# Patient Record
Sex: FEMALE | Race: WHITE | HISPANIC OR LATINO | Employment: OTHER | URBAN - METROPOLITAN AREA
[De-identification: names, ages, dates, MRNs, and addresses within clinical notes are randomized per-mention and may not be internally consistent; named-entity substitution may affect disease eponyms.]

---

## 2019-02-01 ENCOUNTER — OFFICE VISIT (OUTPATIENT)
Dept: URGENT CARE | Facility: CLINIC | Age: 49
End: 2019-02-01
Payer: COMMERCIAL

## 2019-02-01 VITALS
SYSTOLIC BLOOD PRESSURE: 135 MMHG | DIASTOLIC BLOOD PRESSURE: 62 MMHG | BODY MASS INDEX: 35.36 KG/M2 | OXYGEN SATURATION: 98 % | WEIGHT: 220 LBS | RESPIRATION RATE: 18 BRPM | HEART RATE: 77 BPM | TEMPERATURE: 99.6 F | HEIGHT: 66 IN

## 2019-02-01 DIAGNOSIS — N63.10 LUMP OF BREAST, RIGHT: Primary | ICD-10-CM

## 2019-02-01 PROCEDURE — G0381 LEV 2 HOSP TYPE B ED VISIT: HCPCS | Performed by: NURSE PRACTITIONER

## 2019-02-01 PROCEDURE — 99282 EMERGENCY DEPT VISIT SF MDM: CPT | Performed by: NURSE PRACTITIONER

## 2019-02-01 NOTE — PATIENT INSTRUCTIONS
Breast Mass   AMBULATORY CARE:   A breast mass  is a lump or growth in your breast or underarm  A cyst (fluid-filled pocket), an injury, or changes in your breast tissue are the most common causes  A breast mass can also be caused by cancer  You must follow up as directed to find the cause of your breast mass  Contact your healthcare provider if:   · Your breast mass returns or grows larger  · You have pain in your breast or underarm  · You have nipple discharge  · You notice other changes to your breasts or nipples, such as dimpling or a rash  · You had an aspiration and you have redness, pain, swelling, or warmth near the aspiration site  · You have a fever  · You have questions or concerns about your condition or care  Continue to do breast self-exams as directed:  Check your breast for changes in size, shape, or feel of the breast tissue  Check under your arms and all around your breasts  If you have monthly periods, examine your breasts after your period is over  Contact your healthcare provider if you notice any changes in your breasts  If you have questions, ask for more information on how to do a breast self-exam         Follow up with your healthcare provider as directed: You may need to return for more tests  Write down your questions so you remember to ask them during your visits  © 2017 [employer] [employer] Information is for End User's use only and may not be sold, redistributed or otherwise used for commercial purposes  All illustrations and images included in CareNotes® are the copyrighted property of A D A M , Inc  or [employer]  The above information is an  only  It is not intended as medical advice for individual conditions or treatments  Talk to your doctor, nurse or pharmacist before following any medical regimen to see if it is safe and effective for you

## 2019-02-01 NOTE — PROGRESS NOTES
Assessment/Plan    Lump of breast, right [N63 10]  1  Lump of breast, right       - patient advised to follow up with PCP will order ultrasound and/or mammogram  - advised to do so ASAP        Subjective:  Lump on the breast     Patient ID: Narayan Smith is a 51 y o  female  Reason For Visit / Chief Complaint  Chief Complaint   Patient presents with    Breast Problem     pt noted a lump to her right breast 3 days ago  pain noted  I assessed pts breast, no redness noted, lump felt/noted  HPI  She reports she noticed a lump on her breast about 3 days ago  Right breast   No pain  No previous occurrence of such lump  reports level of discomfort in the affected area is 1/10  Past Medical History:   Diagnosis Date    Hypotension        Past Surgical History:   Procedure Laterality Date    MYOMECTOMY      H/O FIBROIDS, NO SURGERY NEEDED       History reviewed  No pertinent family history  Review of Systems   Cardiovascular: Negative for chest pain (No trauma to the chest or breast   No discharge from the nipple  No bruising on the breast )  Objective:    /62 (BP Location: Left arm, Patient Position: Sitting)   Pulse 77   Temp 99 6 °F (37 6 °C) (Tympanic)   Resp 18   Ht 5' 6" (1 676 m)   Wt 99 8 kg (220 lb)   SpO2 98%   BMI 35 51 kg/m²     Physical Exam   Pulmonary/Chest:   Patient advised she will need an ultrasound for a more detailed examination of the lump  That week cannot order an ultrasound at this location  Advised that she will have to follow up with PCP will decide whether she needs either an ultrasound or mammogram of both  Patient got very angry and left because she felt we were supposed to give an outpatient order for ultrasound  Breast exam not performed

## 2019-05-02 ENCOUNTER — TRANSCRIBE ORDERS (OUTPATIENT)
Dept: MAMMOGRAPHY | Facility: CLINIC | Age: 49
End: 2019-05-02

## 2019-05-02 ENCOUNTER — OFFICE VISIT (OUTPATIENT)
Dept: OBGYN CLINIC | Facility: CLINIC | Age: 49
End: 2019-05-02
Payer: COMMERCIAL

## 2019-05-02 VITALS — DIASTOLIC BLOOD PRESSURE: 70 MMHG | BODY MASS INDEX: 34.86 KG/M2 | WEIGHT: 216 LBS | SYSTOLIC BLOOD PRESSURE: 110 MMHG

## 2019-05-02 DIAGNOSIS — N93.9 ABNORMAL UTERINE BLEEDING (AUB): ICD-10-CM

## 2019-05-02 DIAGNOSIS — N63.10 BREAST MASS, RIGHT: Primary | ICD-10-CM

## 2019-05-02 PROCEDURE — 99204 OFFICE O/P NEW MOD 45 MIN: CPT | Performed by: NURSE PRACTITIONER

## 2019-05-03 ENCOUNTER — HOSPITAL ENCOUNTER (OUTPATIENT)
Dept: ULTRASOUND IMAGING | Facility: CLINIC | Age: 49
Discharge: HOME | End: 2019-05-03
Admitting: RADIOLOGY
Payer: COMMERCIAL

## 2019-05-03 ENCOUNTER — HOSPITAL ENCOUNTER (OUTPATIENT)
Dept: MAMMOGRAPHY | Facility: CLINIC | Age: 49
Discharge: HOME | End: 2019-05-03
Payer: COMMERCIAL

## 2019-05-03 ENCOUNTER — HOSPITAL ENCOUNTER (OUTPATIENT)
Dept: ULTRASOUND IMAGING | Facility: CLINIC | Age: 49
Discharge: HOME | End: 2019-05-03
Payer: COMMERCIAL

## 2019-05-03 VITALS
SYSTOLIC BLOOD PRESSURE: 120 MMHG | BODY MASS INDEX: 34.72 KG/M2 | DIASTOLIC BLOOD PRESSURE: 80 MMHG | HEIGHT: 66 IN | WEIGHT: 216 LBS | HEART RATE: 68 BPM

## 2019-05-03 DIAGNOSIS — R92.8 ABNORMAL MAMMOGRAM: ICD-10-CM

## 2019-05-03 DIAGNOSIS — R92.8 ABNORMAL ULTRASOUND OF BREAST: ICD-10-CM

## 2019-05-03 DIAGNOSIS — N63.10 BREAST MASS, RIGHT: ICD-10-CM

## 2019-05-03 PROCEDURE — 88342 IMHCHEM/IMCYTCHM 1ST ANTB: CPT | Performed by: PATHOLOGY

## 2019-05-03 PROCEDURE — 77066 DX MAMMO INCL CAD BI: CPT

## 2019-05-03 PROCEDURE — 88361 TUMOR IMMUNOHISTOCHEM/COMPUT: CPT | Performed by: PATHOLOGY

## 2019-05-03 PROCEDURE — G0279 TOMOSYNTHESIS, MAMMO: HCPCS

## 2019-05-03 PROCEDURE — 88341 IMHCHEM/IMCYTCHM EA ADD ANTB: CPT | Performed by: PATHOLOGY

## 2019-05-03 PROCEDURE — 76642 ULTRASOUND BREAST LIMITED: CPT

## 2019-05-03 PROCEDURE — 76942 ECHO GUIDE FOR BIOPSY: CPT

## 2019-05-03 PROCEDURE — 88305 TISSUE EXAM BY PATHOLOGIST: CPT | Performed by: PATHOLOGY

## 2019-05-03 PROCEDURE — 19083 BX BREAST 1ST LESION US IMAG: CPT

## 2019-05-03 RX ORDER — LIDOCAINE HYDROCHLORIDE 10 MG/ML
4 INJECTION, SOLUTION INFILTRATION; PERINEURAL ONCE
Status: COMPLETED | OUTPATIENT
Start: 2019-05-03 | End: 2019-05-03

## 2019-05-03 RX ADMIN — LIDOCAINE HYDROCHLORIDE 4 ML: 10 INJECTION, SOLUTION INFILTRATION; PERINEURAL at 14:08

## 2019-05-03 RX ADMIN — LIDOCAINE HYDROCHLORIDE 4 ML: 10 INJECTION, SOLUTION INFILTRATION; PERINEURAL at 13:57

## 2019-05-05 LAB
BASOPHILS # BLD AUTO: 7 CELLS/UL (ref 0–200)
BASOPHILS NFR BLD AUTO: 0.1 %
EOSINOPHIL # BLD AUTO: 127 CELLS/UL (ref 15–500)
EOSINOPHIL NFR BLD AUTO: 1.9 %
ERYTHROCYTE [DISTWIDTH] IN BLOOD BY AUTOMATED COUNT: 12.4 % (ref 11–15)
HCT VFR BLD AUTO: 39.2 % (ref 35–45)
HGB BLD-MCNC: 13 G/DL (ref 11.7–15.5)
LYMPHOCYTES # BLD AUTO: 1956 CELLS/UL (ref 850–3900)
LYMPHOCYTES NFR BLD AUTO: 29.2 %
MCH RBC QN AUTO: 30.1 PG (ref 27–33)
MCHC RBC AUTO-ENTMCNC: 33.2 G/DL (ref 32–36)
MCV RBC AUTO: 90.7 FL (ref 80–100)
MONOCYTES # BLD AUTO: 509 CELLS/UL (ref 200–950)
MONOCYTES NFR BLD AUTO: 7.6 %
NEUTROPHILS # BLD AUTO: 4100 CELLS/UL (ref 1500–7800)
NEUTROPHILS NFR BLD AUTO: 61.2 %
PLATELET # BLD AUTO: 185 THOUSAND/UL (ref 140–400)
PMV BLD REES-ECKER: 13.1 FL (ref 7.5–12.5)
RBC # BLD AUTO: 4.32 MILLION/UL (ref 3.8–5.1)
TSH SERPL-ACNC: 1.74 MIU/L
WBC # BLD AUTO: 6.7 THOUSAND/UL (ref 3.8–10.8)

## 2019-05-06 ENCOUNTER — TELEPHONE (OUTPATIENT)
Dept: OBGYN CLINIC | Facility: CLINIC | Age: 49
End: 2019-05-06

## 2019-05-08 ENCOUNTER — TELEPHONE (OUTPATIENT)
Dept: MAMMOGRAPHY | Facility: CLINIC | Age: 49
End: 2019-05-08

## 2019-05-16 ENCOUNTER — OFFICE VISIT (OUTPATIENT)
Dept: SURGICAL ONCOLOGY | Facility: HOSPITAL | Age: 49
End: 2019-05-16
Payer: COMMERCIAL

## 2019-05-16 VITALS
RESPIRATION RATE: 14 BRPM | SYSTOLIC BLOOD PRESSURE: 100 MMHG | WEIGHT: 217 LBS | HEART RATE: 76 BPM | TEMPERATURE: 97.1 F | DIASTOLIC BLOOD PRESSURE: 70 MMHG | HEIGHT: 66 IN | BODY MASS INDEX: 34.87 KG/M2

## 2019-05-16 DIAGNOSIS — C50.111 MALIGNANT NEOPLASM OF CENTRAL PORTION OF RIGHT BREAST IN FEMALE, ESTROGEN RECEPTOR POSITIVE (HCC): Primary | ICD-10-CM

## 2019-05-16 DIAGNOSIS — Z80.0 FAMILY HISTORY OF COLON CANCER: ICD-10-CM

## 2019-05-16 DIAGNOSIS — Z17.0 MALIGNANT NEOPLASM OF CENTRAL PORTION OF RIGHT BREAST IN FEMALE, ESTROGEN RECEPTOR POSITIVE (HCC): Primary | ICD-10-CM

## 2019-05-16 DIAGNOSIS — Z80.3 FAMILY HISTORY OF BREAST CANCER IN FEMALE: ICD-10-CM

## 2019-05-16 DIAGNOSIS — R92.2 DENSE BREAST TISSUE: ICD-10-CM

## 2019-05-16 DIAGNOSIS — C50.911 CARCINOMA OF RIGHT BREAST METASTATIC TO AXILLARY LYMPH NODE (HCC): ICD-10-CM

## 2019-05-16 DIAGNOSIS — C77.3 CARCINOMA OF RIGHT BREAST METASTATIC TO AXILLARY LYMPH NODE (HCC): ICD-10-CM

## 2019-05-16 PROBLEM — C50.419 MALIGNANT NEOPLASM OF UPPER-OUTER QUADRANT OF BREAST IN FEMALE, ESTROGEN RECEPTOR POSITIVE (HCC): Status: ACTIVE | Noted: 2019-05-02

## 2019-05-16 PROCEDURE — 99245 OFF/OP CONSLTJ NEW/EST HI 55: CPT | Performed by: SURGERY

## 2019-05-16 RX ORDER — BENZONATATE 100 MG/1
100 CAPSULE ORAL 3 TIMES DAILY PRN
COMMUNITY
End: 2019-06-07 | Stop reason: ALTCHOICE

## 2019-05-17 ENCOUNTER — TELEPHONE (OUTPATIENT)
Dept: SURGICAL ONCOLOGY | Facility: CLINIC | Age: 49
End: 2019-05-17

## 2019-05-22 ENCOUNTER — TELEPHONE (OUTPATIENT)
Dept: SURGICAL ONCOLOGY | Facility: CLINIC | Age: 49
End: 2019-05-22

## 2019-05-22 DIAGNOSIS — N61.0 MASTITIS: Primary | ICD-10-CM

## 2019-05-22 RX ORDER — CEPHALEXIN 500 MG/1
500 CAPSULE ORAL EVERY 8 HOURS SCHEDULED
Qty: 15 CAPSULE | Refills: 0 | Status: SHIPPED | OUTPATIENT
Start: 2019-05-22 | End: 2019-05-27

## 2019-05-23 ENCOUNTER — HOSPITAL ENCOUNTER (OUTPATIENT)
Dept: NUCLEAR MEDICINE | Facility: HOSPITAL | Age: 49
Discharge: HOME | End: 2019-05-23
Attending: SURGERY
Payer: COMMERCIAL

## 2019-05-23 DIAGNOSIS — C50.911 CARCINOMA OF RIGHT BREAST METASTATIC TO AXILLARY LYMPH NODE (HCC): ICD-10-CM

## 2019-05-23 DIAGNOSIS — Z15.01 BRCA2 POSITIVE: Primary | ICD-10-CM

## 2019-05-23 DIAGNOSIS — C77.3 CARCINOMA OF RIGHT BREAST METASTATIC TO AXILLARY LYMPH NODE (HCC): ICD-10-CM

## 2019-05-23 DIAGNOSIS — Z15.09 BRCA2 POSITIVE: Primary | ICD-10-CM

## 2019-05-23 PROCEDURE — A9503 TC99M MEDRONATE: HCPCS

## 2019-05-23 PROCEDURE — 78306 BONE IMAGING WHOLE BODY: CPT

## 2019-05-24 ENCOUNTER — HOSPITAL ENCOUNTER (OUTPATIENT)
Dept: CT IMAGING | Facility: CLINIC | Age: 49
Discharge: HOME | End: 2019-05-24
Attending: SURGERY
Payer: COMMERCIAL

## 2019-05-24 DIAGNOSIS — C77.3 CARCINOMA OF RIGHT BREAST METASTATIC TO AXILLARY LYMPH NODE (HCC): ICD-10-CM

## 2019-05-24 DIAGNOSIS — C50.911 CARCINOMA OF RIGHT BREAST METASTATIC TO AXILLARY LYMPH NODE (HCC): ICD-10-CM

## 2019-05-24 PROCEDURE — 74176 CT ABD & PELVIS W/O CONTRAST: CPT

## 2019-05-24 PROCEDURE — 71250 CT THORAX DX C-: CPT

## 2019-05-28 ENCOUNTER — TELEPHONE (OUTPATIENT)
Dept: OBGYN CLINIC | Facility: CLINIC | Age: 49
End: 2019-05-28

## 2019-06-03 DIAGNOSIS — R93.89 ABNORMAL CT SCAN: ICD-10-CM

## 2019-06-03 DIAGNOSIS — Z15.09 BRCA2 POSITIVE: ICD-10-CM

## 2019-06-03 DIAGNOSIS — C77.3 CARCINOMA OF RIGHT BREAST METASTATIC TO AXILLARY LYMPH NODE (HCC): Primary | ICD-10-CM

## 2019-06-03 DIAGNOSIS — Z15.01 BRCA2 POSITIVE: ICD-10-CM

## 2019-06-03 DIAGNOSIS — C50.911 CARCINOMA OF RIGHT BREAST METASTATIC TO AXILLARY LYMPH NODE (HCC): Primary | ICD-10-CM

## 2019-06-04 ENCOUNTER — TELEPHONE (OUTPATIENT)
Dept: SURGICAL ONCOLOGY | Facility: CLINIC | Age: 49
End: 2019-06-04

## 2019-06-07 ENCOUNTER — CONSULT (OUTPATIENT)
Dept: HEMATOLOGY ONCOLOGY | Facility: CLINIC | Age: 49
End: 2019-06-07
Payer: COMMERCIAL

## 2019-06-07 VITALS
DIASTOLIC BLOOD PRESSURE: 80 MMHG | HEART RATE: 70 BPM | OXYGEN SATURATION: 97 % | RESPIRATION RATE: 18 BRPM | HEIGHT: 66 IN | SYSTOLIC BLOOD PRESSURE: 124 MMHG | BODY MASS INDEX: 34.72 KG/M2 | TEMPERATURE: 95.4 F | WEIGHT: 216 LBS

## 2019-06-07 DIAGNOSIS — Z15.09 BRCA2 POSITIVE: Primary | ICD-10-CM

## 2019-06-07 DIAGNOSIS — C50.911 CARCINOMA OF RIGHT BREAST METASTATIC TO AXILLARY LYMPH NODE (HCC): ICD-10-CM

## 2019-06-07 DIAGNOSIS — C77.3 CARCINOMA OF RIGHT BREAST METASTATIC TO AXILLARY LYMPH NODE (HCC): ICD-10-CM

## 2019-06-07 DIAGNOSIS — Z15.01 BRCA2 POSITIVE: Primary | ICD-10-CM

## 2019-06-07 PROCEDURE — 99245 OFF/OP CONSLTJ NEW/EST HI 55: CPT | Performed by: INTERNAL MEDICINE

## 2019-06-10 ENCOUNTER — DOCUMENTATION (OUTPATIENT)
Dept: HEMATOLOGY ONCOLOGY | Facility: CLINIC | Age: 49
End: 2019-06-10

## 2019-06-11 ENCOUNTER — HOSPITAL ENCOUNTER (OUTPATIENT)
Dept: RADIOLOGY | Facility: HOSPITAL | Age: 49
Discharge: HOME | End: 2019-06-11
Attending: SURGERY | Admitting: RADIOLOGY
Payer: COMMERCIAL

## 2019-06-11 VITALS
BODY MASS INDEX: 34.72 KG/M2 | RESPIRATION RATE: 16 BRPM | TEMPERATURE: 98.7 F | WEIGHT: 216 LBS | SYSTOLIC BLOOD PRESSURE: 120 MMHG | HEIGHT: 66 IN | OXYGEN SATURATION: 95 % | DIASTOLIC BLOOD PRESSURE: 57 MMHG | HEART RATE: 70 BPM

## 2019-06-11 DIAGNOSIS — R93.89 ABNORMAL CT SCAN: ICD-10-CM

## 2019-06-11 DIAGNOSIS — C50.911 CARCINOMA OF RIGHT BREAST METASTATIC TO AXILLARY LYMPH NODE (HCC): ICD-10-CM

## 2019-06-11 DIAGNOSIS — C77.3 CARCINOMA OF RIGHT BREAST METASTATIC TO AXILLARY LYMPH NODE (HCC): ICD-10-CM

## 2019-06-11 LAB
ERYTHROCYTE [DISTWIDTH] IN BLOOD BY AUTOMATED COUNT: 12.9 % (ref 11.6–15.1)
HCG SERPL QL: NEGATIVE
HCT VFR BLD AUTO: 40.8 % (ref 34.8–46.1)
HGB BLD-MCNC: 13.4 G/DL (ref 11.5–15.4)
INR PPP: 0.96 (ref 0.86–1.17)
MCH RBC QN AUTO: 29.6 PG (ref 26.8–34.3)
MCHC RBC AUTO-ENTMCNC: 32.8 G/DL (ref 31.4–37.4)
MCV RBC AUTO: 90 FL (ref 82–98)
PLATELET # BLD AUTO: 200 THOUSANDS/UL (ref 149–390)
PMV BLD AUTO: 12.5 FL (ref 8.9–12.7)
PROTHROMBIN TIME: 12.9 SECONDS (ref 11.8–14.2)
RBC # BLD AUTO: 4.52 MILLION/UL (ref 3.81–5.12)
WBC # BLD AUTO: 8.04 THOUSAND/UL (ref 4.31–10.16)

## 2019-06-11 PROCEDURE — 99152 MOD SED SAME PHYS/QHP 5/>YRS: CPT | Performed by: RADIOLOGY

## 2019-06-11 PROCEDURE — 88334 PATH CONSLTJ SURG CYTO XM EA: CPT | Performed by: PATHOLOGY

## 2019-06-11 PROCEDURE — 84703 CHORIONIC GONADOTROPIN ASSAY: CPT | Performed by: RADIOLOGY

## 2019-06-11 PROCEDURE — 76942 ECHO GUIDE FOR BIOPSY: CPT

## 2019-06-11 PROCEDURE — 88342 IMHCHEM/IMCYTCHM 1ST ANTB: CPT | Performed by: PATHOLOGY

## 2019-06-11 PROCEDURE — 47000 NEEDLE BIOPSY OF LIVER PERQ: CPT

## 2019-06-11 PROCEDURE — 88341 IMHCHEM/IMCYTCHM EA ADD ANTB: CPT | Performed by: PATHOLOGY

## 2019-06-11 PROCEDURE — 99153 MOD SED SAME PHYS/QHP EA: CPT

## 2019-06-11 PROCEDURE — 88333 PATH CONSLTJ SURG CYTO XM 1: CPT | Performed by: PATHOLOGY

## 2019-06-11 PROCEDURE — 88307 TISSUE EXAM BY PATHOLOGIST: CPT | Performed by: PATHOLOGY

## 2019-06-11 PROCEDURE — 76942 ECHO GUIDE FOR BIOPSY: CPT | Performed by: RADIOLOGY

## 2019-06-11 PROCEDURE — 47000 NEEDLE BIOPSY OF LIVER PERQ: CPT | Performed by: RADIOLOGY

## 2019-06-11 PROCEDURE — 85027 COMPLETE CBC AUTOMATED: CPT | Performed by: RADIOLOGY

## 2019-06-11 PROCEDURE — 85610 PROTHROMBIN TIME: CPT | Performed by: RADIOLOGY

## 2019-06-11 PROCEDURE — 99152 MOD SED SAME PHYS/QHP 5/>YRS: CPT

## 2019-06-11 RX ORDER — MIDAZOLAM HYDROCHLORIDE 1 MG/ML
INJECTION INTRAMUSCULAR; INTRAVENOUS CODE/TRAUMA/SEDATION MEDICATION
Status: COMPLETED | OUTPATIENT
Start: 2019-06-11 | End: 2019-06-11

## 2019-06-11 RX ORDER — FENTANYL CITRATE 50 UG/ML
INJECTION, SOLUTION INTRAMUSCULAR; INTRAVENOUS CODE/TRAUMA/SEDATION MEDICATION
Status: COMPLETED | OUTPATIENT
Start: 2019-06-11 | End: 2019-06-11

## 2019-06-11 RX ORDER — DIPHENHYDRAMINE HYDROCHLORIDE 50 MG/ML
INJECTION INTRAMUSCULAR; INTRAVENOUS CODE/TRAUMA/SEDATION MEDICATION
Status: COMPLETED | OUTPATIENT
Start: 2019-06-11 | End: 2019-06-11

## 2019-06-11 RX ORDER — SODIUM CHLORIDE 9 MG/ML
75 INJECTION, SOLUTION INTRAVENOUS CONTINUOUS
Status: DISCONTINUED | OUTPATIENT
Start: 2019-06-11 | End: 2019-06-11 | Stop reason: HOSPADM

## 2019-06-11 RX ADMIN — DIPHENHYDRAMINE HYDROCHLORIDE 50 MG: 50 INJECTION, SOLUTION INTRAMUSCULAR; INTRAVENOUS at 11:47

## 2019-06-11 RX ADMIN — MIDAZOLAM 1 MG: 1 INJECTION INTRAMUSCULAR; INTRAVENOUS at 12:00

## 2019-06-11 RX ADMIN — MIDAZOLAM 1 MG: 1 INJECTION INTRAMUSCULAR; INTRAVENOUS at 11:44

## 2019-06-11 RX ADMIN — FENTANYL CITRATE 50 MCG: 50 INJECTION, SOLUTION INTRAMUSCULAR; INTRAVENOUS at 11:44

## 2019-06-11 RX ADMIN — MIDAZOLAM 1 MG: 1 INJECTION INTRAMUSCULAR; INTRAVENOUS at 11:37

## 2019-06-11 RX ADMIN — FENTANYL CITRATE 50 MCG: 50 INJECTION, SOLUTION INTRAMUSCULAR; INTRAVENOUS at 11:37

## 2019-06-11 RX ADMIN — FENTANYL CITRATE 50 MCG: 50 INJECTION, SOLUTION INTRAMUSCULAR; INTRAVENOUS at 12:00

## 2019-06-20 ENCOUNTER — OFFICE VISIT (OUTPATIENT)
Dept: HEMATOLOGY ONCOLOGY | Facility: CLINIC | Age: 49
End: 2019-06-20
Payer: COMMERCIAL

## 2019-06-20 VITALS
HEIGHT: 66 IN | DIASTOLIC BLOOD PRESSURE: 72 MMHG | HEART RATE: 77 BPM | SYSTOLIC BLOOD PRESSURE: 144 MMHG | OXYGEN SATURATION: 96 % | RESPIRATION RATE: 18 BRPM | BODY MASS INDEX: 34.72 KG/M2 | WEIGHT: 216 LBS | TEMPERATURE: 98.5 F

## 2019-06-20 DIAGNOSIS — Z15.09 BRCA2 POSITIVE: ICD-10-CM

## 2019-06-20 DIAGNOSIS — Z17.0 MALIGNANT NEOPLASM OF CENTRAL PORTION OF RIGHT BREAST IN FEMALE, ESTROGEN RECEPTOR POSITIVE (HCC): Primary | ICD-10-CM

## 2019-06-20 DIAGNOSIS — C78.7 LIVER METASTASES (HCC): ICD-10-CM

## 2019-06-20 DIAGNOSIS — C50.111 MALIGNANT NEOPLASM OF CENTRAL PORTION OF RIGHT BREAST IN FEMALE, ESTROGEN RECEPTOR POSITIVE (HCC): Primary | ICD-10-CM

## 2019-06-20 DIAGNOSIS — Z15.01 BRCA2 POSITIVE: ICD-10-CM

## 2019-06-20 PROCEDURE — 99215 OFFICE O/P EST HI 40 MIN: CPT | Performed by: INTERNAL MEDICINE

## 2019-06-20 RX ORDER — LETROZOLE 2.5 MG/1
2.5 TABLET, FILM COATED ORAL DAILY
Qty: 90 TABLET | Refills: 1 | Status: SHIPPED | OUTPATIENT
Start: 2019-06-20 | End: 2019-12-02 | Stop reason: SDUPTHER

## 2019-06-21 ENCOUNTER — CONSULT (OUTPATIENT)
Dept: GYNECOLOGIC ONCOLOGY | Facility: CLINIC | Age: 49
End: 2019-06-21
Payer: COMMERCIAL

## 2019-06-21 ENCOUNTER — DOCUMENTATION (OUTPATIENT)
Dept: HEMATOLOGY ONCOLOGY | Facility: CLINIC | Age: 49
End: 2019-06-21

## 2019-06-21 VITALS
SYSTOLIC BLOOD PRESSURE: 124 MMHG | DIASTOLIC BLOOD PRESSURE: 84 MMHG | BODY MASS INDEX: 35.03 KG/M2 | TEMPERATURE: 98 F | HEIGHT: 66 IN | WEIGHT: 218 LBS | HEART RATE: 70 BPM

## 2019-06-21 DIAGNOSIS — N93.9 ABNORMAL UTERINE BLEEDING (AUB): ICD-10-CM

## 2019-06-21 DIAGNOSIS — C78.7 LIVER METASTASES (HCC): ICD-10-CM

## 2019-06-21 DIAGNOSIS — Z15.01 BRCA2 POSITIVE: ICD-10-CM

## 2019-06-21 DIAGNOSIS — C50.111 MALIGNANT NEOPLASM OF CENTRAL PORTION OF RIGHT BREAST IN FEMALE, ESTROGEN RECEPTOR POSITIVE (HCC): Primary | ICD-10-CM

## 2019-06-21 DIAGNOSIS — Z17.0 MALIGNANT NEOPLASM OF CENTRAL PORTION OF RIGHT BREAST IN FEMALE, ESTROGEN RECEPTOR POSITIVE (HCC): Primary | ICD-10-CM

## 2019-06-21 DIAGNOSIS — C50.911 CARCINOMA OF RIGHT BREAST METASTATIC TO AXILLARY LYMPH NODE (HCC): ICD-10-CM

## 2019-06-21 DIAGNOSIS — Z15.09 BRCA2 POSITIVE: ICD-10-CM

## 2019-06-21 DIAGNOSIS — C77.3 CARCINOMA OF RIGHT BREAST METASTATIC TO AXILLARY LYMPH NODE (HCC): ICD-10-CM

## 2019-06-21 PROCEDURE — 58100 BIOPSY OF UTERUS LINING: CPT | Performed by: OBSTETRICS & GYNECOLOGY

## 2019-06-21 PROCEDURE — 99245 OFF/OP CONSLTJ NEW/EST HI 55: CPT | Performed by: OBSTETRICS & GYNECOLOGY

## 2019-06-21 PROCEDURE — 88305 TISSUE EXAM BY PATHOLOGIST: CPT | Performed by: PATHOLOGY

## 2019-06-21 RX ORDER — ACETAMINOPHEN 325 MG/1
975 TABLET ORAL ONCE
Status: CANCELLED | OUTPATIENT
Start: 2019-06-21 | End: 2019-06-21

## 2019-06-21 RX ORDER — GABAPENTIN 100 MG/1
100 CAPSULE ORAL ONCE
Status: CANCELLED | OUTPATIENT
Start: 2019-06-21 | End: 2019-06-21

## 2019-06-21 RX ORDER — CELECOXIB 200 MG/1
200 CAPSULE ORAL ONCE
Status: CANCELLED | OUTPATIENT
Start: 2019-06-21 | End: 2019-06-21

## 2019-06-21 RX ORDER — HEPARIN SODIUM 5000 [USP'U]/ML
5000 INJECTION, SOLUTION INTRAVENOUS; SUBCUTANEOUS
Status: CANCELLED | OUTPATIENT
Start: 2019-06-21 | End: 2019-06-22

## 2019-06-24 ENCOUNTER — DOCUMENTATION (OUTPATIENT)
Dept: HEMATOLOGY ONCOLOGY | Facility: CLINIC | Age: 49
End: 2019-06-24

## 2019-06-24 DIAGNOSIS — C50.111 MALIGNANT NEOPLASM OF CENTRAL PORTION OF RIGHT BREAST IN FEMALE, ESTROGEN RECEPTOR POSITIVE (HCC): Primary | ICD-10-CM

## 2019-06-24 DIAGNOSIS — Z17.0 MALIGNANT NEOPLASM OF CENTRAL PORTION OF RIGHT BREAST IN FEMALE, ESTROGEN RECEPTOR POSITIVE (HCC): Primary | ICD-10-CM

## 2019-06-26 ENCOUNTER — DOCUMENTATION (OUTPATIENT)
Dept: HEMATOLOGY ONCOLOGY | Facility: CLINIC | Age: 49
End: 2019-06-26

## 2019-06-28 ENCOUNTER — DOCUMENTATION (OUTPATIENT)
Dept: HEMATOLOGY ONCOLOGY | Facility: CLINIC | Age: 49
End: 2019-06-28

## 2019-07-01 ENCOUNTER — TELEPHONE (OUTPATIENT)
Dept: OBGYN CLINIC | Facility: CLINIC | Age: 49
End: 2019-07-01

## 2019-07-01 NOTE — TELEPHONE ENCOUNTER
----- Message from Bessy Abel sent at 7/1/2019  6:24 PM EDT -----  Regarding: Non-Urgent Medical Question  Contact: [phone]  Should I get the hpv vaccine? To be blunt, I would like to get laid before I am to ill to enjoy it  Hpv seems to be running rampant in my age range  Is it a good idea?

## 2019-07-02 ENCOUNTER — DOCUMENTATION (OUTPATIENT)
Dept: HEMATOLOGY ONCOLOGY | Facility: CLINIC | Age: 49
End: 2019-07-02

## 2019-07-02 NOTE — TELEPHONE ENCOUNTER
Spoke to [place], reviewed with her that [employer] V[employer] is approved for people ages 10-36  Being that she is 51years old it would not be covered by insurance  I also explained that it is a 3 dose vaccine 0-2-6month schedulle and she will be receiving Chemo before her 2nd dose  I did discuss with her safe sex practices with condom use  She offered no other questions at this time

## 2019-07-02 NOTE — PROGRESS NOTES
7-2-19    Received call from Cain at [address] [facility] program stating she has been trying to reach [facility]  For weeks now  She stated Dea Garcia submitted an application for this patient and it does not indicate when the patient will loose her coverage  Cain stated unless she is given a definite date of termination she will have to deny the application and the patient can reapply when she doesn't have coverage  I informed her according to the notes in [facility] [place] reached out to her a number of times and left message  I also informed her that I would reach out to the patient to see if there was a definite [facility]  Call placed to patient per [facility], there is no definite date as the divorce proceedings are still pending  She stated she could ask her  however she was not positive she would get one  Patient stated she will try and call me back with information  [facility] to provide [place] with information

## 2019-07-24 ENCOUNTER — ANESTHESIA EVENT (OUTPATIENT)
Dept: PERIOP | Facility: HOSPITAL | Age: 49
End: 2019-07-24
Payer: COMMERCIAL

## 2019-07-24 LAB
ABO GROUP BLD: NORMAL
BASOPHILS # BLD AUTO: 18 CELLS/UL (ref 0–200)
BASOPHILS NFR BLD AUTO: 0.3 %
BLD GP AB SCN SERPL QL: NORMAL
EOSINOPHIL # BLD AUTO: 128 CELLS/UL (ref 15–500)
EOSINOPHIL NFR BLD AUTO: 2.1 %
ERYTHROCYTE [DISTWIDTH] IN BLOOD BY AUTOMATED COUNT: 12.5 % (ref 11–15)
EST. AVERAGE GLUCOSE BLD GHB EST-MCNC: 117 (CALC)
EST. AVERAGE GLUCOSE BLD GHB EST-SCNC: 6.5 (CALC)
HBA1C MFR BLD: 5.7 % OF TOTAL HGB
HCT VFR BLD AUTO: 39.1 % (ref 35–45)
HGB BLD-MCNC: 13 G/DL (ref 11.7–15.5)
LYMPHOCYTES # BLD AUTO: 1891 CELLS/UL (ref 850–3900)
LYMPHOCYTES NFR BLD AUTO: 31 %
MCH RBC QN AUTO: 29.8 PG (ref 27–33)
MCHC RBC AUTO-ENTMCNC: 33.2 G/DL (ref 32–36)
MCV RBC AUTO: 89.7 FL (ref 80–100)
MONOCYTES # BLD AUTO: 506 CELLS/UL (ref 200–950)
MONOCYTES NFR BLD AUTO: 8.3 %
NEUTROPHILS # BLD AUTO: 3556 CELLS/UL (ref 1500–7800)
NEUTROPHILS NFR BLD AUTO: 58.3 %
PLATELET # BLD AUTO: 193 THOUSAND/UL (ref 140–400)
PMV BLD REES-ECKER: 12.9 FL (ref 7.5–12.5)
RBC # BLD AUTO: 4.36 MILLION/UL (ref 3.8–5.1)
RH BLD: NORMAL
WBC # BLD AUTO: 6.1 THOUSAND/UL (ref 3.8–10.8)

## 2019-07-25 LAB
ALBUMIN SERPL-MCNC: 3.9 G/DL (ref 3.6–5.1)
ALBUMIN/GLOB SERPL: 1.3 (CALC) (ref 1–2.5)
ALP SERPL-CCNC: 64 U/L (ref 33–130)
ALT SERPL-CCNC: 24 U/L (ref 6–29)
AST SERPL-CCNC: 23 U/L (ref 10–35)
BASOPHILS # BLD AUTO: 30 CELLS/UL (ref 0–200)
BASOPHILS NFR BLD AUTO: 0.4 %
BILIRUB SERPL-MCNC: 0.3 MG/DL (ref 0.2–1.2)
BUN SERPL-MCNC: 20 MG/DL (ref 7–25)
BUN/CREAT SERPL: NORMAL (CALC) (ref 6–22)
CALCIUM SERPL-MCNC: 9.2 MG/DL (ref 8.6–10.4)
CHLORIDE SERPL-SCNC: 107 MMOL/L (ref 98–110)
CO2 SERPL-SCNC: 27 MMOL/L (ref 20–32)
CREAT SERPL-MCNC: 0.84 MG/DL (ref 0.5–1.05)
EOSINOPHIL # BLD AUTO: 163 CELLS/UL (ref 15–500)
EOSINOPHIL NFR BLD AUTO: 2.2 %
ERYTHROCYTE [DISTWIDTH] IN BLOOD BY AUTOMATED COUNT: 12.5 % (ref 11–15)
GLOBULIN SER CALC-MCNC: 2.9 G/DL (CALC) (ref 1.9–3.7)
GLUCOSE SERPL-MCNC: 88 MG/DL (ref 65–99)
HCT VFR BLD AUTO: 40.4 % (ref 35–45)
HGB BLD-MCNC: 13.2 G/DL (ref 11.7–15.5)
LYMPHOCYTES # BLD AUTO: 1998 CELLS/UL (ref 850–3900)
LYMPHOCYTES NFR BLD AUTO: 27 %
MCH RBC QN AUTO: 29.6 PG (ref 27–33)
MCHC RBC AUTO-ENTMCNC: 32.7 G/DL (ref 32–36)
MCV RBC AUTO: 90.6 FL (ref 80–100)
MONOCYTES # BLD AUTO: 570 CELLS/UL (ref 200–950)
MONOCYTES NFR BLD AUTO: 7.7 %
NEUTROPHILS # BLD AUTO: 4640 CELLS/UL (ref 1500–7800)
NEUTROPHILS NFR BLD AUTO: 62.7 %
PLATELET # BLD AUTO: 193 THOUSAND/UL (ref 140–400)
PMV BLD REES-ECKER: 12.9 FL (ref 7.5–12.5)
POTASSIUM SERPL-SCNC: 4.3 MMOL/L (ref 3.5–5.3)
PROT SERPL-MCNC: 6.8 G/DL (ref 6.1–8.1)
RBC # BLD AUTO: 4.46 MILLION/UL (ref 3.8–5.1)
SL AMB EGFR AFRICAN AMERICAN: 94 ML/MIN/1.73M2
SL AMB EGFR NON AFRICAN AMERICAN: 81 ML/MIN/1.73M2
SODIUM SERPL-SCNC: 141 MMOL/L (ref 135–146)
WBC # BLD AUTO: 7.4 THOUSAND/UL (ref 3.8–10.8)

## 2019-07-29 ENCOUNTER — HOSPITAL ENCOUNTER (OUTPATIENT)
Facility: HOSPITAL | Age: 49
Setting detail: OUTPATIENT SURGERY
Discharge: HOME | End: 2019-07-29
Attending: OBSTETRICS & GYNECOLOGY | Admitting: OBSTETRICS & GYNECOLOGY
Payer: COMMERCIAL

## 2019-07-29 ENCOUNTER — ANESTHESIA (OUTPATIENT)
Dept: PERIOP | Facility: HOSPITAL | Age: 49
End: 2019-07-29
Payer: COMMERCIAL

## 2019-07-29 VITALS
HEIGHT: 66 IN | HEART RATE: 55 BPM | SYSTOLIC BLOOD PRESSURE: 117 MMHG | TEMPERATURE: 95.6 F | DIASTOLIC BLOOD PRESSURE: 66 MMHG | WEIGHT: 215 LBS | OXYGEN SATURATION: 96 % | BODY MASS INDEX: 34.55 KG/M2 | RESPIRATION RATE: 20 BRPM

## 2019-07-29 DIAGNOSIS — N93.9 ABNORMAL UTERINE BLEEDING (AUB): Primary | ICD-10-CM

## 2019-07-29 DIAGNOSIS — C78.7 LIVER METASTASES (HCC): ICD-10-CM

## 2019-07-29 DIAGNOSIS — Z15.09 BRCA2 POSITIVE: ICD-10-CM

## 2019-07-29 DIAGNOSIS — Z17.0 MALIGNANT NEOPLASM OF CENTRAL PORTION OF RIGHT BREAST IN FEMALE, ESTROGEN RECEPTOR POSITIVE (HCC): ICD-10-CM

## 2019-07-29 DIAGNOSIS — C50.111 MALIGNANT NEOPLASM OF CENTRAL PORTION OF RIGHT BREAST IN FEMALE, ESTROGEN RECEPTOR POSITIVE (HCC): ICD-10-CM

## 2019-07-29 DIAGNOSIS — C50.911 CARCINOMA OF RIGHT BREAST METASTATIC TO AXILLARY LYMPH NODE (HCC): ICD-10-CM

## 2019-07-29 DIAGNOSIS — C77.3 CARCINOMA OF RIGHT BREAST METASTATIC TO AXILLARY LYMPH NODE (HCC): ICD-10-CM

## 2019-07-29 DIAGNOSIS — Z15.01 BRCA2 POSITIVE: ICD-10-CM

## 2019-07-29 PROBLEM — Z90.722 S/P BSO (BILATERAL SALPINGO-OOPHORECTOMY): Status: ACTIVE | Noted: 2019-07-29

## 2019-07-29 LAB
ABO GROUP BLD: NORMAL
ALBUMIN SERPL BCP-MCNC: 3.6 G/DL (ref 3.5–5)
ALP SERPL-CCNC: 77 U/L (ref 46–116)
ALT SERPL W P-5'-P-CCNC: 34 U/L (ref 12–78)
ANION GAP SERPL CALCULATED.3IONS-SCNC: 6 MMOL/L (ref 4–13)
AST SERPL W P-5'-P-CCNC: 32 U/L (ref 5–45)
BILIRUB SERPL-MCNC: 0.19 MG/DL (ref 0.2–1)
BLD GP AB SCN SERPL QL: NEGATIVE
BUN SERPL-MCNC: 17 MG/DL (ref 5–25)
CALCIUM SERPL-MCNC: 8.7 MG/DL (ref 8.3–10.1)
CHLORIDE SERPL-SCNC: 109 MMOL/L (ref 100–108)
CO2 SERPL-SCNC: 26 MMOL/L (ref 21–32)
CREAT SERPL-MCNC: 0.82 MG/DL (ref 0.6–1.3)
EXT PREGNANCY TEST URINE: NEGATIVE
EXT. CONTROL: NORMAL
GFR SERPL CREATININE-BSD FRML MDRD: 84 ML/MIN/1.73SQ M
GLUCOSE P FAST SERPL-MCNC: 129 MG/DL (ref 65–99)
GLUCOSE SERPL-MCNC: 129 MG/DL (ref 65–140)
POTASSIUM SERPL-SCNC: 3.6 MMOL/L (ref 3.5–5.3)
PROT SERPL-MCNC: 7.3 G/DL (ref 6.4–8.2)
RH BLD: POSITIVE
SODIUM SERPL-SCNC: 141 MMOL/L (ref 136–145)
SPECIMEN EXPIRATION DATE: NORMAL

## 2019-07-29 PROCEDURE — 88112 CYTOPATH CELL ENHANCE TECH: CPT | Performed by: PATHOLOGY

## 2019-07-29 PROCEDURE — 88305 TISSUE EXAM BY PATHOLOGIST: CPT | Performed by: PATHOLOGY

## 2019-07-29 PROCEDURE — 86900 BLOOD TYPING SEROLOGIC ABO: CPT | Performed by: STUDENT IN AN ORGANIZED HEALTH CARE EDUCATION/TRAINING PROGRAM

## 2019-07-29 PROCEDURE — 86901 BLOOD TYPING SEROLOGIC RH(D): CPT | Performed by: STUDENT IN AN ORGANIZED HEALTH CARE EDUCATION/TRAINING PROGRAM

## 2019-07-29 PROCEDURE — NC001 PR NO CHARGE: Performed by: OBSTETRICS & GYNECOLOGY

## 2019-07-29 PROCEDURE — 80053 COMPREHEN METABOLIC PANEL: CPT | Performed by: STUDENT IN AN ORGANIZED HEALTH CARE EDUCATION/TRAINING PROGRAM

## 2019-07-29 PROCEDURE — 58661 LAPAROSCOPY REMOVE ADNEXA: CPT | Performed by: OBSTETRICS & GYNECOLOGY

## 2019-07-29 PROCEDURE — 81025 URINE PREGNANCY TEST: CPT | Performed by: OBSTETRICS & GYNECOLOGY

## 2019-07-29 PROCEDURE — 86850 RBC ANTIBODY SCREEN: CPT | Performed by: STUDENT IN AN ORGANIZED HEALTH CARE EDUCATION/TRAINING PROGRAM

## 2019-07-29 RX ORDER — GABAPENTIN 100 MG/1
100 CAPSULE ORAL ONCE
Status: COMPLETED | OUTPATIENT
Start: 2019-07-29 | End: 2019-07-29

## 2019-07-29 RX ORDER — METOCLOPRAMIDE HYDROCHLORIDE 5 MG/ML
10 INJECTION INTRAMUSCULAR; INTRAVENOUS ONCE AS NEEDED
Status: DISCONTINUED | OUTPATIENT
Start: 2019-07-29 | End: 2019-07-29 | Stop reason: HOSPADM

## 2019-07-29 RX ORDER — GLYCOPYRROLATE 0.2 MG/ML
INJECTION INTRAMUSCULAR; INTRAVENOUS AS NEEDED
Status: DISCONTINUED | OUTPATIENT
Start: 2019-07-29 | End: 2019-07-29 | Stop reason: SURG

## 2019-07-29 RX ORDER — PROPOFOL 10 MG/ML
INJECTION, EMULSION INTRAVENOUS AS NEEDED
Status: DISCONTINUED | OUTPATIENT
Start: 2019-07-29 | End: 2019-07-29 | Stop reason: SURG

## 2019-07-29 RX ORDER — ONDANSETRON 2 MG/ML
4 INJECTION INTRAMUSCULAR; INTRAVENOUS EVERY 6 HOURS PRN
Status: DISCONTINUED | OUTPATIENT
Start: 2019-07-29 | End: 2019-07-29 | Stop reason: HOSPADM

## 2019-07-29 RX ORDER — FENTANYL CITRATE 50 UG/ML
INJECTION, SOLUTION INTRAMUSCULAR; INTRAVENOUS AS NEEDED
Status: DISCONTINUED | OUTPATIENT
Start: 2019-07-29 | End: 2019-07-29 | Stop reason: SURG

## 2019-07-29 RX ORDER — ACETAMINOPHEN 500 MG
1000 TABLET ORAL EVERY 6 HOURS PRN
COMMUNITY
End: 2019-09-05 | Stop reason: ALTCHOICE

## 2019-07-29 RX ORDER — ROCURONIUM BROMIDE 10 MG/ML
INJECTION, SOLUTION INTRAVENOUS AS NEEDED
Status: DISCONTINUED | OUTPATIENT
Start: 2019-07-29 | End: 2019-07-29 | Stop reason: SURG

## 2019-07-29 RX ORDER — ACETAMINOPHEN 325 MG/1
650 TABLET ORAL EVERY 6 HOURS PRN
Status: DISCONTINUED | OUTPATIENT
Start: 2019-07-29 | End: 2019-07-29 | Stop reason: HOSPADM

## 2019-07-29 RX ORDER — CEFAZOLIN SODIUM 2 G/50ML
SOLUTION INTRAVENOUS AS NEEDED
Status: DISCONTINUED | OUTPATIENT
Start: 2019-07-29 | End: 2019-07-29 | Stop reason: SURG

## 2019-07-29 RX ORDER — ACETAMINOPHEN 325 MG/1
975 TABLET ORAL ONCE
Status: COMPLETED | OUTPATIENT
Start: 2019-07-29 | End: 2019-07-29

## 2019-07-29 RX ORDER — FENTANYL CITRATE/PF 50 MCG/ML
25 SYRINGE (ML) INJECTION
Status: DISCONTINUED | OUTPATIENT
Start: 2019-07-29 | End: 2019-07-29 | Stop reason: HOSPADM

## 2019-07-29 RX ORDER — HYDROMORPHONE HYDROCHLORIDE 2 MG/ML
INJECTION, SOLUTION INTRAMUSCULAR; INTRAVENOUS; SUBCUTANEOUS AS NEEDED
Status: DISCONTINUED | OUTPATIENT
Start: 2019-07-29 | End: 2019-07-29 | Stop reason: SURG

## 2019-07-29 RX ORDER — SODIUM CHLORIDE 9 MG/ML
INJECTION, SOLUTION INTRAVENOUS CONTINUOUS PRN
Status: DISCONTINUED | OUTPATIENT
Start: 2019-07-29 | End: 2019-07-29 | Stop reason: SURG

## 2019-07-29 RX ORDER — SODIUM CHLORIDE, SODIUM LACTATE, POTASSIUM CHLORIDE, CALCIUM CHLORIDE 600; 310; 30; 20 MG/100ML; MG/100ML; MG/100ML; MG/100ML
50 INJECTION, SOLUTION INTRAVENOUS CONTINUOUS
Status: DISCONTINUED | OUTPATIENT
Start: 2019-07-29 | End: 2019-07-29

## 2019-07-29 RX ORDER — HEPARIN SODIUM 5000 [USP'U]/ML
5000 INJECTION, SOLUTION INTRAVENOUS; SUBCUTANEOUS
Status: COMPLETED | OUTPATIENT
Start: 2019-07-29 | End: 2019-07-29

## 2019-07-29 RX ORDER — ONDANSETRON 2 MG/ML
INJECTION INTRAMUSCULAR; INTRAVENOUS AS NEEDED
Status: DISCONTINUED | OUTPATIENT
Start: 2019-07-29 | End: 2019-07-29 | Stop reason: SURG

## 2019-07-29 RX ORDER — LIDOCAINE HYDROCHLORIDE 10 MG/ML
INJECTION, SOLUTION INFILTRATION; PERINEURAL AS NEEDED
Status: DISCONTINUED | OUTPATIENT
Start: 2019-07-29 | End: 2019-07-29 | Stop reason: SURG

## 2019-07-29 RX ORDER — OXYCODONE HYDROCHLORIDE AND ACETAMINOPHEN 5; 325 MG/1; MG/1
1 TABLET ORAL EVERY 4 HOURS PRN
Qty: 15 TABLET | Refills: 0 | Status: SHIPPED | OUTPATIENT
Start: 2019-07-29 | End: 2019-07-29 | Stop reason: SDUPTHER

## 2019-07-29 RX ORDER — MIDAZOLAM HYDROCHLORIDE 1 MG/ML
INJECTION INTRAMUSCULAR; INTRAVENOUS AS NEEDED
Status: DISCONTINUED | OUTPATIENT
Start: 2019-07-29 | End: 2019-07-29 | Stop reason: SURG

## 2019-07-29 RX ORDER — DEXAMETHASONE SODIUM PHOSPHATE 10 MG/ML
INJECTION, SOLUTION INTRAMUSCULAR; INTRAVENOUS AS NEEDED
Status: DISCONTINUED | OUTPATIENT
Start: 2019-07-29 | End: 2019-07-29 | Stop reason: SURG

## 2019-07-29 RX ORDER — BUPIVACAINE HYDROCHLORIDE 5 MG/ML
INJECTION, SOLUTION EPIDURAL; INTRACAUDAL AS NEEDED
Status: DISCONTINUED | OUTPATIENT
Start: 2019-07-29 | End: 2019-07-29 | Stop reason: HOSPADM

## 2019-07-29 RX ORDER — CELECOXIB 200 MG/1
200 CAPSULE ORAL ONCE
Status: COMPLETED | OUTPATIENT
Start: 2019-07-29 | End: 2019-07-29

## 2019-07-29 RX ORDER — ONDANSETRON 2 MG/ML
4 INJECTION INTRAMUSCULAR; INTRAVENOUS ONCE AS NEEDED
Status: COMPLETED | OUTPATIENT
Start: 2019-07-29 | End: 2019-07-29

## 2019-07-29 RX ORDER — IBUPROFEN 400 MG/1
600 TABLET ORAL EVERY 6 HOURS PRN
Status: DISCONTINUED | OUTPATIENT
Start: 2019-07-29 | End: 2019-07-29 | Stop reason: HOSPADM

## 2019-07-29 RX ORDER — OXYCODONE HYDROCHLORIDE 5 MG/1
5 TABLET ORAL EVERY 4 HOURS PRN
Status: DISCONTINUED | OUTPATIENT
Start: 2019-07-29 | End: 2019-07-29 | Stop reason: HOSPADM

## 2019-07-29 RX ORDER — SODIUM CHLORIDE, SODIUM LACTATE, POTASSIUM CHLORIDE, CALCIUM CHLORIDE 600; 310; 30; 20 MG/100ML; MG/100ML; MG/100ML; MG/100ML
INJECTION, SOLUTION INTRAVENOUS CONTINUOUS PRN
Status: DISCONTINUED | OUTPATIENT
Start: 2019-07-29 | End: 2019-07-29 | Stop reason: SURG

## 2019-07-29 RX ORDER — HYDROMORPHONE HCL/PF 1 MG/ML
0.5 SYRINGE (ML) INJECTION
Status: DISCONTINUED | OUTPATIENT
Start: 2019-07-29 | End: 2019-07-29 | Stop reason: HOSPADM

## 2019-07-29 RX ORDER — LIDOCAINE HYDROCHLORIDE 10 MG/ML
0.5 INJECTION, SOLUTION EPIDURAL; INFILTRATION; INTRACAUDAL; PERINEURAL ONCE AS NEEDED
Status: DISCONTINUED | OUTPATIENT
Start: 2019-07-29 | End: 2019-07-29

## 2019-07-29 RX ORDER — OXYCODONE HYDROCHLORIDE AND ACETAMINOPHEN 5; 325 MG/1; MG/1
1 TABLET ORAL EVERY 4 HOURS PRN
Qty: 10 TABLET | Refills: 0 | Status: SHIPPED | OUTPATIENT
Start: 2019-07-29 | End: 2019-08-08

## 2019-07-29 RX ADMIN — MIDAZOLAM 2 MG: 1 INJECTION INTRAMUSCULAR; INTRAVENOUS at 07:27

## 2019-07-29 RX ADMIN — HYDROMORPHONE HYDROCHLORIDE 0.5 MG: 2 INJECTION, SOLUTION INTRAMUSCULAR; INTRAVENOUS; SUBCUTANEOUS at 08:29

## 2019-07-29 RX ADMIN — FENTANYL CITRATE 100 MCG: 50 INJECTION, SOLUTION INTRAMUSCULAR; INTRAVENOUS at 07:38

## 2019-07-29 RX ADMIN — SUGAMMADEX 400 MG: 100 INJECTION, SOLUTION INTRAVENOUS at 09:08

## 2019-07-29 RX ADMIN — ACETAMINOPHEN 975 MG: 325 TABLET ORAL at 06:39

## 2019-07-29 RX ADMIN — DEXAMETHASONE SODIUM PHOSPHATE 10 MG: 10 INJECTION, SOLUTION INTRAMUSCULAR; INTRAVENOUS at 07:44

## 2019-07-29 RX ADMIN — GABAPENTIN 100 MG: 100 CAPSULE ORAL at 06:39

## 2019-07-29 RX ADMIN — CELECOXIB 200 MG: 200 CAPSULE ORAL at 06:38

## 2019-07-29 RX ADMIN — ONDANSETRON 4 MG: 2 INJECTION INTRAMUSCULAR; INTRAVENOUS at 09:02

## 2019-07-29 RX ADMIN — SODIUM CHLORIDE, SODIUM LACTATE, POTASSIUM CHLORIDE, AND CALCIUM CHLORIDE: .6; .31; .03; .02 INJECTION, SOLUTION INTRAVENOUS at 09:10

## 2019-07-29 RX ADMIN — CEFAZOLIN SODIUM 2000 MG: 2 SOLUTION INTRAVENOUS at 08:00

## 2019-07-29 RX ADMIN — PROPOFOL 50 MG: 10 INJECTION, EMULSION INTRAVENOUS at 07:44

## 2019-07-29 RX ADMIN — ROCURONIUM BROMIDE 50 MG: 10 INJECTION INTRAVENOUS at 07:38

## 2019-07-29 RX ADMIN — SODIUM CHLORIDE, SODIUM LACTATE, POTASSIUM CHLORIDE, AND CALCIUM CHLORIDE: .6; .31; .03; .02 INJECTION, SOLUTION INTRAVENOUS at 07:16

## 2019-07-29 RX ADMIN — ONDANSETRON 4 MG: 2 INJECTION INTRAMUSCULAR; INTRAVENOUS at 09:30

## 2019-07-29 RX ADMIN — GLYCOPYRROLATE 0.2 MG: 0.2 INJECTION, SOLUTION INTRAMUSCULAR; INTRAVENOUS at 07:27

## 2019-07-29 RX ADMIN — PHENYLEPHRINE HYDROCHLORIDE 50 MCG/MIN: 10 INJECTION INTRAVENOUS at 07:45

## 2019-07-29 RX ADMIN — ROCURONIUM BROMIDE 30 MG: 10 INJECTION INTRAVENOUS at 08:28

## 2019-07-29 RX ADMIN — PHENYLEPHRINE HYDROCHLORIDE 200 MCG: 10 INJECTION INTRAVENOUS at 07:38

## 2019-07-29 RX ADMIN — LIDOCAINE HYDROCHLORIDE 100 MG: 10 INJECTION, SOLUTION INFILTRATION; PERINEURAL at 07:38

## 2019-07-29 RX ADMIN — SODIUM CHLORIDE: 0.9 INJECTION, SOLUTION INTRAVENOUS at 07:43

## 2019-07-29 RX ADMIN — PROPOFOL 200 MG: 10 INJECTION, EMULSION INTRAVENOUS at 07:38

## 2019-07-29 RX ADMIN — FENTANYL CITRATE 25 MCG: 50 INJECTION, SOLUTION INTRAMUSCULAR; INTRAVENOUS at 09:58

## 2019-07-29 RX ADMIN — HEPARIN SODIUM 5000 UNITS: 5000 INJECTION INTRAVENOUS; SUBCUTANEOUS at 07:13

## 2019-07-29 NOTE — H&P
H&P Exam - Gynecology   Gabby Thorpe 47 y o  female MRN: [id number]  Unit/Bed#: OR [place] Encounter: [id number]      History of Present Illness     HPI:  Gabby Thorpe is a 47 y o  female who presents for BSO risk reduction surgery in the setting of stage IV breast cancer BRCA2 positive  She denies any changes in her current medical management or medical history since her original consult  Review of Systems   Constitutional: Negative for chills and fever  Respiratory: Negative for shortness of breath  Cardiovascular: Negative for chest pain  Gastrointestinal: Negative for abdominal distention, abdominal pain, constipation, diarrhea, nausea and vomiting  Neurological: Negative for headaches         Historical Information   Past Medical History:   Diagnosis Date    Hypotension      Past Surgical History:   Procedure Laterality Date    BREAST BIOPSY      IR IMAGE GUIDED BIOPSY/ASPIRATION LIVER  2019    MYOMECTOMY      H/O FIBROIDS, NO SURGERY NEEDED    US GUIDED BREAST BIOPSY RIGHT COMPLETE Right 5/3/2019    US GUIDED BREAST LYMPH NODE BIOPSY RIGHT Right 5/3/2019     OB History    Para Term  AB Living   2 2 2         SAB TAB Ectopic Multiple Live Births                  # Outcome Date GA Lbr Keyur/2nd Weight Sex Delivery Anes PTL Lv   2 Term            1 Term               Obstetric Comments   Age at menarche 14   Age first live birth 33       Family History   Problem Relation Age of Onset    Hypotension Mother     Colon cancer Father 36    Hypertension Father     Prostate cancer Father 79    Throat cancer Maternal Grandmother 61    Cancer Maternal Grandmother     Breast cancer Paternal Aunt         age unknown     Social History   Social History     Substance and Sexual Activity   Alcohol Use Yes    Frequency: Monthly or less    Drinks per session: 1 or 2    Comment: occassional     Social History     Substance and Sexual Activity   Drug Use No     Social History Tobacco Use   Smoking Status Former Smoker    Packs/day: 1 00    Years: 30 00    Pack years: 30 00    Last attempt to quit: 2017    Years since quittin 5   Smokeless Tobacco Never Used       Meds/Allergies   Medications Prior to Admission   Medication    acetaminophen (TYLENOL) 500 mg tablet    APPLE CIDER VINEGAR PO    Biotin w/ Vitamins C & E (HAIR/SKIN/NAILS PO)    letrozole (FEMARA) 2 5 mg tablet    Palbociclib (IBRANCE) 125 MG capsule     No Known Allergies    Objective   /59   Pulse 77   Temp (!) 96 9 °F (36 1 °C) (Tympanic)   Resp 18   Ht 5' 6" (1 676 m)   Wt 97 5 kg (215 lb)   SpO2 98%   BMI 34 70 kg/m²     No intake or output data in the 24 hours ending 19 0708    Physical Exam   Constitutional: She is oriented to person, place, and time  She appears well-developed and well-nourished  No distress  Cardiovascular: Normal rate, regular rhythm and normal heart sounds  Exam reveals no gallop and no friction rub  No murmur heard  Pulmonary/Chest: Effort normal and breath sounds normal  No stridor  No respiratory distress  She has no wheezes  She has no rales  Abdominal: Soft  She exhibits no distension and no mass  There is no tenderness  There is no rebound and no guarding  Neurological: She is alert and oriented to person, place, and time  Skin: Skin is warm and dry  She is not diaphoretic  Psychiatric: She has a normal mood and affect   Her behavior is normal        Lab Results:   Admission on 2019   Component Date Value    Sodium 2019 141     Potassium 2019 3 6     Chloride 2019 109*    CO2 2019 26     ANION GAP 2019 6     BUN 2019 17     Creatinine 2019 0 82     Glucose 2019 129     Glucose, Fasting 2019 129*    Calcium 2019 8 7     AST 2019 32     ALT 2019 34     Alkaline Phosphatase 2019 77     Total Protein 2019 7 3     Albumin 2019 3 6     Total Bilirubin 2019 0 19*    eGFR 2019 84     EXT Preg Test, Ur 2019 Negative     Control 2019 Valid         Assessment/Plan     25-year-old  for BSO risk reduction surgery  Plan for OR as scheduled       Jb Franco MD  2019  7:08 AM

## 2019-07-29 NOTE — INTERVAL H&P NOTE
H&P reviewed  After examining the patient I find no changes in the patients condition since the H&P had been written  Discussed with patient her recent biopsy of the endometrium which was negative for malignancy or hyperplasia  We discussed whether to proceed with hysterectomy BSO or simply be BSO based on the patient's BRCA mutation and recent diagnosis of breast cancer  We discussed risks and benefits and decided to move ahead with laparoscopic bilateral salpingo-oophorectomy  I have  discussed the risks and benefits and we signed the consent

## 2019-07-29 NOTE — OP NOTE
OPERATIVE REPORT  PATIENT NAME: Regine Izaguirre    :  1970  MRN: [id number]  Pt Location: [facility] OR ROOM 14    SURGERY DATE: 2019    Surgeon(s) and Role:     * Carlie Gonzales MD - Primary     * Aline Parikh PA-C - Assisting     * Marco Cannon MD - [address]  Adrianna Orellana MD - Jillian Peterson MD - Assisting    Preop Diagnosis:  Carcinoma of right breast metastatic to axillary lymph node ([address] ) [C50 911, C77 3]  BRCA2 positive [Z15 01, Z15 09]  Liver metastases ([address] ) [C78 7]  Malignant neoplasm of central portion of right breast in female, estrogen receptor positive ([address] ) [C50 111, Z17 0]  Abnormal uterine bleeding (AUB) [N93 9]    Post-Op Diagnosis Codes:     * Carcinoma of right breast metastatic to axillary lymph node ([address] ) [C50 911, C77 3]     * BRCA2 positive [Z15 01, Z15 09]     * Liver metastases ([address] ) [C78 7]     * Malignant neoplasm of central portion of right breast in female, estrogen receptor positive ([address] ) [C50 111, Z17 0]     * Abnormal uterine bleeding (AUB) [N93 9]    Procedure(s) (LRB):  LAPAROSCOPIC BILATERAL SALPINGO-OOPHORECTOMY (N/A)    Specimen(s):  ID Type Source Tests Collected by Time Destination   1 :  Washing Pelvic Washing NON-GYNECOLOGIC CYTOLOGY Carlie Gonzales MD 2019 4130    2 : with bilateral ovaries Tissue Fallopian Tubes, Bilateral TISSUE EXAM Carlie Gonzales MD 2019 0825        Estimated Blood Loss:   Minimal    Drains:  [REMOVED] Urethral Catheter Non-latex 16 Fr  (Removed)   Number of days: 0       Anesthesia Type:   General ET    Operative Indications:  Carcinoma of right breast metastatic to axillary lymph node (HCC) [C50 911, C77 3]  BRCA2 positive [Z15 01, Z15 09]  Liver metastases ([address] ) [C78 7]  Malignant neoplasm of central portion of right breast in female, estrogen receptor positive ([address] ) [C50 111, Z17 0]  Abnormal uterine bleeding (AUB) [N93 9]    Operative Findings:  1    External genitalia grossly normal in appearance  No ulcerations, no lacerations, no lesions  Bimanual exam revealed retroverted uterus with normal contours and freely mobile  No adnexal masses palpated bilaterally  2   Laparoscopic evaluation revealed no injury to bowel or bladder vasculature upon entrance  Uterus was small, retroverted  Adhesions were not present  Bilateral ovaries were grossly normal in appearance  Bilateral fallopian tubes were grossly normal in appearance  3   Upper abdominal organs were visualized and grossly normal in appearance  These include liver, stomach, omentum and diaphragm  Complications:   None    Procedure and Technique:  Brief History  Patient is a 53yo with diagnosed stage IV breast cancer, BRCA positive with negative endometrial biopsy, scheduled for risk reduction surgery of bilateral salpingo-oopherectomy  All risks, benefits, and alternatives to the procedure were discussed with the patient and she had the opportunity to ask questions  Informed consent was obtained  Description of Procedure    Patient was taken to the operating room  General endotracheal anesthesia (GET) was administered and the patient was positioned on the OR table in the dorsal lithotomy position  All pressure points were padded and a nancy hugger was placed to maintain control of core body temperature  A bimanual exam was performed and the uterus was noted to be retroverted, normal in size and consistency with no palpable adnexal masses or fullness  The patient was prepped and draped in the usual sterile fashion with chloroprep on the abdomen and chlorhexidine prep on the vagina and perineum  Operative Technique    A time out was performed to confirm correct patient and correct procedure  A morales catheter was introduced into the bladder  Sterile gloves were then exchanged and attention was turned to the abdomen  A 10mm incision was made at the inferior edge of the umbilicus for introduction of a 10mm trocar   Trocar was introduced under direct visualization  Pneumoperitoneum was then established to a maximum of 15mmHg  The entire abdomen and pelvis was inspected and there was no evidence of injury to bowel, bladder, vasculature, or other structures  Attention was then turned to the pelvis  Patient was placed in Trendelenburg and the uterus was elevated to visualize the pelvic organs  There was noted to be grossly normal tubes and ovaries bilaterally  Two additional port sites were selected in the left and right lower abdomen  A 5mm incision was made for introduction of a 5mm trocar under direct visualization at each site  A blunt grasper was inserted through the right port and used to visualize fallopian tubes and ovaries  Washings were taken from the pelvis  The ureters were identified transperitoneally on both sides: the right could be followed completely up to the level of the pelvic brim, however the left ureter could not be transperitoneally followed at the level of the pelvic brim so the decision was made to gently open the left pelvic peritoneum  The infundibulopelvic ligament was isolated  The right fallopian tube was grasped at its fimbriated end with a blunt grasper and elevated to visualize the mesosalpinx  The right infundibulopelvic ligament was identified and divided with the Enseal device  Enseal device was used to ligate along the mesosalpinx, working proximally  The left utero-ovarian vessel was divided using the Enseal device  The right adnexa was transected just adjacent to level of the cornua  Bleeding was noted to be stable  Attention was then turned to the contralateral adnexal structures  The left infundibulopelvic ligament was divided with the Enseal device  The remainder of the left adnexa was amputated in similar fashion to that of the right  Hemostasis was noted to be adequate  The bilateral adnexal structures were removed using the Endocatch bag   The specimens were removed through the midline port incision  Fascia of this site was closed with interrupted 0 Vicryl suture transperitoneally  Surgical sites were again noted to be hemostatic  Pneumoperitoneum was allowed to escape  Trocars were removed from the abdomen  The incisions were closed using 4-0 Monocryl and Histoacryl  The morales catheter was removed  At the conclusion of the procedure, all needle, sponge, and instrument counts were noted to be correct x2  Patient tolerated the procedure well and was transferred to PACU in stable condition prior to discharge with follow up in 1-2 weeks  Dr Stephanie Rivera was present and participated in all key portions of the case      Patient Disposition:  PACU     SIGNATURE: Oumar Segura MD  DATE: July 29, 2019  TIME: 9:23 AM

## 2019-07-29 NOTE — ANESTHESIA PREPROCEDURE EVALUATION
Review of Systems/Medical History  Patient summary reviewed        Cardiovascular  Negative cardio ROS    Pulmonary  Negative pulmonary ROS        GI/Hepatic    Liver disease ,        Negative  ROS        Endo/Other  Negative endo/other ROS      GYN  Negative gynecology ROS          Hematology  Negative hematology ROS      Musculoskeletal  Negative musculoskeletal ROS        Neurology  Negative neurology ROS      Psychology   Negative psychology ROS              Physical Exam    Airway    Mallampati score: II  TM Distance: >3 FB  Neck ROM: full     Dental       Cardiovascular  Comment: Negative ROS, Cardiovascular exam normal    Pulmonary  Pulmonary exam normal     Other Findings        Anesthesia Plan  ASA Score- 2     Anesthesia Type- general with ASA Monitors  Additional Monitors:   Airway Plan: ETT  Plan Factors-    Induction- intravenous  Postoperative Plan-     Informed Consent- Anesthetic plan and risks discussed with patient  I personally reviewed this patient with the CRNA  Discussed and agreed on the Anesthesia Plan with the CRNA  Georgette Ormond

## 2019-07-29 NOTE — ANESTHESIA POSTPROCEDURE EVALUATION
Post-Op Assessment Note    CV Status:  Stable  Pain Score: 0    Pain management: satisfactory to patient     Mental Status:  Sleepy   Hydration Status:  Stable   PONV Controlled:  None   Airway Patency:  Patent   Post Op Vitals Reviewed: Yes      Staff: Anesthesiologist, with CRNAs           BP   127/63   Temp  92 2   Pulse  63   Resp   14   SpO2   98

## 2019-07-29 NOTE — DISCHARGE INSTRUCTIONS
Salpingo-oophorectomy   WHAT YOU NEED TO KNOW:   A salpingo-oophorectomy is surgery to remove one or both fallopian tubes and ovaries  The ovaries store and release eggs  The fallopian tubes carry the eggs from the ovaries to the uterus  DISCHARGE INSTRUCTIONS:   Call 911 for any of the following:   · You feel lightheaded, short of breath, and have chest pain  · You cough up blood  Seek care immediately if:   · Your arm or leg feels warm, tender, and painful  It may look swollen and red  · Blood soaks through your bandage  · Your stitches come apart  Contact your healthcare provider if:   · You have a fever or chills  · Your wound is red, swollen, or draining pus  · You have pus or a foul-smelling odor coming from your vagina  · You have nausea or are vomiting  · Your skin is itchy, swollen, or you have a rash  · You have trouble urinating or having a bowel movement  Medicines: You may need any of the following:  · Prescription pain medicine  may be given  Ask your healthcare provider how to take this medicine safely  · NSAIDs , such as ibuprofen, help decrease swelling, pain, and fever  NSAIDs can cause stomach bleeding or kidney problems in certain people  If you take blood thinner medicine, always ask your healthcare provider if NSAIDs are safe for you  Always read the medicine label and follow directions  · Take your medicine as directed  Contact your healthcare provider if you think your medicine is not helping or if you have side effects  Tell him or her if you are allergic to any medicine  Keep a list of the medicines, vitamins, and herbs you take  Include the amounts, and when and why you take them  Bring the list or the pill bottles to follow-up visits  Carry your medicine list with you in case of an emergency  Care for your wound as directed:  Ask your healthcare provider when your incision can get wet  Carefully wash around the wound with soap and water   Let soap and water run over your incision  Do not  scrub your incision  Activity:  Ask your healthcare provider when you can return to your normal activities  Do not have sex, douche, or use tampons until your healthcare provider says it is okay  These actions may cause an infection  Do not exercise or lift anything heavy until your healthcare provider says it is okay  This may put too much stress on your incision  Follow up with your healthcare provider as directed

## 2019-08-02 ENCOUNTER — OFFICE VISIT (OUTPATIENT)
Dept: HEMATOLOGY ONCOLOGY | Facility: CLINIC | Age: 49
End: 2019-08-02
Payer: COMMERCIAL

## 2019-08-02 VITALS
WEIGHT: 208 LBS | HEIGHT: 66 IN | RESPIRATION RATE: 18 BRPM | DIASTOLIC BLOOD PRESSURE: 70 MMHG | BODY MASS INDEX: 33.43 KG/M2 | HEART RATE: 77 BPM | TEMPERATURE: 96.3 F | SYSTOLIC BLOOD PRESSURE: 118 MMHG | OXYGEN SATURATION: 98 %

## 2019-08-02 DIAGNOSIS — C50.911 CARCINOMA OF RIGHT BREAST METASTATIC TO AXILLARY LYMPH NODE (HCC): ICD-10-CM

## 2019-08-02 DIAGNOSIS — C77.3 CARCINOMA OF RIGHT BREAST METASTATIC TO AXILLARY LYMPH NODE (HCC): ICD-10-CM

## 2019-08-02 DIAGNOSIS — C78.7 LIVER METASTASES (HCC): Primary | ICD-10-CM

## 2019-08-02 PROCEDURE — 99214 OFFICE O/P EST MOD 30 MIN: CPT | Performed by: INTERNAL MEDICINE

## 2019-08-02 NOTE — PROGRESS NOTES
Hematology / Oncology Outpatient Follow Up Note    Serge Stone 47 y o  female :1970 NFN:[id number]         Date:  2019    Assessment / Plan:  A 50-year-old surgically postmenopausal woman with metastatic right breast cancer, grade 3, ER 90% positive, NV 90% positive, HER2 negative disease   She has BRCA -2 mutation  She has histologically confirmed liver metastasis  Since she was premenopausal, she underwent as bilateral surgical ovarian ablation in late 2019 followed by starting palbociclib and letrozole  So far, she has not noticed any side effects  I recommended her to continue with palbociclib 125 mg 3 weeks on followed by 1 week off as well as letrozole 2 5 mg daily  We will monitor CBC every 2 weeks at least initially  For some reason, baseline tumor marker was not done  When she is due for next blood work, I would add tumor markers  I will see her again in mid 2019 for follow-up    She is in agreement with my recommendation                                                                                     Subjective:      HPI:  A 21-year-old premenopausal woman who noticed a lump in her right breast in 2019 which she brought to medical attention   Radiographically, she has large right breast mass and right axillary adenopathy both of which were biopsy in May 3, 2019   Biopsy showed invasive ductal carcinoma, grade 3   This was ER 90% positive, NV 90% positive, HER2 negative disease   Biopsy from right axilla lymph node was positive for metastatic disease   She was seen by Dr Josue Mares of her young age, she underwent genetic testing which showed BRCA-2 mutation   Because of the locally advanced nature, she underwent CT scan of chest abdomen pelvis which showed multiple liver lesion, suspicious for metastatic disease   Bone scan was negative for osseous metastasis  Luis Daley is going to have liver biopsy in 2019   She presents today to discuss treatment option   She has no symptomatology from breast standpoint   She denied any pain  Her weight has been stable   She has no respiratory symptoms   She has no significant past medical history   Her paternal aunt had breast cancer in her 35s   Interestingly enough, her father had colon cancer in his 45s as well as prostate cancer in his 62s   She has no family history of ovarian cancer   She was a smoker until 2 years ago  Darline Land does not drink alcohol  Darline Land has a son who is 23years old  Darline Land also has younger son who was biologically female  [facility] is a transgender  [facility] is considering bilateral mastectomy  Buddy Callahan, he has not been tested for BRCA gene mutation            Interval History:   A 42-year-old surgically postmenopausal woman with metastatic right breast cancer, grade 3, ER 90% positive, OR 90% positive, HER2 negative disease   She has BRCA -2 mutation   Based on the CT scan, she appeared to have multiple liver metastasis  She subsequently underwent liver biopsy which showed metastatic carcinoma, consistent with breast primary  She was premenopausal at the time of diagnosis  Therefore, she underwent bilateral oophorectomy in late July 2019  Ovary and fallopian tube did not show any malignancy  She started palbociclib and letrozole 3 days ago  She presents today for follow-up  She has not noticed any side effect, yet  She feels well  She has no complaint of pain  Her weight is stable  She has no respiratory symptoms  Her performance status is normal        Objective:      Primary Diagnosis:     1    Metastatic breast cancer, grade 3, ER 90 % positive, OR 90 % positive, HER2 negative disease    2    BRCA-2 mutation      Cancer Staging:  Cancer Staging  Malignant neoplasm of central portion of right breast in female, estrogen receptor positive ([facility] [address] )  Staging form: Breast, AJCC 8th Edition  - Clinical: Stage IIIB (cT4, cN1(f), cM0, G3, ER+, OR+, HER2-) - Signed by Dee Lambert MD on 5/16/2019  Laterality: Right  Method of lymph node assessment: Core biopsy  Histologic grading system: 3 grade system           Previous Hematologic/ Oncologic Treatment:      Surgical ovarian ablation in late July 2019      Current Hematologic/ Oncologic Treatment:       Palbociclib and letrozole since the end of July 2018       Disease Status:      Not evaluated at this time      Test Results:     Pathology:     Right breast biopsy in axillary lymph node biopsy showed invasive ductal carcinoma, grade 3   ER 90 % positive, [place] 90% positive, HER2 negative disease      Liver biopsy in June 2019 showed metastatic carcinoma, consistent with breast primary  Bilateral ovary and fallopian tube showed no evidence of malignancy      Radiology:     Bone scan showed no evidence of osseous metastasis      CT scan of chest abdomen pelvis showed multiple liver lesion, suspicious for metastatic disease in May 2019      Laboratory:     See below      Physical Exam:        General Appearance:    Alert, oriented          Eyes:    PERRL   Ears:    Normal external ear canals, both ears   Nose:   Nares normal, septum midline   Throat:   Mucosa moist  Pharynx without injection  Neck:   Supple         Lungs:     Clear to auscultation bilaterally   Chest Wall:    No tenderness or deformity    Heart:    Regular rate and rhythm         Abdomen:     Soft, non-tender, bowel sounds +, no organomegaly               Extremities:   Extremities no cyanosis or edema         Skin:   no rash or icterus  Lymph nodes:   Cervical, supraclavicular, and axillary nodes normal   Neurologic:   CNII-XII intact, normal strength, sensation and reflexes     Throughout             Breast exam:    12 x 12 cm of mass at upper aspect of her right breast   2 cm of palpable right axillary adenopathy  Left breast exam is negative  ROS: Review of Systems   All other systems reviewed and are negative  Imaging: No results found        Labs:   Lab Results   Component Value Date    WBC 7 4 07/24/2019    HGB 13 2 07/24/2019    HCT 40 4 07/24/2019    MCV 90 6 07/24/2019     07/24/2019     Lab Results   Component Value Date    K 3 6 07/29/2019     (H) 07/29/2019    CO2 26 07/29/2019    BUN 17 07/29/2019    CREATININE 0 82 07/29/2019    GLUF 129 (H) 07/29/2019    CALCIUM 8 7 07/29/2019    AST 32 07/29/2019    ALT 34 07/29/2019    ALKPHOS 77 07/29/2019    EGFR 84 07/29/2019         Current Medications: Reviewed  Allergies: Reviewed  PMH/FH/SH:  Reviewed      Vital Sign:    Body surface area is 2 03 meters squared      Wt Readings from Last 3 Encounters:   08/02/19 94 3 kg (208 lb)   07/29/19 97 5 kg (215 lb)   06/21/19 98 9 kg (218 lb)        Temp Readings from Last 3 Encounters:   08/02/19 (!) 96 3 °F (35 7 °C) (Tympanic Core)   07/29/19 (!) 95 6 °F (35 3 °C) (Tympanic)   06/21/19 98 °F (36 7 °C) (Oral)        BP Readings from Last 3 Encounters:   08/02/19 118/70   07/29/19 117/66   06/21/19 124/84         Pulse Readings from Last 3 Encounters:   08/02/19 77   07/29/19 55   06/21/19 70     @LASTSAO2(3)@

## 2019-08-05 ENCOUNTER — TELEPHONE (OUTPATIENT)
Dept: GYNECOLOGIC ONCOLOGY | Facility: CLINIC | Age: 49
End: 2019-08-05

## 2019-08-05 NOTE — TELEPHONE ENCOUNTER
Left message for patient to give our office a call if she had any questions with incisions or recovery

## 2019-08-15 LAB
BASOPHILS # BLD AUTO: 10 CELLS/UL (ref 0–200)
BASOPHILS NFR BLD AUTO: 0.3 %
CANCER AG15-3 SERPL-ACNC: 81 U/ML
CANCER AG27-29 SERPL-ACNC: 88 U/ML
EOSINOPHIL # BLD AUTO: 51 CELLS/UL (ref 15–500)
EOSINOPHIL NFR BLD AUTO: 1.5 %
ERYTHROCYTE [DISTWIDTH] IN BLOOD BY AUTOMATED COUNT: 13 % (ref 11–15)
HCT VFR BLD AUTO: 37.2 % (ref 35–45)
HGB BLD-MCNC: 12.5 G/DL (ref 11.7–15.5)
LYMPHOCYTES # BLD AUTO: 1244 CELLS/UL (ref 850–3900)
LYMPHOCYTES NFR BLD AUTO: 36.6 %
MCH RBC QN AUTO: 29.9 PG (ref 27–33)
MCHC RBC AUTO-ENTMCNC: 33.6 G/DL (ref 32–36)
MCV RBC AUTO: 89 FL (ref 80–100)
MONOCYTES # BLD AUTO: 207 CELLS/UL (ref 200–950)
MONOCYTES NFR BLD AUTO: 6.1 %
NEUTROPHILS # BLD AUTO: 1887 CELLS/UL (ref 1500–7800)
NEUTROPHILS NFR BLD AUTO: 55.5 %
PLATELET # BLD AUTO: 190 THOUSAND/UL (ref 140–400)
PMV BLD REES-ECKER: 12 FL (ref 7.5–12.5)
RBC # BLD AUTO: 4.18 MILLION/UL (ref 3.8–5.1)
WBC # BLD AUTO: 3.4 THOUSAND/UL (ref 3.8–10.8)

## 2019-08-28 ENCOUNTER — TELEPHONE (OUTPATIENT)
Dept: OBGYN CLINIC | Facility: CLINIC | Age: 49
End: 2019-08-28

## 2019-08-28 ENCOUNTER — OFFICE VISIT (OUTPATIENT)
Dept: GYNECOLOGIC ONCOLOGY | Facility: CLINIC | Age: 49
End: 2019-08-28

## 2019-08-28 VITALS
DIASTOLIC BLOOD PRESSURE: 78 MMHG | WEIGHT: 203.9 LBS | HEIGHT: 66 IN | TEMPERATURE: 98.1 F | BODY MASS INDEX: 32.77 KG/M2 | SYSTOLIC BLOOD PRESSURE: 140 MMHG | HEART RATE: 80 BPM

## 2019-08-28 DIAGNOSIS — Z90.722 S/P BSO (BILATERAL SALPINGO-OOPHORECTOMY): ICD-10-CM

## 2019-08-28 DIAGNOSIS — C50.111 MALIGNANT NEOPLASM OF CENTRAL PORTION OF RIGHT BREAST IN FEMALE, ESTROGEN RECEPTOR POSITIVE (HCC): ICD-10-CM

## 2019-08-28 DIAGNOSIS — Z09 POSTOP CHECK: Primary | ICD-10-CM

## 2019-08-28 DIAGNOSIS — C78.7 LIVER METASTASES (HCC): ICD-10-CM

## 2019-08-28 DIAGNOSIS — C50.911 CARCINOMA OF RIGHT BREAST METASTATIC TO AXILLARY LYMPH NODE (HCC): ICD-10-CM

## 2019-08-28 DIAGNOSIS — C77.3 CARCINOMA OF RIGHT BREAST METASTATIC TO AXILLARY LYMPH NODE (HCC): ICD-10-CM

## 2019-08-28 DIAGNOSIS — Z17.0 MALIGNANT NEOPLASM OF CENTRAL PORTION OF RIGHT BREAST IN FEMALE, ESTROGEN RECEPTOR POSITIVE (HCC): ICD-10-CM

## 2019-08-28 LAB
ALBUMIN SERPL-MCNC: 4.1 G/DL (ref 3.6–5.1)
ALBUMIN/GLOB SERPL: 1.4 (CALC) (ref 1–2.5)
ALP SERPL-CCNC: 53 U/L (ref 33–130)
ALT SERPL-CCNC: 21 U/L (ref 6–29)
AST SERPL-CCNC: 21 U/L (ref 10–35)
BASOPHILS # BLD AUTO: 9 CELLS/UL (ref 0–200)
BASOPHILS NFR BLD AUTO: 0.2 %
BILIRUB SERPL-MCNC: 0.3 MG/DL (ref 0.2–1.2)
BUN SERPL-MCNC: 18 MG/DL (ref 7–25)
BUN/CREAT SERPL: NORMAL (CALC) (ref 6–22)
CALCIUM SERPL-MCNC: 9.5 MG/DL (ref 8.6–10.4)
CHLORIDE SERPL-SCNC: 105 MMOL/L (ref 98–110)
CO2 SERPL-SCNC: 28 MMOL/L (ref 20–32)
CREAT SERPL-MCNC: 0.94 MG/DL (ref 0.5–1.05)
EOSINOPHIL # BLD AUTO: 41 CELLS/UL (ref 15–500)
EOSINOPHIL NFR BLD AUTO: 0.9 %
ERYTHROCYTE [DISTWIDTH] IN BLOOD BY AUTOMATED COUNT: 15.1 % (ref 11–15)
GLOBULIN SER CALC-MCNC: 3 G/DL (CALC) (ref 1.9–3.7)
GLUCOSE SERPL-MCNC: 96 MG/DL (ref 65–99)
HCT VFR BLD AUTO: 37.6 % (ref 35–45)
HGB BLD-MCNC: 12.5 G/DL (ref 11.7–15.5)
LYMPHOCYTES # BLD AUTO: 1927 CELLS/UL (ref 850–3900)
LYMPHOCYTES NFR BLD AUTO: 41.9 %
MCH RBC QN AUTO: 30.2 PG (ref 27–33)
MCHC RBC AUTO-ENTMCNC: 33.2 G/DL (ref 32–36)
MCV RBC AUTO: 90.8 FL (ref 80–100)
MONOCYTES # BLD AUTO: 547 CELLS/UL (ref 200–950)
MONOCYTES NFR BLD AUTO: 11.9 %
NEUTROPHILS # BLD AUTO: 2075 CELLS/UL (ref 1500–7800)
NEUTROPHILS NFR BLD AUTO: 45.1 %
PLATELET # BLD AUTO: 186 THOUSAND/UL (ref 140–400)
PMV BLD REES-ECKER: 10.9 FL (ref 7.5–12.5)
POTASSIUM SERPL-SCNC: 3.8 MMOL/L (ref 3.5–5.3)
PROT SERPL-MCNC: 7.1 G/DL (ref 6.1–8.1)
RBC # BLD AUTO: 4.14 MILLION/UL (ref 3.8–5.1)
SL AMB EGFR AFRICAN AMERICAN: 82 ML/MIN/1.73M2
SL AMB EGFR NON AFRICAN AMERICAN: 71 ML/MIN/1.73M2
SODIUM SERPL-SCNC: 140 MMOL/L (ref 135–146)
WBC # BLD AUTO: 4.6 THOUSAND/UL (ref 3.8–10.8)

## 2019-08-28 PROCEDURE — 99024 POSTOP FOLLOW-UP VISIT: CPT | Performed by: NURSE PRACTITIONER

## 2019-08-28 NOTE — PATIENT INSTRUCTIONS
1  Return to primary gynecologist for routine gynecologic care    2  Call the office if you develop any new or concerning symptoms related to your recent surgery (new pain, bleeding, discharge, fevers, etc )

## 2019-08-28 NOTE — TELEPHONE ENCOUNTER
Pt very distraught and upset  I listened to her and tried to help  She is seeing oncologist today and will express concerns with them   Needs # of  for Cancer care

## 2019-08-28 NOTE — TELEPHONE ENCOUNTER
----- Message from Yury Valenzuela sent at 8/28/2019 10:36 AM EDT -----  Regarding: Non-Urgent Medical Question  Contact: [phone]  I sent a message on the 15th to Dr Nancy Bergman office but no one responded  I was diagnosed with stage 4 and then told I was not getting breast surgery  Honestly, my brain was not working well at the time so I don't remember why  Can you please find out for me because right now I'm thinking that the reason is that as I'm dying anyway, why waste the effort  I realize that I may be venturing into crazy territory with this mindset so I am trying to find out the real reason      Thank you

## 2019-08-28 NOTE — ASSESSMENT & PLAN NOTE
34-year-old with stage IV breast cancer metastatic to the liver  As she was pre-menopausal, she underwent laparoscopic BSO on July 29, 2019  Pathology was without evidence of malignancy  She has recovered well  Her performance status is zero  Patient will resume well-woman care annually with her primary gynecologist  She will call the office with any postoperative concerns  She inquired about contact information for our cancer care ; outreach made to Rosario SHARP to contact the patient

## 2019-08-28 NOTE — PROGRESS NOTES
Assessment/Plan:    Problem List Items Addressed This Visit        Digestive    Liver metastases ([address] )       Immune and Lymphatic    Carcinoma of right breast metastatic to axillary lymph node (HCC)       Other    Malignant neoplasm of central portion of right breast in female, estrogen receptor positive ([address] )    S/P BSO (bilateral salpingo-oophorectomy)    Postop check - Primary     47year-old with stage IV breast cancer metastatic to the liver  As she was pre-menopausal, she underwent laparoscopic BSO on July 29, 2019  Pathology was without evidence of malignancy  She has recovered well  Her performance status is zero  Patient will resume well-woman care annually with her primary gynecologist  She will call the office with any postoperative concerns  She inquired about contact information for our cancer care ; outreach made to Chris SHARP to contact the patient  CHIEF COMPLAINT: Postoperative evaluation       Problem:  Cancer Staging  Malignant neoplasm of central portion of right breast in female, estrogen receptor positive ([facility] [address] )  Staging form: Breast, AJCC 8th Edition  - Clinical: Stage IIIB (cT4, cN1(f), cM0, G3, ER+, WA+, HER2-) - Signed by Annie Harper MD on 5/16/2019        Previous therapy:     Malignant neoplasm of central portion of right breast in female, estrogen receptor positive ([facility] [address] )    5/3/2019 Biopsy     Invasive ductal carcinoma  Right breast, 10:00 oclock, 5 cmfn  Grade 3  ER 90  WA 90  Her 2 1+    Axillary lymph node biopsy  Metastatic carcinoma              5/16/2019 -  Cancer Staged     Staging form: Breast, AJCC 8th Edition  - Clinical: Stage IIIB (cT4, cN1(f), cM0, G3, ER+, WA+, HER2-) - Signed by Annie Harper MD on 5/16/2019  Laterality: Right  Method of lymph node assessment: Core biopsy  Histologic grading system: 3 grade system        7/29/2019 Surgery     Laparoscopic BSO  Benign pathology          Liver metastases ([facility] [address] )    6/20/2019 Initial Diagnosis     Liver metastases ([facility] [address] )           Patient ID: Feroz Campos is a 47 y o  female     This is a 47 y o  female who presents to the office for post-operative evaluation  She is recovering uneventfully from surgery and is without acute complaints  She has been afebrile  She denies nausea or vomiting  Her appetite is appropriate  She reports normal bowel and bladder function  She denies vaginal bleeding or discharge  She is without abdominal or pelvic pain  She is ambulatory  The following portions of the patient's history were reviewed and updated as appropriate: allergies, current medications, past family history, past medical history, past social history, past surgical history and problem list     Review of Systems   Constitutional: Negative for chills, fatigue, fever and unexpected weight change  HENT: Negative for nosebleeds  Eyes: Negative  Respiratory: Negative for cough, chest tightness, shortness of breath and wheezing  Cardiovascular: Negative for chest pain, palpitations and leg swelling  Gastrointestinal: Negative for abdominal distention, abdominal pain, anal bleeding, blood in stool, constipation, diarrhea, nausea, rectal pain and vomiting  Endocrine: Negative  Genitourinary: Negative for difficulty urinating, dysuria, frequency, hematuria, pelvic pain, urgency, vaginal bleeding, vaginal discharge and vaginal pain  Musculoskeletal: Negative for arthralgias and joint swelling  Skin: Negative for color change, pallor and rash  Neurological: Negative for dizziness, weakness, light-headedness, numbness and headaches  Hematological: Negative  Psychiatric/Behavioral: Negative            Current Outpatient Medications:     acetaminophen (TYLENOL) 500 mg tablet, Take 1,000 mg by mouth every 6 (six) hours as needed for mild pain, Disp: , Rfl:     APPLE CIDER VINEGAR PO, Take by mouth, Disp: , Rfl:     Biotin w/ Vitamins C & E (HAIR/SKIN/NAILS PO), Take by mouth, Disp: , Rfl:     letrozole (FEMARA) 2 5 mg tablet, Take 1 tablet (2 5 mg total) by mouth daily, Disp: 90 tablet, Rfl: 1    Palbociclib (IBRANCE) 125 MG capsule, Take 1 capsule (125 mg total) by mouth daily with breakfast Take 3 weeks on, followed by one week off  Take with food  , Disp: 21 capsule, Rfl: 3    Objective:    Blood pressure 140/78, pulse 80, temperature 98 1 °F (36 7 °C), height 5' 6" (1 676 m), weight 92 5 kg (203 lb 14 4 oz)  Body mass index is 32 91 kg/m²  Body surface area is 2 02 meters squared  Physical Exam   Constitutional: She is oriented to person, place, and time  She appears well-developed and well-nourished  No distress  HENT:   Head: Normocephalic and atraumatic  Pulmonary/Chest: Effort normal  No respiratory distress  Abdominal: Soft  She exhibits no distension  There is no tenderness  Incisions well-healed, no evidence of infection  Musculoskeletal: Normal range of motion  Neurological: She is alert and oriented to person, place, and time  Skin: Skin is warm and dry  No rash noted  She is not diaphoretic  No erythema  No pallor  Psychiatric: She has a normal mood and affect  Her behavior is normal  Judgment and thought content normal        Pathology:  Case Report   Surgical Pathology Report                         Case: [id number]                                    Authorizing Provider: Nasir Fung MD       Collected:           07/29/2019 0825               Ordering Location:     [address]      Received:            07/29/2019 [address] [place] [address] Operating Room                                                       Pathologist:           Juanis Reveles MD                                                           Specimen:    Fallopian Tubes, Bilateral, with bilateral ovaries                                         Final Diagnosis   A   Right and left ovaries and fallopian tubes, bilateral salpingo-oophorectomy: - Benign right and left ovaries  - Benign right and left fallopian tubes               - Paratubal cyst and endosalpingosis of paratubal soft tissue              - No dysplasia or malignancy is identified      Interpretation performed at , [address] [place] [address]    Electronically signed by Demond Arango MD on 7/31/2019 at  2:01 PM

## 2019-09-05 ENCOUNTER — OFFICE VISIT (OUTPATIENT)
Dept: HEMATOLOGY ONCOLOGY | Facility: CLINIC | Age: 49
End: 2019-09-05
Payer: COMMERCIAL

## 2019-09-05 VITALS
RESPIRATION RATE: 18 BRPM | DIASTOLIC BLOOD PRESSURE: 72 MMHG | BODY MASS INDEX: 32.78 KG/M2 | OXYGEN SATURATION: 98 % | TEMPERATURE: 97.1 F | HEART RATE: 79 BPM | WEIGHT: 204 LBS | HEIGHT: 66 IN | SYSTOLIC BLOOD PRESSURE: 114 MMHG

## 2019-09-05 DIAGNOSIS — C50.911 CARCINOMA OF RIGHT BREAST METASTATIC TO AXILLARY LYMPH NODE (HCC): ICD-10-CM

## 2019-09-05 DIAGNOSIS — C78.7 LIVER METASTASES (HCC): Primary | ICD-10-CM

## 2019-09-05 DIAGNOSIS — C77.3 CARCINOMA OF RIGHT BREAST METASTATIC TO AXILLARY LYMPH NODE (HCC): ICD-10-CM

## 2019-09-05 PROCEDURE — 99214 OFFICE O/P EST MOD 30 MIN: CPT | Performed by: INTERNAL MEDICINE

## 2019-09-05 NOTE — PROGRESS NOTES
Hematology / Oncology Outpatient Follow Up Note    Regine Izaguirre 47 y o  female :1970 [id number]         Date:  2019    Assessment / Plan:  A 50-year-old surgically postmenopausal woman with metastatic right breast cancer, grade 3, ER 90% positive, [place] 90% positive, HER2 negative disease   She has BRCA -2 mutation    She has histologically confirmed liver metastasis  Since she was premenopausal, she underwent as bilateral surgical ovarian ablation in late 2019 followed by starting palbociclib and letrozole  She has no significant toxicity with current regimen  Based on physical examination, she has partial response  I recommended her to continue with palbociclib 125 mg 3 weeks on followed by 1 week off as well as letrozole 2 5 mg daily  We will continue to monitor CBC and CMP every 4 weeks  I would obtain tumor marker in late 2019 followed by CT scan of chest abdomen pelvis  I will see her again in 2 months for routine follow-up  She is in agreement with my recommendation                                                                                      Subjective:      HPI:  A 30-year-old premenopausal woman who noticed a lump in her right breast in 2019 which she brought to medical attention   Radiographically, she has large right breast mass and right axillary adenopathy both of which were biopsy in May 3, 2019   Biopsy showed invasive ductal carcinoma, grade 3   This was ER 90% positive, VA 90% positive, HER2 negative disease   Biopsy from right axilla lymph node was positive for metastatic disease   She was seen by Dr Phuc Reza of her young age, she underwent genetic testing which showed BRCA-2 mutation   Because of the locally advanced nature, she underwent CT scan of chest abdomen pelvis which showed multiple liver lesion, suspicious for metastatic disease   Bone scan was negative for osseous metastasis  Cara Guevara is going to have liver biopsy in 2019   She presents today to discuss treatment option   She has no symptomatology from breast standpoint   She denied any pain  Her weight has been stable   She has no respiratory symptoms   She has no significant past medical history   Her paternal aunt had breast cancer in her 35s   Interestingly enough, her father had colon cancer in his 45s as well as prostate cancer in his 62s   She has no family history of ovarian cancer   She was a smoker until 2 years ago  Coty Ball does not drink alcohol  Coty Ball has a son who is 23years old  Coty Ball also has younger son who was biologically female  Ishmael Limon is a transgender  Ishmael Limon is considering bilateral mastectomy  Linard Kidney, he has not been tested for BRCA gene mutation            Interval History:   A 69-year-old surgically postmenopausal woman with metastatic right breast cancer, grade 3, ER 90% positive, TN 90% positive, HER2 negative disease   She has BRCA -2 mutation   Based on the CT scan, she appeared to have multiple liver metastasis     She subsequently underwent liver biopsy which showed metastatic carcinoma, consistent with breast primary  She was premenopausal at the time of diagnosis  Therefore, she underwent bilateral oophorectomy in late July 2019  Ovary and fallopian tube did not show any malignancy  She started palbociclib and letrozole in late July 2018  So far, she has not noticed any major side effects  However, she has a complaint of insomnia  She has usual hot flashes  She denied any bone pain  Her weight is stable  She has no respiratory symptoms  Her performance status is normal         Objective:      Primary Diagnosis:     1    Metastatic breast cancer, grade 3, ER 90 % positive, TN 90 % positive, HER2 negative disease    2    BRCA-2 mutation      Cancer Staging:  Cancer Staging  Malignant neoplasm of central portion of right breast in female, estrogen receptor positive ([facility] [address] )  Staging form: Breast, AJCC 8th Edition  - Clinical: Stage IIIB (cT4, cN1(f), cM0, G3, ER+, NM+, HER2-) - Signed by Roshni Sher MD on 5/16/2019  Laterality: Right  Method of lymph node assessment: Core biopsy  Histologic grading system: 3 grade system           Previous Hematologic/ Oncologic Treatment:      Surgical ovarian ablation in late July 2019      Current Hematologic/ Oncologic Treatment:       Palbociclib and letrozole since the end of July 2018       Disease Status:      Not evaluated at this time      Test Results:     Pathology:     Right breast biopsy in axillary lymph node biopsy showed invasive ductal carcinoma, grade 3   ER 90 % positive, NM 90% positive, HER2 negative disease      Liver biopsy in June 2019 showed metastatic carcinoma, consistent with breast primary      Bilateral ovary and fallopian tube showed no evidence of malignancy      Radiology:     Bone scan showed no evidence of osseous metastasis      CT scan of chest abdomen pelvis showed multiple liver lesion, suspicious for metastatic disease in May 2019      Laboratory:     See below      Physical Exam:        General Appearance:    Alert, oriented          Eyes:    PERRL   Ears:    Normal external ear canals, both ears   Nose:   Nares normal, septum midline   Throat:   Mucosa moist  Pharynx without injection  Neck:   Supple         Lungs:     Clear to auscultation bilaterally   Chest Wall:    No tenderness or deformity    Heart:    Regular rate and rhythm         Abdomen:     Soft, non-tender, bowel sounds +, no organomegaly               Extremities:   Extremities no cyanosis or edema         Skin:   no rash or icterus  Lymph nodes:   Cervical, supraclavicular, and axillary nodes normal   Neurologic:   CNII-XII intact, normal strength, sensation and reflexes     Throughout             Breast exam:   10 x 8 cm of mass at upper aspect of her right breast   1 cm of palpable right axillary adenopathy  Left breast exam is negative             ROS: Review of Systems   Constitutional:        Hot flashes   All other systems reviewed and are negative  Imaging: No results found  Labs:   Lab Results   Component Value Date    WBC 4 6 08/27/2019    HGB 12 5 08/27/2019    HCT 37 6 08/27/2019    MCV 90 8 08/27/2019     08/27/2019     Lab Results   Component Value Date    K 3 8 08/27/2019     08/27/2019    CO2 28 08/27/2019    BUN 18 08/27/2019    CREATININE 0 94 08/27/2019    GLUF 129 (H) 07/29/2019    CALCIUM 9 5 08/27/2019    AST 21 08/27/2019    ALT 21 08/27/2019    ALKPHOS 53 08/27/2019    EGFR 84 07/29/2019         Current Medications: Reviewed  Allergies: Reviewed  PMH/FH/SH:  Reviewed      Vital Sign:    Body surface area is 2 02 meters squared      Wt Readings from Last 3 Encounters:   09/05/19 92 5 kg (204 lb)   08/28/19 92 5 kg (203 lb 14 4 oz)   08/02/19 94 3 kg (208 lb)        Temp Readings from Last 3 Encounters:   09/05/19 (!) 97 1 °F (36 2 °C) (Tympanic Core)   08/28/19 98 1 °F (36 7 °C)   08/02/19 (!) 96 3 °F (35 7 °C) (Tympanic Core)        BP Readings from Last 3 Encounters:   09/05/19 114/72   08/28/19 140/78   08/02/19 118/70         Pulse Readings from Last 3 Encounters:   09/05/19 79   08/28/19 80   08/02/19 77     @LASTSAO2(3)@

## 2019-09-06 ENCOUNTER — TELEPHONE (OUTPATIENT)
Dept: INFUSION CENTER | Facility: HOSPITAL | Age: 49
End: 2019-09-06

## 2019-09-06 NOTE — TELEPHONE ENCOUNTER
MSW received referral from [place], requesting outreach to the pt  MSW called the pt and received her voicemail  MSW explained the role of the cancer counselor, provided contact number and encouraged a return call

## 2019-10-04 LAB
ALBUMIN SERPL-MCNC: 4.3 G/DL (ref 3.6–5.1)
ALBUMIN/GLOB SERPL: 1.5 (CALC) (ref 1–2.5)
ALP SERPL-CCNC: 57 U/L (ref 33–130)
ALT SERPL-CCNC: 20 U/L (ref 6–29)
AST SERPL-CCNC: 21 U/L (ref 10–35)
BASOPHILS # BLD AUTO: 31 CELLS/UL (ref 0–200)
BASOPHILS NFR BLD AUTO: 0.7 %
BILIRUB SERPL-MCNC: 0.5 MG/DL (ref 0.2–1.2)
BUN SERPL-MCNC: 17 MG/DL (ref 7–25)
BUN/CREAT SERPL: NORMAL (CALC) (ref 6–22)
CALCIUM SERPL-MCNC: 9.8 MG/DL (ref 8.6–10.4)
CANCER AG15-3 SERPL-ACNC: 112 U/ML
CANCER AG27-29 SERPL-ACNC: 157 U/ML
CHLORIDE SERPL-SCNC: 103 MMOL/L (ref 98–110)
CO2 SERPL-SCNC: 27 MMOL/L (ref 20–32)
CREAT SERPL-MCNC: 1.01 MG/DL (ref 0.5–1.05)
EOSINOPHIL # BLD AUTO: 101 CELLS/UL (ref 15–500)
EOSINOPHIL NFR BLD AUTO: 2.3 %
ERYTHROCYTE [DISTWIDTH] IN BLOOD BY AUTOMATED COUNT: 17.5 % (ref 11–15)
GLOBULIN SER CALC-MCNC: 2.9 G/DL (CALC) (ref 1.9–3.7)
GLUCOSE SERPL-MCNC: 82 MG/DL (ref 65–99)
HCT VFR BLD AUTO: 37.5 % (ref 35–45)
HGB BLD-MCNC: 12.5 G/DL (ref 11.7–15.5)
LYMPHOCYTES # BLD AUTO: 1360 CELLS/UL (ref 850–3900)
LYMPHOCYTES NFR BLD AUTO: 30.9 %
MCH RBC QN AUTO: 31.4 PG (ref 27–33)
MCHC RBC AUTO-ENTMCNC: 33.3 G/DL (ref 32–36)
MCV RBC AUTO: 94.2 FL (ref 80–100)
MONOCYTES # BLD AUTO: 414 CELLS/UL (ref 200–950)
MONOCYTES NFR BLD AUTO: 9.4 %
NEUTROPHILS # BLD AUTO: 2495 CELLS/UL (ref 1500–7800)
NEUTROPHILS NFR BLD AUTO: 56.7 %
PLATELET # BLD AUTO: 189 THOUSAND/UL (ref 140–400)
PMV BLD REES-ECKER: 11.6 FL (ref 7.5–12.5)
POTASSIUM SERPL-SCNC: 3.8 MMOL/L (ref 3.5–5.3)
PROT SERPL-MCNC: 7.2 G/DL (ref 6.1–8.1)
RBC # BLD AUTO: 3.98 MILLION/UL (ref 3.8–5.1)
SL AMB EGFR AFRICAN AMERICAN: 75 ML/MIN/1.73M2
SL AMB EGFR NON AFRICAN AMERICAN: 65 ML/MIN/1.73M2
SODIUM SERPL-SCNC: 140 MMOL/L (ref 135–146)
WBC # BLD AUTO: 4.4 THOUSAND/UL (ref 3.8–10.8)

## 2019-10-22 ENCOUNTER — TELEPHONE (OUTPATIENT)
Dept: HEMATOLOGY ONCOLOGY | Facility: CLINIC | Age: 49
End: 2019-10-22

## 2019-10-22 NOTE — TELEPHONE ENCOUNTER
Spoke with patient regarding her burn  "I burned my hand yesterday while cooking   I've used Neosporin and cotton gloves to protect it   Should I do anything else?  It did not blister but it is still red and a bit inflamed  I'm just worried about infection  " I instructed patient to take ibuprofen for pain/swelling as needed, and to soak in cold water  She can continue with the neosporin and cotton gloves as well  I asked that she contact her PCP if any other issues  She stated she did not have one  I asked that ARON Pierce help her in finding one  She verbalized understanding

## 2019-10-24 ENCOUNTER — TELEPHONE (OUTPATIENT)
Dept: HEMATOLOGY ONCOLOGY | Facility: CLINIC | Age: 49
End: 2019-10-24

## 2019-10-24 NOTE — TELEPHONE ENCOUNTER
Called and spoke with patient  She will be picking up Barium at [facility] tomorrow for her CT on Monday

## 2019-10-28 ENCOUNTER — HOSPITAL ENCOUNTER (OUTPATIENT)
Dept: RADIOLOGY | Facility: HOSPITAL | Age: 49
Discharge: HOME | End: 2019-10-28
Attending: INTERNAL MEDICINE
Payer: COMMERCIAL

## 2019-10-28 ENCOUNTER — TRANSCRIBE ORDERS (OUTPATIENT)
Dept: RADIOLOGY | Facility: HOSPITAL | Age: 49
End: 2019-10-28

## 2019-10-28 DIAGNOSIS — C50.911 CARCINOMA OF RIGHT BREAST METASTATIC TO AXILLARY LYMPH NODE (HCC): ICD-10-CM

## 2019-10-28 DIAGNOSIS — C78.7 LIVER METASTASES (HCC): ICD-10-CM

## 2019-10-28 DIAGNOSIS — C77.3 CARCINOMA OF RIGHT BREAST METASTATIC TO AXILLARY LYMPH NODE (HCC): ICD-10-CM

## 2019-10-28 PROCEDURE — 71260 CT THORAX DX C+: CPT

## 2019-10-28 PROCEDURE — 74177 CT ABD & PELVIS W/CONTRAST: CPT

## 2019-10-28 RX ADMIN — IOHEXOL 100 ML: 350 INJECTION, SOLUTION INTRAVENOUS at 07:27

## 2019-10-30 ENCOUNTER — TELEPHONE (OUTPATIENT)
Dept: HEMATOLOGY ONCOLOGY | Facility: CLINIC | Age: 49
End: 2019-10-30

## 2019-10-30 NOTE — TELEPHONE ENCOUNTER
Patient called in wanting to know the results of her Cat Scan  A good call back number is [phone]

## 2019-10-30 NOTE — TELEPHONE ENCOUNTER
Patient was returning a call in regards to her catscan results  I let her know the results are not in yet  Please call and advise  [phone]

## 2019-10-30 NOTE — TELEPHONE ENCOUNTER
Left VM for patient to let her know that the results are not read at this time  I made myself a note to contact patient once the results are available  Left call back number if she has any questions

## 2019-10-31 ENCOUNTER — TELEPHONE (OUTPATIENT)
Dept: HEMATOLOGY ONCOLOGY | Facility: CLINIC | Age: 49
End: 2019-10-31

## 2019-10-31 NOTE — TELEPHONE ENCOUNTER
Left  for patient to discuss her CT results  Called reading room to get results read  Everything is improving, breast mass is shrinking  Will go over these results with patient once she calls me back  Direct line provided in VM

## 2019-10-31 NOTE — TELEPHONE ENCOUNTER
Patient called in stated that she was following up on phone call from yesterday, I reviewed epic and advise that the results were not in  Patient did express her concern for the results  I did advise we will contact her as soon as they are available

## 2019-11-07 ENCOUNTER — OFFICE VISIT (OUTPATIENT)
Dept: HEMATOLOGY ONCOLOGY | Facility: CLINIC | Age: 49
End: 2019-11-07
Payer: COMMERCIAL

## 2019-11-07 VITALS
HEART RATE: 79 BPM | HEIGHT: 66 IN | OXYGEN SATURATION: 100 % | TEMPERATURE: 97.1 F | WEIGHT: 196 LBS | SYSTOLIC BLOOD PRESSURE: 122 MMHG | RESPIRATION RATE: 16 BRPM | DIASTOLIC BLOOD PRESSURE: 72 MMHG | BODY MASS INDEX: 31.5 KG/M2

## 2019-11-07 DIAGNOSIS — Z17.0 MALIGNANT NEOPLASM OF CENTRAL PORTION OF RIGHT BREAST IN FEMALE, ESTROGEN RECEPTOR POSITIVE (HCC): ICD-10-CM

## 2019-11-07 DIAGNOSIS — C78.7 LIVER METASTASES (HCC): Primary | ICD-10-CM

## 2019-11-07 DIAGNOSIS — C50.111 MALIGNANT NEOPLASM OF CENTRAL PORTION OF RIGHT BREAST IN FEMALE, ESTROGEN RECEPTOR POSITIVE (HCC): ICD-10-CM

## 2019-11-07 LAB
ALBUMIN SERPL-MCNC: 4.3 G/DL (ref 3.6–5.1)
ALBUMIN/GLOB SERPL: 1.5 (CALC) (ref 1–2.5)
ALP SERPL-CCNC: 53 U/L (ref 33–130)
ALT SERPL-CCNC: 15 U/L (ref 6–29)
AST SERPL-CCNC: 18 U/L (ref 10–35)
BASOPHILS # BLD AUTO: 9 CELLS/UL (ref 0–200)
BASOPHILS NFR BLD AUTO: 0.2 %
BILIRUB SERPL-MCNC: 0.4 MG/DL (ref 0.2–1.2)
BUN SERPL-MCNC: 22 MG/DL (ref 7–25)
BUN/CREAT SERPL: NORMAL (CALC) (ref 6–22)
CALCIUM SERPL-MCNC: 9.8 MG/DL (ref 8.6–10.4)
CHLORIDE SERPL-SCNC: 105 MMOL/L (ref 98–110)
CO2 SERPL-SCNC: 30 MMOL/L (ref 20–32)
CREAT SERPL-MCNC: 0.98 MG/DL (ref 0.5–1.05)
EOSINOPHIL # BLD AUTO: 69 CELLS/UL (ref 15–500)
EOSINOPHIL NFR BLD AUTO: 1.5 %
ERYTHROCYTE [DISTWIDTH] IN BLOOD BY AUTOMATED COUNT: 16.6 % (ref 11–15)
GLOBULIN SER CALC-MCNC: 2.8 G/DL (CALC) (ref 1.9–3.7)
GLUCOSE SERPL-MCNC: 87 MG/DL (ref 65–139)
HCT VFR BLD AUTO: 38.7 % (ref 35–45)
HGB BLD-MCNC: 12.9 G/DL (ref 11.7–15.5)
LYMPHOCYTES # BLD AUTO: 1201 CELLS/UL (ref 850–3900)
LYMPHOCYTES NFR BLD AUTO: 26.1 %
MCH RBC QN AUTO: 33.2 PG (ref 27–33)
MCHC RBC AUTO-ENTMCNC: 33.3 G/DL (ref 32–36)
MCV RBC AUTO: 99.7 FL (ref 80–100)
MONOCYTES # BLD AUTO: 340 CELLS/UL (ref 200–950)
MONOCYTES NFR BLD AUTO: 7.4 %
NEUTROPHILS # BLD AUTO: 2981 CELLS/UL (ref 1500–7800)
NEUTROPHILS NFR BLD AUTO: 64.8 %
PLATELET # BLD AUTO: 260 THOUSAND/UL (ref 140–400)
PMV BLD REES-ECKER: 11.7 FL (ref 7.5–12.5)
POTASSIUM SERPL-SCNC: 4.6 MMOL/L (ref 3.5–5.3)
PROT SERPL-MCNC: 7.1 G/DL (ref 6.1–8.1)
RBC # BLD AUTO: 3.88 MILLION/UL (ref 3.8–5.1)
SL AMB EGFR AFRICAN AMERICAN: 78 ML/MIN/1.73M2
SL AMB EGFR NON AFRICAN AMERICAN: 67 ML/MIN/1.73M2
SODIUM SERPL-SCNC: 142 MMOL/L (ref 135–146)
WBC # BLD AUTO: 4.6 THOUSAND/UL (ref 3.8–10.8)

## 2019-11-07 PROCEDURE — 99214 OFFICE O/P EST MOD 30 MIN: CPT | Performed by: INTERNAL MEDICINE

## 2019-11-07 NOTE — PROGRESS NOTES
Hematology / Oncology Outpatient Follow Up Note    Ranulfo Mean 47 y o  female :1970 AIR:[id number]         Date:  2019    Assessment / Plan:  A 47year-old surgically postmenopausal woman with metastatic right breast cancer, grade 3, ER 90% positive, NV 90% positive, HER2 negative disease   She has BRCA -2 mutation    She has histologically confirmed liver metastasis   Since she was premenopausal, she underwent as bilateral surgical ovarian ablation in late 2019 followed by starting palbociclib and letrozole  She has no significant toxicity with current regimen  Based on physical examination and imaging study, she has partial response  She has no toxicity  Therefore, I recommended her to continue with palbociclib 125 mg 3 weeks on followed by 1 week off as well as letrozole 2 5 mg daily  Since she has no neutropenia, I would have CBC monitoring every 3 months  I will see her again in 3 months with CT scan of chest abdomen pelvis for disease monitoring as well as CBC, CMP and tumor markers    She is in agreement with my recommendations                                                                              Subjective:      HPI:  A 79-year-old premenopausal woman who noticed a lump in her right breast in 2019 which she brought to medical attention   Radiographically, she has large right breast mass and right axillary adenopathy both of which were biopsy in May 3, 2019   Biopsy showed invasive ductal carcinoma, grade 3   This was ER 90% positive, NV 90% positive, HER2 negative disease   Biopsy from right axilla lymph node was positive for metastatic disease   She was seen by Dr Ezequiel Patino of her young age, she underwent genetic testing which showed BRCA-2 mutation   Because of the locally advanced nature, she underwent CT scan of chest abdomen pelvis which showed multiple liver lesion, suspicious for metastatic disease   Bone scan was negative for osseous metastasis  Aga Solares is going to have liver biopsy in June 11, 2019   She presents today to discuss treatment option   She has no symptomatology from breast standpoint   She denied any pain  Her weight has been stable   She has no respiratory symptoms   She has no significant past medical history   Her paternal aunt had breast cancer in her 35s   Interestingly enough, her father had colon cancer in his 45s as well as prostate cancer in his 62s   She has no family history of ovarian cancer   She was a smoker until 2 years ago  Dixie Blakely does not drink alcohol  Dixie Blakely has a son who is 23years old  Dixie Blakely also has younger son who was biologically female  Lucho Kennedy is a transgender  Lucho Kennedy is considering bilateral mastectomy  Marivel Sias, he has not been tested for BRCA gene mutation            Interval History:   A 47year-old surgically postmenopausal woman with metastatic right breast cancer, grade 3, ER 90% positive, ND 90% positive, HER2 negative disease   She has BRCA -2 mutation   Based on the CT scan, she appeared to have multiple liver metastasis     She subsequently underwent liver biopsy which showed metastatic carcinoma, consistent with breast primary   She was premenopausal at the time of diagnosis   Therefore, she underwent bilateral oophorectomy in late July 2019  Ovary and fallopian tube did not show any malignancy   She started palbociclib and letrozole in late July 2019  She came in today for routine follow-up  She is tolerating current regimen very well  She has no nausea, vomiting or diarrhea  She has mild fatigue  However, she has no complaint of sandoval  She has a several lb of intentional weight loss  She denied any respiratory symptoms  Recent CT scan showed reduction of right breast mass and axillary adenopathy    Her performance status is 0/4 on the ECOG scale       Objective:      Primary Diagnosis:     1    Metastatic breast cancer, grade 3, ER 90 % positive, ND 90 % positive, HER2 negative disease, diagnosed in June 2019    2    BRCA-2 mutation      Cancer Staging:  Cancer Staging  Malignant neoplasm of central portion of right breast in female, estrogen receptor positive ([facility] [address] )  Staging form: Breast, AJCC 8th Edition  - Clinical: Stage IIIB (cT4, cN1(f), cM0, G3, ER+, DC+, HER2-) - Signed by Blayne Harley MD on 5/16/2019  Laterality: Right  Method of lymph node assessment: Core biopsy  Histologic grading system: 3 grade system           Previous Hematologic/ Oncologic Treatment:      Surgical ovarian ablation in late July 2019      Current Hematologic/ Oncologic Treatment:       Palbociclib and letrozole since the end of July 2018       Disease Status:      Partial response      Test Results:     Pathology:     Right breast biopsy in axillary lymph node biopsy showed invasive ductal carcinoma, grade 3   ER 90 % positive, DC 90% positive, HER2 negative disease      Liver biopsy in June 2019 showed metastatic carcinoma, consistent with breast primary      Bilateral ovary and fallopian tube showed no evidence of malignancy      Radiology:     Bone scan showed no evidence of osseous metastasis      CT scan of chest abdomen pelvis in November 2019 showed decreased right breast mass and right axillary adenopathy  Stable liver metastasis      Laboratory:     See below      Physical Exam:        General Appearance:    Alert, oriented          Eyes:    PERRL   Ears:    Normal external ear canals, both ears   Nose:   Nares normal, septum midline   Throat:   Mucosa moist  Pharynx without injection  Neck:   Supple         Lungs:     Clear to auscultation bilaterally   Chest Wall:    No tenderness or deformity    Heart:    Regular rate and rhythm         Abdomen:     Soft, non-tender, bowel sounds +, no organomegaly               Extremities:   Extremities no cyanosis or edema         Skin:   no rash or icterus      Lymph nodes:   Cervical, supraclavicular, and axillary nodes normal   Neurologic:   CNII-XII intact, normal strength, sensation and reflexes     Throughout             Breast exam:   8 x 8 cm of mass at upper aspect of her right breast   No palpable right axillary adenopathy  Left breast exam is negative  ROS: Review of Systems   All other systems reviewed and are negative  Imaging: Ct Chest Abdomen Pelvis W Contrast    Result Date: 10/31/2019  Narrative: CT CHEST, ABDOMEN AND PELVIS WITH IV CONTRAST INDICATION:   C78 7: Secondary malignant neoplasm of liver and intrahepatic bile duct C50 911: Malignant neoplasm of unspecified site of right female breast C77 3: Secondary and unspecified malignant neoplasm of axilla and upper limb lymph nodes  COMPARISON:  5/29/2019  TECHNIQUE: CT examination of the chest, abdomen and pelvis was performed  Axial, sagittal, and coronal 2D reformatted images were created from the source data and submitted for interpretation  Radiation dose length product (DLP) for this visit:  1799 58 mGy-cm   This examination, like all CT scans performed in the [facility], was performed utilizing techniques to minimize radiation dose exposure, including the use of iterative reconstruction and automated exposure control  IV Contrast:  100 mL of iohexol (OMNIPAQUE) Enteric Contrast: Enteric contrast was administered  FINDINGS: CHEST LUNGS:  Lungs are clear  There is no tracheal or endobronchial lesion  PLEURA:  Unremarkable  HEART/GREAT VESSELS:  Unremarkable for patient's age  MEDIASTINUM AND HAIM:  8 mm pretracheal lymph node appears stable  CHEST WALL AND LOWER NECK:   3 8 x 2 7 cm right breast mass is identified likely decreased in size from the prior exam   Interval improvement in right axillary lymphadenopathy is noted with the largest node currently measuring 2 3 x 1 5 cm series 2 image  119  ABDOMEN LIVER/BILIARY TREE:  Hepatic metastatic disease is again seen  Largest lesion is a 2 3 x 1 7 cm superior right hepatic lobe mass series 2 image 156    These appear somewhat larger on the current exam although this may be related to the absence of intravenous contrast on the prior exam  GALLBLADDER:  No calcified gallstones  No pericholecystic inflammatory change  SPLEEN:  Unremarkable  PANCREAS:  Unremarkable  ADRENAL GLANDS:  Unremarkable  KIDNEYS/URETERS:  Unremarkable  No hydronephrosis  STOMACH AND BOWEL:  Unremarkable  APPENDIX:  No findings to suggest appendicitis  ABDOMINOPELVIC CAVITY:  No ascites or free intraperitoneal air  No lymphadenopathy  VESSELS:  Unremarkable for patient's age  PELVIS REPRODUCTIVE ORGANS:  Unremarkable for patient's age  Previously noted right adnexal cyst has resolved  URINARY BLADDER:  Unremarkable  ABDOMINAL WALL/INGUINAL REGIONS:  Unremarkable  OSSEOUS STRUCTURES:  No acute fracture or destructive osseous lesion  Impression: Interval decrease in size of right breast mass and interval improvement in right axillary lymphadenopathy  Stable borderline pretracheal lymph node is present  Hepatic metastatic disease appears somewhat more prominent currently however this may be secondary to the fact that intravenous contrast was not present on the prior exam   Attention at follow-up is necessary  Workstation performed: [id number]         Labs:   Lab Results   Component Value Date    WBC 4 6 11/06/2019    HGB 12 9 11/06/2019    HCT 38 7 11/06/2019    MCV 99 7 11/06/2019     11/06/2019     Lab Results   Component Value Date    K 4 6 11/06/2019     11/06/2019    CO2 30 11/06/2019    BUN 22 11/06/2019    CREATININE 0 98 11/06/2019    GLUF 129 (H) 07/29/2019    CALCIUM 9 8 11/06/2019    AST 18 11/06/2019    ALT 15 11/06/2019    ALKPHOS 53 11/06/2019    EGFR 84 07/29/2019         Current Medications: Reviewed  Allergies: Reviewed  PMH/FH/SH:  Reviewed      Vital Sign:    Body surface area is 1 98 meters squared      Wt Readings from Last 3 Encounters:   11/07/19 88 9 kg (196 lb)   09/05/19 92 5 kg (204 lb)   08/28/19 92 5 kg (203 lb 14 4 oz)        Temp Readings from Last 3 Encounters:   11/07/19 (!) 97 1 °F (36 2 °C) (Tympanic)   09/05/19 (!) 97 1 °F (36 2 °C) (Tympanic Core)   08/28/19 98 1 °F (36 7 °C)        BP Readings from Last 3 Encounters:   11/07/19 122/72   09/05/19 114/72   08/28/19 140/78         Pulse Readings from Last 3 Encounters:   11/07/19 79   09/05/19 79   08/28/19 80     @LASTSAO2(3)@

## 2019-11-18 DIAGNOSIS — C50.111 MALIGNANT NEOPLASM OF CENTRAL PORTION OF RIGHT BREAST IN FEMALE, ESTROGEN RECEPTOR POSITIVE (HCC): ICD-10-CM

## 2019-11-18 DIAGNOSIS — Z17.0 MALIGNANT NEOPLASM OF CENTRAL PORTION OF RIGHT BREAST IN FEMALE, ESTROGEN RECEPTOR POSITIVE (HCC): ICD-10-CM

## 2019-12-02 ENCOUNTER — TELEPHONE (OUTPATIENT)
Dept: HEMATOLOGY ONCOLOGY | Facility: CLINIC | Age: 49
End: 2019-12-02

## 2019-12-02 DIAGNOSIS — C78.7 LIVER METASTASES (HCC): ICD-10-CM

## 2019-12-02 DIAGNOSIS — Z17.0 MALIGNANT NEOPLASM OF CENTRAL PORTION OF RIGHT BREAST IN FEMALE, ESTROGEN RECEPTOR POSITIVE (HCC): ICD-10-CM

## 2019-12-02 DIAGNOSIS — C50.111 MALIGNANT NEOPLASM OF CENTRAL PORTION OF RIGHT BREAST IN FEMALE, ESTROGEN RECEPTOR POSITIVE (HCC): ICD-10-CM

## 2019-12-02 RX ORDER — LETROZOLE 2.5 MG/1
2.5 TABLET, FILM COATED ORAL DAILY
Qty: 90 TABLET | Refills: 1 | Status: SHIPPED | OUTPATIENT
Start: 2019-12-02 | End: 2020-01-02 | Stop reason: ALTCHOICE

## 2019-12-02 NOTE — TELEPHONE ENCOUNTER
PT called to ask Francis Guardado to send in a new prescription they wont pay for refills for this drug letrozole (FEMARA) 2 5 mg tablet

## 2019-12-13 ENCOUNTER — TELEPHONE (OUTPATIENT)
Dept: HEMATOLOGY ONCOLOGY | Facility: CLINIC | Age: 49
End: 2019-12-13

## 2019-12-13 ENCOUNTER — TELEPHONE (OUTPATIENT)
Dept: URGENT CARE | Age: 49
End: 2019-12-13

## 2019-12-13 ENCOUNTER — OFFICE VISIT (OUTPATIENT)
Dept: URGENT CARE | Age: 49
End: 2019-12-13
Payer: COMMERCIAL

## 2019-12-13 ENCOUNTER — APPOINTMENT (OUTPATIENT)
Dept: RADIOLOGY | Age: 49
End: 2019-12-13
Attending: FAMILY MEDICINE
Payer: COMMERCIAL

## 2019-12-13 VITALS
WEIGHT: 193 LBS | BODY MASS INDEX: 31.02 KG/M2 | RESPIRATION RATE: 18 BRPM | HEART RATE: 70 BPM | TEMPERATURE: 98.2 F | SYSTOLIC BLOOD PRESSURE: 128 MMHG | HEIGHT: 66 IN | DIASTOLIC BLOOD PRESSURE: 59 MMHG | OXYGEN SATURATION: 99 %

## 2019-12-13 DIAGNOSIS — M54.6 ACUTE RIGHT-SIDED THORACIC BACK PAIN: ICD-10-CM

## 2019-12-13 DIAGNOSIS — M54.6 ACUTE RIGHT-SIDED THORACIC BACK PAIN: Primary | ICD-10-CM

## 2019-12-13 PROCEDURE — 99283 EMERGENCY DEPT VISIT LOW MDM: CPT | Performed by: FAMILY MEDICINE

## 2019-12-13 PROCEDURE — 71101 X-RAY EXAM UNILAT RIBS/CHEST: CPT

## 2019-12-13 PROCEDURE — G0382 LEV 3 HOSP TYPE B ED VISIT: HCPCS | Performed by: FAMILY MEDICINE

## 2019-12-13 RX ORDER — NAPROXEN 500 MG/1
500 TABLET ORAL 2 TIMES DAILY WITH MEALS
Qty: 30 TABLET | Refills: 0 | Status: SHIPPED | OUTPATIENT
Start: 2019-12-13 | End: 2019-12-25 | Stop reason: SDUPTHER

## 2019-12-13 RX ORDER — CYCLOBENZAPRINE HCL 10 MG
10 TABLET ORAL
Qty: 20 TABLET | Refills: 0 | Status: SHIPPED | OUTPATIENT
Start: 2019-12-13 | End: 2020-01-07 | Stop reason: ALTCHOICE

## 2019-12-13 NOTE — PATIENT INSTRUCTIONS
Rest, limit activity  Ice to injured area 2 to 3 times a day for 15-20 minutes  Stop ibuprofen (Advil/Motrin)  Start Naprosyn twice a day for pain and inflammation (please take with food)  Flexeril (cyclobenzaprine) twice a day or just at bedtime as needed for muscle tightness (may cause sedation)  Recheck/follow-up with family physician as discussed for further care  Katina Artis   [phone]    Please go to the hospital emergency department if needed

## 2019-12-13 NOTE — TELEPHONE ENCOUNTER
12/13/2019 - 09:18AM:  Spoke with patient via phone; reviewed right ribs with PA chest x-ray report (lucent lesion in the right 11th rib concerning for metastatic disease); patient instructed to follow up with her doctors as soon as possible; patient states she is scheduled for a CT scan in January 2020

## 2019-12-13 NOTE — TELEPHONE ENCOUNTER
Moved up patient's scan to 12/20 at 1230 pm  Also moved her appointment with Dr Zohra Orantes to 1/2 at 3pm  She verbalized understanding and agreed to these appointments

## 2019-12-13 NOTE — PROGRESS NOTES
[facility]        NAME: Ranulfo Hodge is a 47 y o  female  : 1970    MRN: [id number]  DATE: 2019  TIME: 8:38 AM    Assessment and Plan   Acute right-sided thoracic back pain [M54 6]  1  Acute right-sided thoracic back pain  XR ribs right w pa chest min 3 views    naproxen (NAPROSYN) 500 mg tablet    cyclobenzaprine (FLEXERIL) 10 mg tablet         Patient Instructions     Patient Instructions   Rest, limit activity  Ice to injured area 2 to 3 times a day for 15-20 minutes  Stop ibuprofen (Advil/Motrin)  Start Naprosyn twice a day for pain and inflammation (please take with food)  Flexeril (cyclobenzaprine) twice a day or just at bedtime as needed for muscle tightness (may cause sedation)  Recheck/follow-up with family physician as discussed for further care  Nicolina Ganser   [phone]    Please go to the hospital emergency department if needed  Follow up with PCP in 3-5 days  Proceed to  ER if symptoms worsen  Chief Complaint     Chief Complaint   Patient presents with    Rib Pain     Was moving over the weekend and now having lower back (rib pain)  Been taking Advil  History of Present Illness       Patient states she was moving this past weekend and now has pain over her posterior right lower ribs; patient states she was taking ibuprofen without improvement      Review of Systems   Review of Systems   Musculoskeletal:        Midback pain over the posterior right lower ribs   All other systems reviewed and are negative  Current Medications       Current Outpatient Medications:     letrozole (FEMARA) 2 5 mg tablet, Take 1 tablet (2 5 mg total) by mouth daily, Disp: 90 tablet, Rfl: 1    Palbociclib (IBRANCE) 125 MG capsule, TAKE 1 CAPSULE DAILY WITH BREAKFAST  TAKE 3 WEEKS ON, FOLLOWED BY 1 WEEK OFF   TAKE WITH FOOD, Disp: 21 capsule, Rfl: 5    cyclobenzaprine (FLEXERIL) 10 mg tablet, Take 1 tablet (10 mg total) by mouth daily at bedtime for 20 doses, Disp: 20 tablet, Rfl: 0    naproxen (NAPROSYN) 500 mg tablet, Take 1 tablet (500 mg total) by mouth 2 (two) times a day with meals for 30 doses, Disp: 30 tablet, Rfl: 0    Current Allergies     Allergies as of 12/13/2019    (No Known Allergies)            The following portions of the patient's history were reviewed and updated as appropriate: allergies, current medications, past family history, past medical history, past social history, past surgical history and problem list      Past Medical History:   Diagnosis Date    Hypotension        Past Surgical History:   Procedure Laterality Date    BREAST BIOPSY      IR IMAGE GUIDED BIOPSY/ASPIRATION LIVER  6/11/2019    MYOMECTOMY      H/O FIBROIDS, NO SURGERY NEEDED    W[place] LAP,RMV  ADNEXAL STRUCTURE N/A 7/29/2019    Procedure: LAPAROSCOPIC BILATERAL SALPINGO-OOPHORECTOMY;  Surgeon: Jenna Mercedes MD;  Location: [facility];  Service: Gynecology Oncology    US GUIDED BREAST BIOPSY RIGHT COMPLETE Right 5/3/2019    US GUIDED BREAST LYMPH NODE BIOPSY RIGHT Right 5/3/2019       Family History   Problem Relation Age of Onset    Hypotension Mother     Colon cancer Father 36    Hypertension Father     Prostate cancer Father 79    Throat cancer Maternal Grandmother 61    Cancer Maternal Grandmother     Breast cancer Paternal Aunt         age unknown         Medications have been verified  Objective   /59 (BP Location: Left arm, Patient Position: Sitting)   Pulse 70   Temp 98 2 °F (36 8 °C) (Temporal)   Resp 18   Ht 5' 6" (1 676 m)   Wt 87 5 kg (193 lb)   SpO2 99%   BMI 31 15 kg/m²        Physical Exam     Physical Exam   Constitutional: She is oriented to person, place, and time  She appears well-developed and well-nourished  HENT:   Mouth/Throat: Oropharynx is clear and moist    Neck: Normal range of motion  Neck supple  Cardiovascular: Normal rate, regular rhythm and normal heart sounds     Pulmonary/Chest: Effort normal and breath sounds normal    Musculoskeletal:   Tenderness over the posterior aspect of the right lower ribs   Neurological: She is alert and oriented to person, place, and time  Skin:   No bruising noted over the right midback area   Psychiatric: She has a normal mood and affect  Her behavior is normal    Nursing note and vitals reviewed          Right ribs with PA chest x-rays - no displaced rib fractures noted

## 2019-12-13 NOTE — TELEPHONE ENCOUNTER
Pt was in to do her chest Xray radiologist found a lesion on her ribs and they are concerned  PT would like call from [address] [place] [address] Ave and or nurse jayantp

## 2019-12-20 ENCOUNTER — TRANSCRIBE ORDERS (OUTPATIENT)
Dept: RADIOLOGY | Facility: HOSPITAL | Age: 49
End: 2019-12-20

## 2019-12-20 ENCOUNTER — HOSPITAL ENCOUNTER (OUTPATIENT)
Dept: RADIOLOGY | Facility: HOSPITAL | Age: 49
Discharge: HOME | End: 2019-12-20
Attending: INTERNAL MEDICINE
Payer: COMMERCIAL

## 2019-12-20 DIAGNOSIS — C78.7 LIVER METASTASES (HCC): ICD-10-CM

## 2019-12-20 DIAGNOSIS — C50.111 MALIGNANT NEOPLASM OF CENTRAL PORTION OF RIGHT BREAST IN FEMALE, ESTROGEN RECEPTOR POSITIVE (HCC): ICD-10-CM

## 2019-12-20 DIAGNOSIS — Z17.0 MALIGNANT NEOPLASM OF CENTRAL PORTION OF RIGHT BREAST IN FEMALE, ESTROGEN RECEPTOR POSITIVE (HCC): ICD-10-CM

## 2019-12-20 PROCEDURE — 74177 CT ABD & PELVIS W/CONTRAST: CPT

## 2019-12-20 PROCEDURE — 71260 CT THORAX DX C+: CPT

## 2019-12-20 RX ADMIN — IOHEXOL 100 ML: 350 INJECTION, SOLUTION INTRAVENOUS at 13:42

## 2019-12-25 DIAGNOSIS — M54.6 ACUTE RIGHT-SIDED THORACIC BACK PAIN: ICD-10-CM

## 2019-12-26 RX ORDER — NAPROXEN 500 MG/1
TABLET ORAL
Qty: 30 TABLET | Refills: 0 | Status: SHIPPED | OUTPATIENT
Start: 2019-12-26 | End: 2020-01-07 | Stop reason: ALTCHOICE

## 2019-12-27 ENCOUNTER — TELEPHONE (OUTPATIENT)
Dept: HEMATOLOGY ONCOLOGY | Facility: CLINIC | Age: 49
End: 2019-12-27

## 2019-12-27 NOTE — TELEPHONE ENCOUNTER
Call transferred from Ray Guerrero, [address] [place] [address] from Radiology to report significant findings on Ct of Chest/Abdomen/Pelvis  Results in Epic  Andi Banegas RN contacted via IM and tasked

## 2019-12-30 ENCOUNTER — TELEPHONE (OUTPATIENT)
Dept: HEMATOLOGY ONCOLOGY | Facility: CLINIC | Age: 49
End: 2019-12-30

## 2019-12-30 NOTE — TELEPHONE ENCOUNTER
Patient states that her breast is swollen and she is icing it  She has an appt  On Thursday, but is looking for CT results    Please call her at [phone]

## 2019-12-30 NOTE — TELEPHONE ENCOUNTER
I spoke with patient regarding recent CT scan which showed mixed response in the liver metastatic lesion  However, she has clear progression of disease in the breast as well as right axillary lymph node  Therefore, systemic therapy as to be changed  I am considering PARP inhibitor since she has BRCA 2 mutation  She has appointment later this week at which time we will have further discussion  She is in agreement

## 2020-01-02 ENCOUNTER — OFFICE VISIT (OUTPATIENT)
Dept: HEMATOLOGY ONCOLOGY | Facility: CLINIC | Age: 50
End: 2020-01-02
Payer: COMMERCIAL

## 2020-01-02 VITALS
SYSTOLIC BLOOD PRESSURE: 128 MMHG | RESPIRATION RATE: 18 BRPM | OXYGEN SATURATION: 99 % | HEART RATE: 85 BPM | DIASTOLIC BLOOD PRESSURE: 72 MMHG | TEMPERATURE: 95.7 F | BODY MASS INDEX: 32.95 KG/M2 | WEIGHT: 205 LBS | HEIGHT: 66 IN

## 2020-01-02 DIAGNOSIS — Z15.01 BRCA2 GENE MUTATION POSITIVE: ICD-10-CM

## 2020-01-02 DIAGNOSIS — C78.7 LIVER METASTASES (HCC): Primary | ICD-10-CM

## 2020-01-02 DIAGNOSIS — Z15.09 BRCA2 GENE MUTATION POSITIVE: ICD-10-CM

## 2020-01-02 DIAGNOSIS — C50.911 CARCINOMA OF RIGHT BREAST METASTATIC TO AXILLARY LYMPH NODE (HCC): ICD-10-CM

## 2020-01-02 DIAGNOSIS — C77.3 CARCINOMA OF RIGHT BREAST METASTATIC TO AXILLARY LYMPH NODE (HCC): ICD-10-CM

## 2020-01-02 PROCEDURE — 99215 OFFICE O/P EST HI 40 MIN: CPT | Performed by: INTERNAL MEDICINE

## 2020-01-02 RX ORDER — OXYCODONE HYDROCHLORIDE 10 MG/1
10 TABLET ORAL EVERY 6 HOURS PRN
Qty: 30 TABLET | Refills: 0 | Status: SHIPPED | OUTPATIENT
Start: 2020-01-02 | End: 2020-02-21 | Stop reason: SDUPTHER

## 2020-01-02 RX ORDER — SODIUM CHLORIDE 9 MG/ML
20 INJECTION, SOLUTION INTRAVENOUS ONCE
Status: CANCELLED | OUTPATIENT
Start: 2020-01-07

## 2020-01-02 RX ORDER — SODIUM CHLORIDE 9 MG/ML
20 INJECTION, SOLUTION INTRAVENOUS ONCE
Status: CANCELLED | OUTPATIENT
Start: 2020-01-31

## 2020-01-02 NOTE — LETTER
2020     Harriett Hidalgo MD  [address] [facility]  [address] [facility] [address]     Patient: Tiara Foss   YOB: 1970   Date of Visit: 2020       Dear Dr Dhaval Gonzalez: Thank you for referring Tiara Foss to me for evaluation  Below are my notes for this consultation  If you have questions, please do not hesitate to call me  I look forward to following your patient along with you  Sincerely,        Cinthia Mcbride MD        CC: No Recipients  Cinthia Mcbride MD  2020  3:45 PM  Sign at close encounter  Hematology / Oncology Outpatient Follow Up Note    Tiara Foss 47 y o  female :1970 HAK:[id number]         Date:  2020    Assessment / Plan:  A 47year-old surgically postmenopausal woman with metastatic right breast cancer, grade 3, ER 90% positive, ME 90% positive, HER2 negative disease   She has BRCA -2 mutation    She has histologically confirmed liver metastasis   Since she was premenopausal, she underwent as bilateral surgical ovarian ablation in late 2019 followed by starting palbociclib and letrozole  She had initial minor response  However, since last month, she has rapid progression of right breast mass, axillary adenopathy as well as new development of skin metastasis  I recommended her to discontinue palbociclib and letrozole  We discussed following 2 options  1  Olaparib  2  Induction chemotherapy with Taxotere followed by possibly olaparib  Because of her lap progression of disease as well as grade 3 tumor, I would favor induction therapy with Taxotere  Side effects of Taxotere was thoroughly discussed including but not limited to alopecia, nausea, vomiting, neutropenia, risk of infection, allergic reaction, peripheral edema as well as fatigue  She understood and wished to proceed  She was instructed to give us a call immediately if she has fever    She has skin involvement clinically which raised the concern of future fungating mass if she has further progression  I will let her see Dr Fernando Washington for possible palliative mastectomy  Ideally, if she has some certain response on Taxotere, she may undergo palliative mastectomy  She understood  She is going to have 1st cycle of Taxotere on January 7, 2020  I will see her again prior to the 2nd cycle of chemotherapy  She is in agreement with my recommendations                           Subjective:      HPI:  A 79-year-old premenopausal woman who noticed a lump in her right breast in January 2019 which she brought to medical attention   Radiographically, she has large right breast mass and right axillary adenopathy both of which were biopsy in May 3, 2019   Biopsy showed invasive ductal carcinoma, grade 3   This was ER 90% positive, GA 90% positive, HER2 negative disease   Biopsy from right axilla lymph node was positive for metastatic disease   She was seen by Dr Miky Landa of her young age, she underwent genetic testing which showed BRCA-2 mutation   Because of the locally advanced nature, she underwent CT scan of chest abdomen pelvis which showed multiple liver lesion, suspicious for metastatic disease   Bone scan was negative for osseous metastasis  Frances Combs is going to have liver biopsy in June 11, 2019   She presents today to discuss treatment option   She has no symptomatology from breast standpoint   She denied any pain   Her weight has been stable   She has no respiratory symptoms   She has no significant past medical history   Her paternal aunt had breast cancer in her 35s   Interestingly enough, her father had colon cancer in his 45s as well as prostate cancer in his 62s   She has no family history of ovarian cancer   She was a smoker until 2 years ago  Frances Combs does not drink alcohol  Frances Combs has a son who is 23years old  Frances Combs also has younger son who was biologically female  [employer] is a transgender  [employer] is considering bilateral mastectomy  Lilia Champagne, he has not been tested for BRCA gene mutation            Interval History:   A 47year-old surgically postmenopausal woman with metastatic right breast cancer, grade 3, ER 90% positive, WA 90% positive, HER2 negative disease   She has BRCA -2 mutation   Based on the CT scan, she appeared to have multiple liver metastasis     She subsequently underwent liver biopsy which showed metastatic carcinoma, consistent with breast primary   She was premenopausal at the time of diagnosis   Therefore, she underwent bilateral oophorectomy in late July 2019  Ovary and fallopian tube did not show any malignancy   She started palbociclib and letrozole in late July 2019  She initially had response with decreased right breast mass as well as right axillary adenopathy  However, she has recently noticed enlarging right breast mass as well as breast pain  She recently underwent CT scan of chest abdomen pelvis which showed enlarging right breast mass as well as skin change and I had right axillary adenopathy  She had mixed response with liver metastasis  Her weight is stable  She has no respiratory symptoms  Her performance status is normal         Objective:      Primary Diagnosis:     1    Metastatic breast cancer, grade 3, ER 90 % positive, WA 90 % positive, HER2 negative disease, diagnosed in June 2019   2    BRCA-2 mutation      Cancer Staging:  Cancer Staging  Malignant neoplasm of central portion of right breast in female, estrogen receptor positive ([facility] [address] )  Staging form: Breast, AJCC 8th Edition  - Clinical: Stage IIIB (cT4, cN1(f), cM0, G3, ER+, WA+, HER2-) - Signed by Mamie Hernandez MD on 5/16/2019  Laterality: Right  Method of lymph node assessment: Core biopsy  Histologic grading system: 3 grade system           Previous Hematologic/ Oncologic Treatment:      1   Surgical ovarian ablation in late July 2019   2  Palbociclib and letrozole from July 2018 through January 2020       Current Hematologic/ Oncologic Treatment:       Taxotere every 3 weeks to be started in January 7, 2020       Disease Status:      Progressive disease on palbociclib and letrozole      Test Results:     Pathology:     Right breast biopsy in axillary lymph node biopsy showed invasive ductal carcinoma, grade 3   ER 90 % positive, OR 90% positive, HER2 negative disease      Liver biopsy in June 2019 showed metastatic carcinoma, consistent with breast primary      Bilateral ovary and fallopian tube showed no evidence of malignancy      Radiology:     Bone scan showed no evidence of osseous metastasis      CT scan of chest abdomen pelvis in  December 2019 showed progression of right breast mass and right axillary adenopathy with new appearance of skin change  Mixed response with liver metastasis       Laboratory:     See below      Physical Exam:        General Appearance:    Alert, oriented          Eyes:    PERRL   Ears:    Normal external ear canals, both ears   Nose:   Nares normal, septum midline   Throat:   Mucosa moist  Pharynx without injection  Neck:   Supple         Lungs:     Clear to auscultation bilaterally   Chest Wall:    No tenderness or deformity    Heart:    Regular rate and rhythm         Abdomen:     Soft, non-tender, bowel sounds +, no organomegaly               Extremities:   Extremities no cyanosis or edema         Skin:   no rash or icterus  Lymph nodes:   Cervical, supraclavicular, and axillary nodes normal   Neurologic:   CNII-XII intact, normal strength, sensation and reflexes     Throughout             Breast exam:  13 x 12 cm of mass at upper aspect of her right breast   Skin thickening as well as a tomatoes small skin lesion, consistent with skin metastasis   No palpable right axillary adenopathy  Left breast exam is negative  ROS: Review of Systems   All other systems reviewed and are negative            Imaging: Xr Ribs Right W Pa Chest Min 3 Views    Result Date: 12/13/2019  Narrative: RIGHT RIBS AND CHEST - DUAL ENERGY INDICATION:   M54 6: Pain in thoracic spine  COMPARISON:  CT 10/20/2019  EXAM PERFORMED/VIEWS:  XR RIBS RIGHT W PA CHEST MIN 3 VIEWS FINDINGS: Cardiomediastinal silhouette appears unremarkable  Lungs are clear  No pleural effusions  There is no pneumothorax  No rib fractures are identified  There is a 1 6 cm ovoid lucency in the right 11th rib laterally, concerning for metastatic disease  Impression: Lucent lesion in the right 11th rib concerning for metastatic disease  No active cardiopulmonary disease  The study was marked in [facility] [place] for immediate notification  Workstation performed: [id number]     Ct Chest Abdomen Pelvis W Contrast    Result Date: 12/27/2019  Narrative: CT CHEST, ABDOMEN AND PELVIS WITH IV CONTRAST INDICATION:   C78 7: Secondary malignant neoplasm of liver and intrahepatic bile duct C50 111: Malignant neoplasm of central portion of right female breast Z17 0: Estrogen receptor positive status (ER+)  COMPARISON:  10/28/2019 TECHNIQUE: CT examination of the chest, abdomen and pelvis was performed  Scanning through the abdomen was performed in arterial, venous and delayed phases according a protocol spefically designed to evaluate upper abdominal viscera  Axial, sagittal, and coronal 2D reformatted images were created from the source data and submitted for interpretation  Radiation dose length product (DLP) for this visit:  1865 85 mGy-cm   This examination, like all CT scans performed in the [facility], was performed utilizing techniques to minimize radiation dose exposure, including the use of iterative reconstruction and automated exposure control  IV Contrast:  100 mL of iohexol (OMNIPAQUE) Enteric Contrast: Enteric contrast was administered  FINDINGS: CHEST LUNGS:  Lungs are clear  There is centrilobular emphysema  There is no tracheal or endobronchial lesion  PLEURA:  Unremarkable  HEART/GREAT VESSELS:  Unremarkable for patient's age  MEDIASTINUM AND HAIM:  Borderline pretracheal lymph node is stable  CHEST WALL AND LOWER NECK:   There is a large mass within the right breast again seen  While the primary component of the mass does not appear significantly changed in size, there is increased nodularity throughout the surrounding breast tissue and increased overlying skin thickening suspicious for tumor spread  Again seen is right axillary lymphadenopathy  A right axillary lymph node measures 2 4 x 2 9 cm, previously 1 5 x 2 4 cm  A necrotic axillary lymph node measures 1 8 x 2 0 cm, previously 8 x 11 mm  ABDOMEN LIVER/BILIARY TREE:  Multiple liver metastasis are again seen  A lesion in the posterior right lobe at the dome measures 1 8 x 1 9 cm, previously 1 8 x 1 9 cm  A lesion in the medial left lobe measures 1 2 x 1 4 cm, previously 2 2 x 2 7 cm  A subcapsular lesion in the anterior right lobe measures 1 8 x 2 5 cm, previously 1 9 x 1 9 cm  A lesion in the posterior segment of the right hepatic lobe measures 2 1 x 2 4 cm, previously 1 8 x 2 3 cm  GALLBLADDER:  No calcified gallstones  No pericholecystic inflammatory change  SPLEEN:  Unremarkable  PANCREAS:  Unremarkable  ADRENAL GLANDS:  Unremarkable  KIDNEYS/URETERS:  Unremarkable  No hydronephrosis  STOMACH AND BOWEL:  Unremarkable  APPENDIX:  No findings to suggest appendicitis  ABDOMINOPELVIC CAVITY:  No ascites or free intraperitoneal air  No lymphadenopathy  VESSELS:  Unremarkable for patient's age  PELVIS REPRODUCTIVE ORGANS:  Again seen are uterine fibroids  URINARY BLADDER:  Unremarkable  ABDOMINAL WALL/INGUINAL REGIONS:  Unremarkable  OSSEOUS STRUCTURES:  No acute fracture or destructive osseous lesion  Impression: 1  Worsening diffuse right breast nodularity and skin thickening with similar size of the dominant mass  This likely represents local tumor spread  2   Worsening right axillary lymphadenopathy    3   Mixed response of liver metastasis with decrease in some lesions, stability of a lesion at the dome, and increase in size of a subcapsular lesion in the anterior right lobe  The study was marked in [employer] for significant notification  Workstation performed: [id number]         Labs:   Lab Results   Component Value Date    WBC 4 6 11/06/2019    HGB 12 9 11/06/2019    HCT 38 7 11/06/2019    MCV 99 7 11/06/2019     11/06/2019     Lab Results   Component Value Date    K 4 6 11/06/2019     11/06/2019    CO2 30 11/06/2019    BUN 22 11/06/2019    CREATININE 0 98 11/06/2019    GLUF 129 (H) 07/29/2019    CALCIUM 9 8 11/06/2019    AST 18 11/06/2019    ALT 15 11/06/2019    ALKPHOS 53 11/06/2019    EGFR 84 07/29/2019         Current Medications: Reviewed  Allergies: Reviewed  PMH/FH/SH:  Reviewed      Vital Sign:    Body surface area is 2 02 meters squared      Wt Readings from Last 3 Encounters:   01/02/20 93 kg (205 lb)   12/13/19 87 5 kg (193 lb)   11/07/19 88 9 kg (196 lb)        Temp Readings from Last 3 Encounters:   01/02/20 (!) 95 7 °F (35 4 °C) (Tympanic Core)   12/13/19 98 2 °F (36 8 °C) (Temporal)   11/07/19 (!) 97 1 °F (36 2 °C) (Tympanic)        BP Readings from Last 3 Encounters:   01/02/20 128/72   12/13/19 128/59   11/07/19 122/72         Pulse Readings from Last 3 Encounters:   01/02/20 85   12/13/19 70   11/07/19 79     @LASTSAO2(3)@

## 2020-01-02 NOTE — PROGRESS NOTES
Hematology / Oncology Outpatient Follow Up Note    Yvonne Perea 47 y o  female :1970 EUL:[id number]         Date:  2020    Assessment / Plan:  A 47year-old surgically postmenopausal woman with metastatic right breast cancer, grade 3, ER 90% positive, TN 90% positive, HER2 negative disease   She has BRCA -2 mutation    She has histologically confirmed liver metastasis   Since she was premenopausal, she underwent as bilateral surgical ovarian ablation in late 2019 followed by starting palbociclib and letrozole  She had initial minor response  However, since last month, she has rapid progression of right breast mass, axillary adenopathy as well as new development of skin metastasis  I recommended her to discontinue palbociclib and letrozole  We discussed following 2 options  1  Olaparib  2  Induction chemotherapy with Taxotere followed by possibly olaparib  Because of her lap progression of disease as well as grade 3 tumor, I would favor induction therapy with Taxotere  Side effects of Taxotere was thoroughly discussed including but not limited to alopecia, nausea, vomiting, neutropenia, risk of infection, allergic reaction, peripheral edema as well as fatigue  She understood and wished to proceed  She was instructed to give us a call immediately if she has fever  She has skin involvement clinically which raised the concern of future fungating mass if she has further progression  I will let her see Dr Betty Sawant for possible palliative mastectomy  Ideally, if she has some certain response on Taxotere, she may undergo palliative mastectomy  She understood  She is going to have 1st cycle of Taxotere on 2020  I will see her again prior to the 2nd cycle of chemotherapy    She is in agreement with my recommendations                           Subjective:      HPI:  A 50-year-old premenopausal woman who noticed a lump in her right breast in 2019 which she brought to medical attention   Radiographically, she has large right breast mass and right axillary adenopathy both of which were biopsy in May 3, 2019   Biopsy showed invasive ductal carcinoma, grade 3   This was ER 90% positive, NY 90% positive, HER2 negative disease   Biopsy from right axilla lymph node was positive for metastatic disease   She was seen by Dr J Luis Jackson of her young age, she underwent genetic testing which showed BRCA-2 mutation   Because of the locally advanced nature, she underwent CT scan of chest abdomen pelvis which showed multiple liver lesion, suspicious for metastatic disease   Bone scan was negative for osseous metastasis  Scott Mehta is going to have liver biopsy in June 11, 2019   She presents today to discuss treatment option   She has no symptomatology from breast standpoint   She denied any pain  Her weight has been stable   She has no respiratory symptoms   She has no significant past medical history   Her paternal aunt had breast cancer in her 35s   Interestingly enough, her father had colon cancer in his 45s as well as prostate cancer in his 62s   She has no family history of ovarian cancer   She was a smoker until 2 years ago  Scott Mehta does not drink alcohol  Scott Mehta has a son who is 23years old  Scott Mehta also has younger son who was biologically female  [facility] is a transgender  [facility] is considering bilateral mastectomy  [facility], he has not been tested for BRCA gene mutation            Interval History:   A 47year-old surgically postmenopausal woman with metastatic right breast cancer, grade 3, ER 90% positive, [place] 90% positive, HER2 negative disease   She has BRCA -2 mutation   Based on the CT scan, she appeared to have multiple liver metastasis     She subsequently underwent liver biopsy which showed metastatic carcinoma, consistent with breast primary   She was premenopausal at the time of diagnosis   Therefore, she underwent bilateral oophorectomy in late July 2019   Ovary and fallopian tube did not show any malignancy   She started palbociclib and letrozole in late July 2019  She initially had response with decreased right breast mass as well as right axillary adenopathy  However, she has recently noticed enlarging right breast mass as well as breast pain  She recently underwent CT scan of chest abdomen pelvis which showed enlarging right breast mass as well as skin change and I had right axillary adenopathy  She had mixed response with liver metastasis  Her weight is stable  She has no respiratory symptoms  Her performance status is normal         Objective:      Primary Diagnosis:     1    Metastatic breast cancer, grade 3, ER 90 % positive, SC 90 % positive, HER2 negative disease, diagnosed in June 2019   2    BRCA-2 mutation      Cancer Staging:  Cancer Staging  Malignant neoplasm of central portion of right breast in female, estrogen receptor positive ([facility] [address] )  Staging form: Breast, AJCC 8th Edition  - Clinical: Stage IIIB (cT4, cN1(f), cM0, G3, ER+, SC+, HER2-) - Signed by Everardo Jimenez MD on 5/16/2019  Laterality: Right  Method of lymph node assessment: Core biopsy  Histologic grading system: 3 grade system           Previous Hematologic/ Oncologic Treatment:      1   Surgical ovarian ablation in late July 2019   2  Palbociclib and letrozole from July 2018 through January 2020       Current Hematologic/ Oncologic Treatment:       Taxotere every 3 weeks to be started in January 7, 2020       Disease Status:      Progressive disease on palbociclib and letrozole      Test Results:     Pathology:     Right breast biopsy in axillary lymph node biopsy showed invasive ductal carcinoma, grade 3   ER 90 % positive, SC 90% positive, HER2 negative disease      Liver biopsy in June 2019 showed metastatic carcinoma, consistent with breast primary      Bilateral ovary and fallopian tube showed no evidence of malignancy      Radiology:     Bone scan showed no evidence of osseous metastasis      CT scan of chest abdomen pelvis in December 2019 showed progression of right breast mass and right axillary adenopathy with new appearance of skin change  Mixed response with liver metastasis       Laboratory:     See below      Physical Exam:        General Appearance:    Alert, oriented          Eyes:    PERRL   Ears:    Normal external ear canals, both ears   Nose:   Nares normal, septum midline   Throat:   Mucosa moist  Pharynx without injection  Neck:   Supple         Lungs:     Clear to auscultation bilaterally   Chest Wall:    No tenderness or deformity    Heart:    Regular rate and rhythm         Abdomen:     Soft, non-tender, bowel sounds +, no organomegaly               Extremities:   Extremities no cyanosis or edema         Skin:   no rash or icterus  Lymph nodes:   Cervical, supraclavicular, and axillary nodes normal   Neurologic:   CNII-XII intact, normal strength, sensation and reflexes     Throughout             Breast exam:  13 x 12 cm of mass at upper aspect of her right breast   Skin thickening as well as a tomatoes small skin lesion, consistent with skin metastasis   No palpable right axillary adenopathy  Left breast exam is negative  ROS: Review of Systems   All other systems reviewed and are negative  Imaging: Xr Ribs Right W Pa Chest Min 3 Views    Result Date: 12/13/2019  Narrative: RIGHT RIBS AND CHEST - DUAL ENERGY INDICATION:   M54 6: Pain in thoracic spine  COMPARISON:  CT 10/20/2019  EXAM PERFORMED/VIEWS:  XR RIBS RIGHT W PA CHEST MIN 3 VIEWS FINDINGS: Cardiomediastinal silhouette appears unremarkable  Lungs are clear  No pleural effusions  There is no pneumothorax  No rib fractures are identified  There is a 1 6 cm ovoid lucency in the right 11th rib laterally, concerning for metastatic disease  Impression: Lucent lesion in the right 11th rib concerning for metastatic disease  No active cardiopulmonary disease  The study was marked in [facility] for immediate notification   Workstation performed: [id number]     Ct Chest Abdomen Pelvis W Contrast    Result Date: 12/27/2019  Narrative: CT CHEST, ABDOMEN AND PELVIS WITH IV CONTRAST INDICATION:   C78 7: Secondary malignant neoplasm of liver and intrahepatic bile duct C50 111: Malignant neoplasm of central portion of right female breast Z17 0: Estrogen receptor positive status (ER+)  COMPARISON:  10/28/2019 TECHNIQUE: CT examination of the chest, abdomen and pelvis was performed  Scanning through the abdomen was performed in arterial, venous and delayed phases according a protocol spefically designed to evaluate upper abdominal viscera  Axial, sagittal, and coronal 2D reformatted images were created from the source data and submitted for interpretation  Radiation dose length product (DLP) for this visit:  1865 85 mGy-cm   This examination, like all CT scans performed in the [facility], was performed utilizing techniques to minimize radiation dose exposure, including the use of iterative reconstruction and automated exposure control  IV Contrast:  100 mL of iohexol (OMNIPAQUE) Enteric Contrast: Enteric contrast was administered  FINDINGS: CHEST LUNGS:  Lungs are clear  There is centrilobular emphysema  There is no tracheal or endobronchial lesion  PLEURA:  Unremarkable  HEART/GREAT VESSELS:  Unremarkable for patient's age  MEDIASTINUM AND HAIM:  Borderline pretracheal lymph node is stable  CHEST WALL AND LOWER NECK:   There is a large mass within the right breast again seen  While the primary component of the mass does not appear significantly changed in size, there is increased nodularity throughout the surrounding breast tissue and increased overlying skin thickening suspicious for tumor spread  Again seen is right axillary lymphadenopathy  A right axillary lymph node measures 2 4 x 2 9 cm, previously 1 5 x 2 4 cm  A necrotic axillary lymph node measures 1 8 x 2 0 cm, previously 8 x 11 mm   ABDOMEN LIVER/BILIARY TREE:  Multiple liver metastasis are again seen  A lesion in the posterior right lobe at the dome measures 1 8 x 1 9 cm, previously 1 8 x 1 9 cm  A lesion in the medial left lobe measures 1 2 x 1 4 cm, previously 2 2 x 2 7 cm  A subcapsular lesion in the anterior right lobe measures 1 8 x 2 5 cm, previously 1 9 x 1 9 cm  A lesion in the posterior segment of the right hepatic lobe measures 2 1 x 2 4 cm, previously 1 8 x 2 3 cm  GALLBLADDER:  No calcified gallstones  No pericholecystic inflammatory change  SPLEEN:  Unremarkable  PANCREAS:  Unremarkable  ADRENAL GLANDS:  Unremarkable  KIDNEYS/URETERS:  Unremarkable  No hydronephrosis  STOMACH AND BOWEL:  Unremarkable  APPENDIX:  No findings to suggest appendicitis  ABDOMINOPELVIC CAVITY:  No ascites or free intraperitoneal air  No lymphadenopathy  VESSELS:  Unremarkable for patient's age  PELVIS REPRODUCTIVE ORGANS:  Again seen are uterine fibroids  URINARY BLADDER:  Unremarkable  ABDOMINAL WALL/INGUINAL REGIONS:  Unremarkable  OSSEOUS STRUCTURES:  No acute fracture or destructive osseous lesion  Impression: 1  Worsening diffuse right breast nodularity and skin thickening with similar size of the dominant mass  This likely represents local tumor spread  2   Worsening right axillary lymphadenopathy  3   Mixed response of liver metastasis with decrease in some lesions, stability of a lesion at the dome, and increase in size of a subcapsular lesion in the anterior right lobe  The study was marked in EP[employer] for significant notification   Workstation performed: [id number]         Labs:   Lab Results   Component Value Date    WBC 4 6 11/06/2019    HGB 12 9 11/06/2019    HCT 38 7 11/06/2019    MCV 99 7 11/06/2019     11/06/2019     Lab Results   Component Value Date    K 4 6 11/06/2019     11/06/2019    CO2 30 11/06/2019    BUN 22 11/06/2019    CREATININE 0 98 11/06/2019    GLUF 129 (H) 07/29/2019    CALCIUM 9 8 11/06/2019    AST 18 11/06/2019    ALT 15 11/06/2019 ALKPHOS 53 11/06/2019    EGFR 84 07/29/2019         Current Medications: Reviewed  Allergies: Reviewed  PMH/FH/SH:  Reviewed      Vital Sign:    Body surface area is 2 02 meters squared      Wt Readings from Last 3 Encounters:   01/02/20 93 kg (205 lb)   12/13/19 87 5 kg (193 lb)   11/07/19 88 9 kg (196 lb)        Temp Readings from Last 3 Encounters:   01/02/20 (!) 95 7 °F (35 4 °C) (Tympanic Core)   12/13/19 98 2 °F (36 8 °C) (Temporal)   11/07/19 (!) 97 1 °F (36 2 °C) (Tympanic)        BP Readings from Last 3 Encounters:   01/02/20 128/72   12/13/19 128/59   11/07/19 122/72         Pulse Readings from Last 3 Encounters:   01/02/20 85   12/13/19 70   11/07/19 79     @LASTSAO2(3)@

## 2020-01-03 ENCOUNTER — TELEPHONE (OUTPATIENT)
Dept: SURGICAL ONCOLOGY | Facility: CLINIC | Age: 50
End: 2020-01-03

## 2020-01-03 NOTE — TELEPHONE ENCOUNTER
----- Message from Brook Ruvalcaba MD sent at 1/3/2020  4:26 PM EST -----  Give her 3pm on Tuesday    ----- Message -----  From: Blaise Edouard RN  Sent: 1/3/2020   2:42 PM EST  To: Brook Ruvalcaba MD    Pt needs appt with you per Dr Shannon Rutledge  Rapid progression of disease in breast with skin mets  Please see his note   Thanks

## 2020-01-04 ENCOUNTER — APPOINTMENT (OUTPATIENT)
Dept: LAB | Facility: MEDICAL CENTER | Age: 50
End: 2020-01-04
Payer: COMMERCIAL

## 2020-01-04 DIAGNOSIS — Z15.01 BRCA2 GENE MUTATION POSITIVE: ICD-10-CM

## 2020-01-04 DIAGNOSIS — C78.7 LIVER METASTASES (HCC): ICD-10-CM

## 2020-01-04 DIAGNOSIS — Z15.09 BRCA2 GENE MUTATION POSITIVE: ICD-10-CM

## 2020-01-04 DIAGNOSIS — C77.3 CARCINOMA OF RIGHT BREAST METASTATIC TO AXILLARY LYMPH NODE (HCC): ICD-10-CM

## 2020-01-04 DIAGNOSIS — C50.911 CARCINOMA OF RIGHT BREAST METASTATIC TO AXILLARY LYMPH NODE (HCC): ICD-10-CM

## 2020-01-04 LAB
ALBUMIN SERPL BCP-MCNC: 3.7 G/DL (ref 3.5–5)
ALP SERPL-CCNC: 63 U/L (ref 46–116)
ALT SERPL W P-5'-P-CCNC: 32 U/L (ref 12–78)
ANION GAP SERPL CALCULATED.3IONS-SCNC: 4 MMOL/L (ref 4–13)
AST SERPL W P-5'-P-CCNC: 18 U/L (ref 5–45)
BASOPHILS # BLD AUTO: 0.01 THOUSANDS/ΜL (ref 0–0.1)
BASOPHILS NFR BLD AUTO: 0 % (ref 0–1)
BILIRUB SERPL-MCNC: 0.32 MG/DL (ref 0.2–1)
BUN SERPL-MCNC: 20 MG/DL (ref 5–25)
CALCIUM SERPL-MCNC: 10.1 MG/DL (ref 8.3–10.1)
CANCER AG27-29 SERPL-ACNC: 403.2 U/ML (ref 0–42.3)
CHLORIDE SERPL-SCNC: 107 MMOL/L (ref 100–108)
CO2 SERPL-SCNC: 29 MMOL/L (ref 21–32)
CREAT SERPL-MCNC: 0.76 MG/DL (ref 0.6–1.3)
EOSINOPHIL # BLD AUTO: 0.08 THOUSAND/ΜL (ref 0–0.61)
EOSINOPHIL NFR BLD AUTO: 2 % (ref 0–6)
ERYTHROCYTE [DISTWIDTH] IN BLOOD BY AUTOMATED COUNT: 12.9 % (ref 11.6–15.1)
GFR SERPL CREATININE-BSD FRML MDRD: 92 ML/MIN/1.73SQ M
GLUCOSE P FAST SERPL-MCNC: 91 MG/DL (ref 65–99)
HCT VFR BLD AUTO: 38.9 % (ref 34.8–46.1)
HGB BLD-MCNC: 12.8 G/DL (ref 11.5–15.4)
IMM GRANULOCYTES # BLD AUTO: 0.01 THOUSAND/UL (ref 0–0.2)
IMM GRANULOCYTES NFR BLD AUTO: 0 % (ref 0–2)
LYMPHOCYTES # BLD AUTO: 1.39 THOUSANDS/ΜL (ref 0.6–4.47)
LYMPHOCYTES NFR BLD AUTO: 36 % (ref 14–44)
MCH RBC QN AUTO: 34.8 PG (ref 26.8–34.3)
MCHC RBC AUTO-ENTMCNC: 32.9 G/DL (ref 31.4–37.4)
MCV RBC AUTO: 106 FL (ref 82–98)
MONOCYTES # BLD AUTO: 0.56 THOUSAND/ΜL (ref 0.17–1.22)
MONOCYTES NFR BLD AUTO: 15 % (ref 4–12)
NEUTROPHILS # BLD AUTO: 1.78 THOUSANDS/ΜL (ref 1.85–7.62)
NEUTS SEG NFR BLD AUTO: 47 % (ref 43–75)
NRBC BLD AUTO-RTO: 0 /100 WBCS
PLATELET # BLD AUTO: 231 THOUSANDS/UL (ref 149–390)
PMV BLD AUTO: 11 FL (ref 8.9–12.7)
POTASSIUM SERPL-SCNC: 4.1 MMOL/L (ref 3.5–5.3)
PROT SERPL-MCNC: 7.6 G/DL (ref 6.4–8.2)
RBC # BLD AUTO: 3.68 MILLION/UL (ref 3.81–5.12)
SODIUM SERPL-SCNC: 140 MMOL/L (ref 136–145)
WBC # BLD AUTO: 3.83 THOUSAND/UL (ref 4.31–10.16)

## 2020-01-04 PROCEDURE — 36415 COLL VENOUS BLD VENIPUNCTURE: CPT | Performed by: INTERNAL MEDICINE

## 2020-01-04 PROCEDURE — 86300 IMMUNOASSAY TUMOR CA 15-3: CPT

## 2020-01-04 PROCEDURE — 85025 COMPLETE CBC W/AUTO DIFF WBC: CPT | Performed by: INTERNAL MEDICINE

## 2020-01-04 PROCEDURE — 80053 COMPREHEN METABOLIC PANEL: CPT | Performed by: INTERNAL MEDICINE

## 2020-01-05 LAB — CANCER AG15-3 SERPL-ACNC: 382.7 U/ML (ref 0–25)

## 2020-01-07 ENCOUNTER — OFFICE VISIT (OUTPATIENT)
Dept: SURGICAL ONCOLOGY | Facility: CLINIC | Age: 50
End: 2020-01-07
Payer: COMMERCIAL

## 2020-01-07 ENCOUNTER — DOCUMENTATION (OUTPATIENT)
Dept: SURGICAL ONCOLOGY | Facility: CLINIC | Age: 50
End: 2020-01-07

## 2020-01-07 ENCOUNTER — HOSPITAL ENCOUNTER (OUTPATIENT)
Dept: INFUSION CENTER | Facility: CLINIC | Age: 50
Discharge: HOME | End: 2020-01-07
Payer: COMMERCIAL

## 2020-01-07 VITALS
DIASTOLIC BLOOD PRESSURE: 70 MMHG | WEIGHT: 203 LBS | BODY MASS INDEX: 32.62 KG/M2 | SYSTOLIC BLOOD PRESSURE: 120 MMHG | TEMPERATURE: 97.1 F | HEART RATE: 66 BPM | RESPIRATION RATE: 18 BRPM | HEIGHT: 66 IN

## 2020-01-07 VITALS
BODY MASS INDEX: 32.7 KG/M2 | SYSTOLIC BLOOD PRESSURE: 130 MMHG | DIASTOLIC BLOOD PRESSURE: 79 MMHG | HEART RATE: 70 BPM | RESPIRATION RATE: 18 BRPM | WEIGHT: 203.48 LBS | TEMPERATURE: 98.5 F | HEIGHT: 66 IN

## 2020-01-07 DIAGNOSIS — C78.7 LIVER METASTASES (HCC): Primary | ICD-10-CM

## 2020-01-07 DIAGNOSIS — C50.911 CARCINOMA OF RIGHT BREAST METASTATIC TO AXILLARY LYMPH NODE (HCC): Primary | ICD-10-CM

## 2020-01-07 DIAGNOSIS — Z79.899 ON ANTINEOPLASTIC CHEMOTHERAPY: ICD-10-CM

## 2020-01-07 DIAGNOSIS — C50.911 CARCINOMA OF RIGHT BREAST METASTATIC TO AXILLARY LYMPH NODE (HCC): ICD-10-CM

## 2020-01-07 DIAGNOSIS — Z15.09 BRCA2 GENE MUTATION POSITIVE: ICD-10-CM

## 2020-01-07 DIAGNOSIS — C78.7 LIVER METASTASES (HCC): ICD-10-CM

## 2020-01-07 DIAGNOSIS — Z15.01 BRCA2 GENE MUTATION POSITIVE: ICD-10-CM

## 2020-01-07 DIAGNOSIS — C50.111 MALIGNANT NEOPLASM OF CENTRAL PORTION OF RIGHT BREAST IN FEMALE, ESTROGEN RECEPTOR POSITIVE (HCC): ICD-10-CM

## 2020-01-07 DIAGNOSIS — Z17.0 MALIGNANT NEOPLASM OF CENTRAL PORTION OF RIGHT BREAST IN FEMALE, ESTROGEN RECEPTOR POSITIVE (HCC): ICD-10-CM

## 2020-01-07 DIAGNOSIS — C77.3 CARCINOMA OF RIGHT BREAST METASTATIC TO AXILLARY LYMPH NODE (HCC): ICD-10-CM

## 2020-01-07 DIAGNOSIS — C77.3 CARCINOMA OF RIGHT BREAST METASTATIC TO AXILLARY LYMPH NODE (HCC): Primary | ICD-10-CM

## 2020-01-07 PROCEDURE — 96367 TX/PROPH/DG ADDL SEQ IV INF: CPT

## 2020-01-07 PROCEDURE — 96413 CHEMO IV INFUSION 1 HR: CPT

## 2020-01-07 PROCEDURE — 99215 OFFICE O/P EST HI 40 MIN: CPT | Performed by: SURGERY

## 2020-01-07 RX ORDER — SODIUM CHLORIDE 9 MG/ML
20 INJECTION, SOLUTION INTRAVENOUS ONCE
Status: COMPLETED | OUTPATIENT
Start: 2020-01-07 | End: 2020-01-07

## 2020-01-07 RX ADMIN — SODIUM CHLORIDE 20 ML/HR: 0.9 INJECTION, SOLUTION INTRAVENOUS at 09:32

## 2020-01-07 RX ADMIN — DEXAMETHASONE SODIUM PHOSPHATE 10 MG: 10 INJECTION, SOLUTION INTRAMUSCULAR; INTRAVENOUS at 09:32

## 2020-01-07 RX ADMIN — DOCETAXEL 151.6 MG: 20 INJECTION, SOLUTION, CONCENTRATE INTRAVENOUS at 10:07

## 2020-01-07 NOTE — PROGRESS NOTES
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 helps her get through all of this, it keeps her active and distracted but is aware she may need some down time but would prefer to work as long as she can  Discussed additional cancer support services available, patient would like to speak to Imani Moreno again regarding insurance and disability  Otherwise denied any additional questions or needs at this time  Informed that I will make request to Imani Moreno to contact her as requested  Provided patient with my contact information and availability  Encouraged to call as needed with any questions or needs  I will continue to follow up with patient as needed  Patient advised that it is easier to communicate with her via e-mail as she is in and out with running her business  E-mail provided for communication [email or web address]  I will continue to follow up with patient as needed  E-mail sent to Imani Moreno requesting follow up call to patient as noted above

## 2020-01-07 NOTE — PROGRESS NOTES
Surgical Oncology Follow Up       [facility] SURGICAL ONCOLOGY [facility] [address] [address]    [place]  1970  [id number]  [facility] SURGICAL ONCOLOGY [place]  [address] [address]    Chief Complaint   Patient presents with    Follow-up       Assessment/Plan   Diagnoses and all orders for this visit:    Carcinoma of right breast metastatic to axillary lymph node ([facility] [address] )  -     Ambulatory referral to Palliative Care; Future    Liver metastases ([facility] [address] )  -     Ambulatory referral to Palliative Care; Future    BRCA2 gene mutation positive    Malignant neoplasm of central portion of right breast in female, estrogen receptor positive ([facility] [address] )    On antineoplastic chemotherapy        Advance Care Planning/Advance Directives:  Discussed disease status, cancer treatment plans and/or cancer treatment goals with the patient  Oncology History:       Malignant neoplasm of central portion of right breast in female, estrogen receptor positive ([facility] [address] )    5/3/2019 Biopsy     Invasive ductal carcinoma  Right breast, 10:00 oclock, 5 cmfn  Grade 3  ER 90  NJ 90  Her 2 1+    Axillary lymph node biopsy  Metastatic carcinoma              5/16/2019 -  Cancer Staged     Staging form: Breast, AJCC 8th Edition  - Clinical: Stage IV (cT4, cN1(f), cM1, G3, ER+, NJ+, HER2-) - Signed by Aditya Waddell MD on 1/7/2020  Laterality: Right  Method of lymph node assessment: Core biopsy  Sites of metastasis: Liver  Histologic grading system: 3 grade system        7/29/2019 Surgery     Laparoscopic BSO  Benign pathology          Carcinoma of right breast metastatic to axillary lymph node ([facility] [address] )    5/16/2019 Initial Diagnosis     Carcinoma of right breast metastatic to axillary lymph node ([address] )      1/7/2020 -  Chemotherapy     DOCEtaxel (TAXOTERE) 151 6 mg in sodium chloride 0 9 % 250 mL chemo infusion, 75 mg/m2 = 151 6 mg (125 % of original dose 60 mg/m2), Intravenous, Once, 1 of 6 cycles  Dose modification: 75 mg/m2 (original dose 60 mg/m2, Cycle 1, Reason: Dose Not Tolerated)  Administration: 151 6 mg (1/7/2020)        Liver metastases ([facility] [address] )    6/20/2019 Initial Diagnosis     Liver metastases ([facility] [address] )      1/7/2020 -  Chemotherapy     DOCEtaxel (TAXOTERE) 151 6 mg in sodium chloride 0 9 % 250 mL chemo infusion, 75 mg/m2 = 151 6 mg (125 % of original dose 60 mg/m2), Intravenous, Once, 1 of 6 cycles  Dose modification: 75 mg/m2 (original dose 60 mg/m2, Cycle 1, Reason: Dose Not Tolerated)  Administration: 151 6 mg (1/7/2020)         History of Present Illness:  Stage IV right breast cancer  -Interval History:  Was being treated on Ibrance and letrozole but had progression of disease, she started Taxotere today, status post bilateral salpingo-oophorectomy in July of 2019    Review of Systems:  Review of Systems   Constitutional: Negative for appetite change and fever  Hot flashes   Eyes: Negative  Respiratory: Negative for shortness of breath  Cardiovascular: Negative  Gastrointestinal: Negative  Endocrine: Negative  Genitourinary: Negative  Musculoskeletal: Positive for arthralgias and myalgias  Skin: Negative  Allergic/Immunologic: Negative  Neurological: Negative  Hematological: Negative  Negative for adenopathy  Does not bruise/bleed easily  Psychiatric/Behavioral: Negative          Patient Active Problem List   Diagnosis    Malignant neoplasm of central portion of right breast in female, estrogen receptor positive ([facility] [address] )    Abnormal uterine bleeding (AUB)    Carcinoma of right breast metastatic to axillary lymph node ([facility] [address] )    Family history of breast cancer in female    Family history of colon cancer    Dense breast tissue    Mastitis    BRCA2 gene mutation positive    Abnormal CT scan    Liver metastases ([address] )    S/P BSO (bilateral salpingo-oophorectomy)    Postop check    On antineoplastic chemotherapy Past Medical History:   Diagnosis Date    Hypotension      Past Surgical History:   Procedure Laterality Date    BILATERAL SALPINGOOPHORECTOMY      BREAST BIOPSY      IR IMAGE GUIDED BIOPSY/ASPIRATION LIVER  6/11/2019    MYOMECTOMY      H/O FIBROIDS, NO SURGERY NEEDED    HI LAP,RMV  ADNEXAL STRUCTURE N/A 7/29/2019    Procedure: LAPAROSCOPIC BILATERAL SALPINGO-OOPHORECTOMY;  Surgeon: Jenna Mercedes MD;  Location: [facility];  Service: Gynecology Oncology    US GUIDED BREAST BIOPSY RIGHT COMPLETE Right 5/3/2019    US GUIDED BREAST LYMPH NODE BIOPSY RIGHT Right 5/3/2019     Family History   Problem Relation Age of Onset    Hypotension Mother     Colon cancer Father 36    Hypertension Father     Prostate cancer Father 79    Throat cancer Maternal Grandmother 61    Cancer Maternal Grandmother     Breast cancer Paternal Aunt         age unknown     Social History     Socioeconomic History    Marital status: Single     Spouse name: Not on file    Number of children: Not on file    Years of education: Not on file    Highest education level: Not on file   Occupational History    Not on file   Social Needs    Financial resource strain: Not on file    Food insecurity:     Worry: Not on file     Inability: Not on file    Transportation needs:     Medical: Not on file     Non-medical: Not on file   Tobacco Use    Smoking status: Former Smoker     Packs/day: 1 00     Years: 30 00     Pack years: 30 00     Last attempt to quit: 2017     Years since quitting: 3 0    Smokeless tobacco: Never Used   Substance and Sexual Activity    Alcohol use: Yes     Frequency: Monthly or less     Drinks per session: 1 or 2     Comment: occassional    Drug use: No    Sexual activity: Yes     Partners: Male     Birth control/protection: None   Lifestyle    Physical activity:     Days per week: Not on file     Minutes per session: Not on file    Stress: Not on file   Relationships    Social connections:     Talks on phone: Not on file     Gets together: Not on file     Attends Bahai service: Not on file     Active member of club or organization: Not on file     Attends meetings of clubs or organizations: Not on file     Relationship status: Not on file    Intimate partner violence:     Fear of current or ex partner: Not on file     Emotionally abused: Not on file     Physically abused: Not on file     Forced sexual activity: Not on file   Other Topics Concern    Not on file   Social History Narrative    Not on file       Current Outpatient Medications:     CRANIAL PROSTHESIS, RX,, One wig as needed, Disp: 1 Piece, Rfl: 0    oxyCODONE (ROXICODONE) 10 MG TABS, Take 1 tablet (10 mg total) by mouth every 6 (six) hours as needed for moderate painMax Daily Amount: 40 mg, Disp: 30 tablet, Rfl: 0  No current facility-administered medications for this visit  No Known Allergies    The following portions of the patient's history were reviewed and updated as appropriate: allergies, current medications, past family history, past medical history, past social history, past surgical history and problem list         Vitals:    01/07/20 1455   BP: 120/70   Pulse: 66   Resp: 18   Temp: (!) 97 1 °F (36 2 °C)       Physical Exam   Constitutional: She is oriented to person, place, and time  She appears well-developed and well-nourished  HENT:   Head: Normocephalic and atraumatic  Cardiovascular: Normal heart sounds  Pulmonary/Chest: Breath sounds normal  Right breast exhibits mass and skin change (edema and purple discoloration)  Right breast exhibits no inverted nipple, no nipple discharge and no tenderness  Left breast exhibits no inverted nipple, no mass, no nipple discharge, no skin change and no tenderness  Abdominal: Soft  Lymphadenopathy:     She has axillary adenopathy  Right axillary: Pectoral and lateral adenopathy present  Left axillary: No pectoral and no lateral adenopathy present         Right: No supraclavicular adenopathy present  Left: No supraclavicular adenopathy present  Neurological: She is alert and oriented to person, place, and time  Psychiatric: She has a normal mood and affect  Results:  Labs:  BRCA two positive    Imaging  Most recent CT scan from 12/20/2019 of the chest abdomen pelvis shows increasing tumor burden in the breast and axilla with skin thickening, there are mixed responses in the liver with lesions both increasing and decreasing  12/13/2019 right rib x-ray shows a lesion in the 11th six rib that was concerning for metastasis    I reviewed the above laboratory and imaging data  Discussion/Summary:  54-year-old female here today for a follow-up visit secondary to her stage IV breast cancer  She presented with at least a locally advanced right breast carcinoma with inflammatory features  She did have a positive axillary node  On her imaging, there were multiple lesions in the liver as well  These were biopsied revealing metastatic disease  She did have genetic testing and is BRCA two positive  She started treatment with Ibrance and letrozole  She was doing well until recently when she noted in increase in the mass in the breast   She also developed pain on her right side, which she thought was a pulled muscle  She did have a rib x-ray that showed a concerning lesion in the 11th rib  Following that she had an appointment with her oncologist who noted the progression of disease in the breast as well  She had a follow-up CT scan of the chest abdomen and pelvis as noted  On examination today, the skin changes and mass extend from nearly the inframammary fold superior to the top of the breast mound  She does have palpable adenopathy as well  She is having significant pain  She has been using oxycodone which gives her some relief; however her ability to function on this is poor  I am referring her to palliative medicine    I will present her at our breast tumor board  I do have questions about the lesion in the rib  This was of concern on her plain films but not on her CT scan  She does have pain in this location  I am not sure if there is any indication for palliative radiation  Assuming she responds well to the Taxotere, I will also plan to do a palliative mastectomy for her  All of her questions were answered  I will see her again in three months or sooner should the need arise

## 2020-01-07 NOTE — PROGRESS NOTES
Patient arrived on unit for first Taxotere  Patient does have pain in R breast, which is not new  Taking Oxycodone as ordered by Dr Juanita Cavazos  Cleared for treatment today  Patient tolerated treatment without incident today  Cleared by Angelita García Rn that it is ok to change patient's appointments to Friday  Changes made to schedule  Aware of next appointment   AVS given to patient

## 2020-01-09 ENCOUNTER — TELEPHONE (OUTPATIENT)
Dept: PALLIATIVE MEDICINE | Facility: CLINIC | Age: 50
End: 2020-01-09

## 2020-01-09 NOTE — PROGRESS NOTES
Follow up call completed  Patient reported fatigue that is relieved by sleep  She is interested in learning about nutrition classes but had to go   She is aware of when to call the MD

## 2020-01-10 DIAGNOSIS — C50.911 CARCINOMA OF RIGHT BREAST METASTATIC TO AXILLARY LYMPH NODE (HCC): Primary | ICD-10-CM

## 2020-01-10 DIAGNOSIS — C77.3 CARCINOMA OF RIGHT BREAST METASTATIC TO AXILLARY LYMPH NODE (HCC): Primary | ICD-10-CM

## 2020-01-10 RX ORDER — ONDANSETRON HYDROCHLORIDE 8 MG/1
8 TABLET, FILM COATED ORAL EVERY 8 HOURS PRN
Qty: 30 TABLET | Refills: 1 | Status: SHIPPED | OUTPATIENT
Start: 2020-01-10 | End: 2020-02-13 | Stop reason: SDUPTHER

## 2020-01-13 ENCOUNTER — TELEPHONE (OUTPATIENT)
Dept: SURGICAL ONCOLOGY | Facility: CLINIC | Age: 50
End: 2020-01-13

## 2020-01-13 NOTE — TELEPHONE ENCOUNTER
Breast Cancer Nurse Navigator      Sang Weston 1/13/2020 9:04 AM  Kylie [email or web address], good to hear  Definitely maintain follow up with Palliative medicine they don't only treat pain, they help overall with all cancer related symptoms as well as any other needs that you might have  Have they called yet to schedule an initial appointment with you ? [email or web address] [email or web address]    Mon 1/13/2020 8:59 AM  Ila [place]    Thanks for the update, I figured as much  I'm doing well with the chemo  My boob actually moves again and does not hurt like before  I'll talk with Dr Marc Cantu at our appt and see what he recommends  I don't need pain management at the current time but I will keep in contact with that palliative folks for when I do  Thank you    [facility] 1/13/2020 8:53 AM  [email or web address]    Good Morning Aga,    Our team discussed your case during out tumor board meeting this morning and in regards to the suspicious spot on that right rib, the radiologist reviewed the films and feels it is a metastatic bone lesion  Dr Marc Cantu will order what he needs to for treatment and will discuss with you  Dr Loly Adrian suggested that if what palliative care is doing to treat the pain is not working or stops working to please let us know right away and they can schedule you for palliative radiation treatments to help with the pain  They can't do it at the same time while your getting Taxotere but if needed they can possible do it in between treatments  Please call me or Dr Enrike Dee office if you have questions or want to discuss in more detail  Please confirm receipt as I know your very busy and prefer e-mail communication just want to make sure you received this information  Hope you are doing well and please let me know if there is anything I can help you with  Have an awesome day!

## 2020-01-15 ENCOUNTER — OFFICE VISIT (OUTPATIENT)
Dept: PALLIATIVE MEDICINE | Facility: CLINIC | Age: 50
End: 2020-01-15
Payer: COMMERCIAL

## 2020-01-15 VITALS
BODY MASS INDEX: 32.58 KG/M2 | HEART RATE: 84 BPM | WEIGHT: 200.8 LBS | DIASTOLIC BLOOD PRESSURE: 80 MMHG | OXYGEN SATURATION: 97 % | TEMPERATURE: 98.1 F | SYSTOLIC BLOOD PRESSURE: 120 MMHG | RESPIRATION RATE: 14 BRPM

## 2020-01-15 DIAGNOSIS — Z79.899 ON ANTINEOPLASTIC CHEMOTHERAPY: ICD-10-CM

## 2020-01-15 DIAGNOSIS — C78.7 LIVER METASTASES (HCC): ICD-10-CM

## 2020-01-15 DIAGNOSIS — C50.111 MALIGNANT NEOPLASM OF CENTRAL PORTION OF RIGHT BREAST IN FEMALE, ESTROGEN RECEPTOR POSITIVE (HCC): Primary | ICD-10-CM

## 2020-01-15 DIAGNOSIS — G89.3 CANCER RELATED PAIN: ICD-10-CM

## 2020-01-15 DIAGNOSIS — Z71.89 ADVANCED CARE PLANNING/COUNSELING DISCUSSION: ICD-10-CM

## 2020-01-15 DIAGNOSIS — Z90.722 S/P BSO (BILATERAL SALPINGO-OOPHORECTOMY): ICD-10-CM

## 2020-01-15 DIAGNOSIS — R21 RASH: ICD-10-CM

## 2020-01-15 DIAGNOSIS — Z17.0 MALIGNANT NEOPLASM OF CENTRAL PORTION OF RIGHT BREAST IN FEMALE, ESTROGEN RECEPTOR POSITIVE (HCC): Primary | ICD-10-CM

## 2020-01-15 PROCEDURE — 99244 OFF/OP CNSLTJ NEW/EST MOD 40: CPT | Performed by: INTERNAL MEDICINE

## 2020-01-15 NOTE — PROGRESS NOTES
Palliative and [address] 47 y o  female [phone]    Assessment/Plan:  1  Malignant neoplasm of central portion of right breast in female, estrogen receptor positive ([facility] [address] )    2  Liver metastases ([facility] [address] )    3  S/P BSO (bilateral salpingo-oophorectomy)    4  On antineoplastic chemotherapy    5  Cancer related pain    6  Advanced care planning/counseling discussion    7  Rash      - have asked her to cut her oxycodone to 1/2 (5mg) q6H prn   - she will call office for refills if needed  - does not want to take acetaminophen given liver mets  - already has living will and medical as well financial POA  - she names her friend/ as her medical POA and her brother in [place] as her financial POA  - she is treatment-focused for life prolongation  - have asked her to inform Dr Ramiro Brown office about the rash    Requested Prescriptions      No prescriptions requested or ordered in this encounter     There are no discontinued medications  Representatives have queried the patient's controlled substance dispensing history in the Prescription Drug Monitoring Program in compliance with regulations before I have prescribed any controlled substances  The prescription history is consistent with prescribed therapy and our practice policies  45 minutes were spent face to face with Doug Perez with greater than 50% of the time spent in counseling or coordination of care including discussions of etiology of diagnosis, pathogenesis of diagnosis, prognosis of diagnosis, risks and benefits of treatment, instructions for disease self management, treatment instructions, follow up requirements, risk factors and risk reduction of disease, patient and family counseling/involvement in care and compliance with treatment regimen   All of the patient's questions were answered during this discussion  No follow-ups on file      Subjective:   Chief Complaint  New consultation for:  symptom management, goal of care assessment and decisional support  HPI     Naa Rodriguez is a 47 y o  female with metastatic breast cancer to the liver, LNs  She is s/p TAHBSO and is currently on chemotherapy  She sees Dr Corie Jarquin (Onc) and Dr Kathleen Miner (Surg-Onc)  She was diagnosed with stage 4 breast cancer with liver mets on 5/2019  She underwent TAHBSO on 7/2019  She started Harper but showed disease progression  Currently she is on Docetaxel that she started 1/2020 and she is to continue this until 4/2020  At that time, she will have another surgical follow up to see if her breast mass is amenable to surgery at that time  She experiences some stabbing pain in her R breast occasionally  This is relieved by oxycodone 10mg prn  She admits, however, that since starting chemotherapy that her pain is not as bad anymore  She takes only 1 tablet in a day if needed and she doesn't take oxycodone every day  She still has pills left from her prior prescription  She also complains of itchy rash on her arms and neck  She admits to having stress rashes and thinks that it could be from just being stressed  However, this rash happened 4 days after chemotherapy  I advised her to notify her oncologist to let them know in case this is a reaction related to her chemo  She is currently undergoing a divorce  They have been  for 4-5 years, before being diagnosed with breast cancer  She currently lives at a hotel with her dogs and cats and her daughters  She is hoping to close on a house in [address] soon  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  Her daughters are 18yrs and 21 yrs old  She said she has both financial and medical living benites  She names her friend as her medical POA and her brother in [place] as her financial POA  The following portions of the medical history were reviewed: past medical history, problem list, medication list, and social history      Current Outpatient Medications:     ondansetron (ZOFRAN) 8 mg tablet, Take 1 tablet (8 mg total) by mouth every 8 (eight) hours as needed for nausea or vomiting, Disp: 30 tablet, Rfl: 1    oxyCODONE (ROXICODONE) 10 MG TABS, Take 1 tablet (10 mg total) by mouth every 6 (six) hours as needed for moderate painMax Daily Amount: 40 mg, Disp: 30 tablet, Rfl: 0    CRANIAL PROSTHESIS, RX,, One wig as needed, Disp: 1 Piece, Rfl: 0  Review of Systems   Constitutional: Positive for fatigue  Negative for activity change, appetite change and unexpected weight change  Respiratory: Negative for chest tightness and shortness of breath  Cardiovascular: Negative for chest pain  Gastrointestinal: Negative for abdominal pain, constipation, diarrhea and nausea  Genitourinary:        (+) R breast pain   Skin: Positive for rash  Psychiatric/Behavioral: Negative  All other systems negative    Objective:  Vital Signs  /80 (BP Location: Right arm, Cuff Size: Standard)   Pulse 84   Temp 98 1 °F (36 7 °C) (Oral)   Resp 14   Wt 91 1 kg (200 lb 12 8 oz)   SpO2 97%   BMI 32 58 kg/m²    Physical Exam    Constitutional: Appears well-developed and well-nourished  In no acute physical or emotional distress  Head: Normocephalic and atraumatic  Eyes: EOM are normal  No ocular discharge  No scleral icterus  Neck: No visible adenopathy or masses  Respiratory: Effort normal  No stridor  No respiratory distress  Gastrointestinal: No abdominal distension  Musculoskeletal: No edema  Neurological: Alert, oriented and appropriately conversant  Skin: (+) raised, red papules on the right side of her neck as well on both forearms  There is a blanchable, flat, red rash on the ventral aspect of her L forearm where she said she gets the IV in for her chemo  Psychiatric: Displays a normal mood and affect   Behavior, judgement and thought content appear normal      Vinicius Ga MD  Palliative Medicine & Supportive Care  Internal Medicine  Available via [facility] Text  Office: [phone]  Fax: [phone]

## 2020-01-27 ENCOUNTER — OFFICE VISIT (OUTPATIENT)
Dept: HEMATOLOGY ONCOLOGY | Facility: CLINIC | Age: 50
End: 2020-01-27
Payer: COMMERCIAL

## 2020-01-27 VITALS
WEIGHT: 203.48 LBS | TEMPERATURE: 97.7 F | HEART RATE: 85 BPM | SYSTOLIC BLOOD PRESSURE: 122 MMHG | BODY MASS INDEX: 33.9 KG/M2 | HEIGHT: 65 IN | OXYGEN SATURATION: 96 % | DIASTOLIC BLOOD PRESSURE: 76 MMHG | RESPIRATION RATE: 18 BRPM

## 2020-01-27 DIAGNOSIS — Z15.09 BRCA2 GENE MUTATION POSITIVE: ICD-10-CM

## 2020-01-27 DIAGNOSIS — C50.911 CARCINOMA OF RIGHT BREAST METASTATIC TO AXILLARY LYMPH NODE (HCC): ICD-10-CM

## 2020-01-27 DIAGNOSIS — Z15.01 BRCA2 GENE MUTATION POSITIVE: ICD-10-CM

## 2020-01-27 DIAGNOSIS — C77.3 CARCINOMA OF RIGHT BREAST METASTATIC TO AXILLARY LYMPH NODE (HCC): ICD-10-CM

## 2020-01-27 DIAGNOSIS — C78.7 LIVER METASTASES (HCC): Primary | ICD-10-CM

## 2020-01-27 PROCEDURE — 99214 OFFICE O/P EST MOD 30 MIN: CPT | Performed by: INTERNAL MEDICINE

## 2020-01-27 RX ORDER — SODIUM CHLORIDE 9 MG/ML
20 INJECTION, SOLUTION INTRAVENOUS ONCE
Status: CANCELLED | OUTPATIENT
Start: 2020-02-21

## 2020-01-27 RX ORDER — SODIUM CHLORIDE 9 MG/ML
20 INJECTION, SOLUTION INTRAVENOUS ONCE
Status: CANCELLED | OUTPATIENT
Start: 2020-03-13

## 2020-01-27 NOTE — PROGRESS NOTES
Hematology / Oncology Outpatient Follow Up Note    Parker Salgado 47 y o  female :1970 [facility]:[id number]         Date:  2020    Assessment / Plan:  A 47year-old surgically postmenopausal woman with metastatic right breast cancer, grade 3, ER 90% positive, AZ 90% positive, HER2 negative disease   She has BRCA -2 mutation    She has histologically confirmed liver metastasis   Since she was premenopausal, she underwent as bilateral surgical ovarian ablation in late 2019 followed by starting palbociclib and letrozole  She had initial minor response  However, she had rapid progression in 2019 for which she is currently on Taxotere with excellent tolerance  Based on physical examination, she has partial response  I recommended her to continue with Taxotere every 3 weeks  I will see her again when she is due for 3rd cycle of chemotherapy  She is in agreement with my recommendation           Subjective:      HPI:  A 69-year-old premenopausal woman who noticed a lump in her right breast in 2019 which she brought to medical attention   Radiographically, she has large right breast mass and right axillary adenopathy both of which were biopsy in May 3, 2019   Biopsy showed invasive ductal carcinoma, grade 3   This was ER 90% positive, AZ 90% positive, HER2 negative disease   Biopsy from right axilla lymph node was positive for metastatic disease   She was seen by Dr Yojana Lima of her young age, she underwent genetic testing which showed BRCA-2 mutation   Because of the locally advanced nature, she underwent CT scan of chest abdomen pelvis which showed multiple liver lesion, suspicious for metastatic disease   Bone scan was negative for osseous metastasis  June Hagen is going to have liver biopsy in 2019   She presents today to discuss treatment option   She has no symptomatology from breast standpoint   She denied any pain   Her weight has been stable   She has no respiratory symptoms   She has no significant past medical history   Her paternal aunt had breast cancer in her 35s   Interestingly enough, her father had colon cancer in his 45s as well as prostate cancer in his 62s   She has no family history of ovarian cancer   She was a smoker until 2 years ago  Jhonathan Power does not drink alcohol  Jhonathan Power has a son who is 23years old  Jhonathan Power also has younger son who was biologically female  Ever Paula is a transgender  Ever Paula is considering bilateral mastectomy  Janene Loera, he has not been tested for BRCA gene mutation            Interval History:   A 47year-old surgically postmenopausal woman with metastatic right breast cancer, grade 3, ER 90% positive, [place] 90% positive, HER2 negative disease   She has BRCA -2 mutation   Based on the CT scan, she appeared to have multiple liver metastasis     She subsequently underwent liver biopsy which showed metastatic carcinoma, consistent with breast primary   She was premenopausal at the time of diagnosis   Therefore, she underwent bilateral oophorectomy in late July 2019  Ovary and fallopian tube did not show any malignancy   She started palbociclib and letrozole in late July 2019  She initially had response with decreased right breast mass as well as right axillary adenopathy  However, in late December 2019, she had rapid progression in her right breast mass, skin involvement as well as right axillary adenopathy  Therefore, she started palliative chemotherapy with Taxotere which she tolerated 1st cycle very well  She had minimal nausea  She has no history of neutropenic fever  Her breast mass decreased in size  She no longer has right breast pain  She is going to have 2nd cycle treatment later this week  She has no respiratory symptoms    Her performance status is normal         Objective:      Primary Diagnosis:     1    Metastatic breast cancer, grade 3, ER 90 % positive, [place] 90 % positive, HER2 negative disease, diagnosed in June 2019    2    BRCA-2 mutation      Cancer Staging:  Cancer Staging  Malignant neoplasm of central portion of right breast in female, estrogen receptor positive ([facility] [place] [address] )  Staging form: Breast, AJCC 8th Edition  - Clinical: Stage IIIB (cT4, cN1(f), cM0, G3, ER+, IL+, HER2-) - Signed by Shankar Potter MD on 5/16/2019  Laterality: Right  Method of lymph node assessment: Core biopsy  Histologic grading system: 3 grade system           Previous Hematologic/ Oncologic Treatment:      1  Surgical ovarian ablation in late July 2019   2  Palbociclib and letrozole from July 2018 through January 2020       Current Hematologic/ Oncologic Treatment:       Taxotere every 3 weeks  2nd cycle to be given later this week      Disease Status:      Clinical partial response on Taxotere      Test Results:     Pathology:     Right breast biopsy in axillary lymph node biopsy showed invasive ductal carcinoma, grade 3   ER 90 % positive, IL 90% positive, HER2 negative disease      Liver biopsy in June 2019 showed metastatic carcinoma, consistent with breast primary      Bilateral ovary and fallopian tube showed no evidence of malignancy      Radiology:     Bone scan showed no evidence of osseous metastasis      CT scan of chest abdomen pelvis in December 2019 showed progression of right breast mass and right axillary adenopathy with new appearance of skin change  Mixed response with liver metastasis       Laboratory:     See below      Physical Exam:        General Appearance:    Alert, oriented          Eyes:    PERRL   Ears:    Normal external ear canals, both ears   Nose:   Nares normal, septum midline   Throat:   Mucosa moist  Pharynx without injection      Neck:   Supple         Lungs:     Clear to auscultation bilaterally   Chest Wall:    No tenderness or deformity    Heart:    Regular rate and rhythm         Abdomen:     Soft, non-tender, bowel sounds +, no organomegaly               Extremities:   Extremities no cyanosis or edema         Skin:   no rash or icterus  Lymph nodes:   Cervical, supraclavicular, and axillary nodes normal   Neurologic:   CNII-XII intact, normal strength, sensation and reflexes     Throughout             Breast exam: 9 x 8 cm mass at upper aspect of her right breast with less obvious erythematous skin lesion  Right axillary adenopathy is hardly palpable  Left breast exam is negative  ROS: Review of Systems   All other systems reviewed and are negative  Imaging: No results found  Labs:   Lab Results   Component Value Date    WBC 3 83 (L) 01/04/2020    HGB 12 8 01/04/2020    HCT 38 9 01/04/2020     (H) 01/04/2020     01/04/2020     Lab Results   Component Value Date    K 4 1 01/04/2020     01/04/2020    CO2 29 01/04/2020    BUN 20 01/04/2020    CREATININE 0 76 01/04/2020    GLUF 91 01/04/2020    CALCIUM 10 1 01/04/2020    AST 18 01/04/2020    ALT 32 01/04/2020    ALKPHOS 63 01/04/2020    EGFR 92 01/04/2020         Current Medications: Reviewed  Allergies: Reviewed  PMH/FH/SH:  Reviewed      Vital Sign:    Body surface area is 1 99 meters squared      Wt Readings from Last 3 Encounters:   01/27/20 92 3 kg (203 lb 7 8 oz)   01/15/20 91 1 kg (200 lb 12 8 oz)   01/07/20 92 3 kg (203 lb 7 8 oz)        Temp Readings from Last 3 Encounters:   01/27/20 97 7 °F (36 5 °C) (Tympanic Core)   01/15/20 98 1 °F (36 7 °C) (Oral)   01/07/20 98 5 °F (36 9 °C) (Temporal)        BP Readings from Last 3 Encounters:   01/27/20 122/76   01/15/20 120/80   01/07/20 130/79         Pulse Readings from Last 3 Encounters:   01/27/20 85   01/15/20 84   01/07/20 70     @LASTSAO2(3)@

## 2020-01-28 ENCOUNTER — APPOINTMENT (OUTPATIENT)
Dept: RADIOLOGY | Facility: HOSPITAL | Age: 50
End: 2020-01-28
Attending: INTERNAL MEDICINE
Payer: COMMERCIAL

## 2020-01-29 ENCOUNTER — TELEPHONE (OUTPATIENT)
Dept: HEMATOLOGY ONCOLOGY | Facility: CLINIC | Age: 50
End: 2020-01-29

## 2020-01-29 ENCOUNTER — TELEPHONE (OUTPATIENT)
Dept: INFUSION CENTER | Facility: HOSPITAL | Age: 50
End: 2020-01-29

## 2020-01-29 DIAGNOSIS — C50.911 CARCINOMA OF RIGHT BREAST METASTATIC TO AXILLARY LYMPH NODE (HCC): Primary | ICD-10-CM

## 2020-01-29 DIAGNOSIS — C77.3 CARCINOMA OF RIGHT BREAST METASTATIC TO AXILLARY LYMPH NODE (HCC): Primary | ICD-10-CM

## 2020-01-31 ENCOUNTER — HOSPITAL ENCOUNTER (OUTPATIENT)
Dept: INFUSION CENTER | Facility: CLINIC | Age: 50
Discharge: HOME | End: 2020-01-31
Payer: COMMERCIAL

## 2020-01-31 VITALS
HEIGHT: 65 IN | RESPIRATION RATE: 18 BRPM | HEART RATE: 88 BPM | TEMPERATURE: 99.3 F | SYSTOLIC BLOOD PRESSURE: 137 MMHG | BODY MASS INDEX: 33.79 KG/M2 | DIASTOLIC BLOOD PRESSURE: 82 MMHG | WEIGHT: 202.82 LBS

## 2020-01-31 DIAGNOSIS — Z15.09 BRCA2 GENE MUTATION POSITIVE: ICD-10-CM

## 2020-01-31 DIAGNOSIS — C77.3 CARCINOMA OF RIGHT BREAST METASTATIC TO AXILLARY LYMPH NODE (HCC): ICD-10-CM

## 2020-01-31 DIAGNOSIS — C50.911 CARCINOMA OF RIGHT BREAST METASTATIC TO AXILLARY LYMPH NODE (HCC): ICD-10-CM

## 2020-01-31 DIAGNOSIS — Z15.01 BRCA2 GENE MUTATION POSITIVE: ICD-10-CM

## 2020-01-31 DIAGNOSIS — C78.7 LIVER METASTASES (HCC): Primary | ICD-10-CM

## 2020-01-31 PROCEDURE — 96413 CHEMO IV INFUSION 1 HR: CPT

## 2020-01-31 PROCEDURE — 96367 TX/PROPH/DG ADDL SEQ IV INF: CPT

## 2020-01-31 RX ORDER — SODIUM CHLORIDE 9 MG/ML
20 INJECTION, SOLUTION INTRAVENOUS ONCE
Status: COMPLETED | OUTPATIENT
Start: 2020-01-31 | End: 2020-01-31

## 2020-01-31 RX ADMIN — DEXAMETHASONE SODIUM PHOSPHATE 10 MG: 10 INJECTION, SOLUTION INTRAMUSCULAR; INTRAVENOUS at 13:00

## 2020-01-31 RX ADMIN — SODIUM CHLORIDE 20 ML/HR: 0.9 INJECTION, SOLUTION INTRAVENOUS at 13:00

## 2020-01-31 RX ADMIN — DOCETAXEL 151.6 MG: 20 INJECTION, SOLUTION, CONCENTRATE INTRAVENOUS at 13:29

## 2020-01-31 NOTE — PLAN OF CARE
Problem: Potential for Falls  Goal: Patient will remain free of falls  Description  INTERVENTIONS:  - Assess patient frequently for physical needs  -  Identify cognitive and physical deficits and behaviors that affect risk of falls    -  [place] fall precautions as indicated by assessment   - Educate patient/family on patient safety including physical limitations  - Instruct patient to call for assistance with activity based on assessment  - Modify environment to reduce risk of injury  - Consider OT/PT consult to assist with strengthening/mobility  Outcome: Progressing

## 2020-02-02 LAB
ALBUMIN SERPL-MCNC: 4 G/DL (ref 3.6–5.1)
ALBUMIN/GLOB SERPL: 1.4 (CALC) (ref 1–2.5)
ALP SERPL-CCNC: 59 U/L (ref 33–130)
ALT SERPL-CCNC: 47 U/L (ref 6–29)
AST SERPL-CCNC: 24 U/L (ref 10–35)
BASOPHILS # BLD AUTO: 11 CELLS/UL (ref 0–200)
BASOPHILS NFR BLD AUTO: 0.2 %
BILIRUB SERPL-MCNC: 0.3 MG/DL (ref 0.2–1.2)
BUN SERPL-MCNC: 15 MG/DL (ref 7–25)
BUN/CREAT SERPL: ABNORMAL (CALC) (ref 6–22)
CALCIUM SERPL-MCNC: 9.7 MG/DL (ref 8.6–10.4)
CANCER AG15-3 SERPL-ACNC: 256 U/ML
CANCER AG27-29 SERPL-ACNC: 317 U/ML
CHLORIDE SERPL-SCNC: 104 MMOL/L (ref 98–110)
CO2 SERPL-SCNC: 30 MMOL/L (ref 20–32)
CREAT SERPL-MCNC: 0.72 MG/DL (ref 0.5–1.05)
EOSINOPHIL # BLD AUTO: 92 CELLS/UL (ref 15–500)
EOSINOPHIL NFR BLD AUTO: 1.7 %
ERYTHROCYTE [DISTWIDTH] IN BLOOD BY AUTOMATED COUNT: 12.9 % (ref 11–15)
GLOBULIN SER CALC-MCNC: 2.8 G/DL (CALC) (ref 1.9–3.7)
GLUCOSE SERPL-MCNC: 95 MG/DL (ref 65–139)
HCT VFR BLD AUTO: 39.4 % (ref 35–45)
HGB BLD-MCNC: 13 G/DL (ref 11.7–15.5)
LYMPHOCYTES # BLD AUTO: 1318 CELLS/UL (ref 850–3900)
LYMPHOCYTES NFR BLD AUTO: 24.4 %
MCH RBC QN AUTO: 33.2 PG (ref 27–33)
MCHC RBC AUTO-ENTMCNC: 33 G/DL (ref 32–36)
MCV RBC AUTO: 100.5 FL (ref 80–100)
MONOCYTES # BLD AUTO: 788 CELLS/UL (ref 200–950)
MONOCYTES NFR BLD AUTO: 14.6 %
NEUTROPHILS # BLD AUTO: 3191 CELLS/UL (ref 1500–7800)
NEUTROPHILS NFR BLD AUTO: 59.1 %
PLATELET # BLD AUTO: 193 THOUSAND/UL (ref 140–400)
PMV BLD REES-ECKER: 12.2 FL (ref 7.5–12.5)
POTASSIUM SERPL-SCNC: 4 MMOL/L (ref 3.5–5.3)
PROT SERPL-MCNC: 6.8 G/DL (ref 6.1–8.1)
RBC # BLD AUTO: 3.92 MILLION/UL (ref 3.8–5.1)
SL AMB EGFR AFRICAN AMERICAN: 113 ML/MIN/1.73M2
SL AMB EGFR NON AFRICAN AMERICAN: 98 ML/MIN/1.73M2
SODIUM SERPL-SCNC: 141 MMOL/L (ref 135–146)
WBC # BLD AUTO: 5.4 THOUSAND/UL (ref 3.8–10.8)

## 2020-02-13 DIAGNOSIS — C78.7 LIVER METASTASES (HCC): ICD-10-CM

## 2020-02-13 DIAGNOSIS — Z15.01 BRCA2 GENE MUTATION POSITIVE: ICD-10-CM

## 2020-02-13 DIAGNOSIS — C50.911 CARCINOMA OF RIGHT BREAST METASTATIC TO AXILLARY LYMPH NODE (HCC): ICD-10-CM

## 2020-02-13 DIAGNOSIS — R11.0 CHEMOTHERAPY-INDUCED NAUSEA: Primary | ICD-10-CM

## 2020-02-13 DIAGNOSIS — T45.1X5A CHEMOTHERAPY-INDUCED NAUSEA: Primary | ICD-10-CM

## 2020-02-13 DIAGNOSIS — C77.3 CARCINOMA OF RIGHT BREAST METASTATIC TO AXILLARY LYMPH NODE (HCC): ICD-10-CM

## 2020-02-13 DIAGNOSIS — Z15.09 BRCA2 GENE MUTATION POSITIVE: ICD-10-CM

## 2020-02-13 RX ORDER — ONDANSETRON HYDROCHLORIDE 8 MG/1
8 TABLET, FILM COATED ORAL EVERY 8 HOURS PRN
Qty: 30 TABLET | Refills: 1 | Status: SHIPPED | OUTPATIENT
Start: 2020-02-13 | End: 2021-07-15

## 2020-02-13 RX ORDER — METOCLOPRAMIDE 10 MG/1
10 TABLET ORAL 4 TIMES DAILY
Qty: 30 TABLET | Refills: 1 | Status: SHIPPED | OUTPATIENT
Start: 2020-02-13 | End: 2020-04-02

## 2020-02-17 ENCOUNTER — DOCUMENTATION (OUTPATIENT)
Dept: HEMATOLOGY ONCOLOGY | Facility: CLINIC | Age: 50
End: 2020-02-17

## 2020-02-17 DIAGNOSIS — C78.7 LIVER METASTASES (HCC): ICD-10-CM

## 2020-02-17 DIAGNOSIS — C50.911 CARCINOMA OF RIGHT BREAST METASTATIC TO AXILLARY LYMPH NODE (HCC): Primary | ICD-10-CM

## 2020-02-17 DIAGNOSIS — C77.3 CARCINOMA OF RIGHT BREAST METASTATIC TO AXILLARY LYMPH NODE (HCC): Primary | ICD-10-CM

## 2020-02-20 LAB
ALBUMIN SERPL-MCNC: 3.7 G/DL (ref 3.6–5.1)
ALBUMIN/GLOB SERPL: 1.3 (CALC) (ref 1–2.5)
ALP SERPL-CCNC: 62 U/L (ref 37–153)
ALT SERPL-CCNC: 55 U/L (ref 6–29)
AST SERPL-CCNC: 33 U/L (ref 10–35)
BASOPHILS # BLD AUTO: 26 CELLS/UL (ref 0–200)
BASOPHILS NFR BLD AUTO: 0.3 %
BILIRUB SERPL-MCNC: 0.3 MG/DL (ref 0.2–1.2)
BUN SERPL-MCNC: 18 MG/DL (ref 7–25)
BUN/CREAT SERPL: ABNORMAL (CALC) (ref 6–22)
CALCIUM SERPL-MCNC: 9.3 MG/DL (ref 8.6–10.4)
CHLORIDE SERPL-SCNC: 104 MMOL/L (ref 98–110)
CO2 SERPL-SCNC: 30 MMOL/L (ref 20–32)
CREAT SERPL-MCNC: 0.79 MG/DL (ref 0.5–1.05)
EOSINOPHIL # BLD AUTO: 9 CELLS/UL (ref 15–500)
EOSINOPHIL NFR BLD AUTO: 0.1 %
ERYTHROCYTE [DISTWIDTH] IN BLOOD BY AUTOMATED COUNT: 13.3 % (ref 11–15)
GLOBULIN SER CALC-MCNC: 2.9 G/DL (CALC) (ref 1.9–3.7)
GLUCOSE SERPL-MCNC: 97 MG/DL (ref 65–139)
HCT VFR BLD AUTO: 36.5 % (ref 35–45)
HGB BLD-MCNC: 12.1 G/DL (ref 11.7–15.5)
LYMPHOCYTES # BLD AUTO: 1978 CELLS/UL (ref 850–3900)
LYMPHOCYTES NFR BLD AUTO: 23 %
MCH RBC QN AUTO: 32.1 PG (ref 27–33)
MCHC RBC AUTO-ENTMCNC: 33.2 G/DL (ref 32–36)
MCV RBC AUTO: 96.8 FL (ref 80–100)
MONOCYTES # BLD AUTO: 989 CELLS/UL (ref 200–950)
MONOCYTES NFR BLD AUTO: 11.5 %
NEUTROPHILS # BLD AUTO: 5599 CELLS/UL (ref 1500–7800)
NEUTROPHILS NFR BLD AUTO: 65.1 %
PLATELET # BLD AUTO: 193 THOUSAND/UL (ref 140–400)
PMV BLD REES-ECKER: 11.4 FL (ref 7.5–12.5)
POTASSIUM SERPL-SCNC: 4.4 MMOL/L (ref 3.5–5.3)
PROT SERPL-MCNC: 6.6 G/DL (ref 6.1–8.1)
RBC # BLD AUTO: 3.77 MILLION/UL (ref 3.8–5.1)
SL AMB EGFR AFRICAN AMERICAN: 101 ML/MIN/1.73M2
SL AMB EGFR NON AFRICAN AMERICAN: 87 ML/MIN/1.73M2
SODIUM SERPL-SCNC: 140 MMOL/L (ref 135–146)
WBC # BLD AUTO: 8.6 THOUSAND/UL (ref 3.8–10.8)

## 2020-02-21 ENCOUNTER — OFFICE VISIT (OUTPATIENT)
Dept: HEMATOLOGY ONCOLOGY | Facility: CLINIC | Age: 50
End: 2020-02-21
Payer: COMMERCIAL

## 2020-02-21 ENCOUNTER — HOSPITAL ENCOUNTER (OUTPATIENT)
Dept: INFUSION CENTER | Facility: CLINIC | Age: 50
Discharge: HOME | End: 2020-02-21
Payer: COMMERCIAL

## 2020-02-21 VITALS
BODY MASS INDEX: 34.32 KG/M2 | DIASTOLIC BLOOD PRESSURE: 80 MMHG | TEMPERATURE: 97.1 F | WEIGHT: 206 LBS | RESPIRATION RATE: 18 BRPM | SYSTOLIC BLOOD PRESSURE: 122 MMHG | HEART RATE: 96 BPM | OXYGEN SATURATION: 97 % | HEIGHT: 65 IN

## 2020-02-21 VITALS
RESPIRATION RATE: 18 BRPM | WEIGHT: 205.91 LBS | SYSTOLIC BLOOD PRESSURE: 122 MMHG | TEMPERATURE: 97.1 F | HEIGHT: 65 IN | BODY MASS INDEX: 34.31 KG/M2 | HEART RATE: 96 BPM | DIASTOLIC BLOOD PRESSURE: 80 MMHG

## 2020-02-21 DIAGNOSIS — C78.7 LIVER METASTASES (HCC): Primary | ICD-10-CM

## 2020-02-21 DIAGNOSIS — Z15.01 BRCA2 GENE MUTATION POSITIVE: ICD-10-CM

## 2020-02-21 DIAGNOSIS — C50.911 CARCINOMA OF RIGHT BREAST METASTATIC TO AXILLARY LYMPH NODE (HCC): ICD-10-CM

## 2020-02-21 DIAGNOSIS — C78.7 LIVER METASTASES (HCC): ICD-10-CM

## 2020-02-21 DIAGNOSIS — Z15.09 BRCA2 GENE MUTATION POSITIVE: ICD-10-CM

## 2020-02-21 DIAGNOSIS — C77.3 CARCINOMA OF RIGHT BREAST METASTATIC TO AXILLARY LYMPH NODE (HCC): ICD-10-CM

## 2020-02-21 PROCEDURE — 99214 OFFICE O/P EST MOD 30 MIN: CPT | Performed by: INTERNAL MEDICINE

## 2020-02-21 PROCEDURE — 96413 CHEMO IV INFUSION 1 HR: CPT

## 2020-02-21 PROCEDURE — 96367 TX/PROPH/DG ADDL SEQ IV INF: CPT

## 2020-02-21 RX ORDER — SODIUM CHLORIDE 9 MG/ML
20 INJECTION, SOLUTION INTRAVENOUS ONCE
Status: CANCELLED | OUTPATIENT
Start: 2020-04-03

## 2020-02-21 RX ORDER — SODIUM CHLORIDE 9 MG/ML
20 INJECTION, SOLUTION INTRAVENOUS ONCE
Status: COMPLETED | OUTPATIENT
Start: 2020-02-21 | End: 2020-02-21

## 2020-02-21 RX ORDER — OXYCODONE HYDROCHLORIDE 10 MG/1
10 TABLET ORAL EVERY 6 HOURS PRN
Qty: 30 TABLET | Refills: 0 | Status: SHIPPED | OUTPATIENT
Start: 2020-02-21 | End: 2021-01-14 | Stop reason: SDUPTHER

## 2020-02-21 RX ADMIN — SODIUM CHLORIDE 20 ML/HR: 0.9 INJECTION, SOLUTION INTRAVENOUS at 13:00

## 2020-02-21 RX ADMIN — DEXAMETHASONE SODIUM PHOSPHATE 10 MG: 10 INJECTION, SOLUTION INTRAMUSCULAR; INTRAVENOUS at 12:59

## 2020-02-21 RX ADMIN — DOCETAXEL 151.6 MG: 20 INJECTION, SOLUTION, CONCENTRATE INTRAVENOUS at 13:42

## 2020-02-21 NOTE — PROGRESS NOTES
Pt tolerated infusion without adverse reaction  Pt declined AVS  Ambulated off unit with steady gait

## 2020-02-21 NOTE — PROGRESS NOTES
Hematology / Oncology Outpatient Follow Up Note    Keira Francois 47 y o  female :1970 [facility]:[id number]         Date:  2020    Assessment / Plan:  A 47year-old surgically postmenopausal woman with metastatic right breast cancer, grade 3, ER 90% positive, ID 90% positive, HER2 negative disease   She has BRCA -2 mutation    She has histologically confirmed liver metastasis   Since she was premenopausal, she underwent as bilateral surgical ovarian ablation in late 2019 followed by starting palbociclib and letrozole   She had initial minor response   However, she had rapid progression in 2019 for which she is currently on Taxotere with excellent tolerance  Clinically, she has partial response based on today's examination  She has adequate ANC to have 3rd cycle of Taxotere, today  I recommended her to continue with current regimen every 3 weeks  I will see her again when she is due for 5th cycle of Taxotere    She is in agreement with my recommendations          Subjective:      HPI:  A 48-year-old premenopausal woman who noticed a lump in her right breast in 2019 which she brought to medical attention   Radiographically, she has large right breast mass and right axillary adenopathy both of which were biopsy in May 3, 2019   Biopsy showed invasive ductal carcinoma, grade 3   This was ER 90% positive, ID 90% positive, HER2 negative disease   Biopsy from right axilla lymph node was positive for metastatic disease   She was seen by Dr Fritz Bocanegra of her young age, she underwent genetic testing which showed BRCA-2 mutation   Because of the locally advanced nature, she underwent CT scan of chest abdomen pelvis which showed multiple liver lesion, suspicious for metastatic disease   Bone scan was negative for osseous metastasis  Jayjay Hutchison is going to have liver biopsy in 2019   She presents today to discuss treatment option   She has no symptomatology from breast standpoint  Jayjay Hutchison denied any pain  Her weight has been stable   She has no respiratory symptoms   She has no significant past medical history   Her paternal aunt had breast cancer in her 35s   Interestingly enough, her father had colon cancer in his 45s as well as prostate cancer in his 62s   She has no family history of ovarian cancer   She was a smoker until 2 years ago  Valery Pederson does not drink alcohol  Valery Pederson has a son who is 23years old  Valery Pederson also has younger son who was biologically female  Blayne Youssef is a transgender  Blayne Youssef is considering bilateral mastectomy  Aric Fernandez, he has not been tested for BRCA gene mutation            Interval History:   A 47year-old surgically postmenopausal woman with metastatic right breast cancer, grade 3, ER 90% positive, MT 90% positive, HER2 negative disease   She has BRCA -2 mutation   Based on the CT scan, she appeared to have multiple liver metastasis     She subsequently underwent liver biopsy which showed metastatic carcinoma, consistent with breast primary   She was premenopausal at the time of diagnosis   Therefore, she underwent bilateral oophorectomy in late July 2019  Ovary and fallopian tube did not show any malignancy   She started palbociclib and letrozole in late July 2019  She initially had response with decreased right breast mass as well as right axillary adenopathy  However, in late December 2019, she had rapid progression in her right breast mass, skin involvement as well as right axillary adenopathy  Therefore, she started palliative chemotherapy with Taxotere  After 1 cycle of Taxotere, she had partial response based on physical examination  She presents today for 3rd cycle of Taxotere  She has no history of neutropenic fever  She denied any pain  Her weight is stable  She has no skin rash  She denied neuropathy    Her performance status is normal         Objective:      Primary Diagnosis:     1    Metastatic breast cancer, grade 3, ER 90 % positive, MT 90 % positive, HER2 negative disease, diagnosed in June 2019    2    BRCA-2 mutation      Cancer Staging:  Cancer Staging  Malignant neoplasm of central portion of right breast in female, estrogen receptor positive ([facility] [address] )  Staging form: Breast, AJCC 8th Edition  - Clinical: Stage IIIB (cT4, cN1(f), cM0, G3, ER+, DC+, HER2-) - Signed by Juan Ramon Sanderson MD on 5/16/2019  Laterality: Right  Method of lymph node assessment: Core biopsy  Histologic grading system: 3 grade system           Previous Hematologic/ Oncologic Treatment:      1  Surgical ovarian ablation in late July 2019   2  Palbociclib and letrozole from July 2018 through January 2020       Current Hematologic/ Oncologic Treatment:       Taxotere every 3 weeks  3rd cycle to be given today      Disease Status:      Clinical partial response on Taxotere      Test Results:     Pathology:     Right breast biopsy in axillary lymph node biopsy showed invasive ductal carcinoma, grade 3   ER 90 % positive, DC 90% positive, HER2 negative disease      Liver biopsy in June 2019 showed metastatic carcinoma, consistent with breast primary      Bilateral ovary and fallopian tube showed no evidence of malignancy      Radiology:     Bone scan showed no evidence of osseous metastasis      CT scan of chest abdomen pelvis in December 2019 showed progression of right breast mass and right axillary adenopathy with new appearance of skin change   Mixed response with liver metastasis       Laboratory:     See below      Physical Exam:        General Appearance:    Alert, oriented          Eyes:    PERRL   Ears:    Normal external ear canals, both ears   Nose:   Nares normal, septum midline   Throat:   Mucosa moist  Pharynx without injection      Neck:   Supple         Lungs:     Clear to auscultation bilaterally   Chest Wall:    No tenderness or deformity    Heart:    Regular rate and rhythm         Abdomen:     Soft, non-tender, bowel sounds +, no organomegaly               Extremities:   Extremities no cyanosis or edema         Skin:   no rash or icterus  Lymph nodes:   Cervical, supraclavicular, and axillary nodes normal   Neurologic:   CNII-XII intact, normal strength, sensation and reflexes     Throughout             Breast exam: 8 x 7 cm mass at upper aspect of her right breast with minimal erythematous skin lesion  Right axillary adenopathy is hardly palpable  Left breast exam is negative             ROS: Review of Systems   All other systems reviewed and are negative  Imaging: No results found  Labs:   Lab Results   Component Value Date    WBC 8 6 02/19/2020    HGB 12 1 02/19/2020    HCT 36 5 02/19/2020    MCV 96 8 02/19/2020     02/19/2020     Lab Results   Component Value Date    K 4 4 02/19/2020     02/19/2020    CO2 30 02/19/2020    BUN 18 02/19/2020    CREATININE 0 79 02/19/2020    GLUF 91 01/04/2020    CALCIUM 9 3 02/19/2020    AST 33 02/19/2020    ALT 55 (H) 02/19/2020    ALKPHOS 62 02/19/2020    EGFR 92 01/04/2020         Current Medications: Reviewed  Allergies: Reviewed  PMH/FH/SH:  Reviewed      Vital Sign:    Body surface area is 2 meters squared      Wt Readings from Last 3 Encounters:   02/21/20 93 4 kg (206 lb)   01/31/20 92 kg (202 lb 13 2 oz)   01/27/20 92 3 kg (203 lb 7 8 oz)        Temp Readings from Last 3 Encounters:   02/21/20 (!) 97 1 °F (36 2 °C) (Tympanic Core)   01/31/20 99 3 °F (37 4 °C) (Temporal)   01/27/20 97 7 °F (36 5 °C) (Tympanic Core)        BP Readings from Last 3 Encounters:   02/21/20 122/80   01/31/20 137/82   01/27/20 122/76         Pulse Readings from Last 3 Encounters:   02/21/20 96   01/31/20 88   01/27/20 85     @LASTSAO2(3)@

## 2020-03-11 ENCOUNTER — DOCUMENTATION (OUTPATIENT)
Dept: INFUSION CENTER | Facility: CLINIC | Age: 50
End: 2020-03-11

## 2020-03-11 NOTE — SOCIAL WORK
MSW received patient's distress thermometer, scoring low (3)  No practical, family or spiritual concerns noted at this time  Pt notes depression, fears, sadness and worry  Pt notes physical concerns with memory/concentration and pain, writing in "giant tumor - boob hurts"  MSW will follow up with pt to assess any needs at this time

## 2020-03-13 ENCOUNTER — HOSPITAL ENCOUNTER (OUTPATIENT)
Dept: INFUSION CENTER | Facility: CLINIC | Age: 50
Discharge: HOME | End: 2020-03-13
Payer: COMMERCIAL

## 2020-03-13 ENCOUNTER — APPOINTMENT (OUTPATIENT)
Dept: LAB | Facility: HOSPITAL | Age: 50
End: 2020-03-13
Attending: INTERNAL MEDICINE
Payer: COMMERCIAL

## 2020-03-13 VITALS
HEART RATE: 85 BPM | SYSTOLIC BLOOD PRESSURE: 120 MMHG | HEIGHT: 65 IN | DIASTOLIC BLOOD PRESSURE: 67 MMHG | BODY MASS INDEX: 34.31 KG/M2 | RESPIRATION RATE: 18 BRPM | TEMPERATURE: 98.7 F | WEIGHT: 205.91 LBS

## 2020-03-13 DIAGNOSIS — Z15.09 BRCA2 GENE MUTATION POSITIVE: ICD-10-CM

## 2020-03-13 DIAGNOSIS — C50.911 CARCINOMA OF RIGHT BREAST METASTATIC TO AXILLARY LYMPH NODE (HCC): ICD-10-CM

## 2020-03-13 DIAGNOSIS — C78.7 LIVER METASTASES (HCC): Primary | ICD-10-CM

## 2020-03-13 DIAGNOSIS — C78.7 LIVER METASTASES (HCC): ICD-10-CM

## 2020-03-13 DIAGNOSIS — Z15.01 BRCA2 GENE MUTATION POSITIVE: ICD-10-CM

## 2020-03-13 DIAGNOSIS — C77.3 CARCINOMA OF RIGHT BREAST METASTATIC TO AXILLARY LYMPH NODE (HCC): ICD-10-CM

## 2020-03-13 LAB — CANCER AG27-29 SERPL-ACNC: 93.6 U/ML (ref 0–42.3)

## 2020-03-13 PROCEDURE — 96367 TX/PROPH/DG ADDL SEQ IV INF: CPT

## 2020-03-13 PROCEDURE — 96413 CHEMO IV INFUSION 1 HR: CPT

## 2020-03-13 PROCEDURE — 86300 IMMUNOASSAY TUMOR CA 15-3: CPT

## 2020-03-13 RX ORDER — SODIUM CHLORIDE 9 MG/ML
20 INJECTION, SOLUTION INTRAVENOUS ONCE
Status: COMPLETED | OUTPATIENT
Start: 2020-03-13 | End: 2020-03-13

## 2020-03-13 RX ADMIN — DOCETAXEL 151.6 MG: 20 INJECTION, SOLUTION, CONCENTRATE INTRAVENOUS at 13:01

## 2020-03-13 RX ADMIN — DEXAMETHASONE SODIUM PHOSPHATE 10 MG: 10 INJECTION, SOLUTION INTRAMUSCULAR; INTRAVENOUS at 12:37

## 2020-03-13 RX ADMIN — SODIUM CHLORIDE 20 ML/HR: 0.9 INJECTION, SOLUTION INTRAVENOUS at 12:35

## 2020-03-17 ENCOUNTER — TELEPHONE (OUTPATIENT)
Dept: HEMATOLOGY ONCOLOGY | Facility: CLINIC | Age: 50
End: 2020-03-17

## 2020-03-22 ENCOUNTER — PATIENT MESSAGE (OUTPATIENT)
Dept: HEMATOLOGY ONCOLOGY | Facility: CLINIC | Age: 50
End: 2020-03-22

## 2020-04-02 ENCOUNTER — OFFICE VISIT (OUTPATIENT)
Dept: HEMATOLOGY ONCOLOGY | Facility: CLINIC | Age: 50
End: 2020-04-02
Payer: COMMERCIAL

## 2020-04-02 VITALS
RESPIRATION RATE: 18 BRPM | HEIGHT: 65 IN | SYSTOLIC BLOOD PRESSURE: 124 MMHG | OXYGEN SATURATION: 97 % | WEIGHT: 210 LBS | BODY MASS INDEX: 34.99 KG/M2 | TEMPERATURE: 97.2 F | DIASTOLIC BLOOD PRESSURE: 78 MMHG | HEART RATE: 96 BPM

## 2020-04-02 DIAGNOSIS — C50.111 MALIGNANT NEOPLASM OF CENTRAL PORTION OF RIGHT BREAST IN FEMALE, ESTROGEN RECEPTOR POSITIVE (HCC): ICD-10-CM

## 2020-04-02 DIAGNOSIS — C78.7 LIVER METASTASES (HCC): Primary | ICD-10-CM

## 2020-04-02 DIAGNOSIS — C50.911 CARCINOMA OF RIGHT BREAST METASTATIC TO AXILLARY LYMPH NODE (HCC): ICD-10-CM

## 2020-04-02 DIAGNOSIS — Z17.0 MALIGNANT NEOPLASM OF CENTRAL PORTION OF RIGHT BREAST IN FEMALE, ESTROGEN RECEPTOR POSITIVE (HCC): ICD-10-CM

## 2020-04-02 DIAGNOSIS — C77.3 CARCINOMA OF RIGHT BREAST METASTATIC TO AXILLARY LYMPH NODE (HCC): ICD-10-CM

## 2020-04-02 PROCEDURE — 99214 OFFICE O/P EST MOD 30 MIN: CPT | Performed by: INTERNAL MEDICINE

## 2020-04-02 RX ORDER — SODIUM CHLORIDE 9 MG/ML
20 INJECTION, SOLUTION INTRAVENOUS ONCE
Status: CANCELLED | OUTPATIENT
Start: 2020-04-24

## 2020-04-03 ENCOUNTER — TELEPHONE (OUTPATIENT)
Dept: HEMATOLOGY ONCOLOGY | Facility: CLINIC | Age: 50
End: 2020-04-03

## 2020-04-03 ENCOUNTER — HOSPITAL ENCOUNTER (OUTPATIENT)
Dept: INFUSION CENTER | Facility: CLINIC | Age: 50
Discharge: HOME | End: 2020-04-03

## 2020-04-03 DIAGNOSIS — C78.7 LIVER METASTASES (HCC): Primary | ICD-10-CM

## 2020-04-03 LAB
ALBUMIN SERPL-MCNC: 3.8 G/DL (ref 3.6–5.1)
ALBUMIN/GLOB SERPL: 1.4 (CALC) (ref 1–2.5)
ALP SERPL-CCNC: 56 U/L (ref 37–153)
ALT SERPL-CCNC: 269 U/L (ref 6–29)
AST SERPL-CCNC: 113 U/L (ref 10–35)
BASOPHILS # BLD AUTO: 19 CELLS/UL (ref 0–200)
BASOPHILS NFR BLD AUTO: 0.2 %
BILIRUB SERPL-MCNC: 0.3 MG/DL (ref 0.2–1.2)
BUN SERPL-MCNC: 18 MG/DL (ref 7–25)
BUN/CREAT SERPL: ABNORMAL (CALC) (ref 6–22)
CALCIUM SERPL-MCNC: 9.7 MG/DL (ref 8.6–10.4)
CHLORIDE SERPL-SCNC: 105 MMOL/L (ref 98–110)
CO2 SERPL-SCNC: 30 MMOL/L (ref 20–32)
CREAT SERPL-MCNC: 0.76 MG/DL (ref 0.5–1.05)
EOSINOPHIL # BLD AUTO: 10 CELLS/UL (ref 15–500)
EOSINOPHIL NFR BLD AUTO: 0.1 %
ERYTHROCYTE [DISTWIDTH] IN BLOOD BY AUTOMATED COUNT: 14.4 % (ref 11–15)
GLOBULIN SER CALC-MCNC: 2.7 G/DL (CALC) (ref 1.9–3.7)
GLUCOSE SERPL-MCNC: 95 MG/DL (ref 65–139)
HCT VFR BLD AUTO: 35.6 % (ref 35–45)
HGB BLD-MCNC: 11.7 G/DL (ref 11.7–15.5)
LYMPHOCYTES # BLD AUTO: 1978 CELLS/UL (ref 850–3900)
LYMPHOCYTES NFR BLD AUTO: 20.6 %
MCH RBC QN AUTO: 30.2 PG (ref 27–33)
MCHC RBC AUTO-ENTMCNC: 32.9 G/DL (ref 32–36)
MCV RBC AUTO: 92 FL (ref 80–100)
MONOCYTES # BLD AUTO: 1027 CELLS/UL (ref 200–950)
MONOCYTES NFR BLD AUTO: 10.7 %
NEUTROPHILS # BLD AUTO: 6566 CELLS/UL (ref 1500–7800)
NEUTROPHILS NFR BLD AUTO: 68.4 %
PLATELET # BLD AUTO: 212 THOUSAND/UL (ref 140–400)
PMV BLD REES-ECKER: 11.8 FL (ref 7.5–12.5)
POTASSIUM SERPL-SCNC: 4.1 MMOL/L (ref 3.5–5.3)
PROT SERPL-MCNC: 6.5 G/DL (ref 6.1–8.1)
RBC # BLD AUTO: 3.87 MILLION/UL (ref 3.8–5.1)
SL AMB EGFR AFRICAN AMERICAN: 106 ML/MIN/1.73M2
SL AMB EGFR NON AFRICAN AMERICAN: 91 ML/MIN/1.73M2
SODIUM SERPL-SCNC: 140 MMOL/L (ref 135–146)
WBC # BLD AUTO: 9.6 THOUSAND/UL (ref 3.8–10.8)

## 2020-04-20 ENCOUNTER — OFFICE VISIT (OUTPATIENT)
Dept: SURGICAL ONCOLOGY | Facility: CLINIC | Age: 50
End: 2020-04-20
Payer: COMMERCIAL

## 2020-04-20 VITALS
SYSTOLIC BLOOD PRESSURE: 120 MMHG | RESPIRATION RATE: 18 BRPM | WEIGHT: 215 LBS | HEIGHT: 65 IN | BODY MASS INDEX: 35.82 KG/M2 | TEMPERATURE: 98 F | HEART RATE: 85 BPM | DIASTOLIC BLOOD PRESSURE: 70 MMHG

## 2020-04-20 DIAGNOSIS — Z79.899 ON ANTINEOPLASTIC CHEMOTHERAPY: ICD-10-CM

## 2020-04-20 DIAGNOSIS — Z17.0 MALIGNANT NEOPLASM OF CENTRAL PORTION OF RIGHT BREAST IN FEMALE, ESTROGEN RECEPTOR POSITIVE (HCC): Primary | ICD-10-CM

## 2020-04-20 DIAGNOSIS — C78.7 LIVER METASTASES (HCC): ICD-10-CM

## 2020-04-20 DIAGNOSIS — C50.111 MALIGNANT NEOPLASM OF CENTRAL PORTION OF RIGHT BREAST IN FEMALE, ESTROGEN RECEPTOR POSITIVE (HCC): Primary | ICD-10-CM

## 2020-04-20 PROCEDURE — 99215 OFFICE O/P EST HI 40 MIN: CPT | Performed by: SURGERY

## 2020-04-21 ENCOUNTER — HOSPITAL ENCOUNTER (OUTPATIENT)
Dept: INFUSION CENTER | Facility: CLINIC | Age: 50
End: 2020-04-21

## 2020-04-21 DIAGNOSIS — C78.7 LIVER METASTASES (HCC): ICD-10-CM

## 2020-04-21 DIAGNOSIS — C50.911 CARCINOMA OF RIGHT BREAST METASTATIC TO AXILLARY LYMPH NODE (HCC): ICD-10-CM

## 2020-04-21 DIAGNOSIS — C77.3 CARCINOMA OF RIGHT BREAST METASTATIC TO AXILLARY LYMPH NODE (HCC): ICD-10-CM

## 2020-04-21 DIAGNOSIS — Z15.09 BRCA2 GENE MUTATION POSITIVE: ICD-10-CM

## 2020-04-21 DIAGNOSIS — Z15.01 BRCA2 GENE MUTATION POSITIVE: ICD-10-CM

## 2020-04-21 RX ORDER — SODIUM CHLORIDE 9 MG/ML
20 INJECTION, SOLUTION INTRAVENOUS ONCE
Status: CANCELLED | OUTPATIENT
Start: 2020-04-24

## 2020-04-24 ENCOUNTER — TELEPHONE (OUTPATIENT)
Dept: HEMATOLOGY ONCOLOGY | Facility: CLINIC | Age: 50
End: 2020-04-24

## 2020-04-24 ENCOUNTER — HOSPITAL ENCOUNTER (OUTPATIENT)
Dept: INFUSION CENTER | Facility: CLINIC | Age: 50
Discharge: HOME | End: 2020-04-24
Payer: COMMERCIAL

## 2020-04-24 VITALS
BODY MASS INDEX: 35.4 KG/M2 | HEIGHT: 66 IN | HEART RATE: 96 BPM | RESPIRATION RATE: 18 BRPM | WEIGHT: 220.24 LBS | DIASTOLIC BLOOD PRESSURE: 70 MMHG | TEMPERATURE: 98.6 F | SYSTOLIC BLOOD PRESSURE: 126 MMHG

## 2020-04-24 DIAGNOSIS — Z15.01 BRCA2 GENE MUTATION POSITIVE: ICD-10-CM

## 2020-04-24 DIAGNOSIS — C78.7 LIVER METASTASES (HCC): Primary | ICD-10-CM

## 2020-04-24 DIAGNOSIS — C50.911 CARCINOMA OF RIGHT BREAST METASTATIC TO AXILLARY LYMPH NODE (HCC): ICD-10-CM

## 2020-04-24 DIAGNOSIS — C77.3 CARCINOMA OF RIGHT BREAST METASTATIC TO AXILLARY LYMPH NODE (HCC): ICD-10-CM

## 2020-04-24 DIAGNOSIS — Z15.09 BRCA2 GENE MUTATION POSITIVE: ICD-10-CM

## 2020-04-24 PROCEDURE — 96413 CHEMO IV INFUSION 1 HR: CPT

## 2020-04-24 PROCEDURE — 96367 TX/PROPH/DG ADDL SEQ IV INF: CPT

## 2020-04-24 RX ORDER — SODIUM CHLORIDE 9 MG/ML
20 INJECTION, SOLUTION INTRAVENOUS ONCE
Status: COMPLETED | OUTPATIENT
Start: 2020-04-24 | End: 2020-04-24

## 2020-04-24 RX ADMIN — DOCETAXEL 151.6 MG: 20 INJECTION, SOLUTION, CONCENTRATE INTRAVENOUS at 13:48

## 2020-04-24 RX ADMIN — SODIUM CHLORIDE 20 ML/HR: 0.9 INJECTION, SOLUTION INTRAVENOUS at 13:17

## 2020-04-24 RX ADMIN — DEXAMETHASONE SODIUM PHOSPHATE 10 MG: 10 INJECTION, SOLUTION INTRAMUSCULAR; INTRAVENOUS at 13:20

## 2020-04-26 LAB
ALBUMIN SERPL-MCNC: 3.8 G/DL (ref 3.6–5.1)
ALBUMIN/GLOB SERPL: 1.3 (CALC) (ref 1–2.5)
ALP SERPL-CCNC: 71 U/L (ref 37–153)
ALT SERPL-CCNC: 84 U/L (ref 6–29)
AST SERPL-CCNC: 44 U/L (ref 10–35)
BASOPHILS # BLD AUTO: 18 CELLS/UL (ref 0–200)
BASOPHILS NFR BLD AUTO: 0.3 %
BILIRUB SERPL-MCNC: 0.3 MG/DL (ref 0.2–1.2)
BUN SERPL-MCNC: 15 MG/DL (ref 7–25)
BUN/CREAT SERPL: ABNORMAL (CALC) (ref 6–22)
CALCIUM SERPL-MCNC: 9.5 MG/DL (ref 8.6–10.4)
CANCER AG27-29 SERPL-ACNC: 89 U/ML
CHLORIDE SERPL-SCNC: 104 MMOL/L (ref 98–110)
CO2 SERPL-SCNC: 29 MMOL/L (ref 20–32)
CREAT SERPL-MCNC: 0.73 MG/DL (ref 0.5–1.05)
EOSINOPHIL # BLD AUTO: 183 CELLS/UL (ref 15–500)
EOSINOPHIL NFR BLD AUTO: 3 %
ERYTHROCYTE [DISTWIDTH] IN BLOOD BY AUTOMATED COUNT: 14.6 % (ref 11–15)
GLOBULIN SER CALC-MCNC: 3 G/DL (CALC) (ref 1.9–3.7)
GLUCOSE SERPL-MCNC: 82 MG/DL (ref 65–139)
HCT VFR BLD AUTO: 37.7 % (ref 35–45)
HGB BLD-MCNC: 12.5 G/DL (ref 11.7–15.5)
LYMPHOCYTES # BLD AUTO: 2001 CELLS/UL (ref 850–3900)
LYMPHOCYTES NFR BLD AUTO: 32.8 %
MCH RBC QN AUTO: 30.3 PG (ref 27–33)
MCHC RBC AUTO-ENTMCNC: 33.2 G/DL (ref 32–36)
MCV RBC AUTO: 91.3 FL (ref 80–100)
MONOCYTES # BLD AUTO: 677 CELLS/UL (ref 200–950)
MONOCYTES NFR BLD AUTO: 11.1 %
NEUTROPHILS # BLD AUTO: 3221 CELLS/UL (ref 1500–7800)
NEUTROPHILS NFR BLD AUTO: 52.8 %
PLATELET # BLD AUTO: 227 THOUSAND/UL (ref 140–400)
PMV BLD REES-ECKER: 12.8 FL (ref 7.5–12.5)
POTASSIUM SERPL-SCNC: 3.9 MMOL/L (ref 3.5–5.3)
PROT SERPL-MCNC: 6.8 G/DL (ref 6.1–8.1)
RBC # BLD AUTO: 4.13 MILLION/UL (ref 3.8–5.1)
SL AMB EGFR AFRICAN AMERICAN: 111 ML/MIN/1.73M2
SL AMB EGFR NON AFRICAN AMERICAN: 96 ML/MIN/1.73M2
SODIUM SERPL-SCNC: 140 MMOL/L (ref 135–146)
WBC # BLD AUTO: 6.1 THOUSAND/UL (ref 3.8–10.8)

## 2020-05-11 ENCOUNTER — OFFICE VISIT (OUTPATIENT)
Dept: HEMATOLOGY ONCOLOGY | Facility: CLINIC | Age: 50
End: 2020-05-11
Payer: COMMERCIAL

## 2020-05-11 VITALS
RESPIRATION RATE: 18 BRPM | DIASTOLIC BLOOD PRESSURE: 78 MMHG | BODY MASS INDEX: 35.2 KG/M2 | TEMPERATURE: 98.2 F | OXYGEN SATURATION: 98 % | HEART RATE: 90 BPM | HEIGHT: 66 IN | WEIGHT: 219 LBS | SYSTOLIC BLOOD PRESSURE: 132 MMHG

## 2020-05-11 DIAGNOSIS — C78.7 LIVER METASTASES (HCC): Primary | ICD-10-CM

## 2020-05-11 DIAGNOSIS — Z15.09 BRCA2 GENE MUTATION POSITIVE: ICD-10-CM

## 2020-05-11 DIAGNOSIS — Z15.01 BRCA2 GENE MUTATION POSITIVE: ICD-10-CM

## 2020-05-11 DIAGNOSIS — C77.3 CARCINOMA OF RIGHT BREAST METASTATIC TO AXILLARY LYMPH NODE (HCC): ICD-10-CM

## 2020-05-11 DIAGNOSIS — C50.911 CARCINOMA OF RIGHT BREAST METASTATIC TO AXILLARY LYMPH NODE (HCC): ICD-10-CM

## 2020-05-11 PROCEDURE — 99215 OFFICE O/P EST HI 40 MIN: CPT | Performed by: INTERNAL MEDICINE

## 2020-05-11 RX ORDER — SODIUM CHLORIDE 9 MG/ML
20 INJECTION, SOLUTION INTRAVENOUS ONCE
Status: CANCELLED | OUTPATIENT
Start: 2020-05-15

## 2020-05-11 RX ORDER — LORATADINE 10 MG/1
10 TABLET ORAL DAILY
COMMUNITY
End: 2021-01-21 | Stop reason: ALTCHOICE

## 2020-05-12 ENCOUNTER — DOCUMENTATION (OUTPATIENT)
Dept: HEMATOLOGY ONCOLOGY | Facility: CLINIC | Age: 50
End: 2020-05-12

## 2020-05-13 DIAGNOSIS — C77.3 CARCINOMA OF RIGHT BREAST METASTATIC TO AXILLARY LYMPH NODE (HCC): ICD-10-CM

## 2020-05-13 DIAGNOSIS — C78.7 LIVER METASTASES (HCC): Primary | ICD-10-CM

## 2020-05-13 DIAGNOSIS — C50.911 CARCINOMA OF RIGHT BREAST METASTATIC TO AXILLARY LYMPH NODE (HCC): ICD-10-CM

## 2020-05-13 LAB
ALBUMIN SERPL-MCNC: 3.9 G/DL (ref 3.6–5.1)
ALBUMIN/GLOB SERPL: 1.3 (CALC) (ref 1–2.5)
ALP SERPL-CCNC: 68 U/L (ref 37–153)
ALT SERPL-CCNC: 71 U/L (ref 6–29)
AST SERPL-CCNC: 47 U/L (ref 10–35)
BASOPHILS # BLD AUTO: 21 CELLS/UL (ref 0–200)
BASOPHILS NFR BLD AUTO: 0.2 %
BILIRUB SERPL-MCNC: 0.2 MG/DL (ref 0.2–1.2)
BUN SERPL-MCNC: 15 MG/DL (ref 7–25)
BUN/CREAT SERPL: ABNORMAL (CALC) (ref 6–22)
CALCIUM SERPL-MCNC: 9.4 MG/DL (ref 8.6–10.4)
CHLORIDE SERPL-SCNC: 105 MMOL/L (ref 98–110)
CO2 SERPL-SCNC: 28 MMOL/L (ref 20–32)
CREAT SERPL-MCNC: 0.81 MG/DL (ref 0.5–1.05)
EOSINOPHIL # BLD AUTO: 10 CELLS/UL (ref 15–500)
EOSINOPHIL NFR BLD AUTO: 0.1 %
ERYTHROCYTE [DISTWIDTH] IN BLOOD BY AUTOMATED COUNT: 14.9 % (ref 11–15)
GLOBULIN SER CALC-MCNC: 2.9 G/DL (CALC) (ref 1.9–3.7)
GLUCOSE SERPL-MCNC: 115 MG/DL (ref 65–99)
HCT VFR BLD AUTO: 36.9 % (ref 35–45)
HGB BLD-MCNC: 12.1 G/DL (ref 11.7–15.5)
LYMPHOCYTES # BLD AUTO: 2226 CELLS/UL (ref 850–3900)
LYMPHOCYTES NFR BLD AUTO: 21.4 %
MCH RBC QN AUTO: 29.2 PG (ref 27–33)
MCHC RBC AUTO-ENTMCNC: 32.8 G/DL (ref 32–36)
MCV RBC AUTO: 89.1 FL (ref 80–100)
MONOCYTES # BLD AUTO: 790 CELLS/UL (ref 200–950)
MONOCYTES NFR BLD AUTO: 7.6 %
NEUTROPHILS # BLD AUTO: 7353 CELLS/UL (ref 1500–7800)
NEUTROPHILS NFR BLD AUTO: 70.7 %
PLATELET # BLD AUTO: 221 THOUSAND/UL (ref 140–400)
PMV BLD REES-ECKER: 12 FL (ref 7.5–12.5)
POTASSIUM SERPL-SCNC: 3.6 MMOL/L (ref 3.5–5.3)
PROT SERPL-MCNC: 6.8 G/DL (ref 6.1–8.1)
RBC # BLD AUTO: 4.14 MILLION/UL (ref 3.8–5.1)
SL AMB EGFR AFRICAN AMERICAN: 98 ML/MIN/1.73M2
SL AMB EGFR NON AFRICAN AMERICAN: 85 ML/MIN/1.73M2
SODIUM SERPL-SCNC: 141 MMOL/L (ref 135–146)
WBC # BLD AUTO: 10.4 THOUSAND/UL (ref 3.8–10.8)

## 2020-05-15 ENCOUNTER — DOCUMENTATION (OUTPATIENT)
Dept: HEMATOLOGY ONCOLOGY | Facility: CLINIC | Age: 50
End: 2020-05-15

## 2020-05-15 ENCOUNTER — HOSPITAL ENCOUNTER (OUTPATIENT)
Dept: INFUSION CENTER | Facility: CLINIC | Age: 50
Discharge: HOME | End: 2020-05-15
Payer: COMMERCIAL

## 2020-05-15 VITALS
BODY MASS INDEX: 35.11 KG/M2 | HEART RATE: 84 BPM | WEIGHT: 218.48 LBS | SYSTOLIC BLOOD PRESSURE: 105 MMHG | TEMPERATURE: 98.2 F | DIASTOLIC BLOOD PRESSURE: 72 MMHG | RESPIRATION RATE: 16 BRPM | HEIGHT: 66 IN

## 2020-05-15 DIAGNOSIS — Z15.01 BRCA2 GENE MUTATION POSITIVE: ICD-10-CM

## 2020-05-15 DIAGNOSIS — C78.7 LIVER METASTASES (HCC): Primary | ICD-10-CM

## 2020-05-15 DIAGNOSIS — Z15.09 BRCA2 GENE MUTATION POSITIVE: ICD-10-CM

## 2020-05-15 DIAGNOSIS — C50.911 CARCINOMA OF RIGHT BREAST METASTATIC TO AXILLARY LYMPH NODE (HCC): ICD-10-CM

## 2020-05-15 DIAGNOSIS — C77.3 CARCINOMA OF RIGHT BREAST METASTATIC TO AXILLARY LYMPH NODE (HCC): ICD-10-CM

## 2020-05-15 DIAGNOSIS — H66.90 EAR INFECTION: Primary | ICD-10-CM

## 2020-05-15 PROCEDURE — 96367 TX/PROPH/DG ADDL SEQ IV INF: CPT

## 2020-05-15 PROCEDURE — 96413 CHEMO IV INFUSION 1 HR: CPT

## 2020-05-15 RX ORDER — SODIUM CHLORIDE 9 MG/ML
20 INJECTION, SOLUTION INTRAVENOUS ONCE
Status: COMPLETED | OUTPATIENT
Start: 2020-05-15 | End: 2020-05-15

## 2020-05-15 RX ORDER — AMOXICILLIN 500 MG/1
500 CAPSULE ORAL EVERY 8 HOURS SCHEDULED
Qty: 21 CAPSULE | Refills: 0 | Status: SHIPPED | OUTPATIENT
Start: 2020-05-15 | End: 2020-05-22

## 2020-05-15 RX ADMIN — DEXAMETHASONE SODIUM PHOSPHATE 10 MG: 10 INJECTION, SOLUTION INTRAMUSCULAR; INTRAVENOUS at 13:02

## 2020-05-15 RX ADMIN — SODIUM CHLORIDE 20 ML/HR: 9 INJECTION, SOLUTION INTRAVENOUS at 12:50

## 2020-05-15 RX ADMIN — DOCETAXEL 151.6 MG: 20 INJECTION, SOLUTION, CONCENTRATE INTRAVENOUS at 13:38

## 2020-05-19 ENCOUNTER — TELEPHONE (OUTPATIENT)
Dept: SURGICAL ONCOLOGY | Facility: CLINIC | Age: 50
End: 2020-05-19

## 2020-05-21 ENCOUNTER — TELEPHONE (OUTPATIENT)
Dept: SURGICAL ONCOLOGY | Facility: CLINIC | Age: 50
End: 2020-05-21

## 2020-05-27 ENCOUNTER — OFFICE VISIT (OUTPATIENT)
Dept: SURGICAL ONCOLOGY | Facility: CLINIC | Age: 50
End: 2020-05-27
Payer: COMMERCIAL

## 2020-05-27 VITALS
HEART RATE: 91 BPM | BODY MASS INDEX: 36 KG/M2 | DIASTOLIC BLOOD PRESSURE: 68 MMHG | RESPIRATION RATE: 18 BRPM | HEIGHT: 66 IN | TEMPERATURE: 98.6 F | SYSTOLIC BLOOD PRESSURE: 118 MMHG | WEIGHT: 224 LBS

## 2020-05-27 DIAGNOSIS — Z17.0 MALIGNANT NEOPLASM OF CENTRAL PORTION OF RIGHT BREAST IN FEMALE, ESTROGEN RECEPTOR POSITIVE (HCC): Primary | ICD-10-CM

## 2020-05-27 DIAGNOSIS — C50.111 MALIGNANT NEOPLASM OF CENTRAL PORTION OF RIGHT BREAST IN FEMALE, ESTROGEN RECEPTOR POSITIVE (HCC): Primary | ICD-10-CM

## 2020-05-27 PROCEDURE — 99215 OFFICE O/P EST HI 40 MIN: CPT | Performed by: SURGERY

## 2020-05-27 RX ORDER — CEFAZOLIN SODIUM 2 G/50ML
2000 SOLUTION INTRAVENOUS ONCE
Status: CANCELLED | OUTPATIENT
Start: 2020-06-05 | End: 2020-05-27

## 2020-05-29 ENCOUNTER — LAB (OUTPATIENT)
Dept: LAB | Facility: HOSPITAL | Age: 50
End: 2020-05-29
Attending: SURGERY
Payer: COMMERCIAL

## 2020-05-29 ENCOUNTER — HOSPITAL ENCOUNTER (OUTPATIENT)
Dept: NON INVASIVE DIAGNOSTICS | Facility: HOSPITAL | Age: 50
Discharge: HOME | End: 2020-05-29
Attending: SURGERY
Payer: COMMERCIAL

## 2020-05-29 DIAGNOSIS — C50.111 MALIGNANT NEOPLASM OF CENTRAL PORTION OF RIGHT BREAST IN FEMALE, ESTROGEN RECEPTOR POSITIVE (HCC): ICD-10-CM

## 2020-05-29 DIAGNOSIS — Z17.0 MALIGNANT NEOPLASM OF CENTRAL PORTION OF RIGHT BREAST IN FEMALE, ESTROGEN RECEPTOR POSITIVE (HCC): ICD-10-CM

## 2020-05-29 LAB
ALBUMIN SERPL BCP-MCNC: 3.5 G/DL (ref 3.5–5)
ALP SERPL-CCNC: 69 U/L (ref 46–116)
ALT SERPL W P-5'-P-CCNC: 58 U/L (ref 12–78)
ANION GAP SERPL CALCULATED.3IONS-SCNC: 7 MMOL/L (ref 4–13)
APTT PPP: 27 SECONDS (ref 23–37)
AST SERPL W P-5'-P-CCNC: 37 U/L (ref 5–45)
ATRIAL RATE: 82 BPM
BACTERIA UR QL AUTO: ABNORMAL /HPF
BASOPHILS # BLD AUTO: 0.01 THOUSANDS/ΜL (ref 0–0.1)
BASOPHILS NFR BLD AUTO: 0 % (ref 0–1)
BILIRUB SERPL-MCNC: 0.15 MG/DL (ref 0.2–1)
BILIRUB UR QL STRIP: NEGATIVE
BUN SERPL-MCNC: 14 MG/DL (ref 5–25)
CALCIUM SERPL-MCNC: 9.1 MG/DL (ref 8.3–10.1)
CAOX CRY URNS QL MICRO: ABNORMAL /HPF
CHLORIDE SERPL-SCNC: 105 MMOL/L (ref 100–108)
CLARITY UR: CLEAR
CO2 SERPL-SCNC: 28 MMOL/L (ref 21–32)
COLOR UR: YELLOW
CREAT SERPL-MCNC: 0.81 MG/DL (ref 0.6–1.3)
EOSINOPHIL # BLD AUTO: 0.01 THOUSAND/ΜL (ref 0–0.61)
EOSINOPHIL NFR BLD AUTO: 0 % (ref 0–6)
ERYTHROCYTE [DISTWIDTH] IN BLOOD BY AUTOMATED COUNT: 15.2 % (ref 11.6–15.1)
GFR SERPL CREATININE-BSD FRML MDRD: 85 ML/MIN/1.73SQ M
GLUCOSE SERPL-MCNC: 83 MG/DL (ref 65–140)
GLUCOSE UR STRIP-MCNC: NEGATIVE MG/DL
HCT VFR BLD AUTO: 38.4 % (ref 34.8–46.1)
HGB BLD-MCNC: 12.1 G/DL (ref 11.5–15.4)
HGB UR QL STRIP.AUTO: NEGATIVE
IMM GRANULOCYTES # BLD AUTO: 0.05 THOUSAND/UL (ref 0–0.2)
IMM GRANULOCYTES NFR BLD AUTO: 1 % (ref 0–2)
INR PPP: 0.96 (ref 0.84–1.19)
KETONES UR STRIP-MCNC: NEGATIVE MG/DL
LEUKOCYTE ESTERASE UR QL STRIP: ABNORMAL
LYMPHOCYTES # BLD AUTO: 2.1 THOUSANDS/ΜL (ref 0.6–4.47)
LYMPHOCYTES NFR BLD AUTO: 41 % (ref 14–44)
MCH RBC QN AUTO: 29 PG (ref 26.8–34.3)
MCHC RBC AUTO-ENTMCNC: 31.5 G/DL (ref 31.4–37.4)
MCV RBC AUTO: 92 FL (ref 82–98)
MONOCYTES # BLD AUTO: 1.18 THOUSAND/ΜL (ref 0.17–1.22)
MONOCYTES NFR BLD AUTO: 23 % (ref 4–12)
MUCOUS THREADS UR QL AUTO: ABNORMAL
NEUTROPHILS # BLD AUTO: 1.81 THOUSANDS/ΜL (ref 1.85–7.62)
NEUTS SEG NFR BLD AUTO: 35 % (ref 43–75)
NITRITE UR QL STRIP: NEGATIVE
NON-SQ EPI CELLS URNS QL MICRO: ABNORMAL /HPF
NRBC BLD AUTO-RTO: 0 /100 WBCS
P AXIS: 50 DEGREES
PH UR STRIP.AUTO: 5.5 [PH]
PLATELET # BLD AUTO: 225 THOUSANDS/UL (ref 149–390)
PMV BLD AUTO: 11.1 FL (ref 8.9–12.7)
POTASSIUM SERPL-SCNC: 3.8 MMOL/L (ref 3.5–5.3)
PR INTERVAL: 136 MS
PROT SERPL-MCNC: 7.5 G/DL (ref 6.4–8.2)
PROT UR STRIP-MCNC: NEGATIVE MG/DL
PROTHROMBIN TIME: 12.9 SECONDS (ref 11.6–14.5)
QRS AXIS: 67 DEGREES
QRSD INTERVAL: 80 MS
QT INTERVAL: 384 MS
QTC INTERVAL: 448 MS
RBC # BLD AUTO: 4.17 MILLION/UL (ref 3.81–5.12)
RBC #/AREA URNS AUTO: ABNORMAL /HPF
SODIUM SERPL-SCNC: 140 MMOL/L (ref 136–145)
SP GR UR STRIP.AUTO: 1.02 (ref 1–1.03)
T WAVE AXIS: 39 DEGREES
UROBILINOGEN UR QL STRIP.AUTO: 0.2 E.U./DL
VENTRICULAR RATE: 82 BPM
WBC # BLD AUTO: 5.16 THOUSAND/UL (ref 4.31–10.16)
WBC #/AREA URNS AUTO: ABNORMAL /HPF

## 2020-05-29 PROCEDURE — 81001 URINALYSIS AUTO W/SCOPE: CPT | Performed by: SURGERY

## 2020-05-29 PROCEDURE — U0003 INFECTIOUS AGENT DETECTION BY NUCLEIC ACID (DNA OR RNA); SEVERE ACUTE RESPIRATORY SYNDROME CORONAVIRUS 2 (SARS-COV-2) (CORONAVIRUS DISEASE [COVID-19]), AMPLIFIED PROBE TECHNIQUE, MAKING USE OF HIGH THROUGHPUT TECHNOLOGIES AS DESCRIBED BY CMS-2020-01-R: HCPCS

## 2020-05-29 PROCEDURE — 85610 PROTHROMBIN TIME: CPT

## 2020-05-29 PROCEDURE — 85025 COMPLETE CBC W/AUTO DIFF WBC: CPT

## 2020-05-29 PROCEDURE — 85730 THROMBOPLASTIN TIME PARTIAL: CPT

## 2020-05-29 PROCEDURE — 93010 ELECTROCARDIOGRAM REPORT: CPT | Performed by: INTERNAL MEDICINE

## 2020-05-29 PROCEDURE — 80053 COMPREHEN METABOLIC PANEL: CPT

## 2020-05-29 PROCEDURE — 93005 ELECTROCARDIOGRAM TRACING: CPT

## 2020-05-29 PROCEDURE — 36415 COLL VENOUS BLD VENIPUNCTURE: CPT

## 2020-06-01 LAB — SARS-COV-2 RNA SPEC QL NAA+PROBE: NOT DETECTED

## 2020-06-02 ENCOUNTER — ANESTHESIA EVENT (OUTPATIENT)
Dept: PERIOP | Facility: HOSPITAL | Age: 50
DRG: 580 | End: 2020-06-02
Payer: COMMERCIAL

## 2020-06-05 ENCOUNTER — HOSPITAL ENCOUNTER (INPATIENT)
Facility: HOSPITAL | Age: 50
LOS: 1 days | Discharge: HOME HEALTH | DRG: 580 | End: 2020-06-06
Attending: SURGERY | Admitting: SURGERY
Payer: COMMERCIAL

## 2020-06-05 ENCOUNTER — ANESTHESIA (OUTPATIENT)
Dept: PERIOP | Facility: HOSPITAL | Age: 50
DRG: 580 | End: 2020-06-05
Payer: COMMERCIAL

## 2020-06-05 DIAGNOSIS — Z17.0 MALIGNANT NEOPLASM OF CENTRAL PORTION OF RIGHT BREAST IN FEMALE, ESTROGEN RECEPTOR POSITIVE (HCC): Primary | ICD-10-CM

## 2020-06-05 DIAGNOSIS — C50.111 MALIGNANT NEOPLASM OF CENTRAL PORTION OF RIGHT BREAST IN FEMALE, ESTROGEN RECEPTOR POSITIVE (HCC): Primary | ICD-10-CM

## 2020-06-05 PROCEDURE — 88342 IMHCHEM/IMCYTCHM 1ST ANTB: CPT | Performed by: PATHOLOGY

## 2020-06-05 PROCEDURE — 07B50ZX EXCISION OF RIGHT AXILLARY LYMPHATIC, OPEN APPROACH, DIAGNOSTIC: ICD-10-PCS | Performed by: SURGERY

## 2020-06-05 PROCEDURE — 88341 IMHCHEM/IMCYTCHM EA ADD ANTB: CPT | Performed by: PATHOLOGY

## 2020-06-05 PROCEDURE — 19307 MAST MOD RAD: CPT | Performed by: SURGERY

## 2020-06-05 PROCEDURE — 0HTT0ZZ RESECTION OF RIGHT BREAST, OPEN APPROACH: ICD-10-PCS | Performed by: SURGERY

## 2020-06-05 PROCEDURE — 19307 MAST MOD RAD: CPT | Performed by: PHYSICIAN ASSISTANT

## 2020-06-05 PROCEDURE — 88307 TISSUE EXAM BY PATHOLOGIST: CPT | Performed by: PATHOLOGY

## 2020-06-05 PROCEDURE — NC001 PR NO CHARGE: Performed by: PHYSICIAN ASSISTANT

## 2020-06-05 PROCEDURE — 88309 TISSUE EXAM BY PATHOLOGIST: CPT | Performed by: PATHOLOGY

## 2020-06-05 RX ORDER — FENTANYL CITRATE/PF 50 MCG/ML
25 SYRINGE (ML) INJECTION
Status: DISCONTINUED | OUTPATIENT
Start: 2020-06-05 | End: 2020-06-05 | Stop reason: HOSPADM

## 2020-06-05 RX ORDER — HYDROMORPHONE HCL/PF 1 MG/ML
0.5 SYRINGE (ML) INJECTION
Status: DISCONTINUED | OUTPATIENT
Start: 2020-06-05 | End: 2020-06-05 | Stop reason: HOSPADM

## 2020-06-05 RX ORDER — ONDANSETRON 2 MG/ML
4 INJECTION INTRAMUSCULAR; INTRAVENOUS ONCE AS NEEDED
Status: DISCONTINUED | OUTPATIENT
Start: 2020-06-05 | End: 2020-06-05 | Stop reason: HOSPADM

## 2020-06-05 RX ORDER — MIDAZOLAM HYDROCHLORIDE 2 MG/2ML
INJECTION, SOLUTION INTRAMUSCULAR; INTRAVENOUS AS NEEDED
Status: DISCONTINUED | OUTPATIENT
Start: 2020-06-05 | End: 2020-06-05 | Stop reason: SURG

## 2020-06-05 RX ORDER — IBUPROFEN 600 MG/1
600 TABLET ORAL EVERY 8 HOURS PRN
Status: DISCONTINUED | OUTPATIENT
Start: 2020-06-05 | End: 2020-06-06 | Stop reason: HOSPADM

## 2020-06-05 RX ORDER — HYDROMORPHONE HCL/PF 1 MG/ML
SYRINGE (ML) INJECTION AS NEEDED
Status: DISCONTINUED | OUTPATIENT
Start: 2020-06-05 | End: 2020-06-05 | Stop reason: SURG

## 2020-06-05 RX ORDER — LORATADINE 10 MG/1
10 TABLET ORAL DAILY
Status: DISCONTINUED | OUTPATIENT
Start: 2020-06-05 | End: 2020-06-06 | Stop reason: HOSPADM

## 2020-06-05 RX ORDER — DEXAMETHASONE SODIUM PHOSPHATE 10 MG/ML
INJECTION, SOLUTION INTRAMUSCULAR; INTRAVENOUS AS NEEDED
Status: DISCONTINUED | OUTPATIENT
Start: 2020-06-05 | End: 2020-06-05 | Stop reason: SURG

## 2020-06-05 RX ORDER — DEXMEDETOMIDINE HYDROCHLORIDE 100 UG/ML
INJECTION, SOLUTION INTRAVENOUS AS NEEDED
Status: DISCONTINUED | OUTPATIENT
Start: 2020-06-05 | End: 2020-06-05 | Stop reason: SURG

## 2020-06-05 RX ORDER — GLYCOPYRROLATE 0.2 MG/ML
INJECTION INTRAMUSCULAR; INTRAVENOUS AS NEEDED
Status: DISCONTINUED | OUTPATIENT
Start: 2020-06-05 | End: 2020-06-05 | Stop reason: SURG

## 2020-06-05 RX ORDER — SODIUM CHLORIDE 9 MG/ML
125 INJECTION, SOLUTION INTRAVENOUS CONTINUOUS
Status: DISCONTINUED | OUTPATIENT
Start: 2020-06-05 | End: 2020-06-05

## 2020-06-05 RX ORDER — ONDANSETRON 2 MG/ML
INJECTION INTRAMUSCULAR; INTRAVENOUS AS NEEDED
Status: DISCONTINUED | OUTPATIENT
Start: 2020-06-05 | End: 2020-06-05 | Stop reason: SURG

## 2020-06-05 RX ORDER — SODIUM CHLORIDE, SODIUM LACTATE, POTASSIUM CHLORIDE, CALCIUM CHLORIDE 600; 310; 30; 20 MG/100ML; MG/100ML; MG/100ML; MG/100ML
100 INJECTION, SOLUTION INTRAVENOUS CONTINUOUS
Status: DISCONTINUED | OUTPATIENT
Start: 2020-06-05 | End: 2020-06-06

## 2020-06-05 RX ORDER — SODIUM CHLORIDE 9 MG/ML
INJECTION, SOLUTION INTRAVENOUS CONTINUOUS PRN
Status: DISCONTINUED | OUTPATIENT
Start: 2020-06-05 | End: 2020-06-05 | Stop reason: SURG

## 2020-06-05 RX ORDER — PROPOFOL 10 MG/ML
INJECTION, EMULSION INTRAVENOUS AS NEEDED
Status: DISCONTINUED | OUTPATIENT
Start: 2020-06-05 | End: 2020-06-05 | Stop reason: SURG

## 2020-06-05 RX ORDER — ONDANSETRON 8 MG/1
8 TABLET, ORALLY DISINTEGRATING ORAL EVERY 8 HOURS PRN
Status: DISCONTINUED | OUTPATIENT
Start: 2020-06-05 | End: 2020-06-06 | Stop reason: HOSPADM

## 2020-06-05 RX ORDER — FENTANYL CITRATE 50 UG/ML
INJECTION, SOLUTION INTRAMUSCULAR; INTRAVENOUS AS NEEDED
Status: DISCONTINUED | OUTPATIENT
Start: 2020-06-05 | End: 2020-06-05 | Stop reason: SURG

## 2020-06-05 RX ORDER — CEFAZOLIN SODIUM 2 G/50ML
2000 SOLUTION INTRAVENOUS ONCE
Status: DISCONTINUED | OUTPATIENT
Start: 2020-06-05 | End: 2020-06-05

## 2020-06-05 RX ORDER — MAGNESIUM HYDROXIDE 1200 MG/15ML
LIQUID ORAL AS NEEDED
Status: DISCONTINUED | OUTPATIENT
Start: 2020-06-05 | End: 2020-06-05 | Stop reason: HOSPADM

## 2020-06-05 RX ORDER — OXYCODONE HYDROCHLORIDE 10 MG/1
10 TABLET ORAL EVERY 6 HOURS PRN
Status: DISCONTINUED | OUTPATIENT
Start: 2020-06-05 | End: 2020-06-06 | Stop reason: HOSPADM

## 2020-06-05 RX ORDER — LIDOCAINE HYDROCHLORIDE 10 MG/ML
INJECTION, SOLUTION EPIDURAL; INFILTRATION; INTRACAUDAL; PERINEURAL AS NEEDED
Status: DISCONTINUED | OUTPATIENT
Start: 2020-06-05 | End: 2020-06-05 | Stop reason: SURG

## 2020-06-05 RX ORDER — CEFAZOLIN SODIUM 2 G/50ML
SOLUTION INTRAVENOUS AS NEEDED
Status: DISCONTINUED | OUTPATIENT
Start: 2020-06-05 | End: 2020-06-05 | Stop reason: SURG

## 2020-06-05 RX ORDER — KETAMINE HYDROCHLORIDE 50 MG/ML
INJECTION, SOLUTION, CONCENTRATE INTRAMUSCULAR; INTRAVENOUS AS NEEDED
Status: DISCONTINUED | OUTPATIENT
Start: 2020-06-05 | End: 2020-06-05 | Stop reason: SURG

## 2020-06-05 RX ADMIN — HYDROMORPHONE HYDROCHLORIDE 0.5 MG: 1 INJECTION, SOLUTION INTRAMUSCULAR; INTRAVENOUS; SUBCUTANEOUS at 15:44

## 2020-06-05 RX ADMIN — DEXMEDETOMIDINE HYDROCHLORIDE 8 MCG: 100 INJECTION, SOLUTION INTRAVENOUS at 15:56

## 2020-06-05 RX ADMIN — MIDAZOLAM 2 MG: 1 INJECTION INTRAMUSCULAR; INTRAVENOUS at 14:41

## 2020-06-05 RX ADMIN — FENTANYL CITRATE 25 MCG: 50 INJECTION, SOLUTION INTRAMUSCULAR; INTRAVENOUS at 17:35

## 2020-06-05 RX ADMIN — FENTANYL CITRATE 50 MCG: 50 INJECTION, SOLUTION INTRAMUSCULAR; INTRAVENOUS at 15:06

## 2020-06-05 RX ADMIN — CEFAZOLIN SODIUM 2000 MG: 2 SOLUTION INTRAVENOUS at 14:40

## 2020-06-05 RX ADMIN — DEXMEDETOMIDINE HYDROCHLORIDE 8 MCG: 100 INJECTION, SOLUTION INTRAVENOUS at 15:03

## 2020-06-05 RX ADMIN — SODIUM CHLORIDE: 0.9 INJECTION, SOLUTION INTRAVENOUS at 15:28

## 2020-06-05 RX ADMIN — DEXMEDETOMIDINE HYDROCHLORIDE 8 MCG: 100 INJECTION, SOLUTION INTRAVENOUS at 15:42

## 2020-06-05 RX ADMIN — FENTANYL CITRATE 25 MCG: 50 INJECTION, SOLUTION INTRAMUSCULAR; INTRAVENOUS at 17:30

## 2020-06-05 RX ADMIN — OXYCODONE HYDROCHLORIDE 10 MG: 10 TABLET ORAL at 21:45

## 2020-06-05 RX ADMIN — ONDANSETRON 4 MG: 2 INJECTION INTRAMUSCULAR; INTRAVENOUS at 16:35

## 2020-06-05 RX ADMIN — FENTANYL CITRATE 50 MCG: 50 INJECTION, SOLUTION INTRAMUSCULAR; INTRAVENOUS at 14:49

## 2020-06-05 RX ADMIN — HYDROMORPHONE HYDROCHLORIDE 0.5 MG: 1 INJECTION, SOLUTION INTRAMUSCULAR; INTRAVENOUS; SUBCUTANEOUS at 17:42

## 2020-06-05 RX ADMIN — ONDANSETRON 4 MG: 2 INJECTION INTRAMUSCULAR; INTRAVENOUS at 14:53

## 2020-06-05 RX ADMIN — FENTANYL CITRATE 50 MCG: 50 INJECTION, SOLUTION INTRAMUSCULAR; INTRAVENOUS at 14:58

## 2020-06-05 RX ADMIN — DEXAMETHASONE SODIUM PHOSPHATE 4 MG: 10 INJECTION, SOLUTION INTRAMUSCULAR; INTRAVENOUS at 14:49

## 2020-06-05 RX ADMIN — DEXMEDETOMIDINE HYDROCHLORIDE 8 MCG: 100 INJECTION, SOLUTION INTRAVENOUS at 15:30

## 2020-06-05 RX ADMIN — SODIUM CHLORIDE: 0.9 INJECTION, SOLUTION INTRAVENOUS at 14:30

## 2020-06-05 RX ADMIN — FENTANYL CITRATE 50 MCG: 50 INJECTION, SOLUTION INTRAMUSCULAR; INTRAVENOUS at 15:15

## 2020-06-05 RX ADMIN — ENOXAPARIN SODIUM 40 MG: 40 INJECTION SUBCUTANEOUS at 13:58

## 2020-06-05 RX ADMIN — PROPOFOL 200 MG: 10 INJECTION, EMULSION INTRAVENOUS at 14:47

## 2020-06-05 RX ADMIN — SODIUM CHLORIDE 125 ML/HR: 0.9 INJECTION, SOLUTION INTRAVENOUS at 12:50

## 2020-06-05 RX ADMIN — SODIUM CHLORIDE, SODIUM LACTATE, POTASSIUM CHLORIDE, AND CALCIUM CHLORIDE 100 ML/HR: .6; .31; .03; .02 INJECTION, SOLUTION INTRAVENOUS at 18:14

## 2020-06-05 RX ADMIN — KETAMINE HYDROCHLORIDE 20 MG: 50 INJECTION INTRAMUSCULAR; INTRAVENOUS at 15:49

## 2020-06-05 RX ADMIN — LIDOCAINE HYDROCHLORIDE 80 MG: 10 INJECTION, SOLUTION EPIDURAL; INFILTRATION; INTRACAUDAL; PERINEURAL at 14:47

## 2020-06-05 RX ADMIN — HYDROMORPHONE HYDROCHLORIDE 0.5 MG: 1 INJECTION, SOLUTION INTRAMUSCULAR; INTRAVENOUS; SUBCUTANEOUS at 16:14

## 2020-06-05 RX ADMIN — GLYCOPYRROLATE 0.2 MG: 0.2 INJECTION, SOLUTION INTRAMUSCULAR; INTRAVENOUS at 15:49

## 2020-06-05 RX ADMIN — DEXMEDETOMIDINE HYDROCHLORIDE 8 MCG: 100 INJECTION, SOLUTION INTRAVENOUS at 15:05

## 2020-06-05 RX ADMIN — SODIUM CHLORIDE, SODIUM LACTATE, POTASSIUM CHLORIDE, AND CALCIUM CHLORIDE 100 ML/HR: .6; .31; .03; .02 INJECTION, SOLUTION INTRAVENOUS at 19:47

## 2020-06-06 VITALS
BODY MASS INDEX: 35.26 KG/M2 | HEIGHT: 66 IN | TEMPERATURE: 96.7 F | HEART RATE: 64 BPM | OXYGEN SATURATION: 94 % | SYSTOLIC BLOOD PRESSURE: 130 MMHG | WEIGHT: 219.4 LBS | DIASTOLIC BLOOD PRESSURE: 58 MMHG | RESPIRATION RATE: 18 BRPM

## 2020-06-06 PROCEDURE — 99024 POSTOP FOLLOW-UP VISIT: CPT | Performed by: SURGERY

## 2020-06-09 DIAGNOSIS — C77.3 CARCINOMA OF RIGHT BREAST METASTATIC TO AXILLARY LYMPH NODE (HCC): Primary | ICD-10-CM

## 2020-06-09 DIAGNOSIS — C78.7 LIVER METASTASES (HCC): ICD-10-CM

## 2020-06-09 DIAGNOSIS — C50.911 CARCINOMA OF RIGHT BREAST METASTATIC TO AXILLARY LYMPH NODE (HCC): Primary | ICD-10-CM

## 2020-06-11 ENCOUNTER — TELEPHONE (OUTPATIENT)
Dept: SURGICAL ONCOLOGY | Facility: CLINIC | Age: 50
End: 2020-06-11

## 2020-06-20 ENCOUNTER — TELEPHONE (OUTPATIENT)
Dept: OTHER | Facility: OTHER | Age: 50
End: 2020-06-20

## 2020-06-23 ENCOUNTER — TELEPHONE (OUTPATIENT)
Dept: SURGICAL ONCOLOGY | Facility: CLINIC | Age: 50
End: 2020-06-23

## 2020-06-24 ENCOUNTER — OFFICE VISIT (OUTPATIENT)
Dept: SURGICAL ONCOLOGY | Facility: CLINIC | Age: 50
End: 2020-06-24

## 2020-06-24 ENCOUNTER — PATIENT OUTREACH (OUTPATIENT)
Dept: SURGICAL ONCOLOGY | Facility: CLINIC | Age: 50
End: 2020-06-24

## 2020-06-24 VITALS
BODY MASS INDEX: 35.52 KG/M2 | SYSTOLIC BLOOD PRESSURE: 112 MMHG | DIASTOLIC BLOOD PRESSURE: 70 MMHG | TEMPERATURE: 98.9 F | HEIGHT: 66 IN | WEIGHT: 221 LBS | HEART RATE: 85 BPM | RESPIRATION RATE: 18 BRPM

## 2020-06-24 DIAGNOSIS — C50.111 MALIGNANT NEOPLASM OF CENTRAL PORTION OF RIGHT BREAST IN FEMALE, ESTROGEN RECEPTOR POSITIVE (HCC): Primary | ICD-10-CM

## 2020-06-24 DIAGNOSIS — Z17.0 MALIGNANT NEOPLASM OF CENTRAL PORTION OF RIGHT BREAST IN FEMALE, ESTROGEN RECEPTOR POSITIVE (HCC): Primary | ICD-10-CM

## 2020-06-24 PROCEDURE — 99024 POSTOP FOLLOW-UP VISIT: CPT | Performed by: SURGERY

## 2020-06-24 RX ORDER — CEPHALEXIN 500 MG/1
CAPSULE ORAL
COMMUNITY
Start: 2020-06-20 | End: 2020-08-15 | Stop reason: HOSPADM

## 2020-06-24 RX ORDER — IBUPROFEN 200 MG
TABLET ORAL EVERY 6 HOURS PRN
COMMUNITY
End: 2020-07-31

## 2020-06-29 ENCOUNTER — OFFICE VISIT (OUTPATIENT)
Dept: SURGICAL ONCOLOGY | Facility: CLINIC | Age: 50
End: 2020-06-29

## 2020-06-29 VITALS
SYSTOLIC BLOOD PRESSURE: 130 MMHG | WEIGHT: 218.4 LBS | TEMPERATURE: 97.5 F | DIASTOLIC BLOOD PRESSURE: 80 MMHG | HEART RATE: 100 BPM | HEIGHT: 66 IN | RESPIRATION RATE: 16 BRPM | BODY MASS INDEX: 35.1 KG/M2

## 2020-06-29 DIAGNOSIS — N64.89 SEROMA OF BREAST: Primary | ICD-10-CM

## 2020-06-29 PROCEDURE — 99024 POSTOP FOLLOW-UP VISIT: CPT | Performed by: SURGERY

## 2020-07-01 ENCOUNTER — TELEPHONE (OUTPATIENT)
Dept: SURGICAL ONCOLOGY | Facility: CLINIC | Age: 50
End: 2020-07-01

## 2020-07-01 ENCOUNTER — EVALUATION (OUTPATIENT)
Dept: PHYSICAL THERAPY | Facility: REHABILITATION | Age: 50
End: 2020-07-01
Payer: COMMERCIAL

## 2020-07-01 DIAGNOSIS — Z17.0 MALIGNANT NEOPLASM OF CENTRAL PORTION OF RIGHT BREAST IN FEMALE, ESTROGEN RECEPTOR POSITIVE (HCC): ICD-10-CM

## 2020-07-01 DIAGNOSIS — C50.111 MALIGNANT NEOPLASM OF CENTRAL PORTION OF RIGHT BREAST IN FEMALE, ESTROGEN RECEPTOR POSITIVE (HCC): ICD-10-CM

## 2020-07-01 DIAGNOSIS — I89.0 LYMPH EDEMA: Primary | ICD-10-CM

## 2020-07-01 PROCEDURE — 97140 MANUAL THERAPY 1/> REGIONS: CPT | Performed by: PHYSICAL THERAPIST

## 2020-07-01 PROCEDURE — 97161 PT EVAL LOW COMPLEX 20 MIN: CPT | Performed by: PHYSICAL THERAPIST

## 2020-07-01 NOTE — TELEPHONE ENCOUNTER
Patient confirmed appointment and location for tomorrow at 3:45pm at [facility]  Called patient and she does not have any symptoms of fever, cough, or shortness of breath, chills, repeated shaking with chills, muscle pain,headache, sore throat, or new loss of taste/smell  Patient has not been tested for Covid-19  Patient has not been in contact with someone that has been confirmed having Covid-19  Patient was reminded to wear required  face mask when entering the facility  Patient was informed only 1 visitor allowed at time of appt who will also be asked the same screening questions and to wear a mask without a vent or filter

## 2020-07-01 NOTE — PROGRESS NOTES
PT Evaluation     Today's date: 2020  Patient name: Damaris Yañez  : 1970  MRN: [id number]  Referring provider: Lady Gabby MD  Dx:   Encounter Diagnosis     ICD-10-CM    1  Lymph edema I89 0        Start Time: 0155          Assessment  Assessment details: Patient has no edema in her RUE but is pocketing in her axilla and has decreased ROM and strength and would benefit from scar massage, manual drainage, stretching and strengthening  Impairments: abnormal or restricted ROM and impaired physical strength  Other impairment: chest edema    Goals  STG decrease pain 0-1/10  Increase ROM 20-40 degree  Decrease chest edema  LTG I ADL's   Full ROM and strength    Plan  Plan details: Continue manual drainage, stretching and strengthening as tolerated  Patient would benefit from: skilled physical therapy  Planned therapy interventions: manual therapy, joint mobilization and therapeutic exercise  Frequency: 2x week  Duration in weeks: 4  Treatment plan discussed with: patient        Subjective Evaluation    History of Present Illness  Date of surgery: 2020  Mechanism of injury: S/p mastectomy  on the right with drains removed last week and has residual swelling in the right axilla   20 lymphnodes removed  Pain  Current pain rating: 3  At best pain ratin  At worst pain ratin  Location: axilla right   Quality: tight and needle-like  Relieving factors: ice  Aggravating factors: lifting    Patient Goals  Patient goals for therapy: decreased edema, decreased pain and increased motion          Objective     Active Range of Motion     Right Shoulder   Flexion: 145 degrees   Abduction: 95 degrees     Passive Range of Motion     Right Shoulder   Flexion: 149 degrees   Abduction: 141 degrees   External rotation 90°: WFL  Internal rotation 90°: WFL    Swelling   Left shoulder swelling details: Axilla 36 5 cm  12 cm above 37 cm  8 cm above 36 cm  4 cm above 32 5 cm  Olecranon 27 5 cm  16 cm above 27 5 cm  12 cm above 26 cm  8 cm above 22 5 cm  4 cm above 18 5 cm  Ulnar styloid 17 cm   Peguero crease 19 2 cm  mcp 19 cm    Right shoulder swelling details: Axilla 37 1 cm  12 cm above 37 cm  8 cm above 35 cm  4 cm above 31 5 cm  Olecranon 28 cm  16 cm above 28 cm  12 cm above 26 1 cm  8 cm above 22 5 cm  4 cm above 18 5 cm  Ulnar styloid 17 1 cm  Pegeuro crease 21 6 cm  mcp 19 7 cm             Precautions: cancer right breast s/p mastectomy, COPD      Manuals 7/1            Mobilizations distraction/ inferior glide Grade II x 10 ea            Self massage instruction 5 min                                      Neuro Re-Ed                                                                                                        Ther Ex             Wand flexion supine X 20            Standing wand abduction X 20                                                                                          Ther Activity                                       Gait Training                                       Modalities

## 2020-07-02 ENCOUNTER — OFFICE VISIT (OUTPATIENT)
Dept: SURGICAL ONCOLOGY | Facility: CLINIC | Age: 50
End: 2020-07-02

## 2020-07-02 VITALS
SYSTOLIC BLOOD PRESSURE: 120 MMHG | HEIGHT: 66 IN | TEMPERATURE: 98.6 F | DIASTOLIC BLOOD PRESSURE: 70 MMHG | HEART RATE: 86 BPM | BODY MASS INDEX: 35.2 KG/M2 | WEIGHT: 219 LBS | RESPIRATION RATE: 16 BRPM

## 2020-07-02 DIAGNOSIS — N64.89 SEROMA OF BREAST: Primary | ICD-10-CM

## 2020-07-02 PROCEDURE — 99024 POSTOP FOLLOW-UP VISIT: CPT | Performed by: SURGERY

## 2020-07-02 NOTE — PROGRESS NOTES
47 y o  female is here today s/p rule palliative mastectomy  She reports recurrent seroma  Physical Exam   Constitutional: She appears well-developed and well-nourished  Pulmonary/Chest: Right breast exhibits skin change (Well-healing mastectomy scar with seroma slightly more medial compared to last exam)  There is breast swelling ( as noted)  Aspirated 140 cc of clear serous fluid       Neurological: She is alert  Psychiatric: She has a normal mood and affect  Diagnoses and all orders for this visit:    Seroma of breast        Assessment/Plan:  71-year-old female status post right mastectomy  She has a recurrent seroma which was aspirated today in the office  I will see her again as scheduled or sooner should the need arise

## 2020-07-06 ENCOUNTER — OFFICE VISIT (OUTPATIENT)
Dept: PHYSICAL THERAPY | Facility: REHABILITATION | Age: 50
End: 2020-07-06
Payer: COMMERCIAL

## 2020-07-06 DIAGNOSIS — I89.0 LYMPH EDEMA: Primary | ICD-10-CM

## 2020-07-06 PROCEDURE — 97112 NEUROMUSCULAR REEDUCATION: CPT | Performed by: PHYSICAL THERAPIST

## 2020-07-06 PROCEDURE — 97140 MANUAL THERAPY 1/> REGIONS: CPT | Performed by: PHYSICAL THERAPIST

## 2020-07-06 NOTE — PROGRESS NOTES
Daily Note     Today's date: 2020  Patient name: Earl Lockett  : 1970  MRN: [id number]  Referring provider: Gia Watkins MD  Dx:   Encounter Diagnosis     ICD-10-CM    1  Lymph edema I89 0                   Subjective: It's still tight and I'm feeling better but still have pocketing under my arm      Objective: See treatment diary below    Swelling   Left shoulder swelling details: Axilla 36 5 cm  12 cm above 37 cm  8 cm above 36 cm  4 cm above 32 5 cm  Olecranon 27 5 cm  16 cm above 27 5 cm  12 cm above 26 cm  8 cm above 22 5 cm  4 cm above 18 5 cm  Ulnar styloid 17 cm   Peguero crease 19 2 cm  mcp 19 cm     Right shoulder swelling details: Axilla 37 1 cm  12 cm above 37 cm  8 cm above 35 cm  4 cm above 31 5 cm  Olecranon 28 cm  16 cm above 28 cm  12 cm above 26 1 cm  8 cm above 22 5 cm  4 cm above 18 5 cm  Ulnar styloid 17 1 cm  Peguero crease 21 6 cm  mcp 19 7 cm  Assessment: Tolerated treatment well  Patient would benefit from continued PT      Plan: Continue per plan of care        Precautions: cancer right breast s/p mastectomy, COPD      Manuals  7           Mobilizations distraction/ inferior glide Grade II x 10 ea Grade II x 10 ea           Self massage instruction 5 min manual drainage 12 min           PROM right shoulder 10 reps flex/abd 15 flex/abd                        Neuro Re-Ed                                                                                                        Ther Ex             Wand flexion supine X 20 1# x 20           Standing wand abduction X 20 Active x 20           Door way stretch  x3 with 15 sec hold                                                                            Ther Activity                                       Gait Training                                       Modalities

## 2020-07-08 ENCOUNTER — OFFICE VISIT (OUTPATIENT)
Dept: PHYSICAL THERAPY | Facility: REHABILITATION | Age: 50
End: 2020-07-08
Payer: COMMERCIAL

## 2020-07-08 DIAGNOSIS — I89.0 LYMPH EDEMA: Primary | ICD-10-CM

## 2020-07-08 PROCEDURE — 97140 MANUAL THERAPY 1/> REGIONS: CPT | Performed by: PHYSICAL THERAPIST

## 2020-07-08 PROCEDURE — 97110 THERAPEUTIC EXERCISES: CPT | Performed by: PHYSICAL THERAPIST

## 2020-07-08 NOTE — PROGRESS NOTES
Daily Note     Today's date: 2020  Patient name: Héctor Romeo  : 1970  MRN: [id number]  Referring provider: Latisha Ivy MD  Dx:   Encounter Diagnosis     ICD-10-CM    1  Lymph edema I89 0        Start Time: 1332          Subjective: It's still tight and I'm feeling better but still have pocketing under my arm      Objective: See treatment diary below    Swelling   Left shoulder swelling details: Axilla 36 5 cm  12 cm above 37 cm  8 cm above 36 cm  4 cm above 32 5 cm  Olecranon 27 5 cm  16 cm above 27 5 cm  12 cm above 26 cm  8 cm above 22 5 cm  4 cm above 18 5 cm  Ulnar styloid 17 cm   Peguero crease 19 2 cm  mcp 19 cm     Right shoulder swelling details: Axilla 38 cm  12 cm above 38 5 cm  8 cm above 36 2 cm  4 cm above 31 5 cm  Olecranon 28 2 cm  16 cm above 29 cm  12 cm above 26 2 cm  8 cm above 22 cm  4 cm above 18 5 cm  Ulnar styloid 16 8 cm  Peguero crease 20 5 cm  mcp 19 5 cm  Assessment: Tolerated treatment well  Patient would benefit from continued PT  Slight increases right upper arm      Plan: Continue per plan of care        Precautions: cancer right breast s/p mastectomy, COPD      Manuals           Mobilizations distraction/ inferior glide Grade II x 10 ea Grade II x 10 ea grade II 10 ea          Self massage instruction 5 min manual drainage 12 min manual drainage          PROM right shoulder 10 reps flex/abd 15 flex/abd 15 flex/ab          Axilla MFR   2 min          Neuro Re-Ed                                                                                                        Ther Ex             Wand flexion supine X 20 1# x 20 2#x 20           Standing wand abduction X 20 Active x 20 1# x 20          Door way stretch  x3 with 15 sec hold x5 with 15 sec hold          pulleys   2 min          Shoulder flexion   X 20                                                 Ther Activity                                       Gait Training Modalities

## 2020-07-13 ENCOUNTER — OFFICE VISIT (OUTPATIENT)
Dept: PHYSICAL THERAPY | Facility: REHABILITATION | Age: 50
End: 2020-07-13
Payer: COMMERCIAL

## 2020-07-13 DIAGNOSIS — I89.0 LYMPH EDEMA: Primary | ICD-10-CM

## 2020-07-13 PROCEDURE — 97140 MANUAL THERAPY 1/> REGIONS: CPT | Performed by: PHYSICAL THERAPIST

## 2020-07-13 PROCEDURE — 97110 THERAPEUTIC EXERCISES: CPT | Performed by: PHYSICAL THERAPIST

## 2020-07-13 NOTE — PROGRESS NOTES
Daily Note     Today's date: 2020  Patient name: Damaris Yañez  : 1970  MRN: [id number]  Referring provider: Lady Gabby MD  Dx:   Encounter Diagnosis     ICD-10-CM    1  Lymph edema I89 0        Start Time: 1438          Subjective: It's moving better      Objective: See treatment diary below    Swelling   Left shoulder swelling details: Axilla 36 5 cm  12 cm above 37 cm  8 cm above 36 cm  4 cm above 32 5 cm  Olecranon 27 5 cm  16 cm above 27 5 cm  12 cm above 26 cm  8 cm above 22 5 cm  4 cm above 18 5 cm  Ulnar styloid 17 cm   Peguero crease 19 2 cm  mcp 19 cm     Right shoulder swelling details: Axilla 38 cm  12 cm above 38 5 cm  8 cm above 36 2 cm  4 cm above 31 5 cm  Olecranon 28 2 cm  16 cm above 29 cm  12 cm above 26 2 cm  8 cm above 22 cm  4 cm above 18 5 cm  Ulnar styloid 16 8 cm  Peguero crease 20 5 cm  mcp 19 5 cm  Assessment: Tolerated treatment well  Patient would benefit from continued PT  Slight increases right upper arm      Plan: Continue per plan of care        Precautions: cancer right breast s/p mastectomy, COPD      Manuals          Mobilizations distraction/ inferior glide Grade II x 10 ea Grade II x 10 ea grade II 10 ea grade II 10 ea         Self massage instruction 5 min manual drainage 12 min manual drainage manual drainage         PROM right shoulder 10 reps flex/abd 15 flex/abd 15 flex/ab 15 flexion         Axilla MFR   2 min 4 min         Neuro Re-Ed                                                                                                        Ther Ex             Wand flexion supine X 20 1# x 20 2#x 20  3# x 20         Standing wand abduction X 20 Active x 20 1# x 20 2# x 15         Door way stretch  x3 with 15 sec hold x5 with 15 sec hold x5 with 15 sec hold         pulleys   2 min 3 min         Shoulder flexion   X 20 1# x 20         Chest stretch    x3 with 15 sec hold                                   Ther Activity Gait Training                                       Modalities

## 2020-07-15 ENCOUNTER — OFFICE VISIT (OUTPATIENT)
Dept: PHYSICAL THERAPY | Facility: REHABILITATION | Age: 50
End: 2020-07-15
Payer: COMMERCIAL

## 2020-07-15 ENCOUNTER — TELEPHONE (OUTPATIENT)
Dept: HEMATOLOGY ONCOLOGY | Facility: CLINIC | Age: 50
End: 2020-07-15

## 2020-07-15 ENCOUNTER — DOCUMENTATION (OUTPATIENT)
Dept: HEMATOLOGY ONCOLOGY | Facility: CLINIC | Age: 50
End: 2020-07-15

## 2020-07-15 DIAGNOSIS — I89.0 LYMPH EDEMA: Primary | ICD-10-CM

## 2020-07-15 PROCEDURE — 97110 THERAPEUTIC EXERCISES: CPT | Performed by: PHYSICAL THERAPIST

## 2020-07-15 PROCEDURE — 97140 MANUAL THERAPY 1/> REGIONS: CPT | Performed by: PHYSICAL THERAPIST

## 2020-07-15 NOTE — TELEPHONE ENCOUNTER
Pharmacist Kimberly Cortez from [facility] called to inform us that patient's insurance was changed to [address] [facility] [phone] and will need a prior authorization for  olaparib [address]) tablet   Kimberly Cortez stated that she created a cover my meds profile and faxed it to [phone]  I requested that she refax to main [facility] fax number and will email it to Dr Cassandria Lombard team and oral chemo team when I receive it      Email sent to Oral chemo team     Lilian's call back # [phone]

## 2020-07-15 NOTE — PROGRESS NOTES
Daily Note     Today's date: 7/15/2020  Patient name: Ezekiel Velasquez  : 1970  MRN: [id number]  Referring provider: Anthony Zhong MD  Dx:   Encounter Diagnosis     ICD-10-CM    1  Lymph edema I89 0        Start Time: 1528          Subjective: I think the puffiness is down under the arm      Objective: See treatment diary below    Swelling   Left shoulder swelling details: Axilla 36 5 cm  12 cm above 37 cm  8 cm above 36 cm  4 cm above 32 5 cm  Olecranon 27 5 cm  16 cm above 27 5 cm  12 cm above 26 cm  8 cm above 22 5 cm  4 cm above 18 5 cm  Ulnar styloid 17 cm   Peguero crease 19 2 cm  mcp 19 cm     Right shoulder swelling details: Axilla 38 cm  12 cm above 38 cm  8 cm above 36 cm  4 cm above 32 cm  Olecranon 28 5 cm  16 cm above 28 cm  12 cm above 26 cm  8 cm above 22 5 cm  4 cm above 18 8 cm  Ulnar styloid 17 cm  Peguero crease 21 cm  mcp 19 2 cm  Assessment: Tolerated treatment well  Patient would benefit from continued PT  Significant decreases right upper arm      Plan: Continue per plan of care        Precautions: cancer right breast s/p mastectomy, COPD      Manuals  7 7/8 7/13 7/15        Mobilizations distraction/ inferior glide Grade II x 10 ea Grade II x 10 ea grade II 10 ea grade II 10 ea grade II 10 ea        Self massage instruction 5 min manual drainage 12 min manual drainage manual drainage manual drainage 9 min        PROM right shoulder 10 reps flex/abd 15 flex/abd 15 flex/ab 15 flexion 15 flexion        Axilla MFR   2 min 4 min 3 min        Neuro Re-Ed                                                                                                        Ther Ex             Wand flexion supine X 20 1# x 20 2#x 20  3# x 20 4#x 20        Standing wand abduction X 20 Active x 20 1# x 20 2# x 15 2# x 20        Door way stretch  x3 with 15 sec hold x5 with 15 sec hold x5 with 15 sec hold x5        pulleys   2 min 3 min 4 min        Shoulder flexion   X 20 1# x 20 2# x 20        Chest stretch x3 with 15 sec hold x5 with 15 sec hold                                  Ther Activity                                       Gait Training                                       Modalities

## 2020-07-15 NOTE — PROGRESS NOTES
7/15/2020 received notification from [facility] the pt called stating she has new insurance and the [employer]par[employer] needs auth  Pt has [facility]  ID # [id number]  DARLENE [id number]  PCN # [id number]  GRP #[id number]    Submitted for auth through cover my meds    7/16/2020 received approval letter from [facility]  Per the approval letter Martinez Figueroa is valid from 7/15/2020 through 7/15/2021    Notified clinical and [facility]

## 2020-07-17 ENCOUNTER — OFFICE VISIT (OUTPATIENT)
Dept: HEMATOLOGY ONCOLOGY | Facility: CLINIC | Age: 50
End: 2020-07-17
Payer: COMMERCIAL

## 2020-07-17 VITALS
BODY MASS INDEX: 35.36 KG/M2 | TEMPERATURE: 97 F | HEIGHT: 66 IN | DIASTOLIC BLOOD PRESSURE: 68 MMHG | SYSTOLIC BLOOD PRESSURE: 112 MMHG | RESPIRATION RATE: 18 BRPM | HEART RATE: 86 BPM | OXYGEN SATURATION: 98 % | WEIGHT: 220 LBS

## 2020-07-17 DIAGNOSIS — Z17.0 MALIGNANT NEOPLASM OF CENTRAL PORTION OF RIGHT BREAST IN FEMALE, ESTROGEN RECEPTOR POSITIVE (HCC): ICD-10-CM

## 2020-07-17 DIAGNOSIS — C50.911 CARCINOMA OF RIGHT BREAST METASTATIC TO AXILLARY LYMPH NODE (HCC): ICD-10-CM

## 2020-07-17 DIAGNOSIS — C78.7 LIVER METASTASES (HCC): Primary | ICD-10-CM

## 2020-07-17 DIAGNOSIS — C50.111 MALIGNANT NEOPLASM OF CENTRAL PORTION OF RIGHT BREAST IN FEMALE, ESTROGEN RECEPTOR POSITIVE (HCC): ICD-10-CM

## 2020-07-17 DIAGNOSIS — C77.3 CARCINOMA OF RIGHT BREAST METASTATIC TO AXILLARY LYMPH NODE (HCC): ICD-10-CM

## 2020-07-17 PROCEDURE — 99214 OFFICE O/P EST MOD 30 MIN: CPT | Performed by: INTERNAL MEDICINE

## 2020-07-17 NOTE — PROGRESS NOTES
Hematology / Oncology Outpatient Follow Up Note    Isael Beatty 45 y o  female :1970 [id number]         Date:  2020    Assessment / Plan:  A 45year-old surgically postmenopausal woman with metastatic right breast cancer, grade 3, ER 90% positive, VT 90% positive, HER2 negative disease   She has BRCA -2 mutation    She has histologically confirmed liver metastasis   Since she was premenopausal, she underwent as bilateral surgical ovarian ablation in late 2019 followed by starting palbociclib and letrozole   She had initial minor response   However, she had rapid progression in 2019 for which she is currently on Taxotere with excellent tolerance  She has 6 cycle of Taxotere, resulting in partial response  She recently underwent palliative right mastectomy, due to the fact that she had some skin involvement when she had previous progression  She started olaparib 3 days ago with no toxicity  I recommended her to continue with olaparib 300 mg twice a day  I will see her again in a month with CBC and CMP  She is in agreement with my recommendations           Subjective:      HPI:  A 62-year-old premenopausal woman who noticed a lump in her right breast in 2019 which she brought to medical attention   Radiographically, she has large right breast mass and right axillary adenopathy both of which were biopsy in May 3, 2019   Biopsy showed invasive ductal carcinoma, grade 3   This was ER 90% positive, VT 90% positive, HER2 negative disease   Biopsy from right axilla lymph node was positive for metastatic disease   She was seen by Dr Darryl Beebe of her young age, she underwent genetic testing which showed BRCA-2 mutation   Because of the locally advanced nature, she underwent CT scan of chest abdomen pelvis which showed multiple liver lesion, suspicious for metastatic disease   Bone scan was negative for osseous metastasis  Alberto Uribe is going to have liver biopsy in 2019   She presents today to discuss treatment option   She has no symptomatology from breast standpoint   She denied any pain  Her weight has been stable   She has no respiratory symptoms   She has no significant past medical history   Her paternal aunt had breast cancer in her 35s   Interestingly enough, her father had colon cancer in his 45s as well as prostate cancer in his 62s   She has no family history of ovarian cancer   She was a smoker until 2 years ago  Kailash Hagan does not drink alcohol  Kailash Hagan has a son who is 23years old  Kailash Hagan also has younger son who was biologically female  Jose Alejandro Oliveros is a transgender  Jose Alejandro Oliveros is considering bilateral mastectomy  Geovany Grey, he has not been tested for BRCA gene mutation            Interval History:   A 45year-old surgically postmenopausal woman with metastatic right breast cancer, grade 3, ER 90% positive, UT 90% positive, HER2 negative disease   She has BRCA -2 mutation   Based on the CT scan, she appeared to have multiple liver metastasis     She subsequently underwent liver biopsy which showed metastatic carcinoma, consistent with breast primary   She was premenopausal at the time of diagnosis   Therefore, she underwent bilateral oophorectomy in late July 2019  Ovary and fallopian tube did not show any malignancy   She started palbociclib and letrozole in late July 2019  She initially had response with decreased right breast mass as well as right axillary adenopathy   However, in late December 2019, she had rapid progression in her right breast mass, skin involvement as well as right axillary adenopathy   Therefore, she started palliative chemotherapy with Taxotere, resulting in partial response  After 6 cycle treatment with Taxotere, she underwent palliative right mastectomy which showed multiple foci of invasive ductal carcinoma measuring up to 3 5 cm and 13 positive axillary lymph node  She presents today for follow-up  She started olaparib 3 days ago    So far, she has not noticed any side effects  She feels well  She has no complaint of pain  She denied any respiratory symptoms  Her weight is stable  She has normal performance status        Objective:      Primary Diagnosis:     1    Metastatic breast cancer, grade 3, ER 90 % positive, WI 90 % positive, HER2 negative disease, diagnosed in June 2019    2    BRCA-2 mutation      Cancer Staging:  Cancer Staging  Malignant neoplasm of central portion of right breast in female, estrogen receptor positive ([address] )  Staging form: Breast, AJCC 8th Edition  - Clinical: Stage IIIB (cT4, cN1(f), cM0, G3, ER+, WI+, HER2-) - Signed by Marissa Murillo MD on 5/16/2019  Laterality: Right  Method of lymph node assessment: Core biopsy  Histologic grading system: 3 grade system           Previous Hematologic/ Oncologic Treatment:      1  Surgical ovarian ablation in late July 2019   2  Palbociclib and letrozole from July 2018 through January 2020    3  Taxotere x6 cycle, completed in May 2020     4  Palliative right mastectomy      Current Hematologic/ Oncologic Treatment:       Olaparib 300 mg b i d  started in mid July 2020       Disease Status:      Clinical partial response on Taxotere      Test Results:     Pathology:     Right breast biopsy in axillary lymph node biopsy showed invasive ductal carcinoma, grade 3   ER 90 % positive, WI 90% positive, HER2 negative disease      Liver biopsy in June 2019 showed metastatic carcinoma, consistent with breast primary      In June 2020, mastectomy specimen showed multiple foci of invasive ductal carcinoma measuring up to 3 5 cm with 13 positive axillary lymph nodes      Radiology:     Bone scan showed no evidence of osseous metastasis      CT scan of chest abdomen pelvis in December 2019 showed progression of right breast mass and right axillary adenopathy with new appearance of skin change   Mixed response with liver metastasis       Laboratory:     See below   CA 27, 29 was 86 in May 2020       Physical Exam:        General Appearance:    Alert, oriented          Eyes:    PERRL   Ears:    Normal external ear canals, both ears   Nose:   Nares normal, septum midline   Throat:   Mucosa moist  Pharynx without injection  Neck:   Supple         Lungs:     Clear to auscultation bilaterally   Chest Wall:    No tenderness or deformity    Heart:    Regular rate and rhythm         Abdomen:     Soft, non-tender, bowel sounds +, no organomegaly               Extremities:   Extremities no cyanosis or edema         Skin:   no rash or icterus  Lymph nodes:   Cervical, supraclavicular, and axillary nodes normal   Neurologic:   CNII-XII intact, normal strength, sensation and reflexes     Throughout             Breast exam: Right status post mastectomy without reconstruction  No palpable abnormality on her right chest wall   Left breast exam is negative  ROS: Review of Systems   All other systems reviewed and are negative  Imaging: No results found  Labs:   Lab Results   Component Value Date    WBC 5 16 05/29/2020    HGB 12 1 05/29/2020    HCT 38 4 05/29/2020    MCV 92 05/29/2020     05/29/2020     Lab Results   Component Value Date    K 3 8 05/29/2020     05/29/2020    CO2 28 05/29/2020    BUN 14 05/29/2020    CREATININE 0 81 05/29/2020    GLUF 91 01/04/2020    CALCIUM 9 1 05/29/2020    AST 37 05/29/2020    ALT 58 05/29/2020    ALKPHOS 69 05/29/2020    EGFR 85 05/29/2020         Current Medications: Reviewed  Allergies: Reviewed  PMH/FH/SH:  Reviewed      Vital Sign:    Body surface area is 2 08 meters squared      Wt Readings from Last 3 Encounters:   07/17/20 99 8 kg (220 lb)   07/02/20 99 3 kg (219 lb)   06/29/20 99 1 kg (218 lb 6 4 oz)        Temp Readings from Last 3 Encounters:   07/17/20 (!) 97 °F (36 1 °C) (Tympanic Core)   07/02/20 98 6 °F (37 °C) (Tympanic)   06/29/20 97 5 °F (36 4 °C) (Tympanic)        BP Readings from Last 3 Encounters:   07/17/20 112/68   07/02/20 120/70 06/29/20 130/80         Pulse Readings from Last 3 Encounters:   07/17/20 86   07/02/20 86   06/29/20 100     @LASTSAO2(3)@

## 2020-07-20 ENCOUNTER — OFFICE VISIT (OUTPATIENT)
Dept: PHYSICAL THERAPY | Facility: REHABILITATION | Age: 50
End: 2020-07-20
Payer: COMMERCIAL

## 2020-07-20 DIAGNOSIS — I89.0 LYMPH EDEMA: Primary | ICD-10-CM

## 2020-07-20 PROCEDURE — 97110 THERAPEUTIC EXERCISES: CPT | Performed by: PHYSICAL THERAPIST

## 2020-07-20 PROCEDURE — 97140 MANUAL THERAPY 1/> REGIONS: CPT | Performed by: PHYSICAL THERAPIST

## 2020-07-20 NOTE — PROGRESS NOTES
Daily Note     Today's date: 2020  Patient name: Melvyn Saint  : 1970  MRN: [id number]  Referring provider: Karolyn Hall MD  Dx:   Encounter Diagnosis     ICD-10-CM    1  Lymph edema I89 0        Start Time: 1429          Subjective: I think its better      Objective: See treatment diary below    Swelling   Left shoulder swelling details: Axilla 36 5 cm  12 cm above 37 cm  8 cm above 36 cm  4 cm above 32 5 cm  Olecranon 27 5 cm  16 cm above 27 5 cm  12 cm above 26 cm  8 cm above 22 5 cm  4 cm above 18 5 cm  Ulnar styloid 17 cm   Peguero crease 19 2 cm  mcp 19 cm     Right shoulder swelling details: Axilla 38 cm  12 cm above 38 cm  8 cm above 36 cm  4 cm above 32 cm  Olecranon 28 5 cm  16 cm above 28 cm  12 cm above 26 cm  8 cm above 22 5 cm  4 cm above 18 8 cm  Ulnar styloid 17 cm  Peguero crease 21 cm  mcp 19 2 cm  Assessment: Tolerated treatment well  Patient would benefit from continued PT  Plan: Continue per plan of care        Precautions: cancer right breast s/p mastectomy, COPD      Manuals 7/1 7/6 7/8 7/13 7/15 7/20       Mobilizations distraction/ inferior glide Grade II x 10 ea Grade II x 10 ea grade II 10 ea grade II 10 ea grade II 10 ea grade II 10 ea       Self massage instruction 5 min manual drainage 12 min manual drainage manual drainage manual drainage 9 min manual drainage 10 min       PROM right shoulder 10 reps flex/abd 15 flex/abd 15 flex/ab 15 flexion 15 flexion 15 flexion       Axilla MFR   2 min 4 min 3 min 2 min       Neuro Re-Ed                                                                                                        Ther Ex             Wand flexion supine X 20 1# x 20 2#x 20  3# x 20 4#x 20 5# x 15       Standing wand abduction X 20 Active x 20 1# x 20 2# x 15 2# x 20 3# x 15       Door way stretch  x3 with 15 sec hold x5 with 15 sec hold x5 with 15 sec hold x5 x5       pulleys   2 min 3 min 4 min 5 min       Shoulder flexion   X 20 1# x 20 2# x 20 3# x 15 Chest stretch    x3 with 15 sec hold x5 with 15 sec hold x5                                 Ther Activity                                       Gait Training                                       Modalities

## 2020-07-22 ENCOUNTER — OFFICE VISIT (OUTPATIENT)
Dept: PHYSICAL THERAPY | Facility: REHABILITATION | Age: 50
End: 2020-07-22
Payer: COMMERCIAL

## 2020-07-22 DIAGNOSIS — I89.0 LYMPH EDEMA: Primary | ICD-10-CM

## 2020-07-22 PROCEDURE — 97140 MANUAL THERAPY 1/> REGIONS: CPT | Performed by: PHYSICAL THERAPIST

## 2020-07-22 PROCEDURE — 97110 THERAPEUTIC EXERCISES: CPT | Performed by: PHYSICAL THERAPIST

## 2020-07-22 NOTE — PROGRESS NOTES
Daily Note     Today's date: 2020  Patient name: Fabio Martinez  : 1970  MRN: [id number]  Referring provider: Mae Palafox MD  Dx:   Encounter Diagnosis     ICD-10-CM    1  Lymph edema I89 0                   Subjective: No new complaints      Objective: See treatment diary below    Swelling   Left shoulder swelling details: Axilla 36 5 cm  12 cm above 37 cm  8 cm above 36 cm  4 cm above 32 5 cm  Olecranon 27 5 cm  16 cm above 27 5 cm  12 cm above 26 cm  8 cm above 22 5 cm  4 cm above 18 5 cm  Ulnar styloid 17 cm   Peguero crease 19 2 cm  mcp 19 cm     Right shoulder swelling details: Axilla 37 1 cm  12 cm above 37 7 cm  8 cm above 35 7 cm  4 cm above 31 6 cm  Olecranon 28 5 cm  16 cm above 28 5 cm  12 cm above 26 4 cm  8 cm above 21 6 cm  4 cm above 18 1 cm  Ulnar styloid 16 5 cm  Peguero crease 19 8 cm  mcp 19 2 cm  Assessment: Tolerated treatment well  Patient would benefit from continued PT  Plan: Continue per plan of care        Precautions: cancer right breast s/p mastectomy, COPD      Manuals 7/1 7/6 7/8 7/13 7/15 7/20 7/22      Mobilizations distraction/ inferior glide Grade II x 10 ea Grade II x 10 ea grade II 10 ea grade II 10 ea grade II 10 ea grade II 10 ea grade II 10ea      Self massage instruction 5 min manual drainage 12 min manual drainage manual drainage manual drainage 9 min manual drainage 10 min manual drainage x 10 min      PROM right shoulder 10 reps flex/abd 15 flex/abd 15 flex/ab 15 flexion 15 flexion 15 flexion 15 flexion      Axilla MFR   2 min 4 min 3 min 2 min 1 min      Neuro Re-Ed             Decongestive ex       x5 ea                                                                                    Ther Ex             Wand flexion supine X 20 1# x 20 2#x 20  3# x 20 4#x 20 5# x 15 5# x 20      Standing wand abduction X 20 Active x 20 1# x 20 2# x 15 2# x 20 3# x 15 3# x 15      Door way stretch  x3 with 15 sec hold x5 with 15 sec hold x5 with 15 sec hold x5 x5 x5 pulleys   2 min 3 min 4 min 5 min 5 min      Shoulder flexion   X 20 1# x 20 2# x 20 3# x 15 3# x 15      Chest stretch    x3 with 15 sec hold x5 with 15 sec hold x5 X 5                                Ther Activity                                       Gait Training                                       Modalities

## 2020-07-26 ENCOUNTER — PATIENT MESSAGE (OUTPATIENT)
Dept: HEMATOLOGY ONCOLOGY | Facility: CLINIC | Age: 50
End: 2020-07-26

## 2020-07-27 ENCOUNTER — TELEPHONE (OUTPATIENT)
Dept: HEMATOLOGY ONCOLOGY | Facility: CLINIC | Age: 50
End: 2020-07-27

## 2020-07-27 ENCOUNTER — OFFICE VISIT (OUTPATIENT)
Dept: PHYSICAL THERAPY | Facility: REHABILITATION | Age: 50
End: 2020-07-27
Payer: COMMERCIAL

## 2020-07-27 DIAGNOSIS — I89.0 LYMPH EDEMA: Primary | ICD-10-CM

## 2020-07-27 PROCEDURE — 97140 MANUAL THERAPY 1/> REGIONS: CPT | Performed by: PHYSICAL THERAPIST

## 2020-07-27 PROCEDURE — 97110 THERAPEUTIC EXERCISES: CPT | Performed by: PHYSICAL THERAPIST

## 2020-07-27 NOTE — PROGRESS NOTES
Daily Note     Today's date: 2020  Patient name: Fabio Martinez  : 1970  MRN: [id number]  Referring provider: Mae Palafox MD  Dx:   Encounter Diagnosis     ICD-10-CM    1  Lymph edema I89 0        Start Time: 1457          Subjective: I have a fluid build up in my right ear and I think I pulled something in my central chest on the right at work      Objective: See treatment diary below    Swelling   Left shoulder swelling details: Axilla 36 5 cm  12 cm above 37 cm  8 cm above 36 cm  4 cm above 32 5 cm  Olecranon 27 5 cm  16 cm above 27 5 cm  12 cm above 26 cm  8 cm above 22 5 cm  4 cm above 18 5 cm  Ulnar styloid 17 cm   Peguero crease 19 2 cm  mcp 19 cm     Right shoulder swelling details: Axilla 37 1 cm  12 cm above 37 7 cm  8 cm above 35 7 cm  4 cm above 31 6 cm  Olecranon 28 5 cm  16 cm above 28 5 cm  12 cm above 26 4 cm  8 cm above 21 6 cm  4 cm above 18 1 cm  Ulnar styloid 16 5 cm  Peguero crease 19 8 cm  mcp 19 2 cm  Assessment: Tolerated treatment well  Patient would benefit from continued PT  Axilla felt really soft today      Plan: Continue per plan of care        Precautions: cancer right breast s/p mastectomy, COPD      Manuals 7/1 7/6 7/8 7/13 7/15 7/20 7/22 7/27     Mobilizations distraction/ inferior glide Grade II x 10 ea Grade II x 10 ea grade II 10 ea grade II 10 ea grade II 10 ea grade II 10 ea grade II 10ea grade II 10 ea     Self massage instruction 5 min manual drainage 12 min manual drainage manual drainage manual drainage 9 min manual drainage 10 min manual drainage x 10 min manual drainage 10 min     PROM right shoulder 10 reps flex/abd 15 flex/abd 15 flex/ab 15 flexion 15 flexion 15 flexion 15 flexion 15 flexion     Axilla MFR   2 min 4 min 3 min 2 min 1 min 1 min     Neuro Re-Ed             Decongestive ex       x5 ea                                                                                    Ther Ex             Wand flexion supine X 20 1# x 20 2#x 20  3# x 20 4#x 20 5# x 15 5# x 20 5# x20      Standing wand abduction X 20 Active x 20 1# x 20 2# x 15 2# x 20 3# x 15 3# x 15 3# x 15     Door way stretch  x3 with 15 sec hold x5 with 15 sec hold x5 with 15 sec hold x5 x5 x5 x5     pulleys   2 min 3 min 4 min 5 min 5 min 5min     Shoulder flexion   X 20 1# x 20 2# x 20 3# x 15 3# x 15 3# x 15     Chest stretch    x3 with 15 sec hold x5 with 15 sec hold x5 X 5 x5                               Ther Activity                                       Gait Training                                       Modalities

## 2020-07-27 NOTE — TELEPHONE ENCOUNTER
Patient palpated "lump" on right side of neck, oval 2 inches in length, denies pruritis or rash or redness  Patient is afebrile  Patient states right ear has been "clogged" for 3 months  Patient has no PCP  Encouraged patient to choose one  Patient will go to Urgent Care today to have area assessed  Message with picture was sent to Dr Maria E Lala yesterday  Patient is maintained on lynparza    Will send message to Dr Moishe Cowden RN to inform

## 2020-07-29 ENCOUNTER — EVALUATION (OUTPATIENT)
Dept: PHYSICAL THERAPY | Facility: REHABILITATION | Age: 50
End: 2020-07-29
Payer: COMMERCIAL

## 2020-07-29 DIAGNOSIS — I89.0 LYMPH EDEMA: Primary | ICD-10-CM

## 2020-07-29 PROCEDURE — 97110 THERAPEUTIC EXERCISES: CPT | Performed by: PHYSICAL THERAPIST

## 2020-07-29 PROCEDURE — 97140 MANUAL THERAPY 1/> REGIONS: CPT | Performed by: PHYSICAL THERAPIST

## 2020-07-29 NOTE — PROGRESS NOTES
Daily Note     Today's date: 2020  Patient name: Je Sutton  : 1970  MRN: [id number]  Referring provider: Prabhjot Ybarra MD  Dx:   Encounter Diagnosis     ICD-10-CM    1  Lymph edema I89 0                   Subjective: I have some numbness but overall am doing well       Objective: See treatment diary below    Swelling   Left shoulder swelling details: Axilla 36 5 cm  12 cm above 37 cm  8 cm above 36 cm  4 cm above 32 5 cm  Olecranon 27 5 cm  16 cm above 27 5 cm  12 cm above 26 cm  8 cm above 22 5 cm  4 cm above 18 5 cm  Ulnar styloid 17 cm   Peguero crease 19 2 cm  mcp 19 cm     Right shoulder swelling details: Axilla 37 cm  12 cm above 37 cm  8 cm above 35 cm  4 cm above 31 4 cm  Olecranon 28 cm  16 cm above 28 2 cm  12 cm above 26 3 cm  8 cm above 22 5 cm  4 cm above 19 cm  Ulnar styloid 17 cm  Peguero crease 19 4 cm  mcp 19 cm  Assessment: Tolerated treatment well  Patient appears ready for DC to home program with all goals met         Plan: DC to home exercise program     Precautions: cancer right breast s/p mastectomy, COPD      Manuals 7/1 7/6 7/8 7/13 7/15 7/20 7/22 7/27 7/29    Mobilizations distraction/ inferior glide Grade II x 10 ea Grade II x 10 ea grade II 10 ea grade II 10 ea grade II 10 ea grade II 10 ea grade II 10ea grade II 10 ea grade II x 10    Self massage instruction 5 min manual drainage 12 min manual drainage manual drainage manual drainage 9 min manual drainage 10 min manual drainage x 10 min manual drainage 10 min not needed    PROM right shoulder 10 reps flex/abd 15 flex/abd 15 flex/ab 15 flexion 15 flexion 15 flexion 15 flexion 15 flexion 15 flexion    Axilla MFR   2 min 4 min 3 min 2 min 1 min 1 min 1 min    Neuro Re-Ed             Decongestive ex       x5 ea                                                                                    Ther Ex             Wand flexion supine X 20 1# x 20 2#x 20  3# x 20 4#x 20 5# x 15 5# x 20 5# x20  5#x 20    Standing wand abduction X 20 Active x 20 1# x 20 2# x 15 2# x 20 3# x 15 3# x 15 3# x 15 3# x 20    Door way stretch  x3 with 15 sec hold x5 with 15 sec hold x5 with 15 sec hold x5 x5 x5 x5 x 5    pulleys   2 min 3 min 4 min 5 min 5 min 5min 5 min    Shoulder flexion   X 20 1# x 20 2# x 20 3# x 15 3# x 15 3# x 15 3#x 16    Chest stretch    x3 with 15 sec hold x5 with 15 sec hold x5 X 5 x5 x5                              Ther Activity                                       Gait Training                                       Modalities

## 2020-07-31 ENCOUNTER — ANNUAL EXAM (OUTPATIENT)
Dept: OBGYN CLINIC | Facility: CLINIC | Age: 50
End: 2020-07-31
Payer: COMMERCIAL

## 2020-07-31 VITALS — WEIGHT: 219.2 LBS | SYSTOLIC BLOOD PRESSURE: 112 MMHG | DIASTOLIC BLOOD PRESSURE: 80 MMHG | BODY MASS INDEX: 35.56 KG/M2

## 2020-07-31 DIAGNOSIS — Z01.419 ENCOUNTER FOR GYNECOLOGICAL EXAMINATION: Primary | ICD-10-CM

## 2020-07-31 DIAGNOSIS — N90.7 SEBACEOUS CYST OF LABIA: ICD-10-CM

## 2020-07-31 PROCEDURE — G0145 SCR C/V CYTO,THINLAYER,RESCR: HCPCS | Performed by: NURSE PRACTITIONER

## 2020-07-31 PROCEDURE — S0612 ANNUAL GYNECOLOGICAL EXAMINA: HCPCS | Performed by: NURSE PRACTITIONER

## 2020-07-31 PROCEDURE — 87624 HPV HI-RISK TYP POOLED RSLT: CPT | Performed by: NURSE PRACTITIONER

## 2020-07-31 NOTE — PROGRESS NOTES
Manan Meidna   1970    CC:  Yearly exam    S:  45 y o  female here for yearly exam  She is postmenopausal and has had no vaginal bleeding  S/p BSO 7/29/19  Hx of stage IV breast cancer in right breast-estrogen receptor positive with mets to lymph nodes and liver  She denies breast concerns, abdominal/pelvic pain, abnormal vaginal discharge, bladder/bowel dysfunction  She denies vaginal, itching, odor or dryness  She is not sexually active  C/o a lump on her right labia  Has been there for a few years  Not painful, no drainage  Tried sticking it with a needle  Would like it removed  Last Pap-unsure   Last Mammo-follows with Dr Rojas  S/p palliative right breast mastectomy on 6/5/20  Just completed PT for lymph edema this week  Has f/u with Dr Rojas scheduled  Last Colonoscopy-none    Recently moved into a ranch home  1 son lives in [place]  Younger son is going to college in [place] in the fall        Current Outpatient Medications:     cephalexin (KEFLEX) 500 mg capsule, TK 1 C PO TID, Disp: , Rfl:     CRANIAL PROSTHESIS, RX,, One wig as needed, Disp: 1 Piece, Rfl: 0    loratadine (CLARITIN) 10 mg tablet, Take 10 mg by mouth daily, Disp: , Rfl:     olaparib (LYNPARZA) tablet, Take 2 tablets (300 mg total) by mouth 2 (two) times a day, Disp: 120 tablet, Rfl: 2    ondansetron (ZOFRAN) 8 mg tablet, Take 1 tablet (8 mg total) by mouth every 8 (eight) hours as needed for nausea or vomiting, Disp: 30 tablet, Rfl: 1    oxyCODONE (ROXICODONE) 10 MG TABS, Take 1 tablet (10 mg total) by mouth every 6 (six) hours as needed for moderate painMax Daily Amount: 40 mg, Disp: 30 tablet, Rfl: 0  Social History     Socioeconomic History    Marital status: Single     Spouse name: Not on file    Number of children: Not on file    Years of education: Not on file    Highest education level: Not on file   Occupational History    Not on file   Social Needs    Financial resource strain: Not on file    Food insecurity: Worry: Not on file     Inability: Not on file    Transportation needs:     Medical: Not on file     Non-medical: Not on file   Tobacco Use    Smoking status: Former Smoker     Packs/day: 1 00     Years: 30 00     Pack years: 30 00     Last attempt to quit: 2017     Years since quitting: 3 5    Smokeless tobacco: Never Used   Substance and Sexual Activity    Alcohol use: Yes     Frequency: Monthly or less     Drinks per session: 1 or 2     Comment: occassional    Drug use: No    Sexual activity: Not Currently     Partners: Male     Birth control/protection: None   Lifestyle    Physical activity:     Days per week: Not on file     Minutes per session: Not on file    Stress: Not on file   Relationships    Social connections:     Talks on phone: Not on file     Gets together: Not on file     Attends Faith service: Not on file     Active member of club or organization: Not on file     Attends meetings of clubs or organizations: Not on file     Relationship status: Not on file    Intimate partner violence:     Fear of current or ex partner: Not on file     Emotionally abused: Not on file     Physically abused: Not on file     Forced sexual activity: Not on file   Other Topics Concern    Not on file   Social History Narrative    Not on file     Family History   Problem Relation Age of Onset    Hypotension Mother     Colon cancer Father 36    Hypertension Father     Prostate cancer Father 79    Throat cancer Maternal Grandmother 61    Cancer Maternal Grandmother     Breast cancer Paternal Aunt         age unknown     Past Medical History:   Diagnosis Date    Bloating     Bruises easily     Cancer ([address] )     right breast//to lymph nodes/liver/ and bone    Depression     History of cancer chemotherapy 05/2020    History of pneumonia     Hypotension     occas    Low iron     "when pregnant, 20 yrs ago"    Neuropathy     hands//fingers    Shortness of breath     occas    Tinnitus     right  Wears glasses         O:  Blood pressure 112/80, weight 99 4 kg (219 lb 3 2 oz)  Patient appears well and is not in distress  Neck is supple without masses  Breasts-right breast exhibits healing mastectomy scar  Left breast is without mass, tenderness, nipple discharge, skin changes or adenopathy  Abdomen is soft and nontender without masses  External genitals-4mm cyst on the right labia majora without drainage or erythema  Vagina is normal without discharge or bleeding  Cervix is normal without discharge or lesion  Uterus is normal, mobile, nontender without palpable mass  Adnexa are surgically absent  A:  Yearly exam  Sebaceous cyst of the right labia  P:  Pap collected today  ASCCP guidelines reviewed with patient  Will continue f/u with [facility] for breast management and mammography  Colonoscopy recommended  Reviewed postmenopausal considerations to report  Appears to be sebaceous cyst on the right labia majora  Will RTO next week with MD to discuss removal   RTO one year for yearly exam or sooner as needed

## 2020-08-03 ENCOUNTER — OFFICE VISIT (OUTPATIENT)
Dept: OBGYN CLINIC | Facility: CLINIC | Age: 50
End: 2020-08-03

## 2020-08-03 DIAGNOSIS — N90.7 SEBACEOUS CYST OF LABIA: Primary | ICD-10-CM

## 2020-08-03 PROCEDURE — 56420 I&D BARTHOLINS GLAND ABSCESS: CPT | Performed by: NURSE PRACTITIONER

## 2020-08-03 NOTE — PROGRESS NOTES
Incision and drain    Date/Time: 8/3/2020 3:30 PM  Performed by: EMILIANO Ramesh  Authorized by: EMILIANO Ramesh     Patient location:  Clinic  Other Assisting Provider: Yes (comment)    Consent:     Consent obtained:  Verbal    Consent given by:  Patient    Risks discussed:  Bleeding, incomplete drainage, pain and infection    Alternatives discussed:  No treatment  Universal protocol:     Procedure explained and questions answered to patient or proxy's satisfaction: yes      Immediately prior to procedure a time out was called: yes      Patient identity confirmed:  Verbally with patient  Location:     Indications for incision and drainage: sebaceous cyst     Size:  4mm    Location: right labia majora  Pre-procedure details:     Skin preparation:  Betadine  Anesthesia (see MAR for exact dosages): Anesthesia method:  Local infiltration    Local anesthetic:  Lidocaine 2% WITH epi  Procedure details:     Complexity:  Simple    Incision types:  Stab incision    Scalpel blade:  11    Approach:  Puncture    Drainage:  Serosanguinous    Drainage amount:  Scant    Wound treatment:  Wound left open    Packing materials:  None  Post-procedure details:     Patient tolerance of procedure:   Tolerated well, no immediate complications

## 2020-08-03 NOTE — PROGRESS NOTES
Assessment/Plan:    No problem-specific Assessment & Plan notes found for this encounter  Diagnoses and all orders for this visit:    Sebaceous cyst of labia          Subjective:      Patient ID: Manan Medina is a 45 y o  female  HPI    The following portions of the patient's history were reviewed and updated as appropriate: allergies, current medications, past family history, past medical history, past social history, past surgical history and problem list     Review of Systems      Objective: There were no vitals taken for this visit           Physical Exam

## 2020-08-04 ENCOUNTER — PATIENT MESSAGE (OUTPATIENT)
Dept: SURGICAL ONCOLOGY | Facility: CLINIC | Age: 50
End: 2020-08-04

## 2020-08-04 DIAGNOSIS — Z90.11 STATUS POST RIGHT MASTECTOMY: ICD-10-CM

## 2020-08-04 DIAGNOSIS — Z17.0 MALIGNANT NEOPLASM OF CENTRAL PORTION OF RIGHT BREAST IN FEMALE, ESTROGEN RECEPTOR POSITIVE (HCC): Primary | ICD-10-CM

## 2020-08-04 DIAGNOSIS — C50.111 MALIGNANT NEOPLASM OF CENTRAL PORTION OF RIGHT BREAST IN FEMALE, ESTROGEN RECEPTOR POSITIVE (HCC): Primary | ICD-10-CM

## 2020-08-04 LAB
HPV HR 12 DNA CVX QL NAA+PROBE: NEGATIVE
HPV16 DNA CVX QL NAA+PROBE: NEGATIVE
HPV18 DNA CVX QL NAA+PROBE: NEGATIVE

## 2020-08-06 LAB
LAB AP GYN PRIMARY INTERPRETATION: NORMAL
Lab: NORMAL

## 2020-08-06 NOTE — TELEPHONE ENCOUNTER
Called patient to discuss this  Per Dr Marie Marte, it was up to the patient if she wanted to continue having left breast mammograms or not  After discussing this with the patient, she opted for no imaging of her left breast  Patient reports that she does not "feel anything" and doesn't see the point of getting imaging on that side  Instructed patient to continue her self breast exams and to contact our office if she noticed any suspicious findings  Patient verbalized understanding

## 2020-08-10 ENCOUNTER — TELEPHONE (OUTPATIENT)
Dept: OBGYN CLINIC | Facility: MEDICAL CENTER | Age: 50
End: 2020-08-10

## 2020-08-10 ENCOUNTER — OFFICE VISIT (OUTPATIENT)
Dept: FAMILY MEDICINE CLINIC | Facility: CLINIC | Age: 50
End: 2020-08-10
Payer: COMMERCIAL

## 2020-08-10 ENCOUNTER — TELEPHONE (OUTPATIENT)
Dept: FAMILY MEDICINE CLINIC | Facility: CLINIC | Age: 50
End: 2020-08-10

## 2020-08-10 VITALS
RESPIRATION RATE: 18 BRPM | HEIGHT: 66 IN | TEMPERATURE: 98.7 F | DIASTOLIC BLOOD PRESSURE: 70 MMHG | WEIGHT: 222 LBS | SYSTOLIC BLOOD PRESSURE: 110 MMHG | BODY MASS INDEX: 35.68 KG/M2 | HEART RATE: 88 BPM

## 2020-08-10 DIAGNOSIS — C50.111 MALIGNANT NEOPLASM OF CENTRAL PORTION OF RIGHT BREAST IN FEMALE, ESTROGEN RECEPTOR POSITIVE (HCC): ICD-10-CM

## 2020-08-10 DIAGNOSIS — Z23 NEED FOR 23-POLYVALENT PNEUMOCOCCAL POLYSACCHARIDE VACCINE: ICD-10-CM

## 2020-08-10 DIAGNOSIS — Z12.11 SCREEN FOR COLON CANCER: ICD-10-CM

## 2020-08-10 DIAGNOSIS — J43.2 CENTRILOBULAR EMPHYSEMA (HCC): ICD-10-CM

## 2020-08-10 DIAGNOSIS — C78.7 LIVER METASTASES (HCC): ICD-10-CM

## 2020-08-10 DIAGNOSIS — C77.3 CARCINOMA OF RIGHT BREAST METASTATIC TO AXILLARY LYMPH NODE (HCC): ICD-10-CM

## 2020-08-10 DIAGNOSIS — Z78.0 POSTMENOPAUSAL: Primary | ICD-10-CM

## 2020-08-10 DIAGNOSIS — E66.01 CLASS 2 SEVERE OBESITY DUE TO EXCESS CALORIES WITH SERIOUS COMORBIDITY AND BODY MASS INDEX (BMI) OF 36.0 TO 36.9 IN ADULT (HCC): ICD-10-CM

## 2020-08-10 DIAGNOSIS — Z80.0 FAMILY HISTORY OF COLON CANCER: ICD-10-CM

## 2020-08-10 DIAGNOSIS — C50.911 CARCINOMA OF RIGHT BREAST METASTATIC TO AXILLARY LYMPH NODE (HCC): ICD-10-CM

## 2020-08-10 DIAGNOSIS — Z17.0 MALIGNANT NEOPLASM OF CENTRAL PORTION OF RIGHT BREAST IN FEMALE, ESTROGEN RECEPTOR POSITIVE (HCC): ICD-10-CM

## 2020-08-10 PROCEDURE — 3725F SCREEN DEPRESSION PERFORMED: CPT | Performed by: NURSE PRACTITIONER

## 2020-08-10 PROCEDURE — 90471 IMMUNIZATION ADMIN: CPT | Performed by: NURSE PRACTITIONER

## 2020-08-10 PROCEDURE — 99204 OFFICE O/P NEW MOD 45 MIN: CPT | Performed by: NURSE PRACTITIONER

## 2020-08-10 PROCEDURE — 90732 PPSV23 VACC 2 YRS+ SUBQ/IM: CPT | Performed by: NURSE PRACTITIONER

## 2020-08-10 PROCEDURE — 1036F TOBACCO NON-USER: CPT | Performed by: NURSE PRACTITIONER

## 2020-08-10 RX ORDER — BUDESONIDE AND FORMOTEROL FUMARATE DIHYDRATE 160; 4.5 UG/1; UG/1
2 AEROSOL RESPIRATORY (INHALATION) 2 TIMES DAILY
Qty: 1 INHALER | Refills: 3 | Status: SHIPPED | OUTPATIENT
Start: 2020-08-10 | End: 2020-08-11 | Stop reason: CLARIF

## 2020-08-10 NOTE — TELEPHONE ENCOUNTER
----- Message from [address] [place] [address] sent at 8/7/2020 10:05 AM EDT -----  Please notify pap and HPV are negative  Thank you!

## 2020-08-10 NOTE — PROGRESS NOTES
Assessment and Plan:    Problem List Items Addressed This Visit        Digestive    Liver metastases [facility])     Patient is under care of her oncologist             Respiratory    Centrilobular emphysema ([facility] [address] )     Recently evaluated on CT scan 12/20/2019  Patient reports increasing shortness of breath  Will trial controller inhaler 1st if no response would consider PFT in pulmonary evaluation  Immune and Lymphatic    Carcinoma of right breast metastatic to axillary lymph node [facility])     Patient continues under the care of oncologist and breast surgeon            Other    Malignant neoplasm of central portion of right breast in female, estrogen receptor positive ([facility] )     Patient is under the care of Oncology         Family history of colon cancer     Recommend colon cancer screening given breast cancer history and family history         Relevant Orders    Ambulatory referral to Gastroenterology    Postmenopausal - Primary    Relevant Orders    Vitamin D 25 hydroxy    DXA bone density spine hip and pelvis      Other Visit Diagnoses     Screen for colon cancer        Relevant Orders    Ambulatory referral to Gastroenterology    Class 2 severe obesity due to excess calories with serious comorbidity and body mass index (BMI) of 36 0 to 36 9 in adult [facility])        Relevant Orders    Lipid panel    TSH, 3rd generation with Free T4 reflex    Need for 23-polyvalent pneumococcal polysaccharide vaccine        Relevant Orders    PNEUMOCOCCAL POLYSACCHARIDE VACCINE 23-VALENT =>3YO SQ IM (Completed)                 Diagnoses and all orders for this visit:    Postmenopausal  -     Vitamin D 25 hydroxy; Future  -     DXA bone density spine hip and pelvis;  Future    Carcinoma of right breast metastatic to axillary lymph node (HCC)    Liver metastases ([facility] )    Malignant neoplasm of central portion of right breast in female, estrogen receptor positive ([facility] )    Family history of colon cancer  -     Ambulatory referral to Gastroenterology; Future    Screen for colon cancer  -     Ambulatory referral to Gastroenterology; Future    Class 2 severe obesity due to excess calories with serious comorbidity and body mass index (BMI) of 36 0 to 36 9 in adult (HCC)  -     Lipid panel  -     TSH, 3rd generation with Free T4 reflex; Future    Centrilobular emphysema (HCC)  -     Discontinue: budesonide-formoterol (SYMBICORT) 160-4 5 mcg/act inhaler; Inhale 2 puffs 2 (two) times a day Rinse mouth after use  Need for 23-polyvalent pneumococcal polysaccharide vaccine  -     PNEUMOCOCCAL POLYSACCHARIDE VACCINE 23-VALENT =>3YO SQ IM              Subjective:      Patient ID: Cecilia Plascencia is a 45 y o  female  CC:    Chief Complaint   Patient presents with   [facility]     Patient is here to stablish care  HPI:    Patient presents today as a new patient  She states that she has not been seen or under primary care provider for 21 years  Patient is following with oncology she has metastatic right breast cancer  She has liver lesion and newly found right sided rib lesion  Patient is currently on oral treatments  Last chemo was in May of 2019  She is still undergoing and following up with them  Patient states she recent was discharged from physical therapy after seen them post chemo treatment  Patient states that she is concerned today because she is having difficulty with her breathing  She does have a history of smoking for 30 years and quit about 3 years ago  Patient states that she was found to have emphysema on one of her most recent scan  Patient states she is instructed that she does not need to have mammograms did completed  Patient also notes that there is family history of colon cancer she has not yet had a colonoscopy done  She is up-to-date on her dental care and her eye care        The following portions of the patient's history were reviewed and updated as appropriate: allergies, current medications, past family history, past medical history, past social history, past surgical history and problem list       Review of Systems   Constitutional: Negative for chills, diaphoresis, fatigue and fever  Respiratory: Positive for shortness of breath  Negative for cough, chest tightness and wheezing  Cardiovascular: Negative for chest pain, palpitations and leg swelling  Gastrointestinal: Positive for constipation (occasional uses food )  Neurological: Negative for dizziness, light-headedness and headaches  Data to review:       Objective:    Vitals:    08/10/20 1239   BP: 110/70   BP Location: Left arm   Patient Position: Sitting   Cuff Size: Standard   Pulse: 88   Resp: 18   Temp: 98 7 °F (37 1 °C)   Weight: 101 kg (222 lb)   Height: 5' 5 83" (1 672 m)        Physical Exam  Vitals signs and nursing note reviewed  Constitutional:       Appearance: Normal appearance  She is well-developed  She is obese  She is not ill-appearing or diaphoretic  HENT:      Head: Normocephalic and atraumatic  Right Ear: Tympanic membrane normal       Left Ear: Tympanic membrane normal       Mouth/Throat:      Pharynx: Uvula midline  Eyes:      Pupils: Pupils are equal, round, and reactive to light  Neck:      Thyroid: No thyromegaly  Vascular: No carotid bruit or JVD  Cardiovascular:      Rate and Rhythm: Normal rate and regular rhythm  Heart sounds: Normal heart sounds, S1 normal and S2 normal  No murmur  Pulmonary:      Effort: Pulmonary effort is normal  No respiratory distress  Breath sounds: Normal breath sounds  No wheezing or rhonchi  Abdominal:      General: Bowel sounds are normal       Palpations: Abdomen is soft  Tenderness: There is no abdominal tenderness  Musculoskeletal:      Right lower leg: No edema  Left lower leg: No edema  Lymphadenopathy:      Cervical: No cervical adenopathy  Skin:     General: Skin is warm and dry  Findings: No rash     Neurological: Mental Status: She is alert and oriented to person, place, and time  Psychiatric:         Behavior: Behavior normal          Thought Content: Thought content normal            BMI Counseling: Body mass index is 36 02 kg/m²  The BMI is above normal  Nutrition recommendations include decreasing portion sizes, moderation in carbohydrate intake, reducing intake of saturated and trans fat and reducing intake of cholesterol  Exercise recommendations include moderate physical activity 150 minutes/week and strength training exercises

## 2020-08-10 NOTE — TELEPHONE ENCOUNTER
Call from Max stating insurance does not cover Symbicort, [facility]

## 2020-08-11 DIAGNOSIS — C78.7 LIVER METASTASES (HCC): Primary | ICD-10-CM

## 2020-08-11 DIAGNOSIS — J43.2 CENTRILOBULAR EMPHYSEMA (HCC): ICD-10-CM

## 2020-08-11 PROBLEM — Z78.0 POSTMENOPAUSAL: Status: ACTIVE | Noted: 2020-08-11

## 2020-08-11 NOTE — TELEPHONE ENCOUNTER
Pt's Budesonide/Form 160/4 5mcg is not covered by pt's insurance   They suggest and alternative    Advair HFA  Breo Ellipta  Dulera  Symbicort

## 2020-08-11 NOTE — TELEPHONE ENCOUNTER
Please have patient contact insurance to find alternative to symbicort   Some other names are juice/ Rey Chand

## 2020-08-11 NOTE — ASSESSMENT & PLAN NOTE
Recently evaluated on CT scan 12/20/2019  Patient reports increasing shortness of breath  Will trial controller inhaler 1st if no response would consider PFT in pulmonary evaluation

## 2020-08-12 ENCOUNTER — APPOINTMENT (EMERGENCY)
Dept: RADIOLOGY | Facility: HOSPITAL | Age: 50
DRG: 815 | End: 2020-08-12
Payer: COMMERCIAL

## 2020-08-12 ENCOUNTER — HOSPITAL ENCOUNTER (INPATIENT)
Facility: HOSPITAL | Age: 50
LOS: 3 days | Discharge: HOME | DRG: 815 | End: 2020-08-15
Attending: EMERGENCY MEDICINE | Admitting: INTERNAL MEDICINE
Payer: COMMERCIAL

## 2020-08-12 DIAGNOSIS — J43.2 CENTRILOBULAR EMPHYSEMA (HCC): ICD-10-CM

## 2020-08-12 DIAGNOSIS — C50.919 BREAST CANCER (HCC): ICD-10-CM

## 2020-08-12 DIAGNOSIS — R50.9 FEVER: Primary | ICD-10-CM

## 2020-08-12 DIAGNOSIS — R65.10 SIRS (SYSTEMIC INFLAMMATORY RESPONSE SYNDROME) (HCC): ICD-10-CM

## 2020-08-12 LAB
ALBUMIN SERPL BCP-MCNC: 3.2 G/DL (ref 3.5–5)
ALP SERPL-CCNC: 88 U/L (ref 46–116)
ALT SERPL W P-5'-P-CCNC: 30 U/L (ref 12–78)
ANION GAP SERPL CALCULATED.3IONS-SCNC: 8 MMOL/L (ref 4–13)
APTT PPP: 27 SECONDS (ref 23–37)
AST SERPL W P-5'-P-CCNC: 16 U/L (ref 5–45)
BACTERIA UR QL AUTO: ABNORMAL /HPF
BASOPHILS # BLD AUTO: 0.01 THOUSANDS/ΜL (ref 0–0.1)
BASOPHILS NFR BLD AUTO: 0 % (ref 0–1)
BILIRUB SERPL-MCNC: 0.77 MG/DL (ref 0.2–1)
BILIRUB UR QL STRIP: NEGATIVE
BUN SERPL-MCNC: 15 MG/DL (ref 5–25)
CALCIUM SERPL-MCNC: 8.8 MG/DL (ref 8.3–10.1)
CHLORIDE SERPL-SCNC: 104 MMOL/L (ref 100–108)
CLARITY UR: CLEAR
CO2 SERPL-SCNC: 25 MMOL/L (ref 21–32)
COLOR UR: YELLOW
CREAT SERPL-MCNC: 1.21 MG/DL (ref 0.6–1.3)
EOSINOPHIL # BLD AUTO: 0 THOUSAND/ΜL (ref 0–0.61)
EOSINOPHIL NFR BLD AUTO: 0 % (ref 0–6)
ERYTHROCYTE [DISTWIDTH] IN BLOOD BY AUTOMATED COUNT: 17.2 % (ref 11.6–15.1)
GFR SERPL CREATININE-BSD FRML MDRD: 52 ML/MIN/1.73SQ M
GLUCOSE SERPL-MCNC: 134 MG/DL (ref 65–140)
GLUCOSE UR STRIP-MCNC: NEGATIVE MG/DL
HCT VFR BLD AUTO: 36.4 % (ref 34.8–46.1)
HGB BLD-MCNC: 12.1 G/DL (ref 11.5–15.4)
HGB UR QL STRIP.AUTO: ABNORMAL
HYALINE CASTS #/AREA URNS LPF: ABNORMAL /LPF
IMM GRANULOCYTES # BLD AUTO: 0.08 THOUSAND/UL (ref 0–0.2)
IMM GRANULOCYTES NFR BLD AUTO: 1 % (ref 0–2)
INR PPP: 1.11 (ref 0.84–1.19)
KETONES UR STRIP-MCNC: NEGATIVE MG/DL
LACTATE SERPL-SCNC: 1.7 MMOL/L (ref 0.5–2)
LACTATE SERPL-SCNC: 2.7 MMOL/L (ref 0.5–2)
LACTATE SERPL-SCNC: 3.5 MMOL/L (ref 0.5–2)
LEUKOCYTE ESTERASE UR QL STRIP: NEGATIVE
LYMPHOCYTES # BLD AUTO: 1.43 THOUSANDS/ΜL (ref 0.6–4.47)
LYMPHOCYTES NFR BLD AUTO: 10 % (ref 14–44)
MCH RBC QN AUTO: 30.3 PG (ref 26.8–34.3)
MCHC RBC AUTO-ENTMCNC: 33.2 G/DL (ref 31.4–37.4)
MCV RBC AUTO: 91 FL (ref 82–98)
MONOCYTES # BLD AUTO: 0.73 THOUSAND/ΜL (ref 0.17–1.22)
MONOCYTES NFR BLD AUTO: 5 % (ref 4–12)
NEUTROPHILS # BLD AUTO: 12.81 THOUSANDS/ΜL (ref 1.85–7.62)
NEUTS SEG NFR BLD AUTO: 84 % (ref 43–75)
NITRITE UR QL STRIP: NEGATIVE
NON-SQ EPI CELLS URNS QL MICRO: ABNORMAL /HPF
NRBC BLD AUTO-RTO: 0 /100 WBCS
PH UR STRIP.AUTO: 5.5 [PH]
PLATELET # BLD AUTO: 174 THOUSANDS/UL (ref 149–390)
PMV BLD AUTO: 11.1 FL (ref 8.9–12.7)
POTASSIUM SERPL-SCNC: 3.6 MMOL/L (ref 3.5–5.3)
PROCALCITONIN SERPL-MCNC: 0.21 NG/ML
PROT SERPL-MCNC: 7.8 G/DL (ref 6.4–8.2)
PROT UR STRIP-MCNC: NEGATIVE MG/DL
PROTHROMBIN TIME: 14.3 SECONDS (ref 11.6–14.5)
RBC # BLD AUTO: 3.99 MILLION/UL (ref 3.81–5.12)
RBC #/AREA URNS AUTO: ABNORMAL /HPF
SARS-COV-2 RNA RESP QL NAA+PROBE: NEGATIVE
SODIUM SERPL-SCNC: 137 MMOL/L (ref 136–145)
SP GR UR STRIP.AUTO: 1.02 (ref 1–1.03)
UROBILINOGEN UR QL STRIP.AUTO: 0.2 E.U./DL
WBC # BLD AUTO: 15.06 THOUSAND/UL (ref 4.31–10.16)
WBC #/AREA URNS AUTO: ABNORMAL /HPF

## 2020-08-12 PROCEDURE — U0003 INFECTIOUS AGENT DETECTION BY NUCLEIC ACID (DNA OR RNA); SEVERE ACUTE RESPIRATORY SYNDROME CORONAVIRUS 2 (SARS-COV-2) (CORONAVIRUS DISEASE [COVID-19]), AMPLIFIED PROBE TECHNIQUE, MAKING USE OF HIGH THROUGHPUT TECHNOLOGIES AS DESCRIBED BY CMS-2020-01-R: HCPCS | Performed by: EMERGENCY MEDICINE

## 2020-08-12 PROCEDURE — 36415 COLL VENOUS BLD VENIPUNCTURE: CPT | Performed by: EMERGENCY MEDICINE

## 2020-08-12 PROCEDURE — 99223 1ST HOSP IP/OBS HIGH 75: CPT | Performed by: INTERNAL MEDICINE

## 2020-08-12 PROCEDURE — 85610 PROTHROMBIN TIME: CPT | Performed by: EMERGENCY MEDICINE

## 2020-08-12 PROCEDURE — 83605 ASSAY OF LACTIC ACID: CPT | Performed by: EMERGENCY MEDICINE

## 2020-08-12 PROCEDURE — 81001 URINALYSIS AUTO W/SCOPE: CPT | Performed by: INTERNAL MEDICINE

## 2020-08-12 PROCEDURE — 99285 EMERGENCY DEPT VISIT HI MDM: CPT | Performed by: EMERGENCY MEDICINE

## 2020-08-12 PROCEDURE — 80053 COMPREHEN METABOLIC PANEL: CPT | Performed by: EMERGENCY MEDICINE

## 2020-08-12 PROCEDURE — 83605 ASSAY OF LACTIC ACID: CPT | Performed by: INTERNAL MEDICINE

## 2020-08-12 PROCEDURE — 96365 THER/PROPH/DIAG IV INF INIT: CPT

## 2020-08-12 PROCEDURE — 84145 PROCALCITONIN (PCT): CPT | Performed by: EMERGENCY MEDICINE

## 2020-08-12 PROCEDURE — 99285 EMERGENCY DEPT VISIT HI MDM: CPT

## 2020-08-12 PROCEDURE — 85025 COMPLETE CBC W/AUTO DIFF WBC: CPT | Performed by: EMERGENCY MEDICINE

## 2020-08-12 PROCEDURE — 85730 THROMBOPLASTIN TIME PARTIAL: CPT | Performed by: EMERGENCY MEDICINE

## 2020-08-12 PROCEDURE — 87040 BLOOD CULTURE FOR BACTERIA: CPT | Performed by: EMERGENCY MEDICINE

## 2020-08-12 PROCEDURE — 71045 X-RAY EXAM CHEST 1 VIEW: CPT

## 2020-08-12 RX ORDER — ALBUTEROL SULFATE 2.5 MG/3ML
2.5 SOLUTION RESPIRATORY (INHALATION) EVERY 6 HOURS PRN
Status: DISCONTINUED | OUTPATIENT
Start: 2020-08-12 | End: 2020-08-13

## 2020-08-12 RX ORDER — IBUPROFEN 400 MG/1
400 TABLET ORAL EVERY 6 HOURS PRN
Status: DISCONTINUED | OUTPATIENT
Start: 2020-08-12 | End: 2020-08-15 | Stop reason: HOSPADM

## 2020-08-12 RX ORDER — SODIUM CHLORIDE, SODIUM GLUCONATE, SODIUM ACETATE, POTASSIUM CHLORIDE, MAGNESIUM CHLORIDE, SODIUM PHOSPHATE, DIBASIC, AND POTASSIUM PHOSPHATE .53; .5; .37; .037; .03; .012; .00082 G/100ML; G/100ML; G/100ML; G/100ML; G/100ML; G/100ML; G/100ML
1000 INJECTION, SOLUTION INTRAVENOUS ONCE
Status: DISCONTINUED | OUTPATIENT
Start: 2020-08-12 | End: 2020-08-12

## 2020-08-12 RX ORDER — IBUPROFEN 600 MG/1
600 TABLET ORAL ONCE
Status: COMPLETED | OUTPATIENT
Start: 2020-08-12 | End: 2020-08-12

## 2020-08-12 RX ORDER — SODIUM CHLORIDE, SODIUM GLUCONATE, SODIUM ACETATE, POTASSIUM CHLORIDE, MAGNESIUM CHLORIDE, SODIUM PHOSPHATE, DIBASIC, AND POTASSIUM PHOSPHATE .53; .5; .37; .037; .03; .012; .00082 G/100ML; G/100ML; G/100ML; G/100ML; G/100ML; G/100ML; G/100ML
125 INJECTION, SOLUTION INTRAVENOUS CONTINUOUS
Status: DISCONTINUED | OUTPATIENT
Start: 2020-08-12 | End: 2020-08-15

## 2020-08-12 RX ORDER — SODIUM CHLORIDE 9 MG/ML
3 INJECTION INTRAVENOUS
Status: DISCONTINUED | OUTPATIENT
Start: 2020-08-12 | End: 2020-08-15 | Stop reason: HOSPADM

## 2020-08-12 RX ORDER — ACETAMINOPHEN 325 MG/1
650 TABLET ORAL EVERY 6 HOURS PRN
Status: DISCONTINUED | OUTPATIENT
Start: 2020-08-12 | End: 2020-08-12

## 2020-08-12 RX ORDER — ONDANSETRON 2 MG/ML
4 INJECTION INTRAMUSCULAR; INTRAVENOUS EVERY 6 HOURS PRN
Status: DISCONTINUED | OUTPATIENT
Start: 2020-08-12 | End: 2020-08-15 | Stop reason: HOSPADM

## 2020-08-12 RX ADMIN — VANCOMYCIN HYDROCHLORIDE 1500 MG: 10 INJECTION, POWDER, LYOPHILIZED, FOR SOLUTION INTRAVENOUS at 12:37

## 2020-08-12 RX ADMIN — METRONIDAZOLE 500 MG: 500 INJECTION, SOLUTION INTRAVENOUS at 16:03

## 2020-08-12 RX ADMIN — SODIUM CHLORIDE, SODIUM GLUCONATE, SODIUM ACETATE, POTASSIUM CHLORIDE AND MAGNESIUM CHLORIDE 125 ML/HR: 526; 502; 368; 37; 30 INJECTION, SOLUTION INTRAVENOUS at 17:00

## 2020-08-12 RX ADMIN — IBUPROFEN 600 MG: 600 TABLET, FILM COATED ORAL at 10:59

## 2020-08-12 RX ADMIN — METRONIDAZOLE 500 MG: 500 INJECTION, SOLUTION INTRAVENOUS at 23:56

## 2020-08-12 RX ADMIN — CEFEPIME HYDROCHLORIDE 2000 MG: 2 INJECTION, POWDER, FOR SOLUTION INTRAVENOUS at 10:59

## 2020-08-12 RX ADMIN — CEFEPIME HYDROCHLORIDE 2000 MG: 2 INJECTION, POWDER, FOR SOLUTION INTRAVENOUS at 23:50

## 2020-08-12 RX ADMIN — SODIUM CHLORIDE 1000 ML: 0.9 INJECTION, SOLUTION INTRAVENOUS at 12:37

## 2020-08-12 RX ADMIN — ENOXAPARIN SODIUM 40 MG: 40 INJECTION SUBCUTANEOUS at 16:07

## 2020-08-12 RX ADMIN — IBUPROFEN 400 MG: 400 TABLET ORAL at 16:59

## 2020-08-12 RX ADMIN — OLAPARIB 300 MG: 150 TABLET, FILM COATED ORAL at 19:49

## 2020-08-12 NOTE — PROGRESS NOTES
Vancomycin IV Pharmacy-to-Dose Consultation    Cecilia Plascencia is a 45 y o  female who is currently receiving Vancomycin IV with management by the Pharmacy Consult service  Relevant clinical data and objective / subjective history reviewed  Vancomycin Assessment:  Indication: sepsis  Status: acute febrile illness  Micro: blood cultures pending 8/12  Procalcitonin: 0 21  Renal Function: JANNA: Scr elevated from baseline, Scr 1 21, baseline 0 81, CrCl 60, BUN 15  Potential Nephrotoxicity Factors (select ALL that apply):  Medications: n/a  Patient-Factors: renal dysfunction  Days of Therapy: 1  Ordered Dose: 1500 q12h  Goal Trough: 15-20 (appropriate for most indications)   Goal AUC(24h): 400-600      Vancomycin Plan:  New Dosing: give 1500mg loading dose followed by 1250mg for level less than 20  Next Level: random level AM draw 8/13  Renal Function Monitoring: Continue to monitor for changes in Scr/urine output      Pharmacy will continue to follow closely for s/sx of nephrotoxicity, infusion reactions and appropriateness of therapy  BMP and CBC will be ordered per protocol  We will continue to follow the patients culture results and clinical progress daily  Alize Dill III, Pharm  D

## 2020-08-12 NOTE — SEPSIS NOTE
Sepsis Note   Je Bench 45 y o  female MRN: [id number]  Unit/Bed#: ED 16 Encounter: [id number]      qSOFA     Row Name 08/12/20 1138 08/12/20 1020             Altered mental status GCS < 15           Respiratory Rate > / =22    0       Systolic BP < / =653  0  0       Q Sofa Score  0  0           Initial Sepsis Screening     Row Name 08/12/20 1219                Is the patient's history suggestive of a new or worsening infection? (!) Yes (Proceed)  -DP        Suspected source of infection  suspect infection, source unknown  -DP        Are two or more of the following signs & symptoms of infection both present and new to the patient? (!) Yes (Proceed)  -DP        Indicate SIRS criteria  Hyperthemia > 38 3C (100 9F); Tachypnea > 20 resp per min; Tachycardia > 90 bpm  -DP        If the answer is yes to both questions, suspicion of sepsis is present          If severe sepsis is present AND tissue hypoperfusion perists in the hour after fluid resuscitation or lactate > 4, the patient meets criteria for SEPTIC SHOCK          Are any of the following organ dysfunction criteria present within 6 hours of suspected infection and SIRS criteria that are NOT considered to be chronic conditions?         Organ dysfunction          Date of presentation of severe sepsis          Time of presentation of severe sepsis          Tissue hypoperfusion persists in the hour after crystalloid fluid administration, evidenced, by either:  Decrease in SBP by > 40 points mm/Hg  -DP        Was hypotension present within one hour of the conclusion of crystalloid fluid administration?   No  -DP        Date of presentation of septic shock          Time of presentation of septic shock            User Key  (r) = Recorded By, (t) = Taken By, (c) = Cosigned By    [address] Name Provider Type    DP Shalini Max MD Physician               [facility] (last 720 hours)      Sepsis Reassess     Row Name 08/12/20 5664 Repeat Volume Status and Tissue Perfusion Assessment Performed    Repeat Volume Status and Tissue Perfusion Assessment Performed  Yes  -DP           Volume Status and Tissue Perfusion Post Fluid Resuscitation * Must Document All *    Vital Signs Reviewed (HR, RR, BP, T)  Yes  -DP        Shock Index Reviewed          Arterial Oxygen Saturation Reviewed (POx, SaO2 or SpO2)  Yes (comment %)  -DP        Cardio  Normal S1/S2; Regular rate and rhythm  -DP        Pulmonary  Normal effort  -DP        Capillary Refill  Brisk  -DP        Peripheral Pulses  Radial  -DP        Peripheral Pulse  +2  -DP        Skin  Warm  -DP        Urine output assessed  Adequate  -DP           *OR*   Intensive Monitoring- Must Document One of the Following Four *:    Vital Signs Reviewed          * Central Venous Pressure (CVP or RAP)          * Central Venous Oxygen (SVO2, ScvO2 or Oxygen saturation via central catheter)          * Bedside Cardiovascular US in IVC diameter and % collapse          * Passive Leg Raise OR Crystalloid Challenge            User Key  (r) = Recorded By, (t) = Taken By, (c) = Cosigned By    Initials Name Provider Type    DP Elba Queen MD Physician

## 2020-08-12 NOTE — SEPSIS NOTE
Sepsis Note   Margarito Pap 45 y o  female MRN: [id number]  Unit/Bed#: ED 16 Encounter: [id number]      qSOFA     Row Name 08/12/20 1138 08/12/20 1020             Altered mental status GCS < 15           Respiratory Rate > / =22    0       Systolic BP < / =580  0  0       Q Sofa Score  0  0

## 2020-08-12 NOTE — H&P
H&P- René Glass 1970, 45 y o  female MRN: [id number]    Unit/Bed#: ED 16 Encounter: [id number]    Primary Care Provider: EMILIANO Renteria   Date and time admitted to hospital: 8/12/2020 10:13 AM        * Febrile illness, acute  Assessment & Plan  Patient presented with SIRS criteria, fever, tachycardia, leukocytosis and elevated lactic acid  Unknown source  Rule out sepsis  Patient with underlying metastatic breast cancer on chemotherapy  Covid test is negative  Normal chest x-ray  Check UA  Start broad-spectrum IV antibiotic empirically  Follow on blood cultures  Continue supportive care    Malignant neoplasm of central portion of right breast in female, estrogen receptor positive ([facility] [address] )  Assessment & Plan  Status post positive right mastectomy on 06/05/2020  Well healed surgical site  Currently on oral chemotherapy  Outpatient follow-up    VTE Prophylaxis: Enoxaparin (Lovenox)  / sequential compression device   Code Status: Full  POLST: POLST form is completed  Discussion with family: none    Anticipated Length of Stay:  Patient will be admitted on an Inpatient basis with an anticipated length of stay of  > 2 midnights  Justification for Hospital Stay: Sepsis, IV Abx, work-up for a possible source of infection     Total Time for Visit, including Counseling / Coordination of Care: 1 hour  Greater than 50% of this total time spent on direct patient counseling and coordination of care  Chief Complaint: Fever  History of Present Illness:    René Glass is a 45 y o  female with a history of metastatic breast cancer to liver s/p right mastectomy on 06/05/2020 who presents with fever of unknown source since yesterday evening  States that she felt warm and checked her temperature which showed temps of 101 - 102 F  yesterday and this morning  She adds that she has chills and nausea also  Denies any head ache, vomiting, diarrhea, cough, chest pain, fatigue, weakness, wheezing, or dysuria   She is currently on PO chemotherapy  No catheters or lines  Her last hospital admission was on 6/5/2020 for mastectomy of right breast  States she quit smoking tobacco three years ago  Patient was evaluated in the emergency room, found to be febrile with leukocytosis, no neutropenia  Also found to have elevated lactic acid and tachycardia    Review of Systems:    Review of Systems   Constitutional: Positive for chills and fever  Negative for appetite change  HENT: Negative for sore throat  Respiratory: Negative for chest tightness and shortness of breath  Cardiovascular: Negative for chest pain, palpitations and leg swelling  Gastrointestinal: Positive for nausea  Negative for abdominal pain, blood in stool, diarrhea and vomiting  Endocrine: Negative for polyuria  Genitourinary: Negative for difficulty urinating and dysuria  Neurological: Negative for dizziness, speech difficulty and headaches  All other systems reviewed and are negative        Past Medical and Surgical History:     Past Medical History:   Diagnosis Date    Bloating     Bruises easily     Cancer ([address] )     right breast//to lymph nodes/liver/ and bone    Depression     History of cancer chemotherapy 05/2020    History of pneumonia     Hypotension     occas    Low iron     "when pregnant, 20 yrs ago"    Neuropathy     hands//fingers    Shortness of breath     occas    Tinnitus     right    Wears glasses        Past Surgical History:   Procedure Laterality Date    BILATERAL SALPINGOOPHORECTOMY      BREAST BIOPSY      IR IMAGE GUIDED BIOPSY/ASPIRATION LIVER  6/11/2019    MYOMECTOMY      H/O FIBROIDS, NO SURGERY NEEDED    PA LAP,RMV  ADNEXAL STRUCTURE N/A 7/29/2019    Procedure: LAPAROSCOPIC BILATERAL SALPINGO-OOPHORECTOMY;  Surgeon: Shun Spencer MD;  [facility];  Service: Gynecology Oncology    PA MASTECTOMY, SIMPLE, COMPLETE Right 6/5/2020    Procedure: MASTECTOMY SIMPLE, axillary node dissection;  Surgeon: Marissa Murillo MD;  Location[facility];  Service: Surgical Oncology    US GUIDED BREAST BIOPSY RIGHT COMPLETE Right 5/3/2019    US GUIDED BREAST LYMPH NODE BIOPSY RIGHT Right 5/3/2019       Meds/Allergies:    Prior to Admission medications    Medication Sig Start Date End Date Taking? Authorizing Provider   cephalexin (KEFLEX) 500 mg capsule TK 1 C PO TID 6/20/20   Historical Provider, MD   CRANIAL PROSTHESIS, RX, One wig as needed  Patient not taking: Reported on 8/10/2020 1/2/20   Tho Daley MD   fluticasone-salmeterol (ADVAIR, Obadiah Prier) 250-50 mcg/dose inhaler Inhale 1 puff 2 (two) times a day Rinse mouth after use  8/11/20   EMILIANO Gilmore   loratadine (CLARITIN) 10 mg tablet Take 10 mg by mouth daily    Historical Provider, MD donohue [facility] [address]) tablet Take 2 tablets (300 mg total) by mouth 2 (two) times a day 6/9/20   Tho Daley MD   ondansetron (ZOFRAN) 8 mg tablet Take 1 tablet (8 mg total) by mouth every 8 (eight) hours as needed for nausea or vomiting  Patient not taking: Reported on 8/10/2020 2/13/20   Tho Daley MD   oxyCODONE (ROXICODONE) 10 MG TABS Take 1 tablet (10 mg total) by mouth every 6 (six) hours as needed for moderate painMax Daily Amount: 40 mg  Patient not taking: Reported on 8/10/2020 2/21/20   Tho Daley MD     I have reviewed home medications with patient personally  Allergies:    Allergies   Allergen Reactions    Tylenol [Acetaminophen]      Told to avoid due to cancer        Social History:     Marital Status: Single     Substance Use History:   Social History     Substance and Sexual Activity   Alcohol Use Yes    Frequency: Monthly or less    Drinks per session: 1 or 2    Comment: occassional     Social History     Tobacco Use   Smoking Status Former Smoker    Packs/day: 1 00    Years: 30 00    Pack years: 30 00    Last attempt to quit: 2017    Years since quitting: 3 6   Smokeless Tobacco Never Used     Social History     Substance and Sexual Activity   Drug Use No       Family History:    Family History   Problem Relation Age of Onset    Hypotension Mother     Colon cancer Father 36    Hypertension Father     Prostate cancer Father 79    Throat cancer Maternal Grandmother 61    Cancer Maternal Grandmother     Breast cancer Paternal Aunt         age unknown     Physical Exam:     Vitals:   Blood Pressure: 109/53 (08/12/20 1241)  Pulse: 99 (08/12/20 1241)  Temperature: (!) 102 5 °F (39 2 °C) (08/12/20 1020)  Temp Source: Oral (08/12/20 1020)  Respirations: 18 (08/12/20 1241)  Weight - Scale: 99 8 kg (220 lb) (08/12/20 1020)  SpO2: 95 % (08/12/20 1241)    Physical Exam  Vitals signs reviewed  Constitutional:       General: She is not in acute distress  Appearance: Normal appearance  She is not ill-appearing  HENT:      Head: Normocephalic and atraumatic  Mouth/Throat:      Mouth: Mucous membranes are moist       Pharynx: No oropharyngeal exudate  Eyes:      Extraocular Movements: Extraocular movements intact  Conjunctiva/sclera: Conjunctivae normal       Pupils: Pupils are equal, round, and reactive to light  Neck:      Musculoskeletal: Normal range of motion and neck supple  Cardiovascular:      Rate and Rhythm: Regular rhythm  Pulses: Normal pulses  Heart sounds: Normal heart sounds  No murmur  Pulmonary:      Effort: Pulmonary effort is normal       Breath sounds: Normal breath sounds  No wheezing  Abdominal:      General: Bowel sounds are normal  There is no distension  Palpations: Abdomen is soft  Tenderness: There is no abdominal tenderness  Musculoskeletal: Normal range of motion  Skin:     General: Skin is warm and dry  Findings: No rash  Comments: Status post Mastectomy site on right anterior chest wall without erythema or tenderness  Neurological:      General: No focal deficit present  Mental Status: She is alert and oriented to person, place, and time  Cranial Nerves: No cranial nerve deficit  Psychiatric:         Mood and Affect: Mood normal          Additional Data:     Lab Results: I have personally reviewed pertinent reports  Results from last 7 days   Lab Units 08/12/20  1104   WBC Thousand/uL 15 06*   HEMOGLOBIN g/dL 12 1   HEMATOCRIT % 36 4   PLATELETS Thousands/uL 174   NEUTROS PCT % 84*   LYMPHS PCT % 10*   MONOS PCT % 5   EOS PCT % 0     Results from last 7 days   Lab Units 08/12/20  1104   SODIUM mmol/L 137   POTASSIUM mmol/L 3 6   CHLORIDE mmol/L 104   CO2 mmol/L 25   BUN mg/dL 15   CREATININE mg/dL 1 21   ANION GAP mmol/L 8   CALCIUM mg/dL 8 8   ALBUMIN g/dL 3 2*   TOTAL BILIRUBIN mg/dL 0 77   ALK PHOS U/L 88   ALT U/L 30   AST U/L 16   GLUCOSE RANDOM mg/dL 134     Results from last 7 days   Lab Units 08/12/20  1104   INR  1 11             Results from last 7 days   Lab Units 08/12/20  1104   LACTIC ACID mmol/L 3 5*   PROCALCITONIN ng/ml 0 21       Imaging: I have personally reviewed pertinent reports  XR chest 1 view portable   Final Result by Greer Calderón MD (08/12 1300)      No acute cardiopulmonary disease  Workstation performed: [id number]             EKG, Pathology, and Other Studies Reviewed on Admission:   · YES    Allsc[facility] [place] [facility]\"" / Epic Records Reviewed: Yes     ** Please Note: This note has been constructed using a voice recognition system   **

## 2020-08-12 NOTE — ASSESSMENT & PLAN NOTE
Patient presented with SIRS criteria, fever, tachycardia, leukocytosis and elevated lactic acid  Unknown source  Rule out sepsis  Patient with underlying metastatic breast cancer on chemotherapy  Covid test is negative  Normal chest x-ray  Check UA  Start broad-spectrum IV antibiotic empirically  Follow on blood cultures  Continue supportive care

## 2020-08-12 NOTE — ED PROVIDER NOTES
Emergency Department Note- Cristina Man 45 y o  female MRN: [id number]    Unit/Bed#: [facility] 923-01 Encounter: [id number]        History of Present Illness   HPI:  Cristina Man is a 45 y o  female who presents with fever and chills started yesterday approximately 3:30 p m  When she was driving home from an appointment    The patient has no other symptoms other than mild myalgias she has no complaints of cough no headache no neck pain no vomiting no diarrhea no dysuria or hematuria  Patient has had a mastectomy of her right breast in June of this year she had a lymph node dissection she has metastatic disease to her liver and lymph nodes  She has had no cough no known COVID exposure  The patient took Advil for her fever she called her oncologist who told her to come to the emergency room  The surgical site has not caused her any pain and there has been no drainage  Review of Systems  REVIEW OF SYSTEMS  Constitutional:  positive fever, positive chills, no weight loss   Eyes:  Denies visual changes, denies eye pain    HENT:  Denies nasal congestion or sore throat   Respiratory:  Denies cough or shortness of breath, denies hemoptysis    Cardiovascular:  Denies chest pain, palpitations, or leg edema    GI:  Denies abdominal pain, nausea, vomiting, bloody stools, melena, or diarrhea   :  Denies dysuria, hematuria, polyuria   Musculoskeletal:  Denies back pain or joint pain   Integument:  Denies rash, denies color change    Neurologic:  Denies headache, focal weakness or sensory changes   Endocrine:  Denies polyuria or polydipsia   Lymphatic:  Denies swollen glands   Psychiatric:  Denies depression or anxiety     All system reviewed and negative except as noted above or in HPI    Historical Information   Past Medical History:   Diagnosis Date    Bloating     Bruises easily     Cancer ([facility] [address] )     right breast//to lymph nodes/liver/ and bone    Depression     History of cancer chemotherapy 05/2020    History of pneumonia     Hypotension     occas    Low iron     "when pregnant, 20 yrs ago"    Neuropathy     hands//fingers    Shortness of breath     occas    Tinnitus     right    Wears glasses      Past Surgical History:   Procedure Laterality Date    BILATERAL SALPINGOOPHORECTOMY      BREAST BIOPSY      IR IMAGE GUIDED BIOPSY/ASPIRATION LIVER  6/11/2019    MYOMECTOMY      H/O FIBROIDS, NO SURGERY NEEDED    DC LAP,RMV  ADNEXAL STRUCTURE N/A 7/29/2019    Procedure: LAPAROSCOPIC BILATERAL SALPINGO-OOPHORECTOMY;  Surgeon: Braulio Riley MD;  Location[facility];  Service: Gynecology Oncology    DC MASTECTOMY, SIMPLE, COMPLETE Right 6/5/2020    Procedure: MASTECTOMY SIMPLE, axillary node dissection;  Surgeon: Aldair Brooks MD;  Location[facility];  Service: Surgical Oncology    US GUIDED BREAST BIOPSY RIGHT COMPLETE Right 5/3/2019    US GUIDED BREAST LYMPH NODE BIOPSY RIGHT Right 5/3/2019     Social History   Social History     Substance and Sexual Activity   Alcohol Use Yes    Frequency: Monthly or less    Drinks per session: 1 or 2    Comment: occassional     Social History     Substance and Sexual Activity   Drug Use No     Social History     Tobacco Use   Smoking Status Former Smoker    Packs/day: 1 00    Years: 30 00    Pack years: 30 00    Last attempt to quit: 2017    Years since quitting: 3 6   Smokeless Tobacco Never Used     Family History:   Family History   Problem Relation Age of Onset    Hypotension Mother     Colon cancer Father 36    Hypertension Father     Prostate cancer Father 79    Throat cancer Maternal Grandmother 61    Cancer Maternal Grandmother     Breast cancer Paternal Aunt         age unknown       Meds/Allergies   No medications prior to admission  Allergies   Allergen Reactions    Tylenol [Acetaminophen]      Told to avoid due to cancer        Objective   Vitals: Blood pressure 117/63, pulse 78, temperature 98 5 °F (36 9 °C), resp   rate 18, height 5' 6" (1 676 m), weight 100 kg (220 lb 10 9 oz), SpO2 99 %  PHYSICAL EXAM  Constitutional:  Well developed, well nourished, no acute distress, non toxic appearance   Eyes:   PERRL, EOMI, conjunctiva normal, sclera anicteric, no proptosis   HENT:  Atraumatic, external ears normal, nose normal, oropharynx moist, no pharyngeal exudates  Neck  no JVD, no bruits,  normal range of motion, no tenderness, supple, thyroid normal    Respiratory:  No respiratory distress, normal breath sounds B/L, no rales, no wheezing     Cardiovascular:  NSR, no M/G/R  GI:  Soft,  Normal BS,  ND,  NT,  No mass or bruits   :  No costovertebral angle tenderness   Extremities: No edema, no tenderness, no deformities  Normal pulses   Musculoskeletal:   Back - no midline tenderness,  FROM  Upper and lower extremities   SKIN:   no rash, warm and dry    Neurologic:  Alert & oriented x 3, CN 2-12 intact, normal motor function, normal sensory function, no focal deficits noted, gait normal   Psychiatric:  Speech and behavior appropriate normal judgement and insight      Lab Results: Lab Results: I have personally reviewed pertinent lab results    Labs Reviewed   CBC AND DIFFERENTIAL - Abnormal       Result Value Ref Range Status    WBC 15 06 (*) 4 31 - 10 16 Thousand/uL Final    RBC 3 99  3 81 - 5 12 Million/uL Final    Hemoglobin 12 1  11 5 - 15 4 g/dL Final    Hematocrit 36 4  34 8 - 46 1 % Final    MCV 91  82 - 98 fL Final    MCH 30 3  26 8 - 34 3 pg Final    MCHC 33 2  31 4 - 37 4 g/dL Final    RDW 17 2 (*) 11 6 - 15 1 % Final    MPV 11 1  8 9 - 12 7 fL Final    Platelets 980  724 - 390 Thousands/uL Final    nRBC 0  /100 WBCs Final    Neutrophils Relative 84 (*) 43 - 75 % Final    Immat GRANS % 1  0 - 2 % Final    Lymphocytes Relative 10 (*) 14 - 44 % Final    Monocytes Relative 5  4 - 12 % Final    Eosinophils Relative 0  0 - 6 % Final    Basophils Relative 0  0 - 1 % Final    Neutrophils Absolute 12 81 (*) 1 85 - 7 62 Thousands/µL Final    Immature Grans Absolute 0 08  0 00 - 0 20 Thousand/uL Final    Lymphocytes Absolute 1 43  0 60 - 4 47 Thousands/µL Final    Monocytes Absolute 0 73  0 17 - 1 22 Thousand/µL Final    Eosinophils Absolute 0 00  0 00 - 0 61 Thousand/µL Final    Basophils Absolute 0 01  0 00 - 0 10 Thousands/µL Final   COMPREHENSIVE METABOLIC PANEL - Abnormal    Sodium 137  136 - 145 mmol/L Final    Potassium 3 6  3 5 - 5 3 mmol/L Final    Chloride 104  100 - 108 mmol/L Final    CO2 25  21 - 32 mmol/L Final    ANION GAP 8  4 - 13 mmol/L Final    BUN 15  5 - 25 mg/dL Final    Creatinine 1 21  0 60 - 1 30 mg/dL Final    Comment: Standardized to IDMS reference method    Glucose 134  65 - 140 mg/dL Final    Comment: If the patient is fasting, the ADA then defines impaired fasting glucose as > 100 mg/dL and diabetes as > or equal to 123 mg/dL  Specimen collection should occur prior to Sulfasalazine administration due to the potential for falsely depressed results  Specimen collection should occur prior to Sulfapyridine administration due to the potential for falsely elevated results  Calcium 8 8  8 3 - 10 1 mg/dL Final    AST 16  5 - 45 U/L Final    Comment: Specimen collection should occur prior to Sulfasalazine administration due to the potential for falsely depressed results  ALT 30  12 - 78 U/L Final    Comment: Specimen collection should occur prior to Sulfasalazine and/or Sulfapyridine administration due to the potential for falsely depressed results  Alkaline Phosphatase 88  46 - 116 U/L Final    Total Protein 7 8  6 4 - 8 2 g/dL Final    Albumin 3 2 (*) 3 5 - 5 0 g/dL Final    Total Bilirubin 0 77  0 20 - 1 00 mg/dL Final    Comment: Use of this assay is not recommended for patients undergoing treatment with eltrombopag due to the potential for falsely elevated results      eGFR 52  ml/min/1 73sq m Final    Narrative:     [place] guidelines for Chronic Kidney Disease (CKD):     Stage 1 with normal or high GFR (GFR > 90 mL/min/1 73 square meters)    Stage 2 Mild CKD (GFR = 60-89 mL/min/1 73 square meters)    Stage 3A Moderate CKD (GFR = 45-59 mL/min/1 73 square meters)    Stage 3B Moderate CKD (GFR = 30-44 mL/min/1 73 square meters)    Stage 4 Severe CKD (GFR = 15-29 mL/min/1 73 square meters)    Stage 5 End Stage CKD (GFR <15 mL/min/1 73 square meters)  Note: GFR calculation is accurate only with a steady state creatinine   LACTIC ACID, PLASMA - Abnormal    LACTIC ACID 3 5 (*) 0 5 - 2 0 mmol/L Final    Narrative:     Result may be elevated if tourniquet was used during collection  NOVEL CORONAVIRUS (COVID-19), PCR [facility] - Normal    SARS-CoV-2 Negative  Negative Final    Narrative: The specimen collection materials, transport medium, and/or testing methodology utilized in the production of these test results have been proven to be reliable in a limited validation with an abbreviated program under the Emergency Utilization Authorization provided by the FDA  Testing reported as "Presumptive positive" will be confirmed with secondary testing with a reference laboratory to ensure result accuracy  Clinical caution and judgement should be used with the interpretation of these results with consideration of the clinical impression and other laboratory testing  Testing reported as "Positive" or "Negative" has been proven to be accurate according to standard laboratory validation requirements  All testing is performed with control materials showing appropriate reactivity at standard intervals       PROTIME-INR - Normal    Protime 14 3  11 6 - 14 5 seconds Final    INR 1 11  0 84 - 1 19 Final   APTT - Normal    PTT 27  23 - 37 seconds Final    Comment: Therapeutic Heparin Range =  60-90 seconds   PROCALCITONIN TEST - Normal    Procalcitonin 0 21  <=0 25 ng/ml Final    Comment: Suspected Lower Respiratory Tract Infection (LRTI):  - LESS than or EQUAL to 0 25 ng/mL:   low likelihood for bacterial LRTI; antibiotics DISCOURAGED   - GREATER than 0 25 ng/mL:   increased likelihood for bacterial LRTI; antibiotics ENCOURAGED  Suspected Sepsis:  - Strongly consider initiating antibiotics in ALL UNSTABLE patients  - LESS than or EQUAL to 0 5 ng/mL:   low likelihood for bacterial sepsis; antibiotics DISCOURAGED   - GREATER than 0 5 ng/mL:   increased likelihood for bacterial sepsis; antibiotics ENCOURAGED   - GREATER than 2 ng/mL:   high risk for severe sepsis / septic shock; antibiotics strongly ENCOURAGED  Decisions on antibiotic use should not be based solely on Procalcitonin (PCT) levels  If PCT is low but uncertainty exists with stopping antibiotics, repeat PCT in 6-24 hours to confirm the low level  If antibiotics are administered (regardless if initial PCT was high or low), repeat PCT every 1-2 days to consider early antibiotic cessation (when GREATER than 80% decrease from the peak OR when PCT drops below designated cutoffs, whichever comes first), so long as the infection is NOT one that typically requires prolonged treatment durations (e g , bone/joint infections, endocarditis, Staph  aureus bacteremia)      Situations of FALSE-POSITIVE Procalcitonin values:  1) Newborns < 67 hours old  2) Massive stress from severe trauma / burns, major surgery, acute pancreatitis, cardiogenic / hemorrhagic shock, sickle cell crisis, or other organ perfusion abnormalities  3) Malaria and some Candidal infections  4) Treatment with agents that stimulate cytokines (e g , OKT3, anti-lymphocyte globulins, alemtuzumab, IL-2, granulocyte transfusion [NOT GCSFs])  5) Chronic renal disease causes elevated baseline levels (consider GREATER than 0 75 ng/mL as an abnormal cut-off); initiating HD/CRRT may cause transient decreases  6) Paraneoplastic syndromes from medullary thyroid or SCLC, some forms of vasculitis, and acute [id number] disease    Situations of FALSE-NEGATIVE Procalcitonin values:  1) Too early in clinical course for PCT to have reached its peak (may repeat in 6-24 hours to confirm low level)  2) Localized infection WITHOUT systemic (SIRS / sepsis) response (e g , an abscess, osteomyelitis, cystitis)  3) Mycobacteria (e g , Tuberculosis, MAC)  4) Cystic fibrosis exacerbations       Imaging: I have personally reviewed pertinent reports  XR chest 1 view portable   Final Result      No acute cardiopulmonary disease              Workstation performed: [id number]           EKG, Pathology, and Other Studies: I have personally reviewed pertinent films in PACS       Assessment/Plan     ED Medical Decision Making:  Concern for bacteremia   Planned septic workup  IV fluid  Antipyretics  Empiric antibiotics         Anjel Faye MD  08/16/20 2481

## 2020-08-12 NOTE — SOCIAL WORK
LOS: 0  Not a bundle  Readmission risk: Green  Met with pt and discussed role of CM  Pt lives with her son (25 y o ) in a 1-story home with 3 steps at entrance  Pt is independent in ADLs  No DME  Pt has hx [facility]  Preference for pharmacy is [facility] on [address]  No MH/D&A tx hx  PCP- [facility], [address] ([phone])  Pt reports her friend Shannon Wakefield as [place] ([phone])  Main contact: Brother- Adair Mcelroy ([phone])  Anticipate family/friend transport  CM reviewed d/c planning process including the following: identifying help at home, patient preference for d/c planning needs, Discharge Lounge, [facility] Meds to Bed program, availability of treatment team to discuss questions or concerns patient and/or family may have regarding understanding medications and recognizing signs and symptoms once discharged  CM also encouraged patient to follow up with all recommended appointments after discharge  Patient advised of importance for patient and family to participate in managing patients medical well being  Patient/caregiver received discharge checklist  Content reviewed  Patient/caregiver encouraged to participate in discharge plan of care prior to discharge home

## 2020-08-12 NOTE — ASSESSMENT & PLAN NOTE
Status post positive right mastectomy on 06/05/2020  Well healed surgical site  Currently on oral chemotherapy  Outpatient follow-up

## 2020-08-12 NOTE — PLAN OF CARE
Problem: Potential for Falls  Goal: Patient will remain free of falls  Description: INTERVENTIONS:  - Assess patient frequently for physical needs  -  Identify cognitive and physical deficits and behaviors that affect risk of falls    -  [place] fall precautions as indicated by assessment   - Educate patient/family on patient safety including physical limitations  - Instruct patient to call for assistance with activity based on assessment  - Modify environment to reduce risk of injury  - Consider OT/PT consult to assist with strengthening/mobility  Outcome: Progressing     Problem: INFECTION - ADULT  Goal: Absence or prevention of progression during hospitalization  Description: INTERVENTIONS:  - Assess and monitor for signs and symptoms of infection  - Monitor lab/diagnostic results  - Monitor all insertion sites, i e  indwelling lines, tubes, and drains  - Monitor endotracheal if appropriate and nasal secretions for changes in amount and color  - [place] appropriate cooling/warming therapies per order  - Administer medications as ordered  - Instruct and encourage patient and family to use good hand hygiene technique  - Identify and instruct in appropriate isolation precautions for identified infection/condition  Outcome: Progressing

## 2020-08-12 NOTE — NURSING NOTE
Notified Dr Shyanne Pollard that pts temp is 102 9 approximately 1 hr after motrin given (was 102 8)  Pt packed with ice now  Dr Shyanne Pollard offered tylenol but pt refusing as her oncologist has told her not to take tylenol because of the mass on her liver  Will recheck temp again at 1900

## 2020-08-13 LAB
ANION GAP SERPL CALCULATED.3IONS-SCNC: 8 MMOL/L (ref 4–13)
BASOPHILS # BLD AUTO: 0.01 THOUSANDS/ΜL (ref 0–0.1)
BASOPHILS NFR BLD AUTO: 0 % (ref 0–1)
BUN SERPL-MCNC: 11 MG/DL (ref 5–25)
CALCIUM SERPL-MCNC: 7.7 MG/DL (ref 8.3–10.1)
CHLORIDE SERPL-SCNC: 107 MMOL/L (ref 100–108)
CO2 SERPL-SCNC: 24 MMOL/L (ref 21–32)
CREAT SERPL-MCNC: 0.86 MG/DL (ref 0.6–1.3)
EOSINOPHIL # BLD AUTO: 0 THOUSAND/ΜL (ref 0–0.61)
EOSINOPHIL NFR BLD AUTO: 0 % (ref 0–6)
ERYTHROCYTE [DISTWIDTH] IN BLOOD BY AUTOMATED COUNT: 17.5 % (ref 11.6–15.1)
GFR SERPL CREATININE-BSD FRML MDRD: 78 ML/MIN/1.73SQ M
GLUCOSE SERPL-MCNC: 115 MG/DL (ref 65–140)
HCT VFR BLD AUTO: 32.1 % (ref 34.8–46.1)
HGB BLD-MCNC: 10.1 G/DL (ref 11.5–15.4)
IMM GRANULOCYTES # BLD AUTO: 0.13 THOUSAND/UL (ref 0–0.2)
IMM GRANULOCYTES NFR BLD AUTO: 1 % (ref 0–2)
LYMPHOCYTES # BLD AUTO: 0.91 THOUSANDS/ΜL (ref 0.6–4.47)
LYMPHOCYTES NFR BLD AUTO: 6 % (ref 14–44)
MAGNESIUM SERPL-MCNC: 2.3 MG/DL (ref 1.6–2.6)
MCH RBC QN AUTO: 29.3 PG (ref 26.8–34.3)
MCHC RBC AUTO-ENTMCNC: 31.5 G/DL (ref 31.4–37.4)
MCV RBC AUTO: 93 FL (ref 82–98)
MONOCYTES # BLD AUTO: 1.03 THOUSAND/ΜL (ref 0.17–1.22)
MONOCYTES NFR BLD AUTO: 7 % (ref 4–12)
NEUTROPHILS # BLD AUTO: 12.14 THOUSANDS/ΜL (ref 1.85–7.62)
NEUTS SEG NFR BLD AUTO: 86 % (ref 43–75)
NRBC BLD AUTO-RTO: 0 /100 WBCS
PHOSPHATE SERPL-MCNC: 1.6 MG/DL (ref 2.7–4.5)
PLATELET # BLD AUTO: 133 THOUSANDS/UL (ref 149–390)
PMV BLD AUTO: 11.7 FL (ref 8.9–12.7)
POTASSIUM SERPL-SCNC: 3.3 MMOL/L (ref 3.5–5.3)
PROCALCITONIN SERPL-MCNC: 0.16 NG/ML
RBC # BLD AUTO: 3.45 MILLION/UL (ref 3.81–5.12)
SARS-COV-2 RNA RESP QL NAA+PROBE: NEGATIVE
SODIUM SERPL-SCNC: 139 MMOL/L (ref 136–145)
VANCOMYCIN SERPL-MCNC: 2.9 UG/ML
WBC # BLD AUTO: 14.22 THOUSAND/UL (ref 4.31–10.16)

## 2020-08-13 PROCEDURE — 84145 PROCALCITONIN (PCT): CPT | Performed by: INTERNAL MEDICINE

## 2020-08-13 PROCEDURE — 80048 BASIC METABOLIC PNL TOTAL CA: CPT | Performed by: INTERNAL MEDICINE

## 2020-08-13 PROCEDURE — U0003 INFECTIOUS AGENT DETECTION BY NUCLEIC ACID (DNA OR RNA); SEVERE ACUTE RESPIRATORY SYNDROME CORONAVIRUS 2 (SARS-COV-2) (CORONAVIRUS DISEASE [COVID-19]), AMPLIFIED PROBE TECHNIQUE, MAKING USE OF HIGH THROUGHPUT TECHNOLOGIES AS DESCRIBED BY CMS-2020-01-R: HCPCS | Performed by: INTERNAL MEDICINE

## 2020-08-13 PROCEDURE — 99254 IP/OBS CNSLTJ NEW/EST MOD 60: CPT | Performed by: INTERNAL MEDICINE

## 2020-08-13 PROCEDURE — 99232 SBSQ HOSP IP/OBS MODERATE 35: CPT | Performed by: INTERNAL MEDICINE

## 2020-08-13 PROCEDURE — 80202 ASSAY OF VANCOMYCIN: CPT | Performed by: INTERNAL MEDICINE

## 2020-08-13 PROCEDURE — 84100 ASSAY OF PHOSPHORUS: CPT | Performed by: INTERNAL MEDICINE

## 2020-08-13 PROCEDURE — 83735 ASSAY OF MAGNESIUM: CPT | Performed by: INTERNAL MEDICINE

## 2020-08-13 PROCEDURE — 85025 COMPLETE CBC W/AUTO DIFF WBC: CPT | Performed by: INTERNAL MEDICINE

## 2020-08-13 RX ORDER — ALBUTEROL SULFATE 90 UG/1
2 AEROSOL, METERED RESPIRATORY (INHALATION) EVERY 4 HOURS PRN
Status: DISCONTINUED | OUTPATIENT
Start: 2020-08-13 | End: 2020-08-15 | Stop reason: HOSPADM

## 2020-08-13 RX ADMIN — OLAPARIB 300 MG: 150 TABLET, FILM COATED ORAL at 18:01

## 2020-08-13 RX ADMIN — CEFEPIME HYDROCHLORIDE 2000 MG: 2 INJECTION, POWDER, FOR SOLUTION INTRAVENOUS at 23:53

## 2020-08-13 RX ADMIN — METRONIDAZOLE 500 MG: 500 INJECTION, SOLUTION INTRAVENOUS at 08:16

## 2020-08-13 RX ADMIN — VANCOMYCIN HYDROCHLORIDE 1500 MG: 10 INJECTION, POWDER, LYOPHILIZED, FOR SOLUTION INTRAVENOUS at 09:02

## 2020-08-13 RX ADMIN — IBUPROFEN 400 MG: 400 TABLET ORAL at 07:15

## 2020-08-13 RX ADMIN — CEFEPIME HYDROCHLORIDE 2000 MG: 2 INJECTION, POWDER, FOR SOLUTION INTRAVENOUS at 10:42

## 2020-08-13 RX ADMIN — SODIUM CHLORIDE, SODIUM GLUCONATE, SODIUM ACETATE, POTASSIUM CHLORIDE AND MAGNESIUM CHLORIDE 125 ML/HR: 526; 502; 368; 37; 30 INJECTION, SOLUTION INTRAVENOUS at 14:47

## 2020-08-13 RX ADMIN — METRONIDAZOLE 500 MG: 500 INJECTION, SOLUTION INTRAVENOUS at 16:48

## 2020-08-13 RX ADMIN — OLAPARIB 300 MG: 150 TABLET, FILM COATED ORAL at 05:38

## 2020-08-13 RX ADMIN — ENOXAPARIN SODIUM 40 MG: 40 INJECTION SUBCUTANEOUS at 09:04

## 2020-08-13 RX ADMIN — VANCOMYCIN HYDROCHLORIDE 1500 MG: 10 INJECTION, POWDER, LYOPHILIZED, FOR SOLUTION INTRAVENOUS at 21:51

## 2020-08-13 RX ADMIN — SODIUM CHLORIDE, SODIUM GLUCONATE, SODIUM ACETATE, POTASSIUM CHLORIDE AND MAGNESIUM CHLORIDE 125 ML/HR: 526; 502; 368; 37; 30 INJECTION, SOLUTION INTRAVENOUS at 04:10

## 2020-08-13 NOTE — PLAN OF CARE
Problem: Potential for Falls  Goal: Patient will remain free of falls  Description: INTERVENTIONS:  - Assess patient frequently for physical needs  -  Identify cognitive and physical deficits and behaviors that affect risk of falls    -  [place] fall precautions as indicated by assessment   - Educate patient/family on patient safety including physical limitations  - Instruct patient to call for assistance with activity based on assessment  - Modify environment to reduce risk of injury  - Consider OT/PT consult to assist with strengthening/mobility  Outcome: Progressing     Problem: INFECTION - ADULT  Goal: Absence or prevention of progression during hospitalization  Description: INTERVENTIONS:  - Assess and monitor for signs and symptoms of infection  - Monitor lab/diagnostic results  - Monitor all insertion sites, i e  indwelling lines, tubes, and drains  - Monitor endotracheal if appropriate and nasal secretions for changes in amount and color  - [place] appropriate cooling/warming therapies per order  - Administer medications as ordered  - Instruct and encourage patient and family to use good hand hygiene technique  - Identify and instruct in appropriate isolation precautions for identified infection/condition  Outcome: Progressing

## 2020-08-13 NOTE — UTILIZATION REVIEW
Initial Clinical Review    Admission: Date/Time/Statement:   Admission Orders (From admission, onward)     Ordered        08/12/20 1216  Inpatient Admission  Once                   Orders Placed This Encounter   Procedures    Inpatient Admission     Standing Status:   Standing     Number of Occurrences:   1     Order Specific Question:   Admitting Physician     Answer:   Bertha Guerra [[id number]]     Order Specific Question:   Level of Care     Answer:   Med Surg [[id number]]     Order Specific Question:   Estimated length of stay     Answer:   More than 2 Midnights     Order Specific Question:   Certification     Answer:   I certify that inpatient services are medically necessary for this patient for a duration of greater than two midnights  See H&P and MD Progress Notes for additional information about the patient's course of treatment  ED Arrival Information     Expected Arrival Acuity Means of Arrival Escorted By Service Admission Type    - 8/12/2020 09:58 Emergent Walk-In Self Hospitalist Emergency    Arrival Complaint    Fever        Chief Complaint   Patient presents with    Fever Immunocompromised     pt reports fevers, on chemo for breast ca  recent pnuemonia vaccine given     Assessment/Plan:   Ms Nasrin Acuña is a 44 yo female who presents to the ED from home with c/o feeling warm and found to have fever 101-102 0 with chills and nausea  She is s/p metastatic breast cancer to liver with right mastectomy on 06/05/2020  She is on oral chemo  She had no other complaints  In the ED she was found to have leukocytosis, tachycardia and elevated lactic acid  She is admitted to INPATIENT status with Acute Febrile Illness - r/o sepsis - follow UA, IV antibiotics, blood cultures  R mastectomy site is well healed  Pt was found to be unresponsive to Motrin given for temp of 102 8 - was told not to take Tylenol r/t liver mass  Temperature did come down but she has remained febrile since admission        8/13 - still with leukocytosis and fever since admission  Remains on IV fluids  Lactic acid is down to WNL  Covid negative  Will repeat COVID 19 test and get ID Consult  Repeat covid is negative  8/13 ID Consult - Fever secondary to Pneumovax hyper reaction left upper extremity - continue IV antibiotics, d/c whole body CT scan, check final cultures, if cultures negative and L arm swelling decreases with low suspicion for major bacterial infection could consider discontinuing antibiotics and observing        ED Triage Vitals   Temperature Pulse Respirations Blood Pressure SpO2   08/12/20 1020 08/12/20 1020 08/12/20 1020 08/12/20 1020 08/12/20 1020   (!) 102 5 °F (39 2 °C) 102 18 (!) 208/118 96 %      Temp Source Heart Rate Source Patient Position - Orthostatic VS BP Location FiO2 (%)   08/12/20 1020 08/12/20 1138 -- 08/12/20 1138 --   Oral Monitor  Left arm       Pain Score       08/12/20 1242       No Pain          Wt Readings from Last 1 Encounters:   08/12/20 100 kg (220 lb 10 9 oz)     Additional Vital Signs:   08/13/20 08:28:31   100 7 °F (38 2 °C)Abnormal        16   111/61   78          08/13/20 0627   100 3 °F (37 9 °C)                      08/12/20 22:48:36   99 9 °F (37 7 °C)   86   18   105/59   74   97 %       08/12/20 19:01:40   102 9 °F (39 4 °C)Abnormal                        08/12/20 1647   102 8 °F (39 3 °C)Abnormal                        08/12/20 1451   99 5 °F (37 5 °C)   78   18   105/52      97 %   None (Room air)    08/12/20 1241      100   18   109/53   76   95 %       08/12/20 1138      102      102/58      98 %   None (Room air)      Pertinent Labs/Diagnostic Test Results:     8/12 - CXR - no acute disease     Results from last 7 days   Lab Units 08/12/20  1101   SARS-COV-2  Negative     Results from last 7 days   Lab Units 08/13/20  0451 08/12/20  1104   WBC Thousand/uL 14 22* 15 06*   HEMOGLOBIN g/dL 10 1* 12 1   HEMATOCRIT % 32 1* 36 4   PLATELETS Thousands/uL 133* 174 NEUTROS ABS Thousands/µL 12 14* 12 81*     Results from last 7 days   Lab Units 08/13/20  0451 08/12/20  1104   SODIUM mmol/L 139 137   POTASSIUM mmol/L 3 3* 3 6   CHLORIDE mmol/L 107 104   CO2 mmol/L 24 25   ANION GAP mmol/L 8 8   BUN mg/dL 11 15   CREATININE mg/dL 0 86 1 21   EGFR ml/min/1 73sq m 78 52   CALCIUM mg/dL 7 7* 8 8   MAGNESIUM mg/dL 2 3  --    PHOSPHORUS mg/dL 1 6*  --      Results from last 7 days   Lab Units 08/12/20  1104   AST U/L 16   ALT U/L 30   ALK PHOS U/L 88   TOTAL PROTEIN g/dL 7 8   ALBUMIN g/dL 3 2*   TOTAL BILIRUBIN mg/dL 0 77         Results from last 7 days   Lab Units 08/13/20  0451 08/12/20  1104   GLUCOSE RANDOM mg/dL 115 134     Results from last 7 days   Lab Units 08/12/20  1104   PROTIME seconds 14 3   INR  1 11   PTT seconds 27         Results from last 7 days   Lab Units 08/13/20  0451 08/12/20  1104   PROCALCITONIN ng/ml 0 16 0 21     Results from last 7 days   Lab Units 08/12/20  1802 08/12/20  1603 08/12/20  1104   LACTIC ACID mmol/L 1 7 2 7* 3 5*     Results from last 7 days   Lab Units 08/12/20  1707   CLARITY UA  Clear   COLOR UA  Yellow   SPEC GRAV UA  1 017   PH UA  5 5   GLUCOSE UA mg/dl Negative   KETONES UA mg/dl Negative   BLOOD UA  Trace*   PROTEIN UA mg/dl Negative   NITRITE UA  Negative   BILIRUBIN UA  Negative   UROBILINOGEN UA E U /dl 0 2   LEUKOCYTES UA  Negative   WBC UA /hpf None Seen   RBC UA /hpf 2-4*   BACTERIA UA /hpf None Seen   EPITHELIAL CELLS WET PREP /hpf None Seen     Results from last 7 days   Lab Units 08/12/20  1119 08/12/20  1104   BLOOD CULTURE  Received in Microbiology Lab  Culture in Progress  Received in Microbiology Lab  Culture in Progress       ED Treatment:   Medication Administration from 08/12/2020 0958 to 08/12/2020 1441    Date/Time Order Dose Route Action   08/12/2020 1059 cefepime (MAXIPIME) 2 g/50 mL dextrose IVPB 2,000 mg Intravenous New Bag   08/12/2020 1059 ibuprofen (MOTRIN) tablet 600 mg 600 mg Oral Given   08/12/2020 6797 vancomycin (VANCOCIN) 1,500 mg in sodium chloride 0 9 % 250 mL IVPB 1,500 mg Intravenous New Bag   08/12/2020 1237 sodium chloride 0 9 % bolus 1,000 mL 1,000 mL Intravenous New Bag        Past Medical History:   Diagnosis Date    Bloating     Bruises easily     [facility]     right breast//to lymph nodes/liver/ and bone    Depression     History of cancer chemotherapy 05/2020    History of pneumonia     Hypotension     occas    Low iron     "when pregnant, 20 yrs ago"    Neuropathy     hands//fingers    Shortness of breath     occas    Tinnitus     right    Wears glasses      Admitting Diagnosis: Breast cancer (HCC) [C50 919]  Fever [R50 9]  SIRS (systemic inflammatory response syndrome) ([facility] ) [R65 10]     Age/Sex: 45 y o  female     Admission Orders:  Scheduled Medications:  cefepime, 2,000 mg, Intravenous, Q12H  enoxaparin, 40 mg, Subcutaneous, Daily  metroNIDAZOLE, 500 mg, Intravenous, Q8H  olaparib, 300 mg, Oral, BID  vancomycin, 20 mg/kg (Adjusted), Intravenous, Q12H    Continuous IV Infusions:  multi-electrolyte, 125 mL/hr, Intravenous, Continuous    PRN Meds:  albuterol, 2 5 mg, Nebulization, Q6H PRN  ibuprofen, 400 mg, Oral, Q6H PRN - x 1 8/12, 8/13  ondansetron, 4 mg, Intravenous, Q6H PRN  sodium chloride (PF), 3 mL, Intravenous, Q1H PRN    SCDs  OOB as bartolo   CT chest, abd, pelvis   Regular diet   IP CONSULT TO PHARMACY    Network Utilization Review Department  [email or web address]  ATTENTION: Please call with any questions or concerns to [phone] and carefully listen to the prompts so that you are directed to the right person  All voicemails are confidential   Mary Dias all requests for admission clinical reviews, approved or denied determinations and any other requests to dedicated fax number below belonging to the campus where the patient is receiving treatment   List of dedicated fax numbers for the Facilities:  FACILITY NAME UR FAX NUMBER   ADMISSION DENIALS (Administrative/Medical Necessity) [address] (Maternity/NICU/Pediatrics) [place] [phone]   Kezia Sainzcole [phone]   Nancy Awad [phone]   [address] [phone]   [facility]  [phone]   [address] [place] [address], [facility]  [address] [place] [address] [phone]

## 2020-08-13 NOTE — RESPIRATORY THERAPY NOTE
RT Protocol Note  Earl Lockett 45 y o  female MRN: [id number]  Unit/Bed#: [facility] 923-01 Encounter: [id number]    Assessment    Principal Problem:    Febrile illness, acute  Active Problems:    Malignant neoplasm of central portion of right breast in female, estrogen receptor positive ([facility] )      Home Pulmonary Medications:  none       Past Medical History:   Diagnosis Date    Bloating     Bruises easily     [facility]     right breast//to lymph nodes/liver/ and bone    Depression     History of cancer chemotherapy 05/2020    History of pneumonia     Hypotension     occas    Low iron     "when pregnant, 20 yrs ago"    Neuropathy     hands//fingers    Shortness of breath     occas    Tinnitus     right    Wears glasses      Social History     Socioeconomic History    Marital status: Single     Spouse name: None    Number of children: None    Years of education: None    Highest education level: None   Occupational History    None   Social Needs    Financial resource strain: None    Food insecurity     Worry: None     Inability: None    Transportation needs     Medical: None     Non-medical: None   Tobacco Use    Smoking status: Former Smoker     Packs/day: 1 00     Years: 30 00     Pack years: 30 00     Last attempt to quit: 2017     Years since quitting: 3 6    Smokeless tobacco: Never Used   Substance and Sexual Activity    Alcohol use: Yes     Frequency: Monthly or less     Drinks per session: 1 or 2     Comment: occassional    Drug use: No    Sexual activity: Not Currently     Partners: Male     Birth control/protection: None   Lifestyle    Physical activity     Days per week: None     Minutes per session: None    Stress: None   Relationships    Social connections     Talks on phone: None     Gets together: None     Attends Mandaen service: None     Active member of club or organization: None     Attends meetings of clubs or organizations: None     Relationship status: None    Intimate partner violence     Fear of current or ex partner: None     Emotionally abused: None     Physically abused: None     Forced sexual activity: None   Other Topics Concern    None   Social History Narrative    None       Subjective         Objective    Physical Exam:   Assessment Type: (P) Assess only(per RN)    Vitals:  Blood pressure 111/61, pulse 86, temperature (!) 100 7 °F (38 2 °C), resp  rate 16, height 5' 6" (1 676 m), weight 100 kg (220 lb 10 9 oz), SpO2 97 %  Imaging and other studies: I have personally reviewed pertinent reports  Plan    Respiratory Plan: (P) Discontinue Protocol, No distress/Pulmonary history        Resp Comments: (P) pt is Rule out Covid at this time will D/c albuterol UDn and change to albuterol MDi q4 prn for sob /wheeezing   pt was assed per chart and Speaking with RN   per Rn pt would be able to perform an MDI if needed pt does not take any respiratroy meds @ home no other respiratroy intervention is needed at this time will d/c from protocol

## 2020-08-13 NOTE — CONSULTS
Consultation - Infectious Disease   Damarsi Yañez 45 y o  female MRN: [id number]  Unit/Bed#: [facility] 923-01 Encounter: [id number]      Inpatient consult to Infectious Diseases  Consult performed by: Mariza Plascencia MD  Consult ordered by: Duy Sal DO          IMPRESSION & RECOMMENDATIONS:   Impression:  1  Fever secondary to Pneumovax hyper reaction left upper extremity     Recommendations:    Discussed with the primary service  1  Check final culture results  2  If cultures remain negative tomorrow and left arm inflammation decreases with low suspicion for major bacterial infection could consider discontinuing antibiotics and observing   3  For now continue cefepime, vancomycin and metronidazole  4  Would discontinue whole-body CT scan  HISTORY OF PRESENT ILLNESS:    Reason for Consult:  Fever  HPI: Damaris Yañez is a 45y o  year old female who has a history of metastatic breast carcinoma to the liver S/P right mastectomy 6/5/20 who was admitted from the emergency room yesterday with 2 days of fever spikes between 101 and 102°  Patient was doing well and saw her family physician on 08/10 with some shortness of breath  At that time she received a Pneumovax 23 vaccination  Within hours the patient felt that her left arm had pain like of never experienced with a vaccine before    The following day she began to have high spiking temperatures and felt that her left arm was warm and tender  The warmth and tenderness remain currently  Patient  is on chemotherapy  In the emergency room the patient met SIRS criteria  Patient had 2 negative SARS-CoV-2 PCR tests done on consecutive days yesterday and today  Blood cultures x2 are negative at 24 hours  Her initial WBC count was 15,060 with 84% neutrophils, CMP was WNL with the exception of a modestly decreased albumin, lactic acid was elevated at 3 5 and procalcitonin was normal at 0 21 yesterday and 0 16 today  UA was unremarkable  Nellie Nissen   She was begun on cefepime 2 g q 12 hours IV, metronidazole 500 mg q 8 hours IV and vancomycin 1 5 g q 12 hours IV yesterday  She has defervesced over the prior 24 hours  Chest x-ray was unremarkable      Review of Systems   Constitutional: Positive for activity change, appetite change, chills, diaphoresis, fatigue and fever  Respiratory: Positive for shortness of breath and wheezing (Mild)  Musculoskeletal: Positive for myalgias ( left upper arm)  Skin: Positive for color change ( left upper arm)  A [id number] point system-based review of systems is otherwise negative      PAST MEDICAL HISTORY:  Past Medical History:   Diagnosis Date    Bloating     Bruises easily     Cancer ([address] )     right breast//to lymph nodes/liver/ and bone    Depression     History of cancer chemotherapy 05/2020    History of pneumonia     Hypotension     occas    Low iron     "when pregnant, 20 yrs ago"    Neuropathy     hands//fingers    Shortness of breath     occas    Tinnitus     right    Wears glasses      Past Surgical History:   Procedure Laterality Date    BILATERAL SALPINGOOPHORECTOMY      BREAST BIOPSY      IR IMAGE GUIDED BIOPSY/ASPIRATION LIVER  6/11/2019    MYOMECTOMY      H/O FIBROIDS, NO SURGERY NEEDED    MA LAP,RMV  ADNEXAL STRUCTURE N/A 7/29/2019    Procedure: LAPAROSCOPIC BILATERAL SALPINGO-OOPHORECTOMY;  Surgeon: Zeyad Robertson MD;  Location: [facility];  Service: Gynecology Oncology    MA MASTECTOMY, SIMPLE, COMPLETE Right 6/5/2020    Procedure: MASTECTOMY SIMPLE, axillary node dissection;  Surgeon: Danny Wells MD;  Location: [facility];  Service: Surgical Oncology    US GUIDED BREAST BIOPSY RIGHT COMPLETE Right 5/3/2019    US GUIDED BREAST LYMPH NODE BIOPSY RIGHT Right 5/3/2019       FAMILY HISTORY:  Non-contributory    SOCIAL HISTORY:  Social History   Single  Social History     Substance and Sexual Activity   Alcohol Use Yes    Frequency: Monthly or less    Drinks per session: 1 or 2    Comment: occassional     Social History     Substance and Sexual Activity   Drug Use No     Social History     Tobacco Use   Smoking Status Former Smoker    Packs/day: 1 00    Years: 30 00    Pack years: 30 00    Last attempt to quit: 2017    Years since quitting: 3 6   Smokeless Tobacco Never Used       ALLERGIES:  Allergies   Allergen Reactions    Tylenol [Acetaminophen]      Told to avoid due to cancer        MEDICATIONS:  All current active medications have been reviewed        PHYSICAL EXAM:  Temp:  [98 4 °F (36 9 °C)-102 9 °F (39 4 °C)] 98 4 °F (36 9 °C)  HR:  [72-86] 72  Resp:  [16-20] 20  BP: (102-111)/(59-61) 102/61  SpO2:  [95 %-97 %] 95 %  Temp (24hrs), Av 4 °F (38 °C), Min:98 4 °F (36 9 °C), Max:102 9 °F (39 4 °C)  Current: Temperature: 98 4 °F (36 9 °C)    Intake/Output Summary (Last 24 hours) at 2020 1725  Last data filed at 2020 1648  Gross per 24 hour   Intake 4035 08 ml   Output 3250 ml   Net 785 08 ml       General Appearance:  Appearing well, nontoxic, and in no distress, appears stated age   Head:  Normocephalic, without obvious abnormality, atraumatic   Eyes:  PERRL, conjunctiva pale and sclera anicteric, both eyes   Nose: Nares normal, mucosa normal, no drainage   Throat: Oropharynx moist without lesions; lips, mucosa, and tongue normal; teeth and gums normal   Neck: Supple, symmetrical, trachea midline, no adenopathy, no tenderness/mass/nodules   Back:   Symmetric, no curvature, ROM normal, no CVA tenderness   Lungs:   Clear to auscultation bilaterally, no audible wheezes, rhonchi and rales, respirations unlabored   Chest Wall:  No tenderness or deformity   Heart:  Regular rate and rhythm, S1, S2 normal, no murmur, rub or gallop   Abdomen:   Soft, non-tender, non-distended, positive bowel sounds, no masses, no organomegaly    No CVA tenderness   Extremities: Left upper extremity biceps area is warm, tender, with some edema   Skin: Right mastectomy scar without any inflammation, as above    Neurologic: Alert and oriented times 3, extremity strength 5/5 and symmetric           Invasive Devices:   Peripheral IV 08/12/20 Left Antecubital (Active)   Site Assessment Clean;Dry; Intact 08/13/20 0700   Dressing Type Transparent 08/13/20 0700   Line Status Infusing 08/13/20 0700   Dressing Status Clean;Dry; Intact 08/13/20 0700   Dressing Change Due 08/16/20 08/13/20 0700       LABS, IMAGING, & OTHER STUDIES:  Lab Results:      I have personally reviewed pertinent labs  Results from last 7 days   Lab Units 08/13/20  0451 08/12/20  1104   WBC Thousand/uL 14 22* 15 06*   HEMOGLOBIN g/dL 10 1* 12 1   PLATELETS Thousands/uL 133* 174     Results from last 7 days   Lab Units 08/13/20  0451 08/12/20  1104   SODIUM mmol/L 139 137   POTASSIUM mmol/L 3 3* 3 6   CHLORIDE mmol/L 107 104   CO2 mmol/L 24 25   BUN mg/dL 11 15   CREATININE mg/dL 0 86 1 21   EGFR ml/min/1 73sq m 78 52   CALCIUM mg/dL 7 7* 8 8   AST U/L  --  16   ALT U/L  --  30   ALK PHOS U/L  --  88     Results from last 7 days   Lab Units 08/12/20  1119 08/12/20  1104   BLOOD CULTURE  No Growth at 24 hrs  No Growth at 24 hrs  Imaging Studies:   I have personally reviewed pertinent imaging study reports and images in PACS  EKG, Pathology, and Other Studies:   I have personally reviewed pertinent reports

## 2020-08-13 NOTE — PROGRESS NOTES
Vancomycin IV Pharmacy-to-Dose Consultation    Cecilia Plascencia is a 45 y o  female who is currently receiving Vancomycin IV with management by the Pharmacy Consult service  Relevant clinical data and objective / subjective history reviewed  Vancomycin Assessment:  Indication: other sepsis  Status: acute febrile illness  Micro: blood cultures pending 8/13  Procalcitonin: 0 16  Renal Function: stabilizing- 0 86  Potential Nephrotoxicity Factors:  Medications: na   Patient-Factors: renal function improving  Days of Therapy: 2  Current Dose: 1500 mg q12h   Goal Trough: 15-20 (appropriate for most indications)   Goal AUC(24h): 400-600  Last Level: random on 8/13 :  2 9      Vancomycin Plan:  New Dosing: change to 1500mg q12h (next dose: )  Predicted Trough / AUC: 806  Next Level: on 8/14 at 2030  Renal Function Monitoring: will continue to monitor      Pharmacy will continue to follow closely for s/sx of nephrotoxicity, infusion reactions and appropriateness of therapy  BMP and CBC will be ordered per protocol  We will continue to follow the patients culture results and clinical progress daily  Nael DAWKINS  Ph

## 2020-08-13 NOTE — PLAN OF CARE
Problem: Potential for Falls  Goal: Patient will remain free of falls  Description: INTERVENTIONS:  - Assess patient frequently for physical needs  -  Identify cognitive and physical deficits and behaviors that affect risk of falls    -  [place] fall precautions as indicated by assessment   - Educate patient/family on patient safety including physical limitations  - Instruct patient to call for assistance with activity based on assessment  - Modify environment to reduce risk of injury  - Consider OT/PT consult to assist with strengthening/mobility  Outcome: Progressing     Problem: INFECTION - ADULT  Goal: Absence or prevention of progression during hospitalization  Description: INTERVENTIONS:  - Assess and monitor for signs and symptoms of infection  - Monitor lab/diagnostic results  - Monitor all insertion sites, i e  indwelling lines, tubes, and drains  - Monitor endotracheal if appropriate and nasal secretions for changes in amount and color  - [place] appropriate cooling/warming therapies per order  - Administer medications as ordered  - Instruct and encourage patient and family to use good hand hygiene technique  - Identify and instruct in appropriate isolation precautions for identified infection/condition  Outcome: Progressing

## 2020-08-13 NOTE — PROGRESS NOTES
Progress Note - Darcus Manual 1970, 45 y o  female MRN: [id number]    Unit/Bed#: [facility] 923-01 Encounter: [id number]    Primary Care Provider: EMILIANO Cota   Date and time admitted to hospital: 8/12/2020 10:13 AM        * Febrile illness, acute  Assessment & Plan  Patient presented with SIRS criteria, fever, tachycardia, leukocytosis and elevated lactic acid  Unknown source  Rule out sepsis  Rule out occult infection  No obvious skin lesions  No IVs   Patient with underlying metastatic breast cancer on chemotherapy  Covid test is negative  Will repeat  Normal chest x-ray  Follow-up with CT chest abdomen pelvis  Patient with persistent fevers  She does report episodes of shortness of breath and dyspnea with exertion earlier in the week  She notes that her PCP also noted that she was having increased wheezing earlier in the week  Check UA  Already on broad-spectrum antibiotics  Follow on blood cultures  Continue with antibiotics at least through final results of cultures  Consult infectious disease    Malignant neoplasm of central portion of right breast in female, estrogen receptor positive ([facility] [address] )  Assessment & Plan  Status post positive right mastectomy on 06/05/2020  Well healed surgical site  Currently on oral chemotherapy  Outpatient follow-up      VTE Pharmacologic Prophylaxis:   Pharmacologic: Enoxaparin (Lovenox)  Mechanical VTE Prophylaxis in Place: No    Patient Centered Rounds: I have performed bedside rounds with nursing staff today  Time Spent for Care: 15 minutes  More than 50% of total time spent on counseling and coordination of care as described above  Current Length of Stay: 1 day(s)    Current Patient Status: Inpatient   Certification Statement: The patient will continue to require additional inpatient hospital stay due to Need to monitor symptoms        Code Status: Level 1 - Full Code      Subjective:   Patient still with low-grade temps      Objective: Vitals:   Temp (24hrs), Av 3 °F (38 5 °C), Min:99 9 °F (37 7 °C), Max:102 9 °F (39 4 °C)    Temp:  [99 9 °F (37 7 °C)-102 9 °F (39 4 °C)] 100 7 °F (38 2 °C)  HR:  [86] 86  Resp:  [16-18] 16  BP: (105-111)/(59-61) 111/61  SpO2:  [97 %] 97 %  Body mass index is 35 62 kg/m²  Input and Output Summary (last 24 hours): Intake/Output Summary (Last 24 hours) at 2020 1510  Last data filed at 2020 1446  Gross per 24 hour   Intake 3543 ml   Output 2850 ml   Net 693 ml       Physical Exam:     Physical Exam    Additional Data:     Labs:    Results from last 7 days   Lab Units 20  0451   WBC Thousand/uL 14 22*   HEMOGLOBIN g/dL 10 1*   HEMATOCRIT % 32 1*   PLATELETS Thousands/uL 133*   NEUTROS PCT % 86*   LYMPHS PCT % 6*   MONOS PCT % 7   EOS PCT % 0     Results from last 7 days   Lab Units 20  0451 20  1104   POTASSIUM mmol/L 3 3* 3 6   CHLORIDE mmol/L 107 104   CO2 mmol/L 24 25   BUN mg/dL 11 15   CREATININE mg/dL 0 86 1 21   CALCIUM mg/dL 7 7* 8 8   ALK PHOS U/L  --  88   ALT U/L  --  30   AST U/L  --  16     Results from last 7 days   Lab Units 20  1104   INR  1 11       * I Have Reviewed All Lab Data Listed Above  * Additional Pertinent Lab Tests Reviewed: All Labs Within Last 24 Hours Reviewed          Recent Cultures (last 7 days):     Results from last 7 days   Lab Units 20  1119 20  1104   BLOOD CULTURE  Received in Microbiology Lab  Culture in Progress  Received in Microbiology Lab  Culture in Progress         Last 24 Hours Medication List:   Current Facility-Administered Medications   Medication Dose Route Frequency Provider Last Rate    albuterol  2 5 mg Nebulization Q6H PRN Genita Dearth, DO      cefepime  2,000 mg Intravenous Q12H Genita Dearth, DO Stopped (20 1143)    enoxaparin  40 mg Subcutaneous Daily Genita Dearth, DO      ibuprofen  400 mg Oral Q6H PRN Genita Dearth, DO      metroNIDAZOLE  500 mg Intravenous Q8H Genita Dearth, DO Stopped (08/13/20 0902)    multi-electrolyte  125 mL/hr Intravenous Continuous Eulice Erichsen,  mL/hr (08/13/20 1447)    olaparib  300 mg Oral BID Eulice Erichsen, DO      ondansetron  4 mg Intravenous Q6H PRN Eulice Erichsen, DO      sodium chloride (PF)  3 mL Intravenous Q1H PRN Eulice Erichsen, DO      vancomycin  20 mg/kg (Adjusted) Intravenous Q12H Tim Centeno DO Stopped (08/13/20 1042)        Today, Patient Was Seen By: Jerry Inman DO    ** Please Note: Dictation voice to text software may have been used in the creation of this document   **

## 2020-08-13 NOTE — ASSESSMENT & PLAN NOTE
Patient presented with SIRS criteria, fever, tachycardia, leukocytosis and elevated lactic acid  Unknown source  Rule out sepsis  Rule out occult infection  No obvious skin lesions  No IVs   Patient with underlying metastatic breast cancer on chemotherapy  Covid test is negative  Will repeat  Normal chest x-ray  Follow-up with CT chest abdomen pelvis  Patient with persistent fevers  She does report episodes of shortness of breath and dyspnea with exertion earlier in the week  She notes that her PCP also noted that she was having increased wheezing earlier in the week  Check UA  Already on broad-spectrum antibiotics  Follow on blood cultures  Continue with antibiotics at least through final results of cultures    Consult infectious disease

## 2020-08-14 LAB
ANION GAP SERPL CALCULATED.3IONS-SCNC: 5 MMOL/L (ref 4–13)
BUN SERPL-MCNC: 10 MG/DL (ref 5–25)
CALCIUM SERPL-MCNC: 7.7 MG/DL (ref 8.3–10.1)
CHLORIDE SERPL-SCNC: 111 MMOL/L (ref 100–108)
CO2 SERPL-SCNC: 27 MMOL/L (ref 21–32)
CREAT SERPL-MCNC: 0.82 MG/DL (ref 0.6–1.3)
GFR SERPL CREATININE-BSD FRML MDRD: 83 ML/MIN/1.73SQ M
GLUCOSE SERPL-MCNC: 94 MG/DL (ref 65–140)
POTASSIUM SERPL-SCNC: 3.4 MMOL/L (ref 3.5–5.3)
SODIUM SERPL-SCNC: 143 MMOL/L (ref 136–145)
VANCOMYCIN TROUGH SERPL-MCNC: 13.9 UG/ML (ref 10–20)

## 2020-08-14 PROCEDURE — 80202 ASSAY OF VANCOMYCIN: CPT | Performed by: INTERNAL MEDICINE

## 2020-08-14 PROCEDURE — 80048 BASIC METABOLIC PNL TOTAL CA: CPT | Performed by: INTERNAL MEDICINE

## 2020-08-14 PROCEDURE — 99232 SBSQ HOSP IP/OBS MODERATE 35: CPT | Performed by: INTERNAL MEDICINE

## 2020-08-14 RX ORDER — DOCUSATE SODIUM 100 MG/1
100 CAPSULE, LIQUID FILLED ORAL 2 TIMES DAILY PRN
Status: DISCONTINUED | OUTPATIENT
Start: 2020-08-14 | End: 2020-08-15 | Stop reason: HOSPADM

## 2020-08-14 RX ADMIN — OLAPARIB 300 MG: 150 TABLET, FILM COATED ORAL at 05:19

## 2020-08-14 RX ADMIN — CEFEPIME HYDROCHLORIDE 2000 MG: 2 INJECTION, POWDER, FOR SOLUTION INTRAVENOUS at 11:52

## 2020-08-14 RX ADMIN — METRONIDAZOLE 500 MG: 500 INJECTION, SOLUTION INTRAVENOUS at 08:55

## 2020-08-14 RX ADMIN — METRONIDAZOLE 500 MG: 500 INJECTION, SOLUTION INTRAVENOUS at 00:46

## 2020-08-14 RX ADMIN — SODIUM CHLORIDE, SODIUM GLUCONATE, SODIUM ACETATE, POTASSIUM CHLORIDE AND MAGNESIUM CHLORIDE 125 ML/HR: 526; 502; 368; 37; 30 INJECTION, SOLUTION INTRAVENOUS at 03:24

## 2020-08-14 RX ADMIN — IBUPROFEN 400 MG: 400 TABLET ORAL at 06:56

## 2020-08-14 RX ADMIN — OLAPARIB 300 MG: 150 TABLET, FILM COATED ORAL at 19:30

## 2020-08-14 RX ADMIN — METRONIDAZOLE 500 MG: 500 INJECTION, SOLUTION INTRAVENOUS at 17:00

## 2020-08-14 RX ADMIN — CEFEPIME HYDROCHLORIDE 2000 MG: 2 INJECTION, POWDER, FOR SOLUTION INTRAVENOUS at 23:14

## 2020-08-14 RX ADMIN — ENOXAPARIN SODIUM 40 MG: 40 INJECTION SUBCUTANEOUS at 08:55

## 2020-08-14 RX ADMIN — VANCOMYCIN HYDROCHLORIDE 1500 MG: 10 INJECTION, POWDER, LYOPHILIZED, FOR SOLUTION INTRAVENOUS at 21:05

## 2020-08-14 RX ADMIN — VANCOMYCIN HYDROCHLORIDE 1500 MG: 10 INJECTION, POWDER, LYOPHILIZED, FOR SOLUTION INTRAVENOUS at 10:15

## 2020-08-14 NOTE — PLAN OF CARE
Problem: Potential for Falls  Goal: Patient will remain free of falls  Description: INTERVENTIONS:  - Assess patient frequently for physical needs  -  Identify cognitive and physical deficits and behaviors that affect risk of falls    -  [place] fall precautions as indicated by assessment   - Educate patient/family on patient safety including physical limitations  - Instruct patient to call for assistance with activity based on assessment  - Modify environment to reduce risk of injury  - Consider OT/PT consult to assist with strengthening/mobility  Outcome: Progressing

## 2020-08-14 NOTE — ASSESSMENT & PLAN NOTE
Patient presented with SIRS criteria, fever, tachycardia, leukocytosis and elevated lactic acid  Unknown source  Repeat COVID test was negative  Will hold off on CT imaging  Note Infectious Disease input  Discussed with Infectious Disease last night  Etiology of fever likely secondary to vaccination based on temporal relationship of injection and symptoms

## 2020-08-14 NOTE — PROGRESS NOTES
Progress Note - Infectious Disease   Janette Griggs 45 y o  female MRN: [id number]  Unit/Bed#: [facility] 923-01 Encounter: [id number]      Impression:  1  Fever secondary to Pneumovax-23 hyper reaction left upper extremity    Recommendations:  Patient is afebrile and WBC is gradually declining  Patient still has some left upper arm pain and tenderness but it is gradually lessening  1  Check final culture results  2  If cultures are negative tomorrow and improvement continues could discharge off antibiotics     Antibiotics:  1  Cefepime IV, day 3 Rx  2  Vancomycin IV, day 3 Rx  3  Metronidazole IV, day 3 Rx     Subjective: The patient has left upper arm painful tenderness which is less than yesterday  Denies fevers, chills, or sweats  Denies nausea, vomiting, or diarrhea  Objective:  Vitals:  Temp:  [98 °F (36 7 °C)-99 °F (37 2 °C)] 98 °F (36 7 °C)  HR:  [76-84] 76  Resp:  [14-20] 14  BP: ()/(59-73) 104/68  SpO2:  [98 %] 98 %  Temp (24hrs), Av 5 °F (36 9 °C), Min:98 °F (36 7 °C), Max:99 °F (37 2 °C)  Current: Temperature: 98 °F (36 7 °C)    Physical Exam:     General Appearance:  Alert, nontoxic, no acute distress  Throat: Oropharynx moist without lesions  Lips, mucosa, and tongue normal   Neck: Supple, symmetrical, trachea midline, no adenopathy,  no tenderness/mass/nodules   Lungs:   Clear to auscultation bilaterally, no audible wheezes, rhonchi or rales; respirations unlabored   Heart:  Regular rate and rhythm, S1, S2 normal, no murmur, rub or gallop   Abdomen:   Soft, non-tender, non-distended, positive bowel sounds  No masses, no organomegaly    No CVA tenderness   Extremities: LUE biceps area is 1+ warm, edematous and tender with faint erythema   Skin: As above, right mastectomy scar         Invasive Devices     Peripheral Intravenous Line            Peripheral IV 20 Dorsal (posterior); Left Hand less than 1 day                Labs, Imaging, & Other studies:   All pertinent labs were personally reviewed  Results from last 7 days   Lab Units 08/13/20  0451 08/12/20  1104   WBC Thousand/uL 14 22* 15 06*   HEMOGLOBIN g/dL 10 1* 12 1   PLATELETS Thousands/uL 133* 174     Results from last 7 days   Lab Units 08/14/20  0530 08/13/20  0451 08/12/20  1104   SODIUM mmol/L 143 139 137   POTASSIUM mmol/L 3 4* 3 3* 3 6   CHLORIDE mmol/L 111* 107 104   CO2 mmol/L 27 24 25   BUN mg/dL 10 11 15   CREATININE mg/dL 0 82 0 86 1 21   EGFR ml/min/1 73sq m 83 78 52   CALCIUM mg/dL 7 7* 7 7* 8 8   AST U/L  --   --  16   ALT U/L  --   --  30   ALK PHOS U/L  --   --  88     Results from last 7 days   Lab Units 08/12/20  1119 08/12/20  1104   BLOOD CULTURE  No Growth at 48 hrs  No Growth at 48 hrs

## 2020-08-14 NOTE — PROGRESS NOTES
Progress Note - Darcus Manual 1970, 45 y o  female MRN: [id number]    Unit/Bed#: [facility] 923-01 Encounter: [id number]    Primary Care Provider: EMILIANO Cota   Date and time admitted to hospital: 2020 10:13 AM        * Febrile illness, acute  Assessment & Plan  Patient presented with SIRS criteria, fever, tachycardia, leukocytosis and elevated lactic acid  Unknown source  Repeat COVID test was negative  Will hold off on CT imaging  Note Infectious Disease input  Discussed with Infectious Disease last night  Etiology of fever likely secondary to vaccination based on temporal relationship of injection and symptoms  Malignant neoplasm of central portion of right breast in female, estrogen receptor positive [facility])  Assessment & Plan  Status post positive right mastectomy on 2020  Well healed surgical site  Currently on oral chemotherapy  Outpatient follow-up      VTE Pharmacologic Prophylaxis:   Pharmacologic: Enoxaparin (Lovenox)  Mechanical VTE Prophylaxis in Place: No    Patient Centered Rounds: I have performed bedside rounds with nursing staff today  Time Spent for Care: 15 minutes  More than 50% of total time spent on counseling and coordination of care as described above  Current Length of Stay: 2 day(s)    Current Patient Status: Inpatient       Discharge Plan:  Tentative discharge in 24 hours if no further fever spikes  Will likely discontinue antibiotics at discharge    Code Status: Level 1 - Full Code      Subjective:   Patient comfortable    Objective:     Vitals:   Temp (24hrs), Av 6 °F (37 °C), Min:98 2 °F (36 8 °C), Max:99 °F (37 2 °C)    Temp:  [98 2 °F (36 8 °C)-99 °F (37 2 °C)] 98 8 °F (37 1 °C)  HR:  [72-84] 84  Resp:  [20] 20  BP: ()/(59-73) 99/59  SpO2:  [95 %-98 %] 98 %  Body mass index is 35 62 kg/m²  Input and Output Summary (last 24 hours):        Intake/Output Summary (Last 24 hours) at 2020 1251  Last data filed at 2020 1058  Gross per 24 hour   Intake 3423 75 ml   Output 4150 ml   Net -726 25 ml       Physical Exam:     Physical Exam  HENT:      Head: Normocephalic  Neck:      Musculoskeletal: Normal range of motion and neck supple  No neck rigidity  Cardiovascular:      Rate and Rhythm: Normal rate and regular rhythm  Heart sounds: No murmur  No friction rub  Pulmonary:      Effort: Pulmonary effort is normal       Breath sounds: Normal breath sounds  Abdominal:      General: Abdomen is flat  Palpations: Abdomen is soft  Skin:     General: Skin is warm and dry  Neurological:      General: No focal deficit present  Mental Status: She is oriented to person, place, and time  Additional Data:     Labs:    Results from last 7 days   Lab Units 08/13/20  0451   WBC Thousand/uL 14 22*   HEMOGLOBIN g/dL 10 1*   HEMATOCRIT % 32 1*   PLATELETS Thousands/uL 133*   NEUTROS PCT % 86*   LYMPHS PCT % 6*   MONOS PCT % 7   EOS PCT % 0     Results from last 7 days   Lab Units 08/14/20  0530  08/12/20  1104   POTASSIUM mmol/L 3 4*   < > 3 6   CHLORIDE mmol/L 111*   < > 104   CO2 mmol/L 27   < > 25   BUN mg/dL 10   < > 15   CREATININE mg/dL 0 82   < > 1 21   CALCIUM mg/dL 7 7*   < > 8 8   ALK PHOS U/L  --   --  88   ALT U/L  --   --  30   AST U/L  --   --  16    < > = values in this interval not displayed  Results from last 7 days   Lab Units 08/12/20  1104   INR  1 11       * I Have Reviewed All Lab Data Listed Above  * Additional Pertinent Lab Tests Reviewed: All Labs Within Last 24 Hours Reviewed        Recent Cultures (last 7 days):     Results from last 7 days   Lab Units 08/12/20  1119 08/12/20  1104   BLOOD CULTURE  No Growth at 24 hrs  No Growth at 24 hrs         Last 24 Hours Medication List:   Current Facility-Administered Medications   Medication Dose Route Frequency Provider Last Rate    albuterol  2 puff Inhalation Q4H PRN Tim Centeno DO      cefepime  2,000 mg Intravenous Q12H Bettina Bravo DO Stopped (08/14/20 0050)    docusate sodium  100 mg Oral BID PRN Hetul Centeno, DO      enoxaparin  40 mg Subcutaneous Daily Ferry Fontanelle, DO      ibuprofen  400 mg Oral Q6H PRN Ferry Fontanelle, DO      metroNIDAZOLE  500 mg Intravenous Q8H Ferry Fontanelle,  mg (08/14/20 0855)    multi-electrolyte  125 mL/hr Intravenous Continuous Ferry Fontanelle, DO Stopped (08/14/20 0855)    olaparib  300 mg Oral BID Ferry Fontanelle, DO      ondansetron  4 mg Intravenous Q6H PRN Ferry Fontanelle, DO      sodium chloride (PF)  3 mL Intravenous Q1H PRN Ferry Fontanelle, DO      vancomycin  20 mg/kg (Adjusted) Intravenous Q12H Hetul Centeno, DO 1,500 mg (08/14/20 1015)        Today, Patient Was Seen By: Huy Mendez DO    ** Please Note: Dictation voice to text software may have been used in the creation of this document   **

## 2020-08-15 VITALS
HEART RATE: 78 BPM | RESPIRATION RATE: 18 BRPM | WEIGHT: 220.68 LBS | TEMPERATURE: 98.5 F | SYSTOLIC BLOOD PRESSURE: 117 MMHG | HEIGHT: 66 IN | DIASTOLIC BLOOD PRESSURE: 63 MMHG | OXYGEN SATURATION: 99 % | BODY MASS INDEX: 35.47 KG/M2

## 2020-08-15 PROCEDURE — 99232 SBSQ HOSP IP/OBS MODERATE 35: CPT | Performed by: INTERNAL MEDICINE

## 2020-08-15 PROCEDURE — 99238 HOSP IP/OBS DSCHRG MGMT 30/<: CPT | Performed by: INTERNAL MEDICINE

## 2020-08-15 RX ORDER — ALBUTEROL SULFATE 90 UG/1
2 AEROSOL, METERED RESPIRATORY (INHALATION) EVERY 6 HOURS PRN
Qty: 8.5 G | Refills: 0 | Status: SHIPPED | OUTPATIENT
Start: 2020-08-15 | End: 2021-07-15

## 2020-08-15 RX ADMIN — OLAPARIB 300 MG: 150 TABLET, FILM COATED ORAL at 06:13

## 2020-08-15 RX ADMIN — METRONIDAZOLE 500 MG: 500 INJECTION, SOLUTION INTRAVENOUS at 00:03

## 2020-08-15 RX ADMIN — METRONIDAZOLE 500 MG: 500 INJECTION, SOLUTION INTRAVENOUS at 09:00

## 2020-08-15 NOTE — PROGRESS NOTES
Progress Note - Margarito Pap 1970, 45 y o  female MRN: [id number]    Unit/Bed#: [facility] 923-01 Encounter: [id number]    Primary Care Provider: Venson Boeck, CRNP   Date and time admitted to hospital: 2020 10:13 AM        * Febrile illness, acute  Assessment & Plan  Patient presented with SIRS criteria, fever, tachycardia, leukocytosis and elevated lactic acid  Unknown source  Repeat COVID test was negative  Will hold off on CT imaging  Note Infectious Disease input  Discussed with Infectious Disease last night  Etiology of fever likely secondary to vaccination based on temporal relationship of injection and symptoms  Centrilobular emphysema ([address] )  Assessment & Plan  Discharge on albuterol inhaler  Malignant neoplasm of central portion of right breast in female, estrogen receptor positive [facility])  Assessment & Plan  Status post positive right mastectomy on 2020  Well healed surgical site  Currently on oral chemotherapy  Outpatient follow-up      VTE Pharmacologic Prophylaxis:   Pharmacologic: Enoxaparin (Lovenox)  Mechanical VTE Prophylaxis in Place: No    Patient Centered Rounds: I have performed bedside rounds with nursing staff today  Time Spent for Care: 15 minutes  More than 50% of total time spent on counseling and coordination of care as described above  Current Length of Stay: 3 day(s)    Current Patient Status: Inpatient   Certification Statement: The patient will continue to require additional inpatient hospital stay due to Need to monitor symptoms      Code Status: Level 1 - Full Code      Subjective:   No acute distress    Objective:     Vitals:   Temp (24hrs), Av 5 °F (36 9 °C), Min:98 °F (36 7 °C), Max:98 8 °F (37 1 °C)    Temp:  [98 °F (36 7 °C)-98 8 °F (37 1 °C)] 98 5 °F (36 9 °C)  HR:  [76] 76  Resp:  [14-18] 18  BP: ()/(60-68) 117/63  Body mass index is 35 62 kg/m²  Input and Output Summary (last 24 hours):        Intake/Output Summary (Last 24 hours) at 8/15/2020 0924  Last data filed at 8/15/2020 0700  Gross per 24 hour   Intake 550 ml   Output 1950 ml   Net -1400 ml       Physical Exam:     Physical Exam  Constitutional:       Appearance: Normal appearance  HENT:      Head: Normocephalic and atraumatic  Cardiovascular:      Rate and Rhythm: Normal rate and regular rhythm  Heart sounds: No murmur  Pulmonary:      Effort: Pulmonary effort is normal       Breath sounds: Normal breath sounds  Abdominal:      General: Abdomen is flat  Palpations: Abdomen is soft  Musculoskeletal:         General: No swelling  Skin:     Coloration: Skin is not jaundiced or pale  Neurological:      General: No focal deficit present  Mental Status: She is alert and oriented to person, place, and time  Additional Data:     Labs:    Results from last 7 days   Lab Units 08/13/20  0451   WBC Thousand/uL 14 22*   HEMOGLOBIN g/dL 10 1*   HEMATOCRIT % 32 1*   PLATELETS Thousands/uL 133*   NEUTROS PCT % 86*   LYMPHS PCT % 6*   MONOS PCT % 7   EOS PCT % 0     Results from last 7 days   Lab Units 08/14/20  0530  08/12/20  1104   POTASSIUM mmol/L 3 4*   < > 3 6   CHLORIDE mmol/L 111*   < > 104   CO2 mmol/L 27   < > 25   BUN mg/dL 10   < > 15   CREATININE mg/dL 0 82   < > 1 21   CALCIUM mg/dL 7 7*   < > 8 8   ALK PHOS U/L  --   --  88   ALT U/L  --   --  30   AST U/L  --   --  16    < > = values in this interval not displayed  Results from last 7 days   Lab Units 08/12/20  1104   INR  1 11       * I Have Reviewed All Lab Data Listed Above  * Additional Pertinent Lab Tests Reviewed: All Labs Within Last 24 Hours Reviewed      Recent Cultures (last 7 days):     Results from last 7 days   Lab Units 08/12/20  1119 08/12/20  1104   BLOOD CULTURE  No Growth at 48 hrs  No Growth at 48 hrs         Last 24 Hours Medication List:   Current Facility-Administered Medications   Medication Dose Route Frequency Provider Last Rate    albuterol  2 puff Inhalation Q4H PRN Hetul Centeno, DO      cefepime  2,000 mg Intravenous Q12H Eulice Erichsen, DO Stopped (08/14/20 2345)    docusate sodium  100 mg Oral BID PRN Hetul Centeno, DO      enoxaparin  40 mg Subcutaneous Daily Eulice Erichsen, DO      ibuprofen  400 mg Oral Q6H PRN Eulice Erichsen, DO      metroNIDAZOLE  500 mg Intravenous Q8H Eulice Erichsen,  mg (08/15/20 0900)    olaparib  300 mg Oral BID Eulice Erichsen, DO      ondansetron  4 mg Intravenous Q6H PRN Eulice Erichsen, DO      sodium chloride (PF)  3 mL Intravenous Q1H PRN Eulice Erichsen, DO      vancomycin  22 mg/kg (Adjusted) Intravenous Q12H Hetul Centeno, DO          Today, Patient Was Seen By: Jerry Inman DO    ** Please Note: Dictation voice to text software may have been used in the creation of this document   **

## 2020-08-15 NOTE — PROGRESS NOTES
Vancomycin IV Pharmacy-to-Dose Consultation    Fabio Martinez is a 45 y o  female who is currently receiving Vancomycin IV with management by the Pharmacy Consult service  Assessment/Plan:  The patient was reviewed  Renal function is stable and no signs or symptoms of nephrotoxicity and/or infusion reactions were documented in the chart  Based on todays assessment, continue current vancomycin (day # 4) dosing of 1750 mg q12h  If therapy is to continue, plan for trough to be drawn at 2030 on 8/16  We will continue to follow the patients culture results and clinical progress daily      Jyoti Macedo, TeriD, BCIDP

## 2020-08-15 NOTE — PLAN OF CARE
Problem: Potential for Falls  Goal: Patient will remain free of falls  Description: INTERVENTIONS:  - Assess patient frequently for physical needs  -  Identify cognitive and physical deficits and behaviors that affect risk of falls    -  [place] fall precautions as indicated by assessment   - Educate patient/family on patient safety including physical limitations  - Instruct patient to call for assistance with activity based on assessment  - Modify environment to reduce risk of injury  - Consider OT/PT consult to assist with strengthening/mobility  Outcome: Adequate for Discharge     Problem: INFECTION - ADULT  Goal: Absence or prevention of progression during hospitalization  Description: INTERVENTIONS:  - Assess and monitor for signs and symptoms of infection  - Monitor lab/diagnostic results  - Monitor all insertion sites, i e  indwelling lines, tubes, and drains  - Monitor endotracheal if appropriate and nasal secretions for changes in amount and color  - [place] appropriate cooling/warming therapies per order  - Administer medications as ordered  - Instruct and encourage patient and family to use good hand hygiene technique  - Identify and instruct in appropriate isolation precautions for identified infection/condition  Outcome: Adequate for Discharge

## 2020-08-15 NOTE — PROGRESS NOTES
Vancomycin Assessment    Georges Rocky is a 45 y o  female who is currently receiving vancomycin 1500 mg iv q 12hours for other sepsis   Relevant clinical data and objective history reviewed:  Creatinine   Date Value Ref Range Status   08/14/2020 0 82 0 60 - 1 30 mg/dL Final     Comment:     Standardized to IDMS reference method   08/13/2020 0 86 0 60 - 1 30 mg/dL Final     Comment:     Standardized to IDMS reference method   08/12/2020 1 21 0 60 - 1 30 mg/dL Final     Comment:     Standardized to IDMS reference method     Vancomycin Rm   Date Value Ref Range Status   08/13/2020 2 9 ug/mL Final     /68 (BP Location: Left arm)   Pulse 76   Temp 98 °F (36 7 °C) (Oral)   Resp 14   Ht 5' 6" (1 676 m)   Wt 100 kg (220 lb 10 9 oz)   SpO2 98%   BMI 35 62 kg/m²   I/O last 3 completed shifts: In: 46 6 [P O :1660; I V :2144 6; IV Piggyback:1253]  Out: 5650 [Urine:5650]  Lab Results   Component Value Date/Time    BUN 10 08/14/2020 05:30 AM    BUN 15 05/13/2020 01:06 PM    WBC 14 22 (H) 08/13/2020 04:51 AM    HGB 10 1 (L) 08/13/2020 04:51 AM    HCT 32 1 (L) 08/13/2020 04:51 AM    MCV 93 08/13/2020 04:51 AM     (L) 08/13/2020 04:51 AM     Temp Readings from Last 3 Encounters:   08/14/20 98 °F (36 7 °C) (Oral)   08/10/20 98 7 °F (37 1 °C)   08/03/20 97 5 °F (36 4 °C) (Tympanic)     Vancomycin Days of Therapy: 3    Assessment/Plan  The patient is currently on vancomycin utilizing scheduled dosing  Baseline risks associated with therapy include: concomitant nephrotoxic medications and dehydration  The patient is receiving 1500 mg iv q 12hours with the most recent vancomycin level being at steady-state and sub-therapeutic (13 9) based on a goal of 15-20 (appropriate for most indications) ; therefore, after clinical evaluation will be changed to 1750 mg iv q 12 hours   Pharmacy will continue to follow closely for s/sx of nephrotoxicity, infusion reactions, and appropriateness of therapy    BMP and CBC will be ordered per protocol  Plan for trough as patient approaches steady state, prior to the 4th  dose at approximately 20:30 on 8/16/2020  Pharmacy will continue to follow the patients culture results and clinical progress daily      Colonel Matter, Pharmacist

## 2020-08-15 NOTE — SOCIAL WORK
Received message from charge nurse, Yunior Paige that pt would like to s/w CM regarding Billing  Met with patient  She was wanting to pay her $75 00 copay for ER  This CM instructed her we do not take care of billing and informed her most insurance companies waive the fee if patient is admitted as an inpatient  She will f/u with her insurance  Pt will drive herself home   Care in ER lot

## 2020-08-15 NOTE — DISCHARGE SUMMARY
Discharge- Lore Waller 1970, 45 y o  female MRN: [id number]    Unit/Bed#: [facility] 923-01 Encounter: [id number]    Primary Care Provider: EMILIANO Judd   Date and time admitted to hospital: 8/12/2020 10:13 AM        * Febrile illness, acute  Assessment & Plan  Patient presented with SIRS criteria, fever, tachycardia, leukocytosis and elevated lactic acid  Unknown source  Repeat COVID test was negative  Will hold off on CT imaging  Note Infectious Disease input  Discussed with Infectious Disease last night  Etiology of fever likely secondary to vaccination based on temporal relationship of injection and symptoms  Centrilobular emphysema ([facility] [address] )  Assessment & Plan  Discharge on albuterol inhaler  Malignant neoplasm of central portion of right breast in female, estrogen receptor positive ([facility] [address] )  Assessment & Plan  Status post positive right mastectomy on 06/05/2020  Well healed surgical site  Currently on oral chemotherapy  Outpatient follow-up                Resolved Problems  Date Reviewed: 8/12/2020    None          Admission Date:   Admission Orders (From admission, onward)     Ordered        08/12/20 1216  Inpatient Admission  Once                     Admitting Diagnosis: Breast cancer ([facility] [address] ) [C50 919]  Fever [R50 9]  SIRS (systemic inflammatory response syndrome) ([facility] [address] ) [R65 10]    HPI:  This very pleasant 79-year-old female presents hospital known history metastatic breast carcinoma status post right-sided mastectomy on June 5th, 2020 was admitted emergency room for evaluation of 2 day history of fever spikes going up to 101 to 102  Patient was doing well and saw her family physician on August 10th, 2020 with some symptoms of shortness of breath  At that time she received a Pneumovax  Within hours of the patient felt that her left arm had become swollen with increasing pain  She subsequent developed fever spikes  She presents the hospital further workup evaluation    She had empiric antibiotics started with blood cultures drawn  Fortunately her blood cultures have been negative  She did have a look moist reaction likely secondary to the injection  Patient at this time appears clinically stable with improved symptoms  Etiology of the fever is attributed to a vaccine reaction  · Febrile illness secondary to Pneumovax hyper reaction  Patient was admitted to rule out occult sepsis  Fortunately cultures have been negative  Leukocytosis likely leukemoid reaction  Patient feels clinically better  Swelling in arm has improved  Observe off antibiotics  Will discharge home  Cultures negative today  No fevers overnight  COVID testing negative x2  · Metastatic breast cancer  Outpatient follow-up  Patient status post recent mastectomy  Patient with prior chemotherapy  · COPD history  Will discharge on bronchodilator therapy     Procedures Performed: No orders of the defined types were placed in this encounter  Condition at Discharge: fair         Discharge instructions/Information to patient and family:   See after visit summary for information provided to patient and family  Provisions for Follow-Up Care:  See after visit summary for information related to follow-up care and any pertinent home health orders  PCP: EMILIANO Monk    Disposition: Home    Planned Readmission: No    Discharge Statement   I spent 35 minutes discharging the patient  This time was spent on the day of discharge  I had direct contact with the patient on the day of discharge  Additional documentation is required if more than 30 minutes were spent on discharge  Discharge Medications:  See after visit summary for reconciled discharge medications provided to patient and family

## 2020-08-15 NOTE — PROGRESS NOTES
Progress Note - Infectious Disease   Narayanalea Smith 45 y o  female MRN: [id number]  Unit/Bed#: [facility] 923-01 Encounter: [id number]      Impression:  1  Fever secondary to Pneumovax-23 hyper reaction left upper extremity    Recommendations:  Patient is afebrile and WBC is gradually declining when last taken  Patient still has some left upper arm pain and tenderness but it continues to decrease  1  Check final culture results which remain negative  2  Cultures remain negative  Discussed with primary service  Patient will be discharged off antibiotics  IV antibiotics discontinued    Antibiotics:  1  None     Subjective:  Less tenderness and swelling of LUE  Denies fevers, chills, or sweats  Denies nausea, vomiting, or diarrhea  Objective:  Vitals:  Temp:  [98 °F (36 7 °C)-98 7 °F (37 1 °C)] 98 5 °F (36 9 °C)  HR:  [76-78] 78  Resp:  [14-18] 18  BP: ()/(60-68) 117/63  SpO2:  [99 %] 99 %  Temp (24hrs), Av 4 °F (36 9 °C), Min:98 °F (36 7 °C), Max:98 7 °F (37 1 °C)  Current: Temperature: 98 5 °F (36 9 °C)    Physical Exam:     General Appearance:  Alert, nontoxic, no acute distress  Throat: Oropharynx moist without lesions  Lips, mucosa, and tongue normal   Neck: Supple, symmetrical, trachea midline, no adenopathy,  no tenderness/mass/nodules   Lungs:   Clear to auscultation bilaterally, no audible wheezes, rhonchi or rales; respirations unlabored   Heart:  Regular rate and rhythm, S1, S2 normal, no murmur, rub or gallop   Abdomen:   Soft, non-tender, non-distended, positive bowel sounds    No masses, no organomegaly    No CVA tenderness   Extremities: LUE biceps area is 1+ warm, edematous and tender with trace erythema   Skin: As above, right mastectomy scar         Invasive Devices     None                 Labs, Imaging, & Other studies:   All pertinent labs were personally reviewed  Results from last 7 days   Lab Units 20  0451 20  1104   WBC Thousand/uL 14 22* 15 06*   HEMOGLOBIN g/dL 10 1* 12 1   PLATELETS Thousands/uL 133* 174     Results from last 7 days   Lab Units 08/14/20  0530 08/13/20  0451 08/12/20  1104   SODIUM mmol/L 143 139 137   POTASSIUM mmol/L 3 4* 3 3* 3 6   CHLORIDE mmol/L 111* 107 104   CO2 mmol/L 27 24 25   BUN mg/dL 10 11 15   CREATININE mg/dL 0 82 0 86 1 21   EGFR ml/min/1 73sq m 83 78 52   CALCIUM mg/dL 7 7* 7 7* 8 8   AST U/L  --   --  16   ALT U/L  --   --  30   ALK PHOS U/L  --   --  88     Results from last 7 days   Lab Units 08/12/20  1119 08/12/20  1104   BLOOD CULTURE  No Growth at 48 hrs  No Growth at 48 hrs

## 2020-08-17 ENCOUNTER — TRANSITIONAL CARE MANAGEMENT (OUTPATIENT)
Dept: FAMILY MEDICINE CLINIC | Facility: CLINIC | Age: 50
End: 2020-08-17

## 2020-08-17 LAB
BACTERIA BLD CULT: NORMAL
BACTERIA BLD CULT: NORMAL

## 2020-08-27 ENCOUNTER — OFFICE VISIT (OUTPATIENT)
Dept: FAMILY MEDICINE CLINIC | Facility: CLINIC | Age: 50
End: 2020-08-27
Payer: COMMERCIAL

## 2020-08-27 VITALS
DIASTOLIC BLOOD PRESSURE: 64 MMHG | SYSTOLIC BLOOD PRESSURE: 116 MMHG | TEMPERATURE: 98.4 F | WEIGHT: 220 LBS | HEART RATE: 86 BPM | BODY MASS INDEX: 35.36 KG/M2 | HEIGHT: 66 IN

## 2020-08-27 DIAGNOSIS — R50.9 FEBRILE ILLNESS, ACUTE: Primary | ICD-10-CM

## 2020-08-27 DIAGNOSIS — N75.0 BARTHOLIN CYST: ICD-10-CM

## 2020-08-27 DIAGNOSIS — J43.2 CENTRILOBULAR EMPHYSEMA (HCC): ICD-10-CM

## 2020-08-27 PROCEDURE — 99495 TRANSJ CARE MGMT MOD F2F 14D: CPT | Performed by: NURSE PRACTITIONER

## 2020-08-27 PROCEDURE — 3008F BODY MASS INDEX DOCD: CPT | Performed by: NURSE PRACTITIONER

## 2020-08-27 NOTE — ASSESSMENT & PLAN NOTE
Ultimately unknown source  Patient was given antibiotics IV  Was discharged after not having fever for >24 hours  It was ultimately discussed it was more likely related to reaction from pneumovax

## 2020-08-27 NOTE — PROGRESS NOTES
Assessment/Plan:     Centrilobular emphysema ([address] )  Patient on advair and ventolin as needed  Febrile illness, acute  Ultimately unknown source  Patient was given antibiotics IV  Was discharged after not having fever for >24 hours  It was ultimately discussed it was more likely related to reaction from pneumovax  Bartholin cyst  Patient had right vulva cyst  Tiny cyst nontender  Monitor if bigger gyn eval for drainage  Diagnoses and all orders for this visit:    Febrile illness, acute    Centrilobular emphysema ([address] )    Bartholin cyst         Subjective:     Patient ID: Maryam Triplett is a 45 y o  female  8/12-8/15: patient presented herself to the ER after having left arm swelling and fever with Tmax: 103  8  patient is immunocompromised  She was admitted to rule out occult infection due to SIRS criteria  Blood cultures were negative  There were no occult findings to suggest infection  The final diagnosis was febrile illness secondary to hyper response to pneumovax  Patient reports the arm swelling is significantly improved  She has not had any additional fevers  Patient reports since using inhaler she feels better as well  She states she has not needed to use ventolin  She is performing mouth care routinely after use  Patient reports she noticed a small lump on her vaginal area  She states she had them before  Would like it to be looked at  Review of Systems   Constitutional: Negative for chills, diaphoresis, fatigue and fever  Respiratory: Negative for cough, chest tightness and shortness of breath  Cardiovascular: Negative for chest pain and palpitations  Skin: Negative for rash  As noted in the HPI          Objective:     Physical Exam  Vitals signs and nursing note reviewed  Constitutional:       Appearance: She is well-developed  HENT:      Head: Normocephalic and atraumatic  Cardiovascular:      Rate and Rhythm: Normal rate and regular rhythm        Heart sounds: Normal heart sounds, S1 normal and S2 normal    Pulmonary:      Effort: Pulmonary effort is normal       Breath sounds: Normal breath sounds  Chest:      Breasts: Breasts are asymmetrical (right mastectomy )  Genitourinary:      Neurological:      Mental Status: She is alert and oriented to person, place, and time  Vitals:    08/27/20 1315   BP: 116/64   BP Location: Left arm   Patient Position: Sitting   Cuff Size: Adult   Pulse: 86   Temp: 98 4 °F (36 9 °C)   TempSrc: Temporal   Weight: 99 8 kg (220 lb)   Height: 5' 6" (1 676 m)       Transitional Care Management Review:  Keira Jessica is a 45 y o  female here for TCM follow up  During the TCM phone call patient stated:    TCM Call (since 7/27/2020)     Date and time call was made  8/17/2020  1:27 [address] [facility] reviewed  Records reviewed    Patient was hospitial[facility] at  [facility]    Date of Admission  08/12/20    Date of discharge  08/15/20    Diagnosis  acute febrile illness, reaction to pneumovax    Disposition  Home    Were the patients medications reviewed and updated  Yes    Current Symptoms  None      TCM Call (since 7/27/2020)     Post hospital issues  None    Should patient be enrolled in anticoag monitoring? No    Scheduled for follow up?   Yes    Did you obtain your prescribed medications  Yes    Do you need help managing your prescriptions or medications  No    Is transportation to your appointment needed  No    I have advised the patient to call PCP with any new or worsening symptoms  EMILIANO Glass

## 2020-09-02 ENCOUNTER — TELEPHONE (OUTPATIENT)
Dept: HEMATOLOGY ONCOLOGY | Facility: CLINIC | Age: 50
End: 2020-09-02

## 2020-09-02 NOTE — TELEPHONE ENCOUNTER
Patient is calling in requesting for her lab orders to be faxed over to [facility]      I have faxed this over to [phone]

## 2020-09-03 LAB
ALBUMIN SERPL-MCNC: 4 G/DL (ref 3.6–5.1)
ALBUMIN/GLOB SERPL: 1.4 (CALC) (ref 1–2.5)
ALP SERPL-CCNC: 79 U/L (ref 37–153)
ALT SERPL-CCNC: 29 U/L (ref 6–29)
AST SERPL-CCNC: 21 U/L (ref 10–35)
BASOPHILS # BLD AUTO: 0 CELLS/UL (ref 0–200)
BASOPHILS NFR BLD AUTO: 0 %
BILIRUB SERPL-MCNC: 0.3 MG/DL (ref 0.2–1.2)
BUN SERPL-MCNC: 16 MG/DL (ref 7–25)
BUN/CREAT SERPL: NORMAL (CALC) (ref 6–22)
CALCIUM SERPL-MCNC: 9.5 MG/DL (ref 8.6–10.4)
CHLORIDE SERPL-SCNC: 103 MMOL/L (ref 98–110)
CO2 SERPL-SCNC: 30 MMOL/L (ref 20–32)
CREAT SERPL-MCNC: 0.86 MG/DL (ref 0.5–1.05)
EOSINOPHIL # BLD AUTO: 78 CELLS/UL (ref 15–500)
EOSINOPHIL NFR BLD AUTO: 1.3 %
ERYTHROCYTE [DISTWIDTH] IN BLOOD BY AUTOMATED COUNT: 19.3 % (ref 11–15)
GLOBULIN SER CALC-MCNC: 2.8 G/DL (CALC) (ref 1.9–3.7)
GLUCOSE SERPL-MCNC: 85 MG/DL (ref 65–139)
HCT VFR BLD AUTO: 35 % (ref 35–45)
HGB BLD-MCNC: 11.9 G/DL (ref 11.7–15.5)
LYMPHOCYTES # BLD AUTO: 1320 CELLS/UL (ref 850–3900)
LYMPHOCYTES NFR BLD AUTO: 22 %
MCH RBC QN AUTO: 31.3 PG (ref 27–33)
MCHC RBC AUTO-ENTMCNC: 34 G/DL (ref 32–36)
MCV RBC AUTO: 92.1 FL (ref 80–100)
MONOCYTES # BLD AUTO: 576 CELLS/UL (ref 200–950)
MONOCYTES NFR BLD AUTO: 9.6 %
NEUTROPHILS # BLD AUTO: 4026 CELLS/UL (ref 1500–7800)
NEUTROPHILS NFR BLD AUTO: 67.1 %
PLATELET # BLD AUTO: 214 THOUSAND/UL (ref 140–400)
PMV BLD REES-ECKER: 11.7 FL (ref 7.5–12.5)
POTASSIUM SERPL-SCNC: 4.3 MMOL/L (ref 3.5–5.3)
PROT SERPL-MCNC: 6.8 G/DL (ref 6.1–8.1)
RBC # BLD AUTO: 3.8 MILLION/UL (ref 3.8–5.1)
SL AMB EGFR AFRICAN AMERICAN: 91 ML/MIN/1.73M2
SL AMB EGFR NON AFRICAN AMERICAN: 78 ML/MIN/1.73M2
SODIUM SERPL-SCNC: 139 MMOL/L (ref 135–146)
WBC # BLD AUTO: 6 THOUSAND/UL (ref 3.8–10.8)

## 2020-09-04 ENCOUNTER — OFFICE VISIT (OUTPATIENT)
Dept: HEMATOLOGY ONCOLOGY | Facility: CLINIC | Age: 50
End: 2020-09-04
Payer: COMMERCIAL

## 2020-09-04 VITALS
BODY MASS INDEX: 35.84 KG/M2 | HEIGHT: 66 IN | OXYGEN SATURATION: 96 % | WEIGHT: 223 LBS | HEART RATE: 89 BPM | RESPIRATION RATE: 18 BRPM | SYSTOLIC BLOOD PRESSURE: 122 MMHG | DIASTOLIC BLOOD PRESSURE: 70 MMHG | TEMPERATURE: 98.1 F

## 2020-09-04 DIAGNOSIS — C77.3 CARCINOMA OF RIGHT BREAST METASTATIC TO AXILLARY LYMPH NODE (HCC): ICD-10-CM

## 2020-09-04 DIAGNOSIS — C78.7 LIVER METASTASES (HCC): Primary | ICD-10-CM

## 2020-09-04 DIAGNOSIS — Z17.0 MALIGNANT NEOPLASM OF CENTRAL PORTION OF RIGHT BREAST IN FEMALE, ESTROGEN RECEPTOR POSITIVE (HCC): ICD-10-CM

## 2020-09-04 DIAGNOSIS — C50.911 CARCINOMA OF RIGHT BREAST METASTATIC TO AXILLARY LYMPH NODE (HCC): ICD-10-CM

## 2020-09-04 DIAGNOSIS — C50.111 MALIGNANT NEOPLASM OF CENTRAL PORTION OF RIGHT BREAST IN FEMALE, ESTROGEN RECEPTOR POSITIVE (HCC): ICD-10-CM

## 2020-09-04 PROCEDURE — 99214 OFFICE O/P EST MOD 30 MIN: CPT | Performed by: INTERNAL MEDICINE

## 2020-09-04 NOTE — PROGRESS NOTES
Hematology / Oncology Outpatient Follow Up Note    Manan Medina 45 y o  female :1970 QAD:[id number]         Date:  2020    Assessment / Plan:  A 45year-old surgically postmenopausal woman with metastatic right breast cancer, grade 3, ER 90% positive, IA 90% positive, HER2 negative disease   She has BRCA -2 mutation    She has histologically confirmed liver metastasis   Since she was premenopausal, she underwent as bilateral surgical ovarian ablation in late 2019 followed by starting palbociclib and letrozole   She had initial minor response   However, she had rapid progression in 2019 for which she was treated with Taxotere x6 cycle resulting in partial response  Subsequently, she had palliative right mastectomy followed by olaparib since mid 2020  She is tolerating olaparib very well with no toxicity  I recommended her to continue with olaparib 300 mg b i d  I am going to obtain tumor antigen, CBC and CMP as well as CT scan of chest abdomen pelvis in mid 2020 for tumor evaluation    She is in agreement with my recommendations           Subjective:      HPI:  A 50-year-old premenopausal woman who noticed a lump in her right breast in 2019 which she brought to medical attention   Radiographically, she has large right breast mass and right axillary adenopathy both of which were biopsy in May 3, 2019   Biopsy showed invasive ductal carcinoma, grade 3   This was ER 90% positive, IA 90% positive, HER2 negative disease   Biopsy from right axilla lymph node was positive for metastatic disease   She was seen by Dr Belle Carey of her young age, she underwent genetic testing which showed BRCA-2 mutation   Because of the locally advanced nature, she underwent CT scan of chest abdomen pelvis which showed multiple liver lesion, suspicious for metastatic disease   Bone scan was negative for osseous metastasis  Taco Garcia is going to have liver biopsy in 2019  Taco Garcia presents today to discuss treatment option   She has no symptomatology from breast standpoint   She denied any pain  Her weight has been stable   She has no respiratory symptoms   She has no significant past medical history   Her paternal aunt had breast cancer in her 35s   Interestingly enough, her father had colon cancer in his 45s as well as prostate cancer in his 62s   She has no family history of ovarian cancer   She was a smoker until 2 years ago  Viri House does not drink alcohol  Viri House has a son who is 23years old  Viri House also has younger son who was biologically female  Emil Craig is a transgender  Emil Craig is considering bilateral mastectomy  Crispin Cardona, he has not been tested for BRCA gene mutation            Interval History:   A 45year-old surgically postmenopausal woman with metastatic right breast cancer, grade 3, ER 90% positive, MN 90% positive, HER2 negative disease   She has BRCA -2 mutation   Based on the CT scan, she appeared to have multiple liver metastasis     She subsequently underwent liver biopsy which showed metastatic carcinoma, consistent with breast primary   She was premenopausal at the time of diagnosis   Therefore, she underwent bilateral oophorectomy in late July 2019  Ovary and fallopian tube did not show any malignancy   She started palbociclib and letrozole in late July 2019  She initially had response with decreased right breast mass as well as right axillary adenopathy   However, in late December 2019, she had rapid progression in her right breast mass, skin involvement as well as right axillary adenopathy   Therefore, she started palliative chemotherapy with Taxotere, resulting in partial response  After 6 cycle treatment with Taxotere, she underwent palliative right mastectomy which showed multiple foci of invasive ductal carcinoma measuring up to 3 5 cm and 13 positive axillary lymph node  Since mid June 2020, she has been on olaparib    She is tolerating olaparib very well with no nausea, vomiting or GI symptomatology  Her weight is stable  She has no complaint of anorexia  She denied any pain  She has no respiratory symptoms  She was hospitalized with fever which occurred within 24 hours after her 1st shot of pneumonia vaccine  She recovered completely from this event  She has normal performance status         Objective:      Primary Diagnosis:     1    Metastatic breast cancer, grade 3, ER 90 % positive, MN 90 % positive, HER2 negative disease, diagnosed in June 2019    2    BRCA-2 mutation      Cancer Staging:  Cancer Staging  Malignant neoplasm of central portion of right breast in female, estrogen receptor positive ([facility] [address] )  Staging form: Breast, AJCC 8th Edition  - Clinical: Stage IIIB (cT4, cN1(f), cM0, G3, ER+, MN+, HER2-) - Signed by Everardo Garner MD on 5/16/2019  Laterality: Right  Method of lymph node assessment: Core biopsy  Histologic grading system: 3 grade system           Previous Hematologic/ Oncologic Treatment:      1  Surgical ovarian ablation in late July 2019   2  Palbociclib and letrozole from July 2018 through January 2020    3  Taxotere x6 cycle, completed in May 2020  4  Palliative right mastectomy      Current Hematologic/ Oncologic Treatment:       Olaparib 300 mg b i d   Since mid July 2020       Disease Status:      Not evaluated on current regimen      Test Results:     Pathology:     Right breast biopsy in axillary lymph node biopsy showed invasive ductal carcinoma, grade 3   ER 90 % positive, MN 90% positive, HER2 negative disease      Liver biopsy in June 2019 showed metastatic carcinoma, consistent with breast primary      In June 2020, mastectomy specimen showed multiple foci of invasive ductal carcinoma measuring up to 3 5 cm with 13 positive axillary lymph nodes      Radiology:     Bone scan showed no evidence of osseous metastasis      CT scan of chest abdomen pelvis in December 2019 showed progression of right breast mass and right axillary adenopathy with new appearance of skin change   Mixed response with liver metastasis       Laboratory:     See below   CA 27, 29 was 86 in May 2020       Physical Exam:        General Appearance:    Alert, oriented          Eyes:    PERRL   Ears:    Normal external ear canals, both ears   Nose:   Nares normal, septum midline   Throat:   Mucosa moist  Pharynx without injection  Neck:   Supple         Lungs:     Clear to auscultation bilaterally   Chest Wall:    No tenderness or deformity    Heart:    Regular rate and rhythm         Abdomen:     Soft, non-tender, bowel sounds +, no organomegaly               Extremities:   Extremities no cyanosis or edema         Skin:   no rash or icterus  Lymph nodes:   Cervical, supraclavicular, and axillary nodes normal   Neurologic:   CNII-XII intact, normal strength, sensation and reflexes     Throughout             Breast exam: Right status post mastectomy without reconstruction  No palpable abnormality on her right chest wall   Left breast exam is negative  ROS: Review of Systems   All other systems reviewed and are negative  Imaging: Xr Chest 1 View Portable    Result Date: 8/12/2020  Narrative: CHEST INDICATION:   fever  COMPARISON:  12/13/2019 EXAM PERFORMED/VIEWS:  XR CHEST PORTABLE FINDINGS: Cardiomediastinal silhouette appears unremarkable  The lungs are clear  No pneumothorax or pleural effusion  Osseous structures appear within normal limits for patient age  The patient is status post right mastectomy  Surgical clips are present within the axilla  Impression: No acute cardiopulmonary disease   Workstation performed: [id number]         Labs:   Lab Results   Component Value Date    WBC 6 0 09/02/2020    HGB 11 9 09/02/2020    HCT 35 0 09/02/2020    MCV 92 1 09/02/2020     09/02/2020     Lab Results   Component Value Date    K 4 3 09/02/2020     09/02/2020    CO2 30 09/02/2020    BUN 16 09/02/2020    CREATININE 0 86 09/02/2020    GLUF 91 01/04/2020    CALCIUM 9 5 09/02/2020    AST 21 09/02/2020    ALT 29 09/02/2020    ALKPHOS 79 09/02/2020    EGFR 83 08/14/2020         Current Medications: Reviewed  Allergies: Reviewed  PMH/FH/SH:  Reviewed      Vital Sign:    Body surface area is 2 09 meters squared      Wt Readings from Last 3 Encounters:   09/04/20 101 kg (223 lb)   08/27/20 99 8 kg (220 lb)   08/12/20 100 kg (220 lb 10 9 oz)        Temp Readings from Last 3 Encounters:   09/04/20 98 1 °F (36 7 °C) (Tympanic Core)   08/27/20 98 4 °F (36 9 °C) (Temporal)   08/15/20 98 5 °F (36 9 °C)        BP Readings from Last 3 Encounters:   09/04/20 122/70   08/27/20 116/64   08/15/20 117/63         Pulse Readings from Last 3 Encounters:   09/04/20 89   08/27/20 86   08/15/20 78     @LASTSAO2(3)@

## 2020-09-23 ENCOUNTER — PATIENT MESSAGE (OUTPATIENT)
Dept: FAMILY MEDICINE CLINIC | Facility: CLINIC | Age: 50
End: 2020-09-23

## 2020-09-23 DIAGNOSIS — J43.2 CENTRILOBULAR EMPHYSEMA (HCC): ICD-10-CM

## 2020-09-24 NOTE — TELEPHONE ENCOUNTER
From: Georges Hidalgo  To: EMILIANO Hubbard  Sent: 9/23/2020 1:32 PM EDT  Subject: Prescription Question    Could you please have my prescription sent to the [facility] that I have on file  I originally picked up my prescription at the hospital after my stay   I don't want to have to refill at the hospital  Thank you

## 2020-09-29 DIAGNOSIS — C50.911 CARCINOMA OF RIGHT BREAST METASTATIC TO AXILLARY LYMPH NODE (HCC): Primary | ICD-10-CM

## 2020-09-29 DIAGNOSIS — C78.7 LIVER METASTASES (HCC): ICD-10-CM

## 2020-09-29 DIAGNOSIS — C77.3 CARCINOMA OF RIGHT BREAST METASTATIC TO AXILLARY LYMPH NODE (HCC): Primary | ICD-10-CM

## 2020-10-12 ENCOUNTER — HOSPITAL ENCOUNTER (OUTPATIENT)
Dept: CT IMAGING | Facility: HOSPITAL | Age: 50
Discharge: HOME | End: 2020-10-12
Attending: INTERNAL MEDICINE
Payer: COMMERCIAL

## 2020-10-12 DIAGNOSIS — C50.911 CARCINOMA OF RIGHT BREAST METASTATIC TO AXILLARY LYMPH NODE (HCC): ICD-10-CM

## 2020-10-12 DIAGNOSIS — C77.3 CARCINOMA OF RIGHT BREAST METASTATIC TO AXILLARY LYMPH NODE (HCC): ICD-10-CM

## 2020-10-12 DIAGNOSIS — C78.7 LIVER METASTASES (HCC): ICD-10-CM

## 2020-10-12 DIAGNOSIS — C50.111 MALIGNANT NEOPLASM OF CENTRAL PORTION OF RIGHT BREAST IN FEMALE, ESTROGEN RECEPTOR POSITIVE (HCC): ICD-10-CM

## 2020-10-12 DIAGNOSIS — Z17.0 MALIGNANT NEOPLASM OF CENTRAL PORTION OF RIGHT BREAST IN FEMALE, ESTROGEN RECEPTOR POSITIVE (HCC): ICD-10-CM

## 2020-10-12 PROCEDURE — G1004 CDSM NDSC: HCPCS

## 2020-10-12 PROCEDURE — 71260 CT THORAX DX C+: CPT

## 2020-10-12 PROCEDURE — 74177 CT ABD & PELVIS W/CONTRAST: CPT

## 2020-10-12 RX ADMIN — IOHEXOL 100 ML: 350 INJECTION, SOLUTION INTRAVENOUS at 16:11

## 2020-10-18 ENCOUNTER — APPOINTMENT (OUTPATIENT)
Dept: LAB | Facility: HOSPITAL | Age: 50
End: 2020-10-18
Attending: INTERNAL MEDICINE
Payer: COMMERCIAL

## 2020-10-18 DIAGNOSIS — C78.7 LIVER METASTASES (HCC): ICD-10-CM

## 2020-10-18 DIAGNOSIS — Z17.0 MALIGNANT NEOPLASM OF CENTRAL PORTION OF RIGHT BREAST IN FEMALE, ESTROGEN RECEPTOR POSITIVE (HCC): ICD-10-CM

## 2020-10-18 DIAGNOSIS — C50.111 MALIGNANT NEOPLASM OF CENTRAL PORTION OF RIGHT BREAST IN FEMALE, ESTROGEN RECEPTOR POSITIVE (HCC): ICD-10-CM

## 2020-10-18 DIAGNOSIS — C77.3 CARCINOMA OF RIGHT BREAST METASTATIC TO AXILLARY LYMPH NODE (HCC): ICD-10-CM

## 2020-10-18 DIAGNOSIS — C50.911 CARCINOMA OF RIGHT BREAST METASTATIC TO AXILLARY LYMPH NODE (HCC): ICD-10-CM

## 2020-10-18 LAB
ALBUMIN SERPL BCP-MCNC: 3.8 G/DL (ref 3.5–5)
ALP SERPL-CCNC: 75 U/L (ref 46–116)
ALT SERPL W P-5'-P-CCNC: 47 U/L (ref 12–78)
ANION GAP SERPL CALCULATED.3IONS-SCNC: 4 MMOL/L (ref 4–13)
AST SERPL W P-5'-P-CCNC: 23 U/L (ref 5–45)
BASOPHILS # BLD AUTO: 0.01 THOUSANDS/ΜL (ref 0–0.1)
BASOPHILS NFR BLD AUTO: 0 % (ref 0–1)
BILIRUB SERPL-MCNC: 0.27 MG/DL (ref 0.2–1)
BUN SERPL-MCNC: 15 MG/DL (ref 5–25)
CALCIUM SERPL-MCNC: 9.5 MG/DL (ref 8.3–10.1)
CANCER AG27-29 SERPL-ACNC: 25 U/ML (ref 0–42.3)
CHLORIDE SERPL-SCNC: 110 MMOL/L (ref 100–108)
CHOLEST SERPL-MCNC: 201 MG/DL (ref 50–200)
CO2 SERPL-SCNC: 29 MMOL/L (ref 21–32)
CREAT SERPL-MCNC: 0.9 MG/DL (ref 0.6–1.3)
EOSINOPHIL # BLD AUTO: 0.11 THOUSAND/ΜL (ref 0–0.61)
EOSINOPHIL NFR BLD AUTO: 2 % (ref 0–6)
ERYTHROCYTE [DISTWIDTH] IN BLOOD BY AUTOMATED COUNT: 17.1 % (ref 11.6–15.1)
GFR SERPL CREATININE-BSD FRML MDRD: 74 ML/MIN/1.73SQ M
GLUCOSE P FAST SERPL-MCNC: 89 MG/DL (ref 65–99)
HCT VFR BLD AUTO: 36.2 % (ref 34.8–46.1)
HDLC SERPL-MCNC: 55 MG/DL
HGB BLD-MCNC: 12.4 G/DL (ref 11.5–15.4)
IMM GRANULOCYTES # BLD AUTO: 0.02 THOUSAND/UL (ref 0–0.2)
IMM GRANULOCYTES NFR BLD AUTO: 0 % (ref 0–2)
LDLC SERPL CALC-MCNC: 132 MG/DL (ref 0–100)
LYMPHOCYTES # BLD AUTO: 1.78 THOUSANDS/ΜL (ref 0.6–4.47)
LYMPHOCYTES NFR BLD AUTO: 28 % (ref 14–44)
MCH RBC QN AUTO: 33.4 PG (ref 26.8–34.3)
MCHC RBC AUTO-ENTMCNC: 34.3 G/DL (ref 31.4–37.4)
MCV RBC AUTO: 98 FL (ref 82–98)
MONOCYTES # BLD AUTO: 0.63 THOUSAND/ΜL (ref 0.17–1.22)
MONOCYTES NFR BLD AUTO: 10 % (ref 4–12)
NEUTROPHILS # BLD AUTO: 3.71 THOUSANDS/ΜL (ref 1.85–7.62)
NEUTS SEG NFR BLD AUTO: 60 % (ref 43–75)
NONHDLC SERPL-MCNC: 146 MG/DL
NRBC BLD AUTO-RTO: 0 /100 WBCS
PLATELET # BLD AUTO: 200 THOUSANDS/UL (ref 149–390)
PMV BLD AUTO: 11.7 FL (ref 8.9–12.7)
POTASSIUM SERPL-SCNC: 4.1 MMOL/L (ref 3.5–5.3)
PROT SERPL-MCNC: 7.7 G/DL (ref 6.4–8.2)
RBC # BLD AUTO: 3.71 MILLION/UL (ref 3.81–5.12)
SODIUM SERPL-SCNC: 143 MMOL/L (ref 136–145)
TRIGL SERPL-MCNC: 71 MG/DL
WBC # BLD AUTO: 6.26 THOUSAND/UL (ref 4.31–10.16)

## 2020-10-18 PROCEDURE — 80061 LIPID PANEL: CPT | Performed by: NURSE PRACTITIONER

## 2020-10-18 PROCEDURE — 86300 IMMUNOASSAY TUMOR CA 15-3: CPT

## 2020-10-18 PROCEDURE — 80053 COMPREHEN METABOLIC PANEL: CPT

## 2020-10-18 PROCEDURE — 36415 COLL VENOUS BLD VENIPUNCTURE: CPT

## 2020-10-18 PROCEDURE — 85025 COMPLETE CBC W/AUTO DIFF WBC: CPT

## 2020-10-19 ENCOUNTER — OFFICE VISIT (OUTPATIENT)
Dept: HEMATOLOGY ONCOLOGY | Facility: CLINIC | Age: 50
End: 2020-10-19
Payer: COMMERCIAL

## 2020-10-19 VITALS
HEART RATE: 85 BPM | DIASTOLIC BLOOD PRESSURE: 80 MMHG | SYSTOLIC BLOOD PRESSURE: 124 MMHG | HEIGHT: 66 IN | WEIGHT: 228 LBS | OXYGEN SATURATION: 99 % | BODY MASS INDEX: 36.64 KG/M2 | TEMPERATURE: 97.4 F | RESPIRATION RATE: 18 BRPM

## 2020-10-19 DIAGNOSIS — C78.7 LIVER METASTASES (HCC): Primary | ICD-10-CM

## 2020-10-19 DIAGNOSIS — C77.3 CARCINOMA OF RIGHT BREAST METASTATIC TO AXILLARY LYMPH NODE (HCC): ICD-10-CM

## 2020-10-19 DIAGNOSIS — C50.911 CARCINOMA OF RIGHT BREAST METASTATIC TO AXILLARY LYMPH NODE (HCC): ICD-10-CM

## 2020-10-19 PROCEDURE — 99214 OFFICE O/P EST MOD 30 MIN: CPT | Performed by: INTERNAL MEDICINE

## 2020-10-21 ENCOUNTER — OFFICE VISIT (OUTPATIENT)
Dept: FAMILY MEDICINE CLINIC | Facility: CLINIC | Age: 50
End: 2020-10-21
Payer: COMMERCIAL

## 2020-10-21 VITALS
HEIGHT: 66 IN | DIASTOLIC BLOOD PRESSURE: 70 MMHG | BODY MASS INDEX: 36.74 KG/M2 | TEMPERATURE: 98 F | SYSTOLIC BLOOD PRESSURE: 128 MMHG | WEIGHT: 228.6 LBS

## 2020-10-21 DIAGNOSIS — B07.9 VIRAL WART ON FINGER: ICD-10-CM

## 2020-10-21 DIAGNOSIS — J43.2 CENTRILOBULAR EMPHYSEMA (HCC): Primary | ICD-10-CM

## 2020-10-21 DIAGNOSIS — Z23 ENCOUNTER FOR IMMUNIZATION: ICD-10-CM

## 2020-10-21 PROBLEM — R50.9 FEBRILE ILLNESS, ACUTE: Status: RESOLVED | Noted: 2020-08-12 | Resolved: 2020-10-21

## 2020-10-21 PROBLEM — Z01.419 ENCOUNTER FOR GYNECOLOGICAL EXAMINATION: Status: RESOLVED | Noted: 2020-07-31 | Resolved: 2020-10-21

## 2020-10-21 PROBLEM — N61.0 MASTITIS: Status: RESOLVED | Noted: 2019-05-22 | Resolved: 2020-10-21

## 2020-10-21 PROCEDURE — 17110 DESTRUCTION B9 LES UP TO 14: CPT | Performed by: NURSE PRACTITIONER

## 2020-10-21 PROCEDURE — 1036F TOBACCO NON-USER: CPT | Performed by: NURSE PRACTITIONER

## 2020-10-21 PROCEDURE — 90682 RIV4 VACC RECOMBINANT DNA IM: CPT | Performed by: NURSE PRACTITIONER

## 2020-10-21 PROCEDURE — 90471 IMMUNIZATION ADMIN: CPT | Performed by: NURSE PRACTITIONER

## 2020-10-21 PROCEDURE — 99213 OFFICE O/P EST LOW 20 MIN: CPT | Performed by: NURSE PRACTITIONER

## 2020-11-05 ENCOUNTER — TELEPHONE (OUTPATIENT)
Dept: DERMATOLOGY | Facility: CLINIC | Age: 50
End: 2020-11-05

## 2020-11-06 ENCOUNTER — PROCEDURE VISIT (OUTPATIENT)
Dept: FAMILY MEDICINE CLINIC | Facility: CLINIC | Age: 50
End: 2020-11-06
Payer: COMMERCIAL

## 2020-11-06 VITALS
HEART RATE: 88 BPM | HEIGHT: 66 IN | BODY MASS INDEX: 37.09 KG/M2 | SYSTOLIC BLOOD PRESSURE: 120 MMHG | DIASTOLIC BLOOD PRESSURE: 72 MMHG

## 2020-11-06 DIAGNOSIS — B07.9 VIRAL WART ON FINGER: Primary | ICD-10-CM

## 2020-11-06 PROCEDURE — 99212 OFFICE O/P EST SF 10 MIN: CPT | Performed by: NURSE PRACTITIONER

## 2020-11-19 ENCOUNTER — TELEPHONE (OUTPATIENT)
Dept: PALLIATIVE MEDICINE | Facility: CLINIC | Age: 50
End: 2020-11-19

## 2020-11-19 ENCOUNTER — CONSULT (OUTPATIENT)
Dept: PALLIATIVE MEDICINE | Facility: CLINIC | Age: 50
End: 2020-11-19
Payer: COMMERCIAL

## 2020-11-19 VITALS
OXYGEN SATURATION: 99 % | TEMPERATURE: 96.5 F | WEIGHT: 229.5 LBS | RESPIRATION RATE: 18 BRPM | DIASTOLIC BLOOD PRESSURE: 80 MMHG | SYSTOLIC BLOOD PRESSURE: 128 MMHG | HEART RATE: 76 BPM | HEIGHT: 66 IN | BODY MASS INDEX: 36.88 KG/M2

## 2020-11-19 DIAGNOSIS — R63.5 WEIGHT GAIN: ICD-10-CM

## 2020-11-19 DIAGNOSIS — J43.2 CENTRILOBULAR EMPHYSEMA (HCC): ICD-10-CM

## 2020-11-19 DIAGNOSIS — C50.911 CARCINOMA OF RIGHT BREAST METASTATIC TO AXILLARY LYMPH NODE (HCC): ICD-10-CM

## 2020-11-19 DIAGNOSIS — C78.7 LIVER METASTASES (HCC): ICD-10-CM

## 2020-11-19 DIAGNOSIS — F41.8 ANXIETY ABOUT HEALTH: ICD-10-CM

## 2020-11-19 DIAGNOSIS — C50.111 MALIGNANT NEOPLASM OF CENTRAL PORTION OF RIGHT BREAST IN FEMALE, ESTROGEN RECEPTOR POSITIVE (HCC): Primary | ICD-10-CM

## 2020-11-19 DIAGNOSIS — C77.3 CARCINOMA OF RIGHT BREAST METASTATIC TO AXILLARY LYMPH NODE (HCC): ICD-10-CM

## 2020-11-19 DIAGNOSIS — F51.02 ADJUSTMENT INSOMNIA: ICD-10-CM

## 2020-11-19 DIAGNOSIS — Z17.0 MALIGNANT NEOPLASM OF CENTRAL PORTION OF RIGHT BREAST IN FEMALE, ESTROGEN RECEPTOR POSITIVE (HCC): Primary | ICD-10-CM

## 2020-11-19 PROCEDURE — 99214 OFFICE O/P EST MOD 30 MIN: CPT | Performed by: FAMILY MEDICINE

## 2020-11-19 PROCEDURE — 1036F TOBACCO NON-USER: CPT | Performed by: FAMILY MEDICINE

## 2020-11-19 RX ORDER — ALPRAZOLAM 0.25 MG/1
0.25 TABLET ORAL DAILY PRN
Qty: 20 TABLET | Refills: 0 | Status: SHIPPED | OUTPATIENT
Start: 2020-11-19 | End: 2021-01-14 | Stop reason: SDUPTHER

## 2020-11-19 RX ORDER — HYDROXYZINE HYDROCHLORIDE 25 MG/1
25 TABLET, FILM COATED ORAL
Qty: 20 TABLET | Refills: 0 | Status: SHIPPED | OUTPATIENT
Start: 2020-11-19 | End: 2021-07-15

## 2020-11-30 ENCOUNTER — OFFICE VISIT (OUTPATIENT)
Dept: DERMATOLOGY | Facility: CLINIC | Age: 50
End: 2020-11-30
Payer: COMMERCIAL

## 2020-11-30 VITALS — WEIGHT: 230 LBS | BODY MASS INDEX: 36.96 KG/M2 | HEIGHT: 66 IN | TEMPERATURE: 98.1 F

## 2020-11-30 DIAGNOSIS — L71.9 ROSACEA: Primary | ICD-10-CM

## 2020-11-30 DIAGNOSIS — D22.9 NEVUS: ICD-10-CM

## 2020-11-30 DIAGNOSIS — L84 CALLUS: ICD-10-CM

## 2020-11-30 PROCEDURE — 99204 OFFICE O/P NEW MOD 45 MIN: CPT | Performed by: DERMATOLOGY

## 2020-11-30 PROCEDURE — 3008F BODY MASS INDEX DOCD: CPT | Performed by: FAMILY MEDICINE

## 2020-11-30 RX ORDER — AZELAIC ACID 0.15 G/G
AEROSOL, FOAM TOPICAL
Qty: 50 G | Refills: 6 | Status: SHIPPED | OUTPATIENT
Start: 2020-11-30 | End: 2021-07-15

## 2020-12-23 ENCOUNTER — TELEPHONE (OUTPATIENT)
Dept: SURGICAL ONCOLOGY | Facility: CLINIC | Age: 50
End: 2020-12-23

## 2020-12-28 ENCOUNTER — OFFICE VISIT (OUTPATIENT)
Dept: SURGICAL ONCOLOGY | Facility: CLINIC | Age: 50
End: 2020-12-28
Payer: COMMERCIAL

## 2020-12-28 VITALS
WEIGHT: 236 LBS | SYSTOLIC BLOOD PRESSURE: 122 MMHG | HEIGHT: 66 IN | BODY MASS INDEX: 37.93 KG/M2 | TEMPERATURE: 96.8 F | DIASTOLIC BLOOD PRESSURE: 78 MMHG | HEART RATE: 77 BPM

## 2020-12-28 DIAGNOSIS — Z17.0 MALIGNANT NEOPLASM OF CENTRAL PORTION OF RIGHT BREAST IN FEMALE, ESTROGEN RECEPTOR POSITIVE (HCC): Primary | ICD-10-CM

## 2020-12-28 DIAGNOSIS — C50.111 MALIGNANT NEOPLASM OF CENTRAL PORTION OF RIGHT BREAST IN FEMALE, ESTROGEN RECEPTOR POSITIVE (HCC): Primary | ICD-10-CM

## 2020-12-28 DIAGNOSIS — I89.0 LYMPHEDEMA: ICD-10-CM

## 2020-12-28 DIAGNOSIS — C78.7 LIVER METASTASES (HCC): ICD-10-CM

## 2020-12-28 DIAGNOSIS — C50.911 CARCINOMA OF RIGHT BREAST METASTATIC TO AXILLARY LYMPH NODE (HCC): ICD-10-CM

## 2020-12-28 DIAGNOSIS — Z15.09 BRCA2 GENE MUTATION POSITIVE: ICD-10-CM

## 2020-12-28 DIAGNOSIS — C77.3 CARCINOMA OF RIGHT BREAST METASTATIC TO AXILLARY LYMPH NODE (HCC): ICD-10-CM

## 2020-12-28 DIAGNOSIS — Z15.01 BRCA2 GENE MUTATION POSITIVE: ICD-10-CM

## 2020-12-28 PROCEDURE — 1036F TOBACCO NON-USER: CPT | Performed by: SURGERY

## 2020-12-28 PROCEDURE — 99214 OFFICE O/P EST MOD 30 MIN: CPT | Performed by: SURGERY

## 2020-12-28 PROCEDURE — 3008F BODY MASS INDEX DOCD: CPT | Performed by: SURGERY

## 2020-12-29 DIAGNOSIS — C77.3 CARCINOMA OF RIGHT BREAST METASTATIC TO AXILLARY LYMPH NODE (HCC): ICD-10-CM

## 2020-12-29 DIAGNOSIS — C78.7 LIVER METASTASES (HCC): ICD-10-CM

## 2020-12-29 DIAGNOSIS — C50.911 CARCINOMA OF RIGHT BREAST METASTATIC TO AXILLARY LYMPH NODE (HCC): ICD-10-CM

## 2021-01-14 ENCOUNTER — OFFICE VISIT (OUTPATIENT)
Dept: PALLIATIVE MEDICINE | Facility: CLINIC | Age: 51
End: 2021-01-14
Payer: COMMERCIAL

## 2021-01-14 VITALS
WEIGHT: 233.69 LBS | RESPIRATION RATE: 18 BRPM | OXYGEN SATURATION: 98 % | SYSTOLIC BLOOD PRESSURE: 118 MMHG | HEART RATE: 75 BPM | DIASTOLIC BLOOD PRESSURE: 76 MMHG | TEMPERATURE: 97.4 F | BODY MASS INDEX: 37.56 KG/M2 | HEIGHT: 66 IN

## 2021-01-14 DIAGNOSIS — F41.8 ANXIETY ABOUT HEALTH: ICD-10-CM

## 2021-01-14 DIAGNOSIS — C78.7 LIVER METASTASES (HCC): ICD-10-CM

## 2021-01-14 DIAGNOSIS — Z15.01 BRCA2 GENE MUTATION POSITIVE: ICD-10-CM

## 2021-01-14 DIAGNOSIS — Z15.09 BRCA2 GENE MUTATION POSITIVE: ICD-10-CM

## 2021-01-14 DIAGNOSIS — C50.911 CARCINOMA OF RIGHT BREAST METASTATIC TO AXILLARY LYMPH NODE (HCC): ICD-10-CM

## 2021-01-14 DIAGNOSIS — C77.3 CARCINOMA OF RIGHT BREAST METASTATIC TO AXILLARY LYMPH NODE (HCC): ICD-10-CM

## 2021-01-14 PROCEDURE — 99214 OFFICE O/P EST MOD 30 MIN: CPT | Performed by: FAMILY MEDICINE

## 2021-01-14 RX ORDER — ALPRAZOLAM 0.25 MG/1
0.25 TABLET ORAL DAILY PRN
Qty: 20 TABLET | Refills: 0 | Status: SHIPPED | OUTPATIENT
Start: 2021-01-14 | End: 2021-03-09 | Stop reason: SDUPTHER

## 2021-01-14 RX ORDER — OXYCODONE HYDROCHLORIDE 10 MG/1
10 TABLET ORAL EVERY 6 HOURS PRN
Qty: 30 TABLET | Refills: 0 | Status: SHIPPED | OUTPATIENT
Start: 2021-01-14 | End: 2021-03-09 | Stop reason: SDUPTHER

## 2021-01-14 NOTE — PROGRESS NOTES
Outpatient Follow-Up - Palliative and Supportive Care   Ki Penn 45 y o  female [id number]    Assessment & Plan  1  Anxiety about health    2  Liver metastases ([address] )    3  Carcinoma of right breast metastatic to axillary lymph node ([address] )    4  BRCA2 gene mutation positive        Medications adjusted this encounter:  Requested Prescriptions     Signed Prescriptions Disp Refills    ALPRAZolam (XANAX) 0 25 mg tablet 20 tablet 0     Sig: Take 1 tablet (0 25 mg total) by mouth daily as needed (during day for anxiety as needed)    oxyCODONE (ROXICODONE) 10 MG TABS 30 tablet 0     Sig: Take 1 tablet (10 mg total) by mouth every 6 (six) hours as needed for moderate painMax Daily Amount: 40 mg     No orders of the defined types were placed in this encounter  Medications Discontinued During This Encounter   Medication Reason    ALPRAZolam (XANAX) 0 25 mg tablet Reorder    oxyCODONE (ROXICODONE) 10 MG TABS Reorder   - No changes, refills sent  - Advised to consider non-pharm therapy at [facility]  Ms Shiela Melo was seen today for symptoms and planning cares related to above illnesses  I have reviewed the patient's controlled substance dispensing history in the Prescription Drug Monitoring Program in compliance with the [facility] regulations before prescribing any controlled substances  She is invited to continue to follow with us  If there are questions or concerns, please contact us through our clinic/answering service 24 hours a day, seven days a week  Melida Giang MD  [facility]  [phone]      Visit Information    Accompanied By: No one    Source of History: Self    History Limitations: None    Contacts: brother Stepan Oas  - [phone]    Follow up visit for:  symptom management    History of Present Illness      This 45 y o  lady presents in f/up of her breast cancer, having previously seen Dr Baldo Sharp of our team   Since last visit, feeling better overall  She is carrying more tension/stress being carried in shoulders  She finds herself less anhedonic, and with improved function and focus  Her family life is stabilizing, and she is happy to have completed divorce  Children are safe, she declines any specific help for them today  Previously, she had a period of poor motivation after diagnosis, and that has only slightly improved  However, she states that she is motivated enough to work, to care for several pets, and she has recently regained the motivation to enjoy reading and videogames again  She feels frustrated by her slow metabolism  OxyIR has been helpful on occasion for sleep, but she would like to have a medication as need for panic/anxiety/stress      She is not a prayerful person; she feels distrustful of establishments, and even of therapy  From Dr Flavio Barnard' note: "Nathalia David is a 47 y o  female with metastatic breast cancer to the liver, LNs  She is s/p TAHBSO and is currently on chemotherapy  She sees Dr Anjali Mcclelland (Onc) and Dr Gaspar Nyhan (Surg-Onc)      She was diagnosed with stage 4 breast cancer with liver mets on 5/2019  She underwent TAHBSO on 7/2019  She started Coamo but showed disease progression  Currently she is on Docetaxel that she started 1/2020 and she is to continue this until 4/2020  At that time, she will have another surgical follow up to see if her breast mass is amenable to surgery at that time      She experiences some stabbing pain in her R breast occasionally  This is relieved by oxycodone 10mg prn  She admits, however, that since starting chemotherapy that her pain is not as bad anymore  She takes only 1 tablet in a day if needed and she doesn't take oxycodone every day  She still has pills left from her prior prescription      She also complains of itchy rash on her arms and neck  She admits to having stress rashes and thinks that it could be from just being stressed   However, this rash happened 4 days after chemotherapy  I advised her to notify her oncologist to let them know in case this is a reaction related to her chemo       She is currently undergoing a divorce  They have been  for 4-5 years, before being diagnosed with breast cancer  She currently lives at a hotel with her dogs and cats and her daughters  She is hoping to close on a house in [address] soon  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  Her daughters are 18yrs and 21 yrs old  She said she has both financial and medical living benites  She names her friend as her medical POA and her brother in [place] as her financial POA  "        Past medical, surgical, social, and family histories are reviewed and pertinent updates are made  Review of Systems   Constitution: Negative for decreased appetite, weight gain and weight loss  HENT: Negative for hoarse voice and nosebleeds  Eyes: Negative for vision loss in left eye and vision loss in right eye  Cardiovascular: Negative for chest pain and dyspnea on exertion  Respiratory: Negative for cough and shortness of breath  Endocrine: Negative for polydipsia, polyphagia and polyuria  Skin: Negative for flushing and itching  Musculoskeletal: Negative for falls  Gastrointestinal: Negative for anorexia, jaundice, nausea and vomiting  Genitourinary: Negative for frequency  Neurological: Negative for dizziness  Psychiatric/Behavioral: Negative for depression and memory loss  The patient is not nervous/anxious  Vital Signs    /76 (BP Location: Left arm, Patient Position: Sitting, Cuff Size: Standard)   Pulse 75   Temp (!) 97 4 °F (36 3 °C) (Tympanic)   Resp 18   Ht 5' 6" (1 676 m)   Wt 106 kg (233 lb 11 oz)   SpO2 98%   BMI 37 72 kg/m²     Physical Exam and Objective Data  Physical Exam  Constitutional:       General: She is not in acute distress  Appearance: She is well-developed   She is not ill-appearing, toxic-appearing or diaphoretic  HENT:      Head: Normocephalic and atraumatic  Right Ear: External ear normal       Left Ear: External ear normal    Eyes:      General:         Right eye: No discharge  Left eye: No discharge  Conjunctiva/sclera: Conjunctivae normal       Pupils: Pupils are equal, round, and reactive to light  Neck:      Musculoskeletal: Normal range of motion  Trachea: No tracheal deviation  Cardiovascular:      Rate and Rhythm: Normal rate and regular rhythm  Pulmonary:      Effort: Pulmonary effort is normal  No respiratory distress  Breath sounds: No stridor  Abdominal:      General: There is no distension  Palpations: Abdomen is soft  Musculoskeletal:         General: No deformity  Skin:     General: Skin is warm and dry  Findings: No erythema or rash  Neurological:      General: No focal deficit present  Mental Status: She is alert and oriented to person, place, and time  Mental status is at baseline  Cranial Nerves: No cranial nerve deficit  Psychiatric:         Mood and Affect: Mood normal          Behavior: Behavior normal          Thought Content: Thought content normal          Judgment: Judgment normal            Radiology and Laboratory:  I personally reviewed and interpreted the following results: none new    25+ minutes was spent face to face with Sandra Millard with greater than 50% of the time spent in counseling or coordination of care including discussions of etiology of diagnosis, risks and benefits of treatment, treatment instructions and compliance with treatment regimen   All of the patient's questions were answered during this discussion

## 2021-01-14 NOTE — PATIENT INSTRUCTIONS
Please protect yourself from the COVID-19! Even though we do not good antiviral drugs for this infection, the following strategies can help you stay healthy:    = Wash your hands! Soap and water, or hand  with at least 60% alcohol, are both effective at killing the virus  = Wear a mask! This will help protect others from any virus particles you might spread  Your mouth and nose BOTH need to be covered  = Keep the distance! Keep 6 feet of distance from others people, even if they seem healthy  Keeping distance protects you from the other person's virus spread     = Get the vaccine! The [employer] and [employer] are approved for emergency use in the [place]  These vaccines have been shown to be 90+% effective at preventing severe infections when combined with masking, hand-washing, and distancing  As of 1/4/2021, only nursing home residents and front-line health workers have access to the first round of vaccines, but more are coming  We are recommending that all our Palliative and Supportive Care patients get two shots of either vaccine, as early as it is available to them  Numbers of Coronavirus cases are spiking in many [place] States  This is not a more dangerous virus, but a sign that more people in a community are spreading the virus  Please check the local disease reports near you if you consider travelling  As of 1/4/21, we do NOT advise travel outside the [facility] ([place] or [place])  Check out [employer] for [place] data that are updated daily:    http://www [employer]/     Global Epidemics  Org, from [facility] (OUTPATIENT C[facility]), will give you Xabmfs-sn-Uzxshm information on virus cases:    Https://globalepidemics  org/

## 2021-01-18 ENCOUNTER — EVALUATION (OUTPATIENT)
Dept: PHYSICAL THERAPY | Facility: REHABILITATION | Age: 51
End: 2021-01-18
Payer: COMMERCIAL

## 2021-01-18 DIAGNOSIS — I89.0 LYMPHEDEMA: ICD-10-CM

## 2021-01-18 PROCEDURE — 97140 MANUAL THERAPY 1/> REGIONS: CPT | Performed by: PHYSICAL THERAPIST

## 2021-01-18 PROCEDURE — 97162 PT EVAL MOD COMPLEX 30 MIN: CPT | Performed by: PHYSICAL THERAPIST

## 2021-01-18 NOTE — PROGRESS NOTES
Initial Evaluation    Today's Date: 2021  Patient name: Reynaldo Garcia  : 1970  MRN: [id number]  Referring provider: Terri Villafana MD  Dx:  Encounter Diagnosis     ICD-10-CM    1  Lymphedema  I89 0 Ambulatory referral to PT/OT lymphedema therapy       Start Time: 1700  Stop Time: 1800  Total time in clinic (min): 60 minutes       Assessment  Assessment details: Ms Caesar Grant returns to physical therapy for worsening edema of the bilateral lower extremities and hands  Lower extremities edema is consistent with mild venous edema that does not resolve with activity or elevation  Hand edema is transient and not demonstrated on evaluation today, but may be related to lymphatic overload of the upper quadrants related to prior Axillary Lymph Node dissection or current, active and advanced cancer disease state  Upper extremity limb volume difference of 4%, without symptoms of RUE lymphedema, is within normal limits given that she is right hand dominant and engaged in a physically active occupation  She would benefit from manual lymphatic drainage to decongest lower extremities, training on self-MLD for self-management and maintenance, and fitting with compression stockings for daily use for prevention of progression of venous-based or lymphatic-based edema  Other impairment: edema    Symptom irritability: moderateUnderstanding of Dx/Px/POC: excellent  Goals  1  Patient will demonstrate safe and appropriate self-MLD techniquesin 4 weeks    2  Lower extremity limb volume will decrease by at least 5% bilaterally    Plan  Planned therapy interventions: manual therapy, muscle pump exercises, compression, home exercise program, patient education and self care  Frequency: 1x week  Duration in weeks: 8  Plan of Care beginning date: 2021  Plan of Care expiration date: 3/15/2021  Treatment plan discussed with: patient        Subjective/History:  - Chief Complaint: general edema: both hands- in fingers, and both ankles but R>L (history of R ankle fracture); popliteal fossa  Denies RUE lymphedema   - Location of symptoms: general  - Duration of symptoms: unsure, better when receiving MLD   Melissajacob Alicia of onset: insidious; reports 30 lb weight gain in the last year with chemo, etc   Melissajacob Vargas of Progression: slow; unchanged    Cancer history and treatments  o Type of CA: right breast, IDC, ER+, VT+, HER2-; BRCA2 gene mutation positive  o Date of diagnosis: 05/2019  o Stage/Grade: IV  o Metastasis: yes- to Liver, Lymph nodes  o Surgical excision: yes   R mastectomy 6/5/2020   BSO on 07/2019  o Lymph node dissection? Yes- 20 on R  o Reconstruction: none  o Axillary Web Syndrome? no  o Radiation: none  o Chemotherapy: [place]; [address] [phone]; docetaxel (started 01/2020-06/2020) - neoadjuvant  o Side effects of chemo: no lasting side effects  o Care team: Dr Shannon Rutledge (Onc) and Dr Bibi Good (Surg-Onc)  Lymphedema special questions  o Feeling of heaviness, fullness, pressure? no  o Change in fit of shoes/clothes/jewelry?no  o Resolved with elevation/sleeping: no  o Prior treatments  - Multilayer short stretch bandaging: no  - MLD: yes   Compression garments: no; purchased OTC knee high stockings but has not tried them  - Pump: no  - Exercises: shoulder remedial exercises: flexibility and strengthening  - Wound Care: none  o History of Cellulitis?  no  o History of S/DVT? no  o History of Leg wounds? no  o Presence of genital edema? no  o Latex Allergy? no  Systems Review:  o Cardiac screen : denies CHF, AAA, Arrhythmia, Pacemaker present, Peripheral arterial disease, CVA, HTN  o Thyroid dysfunction - no  o Diabetes- no  o Peripheral Neuropathy- no  o Kidney disease - no  o Liver disease - no  o Abdominal surgeries?  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  - Patient goals: decreased swelling in legs and hands    Objective  Orthopedic: no deficits in ROM or strength of upper extremities or lower extremities bilaterally  No deficits in gait or balance or sensation      Vascular exam  - Capillary refill: 1 sec  o Location: great toe nailbed  - Pedal Pulse: 2+    Integumentary exam  - Wounds: no  - Skin changes: Lower extremities: Dry; Upper extremities: WNL  - Scars: laparoscopic, abdominal; well healed; R mastectomy  - Nails: toes: thick; fingers: thin    Lymphedema Exam  Lower extremities:  Leg:  left right  MTP  23 23   -4 cm  23 23 1  -8 cm  24 9 26 6     0 cm  26 4 26 7    4  cm  27 3 27 5    8  cm  30 3 31  12  cm  33 3 34 5  16  cm  37 4 38 3  20  cm  41 5 42 9  24  cm  44 8 45 6  28  cm  46 5 46 8  32  cm  46 45 8  36  cm  44 8 44 3  40  cm  48 9 48 3  44  cm  53 52  48  cm  57 5 59 3  52  cm  63 5 64 8  56  cm  67 5 66 9  60  cm  71 5 71  64  cm  76 4 76  Volume (mL): 76116 52558    Upper extremities:    Left Right  MCP  18 8 19 7   Web  20 6 20 2     0 cm  17 8 17 6    4  cm  19 5 20 1    8  cm  22 23  12  cm  25 7 26 6  16  cm  27 7 29 5  20  cm  28 4 30  24  cm  29 30 5  28  cm  32 32 5  32  cm  35 2 35 6  36  cm  37 4 37 6  40  cm  38 9 38 8    Volume (ml): 3250 3403  Difference b/w limb (mL): 153mL  Difference (%): 4%     Precautions stage IV breast CA (liver METs)     Manuals 1/18       volumetrics 15'       Manual Lymph Drainage 30': SCF, lateral neck, SCF, superficial abd, BLU axillary; BLU inguinal; BLU anterior/lateral/medial LE techniques                       Ther Ex        Self-MLD nv **      Treadmill walking nv **                              Ther Activity

## 2021-01-19 ENCOUNTER — TELEPHONE (OUTPATIENT)
Dept: HEMATOLOGY ONCOLOGY | Facility: CLINIC | Age: 51
End: 2021-01-19

## 2021-01-21 ENCOUNTER — OFFICE VISIT (OUTPATIENT)
Dept: HEMATOLOGY ONCOLOGY | Facility: CLINIC | Age: 51
End: 2021-01-21
Payer: COMMERCIAL

## 2021-01-21 VITALS
HEIGHT: 65 IN | TEMPERATURE: 96 F | OXYGEN SATURATION: 98 % | WEIGHT: 234 LBS | HEART RATE: 81 BPM | RESPIRATION RATE: 18 BRPM | BODY MASS INDEX: 38.99 KG/M2 | DIASTOLIC BLOOD PRESSURE: 72 MMHG | SYSTOLIC BLOOD PRESSURE: 124 MMHG

## 2021-01-21 DIAGNOSIS — Z17.0 MALIGNANT NEOPLASM OF CENTRAL PORTION OF RIGHT BREAST IN FEMALE, ESTROGEN RECEPTOR POSITIVE (HCC): ICD-10-CM

## 2021-01-21 DIAGNOSIS — C77.3 CARCINOMA OF RIGHT BREAST METASTATIC TO AXILLARY LYMPH NODE (HCC): ICD-10-CM

## 2021-01-21 DIAGNOSIS — C50.911 CARCINOMA OF RIGHT BREAST METASTATIC TO AXILLARY LYMPH NODE (HCC): ICD-10-CM

## 2021-01-21 DIAGNOSIS — C50.111 MALIGNANT NEOPLASM OF CENTRAL PORTION OF RIGHT BREAST IN FEMALE, ESTROGEN RECEPTOR POSITIVE (HCC): ICD-10-CM

## 2021-01-21 DIAGNOSIS — C78.7 LIVER METASTASES (HCC): Primary | ICD-10-CM

## 2021-01-21 PROCEDURE — 1036F TOBACCO NON-USER: CPT | Performed by: INTERNAL MEDICINE

## 2021-01-21 PROCEDURE — 3008F BODY MASS INDEX DOCD: CPT | Performed by: INTERNAL MEDICINE

## 2021-01-21 PROCEDURE — 99214 OFFICE O/P EST MOD 30 MIN: CPT | Performed by: INTERNAL MEDICINE

## 2021-01-21 NOTE — PROGRESS NOTES
Hematology / Oncology Outpatient Follow Up Note    Darby Velez 45 y o  female :1970 IVV:[id number]         Date:  2021    Assessment / Plan:  A 45year-old surgically postmenopausal woman with metastatic right breast cancer, grade 3, ER 90% positive, DC 90% positive, HER2 negative disease   She has BRCA -2 mutation    She has histologically confirmed liver metastasis   Since she was premenopausal, she underwent as bilateral surgical ovarian ablation in late 2019 followed by starting palbociclib and letrozole   She had initial minor response  [place] had rapid progression in 2019 for which she was treated with Taxotere x6 cycle resulting in partial response   Subsequently, she had palliative right mastectomy followed by olaparib since mid 2020 with no significant toxicity  She achieved major response on olaparib  Clinically, she has no evidence of progressive disease  I recommended her to continue with olaparib 300 mg b i d  Abhishek Byers I will see her again in 3 months with CBC, CMP and CA 27, 29 as well as CT scan of chest abdomen pelvis for disease evaluation  She is in agreement with my recommendations      Subjective:      HPI:  A 69-year-old premenopausal woman who noticed a lump in her right breast in 2019 which she brought to medical attention   Radiographically, she has large right breast mass and right axillary adenopathy both of which were biopsy in May 3, 2019   Biopsy showed invasive ductal carcinoma, grade 3   This was ER 90% positive, DC 90% positive, HER2 negative disease   Biopsy from right axilla lymph node was positive for metastatic disease   She was seen by Dr Gonzalez Mount of her young age, she underwent genetic testing which showed BRCA-2 mutation   Because of the locally advanced nature, she underwent CT scan of chest abdomen pelvis which showed multiple liver lesion, suspicious for metastatic disease   Bone scan was negative for osseous metastasis  Royal Smith is going to have liver biopsy in June 11, 2019   She presents today to discuss treatment option   She has no symptomatology from breast standpoint   She denied any pain  Her weight has been stable   She has no respiratory symptoms   She has no significant past medical history   Her paternal aunt had breast cancer in her 35s   Interestingly enough, her father had colon cancer in his 45s as well as prostate cancer in his 62s   She has no family history of ovarian cancer   She was a smoker until 2 years ago  Royal Smith does not drink alcohol  Royal Smith has a son who is 23years old  Will Sarah also has younger son who was biologically female  Pippa Pyle is a transgender  Pippa Pyle is considering bilateral mastectomy  Sumi Staley, he has not been tested for BRCA gene mutation            Interval History:   A 45year-old surgically postmenopausal woman with metastatic right breast cancer, grade 3, ER 90% positive, AL 90% positive, HER2 negative disease   She has BRCA -2 mutation   Based on the CT scan, she appeared to have multiple liver metastasis     She subsequently underwent liver biopsy which showed metastatic carcinoma, consistent with breast primary   She was premenopausal at the time of diagnosis   Therefore, she underwent bilateral oophorectomy in late July 2019  Ovary and fallopian tube did not show any malignancy   She started palbociclib and letrozole in late July 2019  She initially had response with decreased right breast mass as well as right axillary adenopathy   However, in late December 2019, she had rapid progression in her right breast mass, skin involvement as well as right axillary adenopathy   Therefore, she started palliative chemotherapy with Taxotere, resulting in partial response   After 6 cycle treatment with Taxotere, she underwent palliative right mastectomy which showed multiple foci of invasive ductal carcinoma measuring up to 3 5 cm and 13 positive axillary lymph node   Since mid June 2020, she has been on olaparib   She is tolerating olaparib very well with no nausea, vomiting or GI symptomatology  She achieved major radiographical and biochemical response on olaparib  She came in today for routine follow-up  She feels well  She has no complaint of pain  Her weight is stable  She has no respiratory symptoms    Her tumor antigen is pending from 2 days ago     Haydee Head  Objective:      Primary Diagnosis:     1    Metastatic breast cancer, grade 3, ER 90 % positive, MD 90 % positive, HER2 negative disease, diagnosed in June 2019    2    BRCA-2 mutation      Cancer Staging:  Cancer Staging  Malignant neoplasm of central portion of right breast in female, estrogen receptor positive ([facility] [address] )  Staging form: Breast, AJCC 8th Edition  - Clinical: Stage IIIB (cT4, cN1(f), cM0, G3, ER+, MD+, HER2-) - Signed by Harriett Hidalgo MD on 5/16/2019  Laterality: Right  Method of lymph node assessment: Core biopsy  Histologic grading system: 3 grade system           Previous Hematologic/ Oncologic Treatment:      1  Surgical ovarian ablation in late July 2019   2  Palbociclib and letrozole from July 2018 through January 2020    3  Taxotere x6 cycle, completed in May 2020    4  Palliative right mastectomy      Current Hematologic/ Oncologic Treatment:       Olaparib 300 mg b i d  Since mid July 2020       Disease Status:      Good partial response on olaparib      Test Results:     Pathology:     Right breast biopsy in axillary lymph node biopsy showed invasive ductal carcinoma, grade 3   ER 90 % positive, MD 90% positive, HER2 negative disease      Liver biopsy in June 2019 showed metastatic carcinoma, consistent with breast primary      In June 2020, mastectomy specimen showed multiple foci of invasive ductal carcinoma measuring up to 3 5 cm with 13 positive axillary lymph nodes      Radiology:     Bone scan showed no evidence of osseous metastasis      CT scan of chest abdomen pelvis in October 2020 showed interval improvement of extensive liver metastasis       Laboratory:     [facility] 18, 29 was 25 in October 2020       Physical Exam:        General Appearance:    Alert, oriented          Eyes:    PERRL   Ears:    Normal external ear canals, both ears   Nose:   Nares normal, septum midline   Throat:   Mucosa moist  Pharynx without injection  Neck:   Supple         Lungs:     Clear to auscultation bilaterally   Chest Wall:    No tenderness or deformity    Heart:    Regular rate and rhythm         Abdomen:     Soft, non-tender, bowel sounds +, no organomegaly               Extremities:   Extremities no cyanosis or edema         Skin:   no rash or icterus  Lymph nodes:   Cervical, supraclavicular, and axillary nodes normal   Neurologic:   CNII-XII intact, normal strength, sensation and reflexes     Throughout             Breast exam: Right status post mastectomy without reconstruction   No palpable abnormality on her right chest wall   Left breast exam is negative  ROS: Review of Systems   All other systems reviewed and are negative  Imaging: No results found  Labs:   Lab Results   Component Value Date    WBC 6 8 01/19/2021    HGB 12 8 01/19/2021    HCT 38 2 01/19/2021    MCV 98 7 01/19/2021     01/19/2021     Lab Results   Component Value Date    K 4 1 01/19/2021     01/19/2021    CO2 28 01/19/2021    BUN 14 01/19/2021    CREATININE 0 86 01/19/2021    GLUF 89 10/18/2020    CALCIUM 9 6 01/19/2021    AST 22 01/19/2021    ALT 30 (H) 01/19/2021    ALKPHOS 71 01/19/2021    EGFR 74 10/18/2020         Current Medications: Reviewed  Allergies: Reviewed  PMH/FH/SH:  Reviewed      Vital Sign:    Body surface area is 2 12 meters squared      Wt Readings from Last 3 Encounters:   01/21/21 106 kg (234 lb)   01/14/21 106 kg (233 lb 11 oz)   12/28/20 107 kg (236 lb)        Temp Readings from Last 3 Encounters:   01/21/21 (!) 96 °F (35 6 °C) (Tympanic Core)   01/14/21 (!) 97 4 °F (36 3 °C) (Tympanic)   12/28/20 (!) 96 8 °F (36 °C) (Tympanic)        BP Readings from Last 3 Encounters:   01/21/21 124/72   01/14/21 118/76   12/28/20 122/78         Pulse Readings from Last 3 Encounters:   01/21/21 81   01/14/21 75   12/28/20 77     @LASTSAO2(3)@

## 2021-01-22 LAB
ALBUMIN SERPL-MCNC: 4.1 G/DL (ref 3.6–5.1)
ALBUMIN/GLOB SERPL: 1.5 (CALC) (ref 1–2.5)
ALP SERPL-CCNC: 71 U/L (ref 37–153)
ALT SERPL-CCNC: 30 U/L (ref 6–29)
AST SERPL-CCNC: 22 U/L (ref 10–35)
BASOPHILS # BLD AUTO: 7 CELLS/UL (ref 0–200)
BASOPHILS NFR BLD AUTO: 0.1 %
BILIRUB SERPL-MCNC: 0.4 MG/DL (ref 0.2–1.2)
BUN SERPL-MCNC: 14 MG/DL (ref 7–25)
BUN/CREAT SERPL: ABNORMAL (CALC) (ref 6–22)
CALCIUM SERPL-MCNC: 9.6 MG/DL (ref 8.6–10.4)
CANCER AG27-29 SERPL-ACNC: 22 U/ML
CHLORIDE SERPL-SCNC: 104 MMOL/L (ref 98–110)
CO2 SERPL-SCNC: 28 MMOL/L (ref 20–32)
CREAT SERPL-MCNC: 0.86 MG/DL (ref 0.5–1.05)
EOSINOPHIL # BLD AUTO: 143 CELLS/UL (ref 15–500)
EOSINOPHIL NFR BLD AUTO: 2.1 %
ERYTHROCYTE [DISTWIDTH] IN BLOOD BY AUTOMATED COUNT: 14.1 % (ref 11–15)
GLOBULIN SER CALC-MCNC: 2.7 G/DL (CALC) (ref 1.9–3.7)
GLUCOSE SERPL-MCNC: 84 MG/DL (ref 65–139)
HCT VFR BLD AUTO: 38.2 % (ref 35–45)
HGB BLD-MCNC: 12.8 G/DL (ref 11.7–15.5)
LYMPHOCYTES # BLD AUTO: 1816 CELLS/UL (ref 850–3900)
LYMPHOCYTES NFR BLD AUTO: 26.7 %
MCH RBC QN AUTO: 33.1 PG (ref 27–33)
MCHC RBC AUTO-ENTMCNC: 33.5 G/DL (ref 32–36)
MCV RBC AUTO: 98.7 FL (ref 80–100)
MONOCYTES # BLD AUTO: 510 CELLS/UL (ref 200–950)
MONOCYTES NFR BLD AUTO: 7.5 %
NEUTROPHILS # BLD AUTO: 4325 CELLS/UL (ref 1500–7800)
NEUTROPHILS NFR BLD AUTO: 63.6 %
PLATELET # BLD AUTO: 205 THOUSAND/UL (ref 140–400)
PMV BLD REES-ECKER: 12 FL (ref 7.5–12.5)
POTASSIUM SERPL-SCNC: 4.1 MMOL/L (ref 3.5–5.3)
PROT SERPL-MCNC: 6.8 G/DL (ref 6.1–8.1)
RBC # BLD AUTO: 3.87 MILLION/UL (ref 3.8–5.1)
SL AMB EGFR AFRICAN AMERICAN: 91 ML/MIN/1.73M2
SL AMB EGFR NON AFRICAN AMERICAN: 78 ML/MIN/1.73M2
SODIUM SERPL-SCNC: 141 MMOL/L (ref 135–146)
WBC # BLD AUTO: 6.8 THOUSAND/UL (ref 3.8–10.8)

## 2021-01-26 ENCOUNTER — APPOINTMENT (OUTPATIENT)
Dept: PHYSICAL THERAPY | Facility: REHABILITATION | Age: 51
End: 2021-01-26
Payer: COMMERCIAL

## 2021-02-02 ENCOUNTER — APPOINTMENT (OUTPATIENT)
Dept: PHYSICAL THERAPY | Facility: REHABILITATION | Age: 51
End: 2021-02-02
Payer: COMMERCIAL

## 2021-02-09 ENCOUNTER — OFFICE VISIT (OUTPATIENT)
Dept: PHYSICAL THERAPY | Facility: REHABILITATION | Age: 51
End: 2021-02-09
Payer: COMMERCIAL

## 2021-02-09 DIAGNOSIS — I89.0 LYMPHEDEMA: Primary | ICD-10-CM

## 2021-02-09 PROCEDURE — 97140 MANUAL THERAPY 1/> REGIONS: CPT | Performed by: PHYSICAL THERAPIST

## 2021-02-09 NOTE — PROGRESS NOTES
Daily Note     Today's date: 2021  Patient name: Dru Hinds  : 1970  MRN: [id number]  Referring provider: Julio Cesar Edwards MD  Dx:   Encounter Diagnosis     ICD-10-CM    1  Lymphedema  I89 0        Start Time: 184  Stop Time:   Total time in clinic (min): 55 minutes    Subjective: No new complaints  Started a weight loss program (Noon) which entails drinking more water (currentl 16 oz/day) and choosing smaller quantities of high-calorie/low-nutrition foods  Objective: See treatment diary below      Assessment: Tolerated treatment well  Continued MLD with focus on Right UE and BLE  Tolerated well  Continue next visit  Plan: Continue per plan of care  Train in self-MLD       Precautions: cancer right breast s/p mastectomy, COPD    Manuals       volumetrics 15'       Manual Lymph Drainage 30': SCF, lateral neck, SCF, superficial abd, BLU axillary; BLU inguinal; BLU anterior/lateral/medial LE techniques 55': SCF, lateral neck, SCF, superficial abd, BLU axillary; BLU inguinal; BLU anterior/lateral/medial LE techniques; RUE techniques                      Ther Ex        Self-MLD  nv      Treadmill walking  nv                              Ther Activity

## 2021-02-16 ENCOUNTER — APPOINTMENT (OUTPATIENT)
Dept: PHYSICAL THERAPY | Facility: REHABILITATION | Age: 51
End: 2021-02-16
Payer: COMMERCIAL

## 2021-03-02 ENCOUNTER — OFFICE VISIT (OUTPATIENT)
Dept: PHYSICAL THERAPY | Facility: REHABILITATION | Age: 51
End: 2021-03-02
Payer: COMMERCIAL

## 2021-03-02 DIAGNOSIS — I89.0 LYMPHEDEMA: Primary | ICD-10-CM

## 2021-03-02 PROCEDURE — 97140 MANUAL THERAPY 1/> REGIONS: CPT | Performed by: PHYSICAL THERAPIST

## 2021-03-02 NOTE — PROGRESS NOTES
Daily Note/Discharge     Today's date: 3/2/2021  Patient name: Gary Bridges  : 1970  MRN: [id number]  Referring provider: Devi Miles MD  Dx:   Encounter Diagnosis     ICD-10-CM    1  Lymphedema  I89 0                   Subjective: Notes decreased swelling in ankles; hand edema persists bilaterally in the mornings  Reports 15 lb weight loss in the last 4 weeks  Objective: See treatment diary below    Assessment: Tolerated treatment well  Patient desires discharge today due to symptomatic improvement      Plan: discharge     Precautions: cancer right breast s/p mastectomy, COPD    Manuals 1/18 2/9 3/2     volumetrics 15'       Manual Lymph Drainage 30': SCF, lateral neck, SCF, superficial abd, BLU axillary; BLU inguinal; BLU anterior/lateral/medial LE techniques 55': SCF, lateral neck, SCF, superficial abd, BLU axillary; BLU inguinal; BLU anterior/lateral/medial LE techniques; RUE techniques 55': SCF, lateral neck, SCF, superficial abd, BLU axillary; BLU inguinal; BLU LE techniques ant/post                     Ther Ex        Self-MLD  nv      Treadmill walking  nv                              Ther Activity

## 2021-03-09 ENCOUNTER — OFFICE VISIT (OUTPATIENT)
Dept: PALLIATIVE MEDICINE | Facility: CLINIC | Age: 51
End: 2021-03-09
Payer: COMMERCIAL

## 2021-03-09 VITALS
SYSTOLIC BLOOD PRESSURE: 110 MMHG | OXYGEN SATURATION: 97 % | BODY MASS INDEX: 37.27 KG/M2 | TEMPERATURE: 97.4 F | DIASTOLIC BLOOD PRESSURE: 60 MMHG | HEART RATE: 80 BPM | WEIGHT: 225.53 LBS | RESPIRATION RATE: 16 BRPM

## 2021-03-09 DIAGNOSIS — Z15.01 BRCA2 GENE MUTATION POSITIVE: ICD-10-CM

## 2021-03-09 DIAGNOSIS — Z15.09 BRCA2 GENE MUTATION POSITIVE: ICD-10-CM

## 2021-03-09 DIAGNOSIS — G89.3 CANCER RELATED PAIN: ICD-10-CM

## 2021-03-09 DIAGNOSIS — C50.911 CARCINOMA OF RIGHT BREAST METASTATIC TO AXILLARY LYMPH NODE (HCC): ICD-10-CM

## 2021-03-09 DIAGNOSIS — F41.8 ANXIETY ABOUT HEALTH: ICD-10-CM

## 2021-03-09 DIAGNOSIS — C77.3 CARCINOMA OF RIGHT BREAST METASTATIC TO AXILLARY LYMPH NODE (HCC): ICD-10-CM

## 2021-03-09 DIAGNOSIS — C78.7 LIVER METASTASES (HCC): Primary | ICD-10-CM

## 2021-03-09 PROCEDURE — 99214 OFFICE O/P EST MOD 30 MIN: CPT | Performed by: NURSE PRACTITIONER

## 2021-03-09 RX ORDER — OXYCODONE HYDROCHLORIDE 10 MG/1
10 TABLET ORAL EVERY 6 HOURS PRN
Qty: 30 TABLET | Refills: 0 | Status: SHIPPED | OUTPATIENT
Start: 2021-03-09 | End: 2021-03-10 | Stop reason: SDUPTHER

## 2021-03-09 RX ORDER — ALPRAZOLAM 0.25 MG/1
0.25 TABLET ORAL DAILY PRN
Qty: 20 TABLET | Refills: 0 | Status: SHIPPED | OUTPATIENT
Start: 2021-03-09 | End: 2021-03-10 | Stop reason: SDUPTHER

## 2021-03-09 NOTE — PROGRESS NOTES
Outpatient Follow-Up - Palliative and Supportive Care   Vishnu Renner 45 y o  female [id number]    Assessment & Plan  1  Liver metastases ([address] )    2  Carcinoma of right breast metastatic to axillary lymph node (HCC)    3  Cancer related pain    4  Anxiety about health      - Counseling on health screening and disease prevention, COVID-19 specific (CPT V65 49)    - Continue Oxycodone IR 10mg by mouth every six hours as needed for pain    - Continue Xanax 0 25mg by mouth every day as needed for anxiety     - Follow up with our office in 3 months    - Call our office with any new or uncontrolled symptoms or side-effects    Medications adjusted this encounter:  Requested Prescriptions      No prescriptions requested or ordered in this encounter     No orders of the defined types were placed in this encounter  There are no discontinued medications  Vishnu Renner was seen today for symptoms and planning cares related to above illnesses  I have reviewed the patient's controlled substance dispensing history in the Prescription Drug Monitoring Program in compliance with the [facility] regulations before prescribing any controlled substances  They are invited to continue to follow with us  If there are questions or concerns, please contact us through our clinic/answering service 24 hours a day, seven days a week  EMILIANO Gallardo  [facility]  [phone]      Visit Information    Accompanied By: No one    Source of History: Self    History Limitations: None    Contacts:Contact Information:  Kate Martin - [phone]    History of Present Illness      Vishnu Renner is a 45 y o  female who presents for follow up of symptoms related to stage 4 breast cancer with metastasis to liver    Pertinent issues include: symptom management, depression or anxiety, assessment of goals of care, disease process education and discussion of prognosis    She sees Dr Rita Ortiz for Oncology treatment  She is currently on olaparib and will have repeat imaging in April after her treatment course is complete  She is tolerating her chemotherapy well  She is surprised that she has not experienced any rashes with this medication, as she has in the past with other treatments  [place] reports only taking at most 1 Oxycodone tablet per day and does not use them every day  She also does not need her Xanax every day, but takes it on occasion  She does not like to take medication if not needed and does not take medication while she is working because she needs to be functional   She is satisfied with her current regimen and feels that the Xanax and Oxycodone work when she needs them too  She has been having some difficulty sleeping at night due to anxiety  She has been able to use the Xanax with good results but asks about MMJ  She will need to change her name on her 's license before she can register for [address] [place] [address] [employer] [place]  She has lost about 15 lbs recently, however this has been purposeful  She reports a healthy diet with lots of vegetables and fiber  She is feeling well and is excited about continuing her self-improvement  Past medical, surgical, social, and family histories are reviewed and pertinent updates are made  Review of Systems   Constitution: Positive for weight loss  Negative for decreased appetite, fever and malaise/fatigue  HENT: Negative for sore throat  Eyes: Negative for visual disturbance  Cardiovascular: Negative for chest pain and dyspnea on exertion  Respiratory: Negative for cough and shortness of breath  Skin: Negative for itching and rash  Musculoskeletal: Negative for falls  Gastrointestinal: Negative for abdominal pain, dysphagia, hematemesis, nausea and vomiting  Genitourinary: Negative for dysuria  Neurological: Negative for disturbances in coordination and weakness     Psychiatric/Behavioral: Negative for hallucinations and memory loss          Vital Signs    There were no vitals taken for this visit  Physical Exam and Objective Data  Physical Exam  Constitutional:       Appearance: She is obese  She is not ill-appearing  HENT:      Head: Normocephalic  Nose: No congestion  Eyes:      Pupils: Pupils are equal, round, and reactive to light  Neck:      Musculoskeletal: Neck supple  Cardiovascular:      Rate and Rhythm: Normal rate and regular rhythm  Pulmonary:      Effort: Pulmonary effort is normal  No respiratory distress  Breath sounds: Normal breath sounds  Abdominal:      General: There is no distension  Palpations: Abdomen is soft  Tenderness: There is no abdominal tenderness  Musculoskeletal: Normal range of motion  Skin:     General: Skin is warm and dry  Neurological:      General: No focal deficit present  Mental Status: She is alert and oriented to person, place, and time  Psychiatric:         Mood and Affect: Mood normal          Behavior: Behavior normal          Thought Content: Thought content normal          Judgment: Judgment normal          20 minutes was spent face to face with Reynaldo Garcia with greater than 50% of the time spent in counseling or coordination of care including discussions of etiology of diagnosis, risks and benefits of treatment, instructions for disease self management, treatment instructions, follow up requirements, risk factors and risk reduction of disease, patient and family counseling/involvement in care and compliance with treatment regimen   All of the patient's or agent's questions were answered during this discussion

## 2021-03-10 ENCOUNTER — TELEPHONE (OUTPATIENT)
Dept: PALLIATIVE MEDICINE | Facility: CLINIC | Age: 51
End: 2021-03-10

## 2021-03-10 DIAGNOSIS — C50.911 CARCINOMA OF RIGHT BREAST METASTATIC TO AXILLARY LYMPH NODE (HCC): ICD-10-CM

## 2021-03-10 DIAGNOSIS — C77.3 CARCINOMA OF RIGHT BREAST METASTATIC TO AXILLARY LYMPH NODE (HCC): ICD-10-CM

## 2021-03-10 DIAGNOSIS — C78.7 LIVER METASTASES (HCC): ICD-10-CM

## 2021-03-10 DIAGNOSIS — Z15.09 BRCA2 GENE MUTATION POSITIVE: ICD-10-CM

## 2021-03-10 DIAGNOSIS — F41.8 ANXIETY ABOUT HEALTH: ICD-10-CM

## 2021-03-10 DIAGNOSIS — Z23 ENCOUNTER FOR IMMUNIZATION: ICD-10-CM

## 2021-03-10 DIAGNOSIS — Z15.01 BRCA2 GENE MUTATION POSITIVE: ICD-10-CM

## 2021-03-10 NOTE — TELEPHONE ENCOUNTER
Per telephone call with [address] [place] [address] where the alprazolam and oxycodone scripts were sent yesterday, they do not handle these types of medications  I then called the patient who verbalized that the pharmacy should be the [facility] on [facility]  Please send the new scripts to that location  Thank you   Germaine Serrano

## 2021-03-11 RX ORDER — OXYCODONE HYDROCHLORIDE 10 MG/1
10 TABLET ORAL EVERY 6 HOURS PRN
Qty: 30 TABLET | Refills: 0 | Status: SHIPPED | OUTPATIENT
Start: 2021-03-11 | End: 2021-10-05 | Stop reason: SDUPTHER

## 2021-03-11 RX ORDER — ALPRAZOLAM 0.25 MG/1
0.25 TABLET ORAL DAILY PRN
Qty: 20 TABLET | Refills: 0 | Status: SHIPPED | OUTPATIENT
Start: 2021-03-11 | End: 2021-07-25 | Stop reason: SDUPTHER

## 2021-03-25 ENCOUNTER — IMMUNIZATIONS (OUTPATIENT)
Dept: FAMILY MEDICINE CLINIC | Facility: HOSPITAL | Age: 51
End: 2021-03-25

## 2021-03-25 DIAGNOSIS — Z23 ENCOUNTER FOR IMMUNIZATION: Primary | ICD-10-CM

## 2021-03-25 PROCEDURE — 91300 SARS-COV-2 / COVID-19 MRNA VACCINE (PFIZER-BIONTECH) 30 MCG: CPT

## 2021-03-25 PROCEDURE — 0001A SARS-COV-2 / COVID-19 MRNA VACCINE (PFIZER-BIONTECH) 30 MCG: CPT

## 2021-04-08 ENCOUNTER — TELEPHONE (OUTPATIENT)
Dept: SURGICAL ONCOLOGY | Facility: CLINIC | Age: 51
End: 2021-04-08

## 2021-04-08 NOTE — TELEPHONE ENCOUNTER
Patient called in regards to lab work being done before her follow up appt with Dr Tony Alvarado on 4/22/21, also stated she has a CT on 4/16/21 and the covid vaccine on 4/19/21  I verified with Michelle Jessica nurse that the covid vaccine will not affect her lab work as done routinely

## 2021-04-16 ENCOUNTER — HOSPITAL ENCOUNTER (OUTPATIENT)
Dept: CT IMAGING | Facility: HOSPITAL | Age: 51
Discharge: HOME | End: 2021-04-16
Attending: INTERNAL MEDICINE
Payer: COMMERCIAL

## 2021-04-16 DIAGNOSIS — C50.111 MALIGNANT NEOPLASM OF CENTRAL PORTION OF RIGHT BREAST IN FEMALE, ESTROGEN RECEPTOR POSITIVE (HCC): ICD-10-CM

## 2021-04-16 DIAGNOSIS — C50.911 CARCINOMA OF RIGHT BREAST METASTATIC TO AXILLARY LYMPH NODE (HCC): ICD-10-CM

## 2021-04-16 DIAGNOSIS — C78.7 LIVER METASTASES (HCC): ICD-10-CM

## 2021-04-16 DIAGNOSIS — C77.3 CARCINOMA OF RIGHT BREAST METASTATIC TO AXILLARY LYMPH NODE (HCC): ICD-10-CM

## 2021-04-16 DIAGNOSIS — Z17.0 MALIGNANT NEOPLASM OF CENTRAL PORTION OF RIGHT BREAST IN FEMALE, ESTROGEN RECEPTOR POSITIVE (HCC): ICD-10-CM

## 2021-04-16 PROCEDURE — G1004 CDSM NDSC: HCPCS

## 2021-04-16 PROCEDURE — 71260 CT THORAX DX C+: CPT

## 2021-04-16 PROCEDURE — 74177 CT ABD & PELVIS W/CONTRAST: CPT

## 2021-04-16 RX ADMIN — IOHEXOL 100 ML: 350 INJECTION, SOLUTION INTRAVENOUS at 13:15

## 2021-04-19 ENCOUNTER — IMMUNIZATIONS (OUTPATIENT)
Dept: FAMILY MEDICINE CLINIC | Facility: HOSPITAL | Age: 51
End: 2021-04-19

## 2021-04-19 DIAGNOSIS — Z23 ENCOUNTER FOR IMMUNIZATION: Primary | ICD-10-CM

## 2021-04-19 PROCEDURE — 91300 SARS-COV-2 / COVID-19 MRNA VACCINE (PFIZER-BIONTECH) 30 MCG: CPT

## 2021-04-19 PROCEDURE — 0002A SARS-COV-2 / COVID-19 MRNA VACCINE (PFIZER-BIONTECH) 30 MCG: CPT

## 2021-04-21 ENCOUNTER — TELEPHONE (OUTPATIENT)
Dept: SURGICAL ONCOLOGY | Facility: CLINIC | Age: 51
End: 2021-04-21

## 2021-04-21 ENCOUNTER — TELEPHONE (OUTPATIENT)
Dept: HEMATOLOGY ONCOLOGY | Facility: CLINIC | Age: 51
End: 2021-04-21

## 2021-04-21 DIAGNOSIS — C78.7 LIVER METASTASES (HCC): Primary | ICD-10-CM

## 2021-04-21 DIAGNOSIS — C50.911 CARCINOMA OF RIGHT BREAST METASTATIC TO AXILLARY LYMPH NODE (HCC): ICD-10-CM

## 2021-04-21 DIAGNOSIS — C77.3 CARCINOMA OF RIGHT BREAST METASTATIC TO AXILLARY LYMPH NODE (HCC): ICD-10-CM

## 2021-04-21 NOTE — TELEPHONE ENCOUNTER
Spoke to Emily Last and reminded her that she has an appt with Dr Vince Salinas tomorrow at 1:00pm and that there are labs that need to be completed prior to her appt  I expressed to her that I can fax her lab orders to [facility] for her  Halle expressed gratitude

## 2021-04-22 ENCOUNTER — OFFICE VISIT (OUTPATIENT)
Dept: HEMATOLOGY ONCOLOGY | Facility: CLINIC | Age: 51
End: 2021-04-22
Payer: COMMERCIAL

## 2021-04-22 VITALS
HEART RATE: 82 BPM | DIASTOLIC BLOOD PRESSURE: 78 MMHG | SYSTOLIC BLOOD PRESSURE: 136 MMHG | RESPIRATION RATE: 18 BRPM | HEIGHT: 65 IN | OXYGEN SATURATION: 98 % | BODY MASS INDEX: 36.49 KG/M2 | TEMPERATURE: 96.6 F | WEIGHT: 219 LBS

## 2021-04-22 DIAGNOSIS — Z17.0 MALIGNANT NEOPLASM OF CENTRAL PORTION OF RIGHT BREAST IN FEMALE, ESTROGEN RECEPTOR POSITIVE (HCC): Primary | ICD-10-CM

## 2021-04-22 DIAGNOSIS — C77.3 CARCINOMA OF RIGHT BREAST METASTATIC TO AXILLARY LYMPH NODE (HCC): ICD-10-CM

## 2021-04-22 DIAGNOSIS — Z15.09 BRCA2 GENE MUTATION POSITIVE: ICD-10-CM

## 2021-04-22 DIAGNOSIS — Z15.01 BRCA2 GENE MUTATION POSITIVE: ICD-10-CM

## 2021-04-22 DIAGNOSIS — C78.7 LIVER METASTASES (HCC): ICD-10-CM

## 2021-04-22 DIAGNOSIS — C50.111 MALIGNANT NEOPLASM OF CENTRAL PORTION OF RIGHT BREAST IN FEMALE, ESTROGEN RECEPTOR POSITIVE (HCC): Primary | ICD-10-CM

## 2021-04-22 DIAGNOSIS — C50.911 CARCINOMA OF RIGHT BREAST METASTATIC TO AXILLARY LYMPH NODE (HCC): ICD-10-CM

## 2021-04-22 PROCEDURE — 3008F BODY MASS INDEX DOCD: CPT | Performed by: INTERNAL MEDICINE

## 2021-04-22 PROCEDURE — 99214 OFFICE O/P EST MOD 30 MIN: CPT | Performed by: INTERNAL MEDICINE

## 2021-04-22 NOTE — PROGRESS NOTES
Hematology / Oncology Outpatient Follow Up Note    Earl Lockett 45 y o  female :1970 OR         Date:  2021    Assessment / Plan:  A 45year-old surgically postmenopausal woman with metastatic right breast cancer, grade 3, ER 90% positive, DC 90% positive, HER2 negative disease   She has BRCA -2 mutation    She has histologically confirmed liver metastasis   Since she was premenopausal, she underwent as bilateral surgical ovarian ablation in late 2019 followed by starting palbociclib and letrozole   She had initial minor response   However, she had rapid progression in 2019 for which she was treated with Taxotere x6 cycle resulting in partial response   Subsequently, she had palliative right mastectomy followed by olaparib since mid 2020 with no significant toxicity  She achieved major response on olaparib  Clinically, radiographically as well as biochemically, she has no progression  I recommended her to continue with olaparib 300 mg b i d  She is in agreement with my recommendation    I will see her again in 3 months with CBC, CMP and CA 27, 29        Subjective:      HPI:  A 50-year-old premenopausal woman who noticed a lump in her right breast in 2019 which she brought to medical attention   Radiographically, she has large right breast mass and right axillary adenopathy both of which were biopsy in May 3, 2019   Biopsy showed invasive ductal carcinoma, grade 3   This was ER 90% positive, DC 90% positive, HER2 negative disease   Biopsy from right axilla lymph node was positive for metastatic disease   She was seen by Dr Josee Adrian of her young age, she underwent genetic testing which showed BRCA-2 mutation   Because of the locally advanced nature, she underwent CT scan of chest abdomen pelvis which showed multiple liver lesion, suspicious for metastatic disease   Bone scan was negative for osseous metastasis  Oralia Steve is going to have liver biopsy in  11, 2019   She presents today to discuss treatment option   She has no symptomatology from breast standpoint   She denied any pain  Her weight has been stable   She has no respiratory symptoms   She has no significant past medical history   Her paternal aunt had breast cancer in her 35s   Interestingly enough, her father had colon cancer in his 45s as well as prostate cancer in his 62s   She has no family history of ovarian cancer   She was a smoker until 2 years ago  Ivelisse Arevalo does not drink alcohol  Ivelisse Arevalo has a son who is 23years old  Ivelisse Arevalo also has younger son who was biologically female  João Troy is a transgender  João Troy is considering bilateral mastectomy  Clorinda Lis, he has not been tested for BRCA gene mutation            Interval History:   A 45year-old surgically postmenopausal woman with metastatic right breast cancer, grade 3, ER 90% positive, HI 90% positive, HER2 negative disease   She has BRCA -2 mutation   Based on the CT scan, she appeared to have multiple liver metastasis     She subsequently underwent liver biopsy which showed metastatic carcinoma, consistent with breast primary   She was premenopausal at the time of diagnosis   Therefore, she underwent bilateral oophorectomy in late July 2019   Ovary and fallopian tube did not show any malignancy   She started palbociclib and letrozole in late July 2019  She initially had response with decreased right breast mass as well as right axillary adenopathy   However, in late December 2019, she had rapid progression in her right breast mass, skin involvement as well as right axillary adenopathy   Therefore, she started palliative chemotherapy with Taxotere, resulting in partial response   After 6 cycle treatment with Taxotere, she underwent palliative right mastectomy which showed multiple foci of invasive ductal carcinoma measuring up to 3 5 cm and 13 positive axillary lymph node   Since mid June 2020, she has been on olaparib   She is tolerating olaparib very well with no nausea, vomiting or GI symptomatology  She achieved major radiographical and biochemical response on olaparib  She presents today for routine follow-up  She has no new complaint  She denied any pain  Her weight is stable  She has no respiratory symptoms  Her recent tumor antigen is within normal range    Unfortunately, CT scan of chest abdomen pelvis has not been read by the radiologist   Her performance status is normal          Objective:      Primary Diagnosis:     1    Metastatic breast cancer, grade 3, ER 90 % positive, NM 90 % positive, HER2 negative disease, diagnosed in June 2019    2    BRCA-2 mutation      Cancer Staging:  Cancer Staging  Malignant neoplasm of central portion of right breast in female, estrogen receptor positive ([facility] [address] )  Staging form: Breast, AJCC 8th Edition  - Clinical: Stage IIIB (cT4, cN1(f), cM0, G3, ER+, NM+, HER2-) - Signed by Shae Carver MD on 5/16/2019  Laterality: Right  Method of lymph node assessment: Core biopsy  Histologic grading system: 3 grade system           Previous Hematologic/ Oncologic Treatment:      1  Surgical ovarian ablation in late July 2019   2  Palbociclib and letrozole from July 2018 through January 2020    3  Taxotere x6 cycle, completed in May 2020    4  Palliative right mastectomy      Current Hematologic/ Oncologic Treatment:       Olaparib 300 mg b i d  Since mid July 2020       Disease Status:      Good partial response on olaparib      Test Results:     Pathology:     Right breast biopsy in axillary lymph node biopsy showed invasive ductal carcinoma, grade 3   ER 90 % positive, NM 90% positive, HER2 negative disease      Liver biopsy in June 2019 showed metastatic carcinoma, consistent with breast primary      In June 2020, mastectomy specimen showed multiple foci of invasive ductal carcinoma measuring up to 3 5 cm with 13 positive axillary lymph nodes      Radiology:     Bone scan showed no evidence of osseous metastasis      CT scan of chest abdomen pelvis in   April 2021 has not been read by radiologist   Personal review of the film showed no evidence of progression       Laboratory:     See below   CA 27, 29 was  22      Physical Exam:        General Appearance:    Alert, oriented          Eyes:    PERRL   Ears:    Normal external ear canals, both ears   Nose:   Nares normal, septum midline   Throat:   Mucosa moist  Pharynx without injection  Neck:   Supple         Lungs:     Clear to auscultation bilaterally   Chest Wall:    No tenderness or deformity    Heart:    Regular rate and rhythm         Abdomen:     Soft, non-tender, bowel sounds +, no organomegaly               Extremities:   Extremities no cyanosis or edema         Skin:   no rash or icterus  Lymph nodes:   Cervical, supraclavicular, and axillary nodes normal   Neurologic:   CNII-XII intact, normal strength, sensation and reflexes     Throughout             Breast exam: Right status post mastectomy without reconstruction   No palpable abnormality on her right chest wall   Left breast exam is negative  ROS: Review of Systems   All other systems reviewed and are negative  Imaging: No results found  Labs:   Lab Results   Component Value Date    WBC 4 5 04/21/2021    HGB 13 4 04/21/2021    HCT 38 5 04/21/2021    MCV 97 2 04/21/2021     04/21/2021     Lab Results   Component Value Date    K 4 0 04/21/2021     04/21/2021    CO2 29 04/21/2021    BUN 13 04/21/2021    CREATININE 0 92 04/21/2021    GLUF 89 10/18/2020    CALCIUM 9 3 04/21/2021    AST 19 04/21/2021    ALT 21 04/21/2021    ALKPHOS 78 04/21/2021    EGFR 74 10/18/2020         Current Medications: Reviewed  Allergies: Reviewed  PMH/FH/SH:  Reviewed      Vital Sign:    Body surface area is 2 06 meters squared      Wt Readings from Last 3 Encounters:   04/22/21 99 3 kg (219 lb)   03/09/21 102 kg (225 lb 8 5 oz)   01/21/21 106 kg (234 lb)        Temp Readings from Last 3 Encounters:   04/22/21 Colin Marking ) 96 6 °F (35 9 °C) (Tympanic Core)   03/09/21 (!) 97 4 °F (36 3 °C) (Temporal)   01/21/21 (!) 96 °F (35 6 °C) (Tympanic Core)        BP Readings from Last 3 Encounters:   04/22/21 136/78   03/09/21 110/60   01/21/21 124/72         Pulse Readings from Last 3 Encounters:   04/22/21 82   03/09/21 80   01/21/21 81     @LASTSAO2(3)@

## 2021-04-25 LAB
ALBUMIN SERPL-MCNC: 4.2 G/DL (ref 3.6–5.1)
ALBUMIN/GLOB SERPL: 1.5 (CALC) (ref 1–2.5)
ALP SERPL-CCNC: 78 U/L (ref 37–153)
ALT SERPL-CCNC: 21 U/L (ref 6–29)
AST SERPL-CCNC: 19 U/L (ref 10–35)
BASOPHILS # BLD AUTO: 9 CELLS/UL (ref 0–200)
BASOPHILS NFR BLD AUTO: 0.2 %
BILIRUB SERPL-MCNC: 0.4 MG/DL (ref 0.2–1.2)
BUN SERPL-MCNC: 13 MG/DL (ref 7–25)
BUN/CREAT SERPL: NORMAL (CALC) (ref 6–22)
CALCIUM SERPL-MCNC: 9.3 MG/DL (ref 8.6–10.4)
CANCER AG27-29 SERPL-ACNC: 21 U/ML
CHLORIDE SERPL-SCNC: 103 MMOL/L (ref 98–110)
CO2 SERPL-SCNC: 29 MMOL/L (ref 20–32)
CREAT SERPL-MCNC: 0.92 MG/DL (ref 0.5–1.05)
EOSINOPHIL # BLD AUTO: 41 CELLS/UL (ref 15–500)
EOSINOPHIL NFR BLD AUTO: 0.9 %
ERYTHROCYTE [DISTWIDTH] IN BLOOD BY AUTOMATED COUNT: 14.5 % (ref 11–15)
GLOBULIN SER CALC-MCNC: 2.8 G/DL (CALC) (ref 1.9–3.7)
GLUCOSE SERPL-MCNC: 83 MG/DL (ref 65–139)
HCT VFR BLD AUTO: 38.5 % (ref 35–45)
HGB BLD-MCNC: 13.4 G/DL (ref 11.7–15.5)
LYMPHOCYTES # BLD AUTO: 837 CELLS/UL (ref 850–3900)
LYMPHOCYTES NFR BLD AUTO: 18.6 %
MCH RBC QN AUTO: 33.8 PG (ref 27–33)
MCHC RBC AUTO-ENTMCNC: 34.8 G/DL (ref 32–36)
MCV RBC AUTO: 97.2 FL (ref 80–100)
MONOCYTES # BLD AUTO: 455 CELLS/UL (ref 200–950)
MONOCYTES NFR BLD AUTO: 10.1 %
NEUTROPHILS # BLD AUTO: 3159 CELLS/UL (ref 1500–7800)
NEUTROPHILS NFR BLD AUTO: 70.2 %
PLATELET # BLD AUTO: 182 THOUSAND/UL (ref 140–400)
PMV BLD REES-ECKER: 12.5 FL (ref 7.5–12.5)
POTASSIUM SERPL-SCNC: 4 MMOL/L (ref 3.5–5.3)
PROT SERPL-MCNC: 7 G/DL (ref 6.1–8.1)
RBC # BLD AUTO: 3.96 MILLION/UL (ref 3.8–5.1)
SL AMB EGFR AFRICAN AMERICAN: 84 ML/MIN/1.73M2
SL AMB EGFR NON AFRICAN AMERICAN: 72 ML/MIN/1.73M2
SODIUM SERPL-SCNC: 140 MMOL/L (ref 135–146)
WBC # BLD AUTO: 4.5 THOUSAND/UL (ref 3.8–10.8)

## 2021-05-07 DIAGNOSIS — C78.7 LIVER METASTASES (HCC): ICD-10-CM

## 2021-05-07 DIAGNOSIS — C77.3 CARCINOMA OF RIGHT BREAST METASTATIC TO AXILLARY LYMPH NODE (HCC): Primary | ICD-10-CM

## 2021-05-07 DIAGNOSIS — C50.911 CARCINOMA OF RIGHT BREAST METASTATIC TO AXILLARY LYMPH NODE (HCC): Primary | ICD-10-CM

## 2021-06-08 ENCOUNTER — OFFICE VISIT (OUTPATIENT)
Dept: PALLIATIVE MEDICINE | Facility: CLINIC | Age: 51
End: 2021-06-08
Payer: COMMERCIAL

## 2021-06-08 VITALS
BODY MASS INDEX: 37.08 KG/M2 | HEART RATE: 86 BPM | WEIGHT: 224.43 LBS | TEMPERATURE: 97.6 F | SYSTOLIC BLOOD PRESSURE: 130 MMHG | OXYGEN SATURATION: 97 % | DIASTOLIC BLOOD PRESSURE: 70 MMHG

## 2021-06-08 DIAGNOSIS — F41.8 ANXIETY ABOUT HEALTH: Primary | ICD-10-CM

## 2021-06-08 DIAGNOSIS — R63.5 ABNORMAL WEIGHT GAIN: ICD-10-CM

## 2021-06-08 PROCEDURE — 99214 OFFICE O/P EST MOD 30 MIN: CPT | Performed by: FAMILY MEDICINE

## 2021-06-08 RX ORDER — BUPROPION HYDROCHLORIDE 100 MG/1
100 TABLET, EXTENDED RELEASE ORAL 2 TIMES DAILY
Qty: 60 TABLET | Refills: 0 | Status: SHIPPED | OUTPATIENT
Start: 2021-06-08 | End: 2021-07-08

## 2021-06-08 RX ORDER — BUPROPION HYDROCHLORIDE 100 MG/1
100 TABLET, EXTENDED RELEASE ORAL 2 TIMES DAILY
Qty: 60 TABLET | Refills: 0 | Status: SHIPPED | OUTPATIENT
Start: 2021-06-08 | End: 2021-06-08 | Stop reason: SDUPTHER

## 2021-06-08 NOTE — PROGRESS NOTES
Outpatient Follow-Up - Palliative and Supportive Care   Mariela Rendon 45 y o  female [id number]    Assessment & Plan  Problem List Items Addressed This Visit     Anxiety about health - Primary    Relevant Medications    buPROPion (WELLBUTRIN SR) 100 mg 12 hr tablet      Other Visit Diagnoses     Abnormal weight gain        Relevant Orders    CBC and Platelet    Basic metabolic panel    TSH, 3rd generation with Free T4 reflex      - Counseling on health screening and disease prevention, COVID-19 specific (CPT V65 49)    Medications adjusted this encounter:  Requested Prescriptions     Signed Prescriptions Disp Refills    buPROPion (WELLBUTRIN SR) 100 mg 12 hr tablet 60 tablet 0     Sig: Take 1 tablet (100 mg total) by mouth 2 (two) times a day Before breakfast, before lunch     Orders Placed This Encounter   Procedures    CBC and Platelet    Basic metabolic panel    TSH, 3rd generation with Free T4 reflex     Medications Discontinued During This Encounter   Medication Reason    buPROPion (WELLBUTRIN SR) 100 mg 12 hr tablet Reorder   - Trial of bupropion for appetite suppression   - Labs to consider etiology for unintentional wt gain      Mariela Rendon was seen today for symptoms and planning cares related to above illnesses  I have reviewed the patient's controlled substance dispensing history in the Prescription Drug Monitoring Program in compliance with the [facility] regulations before prescribing any controlled substances  They are invited to continue to follow with us  If there are questions or concerns, please contact us through our clinic/answering service 24 hours a day, seven days a week  Penny Cunningham MD  [address] [place] [address] Palliative and Supportive Care  [phone]      Visit Information    Accompanied By: No one    Source of History: Self    History Limitations: None    Contacts: brother Bessie Delvalle  -     History of Present Illness      Mariela Rendon is a 45 y o  female who presents in follow up of symptoms related to breast cancer  Pertinent issues include: symptom management, survivorship  She has been doing overall well since our last visit, and has lost some weight  However, she has just recenlty, over a span of weeks, put that weight back on  She is very frustrated with this development, and requests that we consider any and all modes of cares to help her redcue her weight and maintain that wt loss  She will plan a trip soon to the [facility]; just for the weekend, and she will come back to the [facility] for usual f/up with Dr Toni De La Torre; we agreed that she can gather labs when she gets her other labs for dr Toni De La Torre later on in the month  Past medical, surgical, social, and family histories are reviewed and pertinent updates are made  Review of Systems   Constitution: Positive for weight gain  Negative for decreased appetite and weight loss  HENT: Negative for hoarse voice and nosebleeds  Eyes: Negative for vision loss in left eye and vision loss in right eye  Cardiovascular: Negative for chest pain and dyspnea on exertion  Respiratory: Negative for cough and shortness of breath  Endocrine: Negative for polydipsia, polyphagia and polyuria  Skin: Negative for flushing and itching  Musculoskeletal: Negative for falls  Gastrointestinal: Negative for anorexia, jaundice, nausea and vomiting  Genitourinary: Negative for frequency  Neurological: Negative for dizziness  Psychiatric/Behavioral: Negative for depression and memory loss  The patient is not nervous/anxious  Vital Signs    /70 (BP Location: Left arm, Cuff Size: Standard)   Pulse 86   Temp 97 6 °F (36 4 °C) (Temporal)   Wt 102 kg (224 lb 6 9 oz)   SpO2 97%   BMI 37 08 kg/m²     Physical Exam and Objective Data  Physical Exam  Constitutional:       General: She is not in acute distress  Appearance: She is well-developed  She is obese   She is not ill-appearing, toxic-appearing or diaphoretic  HENT:      Head: Normocephalic and atraumatic  Right Ear: External ear normal       Left Ear: External ear normal    Eyes:      General:         Right eye: No discharge  Left eye: No discharge  Conjunctiva/sclera: Conjunctivae normal       Pupils: Pupils are equal, round, and reactive to light  Neck:      Musculoskeletal: Normal range of motion  Trachea: No tracheal deviation  Cardiovascular:      Rate and Rhythm: Normal rate and regular rhythm  Pulmonary:      Effort: Pulmonary effort is normal  No respiratory distress  Breath sounds: No stridor  Abdominal:      General: There is no distension  Palpations: Abdomen is soft  Musculoskeletal:         General: No deformity  Skin:     General: Skin is warm and dry  Coloration: Skin is not pale  Findings: No erythema or rash  Neurological:      General: No focal deficit present  Mental Status: She is alert and oriented to person, place, and time  Mental status is at baseline  Cranial Nerves: No cranial nerve deficit  Psychiatric:         Mood and Affect: Mood normal          Behavior: Behavior normal          Thought Content: Thought content normal          Judgment: Judgment normal            Radiology and Laboratory:  I personally reviewed and interpreted the following results: none new    30+ minutes was spent face to face with Annie Steele with greater than 50% of the time spent in counseling or coordination of care including discussions of etiology of diagnosis, diagnostic results, impression, and recommendations, risks and benefits of treatment, instructions for disease self management, risk factors and risk reduction of disease and compliance with treatment regimen   Additional time was also spent in discussing coronavirus vaccine indications, availability, and logistical challenges  All of the patient's or agent's questions were answered during this discussion

## 2021-06-08 NOTE — PATIENT INSTRUCTIONS
Please protect yourself from COVID-19! Even though we do not good antiviral drugs for this infection, the following tools can help you stay healthy:    = Wash your hands! Soap and water, or hand  with at least 60% alcohol, are both effective at killing the virus  = Wear a mask! This will help protect others from any virus particles you might spread  Your mouth and nose BOTH need to be covered  = Keep the distance! Keep 6 feet of distance from others people, even if they seem healthy  Keeping distance protects you from the other person's virus spread     = Get a vaccine! Three vaccines are approved for emergency use in the [place]; they are made by [employer], and [employer]  These vaccines have been shown to be 90+% effective at preventing severe infections when combined with masking, hand-washing, and distancing  As of 4/19/2021, ALL adults may get a vaccine!  + Pfizer may be given to all persons age 15 and older   + Phylicia [place] may be given to all adults age 25 and older   + Michael&Michael may be given to all adults age 25 and older  (Serious blood clot side effects have only been reported in reproductive-age women, and these are about 1-in-a-million )  = You may get a shot from many other locations outside [facility]: [facility], [employer], [facility], [facility], and certain [facility] locations  We are recommending that ALL our patients get a complete series of one of the three vaccines, as early as it is available to them  Please keep an eye on the [employer], [facility], or call 8-296-NYXTZWT (Choose Option 7 for faster service!) to see when you will become eligible  If you are eligible by the criteria above, please ask our team to help get you a shot!       Informacion en espanol sobre vacunas, de nos companeros de [facility] --  Tommy bland      Numbers of Coronavirus cases (and COVID deaths) are improving in many [place] States, and we think this is because vaccines are working! However, there are many places around the world where the virus is still present  As of 6/1/21, we do NOT advise travel OUTSIDE the [address], and we encourage you to be very careful when planning travel INSIDE the [address]  Check out Primary Data for [place] data that are updated daily:    [place]     Global Epidemics  Org, from [facility]), will give you Sphpte-qo-[id number] information on virus cases and vaccination rates:    Https://globalepidemics  org/

## 2021-06-15 ENCOUNTER — TELEPHONE (OUTPATIENT)
Dept: SURGICAL ONCOLOGY | Facility: CLINIC | Age: 51
End: 2021-06-15

## 2021-06-15 NOTE — TELEPHONE ENCOUNTER
Called patient to inquire if she wished to reschedule her appointment that she had cancelled for June 21, 2021 with Dr Eric Velazquez  Per the patient she doesn't wish to do so  She doesn't see the point of coming in to be told the same thing she was told last time since nothing has changed and feels like it is a waste of money  She is aware to call the office if she changes her mind or has any issues and that I would make Dr Eric Velazquez aware of above

## 2021-06-30 ENCOUNTER — DOCUMENTATION (OUTPATIENT)
Dept: HEMATOLOGY ONCOLOGY | Facility: CLINIC | Age: 51
End: 2021-06-30

## 2021-06-30 NOTE — PROGRESS NOTES
Received request from pharmacy that they started a prior auth for this patient and her lynparza  I submitted the auth via cover my meds     Key: [id number] with rep [facility] and she stated it already has an approval on file from 07/16/2021-07/16/2022    Call her retail pharmacy and they are aware

## 2021-07-08 DIAGNOSIS — F41.8 ANXIETY ABOUT HEALTH: ICD-10-CM

## 2021-07-08 RX ORDER — BUPROPION HYDROCHLORIDE 100 MG/1
100 TABLET, EXTENDED RELEASE ORAL 2 TIMES DAILY
Qty: 60 TABLET | Refills: 0 | Status: SHIPPED | OUTPATIENT
Start: 2021-07-08 | End: 2021-08-02 | Stop reason: SDUPTHER

## 2021-07-14 PROBLEM — N90.7 SEBACEOUS CYST OF LABIA: Status: RESOLVED | Noted: 2020-07-31 | Resolved: 2021-07-14

## 2021-07-15 ENCOUNTER — OFFICE VISIT (OUTPATIENT)
Dept: FAMILY MEDICINE CLINIC | Facility: CLINIC | Age: 51
End: 2021-07-15
Payer: COMMERCIAL

## 2021-07-15 VITALS
WEIGHT: 230.5 LBS | BODY MASS INDEX: 38.4 KG/M2 | HEIGHT: 65 IN | SYSTOLIC BLOOD PRESSURE: 114 MMHG | DIASTOLIC BLOOD PRESSURE: 80 MMHG | TEMPERATURE: 97.1 F

## 2021-07-15 DIAGNOSIS — H65.21 RIGHT CHRONIC SEROUS OTITIS MEDIA: ICD-10-CM

## 2021-07-15 DIAGNOSIS — H69.80 DYSFUNCTION OF EUSTACHIAN TUBE, UNSPECIFIED LATERALITY: ICD-10-CM

## 2021-07-15 DIAGNOSIS — E66.01 SEVERE OBESITY (BMI 35.0-39.9) WITH COMORBIDITY (HCC): ICD-10-CM

## 2021-07-15 DIAGNOSIS — H66.001 ACUTE SUPPURATIVE OTITIS MEDIA OF RIGHT EAR WITHOUT SPONTANEOUS RUPTURE OF TYMPANIC MEMBRANE, RECURRENCE NOT SPECIFIED: ICD-10-CM

## 2021-07-15 DIAGNOSIS — J30.9 ALLERGIC RHINITIS, UNSPECIFIED SEASONALITY, UNSPECIFIED TRIGGER: ICD-10-CM

## 2021-07-15 DIAGNOSIS — Z11.9 SCREENING EXAMINATION FOR INFECTIOUS DISEASE: ICD-10-CM

## 2021-07-15 DIAGNOSIS — H92.01 EARACHE ON RIGHT: Primary | ICD-10-CM

## 2021-07-15 PROCEDURE — 1036F TOBACCO NON-USER: CPT | Performed by: FAMILY MEDICINE

## 2021-07-15 PROCEDURE — 99214 OFFICE O/P EST MOD 30 MIN: CPT | Performed by: FAMILY MEDICINE

## 2021-07-15 PROCEDURE — 3008F BODY MASS INDEX DOCD: CPT | Performed by: FAMILY MEDICINE

## 2021-07-15 RX ORDER — CEPHALEXIN 500 MG/1
500 CAPSULE ORAL EVERY 8 HOURS SCHEDULED
Qty: 30 CAPSULE | Refills: 0 | Status: SHIPPED | OUTPATIENT
Start: 2021-07-15 | End: 2021-07-25

## 2021-07-15 RX ORDER — AZELASTINE 1 MG/ML
2 SPRAY, METERED NASAL 2 TIMES DAILY
Qty: 30 ML | Refills: 3 | Status: SHIPPED | OUTPATIENT
Start: 2021-07-15 | End: 2021-08-02

## 2021-07-15 RX ORDER — PREDNISONE 20 MG/1
TABLET ORAL
Qty: 20 TABLET | Refills: 0 | Status: SHIPPED | OUTPATIENT
Start: 2021-07-15 | End: 2021-07-25

## 2021-07-15 NOTE — PROGRESS NOTES
Assessment and Plan:   1  Right earache  2  Chronic serous right otitis media  3  Acute right otitis media  4  Allergic rhinitis  5  Eustachian tube dysfunction  Astelin is ordered  Keflex is ordered  Prednisone is ordered  Refer to ENT for further evaluation treatment  6  BMI 38 06 patient gained 6 lb diet exercise weight loss recommended  7  Screening for hepatitis-C and HIV discussed orders placed  8  Return after ENT evaluation if needed      Problem List Items Addressed This Visit        Respiratory    Allergic rhinitis      Astelin ordered         Relevant Medications    azelastine (ASTELIN) 0 1 % nasal spray    predniSONE 20 mg tablet       Other    Severe obesity (BMI 35 0-39  9) with comorbidity ([address] )      Patient gained 6 lb diet exercise weight loss recommended           Other Visit Diagnoses     Earache on right    -  Primary    Relevant Medications    predniSONE 20 mg tablet    Other Relevant Orders    Ambulatory Referral to Otolaryngology    Screening examination for infectious disease        Relevant Orders    Hepatitis C antibody    HIV 1/2 Antigen/Antibody (4th Generation) w Reflex [facility]    Right chronic serous otitis media        Relevant Medications    predniSONE 20 mg tablet    Other Relevant Orders    Ambulatory Referral to Otolaryngology    Acute suppurative otitis media of right ear without spontaneous rupture of tympanic membrane, recurrence not specified        Relevant Medications    cephalexin (KEFLEX) 500 mg capsule    Dysfunction of Eustachian tube, unspecified laterality        Relevant Medications    predniSONE 20 mg tablet    Other Relevant Orders    Ambulatory Referral to Otolaryngology                 Diagnoses and all orders for this visit:    Earache on right  -     predniSONE 20 mg tablet; Take 3 tablets daily for 3 days, 2 tablets daily for 3 days, 1 tablet daily for 4 days  Take with food  -     Ambulatory Referral to Otolaryngology;  Future    Screening examination for infectious disease  -     Hepatitis C antibody; Future  -     HIV 1/2 Antigen/Antibody (4th Generation) w Reflex [facility]; Future    Right chronic serous otitis media  -     predniSONE 20 mg tablet; Take 3 tablets daily for 3 days, 2 tablets daily for 3 days, 1 tablet daily for 4 days  Take with food  -     Ambulatory Referral to Otolaryngology; Future    Acute suppurative otitis media of right ear without spontaneous rupture of tympanic membrane, recurrence not specified  -     cephalexin (KEFLEX) 500 mg capsule; Take 1 capsule (500 mg total) by mouth every 8 (eight) hours for 10 days    Allergic rhinitis, unspecified seasonality, unspecified trigger  -     azelastine (ASTELIN) 0 1 % nasal spray; 2 sprays into each nostril 2 (two) times a day Use in each nostril as directed  -     predniSONE 20 mg tablet; Take 3 tablets daily for 3 days, 2 tablets daily for 3 days, 1 tablet daily for 4 days  Take with food    Dysfunction of Eustachian tube, unspecified laterality  -     predniSONE 20 mg tablet; Take 3 tablets daily for 3 days, 2 tablets daily for 3 days, 1 tablet daily for 4 days  Take with food  -     Ambulatory Referral to Otolaryngology; Future    Severe obesity (BMI 35 0-39  9) with comorbidity ([address] )              Subjective:      Patient ID: Doug Lomas is a 45 y o  female  CC:    Chief Complaint   Patient presents with   Verona Hodgkins     pt feels like ear is full and is painful x months  mgb       HPI:     Patient has right ear pain for over a month  Patient was told she had "fluid"  That just in go away  Patient denies fever chills only mild head congestion  No otorrhea mild otalgia      The following portions of the patient's history were reviewed and updated as appropriate: allergies, current medications, past family history, past medical history, past social history, past surgical history and problem list       Review of Systems   Constitutional:        HPI   HENT:        HPI   Eyes: Negative  Respiratory: Negative  Cardiovascular: Negative  Gastrointestinal: Negative  Endocrine: Negative  Genitourinary: Negative  Musculoskeletal: Negative  Skin: Negative  Allergic/Immunologic: Negative  Neurological: Negative  Hematological: Negative  Psychiatric/Behavioral: Negative  Data to review:       Objective:    Vitals:    07/15/21 1334   BP: 114/80   BP Location: Left arm   Patient Position: Sitting   Cuff Size: Large   Temp: (!) 97 1 °F (36 2 °C)   TempSrc: Temporal   Weight: 105 kg (230 lb 8 oz)   Height: 5' 5 25" (1 657 m)        Physical Exam  Vitals and nursing note reviewed  Constitutional:       Appearance: Normal appearance  HENT:      Head: Normocephalic and atraumatic  Left Ear: Tympanic membrane normal       Ears:      Comments: Right TM dull with fluid and mild injection     Nose:      Comments: Mildly allergic turbinates     Mouth/Throat:      Mouth: Mucous membranes are moist       Pharynx: Oropharynx is clear  No oropharyngeal exudate or posterior oropharyngeal erythema  Eyes:      General: No scleral icterus  Cardiovascular:      Rate and Rhythm: Normal rate and regular rhythm  Pulses: Normal pulses  Heart sounds: Normal heart sounds  Musculoskeletal:      Cervical back: Neck supple  Lymphadenopathy:      Cervical: No cervical adenopathy  Skin:     General: Skin is warm and dry  Neurological:      General: No focal deficit present  Mental Status: She is alert and oriented to person, place, and time  Psychiatric:         Mood and Affect: Mood normal            BMI Counseling: Body mass index is 38 06 kg/m²  The BMI is above normal  Nutrition recommendations include moderation in carbohydrate intake and reducing intake of cholesterol  Exercise recommendations include exercising 3-5 times per week

## 2021-07-15 NOTE — PATIENT INSTRUCTIONS
Finish antibiotic and prednisone  Use Astelin nasal spray 2 sprays both sides twice daily  Follow-up with ENT   Diet exercise weight loss recommended   With next blood work complete screening hepatitis-C and HIV blood work

## 2021-07-25 DIAGNOSIS — F41.8 ANXIETY ABOUT HEALTH: ICD-10-CM

## 2021-07-26 RX ORDER — ALPRAZOLAM 0.25 MG/1
0.25 TABLET ORAL DAILY PRN
Qty: 20 TABLET | Refills: 0 | Status: SHIPPED | OUTPATIENT
Start: 2021-07-26 | End: 2021-08-02 | Stop reason: SDUPTHER

## 2021-07-26 NOTE — TELEPHONE ENCOUNTER
Primary palliative medicine provider: Teagan Leyva    Medication requested: Xanax    If for pain, how has the patient been taking their pain medicine?  Left message to inquire about how med is being used    Last appointment: 6/8/21    Next scheduled appointment: 8/2/21    PDMP review:  Last filled 3/11/21 for a 20 day supply

## 2021-07-27 ENCOUNTER — LAB (OUTPATIENT)
Dept: LAB | Facility: HOSPITAL | Age: 51
End: 2021-07-27
Payer: COMMERCIAL

## 2021-07-27 DIAGNOSIS — C50.911 CARCINOMA OF RIGHT BREAST METASTATIC TO AXILLARY LYMPH NODE (HCC): ICD-10-CM

## 2021-07-27 DIAGNOSIS — Z15.01 BRCA2 GENE MUTATION POSITIVE: ICD-10-CM

## 2021-07-27 DIAGNOSIS — C78.7 LIVER METASTASES (HCC): ICD-10-CM

## 2021-07-27 DIAGNOSIS — R63.5 ABNORMAL WEIGHT GAIN: ICD-10-CM

## 2021-07-27 DIAGNOSIS — C77.3 CARCINOMA OF RIGHT BREAST METASTATIC TO AXILLARY LYMPH NODE (HCC): ICD-10-CM

## 2021-07-27 DIAGNOSIS — Z17.0 MALIGNANT NEOPLASM OF CENTRAL PORTION OF RIGHT BREAST IN FEMALE, ESTROGEN RECEPTOR POSITIVE (HCC): ICD-10-CM

## 2021-07-27 DIAGNOSIS — C50.111 MALIGNANT NEOPLASM OF CENTRAL PORTION OF RIGHT BREAST IN FEMALE, ESTROGEN RECEPTOR POSITIVE (HCC): ICD-10-CM

## 2021-07-27 DIAGNOSIS — Z11.9 SCREENING EXAMINATION FOR INFECTIOUS DISEASE: ICD-10-CM

## 2021-07-27 DIAGNOSIS — Z15.09 BRCA2 GENE MUTATION POSITIVE: ICD-10-CM

## 2021-07-27 DIAGNOSIS — Z78.0 POSTMENOPAUSAL: ICD-10-CM

## 2021-07-27 LAB
25(OH)D3 SERPL-MCNC: 18.4 NG/ML (ref 30–100)
ALBUMIN SERPL BCP-MCNC: 3.3 G/DL (ref 3.5–5)
ALP SERPL-CCNC: 76 U/L (ref 46–116)
ALT SERPL W P-5'-P-CCNC: 30 U/L (ref 12–78)
ANION GAP SERPL CALCULATED.3IONS-SCNC: 6 MMOL/L (ref 4–13)
AST SERPL W P-5'-P-CCNC: 18 U/L (ref 5–45)
BASOPHILS # BLD AUTO: 0.01 THOUSANDS/ΜL (ref 0–0.1)
BASOPHILS NFR BLD AUTO: 0 % (ref 0–1)
BILIRUB SERPL-MCNC: 0.54 MG/DL (ref 0.2–1)
BUN SERPL-MCNC: 11 MG/DL (ref 5–25)
CALCIUM ALBUM COR SERPL-MCNC: 9.7 MG/DL (ref 8.3–10.1)
CALCIUM SERPL-MCNC: 9.1 MG/DL (ref 8.3–10.1)
CANCER AG27-29 SERPL-ACNC: 25.4 U/ML (ref 0–42.3)
CHLORIDE SERPL-SCNC: 105 MMOL/L (ref 100–108)
CO2 SERPL-SCNC: 26 MMOL/L (ref 21–32)
CREAT SERPL-MCNC: 0.92 MG/DL (ref 0.6–1.3)
EOSINOPHIL # BLD AUTO: 0.07 THOUSAND/ΜL (ref 0–0.61)
EOSINOPHIL NFR BLD AUTO: 1 % (ref 0–6)
ERYTHROCYTE [DISTWIDTH] IN BLOOD BY AUTOMATED COUNT: 14.4 % (ref 11.6–15.1)
GFR SERPL CREATININE-BSD FRML MDRD: 72 ML/MIN/1.73SQ M
GLUCOSE P FAST SERPL-MCNC: 75 MG/DL (ref 65–99)
HCT VFR BLD AUTO: 37.6 % (ref 34.8–46.1)
HCV AB SER QL: NORMAL
HGB BLD-MCNC: 12.5 G/DL (ref 11.5–15.4)
IMM GRANULOCYTES # BLD AUTO: 0.05 THOUSAND/UL (ref 0–0.2)
IMM GRANULOCYTES NFR BLD AUTO: 1 % (ref 0–2)
LYMPHOCYTES # BLD AUTO: 1.3 THOUSANDS/ΜL (ref 0.6–4.47)
LYMPHOCYTES NFR BLD AUTO: 15 % (ref 14–44)
MCH RBC QN AUTO: 34.2 PG (ref 26.8–34.3)
MCHC RBC AUTO-ENTMCNC: 33.2 G/DL (ref 31.4–37.4)
MCV RBC AUTO: 103 FL (ref 82–98)
MONOCYTES # BLD AUTO: 0.73 THOUSAND/ΜL (ref 0.17–1.22)
MONOCYTES NFR BLD AUTO: 9 % (ref 4–12)
NEUTROPHILS # BLD AUTO: 6.3 THOUSANDS/ΜL (ref 1.85–7.62)
NEUTS SEG NFR BLD AUTO: 74 % (ref 43–75)
NRBC BLD AUTO-RTO: 0 /100 WBCS
PLATELET # BLD AUTO: 181 THOUSANDS/UL (ref 149–390)
PMV BLD AUTO: 11.7 FL (ref 8.9–12.7)
POTASSIUM SERPL-SCNC: 3.2 MMOL/L (ref 3.5–5.3)
PROT SERPL-MCNC: 7.6 G/DL (ref 6.4–8.2)
RBC # BLD AUTO: 3.65 MILLION/UL (ref 3.81–5.12)
SODIUM SERPL-SCNC: 137 MMOL/L (ref 136–145)
TSH SERPL DL<=0.05 MIU/L-ACNC: 0.71 UIU/ML (ref 0.36–3.74)
WBC # BLD AUTO: 8.46 THOUSAND/UL (ref 4.31–10.16)

## 2021-07-27 PROCEDURE — 36415 COLL VENOUS BLD VENIPUNCTURE: CPT

## 2021-07-27 PROCEDURE — 84443 ASSAY THYROID STIM HORMONE: CPT

## 2021-07-27 PROCEDURE — 87389 HIV-1 AG W/HIV-1&-2 AB AG IA: CPT

## 2021-07-27 PROCEDURE — 86803 HEPATITIS C AB TEST: CPT

## 2021-07-27 PROCEDURE — 86300 IMMUNOASSAY TUMOR CA 15-3: CPT

## 2021-07-27 PROCEDURE — 85025 COMPLETE CBC W/AUTO DIFF WBC: CPT

## 2021-07-27 PROCEDURE — 80053 COMPREHEN METABOLIC PANEL: CPT

## 2021-07-27 PROCEDURE — 82306 VITAMIN D 25 HYDROXY: CPT

## 2021-07-28 LAB — HIV 1+2 AB+HIV1 P24 AG SERPL QL IA: NORMAL

## 2021-07-29 ENCOUNTER — OFFICE VISIT (OUTPATIENT)
Dept: HEMATOLOGY ONCOLOGY | Facility: CLINIC | Age: 51
End: 2021-07-29
Payer: COMMERCIAL

## 2021-07-29 VITALS
DIASTOLIC BLOOD PRESSURE: 68 MMHG | SYSTOLIC BLOOD PRESSURE: 132 MMHG | HEART RATE: 88 BPM | RESPIRATION RATE: 18 BRPM | HEIGHT: 65 IN | TEMPERATURE: 97.8 F | OXYGEN SATURATION: 98 % | WEIGHT: 231 LBS | BODY MASS INDEX: 38.49 KG/M2

## 2021-07-29 DIAGNOSIS — C78.7 LIVER METASTASES (HCC): Primary | ICD-10-CM

## 2021-07-29 DIAGNOSIS — C50.911 CARCINOMA OF RIGHT BREAST METASTATIC TO AXILLARY LYMPH NODE (HCC): ICD-10-CM

## 2021-07-29 DIAGNOSIS — C77.3 CARCINOMA OF RIGHT BREAST METASTATIC TO AXILLARY LYMPH NODE (HCC): ICD-10-CM

## 2021-07-29 DIAGNOSIS — C50.111 MALIGNANT NEOPLASM OF CENTRAL PORTION OF RIGHT BREAST IN FEMALE, ESTROGEN RECEPTOR POSITIVE (HCC): ICD-10-CM

## 2021-07-29 DIAGNOSIS — Z17.0 MALIGNANT NEOPLASM OF CENTRAL PORTION OF RIGHT BREAST IN FEMALE, ESTROGEN RECEPTOR POSITIVE (HCC): ICD-10-CM

## 2021-07-29 PROCEDURE — 3008F BODY MASS INDEX DOCD: CPT | Performed by: FAMILY MEDICINE

## 2021-07-29 PROCEDURE — 99214 OFFICE O/P EST MOD 30 MIN: CPT | Performed by: INTERNAL MEDICINE

## 2021-07-29 NOTE — PROGRESS NOTES
Hematology / Oncology Outpatient Follow Up Note    Sharon Campos 45 y o  female :1970 [facility]:[id number]         Date:  2021    Assessment / Plan:  A 45year-old surgically postmenopausal woman with metastatic right breast cancer, grade 3, ER 90% positive, IL 90% positive, HER2 negative disease   She has BRCA -2 mutation    She has histologically confirmed liver metastasis   Since she was premenopausal, she underwent as bilateral surgical ovarian ablation in late 2019 followed by starting palbociclib and letrozole   She had initial minor response   However, she had rapid progression in 2019 for which she was treated with Taxotere x6 cycle resulting in partial response   Subsequently, she had palliative right mastectomy followed by olaparib since mid 2020 with no significant toxicity   She achieved major response on olaparib  She has no evidence of progressive disease, based on her symptoms and physical examination  I recommended her to continue with olaparib twice a day  I am going to set up CT scan of chest abdomen pelvis, CBC, CMP in 3 months followed by office visit    She is in agreement with my recommendations       Subjective:      HPI:  A 26-year-old premenopausal woman who noticed a lump in her right breast in 2019 which she brought to medical attention   Radiographically, she has large right breast mass and right axillary adenopathy both of which were biopsy in May 3, 2019   Biopsy showed invasive ductal carcinoma, grade 3   This was ER 90% positive, IL 90% positive, HER2 negative disease   Biopsy from right axilla lymph node was positive for metastatic disease   She was seen by Dr Rose Marie Bender of her young age, she underwent genetic testing which showed BRCA-2 mutation   Because of the locally advanced nature, she underwent CT scan of chest abdomen pelvis which showed multiple liver lesion, suspicious for metastatic disease   Bone scan was negative for osseous metastasis  Meghana Starks is going to have liver biopsy in June 11, 2019   She presents today to discuss treatment option   She has no symptomatology from breast standpoint   She denied any pain  Her weight has been stable   She has no respiratory symptoms   She has no significant past medical history   Her paternal aunt had breast cancer in her 35s   Interestingly enough, her father had colon cancer in his 45s as well as prostate cancer in his 62s   She has no family history of ovarian cancer   She was a smoker until 2 years ago  Meghana Starks does not drink alcohol  Meghana Starks has a son who is 23years old  Meghana Starks also has younger son who was biologically female  [facility] is a transgender  [facility] is considering bilateral mastectomy  Elmer Grullon, he has not been tested for BRCA gene mutation            Interval History:   A 45year-old surgically postmenopausal woman with metastatic right breast cancer, grade 3, ER 90% positive, VA 90% positive, HER2 negative disease   She has BRCA -2 mutation   Based on the CT scan, she appeared to have multiple liver metastasis     She subsequently underwent liver biopsy which showed metastatic carcinoma, consistent with breast primary   She was premenopausal at the time of diagnosis   Therefore, she underwent bilateral oophorectomy in late July 2019   Ovary and fallopian tube did not show any malignancy   She started palbociclib and letrozole in late July 2019  She initially had response with decreased right breast mass as well as right axillary adenopathy   However, in late December 2019, she had rapid progression in her right breast mass, skin involvement as well as right axillary adenopathy   Therefore, she started palliative chemotherapy with Taxotere, resulting in partial response   After 6 cycle treatment with Taxotere, she underwent palliative right mastectomy which showed multiple foci of invasive ductal carcinoma measuring up to 3 5 cm and 13 positive axillary lymph node   Since mid June 2020, she has been on olaparib   She is tolerating olaparib very well with no nausea, vomiting or GI symptomatology   She achieved major radiographical and biochemical response on olaparib  She presents today for routine follow-up  She has no new complaint except weight gain  She denied any pain  She has no cough, sputum production or shortness of breath  She is going to have MRI of the brain because she has some hearing difficulty in the right ear in August 2021   Her performance status is normal        Objective:      Primary Diagnosis:     1    Metastatic breast cancer, grade 3, ER 90 % positive, NY 90 % positive, HER2 negative disease, diagnosed in June 2019    2    BRCA-2 mutation      Cancer Staging:  Cancer Staging  Malignant neoplasm of central portion of right breast in female, estrogen receptor positive ([facility] [address] )  Staging form: Breast, AJCC 8th Edition  - Clinical: Stage IIIB (cT4, cN1(f), cM0, G3, ER+, NY+, HER2-) - Signed by Dawson Mcelroy MD on 5/16/2019  Laterality: Right  Method of lymph node assessment: Core biopsy  Histologic grading system: 3 grade system           Previous Hematologic/ Oncologic Treatment:      1  Surgical ovarian ablation in late July 2019   2  Palbociclib and letrozole from July 2018 through January 2020    3  Taxotere x6 cycle, completed in May 2020    4  Palliative right mastectomy      Current Hematologic/ Oncologic Treatment:       Olaparib 300 mg b i d  Since mid July 2020       Disease Status:      Good partial response on olaparib      Test Results:     Pathology:     Right breast biopsy in axillary lymph node biopsy showed invasive ductal carcinoma, grade 3   ER 90 % positive, NY 90% positive, HER2 negative disease      Liver biopsy in June 2019 showed metastatic carcinoma, consistent with breast primary      In June 2020, mastectomy specimen showed multiple foci of invasive ductal carcinoma measuring up to 3 5 cm with 13 positive axillary lymph nodes      Radiology:     Bone scan showed no evidence of osseous metastasis      CT scan of chest abdomen pelvis in   April 2021 has not been read by radiologist   Personal review of the film showed no evidence of progression       Laboratory:     See below   CA 27, 29 was  22      Physical Exam:        General Appearance:    Alert, oriented          Eyes:    PERRL   Ears:    Normal external ear canals, both ears   Nose:   Nares normal, septum midline   Throat:   Mucosa moist  Pharynx without injection  Neck:   Supple         Lungs:     Clear to auscultation bilaterally   Chest Wall:    No tenderness or deformity    Heart:    Regular rate and rhythm         Abdomen:     Soft, non-tender, bowel sounds +, no organomegaly               Extremities:   Extremities no cyanosis or edema         Skin:   no rash or icterus  Lymph nodes:   Cervical, supraclavicular, and axillary nodes normal   Neurologic:   CNII-XII intact, normal strength, sensation and reflexes     Throughout             Breast exam: Right status post mastectomy without reconstruction   No palpable abnormality on her right chest wall   Left breast exam is negative             ROS: Review of Systems   All other systems reviewed and are negative  Imaging: No results found  Labs:   Lab Results   Component Value Date    WBC 8 46 07/27/2021    HGB 12 5 07/27/2021    HCT 37 6 07/27/2021     (H) 07/27/2021     07/27/2021     Lab Results   Component Value Date    K 3 2 (L) 07/27/2021     07/27/2021    CO2 26 07/27/2021    BUN 11 07/27/2021    CREATININE 0 92 07/27/2021    GLUF 75 07/27/2021    CALCIUM 9 1 07/27/2021    CORRECTEDCA 9 7 07/27/2021    AST 18 07/27/2021    ALT 30 07/27/2021    ALKPHOS 76 07/27/2021    EGFR 72 07/27/2021         Current Medications: Reviewed  Allergies: Reviewed  PMH/FH/SH:  Reviewed      Vital Sign:    Body surface area is 2 11 meters squared      Wt Readings from Last 3 Encounters:   07/29/21 105 kg (231 lb)   07/26/21 103 kg (227 lb) 07/15/21 105 kg (230 lb 8 oz)        Temp Readings from Last 3 Encounters:   07/29/21 97 8 °F (36 6 °C) (Tympanic Core)   07/15/21 (!) 97 1 °F (36 2 °C) (Temporal)   06/08/21 97 6 °F (36 4 °C) (Temporal)        BP Readings from Last 3 Encounters:   07/29/21 132/68   07/26/21 118/80   07/15/21 114/80         Pulse Readings from Last 3 Encounters:   07/29/21 88   07/26/21 76   06/08/21 86     @LASTSAO2(3)@

## 2021-07-30 NOTE — RESULT ENCOUNTER NOTE
Abnormal or complex results require in-person consultation    Pt is scheduled to f/up with us in clinic on 8/2/21

## 2021-08-02 ENCOUNTER — OFFICE VISIT (OUTPATIENT)
Dept: PALLIATIVE MEDICINE | Facility: CLINIC | Age: 51
End: 2021-08-02
Payer: COMMERCIAL

## 2021-08-02 VITALS
RESPIRATION RATE: 16 BRPM | SYSTOLIC BLOOD PRESSURE: 120 MMHG | BODY MASS INDEX: 38.47 KG/M2 | HEART RATE: 90 BPM | TEMPERATURE: 98 F | OXYGEN SATURATION: 98 % | WEIGHT: 232.81 LBS | DIASTOLIC BLOOD PRESSURE: 68 MMHG

## 2021-08-02 DIAGNOSIS — E55.9 VITAMIN D DEFICIENCY: Primary | ICD-10-CM

## 2021-08-02 DIAGNOSIS — C50.911 CARCINOMA OF RIGHT BREAST METASTATIC TO AXILLARY LYMPH NODE (HCC): ICD-10-CM

## 2021-08-02 DIAGNOSIS — F41.8 ANXIETY ABOUT HEALTH: ICD-10-CM

## 2021-08-02 DIAGNOSIS — Z15.09 BRCA2 GENE MUTATION POSITIVE: ICD-10-CM

## 2021-08-02 DIAGNOSIS — C78.7 LIVER METASTASES (HCC): ICD-10-CM

## 2021-08-02 DIAGNOSIS — C77.3 CARCINOMA OF RIGHT BREAST METASTATIC TO AXILLARY LYMPH NODE (HCC): ICD-10-CM

## 2021-08-02 DIAGNOSIS — I89.0 LYMPHEDEMA: ICD-10-CM

## 2021-08-02 DIAGNOSIS — Z15.01 BRCA2 GENE MUTATION POSITIVE: ICD-10-CM

## 2021-08-02 DIAGNOSIS — E66.01 SEVERE OBESITY (BMI 35.0-39.9) WITH COMORBIDITY (HCC): ICD-10-CM

## 2021-08-02 PROCEDURE — 1036F TOBACCO NON-USER: CPT | Performed by: FAMILY MEDICINE

## 2021-08-02 PROCEDURE — 99214 OFFICE O/P EST MOD 30 MIN: CPT | Performed by: FAMILY MEDICINE

## 2021-08-02 RX ORDER — TOPIRAMATE 50 MG/1
50 TABLET, FILM COATED ORAL EVERY 12 HOURS SCHEDULED
Qty: 30 TABLET | Refills: 1 | Status: SHIPPED | OUTPATIENT
Start: 2021-08-02 | End: 2021-08-02 | Stop reason: SDUPTHER

## 2021-08-02 RX ORDER — ALPRAZOLAM 0.25 MG/1
0.25 TABLET ORAL DAILY PRN
Qty: 40 TABLET | Refills: 0 | Status: SHIPPED | OUTPATIENT
Start: 2021-08-16 | End: 2021-08-02 | Stop reason: SDUPTHER

## 2021-08-02 RX ORDER — BUPROPION HYDROCHLORIDE 100 MG/1
100 TABLET, EXTENDED RELEASE ORAL DAILY
Qty: 60 TABLET | Refills: 0
Start: 2021-08-09 | End: 2021-09-07

## 2021-08-02 RX ORDER — TOPIRAMATE 50 MG/1
50 TABLET, FILM COATED ORAL EVERY 12 HOURS SCHEDULED
Qty: 30 TABLET | Refills: 1 | Status: SHIPPED | OUTPATIENT
Start: 2021-08-02 | End: 2021-09-13 | Stop reason: SDUPTHER

## 2021-08-02 RX ORDER — ALPRAZOLAM 0.25 MG/1
0.25 TABLET ORAL DAILY PRN
Qty: 40 TABLET | Refills: 0 | Status: SHIPPED | OUTPATIENT
Start: 2021-08-16 | End: 2021-09-07 | Stop reason: SDUPTHER

## 2021-08-04 ENCOUNTER — PATIENT MESSAGE (OUTPATIENT)
Dept: PALLIATIVE MEDICINE | Facility: CLINIC | Age: 51
End: 2021-08-04

## 2021-08-04 DIAGNOSIS — E55.9 VITAMIN D DEFICIENCY: ICD-10-CM

## 2021-08-08 DIAGNOSIS — F41.8 ANXIETY ABOUT HEALTH: ICD-10-CM

## 2021-08-09 RX ORDER — BUPROPION HYDROCHLORIDE 100 MG/1
TABLET, EXTENDED RELEASE ORAL
Qty: 60 TABLET | Refills: 0 | OUTPATIENT
Start: 2021-08-09

## 2021-08-21 ENCOUNTER — HOSPITAL ENCOUNTER (OUTPATIENT)
Dept: RADIOLOGY | Facility: HOSPITAL | Age: 51
Discharge: HOME | End: 2021-08-21
Payer: COMMERCIAL

## 2021-08-21 DIAGNOSIS — H93.291 ABNORMAL AUDITORY PERCEPTION, RIGHT: ICD-10-CM

## 2021-08-21 DIAGNOSIS — H90.3 ASYMMETRICAL SENSORINEURAL HEARING LOSS: ICD-10-CM

## 2021-08-21 PROCEDURE — 70553 MRI BRAIN STEM W/O & W/DYE: CPT

## 2021-08-21 PROCEDURE — A9585 GADOBUTROL INJECTION: HCPCS | Performed by: RADIOLOGY

## 2021-08-21 PROCEDURE — G1004 CDSM NDSC: HCPCS

## 2021-08-21 RX ADMIN — GADOBUTROL 10 ML: 604.72 INJECTION INTRAVENOUS at 13:02

## 2021-08-25 ENCOUNTER — EVALUATION (OUTPATIENT)
Dept: PHYSICAL THERAPY | Facility: REHABILITATION | Age: 51
End: 2021-08-25
Payer: COMMERCIAL

## 2021-08-25 DIAGNOSIS — C50.911 CARCINOMA OF RIGHT BREAST METASTATIC TO AXILLARY LYMPH NODE (HCC): ICD-10-CM

## 2021-08-25 DIAGNOSIS — C77.3 CARCINOMA OF RIGHT BREAST METASTATIC TO AXILLARY LYMPH NODE (HCC): ICD-10-CM

## 2021-08-25 DIAGNOSIS — I89.0 LYMPHEDEMA: ICD-10-CM

## 2021-08-25 PROCEDURE — 97140 MANUAL THERAPY 1/> REGIONS: CPT | Performed by: PHYSICAL THERAPIST

## 2021-08-25 PROCEDURE — 97161 PT EVAL LOW COMPLEX 20 MIN: CPT | Performed by: PHYSICAL THERAPIST

## 2021-08-25 NOTE — PROGRESS NOTES
PT Evaluation     Today's date: 2021  Patient name: Gabby Thorpe  : 1970  MRN: [id number]  Referring provider: April Ortega MD  Dx:   Encounter Diagnosis     ICD-10-CM    1  Carcinoma of right breast metastatic to axillary lymph node ([facility] [address] )  C50 911 Ambulatory referral to PT/OT lymphedema therapy    C77 3    2  Lymphedema  I89 0 Ambulatory referral to PT/OT lymphedema therapy                  Assessment  Assessment details: Patient has stage 0 edema of RUE and RLE and would benefit from manual drainage  Patient at full function  Other impairment: edema    Goals  sTG decrease pain 0-1/10  Decrease edema within 5% of Left side  LTG control of edema    Plan  Plan details: Continue manual drainage with compression as needed    Patient would benefit from: skilled physical therapy  Planned therapy interventions: manual therapy  Frequency: 1x week  Duration in weeks: 8  Treatment plan discussed with: patient        Subjective Evaluation    History of Present Illness  Mechanism of injury: Patient reported that she started feeling swelling in her ankles  before seeing  dr Jered Pelaez  Pain  Current pain ratin  At best pain ratin  At worst pain rating: 3  Quality: tight    Patient Goals  Patient goals for therapy: decreased edema          Objective  UPPER EXTREMITY LEFT CALCULATIONS      Most Recent Value   Volume UE (mL)  3022   Difference from last visit (mL)   -3022   Difference from first visit (mL)   -3022      UPPER EXTREMITY RIGHT CALCULATIONS      Most Recent Value   Volume UE (mL)  3206   Difference from last visit (mL)   -3206   Difference from first visit (mL)   -3206      LOWER EXTREMITY LEFT CALCULATIONS      Most Recent Value   Volume LE (mL)  4566   Difference from last visit (mL)   -4566   Difference from first visit (mL)   -4566      LOWER EXTREMITY RIGHT CALCULATIONS      Most Recent Value   Volume LE (mL)  4744   Difference from last visit (mL)   -4744   Difference from first visit (mL)   -1319               Precautions: allergies, anxiety, arthritis, ca right breast, COPD      Manuals 8/25            Manual drainage RUE and RLE with no anastamosis 16 min                                                   Neuro Re-Ed                                                                                                        Ther Ex                                                                                                                     Ther Activity                                       Gait Training                                       Modalities

## 2021-08-26 ENCOUNTER — TELEPHONE (OUTPATIENT)
Dept: HEMATOLOGY ONCOLOGY | Facility: CLINIC | Age: 51
End: 2021-08-26

## 2021-08-26 DIAGNOSIS — C50.911 CARCINOMA OF RIGHT BREAST METASTATIC TO AXILLARY LYMPH NODE (HCC): ICD-10-CM

## 2021-08-26 DIAGNOSIS — C78.7 LIVER METASTASES (HCC): ICD-10-CM

## 2021-08-26 DIAGNOSIS — C77.3 CARCINOMA OF RIGHT BREAST METASTATIC TO AXILLARY LYMPH NODE (HCC): ICD-10-CM

## 2021-08-26 NOTE — TELEPHONE ENCOUNTER
[facility] is calling for a refill olaparib [address]) tablet   Will forward request to Dr Christy Pressley RN

## 2021-09-05 ENCOUNTER — PATIENT MESSAGE (OUTPATIENT)
Dept: FAMILY MEDICINE CLINIC | Facility: CLINIC | Age: 51
End: 2021-09-05

## 2021-09-06 ENCOUNTER — NURSE TRIAGE (OUTPATIENT)
Dept: OTHER | Facility: OTHER | Age: 51
End: 2021-09-06

## 2021-09-06 DIAGNOSIS — Z20.822 EXPOSURE TO COVID-19 VIRUS: Primary | ICD-10-CM

## 2021-09-06 NOTE — TELEPHONE ENCOUNTER
Reason for Disposition   [1] MODERATE pain (e g , interferes with normal activities) AND [2] pain comes and goes (cramps) AND [3] present > 24 hours  (Exception: pain with Vomiting or Diarrhea - see that Guideline)    Answer Assessment - Initial Assessment Questions  1  LOCATION: "Where does it hurt?"       Pain 2 inches away from belly button   2  RADIATION: "Does the pain shoot anywhere else?" (e g , chest, back)      No   3  ONSET: "When did the pain begin?" (e g , minutes, hours or days ago)       3 days ago   4  SUDDEN: "Gradual or sudden onset?"      Patient stated that she only have pain in her belly button area when she coughs  Patient is Covid 19 positive with home test   5  PATTERN "Does the pain come and go, or is it constant?"     - If constant: "Is it getting better, staying the same, or worsening?"       (Note: Constant means the pain never goes away completely; most serious pain is constant and it progresses)      - If intermittent: "How long does it last?" "Do you have pain now?"      (Note: Intermittent means the pain goes away completely between bouts)      Comes and goes  6  SEVERITY: "How bad is the pain?"  (e g , Scale 1-10; mild, moderate, or severe)    - MILD (1-3): doesn't interfere with normal activities, abdomen soft and not tender to touch     - MODERATE (4-7): interferes with normal activities or awakens from sleep, tender to touch     - SEVERE (8-10): excruciating pain, doubled over, unable to do any normal activities       Moderate pain when patient coughs 5 out of 10   7  RECURRENT SYMPTOM: "Have you ever had this type of stomach pain before?" If Yes, ask: "When was the last time?" and "What happened that time?"       First time   8  CAUSE: "What do you think is causing the stomach pain?"      Possible muscle tear   9  RELIEVING/AGGRAVATING FACTORS: "What makes it better or worse?" (e g , movement, antacids, bowel movement)      Patient has no pain if not coughing   10   OTHER SYMPTOMS: "Has there been any vomiting, diarrhea, constipation, or urine problems?"        No   11   PREGNANCY: "Is there any chance you are pregnant?" "When was your last menstrual period?"        No    Protocols used: ABDOMINAL PAIN - [facility]

## 2021-09-06 NOTE — TELEPHONE ENCOUNTER
Patient stated that she was tested positive for covid 19 with home testing kit a week ago  Patient stated that she only has pain around her belly button at right side when she coughs  Patient has a virtual cisit  appointment with  palliative care physician tomorrow, advised to keep her appointment to discuss this issue  Patient agreeable to go to ER if pain becomes worse   Order for Covid 19 test was placed in Epic per patients request

## 2021-09-06 NOTE — TELEPHONE ENCOUNTER
Regarding: Complaining of sharp right side abdominal pain  ----- Message from Joel Gallo sent at 9/6/2021 12:13 PM EDT -----  "Im staring to experience a sharp pain that is two inches away from my belly button on the right side "

## 2021-09-07 ENCOUNTER — TELEMEDICINE (OUTPATIENT)
Dept: PALLIATIVE MEDICINE | Facility: CLINIC | Age: 51
End: 2021-09-07
Payer: COMMERCIAL

## 2021-09-07 DIAGNOSIS — D33.3 SCHWANNOMA OF CRANIAL NERVE (HCC): Primary | Chronic | ICD-10-CM

## 2021-09-07 DIAGNOSIS — F41.8 ANXIETY ABOUT HEALTH: ICD-10-CM

## 2021-09-07 PROCEDURE — 99214 OFFICE O/P EST MOD 30 MIN: CPT | Performed by: FAMILY MEDICINE

## 2021-09-07 RX ORDER — ALPRAZOLAM 0.25 MG/1
0.25 TABLET ORAL DAILY PRN
Qty: 40 TABLET | Refills: 0 | Status: SHIPPED | OUTPATIENT
Start: 2021-09-07 | End: 2021-10-05 | Stop reason: SDUPTHER

## 2021-09-07 NOTE — PROGRESS NOTES
Outpatient Virtual Regular Visit - Follow-up with Palliative and [address] 45 y o  female [id number]    Intake:    Reason for virtual visit is f/up caner, survivorship  The patient is unable to visit the clinic safely due to the coronavirus pandemic  After connecting through [employer], the patient was identified by name and date of birth  Jaclyn Shin or their agent was informed that this was a telemedicine visit and that the visit is being conducted through [address] [employer] and patient was informed that this is a secure, HIPAA-compliant platform  She agrees to proceed     My office door was closed  No one else was in the room  They acknowledged consent and understanding of privacy and security of the video platform  The patient or agent has agreed to participate and understands they can discontinue the visit at any time  Assessment & Plan  Problem List Items Addressed This Visit     Anxiety about health    Schwannoma of cranial nerve (HCC) - Primary (Chronic)        - Counseling on health screening and disease prevention, COVID-19 specific (CPT V65 49)    Medications adjusted this encounter:  Requested Prescriptions     Pending Prescriptions Disp Refills    ALPRAZolam (XANAX) 0 25 mg tablet 40 tablet 2     Sig: Take 1 tablet (0 25 mg total) by mouth daily as needed (during day for anxiety as needed)     No orders of the defined types were placed in this encounter  Medications Discontinued During This Encounter   Medication Reason    buPROPion [facility] SR) 100 mg 12 hr tablet    - no change to topiramate at this time; pt willing to continue at this dose for now  Jaclyn Shin was seen today for symptoms and planning cares related to above illnesses  I have reviewed the patient's controlled substance dispensing history in the Prescription Drug Monitoring Program in compliance with the [facility] regulations before prescribing any controlled substances      They are invited to continue to follow with us  If there are questions or concerns, please contact us through our clinic/answering service 24 hours a day, seven days a week  Phu Can MD  [facility]          Visit Information    Accompanied By: No one    Source of History: Self    History Limitations: None    Contacts: brother Lillard Koyanagi  - [phone]    History of Present Illness      Aurelio Garcia is a 45 y o  female who presents in follow up of symptoms related to survivorship from breast cancer  Pertinent issues include: symptom management,        Since last visit, she caught COVID  She is vaccinated, and her only real symptom has been tiredness and occ cough and coryza  Symptomatic management alone has been effective for her, and she is quarantining in home for public safety's sake  She has otherwise stopped bupropion, and continues on no formal antianxiety/antidepressant meds  She is continuing on topiramate, which has not suppressed a great deal of appetite  Coincidentally, she has been diagnosed with a R side Schwannoma  Neuro is reviewing her case to determine next steps  She has been overall well from cancer standpoint, but still frustrated with wt gain and social isolation  having panic moreso recently  Chest discomfort and tightness asso last few days; unrelieved by alprazolam and albuterol        Has tapered bupropion down to once a day; only using this much since last week  Having trouble sleeping with this med  Still has not found that this is associated with wt loss  Agreed that we will try other meds to poison her appetite  Swelling in hands and feet will be addressed by lymphedema therapy    Reviewed nutritional labs showing severely depressed vitamin D      Past Medical History:   Diagnosis Date    Bloating     Bruises easily     Cancer ([address] )     right breast//to lymph nodes/liver/ and bone    Depression     History of cancer chemotherapy 05/2020    History of pneumonia     Hypotension     occas    Low iron     "when pregnant, 20 yrs ago"    Neuropathy     hands//fingers    Shortness of breath     occas    Tinnitus     right    Wears glasses      Past Surgical History:   Procedure Laterality Date    BILATERAL SALPINGOOPHORECTOMY      BREAST BIOPSY      IR BIOPSY LIVER MASS  6/11/2019    MYOMECTOMY      H/O FIBROIDS, NO SURGERY NEEDED    SC LAP,RMV  ADNEXAL STRUCTURE N/A 7/29/2019    Procedure: LAPAROSCOPIC BILATERAL SALPINGO-OOPHORECTOMY;  Surgeon: Melvin Kumar MD;  [facility];  Service: Gynecology Oncology    SC MASTECTOMY, SIMPLE, COMPLETE Right 6/5/2020    Procedure: MASTECTOMY SIMPLE, axillary node dissection;  Surgeon: Olena Santana MD;  [facility];  Service: Surgical Oncology    US GUIDED BREAST BIOPSY RIGHT COMPLETE Right 5/3/2019    US GUIDED BREAST LYMPH NODE BIOPSY RIGHT Right 5/3/2019     Current Outpatient Medications   Medication Sig Dispense Refill    ALPRAZolam (XANAX) 0 25 mg tablet Take 1 tablet (0 25 mg total) by mouth daily as needed (during day for anxiety as needed) 40 tablet 0    Cholecalciferol 1 25 MG (48901 UT) capsule Take 1 capsule (50,000 Units total) by mouth once a week 10 capsule 0    olaparib (LYNPARZA) tablet Take 2 tablets (300 mg total) by mouth 2 (two) times a day 120 tablet 2    oxyCODONE (ROXICODONE) 10 MG TABS Take 1 tablet (10 mg total) by mouth every 6 (six) hours as needed for moderate painMax Daily Amount: 40 mg 30 tablet 0    topiramate (TOPAMAX) 50 MG tablet Take 1 tablet (50 mg total) by mouth every 12 (twelve) hours Trial of medicine to suppress appetite  30 tablet 1     No current facility-administered medications for this visit  Allergies   Allergen Reactions    Tylenol [Acetaminophen]      Told to avoid due to cancer        Review of Systems   Constitutional: Positive for activity change  Negative for chills, diaphoresis and fever  HENT: Positive for postnasal drip  Negative for ear pain, rhinorrhea and sinus pain  Eyes: Negative for photophobia and redness  Respiratory: Negative for cough and shortness of breath  Cardiovascular: Negative for chest pain  Gastrointestinal: Negative for abdominal distention, abdominal pain and constipation  Endocrine: Negative for polyphagia and polyuria  Genitourinary: Negative for difficulty urinating, dysuria and flank pain  Musculoskeletal: Positive for back pain  Negative for gait problem and joint swelling  Neurological: Negative for seizures, speech difficulty and light-headedness  Psychiatric/Behavioral: Negative for dysphoric mood and sleep disturbance  The patient is not nervous/anxious  Video Exam  There were no vitals filed for this visit  Physical Exam  Constitutional:       General: She is not in acute distress  Appearance: She is well-developed  She is not ill-appearing, toxic-appearing or diaphoretic  HENT:      Head: Normocephalic and atraumatic  Right Ear: External ear normal       Left Ear: External ear normal    Eyes:      General:         Right eye: No discharge  Left eye: No discharge  Conjunctiva/sclera: Conjunctivae normal       Pupils: Pupils are equal, round, and reactive to light  Neck:      Trachea: No tracheal deviation  Pulmonary:      Effort: Pulmonary effort is normal  No respiratory distress  Breath sounds: No stridor  Abdominal:      General: There is no distension  Musculoskeletal:         General: No deformity  Cervical back: Normal range of motion  Skin:     General: Skin is warm and dry  Coloration: Skin is not pale  Findings: No erythema or rash  Neurological:      General: No focal deficit present  Mental Status: She is alert and oriented to person, place, and time  Mental status is at baseline  Cranial Nerves: No cranial nerve deficit     Psychiatric:         Mood and Affect: Mood normal          Behavior: Behavior normal          Thought Content: Thought content normal          Judgment: Judgment normal          I spent 30+ minutes was spent during this virtual visit by Lilo, face to face with Ever Izquierdo with greater than 50% of the time spent in counseling or coordination of care including discussions of etiology of diagnosis, risks and benefits of treatment, follow up requirements and compliance with treatment regimen   All of the patient's or agent's questions were answered during this discussion  Encounter provider Ivory Chowdhury MD, located at:  [address] [place] [id number] [phone]    Recent Visits  No visits were found meeting these conditions  Showing recent visits within past 7 days and meeting all other requirements  Future Appointments  No visits were found meeting these conditions  Showing future appointments within next 150 days and meeting all other requirements       VIRTUAL VISIT DISCLAIMER    Ever Izquierdo or their agent has consented to an online visit or consultation  They understands that the online visit is based solely on information provided by the patient or agent, and that, in the absence of a face-to-face physical evaluation by the physician, the diagnosis they receive is both limited and provisional in terms of accuracy and completeness  This is not intended to replace a full medical face-to-face evaluation by the physician  Ever Izquierdo or their agent understands and accepts these terms  The patient or their agent was informed this is a billable service

## 2021-09-09 ENCOUNTER — APPOINTMENT (OUTPATIENT)
Dept: PHYSICAL THERAPY | Facility: REHABILITATION | Age: 51
End: 2021-09-09
Payer: COMMERCIAL

## 2021-09-13 DIAGNOSIS — E66.01 SEVERE OBESITY (BMI 35.0-39.9) WITH COMORBIDITY (HCC): ICD-10-CM

## 2021-09-13 RX ORDER — TOPIRAMATE 50 MG/1
50 TABLET, FILM COATED ORAL EVERY 12 HOURS SCHEDULED
Qty: 30 TABLET | Refills: 0 | Status: SHIPPED | OUTPATIENT
Start: 2021-09-13 | End: 2021-10-05 | Stop reason: SDUPTHER

## 2021-09-14 ENCOUNTER — CONSULT (OUTPATIENT)
Dept: NEUROSURGERY | Facility: CLINIC | Age: 51
End: 2021-09-14
Payer: COMMERCIAL

## 2021-09-14 VITALS
HEIGHT: 65 IN | RESPIRATION RATE: 18 BRPM | HEART RATE: 84 BPM | SYSTOLIC BLOOD PRESSURE: 128 MMHG | BODY MASS INDEX: 38.65 KG/M2 | DIASTOLIC BLOOD PRESSURE: 74 MMHG | WEIGHT: 232 LBS | TEMPERATURE: 98.1 F

## 2021-09-14 DIAGNOSIS — D33.3 ACOUSTIC NEUROMA (HCC): ICD-10-CM

## 2021-09-14 DIAGNOSIS — D33.3 VESTIBULAR SCHWANNOMA (HCC): Primary | ICD-10-CM

## 2021-09-14 DIAGNOSIS — H90.3 ASYMMETRICAL SENSORINEURAL HEARING LOSS: ICD-10-CM

## 2021-09-14 PROCEDURE — 3008F BODY MASS INDEX DOCD: CPT | Performed by: NEUROLOGICAL SURGERY

## 2021-09-14 PROCEDURE — 1036F TOBACCO NON-USER: CPT | Performed by: NEUROLOGICAL SURGERY

## 2021-09-14 PROCEDURE — 99244 OFF/OP CNSLTJ NEW/EST MOD 40: CPT | Performed by: NEUROLOGICAL SURGERY

## 2021-09-14 NOTE — PROGRESS NOTES
Neurosurgery Office Note  Dayan Neil 45 y o  female MRN: [id number]      Assessment/Plan        Problem List Items Addressed This Visit     None      Visit Diagnoses     Vestibular schwannoma ([address] )    -  Primary    Relevant Orders        MRI brain IAC wo and w contrast    Asymmetrical sensorineural hearing loss        Acoustic neuroma [facility])                Discussion:  Patient is a 65-year-old female with h/o breast cancer metastatic to LN and liver s/p right mastectomy and LN resection (baseline right arm numbness), obesity (BMI 39) who p/w right-sided hearing loss for the past several months  Also reports intermittent buzzing sound in the right ear (like busy phone sound) - not constant ringing  Denies facial numbness/weakness  Denies taking AC  Recent audiogram showed 30% hearing loss (mostly high frequency) on right side  MRI brain IAC wwo contrast on 8/21/21 showed an enhancing mass within the right IAC measuring 1 2 x 0 6 cm extending from the fundus of the IAC to the porus acusticus with minimal extension into the CP angle cistern, likely a vestibular schwannoma  Given the typical benign nature of VS, we will monitor with repeat MRI brain IAC wwo contrast in 3 months with followup  We can discuss treatment options, such as SRS, if there is evidence of growth on repeat MRI  She was advised to call our office if any new or change in symptoms  All questions and concerns were addressed during this visit  CHIEF COMPLAINT    Chief Complaint   Patient presents with    Consult     1 2CM ACOUSTIC NEUROMA       HISTORY    History of Present Illness     45y o  year old female     HPI    See Discussion    REVIEW OF SYSTEMS    Review of Systems   Constitutional: Negative  HENT: Negative  Eyes: Negative  Respiratory: Negative  Cardiovascular: Negative  Gastrointestinal: Negative  Endocrine: Negative  Genitourinary: Negative           INCONTINENCE - ON TOPAMAX Musculoskeletal: Negative  Skin: Negative  Allergic/Immunologic: Negative  Neurological: Negative  Hematological: Negative  Psychiatric/Behavioral: Negative  All other systems reviewed and are negative  Meds/Allergies     Current Outpatient Medications   Medication Sig Dispense Refill    ALPRAZolam (XANAX) 0 25 mg tablet Take 1 tablet (0 25 mg total) by mouth daily as needed (during day for anxiety as needed) 40 tablet 0    Cholecalciferol 1 25 MG (49788 UT) capsule Take 1 capsule (50,000 Units total) by mouth once a week 10 capsule 0    olaparib (LYNPARZA) tablet Take 2 tablets (300 mg total) by mouth 2 (two) times a day 120 tablet 2    oxyCODONE (ROXICODONE) 10 MG TABS Take 1 tablet (10 mg total) by mouth every 6 (six) hours as needed for moderate painMax Daily Amount: 40 mg 30 tablet 0    topiramate (TOPAMAX) 50 MG tablet Take 1 tablet (50 mg total) by mouth every 12 (twelve) hours Trial of medicine to suppress appetite  30 tablet 0     No current facility-administered medications for this visit         Allergies   Allergen Reactions    Tylenol [Acetaminophen]      Told to avoid due to cancer        PAST HISTORY    Past Medical History:   Diagnosis Date    Bloating     Bruises easily     Cancer [address] [address]     right breast//to lymph nodes/liver/ and bone    Depression     History of cancer chemotherapy 05/2020    History of pneumonia     Hypotension     occas    Low iron     "when pregnant, 20 yrs ago"    Neuropathy     hands//fingers    Shortness of breath     occas    Tinnitus     right    Wears glasses        Past Surgical History:   Procedure Laterality Date    BILATERAL SALPINGOOPHORECTOMY      BREAST BIOPSY      IR BIOPSY LIVER MASS  6/11/2019    MYOMECTOMY      H/O FIBROIDS, NO SURGERY NEEDED    [place] LAP,RMV  ADNEXAL STRUCTURE N/A 7/29/2019    Procedure: LAPAROSCOPIC BILATERAL SALPINGO-OOPHORECTOMY;  Surgeon: Fabiola Acuna MD;  [facility];  Service: Gynecology Oncology    GA MASTECTOMY, SIMPLE, COMPLETE Right 2020    Procedure: MASTECTOMY SIMPLE, axillary node dissection;  Surgeon: Matthew Ochoa MD;  [facility];  Service: Surgical Oncology    US GUIDED BREAST BIOPSY RIGHT COMPLETE Right 5/3/2019    US GUIDED BREAST LYMPH NODE BIOPSY RIGHT Right 5/3/2019       Social History     Tobacco Use    Smoking status: Former Smoker     Packs/day: 1 00     Years: 30 00     Pack years: 30 00     Quit date:      Years since quittin 7    Smokeless tobacco: Never Used   Vaping Use    Vaping Use: Never used   Substance Use Topics    Alcohol use: Yes     Comment: occassional    Drug use: No       Family History   Problem Relation Age of Onset    Hypotension Mother     Colon cancer Father 36    Hypertension Father     Prostate cancer Father 79    Throat cancer Maternal Grandmother 61    Cancer Maternal Grandmother     Breast cancer Paternal Aunt         age unknown         The following portions of the patient's history were reviewed in this encounter and updated as appropriate: Past medical, surgical, family, and social history, as well as medications, allergies, and review of systems  EXAM    Vitals:Blood pressure 128/74, pulse 84, temperature 98 1 °F (36 7 °C), temperature source Temporal, resp  rate 18, height 5' 5" (1 651 m), weight 105 kg (232 lb)  ,There is no height or weight on file to calculate BMI  Physical Exam  Constitutional:       Appearance: Normal appearance  HENT:      Head: Normocephalic and atraumatic  Eyes:      Extraocular Movements: Extraocular movements intact and EOM normal       Pupils: Pupils are equal, round, and reactive to light  Cardiovascular:      Rate and Rhythm: Normal rate and regular rhythm  Pulses: Normal pulses  Heart sounds: Normal heart sounds  Pulmonary:      Effort: Pulmonary effort is normal       Breath sounds: Normal breath sounds     Abdominal:      General: Abdomen is flat       Palpations: Abdomen is soft  Musculoskeletal:         General: Normal range of motion  Cervical back: Normal range of motion  Skin:     General: Skin is warm  Neurological:      General: No focal deficit present  Mental Status: She is alert and oriented to person, place, and time  Mental status is at baseline  Gait: Gait is intact  Deep Tendon Reflexes: Strength normal    Psychiatric:         Mood and Affect: Mood normal          Speech: Speech normal          Behavior: Behavior normal          Neurologic Exam     Mental Status   Oriented to person, place, and time  Attention: normal    Speech: speech is normal   Level of consciousness: alert    Cranial Nerves     CN II   Visual fields full to confrontation  Visual acuity: normal  Right visual field deficit: none  Left visual field deficit: none     CN III, IV, VI   Pupils are equal, round, and reactive to light  Extraocular motions are normal    CN III: no CN III palsy  CN VI: no CN VI palsy  Nystagmus: none   Diplopia: none  Ophthalmoparesis: none  Upgaze: normal  Downgaze: normal  Conjugate gaze: present    CN V   Facial sensation intact  Right facial sensation deficit: none  Left facial sensation deficit: none    CN VII   Facial expression full, symmetric  Right facial weakness: none  Left facial weakness: none    CN VIII   Hearing: impaired (right hearing loss (30% per audiogram), unchanged per patient)    CN IX, X   CN IX normal    CN X normal    Palate: symmetric    CN XI   CN XI normal    Right sternocleidomastoid strength: normal  Left sternocleidomastoid strength: normal  Right trapezius strength: normal  Left trapezius strength: normal    CN XII   CN XII normal    Tongue deviation: none    Motor Exam   Muscle bulk: normal  Overall muscle tone: normal  Right arm pronator drift: absent  Left arm pronator drift: absent    Strength   Strength 5/5 throughout       Sensory Exam   Light touch normal    Right arm light touch: baseline numbness near right armpit s/p breast cancer surgery  Gait, Coordination, and Reflexes     Gait  Gait: normal    Reflexes   Reflexes 2+ except as noted  MEDICAL DECISION MAKING    Imaging Studies:     MRI brain IAC wo and w contrast    Result Date: 8/26/2021  Narrative: MRI BRAIN AND IAC'S -  WITH AND WITHOUT CONTRAST INDICATION: [id number] 291: Other abnormal auditory perceptions, right ear H90 5: Unspecified sensorineural hearing loss  COMPARISON:  None  TECHNIQUE: Brain:   Sagittal T1, axial T2, axial [place], axial T1, axial FLAIR, axial diffusion imaging  Axial T1 postcontrast   Axial BRAVO post contrast  IAC'S:  Coronal FIESTA, coronal T1 postcontrast, axial T1 postcontrast with fat suppression  Targeted images of the IAC'S were performed requiring additional time at acquisition and interpretation of approximately 25% IV Contrast:  10 mL of Gadobutrol injection (SINGLE-DOSE) IMAGE QUALITY:   Diagnostic  FINDINGS: BRAIN PARENCHYMA:  There is no discrete mass, mass effect or midline shift  Brainstem and cerebellum demonstrate normal signal  There is no intracranial hemorrhage  There is no evidence of acute infarction and diffusion imaging is unremarkable  There are no white matter changes in the cerebral hemispheres  Vascular type enhancement and central cameron 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  IAC'S:  Briskly enhancing mass identified within the right IAC measuring 1 2 x 0 6 cm extending from the fundus of the IAC to the porus acusticus with minimal extension into the CP angle cistern  Imaging characteristics are typical for vestibular schwannoma  VENTRICLES:  Normal  SELLA AND PITUITARY GLAND:  Normal  ORBITS:  Normal  PARANASAL SINUSES:  Normal  VASCULATURE:  Evaluation of the major intracranial vasculature demonstrates appropriate flow voids   CALVARIUM AND SKULL BASE:  Normal  EXTRACRANIAL SOFT TISSUES:  Normal      Impression: Right-sided acoustic neuroma measuring 1 2 x 0 6 cm   The mass extends from the fundus of the IAC through the porus acusticus into the CP angle cistern without evidence of brainstem compression  The study was marked in EPIC for significant notification  I have personally reviewed pertinent films in [place]    Neurosurgeon

## 2021-10-02 ENCOUNTER — IMMUNIZATIONS (OUTPATIENT)
Dept: FAMILY MEDICINE CLINIC | Facility: HOSPITAL | Age: 51
End: 2021-10-02

## 2021-10-02 DIAGNOSIS — Z23 ENCOUNTER FOR IMMUNIZATION: Primary | ICD-10-CM

## 2021-10-02 PROCEDURE — 91300 SARS-COV-2 / COVID-19 MRNA VACCINE (PFIZER-BIONTECH) 30 MCG: CPT

## 2021-10-02 PROCEDURE — 0001A SARS-COV-2 / COVID-19 MRNA VACCINE (PFIZER-BIONTECH) 30 MCG: CPT

## 2021-10-03 DIAGNOSIS — C78.7 LIVER METASTASES (HCC): ICD-10-CM

## 2021-10-03 DIAGNOSIS — E55.9 VITAMIN D DEFICIENCY: ICD-10-CM

## 2021-10-03 DIAGNOSIS — C50.911 CARCINOMA OF RIGHT BREAST METASTATIC TO AXILLARY LYMPH NODE (HCC): ICD-10-CM

## 2021-10-03 DIAGNOSIS — C77.3 CARCINOMA OF RIGHT BREAST METASTATIC TO AXILLARY LYMPH NODE (HCC): ICD-10-CM

## 2021-10-03 DIAGNOSIS — Z15.09 BRCA2 GENE MUTATION POSITIVE: ICD-10-CM

## 2021-10-03 DIAGNOSIS — Z15.01 BRCA2 GENE MUTATION POSITIVE: ICD-10-CM

## 2021-10-04 DIAGNOSIS — C78.7 LIVER METASTASES (HCC): ICD-10-CM

## 2021-10-04 DIAGNOSIS — Z15.09 BRCA2 GENE MUTATION POSITIVE: ICD-10-CM

## 2021-10-04 DIAGNOSIS — E66.01 SEVERE OBESITY (BMI 35.0-39.9) WITH COMORBIDITY (HCC): ICD-10-CM

## 2021-10-04 DIAGNOSIS — C50.911 CARCINOMA OF RIGHT BREAST METASTATIC TO AXILLARY LYMPH NODE (HCC): ICD-10-CM

## 2021-10-04 DIAGNOSIS — E55.9 VITAMIN D DEFICIENCY: ICD-10-CM

## 2021-10-04 DIAGNOSIS — F41.8 ANXIETY ABOUT HEALTH: ICD-10-CM

## 2021-10-04 DIAGNOSIS — C77.3 CARCINOMA OF RIGHT BREAST METASTATIC TO AXILLARY LYMPH NODE (HCC): ICD-10-CM

## 2021-10-04 DIAGNOSIS — Z15.01 BRCA2 GENE MUTATION POSITIVE: ICD-10-CM

## 2021-10-04 RX ORDER — OXYCODONE HYDROCHLORIDE 10 MG/1
10 TABLET ORAL EVERY 6 HOURS PRN
Qty: 30 TABLET | Refills: 0 | Status: CANCELLED | OUTPATIENT
Start: 2021-10-04

## 2021-10-05 RX ORDER — OXYCODONE HYDROCHLORIDE 10 MG/1
10 TABLET ORAL EVERY 6 HOURS PRN
Qty: 30 TABLET | Refills: 0 | Status: SHIPPED | OUTPATIENT
Start: 2021-10-05 | End: 2022-02-24 | Stop reason: SDUPTHER

## 2021-10-05 RX ORDER — CHOLECALCIFEROL (VITAMIN D3) 1250 MCG
CAPSULE ORAL
Qty: 4 CAPSULE | Refills: 2 | OUTPATIENT
Start: 2021-10-05

## 2021-10-05 RX ORDER — TOPIRAMATE 50 MG/1
50 TABLET, FILM COATED ORAL EVERY 12 HOURS SCHEDULED
Qty: 30 TABLET | Refills: 0 | Status: SHIPPED | OUTPATIENT
Start: 2021-10-05 | End: 2021-11-09

## 2021-10-05 RX ORDER — ALPRAZOLAM 0.25 MG/1
0.25 TABLET ORAL DAILY PRN
Qty: 40 TABLET | Refills: 0 | Status: SHIPPED | OUTPATIENT
Start: 2021-10-05 | End: 2022-02-24 | Stop reason: SDUPTHER

## 2021-10-05 RX ORDER — MELATONIN
1000 DAILY
Start: 2021-10-05

## 2021-10-06 ENCOUNTER — OFFICE VISIT (OUTPATIENT)
Dept: PHYSICAL THERAPY | Facility: REHABILITATION | Age: 51
End: 2021-10-06
Payer: COMMERCIAL

## 2021-10-06 DIAGNOSIS — I89.0 LYMPHEDEMA: Primary | ICD-10-CM

## 2021-10-06 PROCEDURE — 97140 MANUAL THERAPY 1/> REGIONS: CPT | Performed by: PHYSICAL THERAPIST

## 2021-10-22 ENCOUNTER — HOSPITAL ENCOUNTER (OUTPATIENT)
Dept: RADIOLOGY | Facility: HOSPITAL | Age: 51
Discharge: HOME | End: 2021-10-22
Attending: INTERNAL MEDICINE
Payer: COMMERCIAL

## 2021-10-22 DIAGNOSIS — C50.911 CARCINOMA OF RIGHT BREAST METASTATIC TO AXILLARY LYMPH NODE (HCC): ICD-10-CM

## 2021-10-22 DIAGNOSIS — Z17.0 MALIGNANT NEOPLASM OF CENTRAL PORTION OF RIGHT BREAST IN FEMALE, ESTROGEN RECEPTOR POSITIVE (HCC): ICD-10-CM

## 2021-10-22 DIAGNOSIS — C78.7 LIVER METASTASES (HCC): ICD-10-CM

## 2021-10-22 DIAGNOSIS — C77.3 CARCINOMA OF RIGHT BREAST METASTATIC TO AXILLARY LYMPH NODE (HCC): ICD-10-CM

## 2021-10-22 DIAGNOSIS — C50.111 MALIGNANT NEOPLASM OF CENTRAL PORTION OF RIGHT BREAST IN FEMALE, ESTROGEN RECEPTOR POSITIVE (HCC): ICD-10-CM

## 2021-10-22 PROCEDURE — 71260 CT THORAX DX C+: CPT

## 2021-10-22 PROCEDURE — 74177 CT ABD & PELVIS W/CONTRAST: CPT

## 2021-10-22 RX ADMIN — IOHEXOL 100 ML: 350 INJECTION, SOLUTION INTRAVENOUS at 08:31

## 2021-10-29 ENCOUNTER — TELEPHONE (OUTPATIENT)
Dept: HEMATOLOGY ONCOLOGY | Facility: CLINIC | Age: 51
End: 2021-10-29

## 2021-11-02 ENCOUNTER — APPOINTMENT (OUTPATIENT)
Dept: LAB | Facility: HOSPITAL | Age: 51
End: 2021-11-02
Attending: INTERNAL MEDICINE
Payer: COMMERCIAL

## 2021-11-02 DIAGNOSIS — C50.111 MALIGNANT NEOPLASM OF CENTRAL PORTION OF RIGHT BREAST IN FEMALE, ESTROGEN RECEPTOR POSITIVE (HCC): ICD-10-CM

## 2021-11-02 DIAGNOSIS — E55.9 VITAMIN D DEFICIENCY: ICD-10-CM

## 2021-11-02 DIAGNOSIS — C77.3 CARCINOMA OF RIGHT BREAST METASTATIC TO AXILLARY LYMPH NODE (HCC): ICD-10-CM

## 2021-11-02 DIAGNOSIS — C78.7 LIVER METASTASES (HCC): ICD-10-CM

## 2021-11-02 DIAGNOSIS — C50.911 CARCINOMA OF RIGHT BREAST METASTATIC TO AXILLARY LYMPH NODE (HCC): ICD-10-CM

## 2021-11-02 DIAGNOSIS — Z17.0 MALIGNANT NEOPLASM OF CENTRAL PORTION OF RIGHT BREAST IN FEMALE, ESTROGEN RECEPTOR POSITIVE (HCC): ICD-10-CM

## 2021-11-02 LAB
ALBUMIN SERPL BCP-MCNC: 3.5 G/DL (ref 3.5–5)
ALP SERPL-CCNC: 81 U/L (ref 46–116)
ALT SERPL W P-5'-P-CCNC: 43 U/L (ref 12–78)
ANION GAP SERPL CALCULATED.3IONS-SCNC: 6 MMOL/L (ref 4–13)
AST SERPL W P-5'-P-CCNC: 29 U/L (ref 5–45)
BASOPHILS # BLD AUTO: 0.01 THOUSANDS/ΜL (ref 0–0.1)
BASOPHILS NFR BLD AUTO: 0 % (ref 0–1)
BILIRUB SERPL-MCNC: 0.33 MG/DL (ref 0.2–1)
BUN SERPL-MCNC: 13 MG/DL (ref 5–25)
CALCIUM SERPL-MCNC: 9.3 MG/DL (ref 8.3–10.1)
CANCER AG27-29 SERPL-ACNC: 30 U/ML (ref 0–42.3)
CHLORIDE SERPL-SCNC: 106 MMOL/L (ref 100–108)
CO2 SERPL-SCNC: 26 MMOL/L (ref 21–32)
CREAT SERPL-MCNC: 0.93 MG/DL (ref 0.6–1.3)
EOSINOPHIL # BLD AUTO: 0.15 THOUSAND/ΜL (ref 0–0.61)
EOSINOPHIL NFR BLD AUTO: 2 % (ref 0–6)
ERYTHROCYTE [DISTWIDTH] IN BLOOD BY AUTOMATED COUNT: 13.9 % (ref 11.6–15.1)
GFR SERPL CREATININE-BSD FRML MDRD: 71 ML/MIN/1.73SQ M
GLUCOSE SERPL-MCNC: 86 MG/DL (ref 65–140)
HCT VFR BLD AUTO: 40 % (ref 34.8–46.1)
HGB BLD-MCNC: 13.4 G/DL (ref 11.5–15.4)
IMM GRANULOCYTES # BLD AUTO: 0.01 THOUSAND/UL (ref 0–0.2)
IMM GRANULOCYTES NFR BLD AUTO: 0 % (ref 0–2)
LYMPHOCYTES # BLD AUTO: 1.76 THOUSANDS/ΜL (ref 0.6–4.47)
LYMPHOCYTES NFR BLD AUTO: 28 % (ref 14–44)
MCH RBC QN AUTO: 34 PG (ref 26.8–34.3)
MCHC RBC AUTO-ENTMCNC: 33.5 G/DL (ref 31.4–37.4)
MCV RBC AUTO: 102 FL (ref 82–98)
MONOCYTES # BLD AUTO: 0.6 THOUSAND/ΜL (ref 0.17–1.22)
MONOCYTES NFR BLD AUTO: 9 % (ref 4–12)
NEUTROPHILS # BLD AUTO: 3.85 THOUSANDS/ΜL (ref 1.85–7.62)
NEUTS SEG NFR BLD AUTO: 61 % (ref 43–75)
NRBC BLD AUTO-RTO: 0 /100 WBCS
PLATELET # BLD AUTO: 201 THOUSANDS/UL (ref 149–390)
PMV BLD AUTO: 11.4 FL (ref 8.9–12.7)
POTASSIUM SERPL-SCNC: 3.6 MMOL/L (ref 3.5–5.3)
PROT SERPL-MCNC: 7.8 G/DL (ref 6.4–8.2)
RBC # BLD AUTO: 3.94 MILLION/UL (ref 3.81–5.12)
SODIUM SERPL-SCNC: 138 MMOL/L (ref 136–145)
WBC # BLD AUTO: 6.38 THOUSAND/UL (ref 4.31–10.16)

## 2021-11-02 PROCEDURE — 85025 COMPLETE CBC W/AUTO DIFF WBC: CPT

## 2021-11-02 PROCEDURE — 36415 COLL VENOUS BLD VENIPUNCTURE: CPT

## 2021-11-02 PROCEDURE — 86300 IMMUNOASSAY TUMOR CA 15-3: CPT

## 2021-11-02 PROCEDURE — 82306 VITAMIN D 25 HYDROXY: CPT

## 2021-11-02 PROCEDURE — 80053 COMPREHEN METABOLIC PANEL: CPT

## 2021-11-04 ENCOUNTER — OFFICE VISIT (OUTPATIENT)
Dept: HEMATOLOGY ONCOLOGY | Facility: CLINIC | Age: 51
End: 2021-11-04
Payer: COMMERCIAL

## 2021-11-04 VITALS
SYSTOLIC BLOOD PRESSURE: 122 MMHG | OXYGEN SATURATION: 97 % | RESPIRATION RATE: 18 BRPM | BODY MASS INDEX: 39.49 KG/M2 | HEART RATE: 109 BPM | DIASTOLIC BLOOD PRESSURE: 76 MMHG | TEMPERATURE: 97.6 F | WEIGHT: 237 LBS | HEIGHT: 65 IN

## 2021-11-04 DIAGNOSIS — C78.7 LIVER METASTASES (HCC): Primary | ICD-10-CM

## 2021-11-04 DIAGNOSIS — Z17.0 MALIGNANT NEOPLASM OF CENTRAL PORTION OF RIGHT BREAST IN FEMALE, ESTROGEN RECEPTOR POSITIVE (HCC): ICD-10-CM

## 2021-11-04 DIAGNOSIS — C77.3 CARCINOMA OF RIGHT BREAST METASTATIC TO AXILLARY LYMPH NODE (HCC): ICD-10-CM

## 2021-11-04 DIAGNOSIS — C50.111 MALIGNANT NEOPLASM OF CENTRAL PORTION OF RIGHT BREAST IN FEMALE, ESTROGEN RECEPTOR POSITIVE (HCC): ICD-10-CM

## 2021-11-04 DIAGNOSIS — C50.911 CARCINOMA OF RIGHT BREAST METASTATIC TO AXILLARY LYMPH NODE (HCC): ICD-10-CM

## 2021-11-04 PROCEDURE — 99214 OFFICE O/P EST MOD 30 MIN: CPT | Performed by: INTERNAL MEDICINE

## 2021-11-04 PROCEDURE — 3008F BODY MASS INDEX DOCD: CPT | Performed by: FAMILY MEDICINE

## 2021-11-08 LAB
25(OH)D2 SERPL-MCNC: <1 NG/ML
25(OH)D3 SERPL-MCNC: 37 NG/ML
25(OH)D3+25(OH)D2 SERPL-MCNC: 37 NG/ML

## 2021-11-09 ENCOUNTER — OFFICE VISIT (OUTPATIENT)
Dept: PALLIATIVE MEDICINE | Facility: CLINIC | Age: 51
End: 2021-11-09
Payer: COMMERCIAL

## 2021-11-09 VITALS
WEIGHT: 241.62 LBS | SYSTOLIC BLOOD PRESSURE: 120 MMHG | RESPIRATION RATE: 16 BRPM | OXYGEN SATURATION: 97 % | DIASTOLIC BLOOD PRESSURE: 80 MMHG | TEMPERATURE: 98.1 F | HEART RATE: 91 BPM | BODY MASS INDEX: 39.93 KG/M2

## 2021-11-09 DIAGNOSIS — C77.3 CARCINOMA OF RIGHT BREAST METASTATIC TO AXILLARY LYMPH NODE (HCC): ICD-10-CM

## 2021-11-09 DIAGNOSIS — C50.911 CARCINOMA OF RIGHT BREAST METASTATIC TO AXILLARY LYMPH NODE (HCC): ICD-10-CM

## 2021-11-09 DIAGNOSIS — F41.8 ANXIETY ABOUT HEALTH: ICD-10-CM

## 2021-11-09 DIAGNOSIS — Z15.09 BRCA2 GENE MUTATION POSITIVE: ICD-10-CM

## 2021-11-09 DIAGNOSIS — Z15.01 BRCA2 GENE MUTATION POSITIVE: ICD-10-CM

## 2021-11-09 DIAGNOSIS — C78.7 LIVER METASTASES (HCC): ICD-10-CM

## 2021-11-09 PROCEDURE — 99213 OFFICE O/P EST LOW 20 MIN: CPT | Performed by: FAMILY MEDICINE

## 2021-11-10 DIAGNOSIS — C78.7 LIVER METASTASES (HCC): ICD-10-CM

## 2021-11-10 DIAGNOSIS — C50.911 CARCINOMA OF RIGHT BREAST METASTATIC TO AXILLARY LYMPH NODE (HCC): Primary | ICD-10-CM

## 2021-11-10 DIAGNOSIS — C77.3 CARCINOMA OF RIGHT BREAST METASTATIC TO AXILLARY LYMPH NODE (HCC): Primary | ICD-10-CM

## 2021-12-18 ENCOUNTER — HOSPITAL ENCOUNTER (OUTPATIENT)
Dept: RADIOLOGY | Facility: HOSPITAL | Age: 51
Discharge: HOME | End: 2021-12-18
Attending: NEUROLOGICAL SURGERY
Payer: COMMERCIAL

## 2021-12-18 DIAGNOSIS — D33.3 VESTIBULAR SCHWANNOMA (HCC): ICD-10-CM

## 2021-12-18 PROCEDURE — G1004 CDSM NDSC: HCPCS

## 2021-12-18 PROCEDURE — A9585 GADOBUTROL INJECTION: HCPCS | Performed by: NEUROLOGICAL SURGERY

## 2021-12-18 PROCEDURE — 70553 MRI BRAIN STEM W/O & W/DYE: CPT

## 2021-12-18 RX ADMIN — GADOBUTROL 11 ML: 604.72 INJECTION INTRAVENOUS at 11:41

## 2021-12-23 ENCOUNTER — OFFICE VISIT (OUTPATIENT)
Dept: NEUROSURGERY | Facility: CLINIC | Age: 51
End: 2021-12-23
Payer: COMMERCIAL

## 2021-12-23 VITALS
HEIGHT: 65 IN | RESPIRATION RATE: 18 BRPM | WEIGHT: 244 LBS | BODY MASS INDEX: 40.65 KG/M2 | SYSTOLIC BLOOD PRESSURE: 122 MMHG | HEART RATE: 72 BPM | TEMPERATURE: 97.9 F | DIASTOLIC BLOOD PRESSURE: 60 MMHG

## 2021-12-23 DIAGNOSIS — D33.3 UNILATERAL VESTIBULAR SCHWANNOMA (HCC): Primary | ICD-10-CM

## 2021-12-23 PROCEDURE — 99214 OFFICE O/P EST MOD 30 MIN: CPT | Performed by: NEUROLOGICAL SURGERY

## 2021-12-23 PROCEDURE — 3008F BODY MASS INDEX DOCD: CPT | Performed by: NEUROLOGICAL SURGERY

## 2021-12-23 PROCEDURE — 1036F TOBACCO NON-USER: CPT | Performed by: NEUROLOGICAL SURGERY

## 2021-12-23 RX ORDER — VITAMIN B COMPLEX
1 CAPSULE ORAL DAILY
COMMUNITY

## 2022-01-01 ENCOUNTER — TELEPHONE (OUTPATIENT)
Dept: NEUROLOGY | Facility: CLINIC | Age: 52
End: 2022-01-01

## 2022-01-22 DIAGNOSIS — C77.3 CARCINOMA OF RIGHT BREAST METASTATIC TO AXILLARY LYMPH NODE (HCC): ICD-10-CM

## 2022-01-22 DIAGNOSIS — C50.911 CARCINOMA OF RIGHT BREAST METASTATIC TO AXILLARY LYMPH NODE (HCC): ICD-10-CM

## 2022-01-22 DIAGNOSIS — C78.7 LIVER METASTASES (HCC): ICD-10-CM

## 2022-01-27 ENCOUNTER — HOSPITAL ENCOUNTER (OUTPATIENT)
Dept: RADIOLOGY | Facility: HOSPITAL | Age: 52
Discharge: HOME | End: 2022-01-27
Attending: INTERNAL MEDICINE
Payer: COMMERCIAL

## 2022-01-27 DIAGNOSIS — C78.7 LIVER METASTASES (HCC): ICD-10-CM

## 2022-01-27 DIAGNOSIS — C50.911 CARCINOMA OF RIGHT BREAST METASTATIC TO AXILLARY LYMPH NODE (HCC): ICD-10-CM

## 2022-01-27 DIAGNOSIS — C50.111 MALIGNANT NEOPLASM OF CENTRAL PORTION OF RIGHT BREAST IN FEMALE, ESTROGEN RECEPTOR POSITIVE (HCC): ICD-10-CM

## 2022-01-27 DIAGNOSIS — C77.3 CARCINOMA OF RIGHT BREAST METASTATIC TO AXILLARY LYMPH NODE (HCC): ICD-10-CM

## 2022-01-27 DIAGNOSIS — Z17.0 MALIGNANT NEOPLASM OF CENTRAL PORTION OF RIGHT BREAST IN FEMALE, ESTROGEN RECEPTOR POSITIVE (HCC): ICD-10-CM

## 2022-01-27 PROCEDURE — 71260 CT THORAX DX C+: CPT

## 2022-01-27 PROCEDURE — 74177 CT ABD & PELVIS W/CONTRAST: CPT

## 2022-01-27 RX ADMIN — IOHEXOL 100 ML: 350 INJECTION, SOLUTION INTRAVENOUS at 11:06

## 2022-02-08 ENCOUNTER — APPOINTMENT (OUTPATIENT)
Dept: LAB | Facility: CLINIC | Age: 52
End: 2022-02-08
Payer: COMMERCIAL

## 2022-02-08 DIAGNOSIS — Z17.0 MALIGNANT NEOPLASM OF CENTRAL PORTION OF RIGHT BREAST IN FEMALE, ESTROGEN RECEPTOR POSITIVE (HCC): ICD-10-CM

## 2022-02-08 DIAGNOSIS — C50.111 MALIGNANT NEOPLASM OF CENTRAL PORTION OF RIGHT BREAST IN FEMALE, ESTROGEN RECEPTOR POSITIVE (HCC): ICD-10-CM

## 2022-02-08 DIAGNOSIS — C78.7 LIVER METASTASES (HCC): ICD-10-CM

## 2022-02-08 DIAGNOSIS — C50.911 CARCINOMA OF RIGHT BREAST METASTATIC TO AXILLARY LYMPH NODE (HCC): ICD-10-CM

## 2022-02-08 DIAGNOSIS — C77.3 CARCINOMA OF RIGHT BREAST METASTATIC TO AXILLARY LYMPH NODE (HCC): ICD-10-CM

## 2022-02-08 LAB
ALBUMIN SERPL BCP-MCNC: 3.7 G/DL (ref 3.5–5)
ALP SERPL-CCNC: 74 U/L (ref 46–116)
ALT SERPL W P-5'-P-CCNC: 44 U/L (ref 12–78)
ANION GAP SERPL CALCULATED.3IONS-SCNC: 6 MMOL/L (ref 4–13)
AST SERPL W P-5'-P-CCNC: 27 U/L (ref 5–45)
BASOPHILS # BLD AUTO: 0.02 THOUSANDS/ΜL (ref 0–0.1)
BASOPHILS NFR BLD AUTO: 0 % (ref 0–1)
BILIRUB SERPL-MCNC: 0.51 MG/DL (ref 0.2–1)
BUN SERPL-MCNC: 16 MG/DL (ref 5–25)
CALCIUM SERPL-MCNC: 9.6 MG/DL (ref 8.3–10.1)
CHLORIDE SERPL-SCNC: 110 MMOL/L (ref 100–108)
CO2 SERPL-SCNC: 24 MMOL/L (ref 21–32)
CREAT SERPL-MCNC: 1.08 MG/DL (ref 0.6–1.3)
EOSINOPHIL # BLD AUTO: 0.19 THOUSAND/ΜL (ref 0–0.61)
EOSINOPHIL NFR BLD AUTO: 2 % (ref 0–6)
ERYTHROCYTE [DISTWIDTH] IN BLOOD BY AUTOMATED COUNT: 14.1 % (ref 11.6–15.1)
GFR SERPL CREATININE-BSD FRML MDRD: 59 ML/MIN/1.73SQ M
GLUCOSE SERPL-MCNC: 83 MG/DL (ref 65–140)
HCT VFR BLD AUTO: 38.3 % (ref 34.8–46.1)
HGB BLD-MCNC: 12.5 G/DL (ref 11.5–15.4)
IMM GRANULOCYTES # BLD AUTO: 0.01 THOUSAND/UL (ref 0–0.2)
IMM GRANULOCYTES NFR BLD AUTO: 0 % (ref 0–2)
LYMPHOCYTES # BLD AUTO: 2.09 THOUSANDS/ΜL (ref 0.6–4.47)
LYMPHOCYTES NFR BLD AUTO: 26 % (ref 14–44)
MCH RBC QN AUTO: 33.2 PG (ref 26.8–34.3)
MCHC RBC AUTO-ENTMCNC: 32.6 G/DL (ref 31.4–37.4)
MCV RBC AUTO: 102 FL (ref 82–98)
MONOCYTES # BLD AUTO: 0.79 THOUSAND/ΜL (ref 0.17–1.22)
MONOCYTES NFR BLD AUTO: 10 % (ref 4–12)
NEUTROPHILS # BLD AUTO: 4.86 THOUSANDS/ΜL (ref 1.85–7.62)
NEUTS SEG NFR BLD AUTO: 62 % (ref 43–75)
NRBC BLD AUTO-RTO: 0 /100 WBCS
PLATELET # BLD AUTO: 186 THOUSANDS/UL (ref 149–390)
PMV BLD AUTO: 12 FL (ref 8.9–12.7)
POTASSIUM SERPL-SCNC: 3.6 MMOL/L (ref 3.5–5.3)
PROT SERPL-MCNC: 7.4 G/DL (ref 6.4–8.2)
RBC # BLD AUTO: 3.77 MILLION/UL (ref 3.81–5.12)
SODIUM SERPL-SCNC: 140 MMOL/L (ref 136–145)
WBC # BLD AUTO: 7.96 THOUSAND/UL (ref 4.31–10.16)

## 2022-02-08 PROCEDURE — 36415 COLL VENOUS BLD VENIPUNCTURE: CPT

## 2022-02-08 PROCEDURE — 80053 COMPREHEN METABOLIC PANEL: CPT

## 2022-02-08 PROCEDURE — 86300 IMMUNOASSAY TUMOR CA 15-3: CPT

## 2022-02-08 PROCEDURE — 85025 COMPLETE CBC W/AUTO DIFF WBC: CPT

## 2022-02-09 LAB — CANCER AG27-29 SERPL-ACNC: 20.1 U/ML (ref 0–42.3)

## 2022-02-10 ENCOUNTER — OFFICE VISIT (OUTPATIENT)
Dept: HEMATOLOGY ONCOLOGY | Facility: CLINIC | Age: 52
End: 2022-02-10
Payer: COMMERCIAL

## 2022-02-10 VITALS
BODY MASS INDEX: 41.99 KG/M2 | TEMPERATURE: 97.1 F | RESPIRATION RATE: 18 BRPM | SYSTOLIC BLOOD PRESSURE: 114 MMHG | DIASTOLIC BLOOD PRESSURE: 68 MMHG | WEIGHT: 252 LBS | HEIGHT: 65 IN | OXYGEN SATURATION: 97 % | HEART RATE: 80 BPM

## 2022-02-10 DIAGNOSIS — C50.111 MALIGNANT NEOPLASM OF CENTRAL PORTION OF RIGHT BREAST IN FEMALE, ESTROGEN RECEPTOR POSITIVE (HCC): ICD-10-CM

## 2022-02-10 DIAGNOSIS — C78.7 LIVER METASTASES (HCC): Primary | ICD-10-CM

## 2022-02-10 DIAGNOSIS — Z17.0 MALIGNANT NEOPLASM OF CENTRAL PORTION OF RIGHT BREAST IN FEMALE, ESTROGEN RECEPTOR POSITIVE (HCC): ICD-10-CM

## 2022-02-10 PROCEDURE — 3008F BODY MASS INDEX DOCD: CPT | Performed by: INTERNAL MEDICINE

## 2022-02-10 PROCEDURE — 99214 OFFICE O/P EST MOD 30 MIN: CPT | Performed by: INTERNAL MEDICINE

## 2022-02-10 NOTE — PROGRESS NOTES
Hematology / Oncology Outpatient Follow Up Note    Bell Keyes 45 y o  female :1970 [facility]:[id number]         Date:  2/10/2022    Assessment / Plan:  A 45year-old surgically postmenopausal woman with metastatic right breast cancer, grade 3, ER 90% positive, FL 90% positive, HER2 negative disease   She has BRCA -2 mutation    She has histologically confirmed liver metastasis   Since she was premenopausal, she underwent as bilateral surgical ovarian ablation in late 2019 followed by starting palbociclib and letrozole   She had initial minor response   However, she had rapid progression in 2019 for which she was treated with Taxotere x6 cycle resulting in partial response   Subsequently, she had palliative right mastectomy followed by olaparib since mid 2020 with no significant toxicity   She achieved major response on olaparib  Clinically as well as radiographically, she has no evidence of progressive disease  I recommended her to continue olaparib 300 mg b i d  She is in agreement with my recommendation    I will see her again in 6 months with CBC, CMP, CT scan of chest abdomen pelvis as well as tumor marker        Subjective:      HPI:  A 14-year-old premenopausal woman who noticed a lump in her right breast in 2019 which she brought to medical attention   Radiographically, she has large right breast mass and right axillary adenopathy both of which were biopsy in May 3, 2019   Biopsy showed invasive ductal carcinoma, grade 3   This was ER 90% positive, FL 90% positive, HER2 negative disease   Biopsy from right axilla lymph node was positive for metastatic disease   She was seen by Dr Aamir Samayoa of her young age, she underwent genetic testing which showed BRCA-2 mutation   Because of the locally advanced nature, she underwent CT scan of chest abdomen pelvis which showed multiple liver lesion, suspicious for metastatic disease   Bone scan was negative for osseous metastasis  Soto Izaguirre is going to have liver biopsy in June 11, 2019   She presents today to discuss treatment option   She has no symptomatology from breast standpoint   She denied any pain  Her weight has been stable   She has no respiratory symptoms   She has no significant past medical history   Her paternal aunt had breast cancer in her 35s   Interestingly enough, her father had colon cancer in his 45s as well as prostate cancer in his 62s   She has no family history of ovarian cancer   She was a smoker until 2 years ago  Soto Izaguirre does not drink alcohol  Soto Izaguirre has a son who is 23years old  Soto Izaguirre also has younger son who was biologically female  Adarsh Berg is a transgender  Adarsh Berg is considering bilateral mastectomy  Leroy Fairly, he has not been tested for BRCA gene mutation            Interval History:   A 45year-old surgically postmenopausal woman with metastatic right breast cancer, grade 3, ER 90% positive, DC 90% positive, HER2 negative disease   She has BRCA -2 mutation   Based on the CT scan, she appeared to have multiple liver metastasis     She subsequently underwent liver biopsy which showed metastatic carcinoma, consistent with breast primary   She was premenopausal at the time of diagnosis   Therefore, she underwent bilateral oophorectomy in late July 2019   Ovary and fallopian tube did not show any malignancy   She started palbociclib and letrozole in late July 2019  She initially had response with decreased right breast mass as well as right axillary adenopathy   However, in late December 2019, she had rapid progression in her right breast mass, skin involvement as well as right axillary adenopathy   Therefore, she started palliative chemotherapy with Taxotere, resulting in partial response   After 6 cycle treatment with Taxotere, she underwent palliative right mastectomy which showed multiple foci of invasive ductal carcinoma measuring up to 3 5 cm and 13 positive axillary lymph node   Since mid June 2020, she has been on olaparib  She achieved major radiographical and biochemical response on olaparib  In November 2021, she was found to have left axillary lymphadenopathy based on CT scan  However, we suspected the lymphadenopathy was due to the COVID-19 vaccination  Therefore, we did not change systemic therapy  She continued on olaparib  She presents today for follow-up  Recent CT scan showed decrease of left axillary adenopathy  There is no evidence of progression of liver metastasis  She feels well  She has no complaint of pain  She has occasional minor GI symptoms    Her performance status is normal         Objective:      Primary Diagnosis:     1    Metastatic breast cancer, grade 3, ER 90 % positive, NC 90 % positive, HER2 negative disease, diagnosed in June 2019    2    BRCA-2 mutation      Cancer Staging:  Cancer Staging  Malignant neoplasm of central portion of right breast in female, estrogen receptor positive ([facility] [address] )  Staging form: Breast, AJCC 8th Edition  - Clinical: Stage IIIB (cT4, cN1(f), cM0, G3, ER+, NC+, HER2-) - Signed by Dilan Zamora MD on 5/16/2019  Laterality: Right  Method of lymph node assessment: Core biopsy  Histologic grading system: 3 grade system           Previous Hematologic/ Oncologic Treatment:      1  Surgical ovarian ablation in late July 2019   2  Palbociclib and letrozole from July 2018 through January 2020    3  Taxotere x6 cycle, completed in May 2020    4  Palliative right mastectomy      Current Hematologic/ Oncologic Treatment:       Olaparib 300 mg b i d  Since mid July 2020       Disease Status:      Good partial response on olaparib      Test Results:     Pathology:     Right breast biopsy in axillary lymph node biopsy showed invasive ductal carcinoma, grade 3   ER 90 % positive, NC 90% positive, HER2 negative disease      Liver biopsy in June 2019 showed metastatic carcinoma, consistent with breast primary      In June 2020, mastectomy specimen showed multiple foci of invasive ductal carcinoma measuring up to 3 5 cm with 13 positive axillary lymph nodes      Radiology:     Bone scan showed no evidence of osseous metastasis      CT scan of chest abdomen pelvis in  late January 2022 showed decreased left axillary adenopathy  No new metastatic disease in the liver       Laboratory:     See below   CA 27, 29 was 20 in February 2022     Physical Exam:        General Appearance:    Alert, oriented          Eyes:    PERRL   Ears:    Normal external ear canals, both ears   Nose:   Nares normal, septum midline   Throat:   Mucosa moist  Pharynx without injection  Neck:   Supple         Lungs:     Clear to auscultation bilaterally   Chest Wall:    No tenderness or deformity    Heart:    Regular rate and rhythm         Abdomen:     Soft, non-tender, bowel sounds +, no organomegaly               Extremities:   Extremities no cyanosis or edema         Skin:   no rash or icterus  Lymph nodes:   Cervical, supraclavicular, and axillary nodes normal   Neurologic:   CNII-XII intact, normal strength, sensation and reflexes     Throughout             Breast exam: Right status post mastectomy without reconstruction   No palpable abnormality on her right chest wall   Left breast exam is negative               ROS: Review of Systems   All other systems reviewed and are negative  Imaging: CT chest abdomen pelvis w contrast    Result Date: 1/31/2022  Narrative: CT CHEST, ABDOMEN AND PELVIS WITH IV CONTRAST INDICATION:  C78 7: Secondary malignant neoplasm of liver and intrahepatic bile duct  C50 911: Malignant neoplasm of unspecified site of right female breast  C77 3: Secondary and unspecified malignant neoplasm of axilla and upper limb lymph nodes  C50 111: Malignant neoplasm of central portion of right female breast  Z17 0: Estrogen receptor positive status (ER+)  COMPARISON:  CT chest, abdomen and pelvis 10/22/2021 TECHNIQUE: CT examination of the chest, abdomen and pelvis was performed   Axial, sagittal, and coronal 2D reformatted images were created from the source data and submitted for interpretation  Arterial, portal venous and delayed phase images of the abdomen were obtained  Radiation dose length product (DLP) for this visit:  2501 63 mGy-cm  This examination, like all CT scans performed in the [facility], was performed utilizing techniques to minimize radiation dose exposure, including the use of iterative  reconstruction and automated exposure control  IV Contrast:  100 mL of iohexol (OMNIPAQUE) Enteric Contrast: Enteric contrast was administered  FINDINGS: CHEST LUNGS:  Mild emphysema  No new pulmonary nodules, consolidation or endobronchial lesions  PLEURA:  Unremarkable  HEART/GREAT VESSELS: Heart is unremarkable for patient's age  No thoracic aortic aneurysm  MEDIASTINUM AND HAIM:  Unremarkable  CHEST WALL AND LOWER NECK: Status post right mastectomy and axillary lidia dissection  Subcentimeter short axis left axillary lymph nodes, decreased from previous study  ABDOMEN LIVER/BILIARY TREE:  Liver is decreased in density consistent with fatty change  Mild fatty sparing adjacent to gallbladder fossa noted  Normal hepatic contours  No biliary dilatation  12 x 5 mm hypodensity subcapsular right hepatic lobe segment 5, grossly unchanged accounting for differences in interobserver variability  5 mm left lobe hypodensity series 2/172, unchanged  No new hepatic lesions  GALLBLADDER:  No calcified gallstones  No pericholecystic inflammatory change  SPLEEN:  Unremarkable  PANCREAS:  Mild fatty replacement of the pancreas without acute findings  ADRENAL GLANDS:  Unremarkable  KIDNEYS/URETERS:  Unremarkable  No hydronephrosis  STOMACH AND BOWEL:  Unremarkable  APPENDIX:  No findings to suggest appendicitis  ABDOMINOPELVIC CAVITY:  No ascites  No pneumoperitoneum  Shotty retroperitoneal lymph nodes, similar  VESSELS:  Unremarkable for patient's age   PELVIS REPRODUCTIVE ORGANS: Unremarkable for patient's age  URINARY BLADDER:  Unremarkable  ABDOMINAL WALL/INGUINAL REGIONS:  There is a small fat-containing umbilical hernia  OSSEOUS STRUCTURES:  No acute fracture or destructive osseous lesion  Degenerative changes of the spine  Impression: Decreased size of left axillary lymph nodes  Stable subcentimeter mediastinal lymph nodes  No new evidence of metastatic disease in the chest  Stable hepatic lesions  No evidence of new metastatic disease in the abdomen or pelvis  Fatty liver  Workstation performed: [id number]         Labs:   Lab Results   Component Value Date    WBC 7 96 02/08/2022    HGB 12 5 02/08/2022    HCT 38 3 02/08/2022     (H) 02/08/2022     02/08/2022     Lab Results   Component Value Date    K 3 6 02/08/2022     (H) 02/08/2022    CO2 24 02/08/2022    BUN 16 02/08/2022    CREATININE 1 08 02/08/2022    GLUF 75 07/27/2021    CALCIUM 9 6 02/08/2022    CORRECTEDCA 9 7 07/27/2021    AST 27 02/08/2022    ALT 44 02/08/2022    ALKPHOS 74 02/08/2022    EGFR 59 02/08/2022         Current Medications: Reviewed  Allergies: Reviewed  PMH/FH/SH:  Reviewed      Vital Sign:    Body surface area is 2 19 meters squared      Wt Readings from Last 3 Encounters:   02/10/22 114 kg (252 lb)   12/23/21 111 kg (244 lb)   11/09/21 110 kg (241 lb 10 oz)        Temp Readings from Last 3 Encounters:   02/10/22 (!) 97 1 °F (36 2 °C) (Tympanic Core)   12/23/21 97 9 °F (36 6 °C) (Temporal)   11/09/21 98 1 °F (36 7 °C) (Temporal)        BP Readings from Last 3 Encounters:   02/10/22 114/68   12/23/21 122/60   11/09/21 120/80         Pulse Readings from Last 3 Encounters:   02/10/22 80   12/23/21 72   11/09/21 91     @LASTSAO2(3)@

## 2022-02-24 ENCOUNTER — OFFICE VISIT (OUTPATIENT)
Dept: PALLIATIVE MEDICINE | Facility: CLINIC | Age: 52
End: 2022-02-24
Payer: COMMERCIAL

## 2022-02-24 VITALS
SYSTOLIC BLOOD PRESSURE: 130 MMHG | DIASTOLIC BLOOD PRESSURE: 76 MMHG | WEIGHT: 251.32 LBS | BODY MASS INDEX: 41.53 KG/M2 | HEART RATE: 70 BPM | RESPIRATION RATE: 24 BRPM | TEMPERATURE: 96.2 F | OXYGEN SATURATION: 99 %

## 2022-02-24 DIAGNOSIS — F41.8 ANXIETY ABOUT HEALTH: ICD-10-CM

## 2022-02-24 DIAGNOSIS — C78.7 LIVER METASTASES (HCC): ICD-10-CM

## 2022-02-24 DIAGNOSIS — C77.3 CARCINOMA OF RIGHT BREAST METASTATIC TO AXILLARY LYMPH NODE (HCC): ICD-10-CM

## 2022-02-24 DIAGNOSIS — Z15.01 BRCA2 GENE MUTATION POSITIVE: ICD-10-CM

## 2022-02-24 DIAGNOSIS — Z15.09 BRCA2 GENE MUTATION POSITIVE: ICD-10-CM

## 2022-02-24 DIAGNOSIS — C50.911 CARCINOMA OF RIGHT BREAST METASTATIC TO AXILLARY LYMPH NODE (HCC): ICD-10-CM

## 2022-02-24 PROCEDURE — 99213 OFFICE O/P EST LOW 20 MIN: CPT | Performed by: FAMILY MEDICINE

## 2022-02-24 RX ORDER — ALPRAZOLAM 0.25 MG/1
0.25 TABLET ORAL DAILY PRN
Qty: 40 TABLET | Refills: 0 | Status: SHIPPED | OUTPATIENT
Start: 2022-02-24 | End: 2022-06-08 | Stop reason: SDUPTHER

## 2022-02-24 RX ORDER — OXYCODONE HYDROCHLORIDE 10 MG/1
10 TABLET ORAL EVERY 6 HOURS PRN
Qty: 30 TABLET | Refills: 0 | Status: SHIPPED | OUTPATIENT
Start: 2022-02-24

## 2022-02-24 NOTE — PROGRESS NOTES
Outpatient Follow-Up - Palliative and Supportive Care   Héctor Romeo 45 y o  female [id number]    Assessment & Plan  Problem List Items Addressed This Visit     Anxiety about health     BRCA2 gene mutation positive     Carcinoma of right breast metastatic to axillary lymph node ([facility] [address] )     Liver metastases ([facility] [address] )        Medications adjusted this encounter:  No medication changes made  Refills provided for alprazolam and oxycodone  - Defer further medical therapy for wt loss at this time  Patient is actively engaging with an exercise regimen and has joined a gym with a friend  She is currently very self motivated to address this issue   - Supportive listening provided re: wt gain  Deferred counseling for CBT/cognitive coaching at this time  Héctor Romeo was seen today for symptoms and planning cares related to above illnesses  I have reviewed the patient's controlled substance dispensing history in the Prescription Drug Monitoring Program in compliance with the [facility] regulations before prescribing any controlled substances  She was invited to continue to follow with us and plans for follow up appointment in 6 months  If there are questions or concerns, please contact us through our clinic/answering service 24 hours a day, seven days a week  Adore Evangelista MSN, CRNP  [facility]iative [facility]  [phone]      Visit Information    Accompanied By: No one    Source of History: Self    History Limitations: None    Contacts: brother Concha Aponte - [phone]    History of Present Illness      Héctor Romeo is a 45 y o  female who presents in follow up of symptoms related to survivorship from breast cancer  Pertinent issues include: symptom management for left shoulder pain and weight management  Since last visit, she is feeling well and 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    Her family is doing well and she remains cheerful and open about planning for possibilities should disease progression ever become evident  She has joined a local gym with a friend and has begun workouts with the goal of at least 3 sessions/week  She has also identified portion size as a key factor to address at this time and has begun a meal plan helpful for making this adjustment  She reports alprazolam is effective at hs for helping her quell anxious thought patterns that keep her awake  If ineffective after 2 hours, she occasionally adds a benadryl which has been successful  Past medical, surgical, social, and family histories are reviewed and pertinent updates are made  Review of Systems   Constitutional: Negative for chills, diaphoresis, fever and unexpected weight change  HENT: Negative for ear pain, rhinorrhea and sinus pain  Eyes: Negative for photophobia and redness  Respiratory: Negative for cough and shortness of breath  Cardiovascular: Negative for chest pain  Gastrointestinal: Negative for abdominal distention, abdominal pain and constipation  Endocrine: Negative for polyphagia and polyuria  Genitourinary: Negative for difficulty urinating, dysuria and flank pain  Musculoskeletal: Positive for left shoulder pain  Negative for gait problem and joint swelling  Neurological: Negative for seizures, speech difficulty and light-headedness  Psychiatric/Behavioral: Negative for dysphoric mood and sleep disturbance  The patient is not nervous/anxious  Vital Signs    /76 (BP Location: Left arm, Patient Position: Sitting, Cuff Size: Standard)   Pulse 70   Temp (!) 96 2 °F (35 7 °C) (Temporal)   Resp (!) 24   Wt 114 kg (251 lb 5 2 oz)   SpO2 99%   BMI 41 53 kg/m²       Physical Exam and Objective Data  Physical Exam  Constitutional:       General: She is not in acute distress  Appearance: She is well-developed  She is obese  She is not ill-appearing, toxic-appearing or diaphoretic  HENT:      Head: Normocephalic and atraumatic        Right Ear: External ear normal       Left Ear: External ear normal    Eyes:      General:         Right eye: No discharge  Left eye: No discharge  Conjunctiva/sclera: Conjunctivae normal       Pupils: Pupils are equal, round, and reactive to light  Neck:      Trachea: No tracheal deviation  Cardiovascular:      Rate and Rhythm: Normal rate and regular rhythm  Pulmonary:      Effort: Pulmonary effort is normal  No respiratory distress  Breath sounds: No stridor  Abdominal:      General: There is no distension  Palpations: Abdomen is soft  Musculoskeletal:         General: No deformity  Cervical back: Normal range of motion  Skin:     General: Skin is warm and dry  Coloration: Skin is not pale  Findings: No erythema or rash  Neurological:      General: No focal deficit present  Mental Status: She is alert and oriented to person, place, and time  Mental status is at baseline  Cranial Nerves: No cranial nerve deficit  Psychiatric:         Mood and Affect: Mood normal          Behavior: Behavior normal          Thought Content: Thought content normal          Judgment: Judgment normal           Radiology and Laboratory:  No new labs or studies have been done  30+ minutes was spent face to face with Fabio Martinez with greater than 50% of the time spent in counseling or coordination of care including: chart review; symptom pursuit; supportive listening; anticipatory guidance     Additional time was also spent in discussing goals of care related to potential for disease progression in future  All of the patient's or agent's questions were answered during this discussion

## 2022-03-14 ENCOUNTER — OFFICE VISIT (OUTPATIENT)
Dept: OBGYN CLINIC | Facility: OTHER | Age: 52
End: 2022-03-14
Payer: COMMERCIAL

## 2022-03-14 ENCOUNTER — APPOINTMENT (OUTPATIENT)
Dept: RADIOLOGY | Facility: OTHER | Age: 52
End: 2022-03-14
Payer: COMMERCIAL

## 2022-03-14 VITALS
BODY MASS INDEX: 41.32 KG/M2 | WEIGHT: 248 LBS | SYSTOLIC BLOOD PRESSURE: 127 MMHG | DIASTOLIC BLOOD PRESSURE: 90 MMHG | HEART RATE: 87 BPM | HEIGHT: 65 IN

## 2022-03-14 DIAGNOSIS — M25.512 LEFT SHOULDER PAIN, UNSPECIFIED CHRONICITY: ICD-10-CM

## 2022-03-14 DIAGNOSIS — M75.42 SUBACROMIAL IMPINGEMENT OF LEFT SHOULDER: Primary | ICD-10-CM

## 2022-03-14 PROCEDURE — 99204 OFFICE O/P NEW MOD 45 MIN: CPT | Performed by: ORTHOPAEDIC SURGERY

## 2022-03-14 PROCEDURE — 20610 DRAIN/INJ JOINT/BURSA W/O US: CPT | Performed by: ORTHOPAEDIC SURGERY

## 2022-03-14 PROCEDURE — 73030 X-RAY EXAM OF SHOULDER: CPT

## 2022-03-14 RX ORDER — BUPIVACAINE HYDROCHLORIDE 2.5 MG/ML
2 INJECTION, SOLUTION INFILTRATION; PERINEURAL
Status: COMPLETED | OUTPATIENT
Start: 2022-03-14 | End: 2022-03-14

## 2022-03-14 RX ORDER — BETAMETHASONE SODIUM PHOSPHATE AND BETAMETHASONE ACETATE 3; 3 MG/ML; MG/ML
6 INJECTION, SUSPENSION INTRA-ARTICULAR; INTRALESIONAL; INTRAMUSCULAR; SOFT TISSUE
Status: COMPLETED | OUTPATIENT
Start: 2022-03-14 | End: 2022-03-14

## 2022-03-14 RX ADMIN — BETAMETHASONE SODIUM PHOSPHATE AND BETAMETHASONE ACETATE 6 MG: 3; 3 INJECTION, SUSPENSION INTRA-ARTICULAR; INTRALESIONAL; INTRAMUSCULAR; SOFT TISSUE at 13:50

## 2022-03-14 RX ADMIN — BUPIVACAINE HYDROCHLORIDE 2 ML: 2.5 INJECTION, SOLUTION INFILTRATION; PERINEURAL at 13:50

## 2022-03-14 NOTE — PATIENT INSTRUCTIONS

## 2022-03-14 NOTE — PROGRESS NOTES
Assessment  Diagnoses and all orders for this visit:    Subacromial impingement of left shoulder    Discussion and Plan:    · The patient has an examination consistent with subacromial impingement syndrome of the left shoulder  I have discussed with the patient the pathophysiology of this diagnosis and reviewed how the examination correlates with this diagnosis  Treatment options were discussed at length and after discussing these treatment options, the patient elected for and received a subacromial injection of corticosteroid (as described in the procedure note) with a prescription for referral to physical therapy  · We will reevaluate the patient in 6-8 weeks  If the symptoms fail to improve with this treatment the patient would be indicated for further imaging in the form of an MRI scan of the shoulder  Subjective:   Patient ID: Georges Hidalgo is a 45 y o  female    The patient presents with a chief complaint of left shoulder pain  The pain began 2 month(s) ago and is not associated with an acute injury  The patient describes the pain as aching and dull in intensity,  stable, occurring with increasing frequency in timing, and localizes the pain to the  left subacromial joint, deltoid  The pain is worse with overuse and reaching behind her back and relieved by rest, ice, avoiding the painful activities  The pain is not associated with numbness and tingling  The pain is not associated with constitutional symptoms  The patient is not awoken at night by the pain  The patient has had no prior treatment        The following portions of the patient's history were reviewed and updated as appropriate: allergies, current medications, past family history, past medical history, past social history, past surgical history and problem list     Objective:  /90   Pulse 87   Ht 5' 5" (1 651 m)   Wt 112 kg (248 lb)   BMI 41 27 kg/m²       Left Shoulder Exam     Tenderness   The patient is experiencing no tenderness  Range of Motion   The patient has normal left shoulder ROM  Muscle Strength   Abduction: 5/5   External rotation: 5/5     Tests   Berry test: negative  Drop arm: negative    Other   Erythema: absent  Sensation: normal  Pulse: present             Physical Exam  Constitutional:       Appearance: She is well-developed  Eyes:      Pupils: Pupils are equal, round, and reactive to light  Pulmonary:      Effort: Pulmonary effort is normal       Breath sounds: Normal breath sounds  Skin:     General: Skin is warm and dry  Neurological:      Mental Status: She is alert and oriented to person, place, and time  Psychiatric:         Behavior: Behavior normal          Thought Content: Thought content normal          Judgment: Judgment normal        Large joint arthrocentesis: L subacromial bursa  Universal Protocol:  Consent: Verbal consent obtained  Risks and benefits: risks, benefits and alternatives were discussed  Consent given by: patient  Time out: Immediately prior to procedure a "time out" was called to verify the correct patient, procedure, equipment, support staff and site/side marked as required  Site marked: the operative site was marked  Supporting Documentation  Indications: pain and diagnostic evaluation   Procedure Details  Location: shoulder - L subacromial bursa  Preparation: Patient was prepped and draped in the usual sterile fashion  Needle size: 22 G  Ultrasound guidance: no  Approach: lateral  Medications administered: 2 mL bupivacaine 0 25 %; 6 mg betamethasone acetate-betamethasone sodium phosphate 6 (3-3) mg/mL    Patient tolerance: patient tolerated the procedure well with no immediate complications  Dressing:  Sterile dressing applied        I have personally reviewed pertinent films in PACS and my interpretation is as follows      X Ray Left Shoulder 3/14/2022: No acute osseous abnormalities or degenerative changes    Scribe Attestation    I,:  Tolu Wong am acting as a scribe while in the presence of the attending physician :       I,:  Malina Thompson MD personally performed the services described in this documentation    as scribed in my presence :

## 2022-03-21 ENCOUNTER — EVALUATION (OUTPATIENT)
Dept: PHYSICAL THERAPY | Facility: REHABILITATION | Age: 52
End: 2022-03-21
Payer: COMMERCIAL

## 2022-03-21 ENCOUNTER — PATIENT MESSAGE (OUTPATIENT)
Dept: PALLIATIVE MEDICINE | Facility: CLINIC | Age: 52
End: 2022-03-21

## 2022-03-21 DIAGNOSIS — M75.42 SUBACROMIAL IMPINGEMENT OF LEFT SHOULDER: ICD-10-CM

## 2022-03-21 PROCEDURE — 97110 THERAPEUTIC EXERCISES: CPT | Performed by: PHYSICAL THERAPIST

## 2022-03-21 PROCEDURE — 97161 PT EVAL LOW COMPLEX 20 MIN: CPT | Performed by: PHYSICAL THERAPIST

## 2022-03-21 NOTE — PROGRESS NOTES
PT Evaluation     Today's date: 3/21/2022  Patient name: Annie Steele  : 1970  MRN: [id number]  Referring provider: Pernell Shepard  Dx:   Encounter Diagnosis     ICD-10-CM    1  Subacromial impingement of left shoulder  M75 42 Ambulatory Referral to Physical Therapy        Start Time:   Stop Time: 1615  Total time in clinic (min): 45 minutes    Assessment  Assessment details: Patient is a 45 y o  female presenting to initial examination with chief complaint of L shoulder pain  Signs and symptoms are consistent with primary subacromial impingement syndrome  Primary impairments include posterior capsular hypomobility, ER strength deficits, and middle/lower trapezius strength deficits  As a result of impairments patient experiences limitations with functional/daily activities including reaching across body and behind back, sleeping on L side, and regular fitness activities at the gym  Patient achieved FOTO score of 65  Educated patient regarding plan of care and answered all patient questions to patient satisfaction  Patient would benefit from skilled PT interventions to address above impairments in order to maximize functional capacity   Thank you for the referral     Impairments: abnormal muscle firing, abnormal or restricted ROM, abnormal movement, activity intolerance, impaired physical strength and pain with function     Prognosis: good    Goals  Impairment Goals: 4-6 weeks  - Patient to decrease pain to 0/10  - Patient to improve shoulder AROM to [facility] in all planes  - Patient to increase shoulder strength to 4+/5 throughout    Functional Goals: by discharge  - Patient to discharge to independent [facility]  - Patient to return to prior level of function  - Patient to be able to reach across body without increased pain/compensation/difficulty  - Patient to be able to reach behind back without increased pain/compensation/difficulty   - Patient to tolerate fitness activities at gym without increased pain or difficulty      Plan  Patient would benefit from: skilled physical therapy  Planned modality interventions: cryotherapy, TENS and thermotherapy: hydrocollator packs  Planned therapy interventions: flexibility, home exercise program, joint mobilization, manual therapy, neuromuscular re-education, patient education, strengthening, stretching, therapeutic activities, therapeutic exercise and functional ROM exercises  Frequency: 1x week  Duration in weeks: 6  Treatment plan discussed with: patient        Subjective Evaluation    History of Present Illness  Mechanism of injury: HISTORY OF PRESENT ILLNESS: Patient reports she was reaching behind her with the L shoulder a few months ago  She felt onset of L shoulder pain localized to lateral glenohumeral joint  She has been going to the gym and had pain with chest press motions  Patient denies cervical pain or sensory changes  Patient denies loss of motion    PRIOR TREATMENT: cortisone injection with good relief  AGGRAVATING FACTORS: reaching behind body, sleeping on L side, reaching across body, chest press  EASING FACTORS: change position and avoid aggravating activity  WORK: cleaning business  IMAGING: x-rays negative   FUNCTIONAL LIMITATIONS: reaching behind back/across body, sleeping on L side, restricted activity at gym  SUBJECTIVE FUNCTIONAL LEVEL: 60%   PATIENT GOAL: to get back to normal    Pain  Current pain ratin  At best pain ratin  At worst pain ratin  Location: L shoulder  Quality: sharp          Objective     Palpation     Additional Palpation Details  No TTP elicited shoulder girdle    Neurological Testing     Sensation   Cervical/Thoracic   Left   Intact: light touch    Right   Intact: light touch    Active Range of Motion   Cervical/Thoracic Spine       Cervical    Flexion:  WFL  Extension:  WFL  Left lateral flexion:  WFL  Right lateral flexion:  WFL  Left rotation:  WFL  Right rotation:  [facility]  Left Shoulder   Flexion: WFL  Abduction: [facility]  External rotation BTH: C7   Internal rotation BTB: lumbar     Right Shoulder   Flexion: WFL  Abduction: WFL  External rotation BTH: WFL  Internal rotation BTB: WFL    Additional Active Range of Motion Details  No familiar symptom reproduction with cervical AROM    Passive Range of Motion   Left Shoulder   Flexion: WFL  Abduction: WFL  External rotation 90°: WFL  Internal rotation 90°: [facility]    Joint Play   Left Shoulder  Joints within functional limits are the inferior capsule  Hypomobile in the posterior capsule  Strength/Myotome Testing     Left Shoulder     Planes of Motion   Flexion: 4+   Abduction: 4+   External rotation at 0°: 3+   Internal rotation at 0°: 4+     Isolated Muscles   Infraspinatus: 4   Lower trapezius: 3+   Middle trapezius: 3+   Subscapularis: 4   Supraspinatus: 4   Teres minor: 4+     Right Shoulder     Planes of Motion   Flexion: 4+   Abduction: 4+   External rotation at 0°: 4+   Internal rotation at 0°: 4+     Isolated Muscles   Infraspinatus: 4+   Lower trapezius: 4   Middle trapezius: 4   Subscapularis: 4   Supraspinatus: 4+   Teres minor: 4+     Additional Strength Details  Elbow flexion 4+/5 bilaterally    Tests     Left Shoulder   Positive Hawkin's and Neer's  Negative drop arm, empty can and painful arc  Right Shoulder   Negative drop arm, empty can, Hawkin's, Neer's and painful arc       Additional Tests Details  (+) ER resistance      Flowsheet Rows      Most Recent Value   PT/OT G-Codes    Current Score 65   Projected Score 73             Diagnosis: primary subacromial impingement syndrome   Precautions: active cancer, chemotherapy   Primary impairments: posterior capsular hypomobility, ER strength deficits, and middle/lower trapezius strength deficits   *asterisks by exercise = given for HEP    3/21       Manuals                                                There Ex        UBE        Sleeper stretch                                                                Neuro Re-Ed        Bent over extensions *  1 lb x 15       Bent over H  abd *  1 lb x 15       Bent over scaption        S/L ER *  1 lb x 15       S/L flex        Rhythmic stabilization balance point        Band ER/IR        Band no moneys        Band extensions        Band H  abd        Wall slides with band        3-way scapular band                                Re-evaluation             Ther Act/Gait                                         Modalities                                                   HEP: [id number]

## 2022-03-23 ENCOUNTER — OFFICE VISIT (OUTPATIENT)
Dept: PHYSICAL THERAPY | Facility: REHABILITATION | Age: 52
End: 2022-03-23
Payer: COMMERCIAL

## 2022-03-23 DIAGNOSIS — M75.42 SUBACROMIAL IMPINGEMENT OF LEFT SHOULDER: Primary | ICD-10-CM

## 2022-03-23 PROCEDURE — 97110 THERAPEUTIC EXERCISES: CPT

## 2022-03-23 PROCEDURE — 97112 NEUROMUSCULAR REEDUCATION: CPT

## 2022-03-23 NOTE — PROGRESS NOTES
Daily Note     Today's date: 3/23/2022  Patient name: Maryam Triplett  : 1970  MRN: [id number]  Referring provider: Melba Mahoney  Dx:   Encounter Diagnosis     ICD-10-CM    1  Subacromial impingement of left shoulder  M75 42                   Subjective: Patient stated that her shoulder feels ok  Now new changes since IE  Reports adherence to HEP  Objective: See treatment diary below      Assessment: Tolerated treatment well  Generalized muscle fatigue noted, more so with RTC strengthening  Appropriate parascapular activation noted with posterior chain strengthening  Patient tends not to utilize rest breaks between sets, loosing form with fatigue  Intermittent crepitation noted with PREs, but was not limiting  Continued PT would be beneficial to improve function            Plan: Continue per plan of care         Diagnosis: primary subacromial impingement syndrome   Precautions: active cancer, chemotherapy   Primary impairments: posterior capsular hypomobility, ER strength deficits, and middle/lower trapezius strength deficits   *asterisks by exercise = given for HEP    3/21 3/23      Manuals                                                There Ex        UBE  6'      Sleeper stretch  4x20"                                                              Neuro Re-Ed        Bent over extensions *  1 lb x 15 2x12 1 lb      Bent over H  abd *  1 lb x 15 2x12 1 lb      Bent over scaption  2x12      S/L ER *  1 lb x 15 1 lb x 15      S/L flex  2x12      Rhythmic stabilization balance point  3x30"      Band ER/IR  Red 2x12      Band no moneys  Red 2x12      Band extensions  Red 2x12      Band H  abd  yellow 2x12      Wall slides with band        3-way scapular band  Yellow 12x                              Re-evaluation             Ther Act/Gait                                         Modalities                                                   HEP: [id number]

## 2022-03-24 ENCOUNTER — OFFICE VISIT (OUTPATIENT)
Dept: FAMILY MEDICINE CLINIC | Facility: CLINIC | Age: 52
End: 2022-03-24
Payer: COMMERCIAL

## 2022-03-24 VITALS
DIASTOLIC BLOOD PRESSURE: 70 MMHG | BODY MASS INDEX: 41.82 KG/M2 | SYSTOLIC BLOOD PRESSURE: 106 MMHG | HEART RATE: 72 BPM | WEIGHT: 251 LBS | HEIGHT: 65 IN

## 2022-03-24 DIAGNOSIS — C78.7 LIVER METASTASES (HCC): ICD-10-CM

## 2022-03-24 DIAGNOSIS — C50.111 MALIGNANT NEOPLASM OF CENTRAL PORTION OF RIGHT BREAST IN FEMALE, ESTROGEN RECEPTOR POSITIVE (HCC): ICD-10-CM

## 2022-03-24 DIAGNOSIS — E66.01 CLASS 3 SEVERE OBESITY DUE TO EXCESS CALORIES WITH SERIOUS COMORBIDITY AND BODY MASS INDEX (BMI) OF 40.0 TO 44.9 IN ADULT (HCC): ICD-10-CM

## 2022-03-24 DIAGNOSIS — Z17.0 MALIGNANT NEOPLASM OF CENTRAL PORTION OF RIGHT BREAST IN FEMALE, ESTROGEN RECEPTOR POSITIVE (HCC): ICD-10-CM

## 2022-03-24 DIAGNOSIS — C50.911 CARCINOMA OF RIGHT BREAST METASTATIC TO AXILLARY LYMPH NODE (HCC): ICD-10-CM

## 2022-03-24 DIAGNOSIS — J44.1 CHRONIC OBSTRUCTIVE PULMONARY DISEASE WITH ACUTE EXACERBATION (HCC): ICD-10-CM

## 2022-03-24 DIAGNOSIS — J44.1 CHRONIC OBSTRUCTIVE PULMONARY DISEASE WITH ACUTE EXACERBATION (HCC): Primary | ICD-10-CM

## 2022-03-24 DIAGNOSIS — C77.3 CARCINOMA OF RIGHT BREAST METASTATIC TO AXILLARY LYMPH NODE (HCC): ICD-10-CM

## 2022-03-24 PROBLEM — E66.9 OBESITY: Status: ACTIVE | Noted: 2021-07-15

## 2022-03-24 PROBLEM — J44.9 COPD (CHRONIC OBSTRUCTIVE PULMONARY DISEASE) (HCC): Status: ACTIVE | Noted: 2020-08-10

## 2022-03-24 PROCEDURE — 1036F TOBACCO NON-USER: CPT | Performed by: FAMILY MEDICINE

## 2022-03-24 PROCEDURE — 99214 OFFICE O/P EST MOD 30 MIN: CPT | Performed by: FAMILY MEDICINE

## 2022-03-24 PROCEDURE — 3008F BODY MASS INDEX DOCD: CPT | Performed by: FAMILY MEDICINE

## 2022-03-24 PROCEDURE — 3725F SCREEN DEPRESSION PERFORMED: CPT | Performed by: FAMILY MEDICINE

## 2022-03-24 RX ORDER — BENZONATATE 200 MG/1
200 CAPSULE ORAL 3 TIMES DAILY PRN
Qty: 20 CAPSULE | Refills: 0 | Status: SHIPPED | OUTPATIENT
Start: 2022-03-24 | End: 2022-06-10

## 2022-03-24 RX ORDER — BUDESONIDE AND FORMOTEROL FUMARATE DIHYDRATE 160; 4.5 UG/1; UG/1
2 AEROSOL RESPIRATORY (INHALATION) 2 TIMES DAILY
Qty: 10.2 G | Refills: 5 | Status: SHIPPED | OUTPATIENT
Start: 2022-03-24 | End: 2022-03-26 | Stop reason: CLARIF

## 2022-03-24 RX ORDER — AZITHROMYCIN 250 MG/1
TABLET, FILM COATED ORAL
Qty: 6 TABLET | Refills: 0 | Status: SHIPPED | OUTPATIENT
Start: 2022-03-24 | End: 2022-03-29

## 2022-03-24 RX ORDER — ALBUTEROL SULFATE 90 UG/1
2 AEROSOL, METERED RESPIRATORY (INHALATION) EVERY 6 HOURS PRN
Qty: 6.7 G | Refills: 5 | Status: SHIPPED | OUTPATIENT
Start: 2022-03-24

## 2022-03-24 NOTE — PROGRESS NOTES
Assessment and Plan:    Problem List Items Addressed This Visit     Carcinoma of right breast metastatic to axillary lymph node [facility])     Patient follow-up with Hematology/Oncology, as well as Surgical Oncology  No change         COPD [facility]chronic obstructive pulmonary disease) ([facility] ) - Primary     Patient does appear to have some minor COPD like symptoms, but this also could just be pharyngitis  Would recommend start antibiotics  Z-Romaine           Relevant Medications    albuterol (Proventil HFA) 90 mcg/act inhaler    budesonide-formoterol (Symbicort) 160-4 5 mcg/act inhaler    benzonatate (TESSALON) 200 MG capsule    azithromycin (ZITHROMAX) 250 mg tablet    Liver metastases (HCC)     Elevated February were excellent  No changes  Because of this, I feel we could use Tessalon Perles which are a cough suppressant  Malignant neoplasm of central portion of right breast in female, estrogen receptor positive ([facility] )     Stable  Follow-up with Hematology         Obesity     BMI is elevated, but for now given her cancer diagnosis will hold off on making recommendations  Diagnoses and all orders for this visit:    Chronic obstructive pulmonary disease with acute exacerbation (HCC)  -     albuterol (Proventil HFA) 90 mcg/act inhaler; Inhale 2 puffs every 6 (six) hours as needed for wheezing  -     budesonide-formoterol (Symbicort) 160-4 5 mcg/act inhaler; Inhale 2 puffs 2 (two) times a day Rinse mouth after use  -     benzonatate (TESSALON) 200 MG capsule; Take 1 capsule (200 mg total) by mouth 3 (three) times a day as needed for cough  -     azithromycin (ZITHROMAX) 250 mg tablet;  Take 2 tablets on day 1, then 1 tablet daily days 2 through 5    Malignant neoplasm of central portion of right breast in female, estrogen receptor positive ([facility] )    Liver metastases ([place] [address] )    Class 3 severe obesity due to excess calories with serious comorbidity and body mass index (BMI) of 40 0 to 44 9 in adult [facility])    Carcinoma of right breast metastatic to axillary lymph node (HCC)              Subjective:      Patient ID: Rupal Austin is a 45 y o  female  CC:    Chief Complaint   Patient presents with    Cough     Cough started about 2 days ago, consistent hacking dry cough       HPI:    Patient has significant amount of coughing  Started on the weekend around Saturday  Yesterday, it started getting significantly worse  Significant cough, shortness of breath, some wheezing with that as well  Denies any fevers  Nausea with the significant coughing  Patient has had COPD for a while now  She reports that it was not listed as severe  In the past, she has been on controller medications, but ended up with side effects from that in the throat  She was on Advair previously  Most of the symptoms are in the throat, not in the lungs/chest       The following portions of the patient's history were reviewed and updated as appropriate: allergies, current medications, past family history, past medical history, past social history, past surgical history and problem list       Review of Systems   Constitutional: Positive for fatigue  Negative for chills and fever  HENT:        Per HPI   Respiratory: Positive for cough, shortness of breath and wheezing  Cardiovascular: Negative  Gastrointestinal: Negative  Data to review:       Objective:    Vitals:    03/24/22 1425   BP: 106/70   BP Location: Left arm   Patient Position: Sitting   Cuff Size: Large   Pulse: 72   Weight: 114 kg (251 lb)   Height: 5' 5" (1 651 m)        Physical Exam  Vitals and nursing note reviewed  Constitutional:       Appearance: Normal appearance  HENT:      Head: Normocephalic  Cardiovascular:      Rate and Rhythm: Normal rate and regular rhythm  Pulses: Normal pulses  Carotid pulses are 2+ on the right side and 2+ on the left side  Heart sounds: Normal heart sounds  No murmur heard  No friction rub  No gallop  Pulmonary:      Effort: Pulmonary effort is normal  No respiratory distress  Breath sounds: Normal breath sounds  No stridor  No wheezing, rhonchi or rales  Chest:      Chest wall: No tenderness  Neurological:      Mental Status: She is alert  BMI Counseling: Body mass index is 41 77 kg/m²  The BMI is above normal  Nutrition recommendations include decreasing portion sizes, encouraging healthy choices of fruits and vegetables, decreasing fast food intake, consuming healthier snacks, limiting drinks that contain sugar, moderation in carbohydrate intake, increasing intake of lean protein, reducing intake of saturated and trans fat and reducing intake of cholesterol  Exercise recommendations include exercising 3-5 times per week  No pharmacotherapy was ordered  Rationale for BMI follow-up plan is due to patient being overweight or obese  Depression Screening and Follow-up Plan: Patient was screened for depression during today's encounter  They screened negative with a PHQ-2 score of 1

## 2022-03-24 NOTE — PATIENT INSTRUCTIONS
Problem List Items Addressed This Visit     Carcinoma of right breast metastatic to axillary lymph node [facility])     Patient follow-up with Hematology/Oncology, as well as Surgical Oncology  No change         COPD (chronic obstructive pulmonary disease) ([facility] ) - Primary     Patient does appear to have some minor COPD like symptoms, but this also could just be pharyngitis  Would recommend start antibiotics  Z-Romaine           Relevant Medications    albuterol (Proventil HFA) 90 mcg/act inhaler    budesonide-formoterol (Symbicort) 160-4 5 mcg/act inhaler    benzonatate (TESSALON) 200 MG capsule    azithromycin (ZITHROMAX) 250 mg tablet    Liver metastases (HCC)     Elevated February were excellent  No changes  Because of this, I feel we could use Tessalon Perles which are a cough suppressant  Malignant neoplasm of central portion of right breast in female, estrogen receptor positive ([facility] )     Stable  Follow-up with Hematology         Obesity     BMI is elevated, but for now given her cancer diagnosis will hold off on making recommendations  COVID 19 Instructions    Janette Griggs was advised to limit contact with others to essential tasks such as getting food, medications, and medical care  Proper handwashing reviewed, and Hand sanitzer when washing is not available  If the patient develops symptoms of COVID 19, the patient should call the office as soon as possible  For 7201-0547 Flu season, it is strongly recommended that Flu Vaccinations be obtained  Virtual Visits:  Farida: This works on smart phones (any phone with Internet browsing capability)  You should get a text message when the provider is ready to see you  Click on the link in the text message, and the call should start  You will need to type in your name, and allow camera and microphone access  This is HIPPA compliant, and secure  If you have not already done so, get immunized to COVID 19        We are committed to getting you vaccinated as soon as possible and will be closely following CDC and [facility]  guidelines as they are released and revised  Please refer to our COVID-19 vaccine webpage for the most up to date information on the vaccine and our distribution efforts  This site will also have the most up to date recommendations for COVID booster vaccine  Karla macias    Call [phone]), option 7    OUR NEW LOCATION:    [address], [address] [place], [place] [address] [place], [place], [address]  Fax: [phone]    Lab services and OB/GYN are at this location as well

## 2022-03-24 NOTE — ASSESSMENT & PLAN NOTE
Patient does appear to have some minor COPD like symptoms, but this also could just be pharyngitis  Would recommend start antibiotics    Z-Romaine

## 2022-03-24 NOTE — ASSESSMENT & PLAN NOTE
Elevated February were excellent  No changes  Because of this, I feel we could use Tessalon Perles which are a cough suppressant

## 2022-03-26 DIAGNOSIS — J44.1 CHRONIC OBSTRUCTIVE PULMONARY DISEASE WITH ACUTE EXACERBATION (HCC): ICD-10-CM

## 2022-03-26 RX ORDER — BUDESONIDE AND FORMOTEROL FUMARATE DIHYDRATE 160; 4.5 UG/1; UG/1
AEROSOL RESPIRATORY (INHALATION)
Qty: 10.2 G | Refills: 5 | OUTPATIENT
Start: 2022-03-26

## 2022-03-26 RX ORDER — MOMETASONE FUROATE AND FORMOTEROL FUMARATE DIHYDRATE 200; 5 UG/1; UG/1
2 AEROSOL RESPIRATORY (INHALATION) 2 TIMES DAILY
Qty: 13 G | Refills: 5 | Status: SHIPPED | OUTPATIENT
Start: 2022-03-26 | End: 2022-03-29

## 2022-03-29 RX ORDER — MOMETASONE FUROATE AND FORMOTEROL FUMARATE DIHYDRATE 200; 5 UG/1; UG/1
AEROSOL RESPIRATORY (INHALATION)
Qty: 13 G | Refills: 5 | Status: SHIPPED | OUTPATIENT
Start: 2022-03-29 | End: 2022-03-29

## 2022-03-31 ENCOUNTER — OFFICE VISIT (OUTPATIENT)
Dept: PHYSICAL THERAPY | Facility: REHABILITATION | Age: 52
End: 2022-03-31
Payer: COMMERCIAL

## 2022-03-31 DIAGNOSIS — M75.42 SUBACROMIAL IMPINGEMENT OF LEFT SHOULDER: Primary | ICD-10-CM

## 2022-03-31 PROCEDURE — 97112 NEUROMUSCULAR REEDUCATION: CPT | Performed by: PHYSICAL THERAPIST

## 2022-03-31 PROCEDURE — 97140 MANUAL THERAPY 1/> REGIONS: CPT | Performed by: PHYSICAL THERAPIST

## 2022-03-31 PROCEDURE — 97110 THERAPEUTIC EXERCISES: CPT | Performed by: PHYSICAL THERAPIST

## 2022-03-31 NOTE — PROGRESS NOTES
Daily Note and Discharge Summary    Patient is discharged at this time due to a lapse in insurance coverage  Patient to call once her coverage resumes if further care is required  Today's date: 3/31/2022  Patient name: Manan Medina  : 1970  MRN: [id number]  Referring provider: Grace Casarez  Dx:   Encounter Diagnosis     ICD-10-CM    1  Subacromial impingement of left shoulder  M75 42        Start Time: 1200  Stop Time: 1245  Total time in clinic (min): 45 minutes    Subjective: Patient had some muscle soreness after last treatment and she has increased pain on arrival which she attributes to going to the gym daily and possibly overworking it      Objective: See treatment diary below      Assessment: Cueing required to complete band-resisted ER/IR through appropriate range and to prevent compensatory elbow extension  Slight limitation in end-range shoulder flexion passively compared to uninvolved side however patient responded well to manual therapy with improved mobility  Updated HEP to include sleeper stretch and band resisted external rotation with scapular retraction  Patient would benefit from continued PT      Plan: Continue per plan of care        Diagnosis: primary subacromial impingement syndrome   Precautions: active cancer, chemotherapy   Primary impairments: posterior capsular hypomobility, ER strength deficits, and middle/lower trapezius strength deficits   *asterisks by exercise = given for HEP    3/21 3/23 3/31     Manuals        L shoulder PROM and post/inf mobilizations    10' (grades II-III)                                     There Ex        UBE  6'  fwd/back x 6'     Sleeper stretch *  4x20"  20" x 3                                                             Neuro Re-Ed        Bent over extensions *  1 lb x 15 2x12 1 lb  1 lb 2 x 10     Bent over H  abd *  1 lb x 15 2x12 1 lb  1 lb 2 x 10     Bent over scaption  2x12  2 x 10     S/L ER *  1 lb x 15 1 lb x 15  1 lb x 10 S/L flex  2x12      Rhythmic stabilization balance point  3x30"      Band ER/IR  Red 2x12  red 2 x 10     Band no moneys *  Red 2x12  red 2 x 10     Band extensions  Red 2x12      Band H  abd  yellow 2x12      Wall slides with band    red x 15     3-way scapular band  Yellow 12x                              Re-evaluation             Ther Act/Gait                                         Modalities                                                   HEP: [id number]

## 2022-04-06 ENCOUNTER — APPOINTMENT (OUTPATIENT)
Dept: PHYSICAL THERAPY | Facility: REHABILITATION | Age: 52
End: 2022-04-06

## 2022-04-10 DIAGNOSIS — C50.911 CARCINOMA OF RIGHT BREAST METASTATIC TO AXILLARY LYMPH NODE (HCC): ICD-10-CM

## 2022-04-10 DIAGNOSIS — C78.7 LIVER METASTASES (HCC): ICD-10-CM

## 2022-04-10 DIAGNOSIS — C77.3 CARCINOMA OF RIGHT BREAST METASTATIC TO AXILLARY LYMPH NODE (HCC): ICD-10-CM

## 2022-04-12 ENCOUNTER — TELEPHONE (OUTPATIENT)
Dept: HEMATOLOGY ONCOLOGY | Facility: CLINIC | Age: 52
End: 2022-04-12

## 2022-04-12 NOTE — TELEPHONE ENCOUNTER
Spoke with patient about having to reschedule follow up due to Dr Jayden Franco being out of the office at that time  Provided details of new date and time  Patient voiced understanding

## 2022-04-13 ENCOUNTER — APPOINTMENT (OUTPATIENT)
Dept: PHYSICAL THERAPY | Facility: REHABILITATION | Age: 52
End: 2022-04-13

## 2022-04-20 ENCOUNTER — APPOINTMENT (OUTPATIENT)
Dept: PHYSICAL THERAPY | Facility: REHABILITATION | Age: 52
End: 2022-04-20

## 2022-04-27 ENCOUNTER — APPOINTMENT (OUTPATIENT)
Dept: PHYSICAL THERAPY | Facility: REHABILITATION | Age: 52
End: 2022-04-27

## 2022-06-08 DIAGNOSIS — F41.8 ANXIETY ABOUT HEALTH: ICD-10-CM

## 2022-06-08 NOTE — TELEPHONE ENCOUNTER
Primary palliative medicine provider: Mikey Salmon    Medication requested: Frutoso Angelia     If for pain, how has the patient been taking their pain medicine?      Last appointment: 2/24/22    Next scheduled appointment: PT DOES NOT HAVE APPT    PDMP review:   LAST FILLED 40 DAY SUPPLY ON 2/24/22

## 2022-06-09 DIAGNOSIS — F41.8 ANXIETY ABOUT HEALTH: ICD-10-CM

## 2022-06-09 RX ORDER — ALPRAZOLAM 0.25 MG/1
0.25 TABLET ORAL DAILY PRN
Qty: 40 TABLET | Refills: 0 | Status: CANCELLED | OUTPATIENT
Start: 2022-06-09

## 2022-06-09 RX ORDER — ALPRAZOLAM 0.25 MG/1
0.25 TABLET ORAL DAILY PRN
Qty: 40 TABLET | Refills: 0 | Status: SHIPPED | OUTPATIENT
Start: 2022-06-09

## 2022-06-10 ENCOUNTER — APPOINTMENT (OUTPATIENT)
Dept: RADIOLOGY | Facility: MEDICAL CENTER | Age: 52
End: 2022-06-10
Payer: COMMERCIAL

## 2022-06-10 ENCOUNTER — OFFICE VISIT (OUTPATIENT)
Dept: FAMILY MEDICINE CLINIC | Facility: CLINIC | Age: 52
End: 2022-06-10
Payer: COMMERCIAL

## 2022-06-10 VITALS
HEIGHT: 65 IN | RESPIRATION RATE: 16 BRPM | SYSTOLIC BLOOD PRESSURE: 110 MMHG | HEART RATE: 80 BPM | BODY MASS INDEX: 40.82 KG/M2 | DIASTOLIC BLOOD PRESSURE: 72 MMHG | WEIGHT: 245 LBS

## 2022-06-10 DIAGNOSIS — Z12.11 SCREEN FOR COLON CANCER: ICD-10-CM

## 2022-06-10 DIAGNOSIS — M25.562 ACUTE PAIN OF LEFT KNEE: ICD-10-CM

## 2022-06-10 DIAGNOSIS — Z78.0 POSTMENOPAUSAL: Primary | ICD-10-CM

## 2022-06-10 PROCEDURE — 99214 OFFICE O/P EST MOD 30 MIN: CPT | Performed by: NURSE PRACTITIONER

## 2022-06-10 PROCEDURE — 73562 X-RAY EXAM OF KNEE 3: CPT

## 2022-06-10 NOTE — ASSESSMENT & PLAN NOTE
Recommend ice 4 times per day for 15-20  Minutes  Can continue to exercise as tolerated   If effusion will refer to ortho   Xray ordered

## 2022-06-10 NOTE — PROGRESS NOTES
Assessment and Plan:    Problem List Items Addressed This Visit        Other    Postmenopausal - Primary     dexa ordered due to early menopause from bilateral oophorectomy            Relevant Orders    DXA bone density spine hip and pelvis    Acute pain of left knee     Recommend ice 4 times per day for 15-20  Minutes  Can continue to exercise as tolerated   If effusion will refer to ortho   Xray ordered           Relevant Orders    XR knee 3 vw left non injury      Other Visit Diagnoses     Screen for colon cancer        Relevant Orders    Ambulatory Referral to Gastroenterology                 Diagnoses and all orders for this visit:    Postmenopausal  -     DXA bone density spine hip and pelvis; Future    Acute pain of left knee  -     XR knee 3 vw left non injury; Future    Screen for colon cancer  -     Ambulatory Referral to Gastroenterology; Future            Subjective:      Patient ID: Vishnu Renner is a 45 y o  female  CC:    Chief Complaint   Patient presents with    Knee Pain     Left knee pain x 1 week  rcruz       HPI:    HPI     Patient goes to the gym 3-5 times per week and does  bicycle, treadmill  She states she tried it yesterday and could not do the bicycle for more than 10 minutes but was able to do the treadmill  She has pain laterally when she is turning  There is no discomfort with up and down activities  She has been taking OTC advil 400mg  The following portions of the patient's history were reviewed and updated as appropriate: allergies, current medications, past family history, past medical history, past social history, past surgical history and problem list       Review of Systems   Constitutional: Negative  Respiratory: Negative  Cardiovascular: Negative  Musculoskeletal: Positive for arthralgias and joint swelling           Data to review:       Objective:    Vitals:    06/10/22 1606   BP: 110/72   BP Location: Right arm   Patient Position: Sitting   Cuff Size: Large   Pulse: 80   Resp: 16   Weight: 111 kg (245 lb)   Height: 5' 5" (1 651 m)        Physical Exam  Vitals and nursing note reviewed  Constitutional:       General: She is not in acute distress  Appearance: Normal appearance  She is obese  She is not ill-appearing, toxic-appearing or diaphoretic  HENT:      Head: Normocephalic and atraumatic  Right Ear: External ear normal       Left Ear: External ear normal    Eyes:      Extraocular Movements: Extraocular movements intact  Conjunctiva/sclera: Conjunctivae normal       Pupils: Pupils are equal, round, and reactive to light  Cardiovascular:      Rate and Rhythm: Normal rate and regular rhythm  Heart sounds: Normal heart sounds, S1 normal and S2 normal  No murmur heard  Pulmonary:      Effort: Pulmonary effort is normal       Breath sounds: Normal breath sounds  Musculoskeletal:      Left knee: Swelling present  Normal range of motion  Tenderness present over the medial joint line  Skin:     Coloration: Skin is not pale  Neurological:      Mental Status: She is alert and oriented to person, place, and time  Psychiatric:         Mood and Affect: Mood normal          Behavior: Behavior normal          Thought Content:  Thought content normal          Judgment: Judgment normal

## 2022-06-13 ENCOUNTER — PATIENT MESSAGE (OUTPATIENT)
Dept: FAMILY MEDICINE CLINIC | Facility: CLINIC | Age: 52
End: 2022-06-13

## 2022-06-13 DIAGNOSIS — Z12.31 ENCOUNTER FOR SCREENING MAMMOGRAM FOR MALIGNANT NEOPLASM OF BREAST: Primary | ICD-10-CM

## 2022-06-13 NOTE — TELEPHONE ENCOUNTER
From: Hany Queen  To: EMILIANO Harrington  Sent: 6/13/2022 12:55 PM EDT  Subject: Mammogram     My oncologist said yes to the mammogram  Darn it  So I need to schedule one    I'm going to the office Thursday for my covid booster so I can set it up then

## 2022-06-15 ENCOUNTER — PATIENT MESSAGE (OUTPATIENT)
Dept: FAMILY MEDICINE CLINIC | Facility: CLINIC | Age: 52
End: 2022-06-15

## 2022-06-15 DIAGNOSIS — J44.1 CHRONIC OBSTRUCTIVE PULMONARY DISEASE WITH ACUTE EXACERBATION (HCC): ICD-10-CM

## 2022-06-16 ENCOUNTER — IMMUNIZATIONS (OUTPATIENT)
Dept: FAMILY MEDICINE CLINIC | Facility: CLINIC | Age: 52
End: 2022-06-16
Payer: COMMERCIAL

## 2022-06-16 DIAGNOSIS — Z23 NEED FOR COVID-19 VACCINE: Primary | ICD-10-CM

## 2022-06-16 PROCEDURE — 0054A PR IMM ADMN SARSCOV2 30MCG/0.3ML TRIS-SUCROSE BST: CPT

## 2022-06-16 PROCEDURE — 91305 PR SARSCOV2 VACCINE 30MCG/0.3ML TRIS-SUCROSE IM USE: CPT

## 2022-06-17 ENCOUNTER — TELEPHONE (OUTPATIENT)
Dept: FAMILY MEDICINE CLINIC | Facility: CLINIC | Age: 52
End: 2022-06-17

## 2022-06-17 DIAGNOSIS — S82.153A AVULSION FRACTURE OF TIBIAL TUBEROSITY: Primary | ICD-10-CM

## 2022-06-17 NOTE — TELEPHONE ENCOUNTER
Pt saw her x-ray results on MyChart and is wondering what she is supposed to do  I did let her know that results are sent to [address] before the provider can review them, and I let her know that we will contact her as soon as Radha Isaac reviews it

## 2022-06-21 NOTE — TELEPHONE ENCOUNTER
From: Hany Queen  To: EMILIANO Harrington  Sent: 6/15/2022 9:23 AM EDT  Subject: Daily inhaler     I was prescribed dulera by a doctor  Seriously not sure which one it's got to be on my records  That's $400 a month which I cannot afford  Attached is the message from my insurance company saying which ones are covered  If I could get a prescription for one of those that would be great

## 2022-06-23 ENCOUNTER — HOSPITAL ENCOUNTER (OUTPATIENT)
Dept: RADIOLOGY | Facility: HOSPITAL | Age: 52
Discharge: HOME | End: 2022-06-23
Attending: NEUROLOGICAL SURGERY
Payer: COMMERCIAL

## 2022-06-23 DIAGNOSIS — D33.3 UNILATERAL VESTIBULAR SCHWANNOMA (HCC): ICD-10-CM

## 2022-06-23 PROCEDURE — A9585 GADOBUTROL INJECTION: HCPCS | Performed by: NEUROLOGICAL SURGERY

## 2022-06-23 PROCEDURE — G1004 CDSM NDSC: HCPCS

## 2022-06-23 PROCEDURE — 70553 MRI BRAIN STEM W/O & W/DYE: CPT

## 2022-06-23 RX ADMIN — GADOBUTROL 11 ML: 604.72 INJECTION INTRAVENOUS at 13:29

## 2022-06-24 ENCOUNTER — OFFICE VISIT (OUTPATIENT)
Dept: NEUROSURGERY | Facility: CLINIC | Age: 52
End: 2022-06-24
Payer: COMMERCIAL

## 2022-06-24 VITALS
SYSTOLIC BLOOD PRESSURE: 125 MMHG | WEIGHT: 250 LBS | BODY MASS INDEX: 41.65 KG/M2 | TEMPERATURE: 97.4 F | DIASTOLIC BLOOD PRESSURE: 80 MMHG | OXYGEN SATURATION: 98 % | HEIGHT: 65 IN | HEART RATE: 79 BPM

## 2022-06-24 DIAGNOSIS — D33.3 VESTIBULAR SCHWANNOMA (HCC): Primary | ICD-10-CM

## 2022-06-24 DIAGNOSIS — I63.9 STROKE (HCC): ICD-10-CM

## 2022-06-24 PROCEDURE — 99214 OFFICE O/P EST MOD 30 MIN: CPT | Performed by: NEUROLOGICAL SURGERY

## 2022-06-24 NOTE — PROGRESS NOTES
Neurosurgery Office Note  Malini Mcbride 45 y o  female MRN: [id number]      Assessment/Plan        Problem List Items Addressed This Visit         Visit Diagnoses     Vestibular schwannoma [facility])    -  Primary    Relevant Orders    BUN    Creatinine, serum    MRI brain IAC wo and w contrast    Stroke [facility])        Relevant Orders    Ambulatory referral to Neurology            Discussion:  Patient was previously evaluated on 9/14/21 and 12/23/21  She is a 60-year-old female with h/o breast cancer metastatic to LN and liver s/p right mastectomy and LN resection (baseline right arm numbness), obesity (BMI 39) who p/w right-sided hearing loss for several months (30% loss per audiogram) as well as intermittent buzzing sound in the right ear (like busy phone sound), last evaluated by me on 9/14/21 for MRI showing an enhancing mass within the right IAC measuring 1 2 x 0 6 cm extending from the fundus of the IAC to the porus acusticus with minimal extension into the CP angle cistern, likely a vestibular schwannoma  Today, she reports no significant change in her previous symptoms  She still denies change in vision, facial numbness/weakness and any other new neurologic complaints  She read the recent MRI report and is tearful about the new infarcts  She reports the "only new thing I've done since last MRI is start diet and exercise", and I reassured her that is not the reason why she had new infarcts  She also reports several months of left knee pain, for which she is seeing an orthopedic surgeon for a possible fracture  Repeat MRI brain on 6/23 showed stable/unchanged right IAC lesion, still without significant extension into CP angle and without any mass effect on adjacent critical structures outside of the IAC  Of note, there are two new punctate infarcts in right frontal region, without significant mass effect, edema, or hemorrhagic conversion       Given the typical benign nature of VS, we will continue to monitor this lesion with repeat MRI brain IAC wwo contrast yearly with follow-up  She was advised to call our office if any new or change in symptoms  I will place an expedited referral to Neurology for new acute infarcts (requires comprehensive work-up, management, and prevention)  All questions and concerns were addressed during this visit  CHIEF COMPLAINT    Chief Complaint   Patient presents with    Follow-up     Unilateral vestibular schwannoma        HISTORY    History of Present Illness     45y o  year old female     HPI    See Discussion    REVIEW OF SYSTEMS    Review of Systems   Constitutional: Negative  HENT: Positive for hearing loss  Eyes: Positive for visual disturbance (wears glasses)  Respiratory:        Emphysema    Cardiovascular: Negative  Gastrointestinal: Negative  Endocrine: Negative  Genitourinary: Negative  Musculoskeletal: Positive for gait problem (balance issues)  Skin: Negative  Allergic/Immunologic: Negative  Hematological: Negative  Psychiatric/Behavioral: Negative  Meds/Allergies     Current Outpatient Medications   Medication Sig Dispense Refill    fluticasone-vilanterol (Breo Ellipta) 200-25 MCG/INH inhaler Inhale 1 puff daily Rinse mouth after use   180 blister 0    albuterol (Proventil HFA) 90 mcg/act inhaler Inhale 2 puffs every 6 (six) hours as needed for wheezing 6 7 g 5    ALPRAZolam (XANAX) 0 25 mg tablet Take 1 tablet (0 25 mg total) by mouth daily as needed (during day for anxiety as needed) 40 tablet 0    b complex vitamins capsule Take 1 capsule by mouth daily      cholecalciferol (VITAMIN D3) 1,000 units tablet Take 1 tablet (1,000 Units total) by mouth daily      olaparib (Lynparza) tablet TAKE 2 TABLETS (300 MG) TWICE A  tablet 2    oxyCODONE (ROXICODONE) 10 MG TABS Take 1 tablet (10 mg total) by mouth every 6 (six) hours as needed for moderate pain Max Daily Amount: 40 mg 30 tablet 0     No current facility-administered medications for this visit         Allergies   Allergen Reactions    Tylenol [Acetaminophen]      Told to avoid due to cancer        PAST HISTORY    Past Medical History:   Diagnosis Date    Bloating     Bruises easily     Cancer ([address] )     right breast//to lymph nodes/liver/ and bone    Depression     History of cancer chemotherapy 2020    History of pneumonia     Hypotension     occas    Low iron     "when pregnant, 20 yrs ago"    Neuropathy     hands//fingers    Shortness of breath     occas    Tinnitus     right    Wears glasses        Past Surgical History:   Procedure Laterality Date    BILATERAL SALPINGOOPHORECTOMY      BREAST BIOPSY      IR BIOPSY LIVER MASS  2019    MYOMECTOMY      H/O FIBROIDS, NO SURGERY NEEDED    AK LAP,RMV  ADNEXAL STRUCTURE N/A 2019    Procedure: LAPAROSCOPIC BILATERAL SALPINGO-OOPHORECTOMY;  Surgeon: Vinod Sahu MD;  [facility]: [facility];  Service: Gynecology Oncology    AK MASTECTOMY, SIMPLE, COMPLETE Right 2020    Procedure: MASTECTOMY SIMPLE, axillary node dissection;  Surgeon: Wendy Mckinnon MD;  Location: [facility];  Service: Surgical Oncology    US GUIDED BREAST BIOPSY RIGHT COMPLETE Right 5/3/2019    US GUIDED BREAST LYMPH NODE BIOPSY RIGHT Right 5/3/2019       Social History     Tobacco Use    Smoking status: Former Smoker     Packs/day: 1 00     Years: 30 00     Pack years: 30 00     Quit date:      Years since quittin 4    Smokeless tobacco: Never Used   Vaping Use    Vaping Use: Never used   Substance Use Topics    Alcohol use: Yes     Comment: occassional    Drug use: No       Family History   Problem Relation Age of Onset    Hypotension Mother     Colon cancer Father 36    Hypertension Father     Prostate cancer Father 79    Throat cancer Maternal Grandmother 61    Cancer Maternal Grandmother     Breast cancer Paternal Aunt         age unknown         The following portions of the patient's history were reviewed in this encounter and updated as appropriate: Past medical, surgical, family, and social history, as well as medications, allergies, and review of systems  EXAM    Vitals:Blood pressure 125/80, pulse 79, temperature (!) 97 4 °F (36 3 °C), temperature source Temporal, height 5' 5" (1 651 m), weight 113 kg (250 lb), SpO2 98 %  ,There is no height or weight on file to calculate BMI  Physical Exam  Constitutional:       Appearance: Normal appearance  HENT:      Head: Normocephalic and atraumatic  Eyes:      Extraocular Movements: Extraocular movements intact and EOM normal       Pupils: Pupils are equal, round, and reactive to light  Cardiovascular:      Rate and Rhythm: Normal rate and regular rhythm  Pulses: Normal pulses  Heart sounds: Normal heart sounds  Pulmonary:      Effort: Pulmonary effort is normal       Breath sounds: Normal breath sounds  Abdominal:      General: Abdomen is flat  Palpations: Abdomen is soft  Musculoskeletal:         General: Normal range of motion  Cervical back: Normal range of motion  Skin:     General: Skin is warm  Neurological:      General: No focal deficit present  Mental Status: She is alert and oriented to person, place, and time  Mental status is at baseline  Gait: Gait is intact  Deep Tendon Reflexes: Strength normal    Psychiatric:         Mood and Affect: Mood normal          Speech: Speech normal          Behavior: Behavior normal          Neurologic Exam     Mental Status   Oriented to person, place, and time  Attention: normal    Speech: speech is normal   Level of consciousness: alert    Cranial Nerves     CN II   Visual fields full to confrontation  Visual acuity: normal  Right visual field deficit: none  Left visual field deficit: none     CN III, IV, VI   Pupils are equal, round, and reactive to light    Extraocular motions are normal    CN III: no CN III palsy  CN VI: no CN VI palsy  Nystagmus: none   Diplopia: none  Ophthalmoparesis: none  Upgaze: normal  Downgaze: normal  Conjugate gaze: present    CN V   Facial sensation intact  Right facial sensation deficit: none  Left facial sensation deficit: none    CN VII   Facial expression full, symmetric  Right facial weakness: none  Left facial weakness: none    CN IX, X   CN IX normal    CN X normal    Palate: symmetric    CN XI   CN XI normal    Right sternocleidomastoid strength: normal  Left sternocleidomastoid strength: normal  Right trapezius strength: normal  Left trapezius strength: normal    CN XII   CN XII normal    Tongue deviation: none  Unchanged right hearing loss with tinnitus     Motor Exam   Muscle bulk: normal  Overall muscle tone: normal  Right arm pronator drift: absent  Left arm pronator drift: absent    Strength   Strength 5/5 throughout  Sensory Exam   Light touch normal      Gait, Coordination, and Reflexes     Gait  Gait: normal    Reflexes   Reflexes 2+ except as noted  MEDICAL DECISION MAKING    Imaging Studies:     XR knee 3 vw left non injury    Result Date: 6/16/2022  Narrative: LEFT KNEE INDICATION:   M25 562: Pain in left knee  COMPARISON:  11/24/2006  VIEWS:  XR KNEE 3 VW LEFT NON INJURY Images: 3 FINDINGS: There is a small osseous fragment seen along the medial tibial spine which may reflect an avulsion fracture fragment, age indeterminate  There is a small joint effusion  No significant degenerative changes  No lytic or blastic osseous lesion  Soft tissues are unremarkable  Impression: Small osseous fragment along the medial tibial spine may reflect an avulsion fracture fragment, age indeterminate  Small joint effusion  If clinically indicated, this can be further evaluated with MRI  The study was marked in EPIC for significant notification   Workstation performed: [id number]     MRI brain IAC wo and w contrast    Result Date: 6/23/2022  Narrative: MRI BRAIN AND IAC'S -  WITH AND WITHOUT CONTRAST INDICATION: D33 3: Benign neoplasm of cranial nerves  COMPARISON:  12/18/2021 TECHNIQUE: Brain:   Sagittal T1, axial T2, axial Gradient, axial T1, axial FLAIR, axial diffusion imaging  Axial T1 postcontrast   Axial BRAVO post contrast  IAC'S:  Coronal FIESTA, coronal T1 postcontrast, axial T1 postcontrast with fat suppression  Targeted images of the IAC'S were performed requiring additional time at acquisition and interpretation of approximately 25% IV Contrast:  11 mL of Gadobutrol injection (SINGLE-DOSE) IMAGE QUALITY:   Diagnostic  FINDINGS: BRAIN PARENCHYMA:  Diffusion imaging demonstrates a small cortical infarct within the right frontal lobe on series 3 image 25 measuring less than 1 cm  There is an additional punctate juxtacortical infarct within the right posterior frontal lobe on series 3 image 26  No mass, mass effect or midline shift  No acute or chronic hemorrhage  Minor white matter change suggestive of chronic microangiopathy within the frontal lobes  There is no abnormal enhancement of the cerebral hemispheres, brainstem or cerebellum  IAC'S:  There is a homogeneously enhancing right IAC mass extending medially into the CP angle without mass effect upon the brainstem or cerebellum, see series 11 images 8 and 9  VENTRICLES:  Normal  SELLA AND PITUITARY GLAND:  Normal  ORBITS:  Normal  PARANASAL SINUSES:  Normal  VASCULATURE:  Evaluation of the major intracranial vasculature demonstrates appropriate flow voids  CALVARIUM AND SKULL BASE:  Normal  EXTRACRANIAL SOFT TISSUES:  Normal      Impression: 2 small infarcts within the right frontal lobe with no mass effect or hemorrhagic transformation  Right CP angle mass consistent with acoustic neuroma, grossly unchanged in size  Workstation performed: [id number]       I have personally reviewed pertinent films in [facility]    Neurosurgeon

## 2022-06-29 ENCOUNTER — TELEPHONE (OUTPATIENT)
Dept: NEUROLOGY | Facility: CLINIC | Age: 52
End: 2022-06-29

## 2022-06-29 ENCOUNTER — CONSULT (OUTPATIENT)
Dept: NEUROLOGY | Facility: CLINIC | Age: 52
End: 2022-06-29
Payer: COMMERCIAL

## 2022-06-29 VITALS
TEMPERATURE: 96.7 F | BODY MASS INDEX: 41.05 KG/M2 | WEIGHT: 246.4 LBS | DIASTOLIC BLOOD PRESSURE: 78 MMHG | SYSTOLIC BLOOD PRESSURE: 122 MMHG | HEIGHT: 65 IN

## 2022-06-29 DIAGNOSIS — Z82.49 FAMILY HISTORY OF ANEURYSM OF BLOOD VESSEL OF BRAIN: Primary | ICD-10-CM

## 2022-06-29 DIAGNOSIS — I63.9 STROKE (HCC): ICD-10-CM

## 2022-06-29 DIAGNOSIS — D33.3 SCHWANNOMA OF CRANIAL NERVE (HCC): Chronic | ICD-10-CM

## 2022-06-29 PROCEDURE — 3008F BODY MASS INDEX DOCD: CPT | Performed by: PSYCHIATRY & NEUROLOGY

## 2022-06-29 PROCEDURE — 99204 OFFICE O/P NEW MOD 45 MIN: CPT | Performed by: PSYCHIATRY & NEUROLOGY

## 2022-06-29 PROCEDURE — 1036F TOBACCO NON-USER: CPT | Performed by: PSYCHIATRY & NEUROLOGY

## 2022-06-29 NOTE — ASSESSMENT & PLAN NOTE
Pt follows with ent and NS  Pt with 30 % hearing loss   Pt had last mri head in June with current stability in schwannoma

## 2022-06-29 NOTE — PROGRESS NOTES
Patient ID: Wilbur Guadarrama is a 45 y o  female  Assessment/Plan:    Stroke [facility])  Pt here today for initial neuro visit  Pt referred by dr Sho Boone from [facility]  Pt has been following with NS for vestibular schwannoma surveillance  Pt sent due to incidental 2 cva on surviellance imaging from June  Both cva positive on dwi  Pt without any tia or cva like sxs to date  Pt with family history of cerebral aneurysm in aunt  Pt with history of tob use up to 2017  Heart disease on dads side  Pt had last chol panel in 2020  Rev labs from that interval with total chol 201  Pt follows with pcp as well as oncology due to breast cancer history with mets to liver  Pt needs full cva workup  Rev pathophysiology of cva ie atherosclerotic vs embolic disease  Will check carotid ultrasound, echo, holter monitor and mra head  Also rec thrombosis panel  Pt to also alert her primary team ie pcp and oncologist as well  Pt rec to take 81 mg asa if ok with above team, will task them  Pt aware to go to er for any acute neuro changes  Follow up after studies    Schwannoma of cranial nerve ([facility] [address] )  Pt follows with ent and NS  Pt with 30 % hearing loss   Pt had last mri head in June with current stability in schwannoma       Diagnoses and all orders for this visit:    Family history of aneurysm of blood vessel of brain  -     MRI angiogram head without contrast; Future    Stroke [facility])  -     Ambulatory referral to Neurology  -     VAS carotid complete study; Future  -     Echo complete w/ contrast if indicated; Future  -     Thrombosis Panel; Future  -     Lipid panel; Future  -     Holter monitor; Future  -     MRI angiogram head without contrast; Future    Schwannoma of cranial nerve (HCC)           Subjective:    HPI  Pt is a 44 yo f  With pmh of right breast cancer with liver mets , acoustic schwanoma who  Presents today for evaluation of abnormal mri head with new incidental finding of cva    Pt is currently followed closely by oncology due to known history of breast cancer since 2019  Pt with family history of breast cancer as well  Pt notes she had mastectomy and chemo treatments initially  Pt has been on lynparza for almost 2 yrs  Pt is following with oncology team due to liver mets as well  Pt notes she had been seeing ent due to decreased hearing that occurred rather acutely  Pt had imaging done including iacs and found to have schwannoma  Pt has been following several times now with dr Florian Kumar for continued surviellance along with audiology  Pt had imaging in dec and then again in June  At timing of June imaging pt was also alerted to 2 positive dwi infarcts  Pt had study rev with NS and sent to pcp , oncology and neurology to review  Rev study in detail with pt    6/23/22- 2 small infarcts within the right frontal lobe with no mass effect or hemorrhagic transformation  Right CP angle mass consistent with acoustic neuroma, grossly unchanged in size  Diffusion imaging demonstrates a small cortical infarct within the right frontal lobe on series 3 image 25 measuring less than 1 cm  There is an additional punctate juxtacortical infarct within the right posterior frontal lobe on   series 3 image 26  No mass, mass effect or midline shift  No acute or chronic hemorrhage  Minor white matter change suggestive of chronic microangiopathy within the frontal lobes  There is no abnormal enhancement of the cerebral hemispheres, brainstem or cerebellum  Pt denies any sxs of tia or cva  Pt denies any change in vision  No loss of vision  No pain in eyes  No falls or trips  No loc  No sz  No change in bowel or bladder  No change in speech or swallowing  Pt notes she had no sxs prior to imaging and again found incidentally  Pt with family history of heart disease on dads side  No cva history  Pt notes cancer history on both sides of family including breast cancer  Pt notes cerebral aneurysm in her aunt  Pt denies any vertigo or dizziness     No ha or no rv  Pt notes overall feeling ok  Rev typical pathophysiology of cva - thrombosis vs embolic disease  Currently no change in exam or sxs  Will need to do full cva workup  Also rec consideration with 81 mg asa  Pt needs to check with medical team ie pcp and oncology if any contraindication  Pt was concerned about her liver mets  Pt  Notes occ palpitations but no chest pain or chest pressure  Pt had covid last year  Pt notes no fever chills or cough presently  Pt denies any history of htn or dm  Pt smoked for about 30 yrs up till 2017  Will also update lipid panel as last done in 2020  Total chol 201, , HDL 55    Pt to also follow up with pcp office for cva risk factor management          The following portions of the patient's history were reviewed and updated as appropriate: allergies, current medications, past family history, past medical history, past social history, past surgical history and problem list and med list and ros rev  Objective:    Blood pressure 122/78, temperature (!) 96 7 °F (35 9 °C), temperature source Tympanic, height 5' 5" (1 651 m), weight 112 kg (246 lb 6 4 oz)  Physical Exam  Constitutional:       General: She is not in acute distress  Appearance: She is not ill-appearing  Eyes:      General: Lids are normal       Pupils: Pupils are equal, round, and reactive to light  Musculoskeletal:      Right lower leg: No edema  Left lower leg: No edema  Neurological:      Mental Status: She is alert  Deep Tendon Reflexes: Strength normal and reflexes are normal and symmetric  Neurological Exam  Mental Status  Alert  Cranial Nerves  CN II: Visual acuity is normal  Visual fields full to confrontation  CN III, IV, VI: Right eye stabismus, exotropia  Normal lids and orbits bilaterally  Pupils equal round and reactive to light bilaterally  CN V: Facial sensation is normal   CN VII: Full and symmetric facial movement    CN VIII: Hearing is normal   CN IX, X: Palate elevates symmetrically  Normal gag reflex  CN XI: Shoulder shrug strength is normal   CN XII: Tongue midline without atrophy or fasciculations  Motor  Normal muscle bulk throughout  Normal muscle tone  No abnormal involuntary movements  Strength is 5/5 throughout all four extremities  Sensory  Sensation is intact to light touch, pinprick, vibration and proprioception in all four extremities  Reflexes  Deep tendon reflexes are 2+ and symmetric in all four extremities  Coordination  Right: Finger-to-nose normal  Rapid alternating movement normal Left: Finger-to-nose normal  Rapid alternating movement normal     Gait  Casual gait is normal including stance, stride, and arm swing  ROS:    Review of Systems   Constitutional: Negative  Negative for appetite change and fever  HENT: Negative  Negative for hearing loss, tinnitus, trouble swallowing and voice change  Eyes: Negative  Negative for photophobia and pain  Respiratory: Negative  Negative for shortness of breath  Cardiovascular: Negative  Negative for palpitations  Gastrointestinal: Negative  Negative for nausea and vomiting  Endocrine: Negative  Negative for cold intolerance  Genitourinary: Negative  Negative for dysuria, frequency and urgency  Musculoskeletal: Negative  Negative for myalgias and neck pain  Skin: Negative  Negative for rash  Neurological: Negative  Negative for dizziness, tremors, seizures, syncope, facial asymmetry, speech difficulty, weakness, light-headedness, numbness and headaches  Hematological: Negative  Does not bruise/bleed easily  Psychiatric/Behavioral: Negative  Negative for confusion, hallucinations and sleep disturbance

## 2022-06-29 NOTE — ASSESSMENT & PLAN NOTE
Pt here today for initial neuro visit  Pt referred by dr Raquel Eisenberg from [facility]  Pt has been following with NS for vestibular schwannoma surveillance  Pt sent due to incidental 2 cva on surviellance imaging from June  Both cva positive on dwi  Pt without any tia or cva like sxs to date  Pt with family history of cerebral aneurysm in aunt  Pt with history of tob use up to 2017  Heart disease on dads side  Pt had last chol panel in 2020  Rev labs from that interval with total chol 201  Pt follows with pcp as well as oncology due to breast cancer history with mets to liver  Pt needs full cva workup  Rev pathophysiology of cva ie atherosclerotic vs embolic disease  Will check carotid ultrasound, echo, holter monitor and mra head  Also rec thrombosis panel  Pt to also alert her primary team ie pcp and oncologist as well  Pt rec to take 81 mg asa if ok with above team, will task them  Pt aware to go to er for any acute neuro changes  Follow up after studies

## 2022-06-30 ENCOUNTER — TELEPHONE (OUTPATIENT)
Dept: FAMILY MEDICINE CLINIC | Facility: CLINIC | Age: 52
End: 2022-06-30

## 2022-06-30 ENCOUNTER — TELEPHONE (OUTPATIENT)
Dept: NEUROLOGY | Facility: CLINIC | Age: 52
End: 2022-06-30

## 2022-06-30 ENCOUNTER — PATIENT MESSAGE (OUTPATIENT)
Dept: PALLIATIVE MEDICINE | Facility: CLINIC | Age: 52
End: 2022-06-30

## 2022-06-30 ENCOUNTER — APPOINTMENT (OUTPATIENT)
Dept: LAB | Facility: HOSPITAL | Age: 52
End: 2022-06-30
Attending: NEUROLOGICAL SURGERY
Payer: COMMERCIAL

## 2022-06-30 DIAGNOSIS — I63.9 STROKE (HCC): ICD-10-CM

## 2022-06-30 DIAGNOSIS — D33.3 VESTIBULAR SCHWANNOMA (HCC): ICD-10-CM

## 2022-06-30 DIAGNOSIS — I63.9 CEREBROVASCULAR ACCIDENT (CVA), UNSPECIFIED MECHANISM (HCC): Primary | ICD-10-CM

## 2022-06-30 LAB
BUN SERPL-MCNC: 20 MG/DL (ref 5–25)
CHOLEST SERPL-MCNC: 220 MG/DL
CREAT SERPL-MCNC: 1.03 MG/DL (ref 0.6–1.3)
DEPRECATED AT III PPP: 96 % OF NORMAL (ref 92–136)
GFR SERPL CREATININE-BSD FRML MDRD: 62 ML/MIN/1.73SQ M
HDLC SERPL-MCNC: 45 MG/DL
LDLC SERPL CALC-MCNC: 141 MG/DL (ref 0–100)
NONHDLC SERPL-MCNC: 175 MG/DL
PROT C AG ACT/NOR PPP IA: 124 % OF NORMAL (ref 60–150)
PROT S ACT/NOR PPP: 103 % (ref 68–108)
TRIGL SERPL-MCNC: 172 MG/DL

## 2022-06-30 PROCEDURE — 85306 CLOT INHIBIT PROT S FREE: CPT

## 2022-06-30 PROCEDURE — 85300 ANTITHROMBIN III ACTIVITY: CPT

## 2022-06-30 PROCEDURE — 80061 LIPID PANEL: CPT

## 2022-06-30 PROCEDURE — 82565 ASSAY OF CREATININE: CPT

## 2022-06-30 PROCEDURE — 85732 THROMBOPLASTIN TIME PARTIAL: CPT

## 2022-06-30 PROCEDURE — 85305 CLOT INHIBIT PROT S TOTAL: CPT

## 2022-06-30 PROCEDURE — 36415 COLL VENOUS BLD VENIPUNCTURE: CPT

## 2022-06-30 PROCEDURE — 81240 F2 GENE: CPT

## 2022-06-30 PROCEDURE — 85303 CLOT INHIBIT PROT C ACTIVITY: CPT

## 2022-06-30 PROCEDURE — 84520 ASSAY OF UREA NITROGEN: CPT

## 2022-06-30 PROCEDURE — 85670 THROMBIN TIME PLASMA: CPT

## 2022-06-30 PROCEDURE — 86146 BETA-2 GLYCOPROTEIN ANTIBODY: CPT

## 2022-06-30 PROCEDURE — 85705 THROMBOPLASTIN INHIBITION: CPT

## 2022-06-30 PROCEDURE — 86147 CARDIOLIPIN ANTIBODY EA IG: CPT

## 2022-06-30 PROCEDURE — 85613 RUSSELL VIPER VENOM DILUTED: CPT

## 2022-06-30 PROCEDURE — 81241 F5 GENE: CPT

## 2022-06-30 NOTE — TELEPHONE ENCOUNTER
Patient called in and stated that her neurologist suggested that she follow up with her pcp about something to manage her cholesterol  Please advise   Thank you

## 2022-06-30 NOTE — TELEPHONE ENCOUNTER
Spoke w/patient  Advised her of results and recommendations  Patient verbalized understanding  She will f/u with PCP

## 2022-07-01 RX ORDER — ATORVASTATIN CALCIUM 40 MG/1
40 TABLET, FILM COATED ORAL DAILY
Qty: 90 TABLET | Refills: 1 | Status: SHIPPED | OUTPATIENT
Start: 2022-07-01

## 2022-07-01 NOTE — TELEPHONE ENCOUNTER
Pt calling to inform that she has not yet heard back from PCP and is asking if we had heard anything from PCP regarding Dr Braulio Pal recommendation  Advised pt that we have not received any communication from her PCP regarding cholesterol management  Called PCP office and they say they did receive call from pt and sent message to provider  Response is still pending  Pt will receive cb when her PCP responds  Pt made aware of same  Best cb [phone], ok to leave detailed message

## 2022-07-01 NOTE — TELEPHONE ENCOUNTER
Pt aware and she is asking for a 90 day supply( to save money) to [facility] [address] [facility]

## 2022-07-02 LAB
APTT SCREEN TO CONFIRM RATIO: 1.16 RATIO (ref 0–1.34)
CONFIRM APTT/NORMAL: 38.6 SEC (ref 0–47.6)
LA PPP-IMP: NORMAL
PROT S ACT/NOR PPP: 99 % (ref 61–136)
PROT S PPP-ACNC: 108 % (ref 60–150)
SCREEN APTT: 29.1 SEC (ref 0–51.9)
SCREEN DRVVT: 37.2 SEC (ref 0–47)
THROMBIN TIME: 17.7 SEC (ref 0–23)

## 2022-07-06 LAB — F5 GENE MUT ANL BLD/T: NORMAL

## 2022-07-07 LAB
B2 GLYCOPROT1 IGA SERPL IA-ACNC: 2.2
B2 GLYCOPROT1 IGG SERPL IA-ACNC: 1.2
B2 GLYCOPROT1 IGM SERPL IA-ACNC: 3.2
CARDIOLIPIN IGA SER IA-ACNC: 2.8
CARDIOLIPIN IGG SER IA-ACNC: 1.8
CARDIOLIPIN IGM SER IA-ACNC: 1.4
F2 GENE MUT ANL BLD/T: NORMAL

## 2022-07-08 ENCOUNTER — HOSPITAL ENCOUNTER (OUTPATIENT)
Dept: BONE DENSITY | Facility: MEDICAL CENTER | Age: 52
Discharge: HOME | End: 2022-07-08
Payer: COMMERCIAL

## 2022-07-08 DIAGNOSIS — Z78.0 POSTMENOPAUSAL: ICD-10-CM

## 2022-07-08 PROCEDURE — 77080 DXA BONE DENSITY AXIAL: CPT

## 2022-07-11 ENCOUNTER — OFFICE VISIT (OUTPATIENT)
Dept: OBGYN CLINIC | Facility: MEDICAL CENTER | Age: 52
End: 2022-07-11
Payer: COMMERCIAL

## 2022-07-11 ENCOUNTER — HOSPITAL ENCOUNTER (OUTPATIENT)
Dept: NON INVASIVE DIAGNOSTICS | Facility: CLINIC | Age: 52
Discharge: HOME | End: 2022-07-11
Payer: COMMERCIAL

## 2022-07-11 VITALS
WEIGHT: 242.8 LBS | SYSTOLIC BLOOD PRESSURE: 110 MMHG | HEIGHT: 65 IN | BODY MASS INDEX: 40.45 KG/M2 | DIASTOLIC BLOOD PRESSURE: 65 MMHG

## 2022-07-11 DIAGNOSIS — S82.153A AVULSION FRACTURE OF TIBIAL TUBEROSITY: ICD-10-CM

## 2022-07-11 DIAGNOSIS — I63.9 STROKE (HCC): ICD-10-CM

## 2022-07-11 DIAGNOSIS — S83.242A OTHER TEAR OF MEDIAL MENISCUS, CURRENT INJURY, LEFT KNEE, INITIAL ENCOUNTER: Primary | ICD-10-CM

## 2022-07-11 PROCEDURE — 93880 EXTRACRANIAL BILAT STUDY: CPT | Performed by: SURGERY

## 2022-07-11 PROCEDURE — 93880 EXTRACRANIAL BILAT STUDY: CPT

## 2022-07-11 PROCEDURE — 99213 OFFICE O/P EST LOW 20 MIN: CPT | Performed by: ORTHOPAEDIC SURGERY

## 2022-07-11 NOTE — PROGRESS NOTES
Ortho Sports Medicine Knee New Patient Visit     Assesment:   47 y o  female left knee possible medial meniscus tear    Plan:    Conservative treatment:    Ice to knee for 20 minutes at least 1-2 times daily  PT for ROM/strengthening to knee, hip and core  Hinged knee brace ordered  Imaging: All imaging from today was reviewed by myself and explained to the patient  Injection:    No Injection planned at this time  Surgery:     No surgery is recommended at this point, continue with conservative treatment plan as noted  Follow up:    No follow-ups on file  Chief Complaint   Patient presents with    Left Knee - Pain       History of Present Illness: The patient is a 47 y o  female whose occupation is , referred to me by their primary care physician, seen in clinic for consultation of left knee pain  Pain is located medial   The patient rates the pain as a 8/10  The pain has been present for a few weeks  The patient does not recall sustaining a specific injury  The mechanism of injury was uknown  The pain is characterized as sharp, stabbing  The pain is present daily  Pain is improved by rest, ice and NSAIDS  Pain is aggravated by stairs, squatting, weight bearing, walking, sitting, standing and pivoting on a planted foot  Symptoms include clicking catching and swelling  The patient has tried bracing, compression and Advil        Knee Surgical History:  None    Past Medical, Social and Family History:  Past Medical History:   Diagnosis Date    Bloating     Bruises easily     Cancer ([address] )     right breast//to lymph nodes/liver/ and bone    Depression     History of cancer chemotherapy 05/2020    History of pneumonia     Hypotension     occas    Low iron     "when pregnant, 20 yrs ago"    Neuropathy     hands//fingers    Shortness of breath     occas    Tinnitus     right    Wears glasses      Past Surgical History:   Procedure Laterality Date  BILATERAL SALPINGOOPHORECTOMY      BREAST BIOPSY      IR BIOPSY LIVER MASS  6/11/2019    MYOMECTOMY      H/O FIBROIDS, NO SURGERY NEEDED    WA LAP,RMV  ADNEXAL STRUCTURE N/A 7/29/2019    Procedure: LAPAROSCOPIC BILATERAL SALPINGO-OOPHORECTOMY;  Surgeon: Elyse Mccurdy MD;  Location: [facility];  Service: Gynecology Oncology    WA MASTECTOMY, SIMPLE, COMPLETE Right 6/5/2020    Procedure: MASTECTOMY SIMPLE, axillary node dissection;  Surgeon: Tevin Mullins MD;  Location: [facility];  Service: Surgical Oncology    US GUIDED BREAST BIOPSY RIGHT COMPLETE Right 5/3/2019    US GUIDED BREAST LYMPH NODE BIOPSY RIGHT Right 5/3/2019     Allergies   Allergen Reactions    Tylenol [Acetaminophen]      Told to avoid due to cancer      Current Outpatient Medications on File Prior to Visit   Medication Sig Dispense Refill    albuterol (Proventil HFA) 90 mcg/act inhaler Inhale 2 puffs every 6 (six) hours as needed for wheezing 6 7 g 5    ALPRAZolam (XANAX) 0 25 mg tablet Take 1 tablet (0 25 mg total) by mouth daily as needed (during day for anxiety as needed) 40 tablet 0    ASPIRIN 81 PO Take by mouth      atorvastatin (LIPITOR) 40 mg tablet Take 1 tablet (40 mg total) by mouth daily 90 tablet 1    b complex vitamins capsule Take 1 capsule by mouth daily      cholecalciferol (VITAMIN D3) 1,000 units tablet Take 1 tablet (1,000 Units total) by mouth daily      fluticasone-vilanterol (Breo Ellipta) 200-25 MCG/INH inhaler Inhale 1 puff daily Rinse mouth after use  180 blister 0    olaparib (Lynparza) tablet TAKE 2 TABLETS (300 MG) TWICE A  tablet 2    oxyCODONE (ROXICODONE) 10 MG TABS Take 1 tablet (10 mg total) by mouth every 6 (six) hours as needed for moderate pain Max Daily Amount: 40 mg 30 tablet 0     No current facility-administered medications on file prior to visit       Social History     Socioeconomic History    Marital status: Single     Spouse name: Not on file    Number of children: Not on file    Years of education: Not on file    Highest education level: Not on file   Occupational History    Not on file   Tobacco Use    Smoking status: Former Smoker     Packs/day: 1 00     Years: 30 00     Pack years: 30 00     Quit date:      Years since quittin 5    Smokeless tobacco: Never Used   Vaping Use    Vaping Use: Never used   Substance and Sexual Activity    Alcohol use: Yes     Comment: occassional    Drug use: No    Sexual activity: Not Currently     Partners: Male     Birth control/protection: None   Other Topics Concern    Not on file   Social History Narrative    Consumes 2-3 cups of coffee per day     Social Determinants of Health     Financial Resource Strain: Not on file   Food Insecurity: Not on file   Transportation Needs: Not on file   Physical Activity: Not on file   Stress: Not on file   Social Connections: Not on file   Intimate Partner Violence: Not on file   Housing Stability: Not on file         I have reviewed the past medical, surgical, social and family history, medications and allergies as documented in the EMR  Review of systems: ROS is negative other than that noted in the HPI  Constitutional: Negative for fatigue and fever  HENT: Negative for sore throat  Respiratory: Negative for shortness of breath  Cardiovascular: Negative for chest pain  Gastrointestinal: Negative for abdominal pain  Endocrine: Negative for cold intolerance and heat intolerance  Genitourinary: Negative for flank pain  Musculoskeletal: Negative for back pain  Skin: Negative for rash  Allergic/Immunologic: Negative for immunocompromised state  Neurological: Negative for dizziness  Psychiatric/Behavioral: Negative for agitation  Physical Exam:    Blood pressure 110/65, height 5' 5" (1 651 m), weight 110 kg (242 lb 12 8 oz)      General/Constitutional: NAD, well developed, well nourished  HENT: Normocephalic, atraumatic  CV: Intact distal pulses, regular rate  Resp: No respiratory distress or labored breathing  Lymphatic: No lymphadenopathy palpated  Neuro: Alert and Oriented x 3, no focal deficits  Psych: Normal mood, normal affect, normal judgement, normal behavior  Skin: Warm, dry, no rashes, no erythema      Knee Exam (focused): RIGHT LEFT   ROM:   0-130 0-130   Palpation: Effusion negative negative     MJL tenderness Negative Positive     LJL tenderness Negative Negative   Meniscus: Jaci Negative Negative    Apley's Compression Negative Negative   Instability: Varus stable stable     Valgus stable stable   Special Tests: Lachman Negative Negative     Posterior drawer Negative Negative     Anterior drawer Negative Negative     Pivot shift not tested not tested     Dial not tested not tested   Patella: Palpation no tenderness no tenderness     Mobility 1/4 1/4     Apprehension Negative Negative   Other: Single leg 1/4 squat not tested not tested      LE NV Exam: +2 DP/PT pulses bilaterally  Sensation intact to light touch L2-S1 bilaterally     Bilateral hip ROM demonstrates no pain actively or passively    No calf tenderness to palpation bilaterally    Knee Imaging    X-rays of the left knee were reviewed, which demonstrate small osseous fragment along the medial tibial spine  There is a small joint effusion     I have reviewed the radiology report and agree with their impression        Scribe Attestation    I,:  Lewis Hill am acting as a scribe while in the presence of the attending physician :       I,:  Demarco Briones, DO personally performed the services described in this documentation    as scribed in my presence :

## 2022-07-12 ENCOUNTER — HOSPITAL ENCOUNTER (OUTPATIENT)
Dept: RADIOLOGY | Facility: HOSPITAL | Age: 52
Discharge: HOME | End: 2022-07-12
Attending: PSYCHIATRY & NEUROLOGY
Payer: COMMERCIAL

## 2022-07-12 DIAGNOSIS — I63.9 STROKE (HCC): ICD-10-CM

## 2022-07-12 DIAGNOSIS — Z82.49 FAMILY HISTORY OF ANEURYSM OF BLOOD VESSEL OF BRAIN: ICD-10-CM

## 2022-07-12 PROCEDURE — 70544 MR ANGIOGRAPHY HEAD W/O DYE: CPT

## 2022-07-12 PROCEDURE — G1004 CDSM NDSC: HCPCS

## 2022-07-13 DIAGNOSIS — I67.1 CEREBRAL ANEURYSM: Primary | ICD-10-CM

## 2022-07-14 ENCOUNTER — HOSPITAL ENCOUNTER (OUTPATIENT)
Dept: NON INVASIVE DIAGNOSTICS | Facility: HOSPITAL | Age: 52
Discharge: HOME | End: 2022-07-14
Attending: PSYCHIATRY & NEUROLOGY
Payer: COMMERCIAL

## 2022-07-14 ENCOUNTER — TELEPHONE (OUTPATIENT)
Dept: NEUROLOGY | Facility: CLINIC | Age: 52
End: 2022-07-14

## 2022-07-14 VITALS
BODY MASS INDEX: 40.32 KG/M2 | WEIGHT: 242 LBS | SYSTOLIC BLOOD PRESSURE: 110 MMHG | DIASTOLIC BLOOD PRESSURE: 65 MMHG | HEART RATE: 77 BPM | HEIGHT: 65 IN

## 2022-07-14 DIAGNOSIS — I63.9 STROKE (HCC): ICD-10-CM

## 2022-07-14 LAB
AORTIC ROOT: 2.9 CM
APICAL FOUR CHAMBER EJECTION FRACTION: 57 %
ASCENDING AORTA: 2.8 CM
E WAVE DECELERATION TIME: 227 MS
FRACTIONAL SHORTENING: 43 % (ref 28–44)
INTERVENTRICULAR SEPTUM IN DIASTOLE (PARASTERNAL SHORT AXIS VIEW): 0.9 CM
INTERVENTRICULAR SEPTUM: 0.9 CM (ref 0.6–1.1)
LAAS-AP4: 15.1 CM2
LEFT ATRIUM SIZE: 4.2 CM
LEFT INTERNAL DIMENSION IN SYSTOLE: 2.8 CM (ref 2.1–4)
LEFT VENTRICULAR INTERNAL DIMENSION IN DIASTOLE: 4.9 CM (ref 3.5–6)
LEFT VENTRICULAR POSTERIOR WALL IN END DIASTOLE: 1 CM
LEFT VENTRICULAR STROKE VOLUME: 83 ML
LVSV (TEICH): 83 ML
MV E'TISSUE VEL-SEP: 14 CM/S
MV PEAK A VEL: 0.88 M/S
MV PEAK E VEL: 92 CM/S
MV STENOSIS PRESSURE HALF TIME: 66 MS
MV VALVE AREA P 1/2 METHOD: 3.33 CM2
RA PRESSURE ESTIMATED: 5 MMHG
RIGHT ATRIAL 2D VOLUME: 34 ML
RIGHT ATRIUM AREA SYSTOLE A4C: 14 CM2
RIGHT VENTRICLE ID DIMENSION: 3.7 CM
RV PSP: 29 MMHG
SL CV LV EF: 65
SL CV PED ECHO LEFT VENTRICLE DIASTOLIC VOLUME (MOD BIPLANE) 2D: 111 ML
SL CV PED ECHO LEFT VENTRICLE SYSTOLIC VOLUME (MOD BIPLANE) 2D: 28 ML
TR MAX PG: 24 MMHG
TR PEAK VELOCITY: 2.4 M/S
TRICUSPID VALVE PEAK REGURGITATION VELOCITY: 2.42 M/S

## 2022-07-14 PROCEDURE — 93226 XTRNL ECG REC<48 HR SCAN A/R: CPT

## 2022-07-14 PROCEDURE — 93225 XTRNL ECG REC<48 HRS REC: CPT

## 2022-07-14 PROCEDURE — 93306 TTE W/DOPPLER COMPLETE: CPT | Performed by: INTERNAL MEDICINE

## 2022-07-14 PROCEDURE — 93306 TTE W/DOPPLER COMPLETE: CPT

## 2022-07-14 NOTE — TELEPHONE ENCOUNTER
----- Message from Miriam Villa RN sent at 7/14/2022  9:18 AM EDT -----  Regarding: appointment- suspected 2mm aneurysm  Called patient and scheduled her for non-urgent snpx appointment with AYESHA Koch and Dr Henok Ott on 8/8 @ 7:15am   Patient is aware that this is at the [place] office  Patient was a little concerned about the finding and I did reassure her by letting her know Dr Karen Christensen reviewed her imaging and felt that this was not something urgent to be concerned about, however, still should be followed up on  Patient was appreciative    ----- Message -----  From: Edna Wilson MD  Sent: 7/13/2022   6:03 PM EDT  To: Magui Thomason, RN, #    Nursing, please let pt know that pt will need to see one of the vascular neurosurgeons  I will place another NS referral as well  Dr Jimi Lovelace  ----- Message -----  From: Nisreen Redmond MD  Sent: 7/13/2022   5:59 PM EDT  To: Tristan Hernadez MD, #    Cerebrovascular conditions are managed by my partners Dr Kailee Mojica or Dr Tong Sherman  I am only following her brain tumor  Camryn Sill or Acquanetta Gera, can you please schedule this patient as a SNPX (non-urgent) with either of my partners? Thank you  LH    ----- Message -----  From: Edna Wilson MD  Sent: 7/13/2022   5:08 PM EDT  To: Nisreen Redmond MD, #    Nursing, let pt know mra head done for further eval of cva  Pt was sent by NS for eval of cva  Pt is already followed by NS Dr Karen Christensen due to known vestibular schwannoma  Mra head was done due to further eval for cva and no large vessel occlusion  However, a suspected 2mm aneursym at the right superior cerebellar artery origin is noted on report  It is recommended by rads for pt to follow up with NS  Let pt know I am forwarding report to Dr Karen Christensen to review  Pt should followup with Dr Karen Christensen as well for further guidance of the possible aneurysm finding

## 2022-07-14 NOTE — TELEPHONE ENCOUNTER
Patient has appointment scheduled with dr Trinidad Keys 8/8  Please advise if requires anything further from nursing, thank you

## 2022-07-21 PROCEDURE — 93227 XTRNL ECG REC<48 HR R&I: CPT | Performed by: INTERNAL MEDICINE

## 2022-07-29 ENCOUNTER — HOSPITAL ENCOUNTER (OUTPATIENT)
Dept: RADIOLOGY | Facility: HOSPITAL | Age: 52
Discharge: HOME | End: 2022-07-29
Attending: INTERNAL MEDICINE
Payer: COMMERCIAL

## 2022-07-29 DIAGNOSIS — C78.7 LIVER METASTASES (HCC): ICD-10-CM

## 2022-07-29 DIAGNOSIS — C50.111 MALIGNANT NEOPLASM OF CENTRAL PORTION OF RIGHT BREAST IN FEMALE, ESTROGEN RECEPTOR POSITIVE (HCC): ICD-10-CM

## 2022-07-29 DIAGNOSIS — Z17.0 MALIGNANT NEOPLASM OF CENTRAL PORTION OF RIGHT BREAST IN FEMALE, ESTROGEN RECEPTOR POSITIVE (HCC): ICD-10-CM

## 2022-07-29 PROCEDURE — 71260 CT THORAX DX C+: CPT

## 2022-07-29 PROCEDURE — 74177 CT ABD & PELVIS W/CONTRAST: CPT

## 2022-07-29 RX ADMIN — IOHEXOL 70 ML: 350 INJECTION, SOLUTION INTRAVENOUS at 14:03

## 2022-08-08 ENCOUNTER — OFFICE VISIT (OUTPATIENT)
Dept: NEUROSURGERY | Facility: CLINIC | Age: 52
End: 2022-08-08
Payer: COMMERCIAL

## 2022-08-08 VITALS
TEMPERATURE: 97.7 F | HEART RATE: 76 BPM | RESPIRATION RATE: 16 BRPM | BODY MASS INDEX: 40.32 KG/M2 | DIASTOLIC BLOOD PRESSURE: 76 MMHG | HEIGHT: 65 IN | SYSTOLIC BLOOD PRESSURE: 127 MMHG | WEIGHT: 242 LBS

## 2022-08-08 DIAGNOSIS — I67.1 CEREBRAL ANEURYSM: ICD-10-CM

## 2022-08-08 PROCEDURE — 99213 OFFICE O/P EST LOW 20 MIN: CPT | Performed by: PHYSICIAN ASSISTANT

## 2022-08-08 NOTE — ASSESSMENT & PLAN NOTE
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  Plan  · Reviewed with patient the natural history of aneurysms  · Discussed modifiable risk factors for aneurysm growth and rupture including avoiding smoking and managing hypertension and hyperlipidemia   Certain genetic conditions also are associated with the incidence and rupture rate of aneurysm  Patients family members should be screened by their Primary Care Providers with MRA head and neck  Recommended that patient discuss this information  with family members   Recommend that patient call 911 and present to Emergency Room if  they experience sudden severe  headache, seizure, mental status change, speech / vision change, sensory / motor change, or other neurological change  · At this time, no nsx intervention anticipated  Recommend follow up in 6 month with repeat MRA head wo (Snplx Dr Aric Jordan)  Since pt has upcoming MRI brain wo ordered to be done for follow up for the vestibular schwannoma will have pt obtain the MRA head at the same time  · Pt made aware to contact nsx with any questions or concerns in the interim

## 2022-08-15 ENCOUNTER — HOSPITAL ENCOUNTER (OUTPATIENT)
Dept: MAMMOGRAPHY | Facility: CLINIC | Age: 52
Discharge: HOME | End: 2022-08-15
Payer: COMMERCIAL

## 2022-08-15 VITALS — WEIGHT: 242 LBS | HEIGHT: 64 IN | BODY MASS INDEX: 41.32 KG/M2

## 2022-08-15 DIAGNOSIS — Z84.89: ICD-10-CM

## 2022-08-15 PROCEDURE — 77065 DX MAMMO INCL CAD UNI: CPT

## 2022-08-15 PROCEDURE — G0279 TOMOSYNTHESIS, MAMMO: HCPCS

## 2022-08-22 ENCOUNTER — OFFICE VISIT (OUTPATIENT)
Dept: GASTROENTEROLOGY | Facility: CLINIC | Age: 52
End: 2022-08-22
Payer: COMMERCIAL

## 2022-08-22 VITALS
HEART RATE: 75 BPM | DIASTOLIC BLOOD PRESSURE: 60 MMHG | OXYGEN SATURATION: 97 % | WEIGHT: 242 LBS | BODY MASS INDEX: 40.32 KG/M2 | HEIGHT: 65 IN | SYSTOLIC BLOOD PRESSURE: 110 MMHG | TEMPERATURE: 98.8 F

## 2022-08-22 DIAGNOSIS — Z80.0 FAMILY HISTORY OF COLON CANCER IN FATHER: ICD-10-CM

## 2022-08-22 DIAGNOSIS — C50.911 CARCINOMA OF RIGHT BREAST METASTATIC TO AXILLARY LYMPH NODE (HCC): Primary | ICD-10-CM

## 2022-08-22 DIAGNOSIS — C77.3 CARCINOMA OF RIGHT BREAST METASTATIC TO AXILLARY LYMPH NODE (HCC): Primary | ICD-10-CM

## 2022-08-22 DIAGNOSIS — Z12.11 SCREEN FOR COLON CANCER: Primary | ICD-10-CM

## 2022-08-22 PROCEDURE — 99244 OFF/OP CNSLTJ NEW/EST MOD 40: CPT | Performed by: INTERNAL MEDICINE

## 2022-08-22 RX ORDER — POLYETHYLENE GLYCOL 3350 17 G/17G
POWDER, FOR SOLUTION ORAL
Qty: 255 G | Refills: 0 | Status: SHIPPED | OUTPATIENT
Start: 2022-08-22 | End: 2022-10-03 | Stop reason: ALTCHOICE

## 2022-08-22 NOTE — PATIENT INSTRUCTIONS
Scheduled date of Colonoscopy (as of today): 11/28/2022  Physician performing: Dr Olamide Cai  Location of procedure: [facility]  Bowel prep reviewed with patient: 2 day Miralax/Dulcolax  Instructions reviewed with patient by: Yojana Vega  Clearances:  n/a

## 2022-08-22 NOTE — PROGRESS NOTES
[address] [facility] - Outpatient Consultation  Matthew Sicard 47 y o  female MRN: [id number]  Encounter: [id number]          ASSESSMENT AND PLAN:    Matthew Sicard is a 47 y o  female with breast cancer with mets to lymph node and liver s/p right mastectomy with lymph node dissection, schwannoma, cerebral infacrts, 2mm aneurysm who presents with complaint of need for colon cancer screening  Father had colon cancer in his 35s  No prior colonoscopy  Occasional constipation but she has a BM daily  CT of the chest abdomen and pelvis from July with stable size of left axillary lymph node with noted post right mastectomy and right axillary dissection, stable hypodensity in the left hepatic lobe previously described small right hepatic lobe hypodensity not definitively seen, no suspicious or new hepatic lesions, resolved hepatic steatosis  MRA of the head with suspected 2mm aneurysm at the right SCA origin, known right vestibular schwannoma  Most recent Cr normal  Prior CMP with chloride 110 but otherwise normal  Vit D normal  CBC with  but otherwise normal  TTE with LV EF 65%  1  Screen for colon cancer    2  Family history of colon cancer in father        Orders Placed This Encounter   Procedures    Colonoscopy       Will schedule colonoscopy given age and family history  ______________________________________________________________________    Referred by Antoine Mata for CRC screening    HPI:    Matthew Sicard is a 47 y o  female with breast cancer with mets to lymph node and liver s/p right mastectomy with lymph node dissection, schwannoma, cerebral infacrts, 2mm aneurysm who presents with complaint of need for colon cancer screening  No heartburn, dysphagia, odynophagia, nausea, vomiting, diarrhea, BRBPR, melena, abdominal pain, significant weight loss  She has some constipated related to medication and she takes fruit and it helps  She goes daily but not much   She drinks a lot of water and seltzer  She has 1-2 cups of coffee per day  No prior colonoscopy  No prior EGD  Father had colon cancer in his 29's  BRCA 2 +     No other other GI related issues in the family      REVIEW OF SYSTEMS:  10 point ROS reviewed and negative, except as above    Historical Information   Past Medical History:   Diagnosis Date    Bloating     Bruises easily     Cancer ([address] )     right breast//to lymph nodes/liver/ and bone    Depression     History of cancer chemotherapy 2020    History of pneumonia     Hypotension     occas    Low iron     "when pregnant, 20 yrs ago"    Neuropathy     hands//fingers    Shortness of breath     occas    Stroke ([facility] [address]     Tinnitus     right    Wears glasses      Past Surgical History:   Procedure Laterality Date    BILATERAL SALPINGOOPHORECTOMY      BREAST BIOPSY      IR BIOPSY LIVER MASS  2019    MASTECTOMY Right     2019    MYOMECTOMY      H/O FIBROIDS, NO SURGERY NEEDED    MD LAP,RMV  ADNEXAL STRUCTURE N/A 2019    Procedure: LAPAROSCOPIC BILATERAL SALPINGO-OOPHORECTOMY;  Surgeon: Claudia Boyer MD;  Location: [facility];  Service: Gynecology Oncology    MD MASTECTOMY, SIMPLE, COMPLETE Right 2020    Procedure: MASTECTOMY SIMPLE, axillary node dissection;  Surgeon: Veto Eisenmenger, MD;  Location: [facility];  Service: Surgical Oncology    US GUIDED BREAST BIOPSY RIGHT COMPLETE Right 2019    US GUIDED BREAST LYMPH NODE BIOPSY RIGHT Right 2019     Social History   Social History     Substance and Sexual Activity   Alcohol Use Yes    Comment: occassional     Social History     Substance and Sexual Activity   Drug Use No     Social History     Tobacco Use   Smoking Status Former Smoker    Packs/day: 1 00    Years: 30 00    Pack years: 30 00    Quit date:     Years since quittin 6   Smokeless Tobacco Never Used     Family History   Problem Relation Age of Onset    Hypotension Mother     Colon cancer Father 36    Hypertension Father     Prostate cancer Father 79    Throat cancer Maternal Grandmother 61    Cancer Maternal Grandmother     Breast cancer Paternal Aunt         age unknown       Meds/Allergies       Current Outpatient Medications:     albuterol (Proventil HFA) 90 mcg/act inhaler    ALPRAZolam (XANAX) 0 25 mg tablet    ASPIRIN 81 PO    atorvastatin (LIPITOR) 40 mg tablet    b complex vitamins capsule    bisacodyl (DULCOLAX) 5 mg EC tablet    cholecalciferol (VITAMIN D3) 1,000 units tablet    fluticasone-vilanterol (Breo Ellipta) 200-25 MCG/INH inhaler    olaparib (Lynparza) tablet    oxyCODONE (ROXICODONE) 10 MG TABS    polyethylene glycol (GLYCOLAX) 17 GM/SCOOP powder    Specialty Vitamins Products (Advanced Collagen) TABS    Allergies   Allergen Reactions    Tylenol [Acetaminophen]      Told to avoid due to cancer            Objective     Blood pressure 110/60, pulse 75, temperature 98 8 °F (37 1 °C), temperature source Tympanic, height 5' 5" (1 651 m), weight 110 kg (242 lb), SpO2 97 %  Body mass index is 40 27 kg/m²  PHYSICAL EXAMINATION:    General Appearance:   Alert, cooperative, no distress   HEENT:  Normocephalic, atraumatic, anicteric  Neck supple, symmetrical, trachea midline  Lungs:   Equal chest rise and unlabored breathing, normal effort, no coughing  Cardiovascular:   No visualized JVD  Abdomen:   No abdominal distension  Skin:   No jaundice, rashes, or lesions  Musculoskeletal:   Normal range of motion visualized  Psych:  Normal affect and normal insight  Neuro:  Alert and appropriate  Lab Results:   No visits with results within 1 Day(s) from this visit     Latest known visit with results is:   Hospital Outpatient Visit on 07/14/2022   Component Date Value    LA size 07/14/2022 4 2     Triscuspid Valve Regurgi* 07/14/2022 24 0     Tricuspid valve peak reg* 07/14/2022 2 42     LVPWd 07/14/2022 1 00     Left Atrium Area-systoli* 07/14/2022 15 1     MV E' Tissue Velocity Se* 07/14/2022 14     RA 2D Volume 07/14/2022 34 0     TR Peak Femi 07/14/2022 2 4     IVSd 07/14/2022 2 84     LV DIASTOLIC VOLUME (MOD* 56/20/8925 111     LEFT VENTRICLE SYSTOLIC * 97/46/7939 28     Left ventricular stroke * 07/14/2022 83 00     A4C EF 07/14/2022 57     LVIDd 07/14/2022 4 90     IVS 07/14/2022 0 9     LVIDS 07/14/2022 2 80     FS 07/14/2022 43     Asc Ao 07/14/2022 2 8     Ao root 07/14/2022 2 90     RVID d 07/14/2022 3 7     MV valve area p 1/2 meth* 07/14/2022 3 33     E wave deceleration time 07/14/2022 227     MV Peak E Femi 07/14/2022 92     MV Peak A Femi 07/14/2022 0 88     RAA A4C 07/14/2022 14     MV stenosis pressure 1/2* 07/14/2022 66     LVSV, 2D 07/14/2022 83     LV EF 07/14/2022 65     Est  RA pres 07/14/2022 5 0     Right Ventricular Peak S* 07/14/2022 29 00        Lab Results   Component Value Date    WBC 7 96 02/08/2022    HGB 12 5 02/08/2022    HCT 38 3 02/08/2022     (H) 02/08/2022     02/08/2022       Lab Results   Component Value Date    SODIUM 140 02/08/2022    K 3 6 02/08/2022     (H) 02/08/2022    CO2 24 02/08/2022    AGAP 6 02/08/2022    BUN 20 06/30/2022    CREATININE 1 03 06/30/2022    GLUC 83 02/08/2022    GLUF 75 07/27/2021    CALCIUM 9 6 02/08/2022    AST 27 02/08/2022    ALT 44 02/08/2022    ALKPHOS 74 02/08/2022    TP 7 4 02/08/2022    TBILI 0 51 02/08/2022    EGFR 62 06/30/2022       No results found for: CRP    Lab Results   Component Value Date    PUJ0XDSOQUNZ 0 710 07/27/2021       No results found for: IRON, TIBC, FERRITIN    Radiology Results:   CT chest abdomen pelvis w contrast    Result Date: 8/4/2022  Narrative: CT CHEST, ABDOMEN AND PELVIS WITH IV CONTRAST INDICATION:   C78 7: Secondary malignant neoplasm of liver and intrahepatic bile duct C50 111: Malignant neoplasm of central portion of right female breast Z17 0: Estrogen receptor positive status (ER+)  COMPARISON:  1/27/2022   TECHNIQUE: CT examination of the chest, abdomen and pelvis was performed  Axial, sagittal, and coronal 2D reformatted images were created from the source data and submitted for interpretation  Radiation dose length product (DLP) for this visit:  2013 85 mGy-cm   This examination, like all CT scans performed in the [facility], was performed utilizing techniques to minimize radiation dose exposure, including the use of iterative reconstruction and automated exposure control  IV Contrast:  70 mL of iohexol (OMNIPAQUE) Enteric Contrast: Enteric contrast was administered  FINDINGS: CHEST LUNGS:  Lungs are clear without airspace consolidation  No suspicious pulmonary parenchymal mass or nodules identified  Minimal centrilobular emphysematous changes noted in the right upper lobe  Central airways are patent  There is no tracheal or endobronchial lesion  PLEURA:  Unremarkable without pneumothorax or pleural effusion  HEART/GREAT VESSELS: Heart is unremarkable for patient's age  No thoracic aortic aneurysm  No pericardial effusion  MEDIASTINUM AND HAIM:  Stable nonspecific subcentimeter mediastinal lymph nodes  No significant lymphadenopathy by size criteria  No mass or fluid collections  Thyroid glands appear unremarkable  Esophagus is normal in course and caliber  No significant prevertebral soft tissue swelling noted  CHEST WALL AND LOWER NECK:  Patient is status post right mastectomy and right axillary dissection  Multiple nonspecific subcentimeter left axillary lymph nodes are again seen, not significantly changed in size or number compared to the previous exam   No subcutaneous mass or fluid collections noted    ABDOMEN LIVER/BILIARY TREE:  Liver is normal in size and contour  5 mm hypodensity in the left hepatic lobe is again seen without interval change    Previously described right hepatic lobe small hypodensity not definitively seen on this exam which could be related to the phase of contrast enhancement  No new or additional suspicious hepatic lesions identified  Tavo Heredia GALLBLADDER:  No calcified gallstones  No pericholecystic inflammatory change  SPLEEN:  Unremarkable  PANCREAS:  Unremarkable  ADRENAL GLANDS:  Unremarkable  KIDNEYS/URETERS:  Unremarkable  No hydronephrosis  STOMACH AND BOWEL:  Stomach is mildly distended with oral contrast and air  No intraluminal mass or abnormal wall thickening  Small and large bowel loops appear normal in course and caliber without evidence for obstruction or inflammation  Terminal ileum and appendix appear grossly unremarkable  APPENDIX:  No findings to suggest appendicitis  ABDOMINOPELVIC CAVITY:  No ascites  No pneumoperitoneum  No lymphadenopathy  VESSELS:  Unremarkable for patient's age  PELVIS REPRODUCTIVE ORGANS:  Unremarkable for patient's age  URINARY BLADDER:  Mildly distended and grossly unremarkable  ABDOMINAL WALL/INGUINAL REGIONS:  Previous ventral upper abdominal wall hernia repair noted without evidence for recurrent hernia  No subcutaneous mass or fluid collections are seen  A few subcentimeter bilateral inguinal lymph nodes are again noted without interval change  OSSEOUS STRUCTURES:  No acute fracture or destructive osseous lesion  Impression: 1  Stable size and appearance of subcentimeter left axillary lymph nodes  Patient is status post right mastectomy and right axillary dissection  2   No evidence of intrathoracic metastatic disease  3   Stable 5 mm hypodensity in the left hepatic lobe  Previously described small right hepatic lobe hypodensity not definitively seen on this study which could be related to the phase of contrast enhancement  No suspicious or new hepatic lesions identified  Previously noted hepatic steatosis appear resolved  4   No acute intra-abdominal/pelvic abnormalities or evidence of metastatic disease/lymphadenopathy   Workstation performed: [id number]     Mammo diagnostic left w 3d & cad    Result Date: 8/15/2022  Narrative: DIAGNOSIS:  Right breast cancer, right mastectomy in 2019 TECHNIQUE: Digital diagnostic mammography was performed  Computer Aided Detection (CAD) analyzed all applicable images  Computer Aided Detection (CAD) analyzed all applicable images  COMPARISONS: Prior breast imaging dated: 05/03/2019 and 05/03/2019 RELEVANT HISTORY: Family Breast Cancer History: History of breast cancer in Paternal Aunt  Family Medical History: Family medical history includes breast cancer in paternal aunt and colon cancer in father  Personal History: No known relevant hormone history  Surgical history includes breast biopsy and mastectomy  No known relevant medical history  RISK ASSESSMENT: 5 Year Tyrer-Cuzick: 1 42 % 10 Year Tyrer-Cuzick: 3 08 % Lifetime Tyrer-Cuzick: 11 21 % TISSUE DENSITY: The breasts are almost entirely fatty  INDICATION: Raúl Carlisle is a 47 y o  female presenting for post mastectomy mammo for other breast  FINDINGS: There are no suspicious masses, grouped microcalcifications or areas of unexplained architectural distortion  The skin and nipple areolar complex are unremarkable       Impression:  No abnormality of the left breast  ASSESSMENT/BI-RADS CATEGORY: Left: 1 - Negative Overall: 1 - Negative RECOMMENDATION:      - Routine screening mammogram in 1 year for the left breast  Workstation ID: [id number]

## 2022-08-23 ENCOUNTER — APPOINTMENT (OUTPATIENT)
Dept: LAB | Facility: CLINIC | Age: 52
End: 2022-08-23
Payer: COMMERCIAL

## 2022-08-23 DIAGNOSIS — C50.111 MALIGNANT NEOPLASM OF CENTRAL PORTION OF RIGHT BREAST IN FEMALE, ESTROGEN RECEPTOR POSITIVE (HCC): ICD-10-CM

## 2022-08-23 DIAGNOSIS — C78.7 LIVER METASTASES (HCC): ICD-10-CM

## 2022-08-23 DIAGNOSIS — Z17.0 MALIGNANT NEOPLASM OF CENTRAL PORTION OF RIGHT BREAST IN FEMALE, ESTROGEN RECEPTOR POSITIVE (HCC): ICD-10-CM

## 2022-08-23 LAB
ALBUMIN SERPL BCP-MCNC: 3.7 G/DL (ref 3.5–5)
ALP SERPL-CCNC: 85 U/L (ref 46–116)
ALT SERPL W P-5'-P-CCNC: 41 U/L (ref 12–78)
ANION GAP SERPL CALCULATED.3IONS-SCNC: 5 MMOL/L (ref 4–13)
AST SERPL W P-5'-P-CCNC: 22 U/L (ref 5–45)
BASOPHILS # BLD AUTO: 0.01 THOUSANDS/ΜL (ref 0–0.1)
BASOPHILS NFR BLD AUTO: 0 % (ref 0–1)
BILIRUB SERPL-MCNC: 0.42 MG/DL (ref 0.2–1)
BUN SERPL-MCNC: 19 MG/DL (ref 5–25)
CALCIUM SERPL-MCNC: 9.4 MG/DL (ref 8.3–10.1)
CANCER AG27-29 SERPL-ACNC: 18.9 U/ML (ref 0–42.3)
CHLORIDE SERPL-SCNC: 108 MMOL/L (ref 96–108)
CO2 SERPL-SCNC: 26 MMOL/L (ref 21–32)
CREAT SERPL-MCNC: 0.92 MG/DL (ref 0.6–1.3)
EOSINOPHIL # BLD AUTO: 0.09 THOUSAND/ΜL (ref 0–0.61)
EOSINOPHIL NFR BLD AUTO: 1 % (ref 0–6)
ERYTHROCYTE [DISTWIDTH] IN BLOOD BY AUTOMATED COUNT: 14.8 % (ref 11.6–15.1)
GFR SERPL CREATININE-BSD FRML MDRD: 71 ML/MIN/1.73SQ M
GLUCOSE P FAST SERPL-MCNC: 85 MG/DL (ref 65–99)
HCT VFR BLD AUTO: 37.3 % (ref 34.8–46.1)
HGB BLD-MCNC: 12.6 G/DL (ref 11.5–15.4)
IMM GRANULOCYTES # BLD AUTO: 0.02 THOUSAND/UL (ref 0–0.2)
IMM GRANULOCYTES NFR BLD AUTO: 0 % (ref 0–2)
LYMPHOCYTES # BLD AUTO: 1.87 THOUSANDS/ΜL (ref 0.6–4.47)
LYMPHOCYTES NFR BLD AUTO: 28 % (ref 14–44)
MCH RBC QN AUTO: 33.8 PG (ref 26.8–34.3)
MCHC RBC AUTO-ENTMCNC: 33.8 G/DL (ref 31.4–37.4)
MCV RBC AUTO: 100 FL (ref 82–98)
MONOCYTES # BLD AUTO: 0.45 THOUSAND/ΜL (ref 0.17–1.22)
MONOCYTES NFR BLD AUTO: 7 % (ref 4–12)
NEUTROPHILS # BLD AUTO: 4.18 THOUSANDS/ΜL (ref 1.85–7.62)
NEUTS SEG NFR BLD AUTO: 64 % (ref 43–75)
NRBC BLD AUTO-RTO: 0 /100 WBCS
PLATELET # BLD AUTO: 181 THOUSANDS/UL (ref 149–390)
PMV BLD AUTO: 11.7 FL (ref 8.9–12.7)
POTASSIUM SERPL-SCNC: 3.7 MMOL/L (ref 3.5–5.3)
PROT SERPL-MCNC: 7.3 G/DL (ref 6.4–8.4)
RBC # BLD AUTO: 3.73 MILLION/UL (ref 3.81–5.12)
SODIUM SERPL-SCNC: 139 MMOL/L (ref 135–147)
WBC # BLD AUTO: 6.62 THOUSAND/UL (ref 4.31–10.16)

## 2022-08-23 PROCEDURE — 36415 COLL VENOUS BLD VENIPUNCTURE: CPT

## 2022-08-23 PROCEDURE — 85025 COMPLETE CBC W/AUTO DIFF WBC: CPT

## 2022-08-23 PROCEDURE — 86300 IMMUNOASSAY TUMOR CA 15-3: CPT

## 2022-08-23 PROCEDURE — 80053 COMPREHEN METABOLIC PANEL: CPT

## 2022-08-25 ENCOUNTER — OFFICE VISIT (OUTPATIENT)
Dept: HEMATOLOGY ONCOLOGY | Facility: CLINIC | Age: 52
End: 2022-08-25
Payer: COMMERCIAL

## 2022-08-25 VITALS
TEMPERATURE: 96.9 F | SYSTOLIC BLOOD PRESSURE: 126 MMHG | HEART RATE: 80 BPM | WEIGHT: 241 LBS | HEIGHT: 65 IN | DIASTOLIC BLOOD PRESSURE: 74 MMHG | BODY MASS INDEX: 40.15 KG/M2 | RESPIRATION RATE: 18 BRPM | OXYGEN SATURATION: 97 %

## 2022-08-25 DIAGNOSIS — C78.7 LIVER METASTASES (HCC): Primary | ICD-10-CM

## 2022-08-25 DIAGNOSIS — C50.111 MALIGNANT NEOPLASM OF CENTRAL PORTION OF RIGHT BREAST IN FEMALE, ESTROGEN RECEPTOR POSITIVE (HCC): ICD-10-CM

## 2022-08-25 DIAGNOSIS — Z17.0 MALIGNANT NEOPLASM OF CENTRAL PORTION OF RIGHT BREAST IN FEMALE, ESTROGEN RECEPTOR POSITIVE (HCC): ICD-10-CM

## 2022-08-25 DIAGNOSIS — C50.911 CARCINOMA OF RIGHT BREAST METASTATIC TO AXILLARY LYMPH NODE (HCC): ICD-10-CM

## 2022-08-25 DIAGNOSIS — C77.3 CARCINOMA OF RIGHT BREAST METASTATIC TO AXILLARY LYMPH NODE (HCC): ICD-10-CM

## 2022-08-25 PROCEDURE — 99214 OFFICE O/P EST MOD 30 MIN: CPT | Performed by: INTERNAL MEDICINE

## 2022-08-25 NOTE — PROGRESS NOTES
Hematology / Oncology Outpatient Follow Up Note    Isael Beatty 47 y o  female :1970 [id number]         Date:  2022    Assessment / Plan:  A 48-year-old surgically postmenopausal woman with metastatic right breast cancer, grade 3, ER 90% positive, SC 90% positive, HER2 negative disease   She has BRCA -2 mutation    She has histologically confirmed liver metastasis   Since she was premenopausal, she underwent as bilateral surgical ovarian ablation in late 2019 followed by starting palbociclib and letrozole   She had initial minor response  Trenton Abarca had rapid progression in 2019 for which she was treated with Taxotere x6 cycle resulting in partial response   Subsequently, she had palliative right mastectomy followed by olaparib since mid 2020 with no significant toxicity   She achieved major response on olaparib  She has no evidence of progressive disease on olaparib based on the imaging study as well as symptoms standpoint  I recommended her to continue olaparib 300 mg b i d  She is in agreement with my recommendation  I will see her again in 6 months with CBC, CMP, CA 27, 29 and CT scan of chest abdomen pelvis    She is in agreement with my recommendations         Subjective:      HPI:  A 50-year-old premenopausal woman who noticed a lump in her right breast in 2019 which she brought to medical attention   Radiographically, she has large right breast mass and right axillary adenopathy both of which were biopsy in May 3, 2019   Biopsy showed invasive ductal carcinoma, grade 3   This was ER 90% positive, SC 90% positive, HER2 negative disease   Biopsy from right axilla lymph node was positive for metastatic disease   She was seen by Dr Darryl Beebe of her young age, she underwent genetic testing which showed BRCA-2 mutation   Because of the locally advanced nature, she underwent CT scan of chest abdomen pelvis which showed multiple liver lesion, suspicious for metastatic disease   Bone scan was negative for osseous metastasis  Edil Weir is going to have liver biopsy in June 11, 2019   She presents today to discuss treatment option   She has no symptomatology from breast standpoint   She denied any pain  Her weight has been stable   She has no respiratory symptoms   She has no significant past medical history   Her paternal aunt had breast cancer in her 35s   Interestingly enough, her father had colon cancer in his 45s as well as prostate cancer in his 62s   She has no family history of ovarian cancer   She was a smoker until 2 years ago  Edil Weir does not drink alcohol  Edil Weir has a son who is 23years old  Edil Weir also has younger son who was biologically female  [facility] is a transgender  [facility] is considering bilateral mastectomy  Maddie Shields, he has not been tested for BRCA gene mutation            Interval History:   A 47year-old surgically postmenopausal woman with metastatic right breast cancer, grade 3, ER 90% positive, MI 90% positive, HER2 negative disease   She has BRCA -2 mutation   Based on the CT scan, she appeared to have multiple liver metastasis     She subsequently underwent liver biopsy which showed metastatic carcinoma, consistent with breast primary   She was premenopausal at the time of diagnosis   Therefore, she underwent bilateral oophorectomy in late July 2019   Ovary and fallopian tube did not show any malignancy   She started palbociclib and letrozole in late July 2019  She initially had response with decreased right breast mass as well as right axillary adenopathy   However, in late December 2019, she had rapid progression in her right breast mass, skin involvement as well as right axillary adenopathy   Therefore, she started palliative chemotherapy with Taxotere, resulting in partial response   After 6 cycle treatment with Taxotere, she underwent palliative right mastectomy which showed multiple foci of invasive ductal carcinoma measuring up to 3 5 cm and 13 positive axillary lymph node   Since mid June 2020, she has been on olaparib  She achieved major radiographical and biochemical response on olaparib  She presents today for routine follow-up  She has no new complaint  She feels well  She denied any pain  She has no respiratory symptoms   She has several lb of intentional weight loss by the exercise    Her performance status is normal       Objective:      Primary Diagnosis:     1    Metastatic breast cancer, grade 3, ER 90 % positive, KY 90 % positive, HER2 negative disease, diagnosed in June 2019    2    BRCA-2 mutation      Cancer Staging:  Cancer Staging  Malignant neoplasm of central portion of right breast in female, estrogen receptor positive ([facility] [address] )  Staging form: Breast, AJCC 8th Edition  - Clinical: Stage IIIB (cT4, cN1(f), cM0, G3, ER+, KY+, HER2-) - Signed by Giselle Kohli MD on 5/16/2019  Laterality: Right  Method of lymph node assessment: Core biopsy  Histologic grading system: 3 grade system           Previous Hematologic/ Oncologic Treatment:      1  Surgical ovarian ablation in late July 2019   2  Palbociclib and letrozole from July 2018 through January 2020    3  Taxotere x6 cycle, completed in May 2020    4  Palliative right mastectomy      Current Hematologic/ Oncologic Treatment:       Olaparib 300 mg b i d  since mid July 2020       Disease Status:      Good partial response on olaparib      Test Results:     Pathology:     Right breast biopsy in axillary lymph node biopsy showed invasive ductal carcinoma, grade 3   ER 90 % positive, KY 90% positive, HER2 negative disease      Liver biopsy in June 2019 showed metastatic carcinoma, consistent with breast primary      In June 2020, mastectomy specimen showed multiple foci of invasive ductal carcinoma measuring up to 3 5 cm with 13 positive axillary lymph nodes      Radiology:     Bone scan showed no evidence of osseous metastasis      CT scan of chest abdomen pelvis in July 2022 showed stable subcentimeter left axillary lymph nodes  No obvious liver metastasis       Laboratory:     See below   CA 27, 29 was 18 9 in August 2022      Physical Exam:        General Appearance:    Alert, oriented          Eyes:    PERRL   Ears:    Normal external ear canals, both ears   Nose:   Nares normal, septum midline   Throat:   Mucosa moist  Pharynx without injection  Neck:   Supple         Lungs:     Clear to auscultation bilaterally   Chest Wall:    No tenderness or deformity    Heart:    Regular rate and rhythm         Abdomen:     Soft, non-tender, bowel sounds +, no organomegaly               Extremities:   Extremities no cyanosis or edema         Skin:   no rash or icterus  Lymph nodes:   Cervical, supraclavicular, and axillary nodes normal   Neurologic:   CNII-XII intact, normal strength, sensation and reflexes     Throughout             Breast exam: Right status post mastectomy without reconstruction   No palpable abnormality on her right chest wall   Left breast exam is negative  ROS: Review of Systems   All other systems reviewed and are negative  Imaging: CT chest abdomen pelvis w contrast    Result Date: 8/4/2022  Narrative: CT CHEST, ABDOMEN AND PELVIS WITH IV CONTRAST INDICATION:   C78 7: Secondary malignant neoplasm of liver and intrahepatic bile duct C50 111: Malignant neoplasm of central portion of right female breast Z17 0: Estrogen receptor positive status (ER+)  COMPARISON:  1/27/2022  TECHNIQUE: CT examination of the chest, abdomen and pelvis was performed  Axial, sagittal, and coronal 2D reformatted images were created from the source data and submitted for interpretation  Radiation dose length product (DLP) for this visit:  2013 85 mGy-cm   This examination, like all CT scans performed in the [facility], was performed utilizing techniques to minimize radiation dose exposure, including the use of iterative reconstruction and automated exposure control  IV Contrast:  70 mL of iohexol (OMNIPAQUE) Enteric Contrast: Enteric contrast was administered  FINDINGS: CHEST LUNGS:  Lungs are clear without airspace consolidation  No suspicious pulmonary parenchymal mass or nodules identified  Minimal centrilobular emphysematous changes noted in the right upper lobe  Central airways are patent  There is no tracheal or endobronchial lesion  PLEURA:  Unremarkable without pneumothorax or pleural effusion  HEART/GREAT VESSELS: Heart is unremarkable for patient's age  No thoracic aortic aneurysm  No pericardial effusion  MEDIASTINUM AND HAIM:  Stable nonspecific subcentimeter mediastinal lymph nodes  No significant lymphadenopathy by size criteria  No mass or fluid collections  Thyroid glands appear unremarkable  Esophagus is normal in course and caliber  No significant prevertebral soft tissue swelling noted  CHEST WALL AND LOWER NECK:  Patient is status post right mastectomy and right axillary dissection  Multiple nonspecific subcentimeter left axillary lymph nodes are again seen, not significantly changed in size or number compared to the previous exam   No subcutaneous mass or fluid collections noted    ABDOMEN LIVER/BILIARY TREE:  Liver is normal in size and contour  5 mm hypodensity in the left hepatic lobe is again seen without interval change  Previously described right hepatic lobe small hypodensity not definitively seen on this exam which could be related to the phase of contrast enhancement  No new or additional suspicious hepatic lesions identified  Melanie Abdul GALLBLADDER:  No calcified gallstones  No pericholecystic inflammatory change  SPLEEN:  Unremarkable  PANCREAS:  Unremarkable  ADRENAL GLANDS:  Unremarkable  KIDNEYS/URETERS:  Unremarkable  No hydronephrosis  STOMACH AND BOWEL:  Stomach is mildly distended with oral contrast and air  No intraluminal mass or abnormal wall thickening    Small and large bowel loops appear normal in course and caliber without evidence for obstruction or inflammation  Terminal ileum and appendix appear grossly unremarkable  APPENDIX:  No findings to suggest appendicitis  ABDOMINOPELVIC CAVITY:  No ascites  No pneumoperitoneum  No lymphadenopathy  VESSELS:  Unremarkable for patient's age  PELVIS REPRODUCTIVE ORGANS:  Unremarkable for patient's age  URINARY BLADDER:  Mildly distended and grossly unremarkable  ABDOMINAL WALL/INGUINAL REGIONS:  Previous ventral upper abdominal wall hernia repair noted without evidence for recurrent hernia  No subcutaneous mass or fluid collections are seen  A few subcentimeter bilateral inguinal lymph nodes are again noted without interval change  OSSEOUS STRUCTURES:  No acute fracture or destructive osseous lesion  Impression: 1  Stable size and appearance of subcentimeter left axillary lymph nodes  Patient is status post right mastectomy and right axillary dissection  2   No evidence of intrathoracic metastatic disease  3   Stable 5 mm hypodensity in the left hepatic lobe  Previously described small right hepatic lobe hypodensity not definitively seen on this study which could be related to the phase of contrast enhancement  No suspicious or new hepatic lesions identified  Previously noted hepatic steatosis appear resolved  4   No acute intra-abdominal/pelvic abnormalities or evidence of metastatic disease/lymphadenopathy  Workstation performed: [id number]     Mammo diagnostic left w 3d & cad    Result Date: 8/15/2022  Narrative: DIAGNOSIS:  Right breast cancer, right mastectomy in 2019 TECHNIQUE: Digital diagnostic mammography was performed  Computer Aided Detection (CAD) analyzed all applicable images  Computer Aided Detection (CAD) analyzed all applicable images  COMPARISONS: Prior breast imaging dated: 05/03/2019 and 05/03/2019 RELEVANT HISTORY: Family Breast Cancer History: History of breast cancer in Paternal Aunt   Family Medical History: Family medical history includes breast cancer in paternal aunt and colon cancer in father  Personal History: No known relevant hormone history  Surgical history includes breast biopsy and mastectomy  No known relevant medical history  RISK ASSESSMENT: 5 Year Tyrer-Cuzick: 1 42 % 10 Year Tyrer-Cuzick: 3 08 % Lifetime Tyrer-Cuzick: 11 21 % TISSUE DENSITY: The breasts are almost entirely fatty  INDICATION: Cecilia Plascencia is a 47 y o  female presenting for post mastectomy mammo for other breast  FINDINGS: There are no suspicious masses, grouped microcalcifications or areas of unexplained architectural distortion  The skin and nipple areolar complex are unremarkable  Impression:  No abnormality of the left breast  ASSESSMENT/BI-RADS CATEGORY: Left: 1 - Negative Overall: 1 - Negative RECOMMENDATION:      - Routine screening mammogram in 1 year for the left breast  Workstation ID: [id number]        Labs:   Lab Results   Component Value Date    WBC 6 62 08/23/2022    HGB 12 6 08/23/2022    HCT 37 3 08/23/2022     (H) 08/23/2022     08/23/2022     Lab Results   Component Value Date    K 3 7 08/23/2022     08/23/2022    CO2 26 08/23/2022    BUN 19 08/23/2022    CREATININE 0 92 08/23/2022    GLUF 85 08/23/2022    CALCIUM 9 4 08/23/2022    CORRECTEDCA 9 7 07/27/2021    AST 22 08/23/2022    ALT 41 08/23/2022    ALKPHOS 85 08/23/2022    EGFR 71 08/23/2022         Current Medications: Reviewed  Allergies: Reviewed  PMH/FH/SH:  Reviewed      Vital Sign:    There is no height or weight on file to calculate BSA      Wt Readings from Last 3 Encounters:   08/22/22 110 kg (242 lb)   08/15/22 110 kg (242 lb)   08/08/22 110 kg (242 lb)        Temp Readings from Last 3 Encounters:   08/22/22 98 8 °F (37 1 °C) (Tympanic)   08/08/22 97 7 °F (36 5 °C) (Tympanic)   06/29/22 (!) 96 7 °F (35 9 °C) (Tympanic)        BP Readings from Last 3 Encounters:   08/22/22 110/60   08/08/22 127/76   07/14/22 110/65         Pulse Readings from Last 3 Encounters:   08/22/22 75 08/08/22 76   07/14/22 77     @[facility]@

## 2022-09-08 ENCOUNTER — EVALUATION (OUTPATIENT)
Dept: PHYSICAL THERAPY | Facility: REHABILITATION | Age: 52
End: 2022-09-08
Payer: COMMERCIAL

## 2022-09-08 DIAGNOSIS — C50.911 CARCINOMA OF RIGHT BREAST METASTATIC TO AXILLARY LYMPH NODE (HCC): ICD-10-CM

## 2022-09-08 DIAGNOSIS — C77.3 CARCINOMA OF RIGHT BREAST METASTATIC TO AXILLARY LYMPH NODE (HCC): ICD-10-CM

## 2022-09-08 PROCEDURE — 97161 PT EVAL LOW COMPLEX 20 MIN: CPT | Performed by: PHYSICAL THERAPIST

## 2022-09-08 PROCEDURE — 97140 MANUAL THERAPY 1/> REGIONS: CPT | Performed by: PHYSICAL THERAPIST

## 2022-09-08 NOTE — PROGRESS NOTES
PT Evaluation     Today's date: 2022  Patient name: Earl Lockett  : 1970  MRN: [id number]  Referring provider: Janet Almanza MD  Dx:   Encounter Diagnosis     ICD-10-CM    1  Carcinoma of right breast metastatic to axillary lymph node ([facility] [address] )  C50 911 Ambulatory Referral to PT/OT Lymphedema Therapy    C77 3        Start Time: 1548          Assessment  Assessment details: RUE 9% larger compared to LUE  RLE less 4% then LLE  Would benefit from manual drainage to RUE but patient is choosing to go on hold and will call within the next month if she thinks she is flaring up  Other impairment: edema RUE    Goals  STG decrease edema within 5% of LUE  I ADL    Plan  Plan details: Hold secondary to patient request and if she thinks she has a flare up will call within the next month    Treatment plan discussed with: patient        Subjective Evaluation    History of Present Illness  Mechanism of injury: Patient reported puffiness in RUE in the last 3 weeks and has become a little better  Pain  No pain reported          Objective           Precautions: allergies, anxiety, arthritis, cancer, COPD, hearing, Stroke      Manuals             Manual drainage RUE anastamosis to LUE not RLE 15 min                                                   Neuro Re-Ed                                                                                                        Ther Ex                                                                                                                     Ther Activity                                       Gait Training                                       Modalities

## 2022-09-17 DIAGNOSIS — C78.7 LIVER METASTASES (HCC): ICD-10-CM

## 2022-09-17 DIAGNOSIS — C77.3 CARCINOMA OF RIGHT BREAST METASTATIC TO AXILLARY LYMPH NODE (HCC): ICD-10-CM

## 2022-09-17 DIAGNOSIS — C50.911 CARCINOMA OF RIGHT BREAST METASTATIC TO AXILLARY LYMPH NODE (HCC): ICD-10-CM

## 2022-09-19 DIAGNOSIS — F41.8 ANXIETY ABOUT HEALTH: ICD-10-CM

## 2022-09-19 RX ORDER — ALPRAZOLAM 0.25 MG/1
0.25 TABLET ORAL DAILY PRN
Qty: 40 TABLET | Refills: 0 | Status: SHIPPED | OUTPATIENT
Start: 2022-09-19

## 2022-09-19 NOTE — TELEPHONE ENCOUNTER
Primary palliative medicine provider:   [place]    Medication requested:  Alprazolam     If for pain, how has the patient been taking their pain medicine? Last appointment: 2/24/22     Next scheduled appointment: none  Please indicate when pt should be seen next  PDMP review:    [id number] 07/16/2022 06/09/2022 ALPRAZolam (Tablet) 10 0 30 0 25 MG NA SHANEL NAM [facility] [place]  Private Pay 1 / 0 [place]     1 [id number] 06/09/2022 06/09/2022 ALPRAZolam (Tablet) 30 0 30 0 25 MG NA SHANEL NAM [facility] [place]  Private Pay 0 / 0 [place]    1 [id number] 02/24/2022 02/24/2022 ALPRAZolam (Tablet) 40 0 40 0 25 MG NA SHANEL NAM [facility]  [facility]

## 2022-09-30 ENCOUNTER — RA CDI HCC (OUTPATIENT)
Dept: OTHER | Facility: HOSPITAL | Age: 52
End: 2022-09-30

## 2022-09-30 NOTE — PROGRESS NOTES
Please review if the following dx  is applicable to the patient's condition and assess and document, if applicable in next visit     E66 01: Morbid (severe) obesity due to excess calories ([address] )     Per CMS/ICD 10 coding guidelines, to be used when BMI > 35 & <40 with one or more comorbidity (DM, HTN, or LEILA)      [address]  coding opportunities          Chart Reviewed number of suggestions sent to Provider: 1     Patients Insurance        Commercial Insurance: [address] [place] [address]

## 2022-10-03 ENCOUNTER — OFFICE VISIT (OUTPATIENT)
Dept: FAMILY MEDICINE CLINIC | Facility: CLINIC | Age: 52
End: 2022-10-03
Payer: COMMERCIAL

## 2022-10-03 VITALS
BODY MASS INDEX: 40.32 KG/M2 | HEIGHT: 65 IN | TEMPERATURE: 98.1 F | WEIGHT: 242 LBS | DIASTOLIC BLOOD PRESSURE: 64 MMHG | HEART RATE: 84 BPM | RESPIRATION RATE: 16 BRPM | SYSTOLIC BLOOD PRESSURE: 122 MMHG

## 2022-10-03 DIAGNOSIS — Z80.0 FAMILY HISTORY OF COLON CANCER: ICD-10-CM

## 2022-10-03 DIAGNOSIS — Z90.722 S/P BSO (BILATERAL SALPINGO-OOPHORECTOMY): ICD-10-CM

## 2022-10-03 DIAGNOSIS — L71.9 ROSACEA: ICD-10-CM

## 2022-10-03 DIAGNOSIS — C78.7 LIVER METASTASES (HCC): Primary | ICD-10-CM

## 2022-10-03 DIAGNOSIS — Z23 ENCOUNTER FOR IMMUNIZATION: ICD-10-CM

## 2022-10-03 DIAGNOSIS — I89.0 LYMPHEDEMA: ICD-10-CM

## 2022-10-03 DIAGNOSIS — I67.1 CEREBRAL ANEURYSM: ICD-10-CM

## 2022-10-03 DIAGNOSIS — G89.3 CANCER RELATED PAIN: ICD-10-CM

## 2022-10-03 DIAGNOSIS — Z15.01 BRCA2 GENE MUTATION POSITIVE: ICD-10-CM

## 2022-10-03 DIAGNOSIS — E78.2 MIXED HYPERLIPIDEMIA: ICD-10-CM

## 2022-10-03 DIAGNOSIS — Z15.09 BRCA2 GENE MUTATION POSITIVE: ICD-10-CM

## 2022-10-03 DIAGNOSIS — D33.3 SCHWANNOMA OF CRANIAL NERVE (HCC): Chronic | ICD-10-CM

## 2022-10-03 PROCEDURE — 99204 OFFICE O/P NEW MOD 45 MIN: CPT | Performed by: NURSE PRACTITIONER

## 2022-10-03 PROCEDURE — 90471 IMMUNIZATION ADMIN: CPT

## 2022-10-03 PROCEDURE — 90472 IMMUNIZATION ADMIN EACH ADD: CPT

## 2022-10-03 PROCEDURE — 90682 RIV4 VACC RECOMBINANT DNA IM: CPT

## 2022-10-03 PROCEDURE — 90715 TDAP VACCINE 7 YRS/> IM: CPT

## 2022-10-03 RX ORDER — AZELAIC ACID 0.15 G/G
1 AEROSOL, FOAM TOPICAL 2 TIMES DAILY
Qty: 50 G | Refills: 3 | Status: SHIPPED | OUTPATIENT
Start: 2022-10-03

## 2022-10-03 NOTE — PATIENT INSTRUCTIONS
Good fat  Good fats come mainly from vegetables, nuts, seeds, and fish  They differ from saturated fats by having fewer hydrogen atoms bonded to their carbon chains  Healthy fats are liquid at room temperature, not solid  There are two broad categories of beneficial fats: monounsaturated and polyunsaturated fats  Monounsaturated fats  When you dip your bread in olive oil at an [place], you're getting mostly monounsaturated fat  Monounsaturated fats have a single carbon-to-carbon double bond  The result is that it has two fewer hydrogen atoms than a saturated fat and a bend at the double bond  This structure keeps monounsaturated fats liquid at room temperature  Good sources of monounsaturated fats are olive oil, peanut oil, canola oil, avocados, and most nuts, as well as high-oleic safflower and sunflower oils  The discovery that monounsaturated fat could be healthful came from the [place] Study during the 1960s  It revealed that people in [place] and other parts of the [facility] enjoyed a low rate of heart disease despite a high-fat diet  The main fat in their diet, though, was not the saturated animal fat common in countries with higher rates of heart disease  It was olive oil, which contains mainly monounsaturated fat  This finding produced a surge of interest in olive oil and the "Mediterranean diet," a style of eating regarded as a healthful choice today  Although there's no recommended daily intake of monounsaturated fats, the [place] [employer] recommends using them as much as possible along with polyunsaturated fats to replace saturated and trans fats  Polyunsaturated fats  When you pour liquid cooking oil into a pan, there's a good chance you're using polyunsaturated fat  Corn oil, sunflower oil, and safflower oil are common examples  Polyunsaturated fats are essential fats  That means they're required for normal body functions but your body can't make them   So you must get them from food  Polyunsaturated fats are used to build cell membranes and the covering of nerves  They are needed for blood clotting, muscle movement, and inflammation  A polyunsaturated fat has two or more double bonds in its carbon chain  There are two main types of polyunsaturated fats: omega-3 fatty acids and omega-6 fatty acids  The numbers refer to the distance between the beginning of the carbon chain and the first double bond  Both types offer health benefits  Eating polyunsaturated fats in place of saturated fats or highly refined carbohydrates reduces harmful LDL cholesterol and improves the cholesterol profile  It also lowers triglycerides  Good sources of omega-3 fatty acids include fatty fish such as salmon, mackerel, and sardines, flaxseeds, walnuts, canola oil, and unhydrogenated soybean oil  Omega-3 fatty acids may help prevent and even treat heart disease and stroke  In addition to reducing blood pressure, raising HDL, and lowering triglycerides, polyunsaturated fats may help prevent lethal heart rhythms from arising  Evidence also suggests they may help reduce the need for corticosteroid medications in people with rheumatoid arthritis  Studies linking omega-3s to a wide range of other health improvements, including reducing risk of dementia, are inconclusive, and some of them have major flaws, according to a systematic review of the evidence by the Agency for Healthcare Research and Quality  Omega-6 fatty acids have also been linked to protection against heart disease  Foods rich in linoleic acid and other omega-6 fatty acids include vegetable oils such as safflower, soybean, sunflower, walnut, and corn oils  Cholesterol and Your Health   AMBULATORY CARE:   Cholesterol  is a waxy, fat-like substance  Your body uses cholesterol to make hormones and new cells, and to protect nerves  Cholesterol is made by your body   It also comes from certain foods you eat, such as meat and dairy products  Your healthcare provider can help you set goals for your cholesterol levels  He or she can help you create a plan to meet your goals  Cholesterol level goals: Your cholesterol level goals depend on your risk for heart disease, your age, and your other health conditions  The following are general guidelines: Total cholesterol  includes low-density lipoprotein (LDL), high-density lipoprotein (HDL), and triglyceride levels  The total cholesterol level should be lower than 200 mg/dL and is best at about 150 mg/dL  LDL cholesterol  is called bad cholesterol  because it forms plaque in your arteries  As plaque builds up, your arteries become narrow, and less blood flows through  When plaque decreases blood flow to your heart, you may have chest pain  If plaque completely blocks an artery that brings blood to your heart, you may have a heart attack  Plaque can break off and form blood clots  Blood clots may block arteries in your brain and cause a stroke  The level should be less than 130 mg/dL and is best at about 100 mg/dL  HDL cholesterol  is called good cholesterol  because it helps remove LDL cholesterol from your arteries  It does this by attaching to LDL cholesterol and carrying it to your liver  Your liver breaks down LDL cholesterol so your body can get rid of it  High levels of HDL cholesterol can help prevent a heart attack and stroke  Low levels of HDL cholesterol can increase your risk for heart disease, heart attack, and stroke  The level should be 60 mg/dL or higher  Triglycerides  are a type of fat that store energy from foods you eat  High levels of triglycerides also cause plaque buildup  This can increase your risk for a heart attack or stroke  If your triglyceride level is high, your LDL cholesterol level may also be high  The level should be less than 150 mg/dL      Any of the following can increase your risk for high cholesterol:   Smoking cigarettes    Being overweight or obese, or not getting enough exercise    Drinking large amounts of alcohol    A medical condition such as hypertension (high blood pressure) or diabetes    Certain genes passed from your parents to you    Age older than 65 years    What you need to know about having your cholesterol levels checked: Adults 21to 39years of age should have their cholesterol levels checked every 4 to 6 years  Adults 45 years or older should have their cholesterol checked every 1 to 2 years  You may need your cholesterol checked more often, or at a younger age, if you have risk factors for heart disease  You may also need to have your cholesterol checked more often if you have other health conditions, such as diabetes  Blood tests are used to check cholesterol levels  Blood tests measure your levels of triglycerides, LDL cholesterol, and HDL cholesterol  How healthy fats affect your cholesterol levels:  Healthy fats, also called unsaturated fats, help lower LDL cholesterol and triglyceride levels  Healthy fats include the following:  Monounsaturated fats  are found in foods such as olive oil, canola oil, avocado, nuts, and olives  Polyunsaturated fats,  such as omega 3 fats, are found in fish, such as salmon, trout, and tuna  They can also be found in plant foods such as flaxseed, walnuts, and soybeans  How unhealthy fats affect your cholesterol levels:  Unhealthy fats increase LDL cholesterol and triglyceride levels  They are found in foods high in cholesterol, saturated fat, and trans fat:  Cholesterol  is found in eggs, dairy, and meat  Saturated fat  is found in butter, cheese, ice cream, whole milk, and coconut oil  Saturated fat is also found in meat, such as sausage, hot dogs, and bologna  Trans fat  is found in liquid oils and is used in fried and baked foods  Foods that contain trans fats include chips, crackers, muffins, sweet rolls, microwave popcorn, and cookies      Treatment  for high cholesterol will also decrease your risk of heart disease, heart attack, and stroke  Treatment may include any of the following:  Lifestyle changes  may include food, exercise, weight loss, and quitting smoking  You may also need to decrease the amount of alcohol you drink  Your healthcare provider will want you to start with lifestyle changes  Other treatment may be added if lifestyle changes are not enough  Your healthcare provider may recommend you work with a team to manage hyperlipidemia  The team may include medical experts such as a dietitian, an exercise or physical therapist, and a behavior therapist  Your family members may be included in helping you create lifestyle changes  Medicines  may be given to lower your LDL cholesterol, triglyceride levels, or total cholesterol level  You may need medicines to lower your cholesterol if any of the following is true:    You have a history of stroke, TIA, unstable angina, or a heart attack  Your LDL cholesterol level is 190 mg/dL or higher  You are age 36 to 76 years, have diabetes or heart disease risk factors, and your LDL cholesterol is 70 mg/dL or higher  Supplements  include fish oil, red yeast rice, and garlic  Fish oil may help lower your triglyceride and LDL cholesterol levels  It may also increase your HDL cholesterol level  Red yeast rice may help decrease your total cholesterol level and LDL cholesterol level  Garlic may help lower your total cholesterol level  Do not take any supplements without talking to your healthcare provider  Food changes you can make to lower your cholesterol levels:  A dietitian can help you create a healthy eating plan  He or she can show you how to read food labels and choose foods low in saturated fat, trans fats, and cholesterol  Decrease the total amount of fat you eat  Choose lean meats, fat-free or 1% fat milk, and low-fat dairy products, such as yogurt and cheese  Try to limit or avoid red meats   Limit or do not eat fried foods or baked goods, such as cookies  Replace unhealthy fats with healthy fats  Cook foods in olive oil or canola oil  Choose soft margarines that are low in saturated fat and trans fat  Seeds, nuts, and avocados are other examples of healthy fats  Eat foods with omega-3 fats  Examples include salmon, tuna, mackerel, walnuts, and flaxseed  Eat fish 2 times per week  Pregnant women should not eat fish that have high levels of mercury, such as shark, swordfish, and vicki mackerel  Increase the amount of high-fiber foods you eat  High-fiber foods can help lower your LDL cholesterol  Aim to get between 20 and 30 grams of fiber each day  Fruits and vegetables are high in fiber  Eat at least 5 servings each day  Other high-fiber foods are whole-grain or whole-wheat breads, pastas, or cereals, and brown rice  Eat 3 ounces of whole-grain foods each day  Increase fiber slowly  You may have abdominal discomfort, bloating, and gas if you add fiber to your diet too quickly  Eat healthy protein foods  Examples include low-fat dairy products, skinless chicken and turkey, fish, and nuts  Limit foods and drinks that are high in sugar  Your dietitian or healthcare provider can help you create daily limits for high-sugar foods and drinks  The limit may be lower if you have diabetes or another health condition  Limits can also help you lose weight if needed  Lifestyle changes you can make to lower your cholesterol levels:   Maintain a healthy weight  Ask your healthcare provider what a healthy weight is for you  Ask him or her to help you create a weight loss plan if needed  Weight loss can decrease your total cholesterol and triglyceride levels  Weight loss may also help keep your blood pressure at a healthy level  Be physically active throughout the day  Physical activity, such as exercise, can help lower your total cholesterol level and maintain a healthy weight   Physical activity can also help increase your HDL cholesterol level  Work with your healthcare provider to create an program that is right for you  Get at least 30 to 40 minutes of moderate physical activity most days of the week  Examples of exercise include brisk walking, swimming, or biking  Also include strength training at least 2 times each week  Your healthcare providers can help you create a physical activity plan  Do not smoke  Nicotine and other chemicals in cigarettes and cigars can raise your cholesterol levels  Ask your healthcare provider for information if you currently smoke and need help to quit  E-cigarettes or smokeless tobacco still contain nicotine  Talk to your healthcare provider before you use these products  Limit or do not drink alcohol  Alcohol can increase your triglyceride levels  Ask your healthcare provider before you drink alcohol  Ask how much is okay for you to drink in 24 hours or 1 week  Follow up with your doctor as directed:  Write down your questions so you remember to ask them during your visits  © [employer] 2022 Information is for End User's use only and may not be sold, redistributed or otherwise used for commercial purposes  All illustrations and images included in CareNotes® are the copyrighted property of [employer]  or [facility]   The above information is an  only  It is not intended as medical advice for individual conditions or treatments  Talk to your doctor, nurse or pharmacist before following any medical regimen to see if it is safe and effective for you  Weight Management   WHAT YOU NEED TO KNOW:   Being overweight increases your risk of health conditions such as heart disease, high blood pressure, type 2 diabetes, and certain types of cancer  It can also increase your risk for osteoarthritis, sleep apnea, and other respiratory problems  Aim for a slow, steady weight loss   Even a small amount of weight loss can lower your risk of health problems  DISCHARGE INSTRUCTIONS:   How to lose weight safely:  A safe and healthy way to lose weight is to eat fewer calories and get regular exercise  You can lose up about 1 pound a week by decreasing the number of calories you eat by 500 calories each day  You can decrease calories by eating smaller portion sizes or by cutting out high-calorie foods  Read labels to find out how many calories are in the foods you eat  You can also burn calories with exercise such as walking, swimming, or biking  You will be more likely to keep weight off if you make these changes part of your lifestyle  Exercise at least 30 minutes per day on most days of the week  You can also fit in more physical activity by taking the stairs instead of the elevator or parking farther away from stores  Ask your healthcare provider about the best exercise plan for you  Healthy meal plan for weight management:  A healthy meal plan includes a variety of foods, contains fewer calories, and helps you stay healthy  A healthy meal plan includes the following:     Eat whole-grain foods more often  A healthy meal plan should contain fiber  Fiber is the part of grains, fruits, and vegetables that is not broken down by your body  Whole-grain foods are healthy and provide extra fiber in your diet  Some examples of whole-grain foods are whole-wheat breads and pastas, oatmeal, brown rice, and bulgur  Eat a variety of vegetables every day  Include dark, leafy greens such as spinach, kale, erika greens, and mustard greens  Eat yellow and orange vegetables such as carrots, sweet potatoes, and winter squash  Eat a variety of fruits every day  Choose fresh or canned fruit (canned in its own juice or light syrup) instead of juice  Fruit juice has very little or no fiber  Eat low-fat dairy foods  Drink fat-free (skim) milk or 1% milk  Eat fat-free yogurt and low-fat cottage cheese   Try low-fat cheeses such as mozzarella and other reduced-fat cheeses  Choose meat and other protein foods that are low in fat  Choose beans or other legumes such as split peas or lentils  Choose fish, skinless poultry (chicken or turkey), or lean cuts of red meat (beef or pork)  Before you cook meat or poultry, cut off any visible fat  Use less fat and oil  Try baking foods instead of frying them  Add less fat, such as margarine, sour cream, regular salad dressing, and mayonnaise to foods  Eat fewer high-fat foods  Some examples of high-fat foods include french fries, doughnuts, ice cream, and cakes  Eat fewer sweets  Limit foods and drinks that are high in sugar  This includes candy, cookies, regular soda, and sweetened drinks  Ways to decrease calories:   Eat smaller portions  Use a small plate with smaller servings  Do not eat second helpings  When you eat at a restaurant, ask for a box and place half of your meal in the box before you eat  Share an entrée with someone else  Replace high-calorie snacks with healthy, low-calorie snacks  Choose fresh fruit, vegetables, fat-free rice cakes, or air-popped popcorn instead of potato chips, nuts, or chocolate  Choose water or calorie-free drinks instead of soda or sweetened drinks  Do not shop for groceries when you are hungry  You may be more likely to make unhealthy food choices  Take a grocery list of healthy foods and shop after you have eaten  Eat regular meals  Do not skip meals  Skipping meals can lead to overeating later in the day  This can make it harder for you to lose weight  Eat a healthy snack in place of a meal if you do not have time to eat a regular meal  Talk with a dietitian to help you create a meal plan and schedule that is right for you  Other things to consider as you try to lose weight:   Be aware of situations that may give you the urge to overeat, such as eating while watching television  Find ways to avoid these situations   For example, read a book, go for a walk, or do crafts  Meet with a weight loss support group or friends who are also trying to lose weight  This may help you stay motivated to continue working on your weight loss goals  © Copyright [employer] 2022 Information is for End User's use only and may not be sold, redistributed or otherwise used for commercial purposes  All illustrations and images included in CareNotes® are the copyrighted property of [employer]  or [facility] Rogerio Nicholas   The above information is an  only  It is not intended as medical advice for individual conditions or treatments  Talk to your doctor, nurse or pharmacist before following any medical regimen to see if it is safe and effective for you

## 2022-10-03 NOTE — PROGRESS NOTES
INTERNAL MEDICINE INITIAL OFFICE VISIT  [facility] - [facility] [place] [facility]    NAME: Annie Steele  AGE: 47 y o  SEX: female  : 1970     DATE: 10/3/2022     Assessment and Plan:     Problem List Items Addressed This Visit        Digestive    Liver metastases [facility]) - Primary     Patient continues on treatment with medical oncology  While CT scan was 2022 which showed stability of hepatic disease  Cardiovascular and Mediastinum    Cerebral aneurysm     The patient will continue to follow with Neurosurgery  No surgical interventions needed at this time  Nervous and Auditory    Schwannoma of cranial nerve (HCC) (Chronic)     Patient with serial surveillance imaging  She continues to follow with Neurosurgery and ear nose and throat  Musculoskeletal and Integument    Rosacea     Patient with a history of rosacea  Topical foam and cream sent to her pharmacy         Relevant Medications    Azelaic Acid (Finacea) 15 % FOAM    metroNIDAZOLE (METROCREAM) 0 75 % cream       Other    Family history of colon cancer     Father with a history of colon cancer  Patient is scheduled for colonoscopy next month  BRCA2 gene mutation positive     The patient did have a bilateral salpingo-oophorectomy performed for risk reduction  S/P BSO (bilateral salpingo-oophorectomy)    Cancer related pain     The patient continues to follow with palliative care  Lymphedema     Patient with occasional issues with right arm and hand lymphedema    She did have a right mastectomy performed in the past          Mixed hyperlipidemia    Relevant Orders    Lipid Panel with Direct LDL reflex      Other Visit Diagnoses     Encounter for immunization        Relevant Orders    TDAP VACCINE GREATER THAN OR EQUAL TO 8YO IM (Completed)    influenza vaccine, quadrivalent, recombinant, PF, 0 5 mL, for patients 18 yr+ (FLUBLOK) (Completed)          Return in about 6 months (around 4/15/2023) for Lily Stearns, yearly physical exam     Patient Care Team:  Rodrigue Thomas as PCP - General (Internal Medicine)  Teresa Cabral MD (Surgical Oncology)  Amilcar Henderson as Nurse Practitioner (Obstetrics)  Millie Payton MD as Medical Oncologist (Hematology)  Brii Soria MD (Neurosurgery)  Isaiah Laura MD (Neurology)  Rand Poe MD (Palliative Care)  Myles Arenas DO (Orthopedic Surgery)         Chief Complaint:     Chief Complaint   Patient presents with    New Patient Visit    Sore Throat     Symptoms may have started Saturday       History of Present Illness:   Patient presents to the office today to establish care  Patient's past medical history was updated  She is up-to-date with blood work  She is BRCA 2 positive in did have prophylactic surgery to remove her ovaries for risk reduction  She has a family history of colon cancer and will be getting a colonoscopy next month  She is up-to-date with mammograms  She continues on oral chemotherapy for her metastatic breast cancer  She is followed closely by Hematology Oncology and Surgical Oncology  She has started eating healthier and is going to the gym  Patient reports a 2 day history of a sore throat with no other symptoms  She did test for COVID twice and was negative  I will see the patient back in the office in 6 months  She will continue to follow with her multi specialties  The following portions of the patient's history were reviewed and updated as appropriate: allergies, current medications, past family history, past medical history, past social history, past surgical history and problem list      Review of Systems:     Review of Systems   Constitutional: Negative for activity change, fatigue and fever  HENT: Positive for sore throat  Negative for congestion, hearing loss, rhinorrhea, trouble swallowing and voice change      Eyes: Negative for photophobia, pain, discharge and visual disturbance  Respiratory: Negative for cough, chest tightness and shortness of breath  Cardiovascular: Negative for chest pain, palpitations and leg swelling  Gastrointestinal: Negative for abdominal pain, blood in stool, constipation, nausea and vomiting  Endocrine: Negative for cold intolerance and heat intolerance  Genitourinary: Negative for difficulty urinating, frequency, hematuria, urgency, vaginal bleeding and vaginal discharge  Musculoskeletal: Negative for arthralgias and myalgias  Skin: Negative  Neurological: Negative for dizziness, weakness, numbness and headaches  Psychiatric/Behavioral: Negative for decreased concentration  The patient is nervous/anxious           Past Medical History:     Past Medical History:   Diagnosis Date    Bloating     Bruises easily     Cancer ([facility] [address] )     right breast//to lymph nodes/liver/ and bone    Depression     History of cancer chemotherapy 05/2020    History of pneumonia     Hypotension     occas    Low iron     "when pregnant, 20 yrs ago"    Neuropathy     hands//fingers    Shortness of breath     occas    Stroke ([facility] [address] )     Tinnitus     right    Wears glasses         Past Surgical History:     Past Surgical History:   Procedure Laterality Date    BILATERAL SALPINGOOPHORECTOMY      BREAST BIOPSY      IR BIOPSY LIVER MASS  06/11/2019    MASTECTOMY Right     2019    MYOMECTOMY      H/O FIBROIDS, NO SURGERY NEEDED    WY LAP,RMV  ADNEXAL STRUCTURE N/A 07/29/2019    Procedure: LAPAROSCOPIC BILATERAL SALPINGO-OOPHORECTOMY;  Surgeon: Pee Pugh MD;  [facility]: [facility];  Service: Gynecology Oncology    WY MASTECTOMY, SIMPLE, COMPLETE Right 06/05/2020    Procedure: MASTECTOMY SIMPLE, axillary node dissection;  Surgeon: Chad Villegas MD;  Location: [facility];  Service: Surgical Oncology    US GUIDED BREAST BIOPSY RIGHT COMPLETE Right 05/03/2019    US GUIDED BREAST LYMPH NODE BIOPSY RIGHT Right 05/03/2019        Social History: Social History     Socioeconomic History    Marital status: Single     Spouse name: None    Number of children: None    Years of education: None    Highest education level: None   Occupational History    None   Tobacco Use    Smoking status: Former Smoker     Packs/day: 1 00     Years: 30 00     Pack years: 30 00     Quit date:      Years since quittin 7    Smokeless tobacco: Never Used   Vaping Use    Vaping Use: Never used   Substance and Sexual Activity    Alcohol use: Yes     Comment: occassional    Drug use: No    Sexual activity: Not Currently     Partners: Male     Birth control/protection: None   Other Topics Concern    None   Social History Narrative    Consumes 2-3 cups of coffee per day     Social Determinants of Health     Financial Resource Strain: Not on file   Food Insecurity: Not on file   Transportation Needs: Not on file   Physical Activity: Not on file   Stress: Not on file   Social Connections: Not on file   Intimate Partner Violence: Not on file   Housing Stability: Not on file         Family History:     Family History   Problem Relation Age of Onset    Hypotension Mother     Colon cancer Father 36    Hypertension Father     Prostate cancer Father 79    Throat cancer Maternal Grandmother 61    Cancer Maternal Grandmother     Breast cancer Paternal Aunt         age unknown        Current Medications:     Current Outpatient Medications:     albuterol (Proventil HFA) 90 mcg/act inhaler, Inhale 2 puffs every 6 (six) hours as needed for wheezing, Disp: 6 7 g, Rfl: 5    ALPRAZolam (XANAX) 0 25 mg tablet, Take 1 tablet (0 25 mg total) by mouth daily as needed (during day for anxiety as needed), Disp: 40 tablet, Rfl: 0    ASPIRIN 81 PO, Take by mouth, Disp: , Rfl:     atorvastatin (LIPITOR) 40 mg tablet, Take 1 tablet (40 mg total) by mouth daily, Disp: 90 tablet, Rfl: 1    Azelaic Acid (Finacea) 15 % FOAM, Apply 1 Pump topically 2 (two) times a day, Disp: 50 g, Rfl: 3    b complex vitamins capsule, Take 1 capsule by mouth daily, Disp: , Rfl:     cholecalciferol (VITAMIN D3) 1,000 units tablet, Take 1 tablet (1,000 Units total) by mouth daily, Disp: , Rfl:     fluticasone-vilanterol (Breo Ellipta) 200-25 MCG/INH inhaler, Inhale 1 puff daily Rinse mouth after use , Disp: 180 blister, Rfl: 0    metroNIDAZOLE (METROCREAM) 0 75 % cream, Apply topically 2 (two) times a day, Disp: 45 g, Rfl: 3    olaparib (Lynparza) tablet, TAKE 2 TABLETS (300 MG) TWICE A DAY, Disp: 120 tablet, Rfl: 2    oxyCODONE (ROXICODONE) 10 MG TABS, Take 1 tablet (10 mg total) by mouth every 6 (six) hours as needed for moderate pain Max Daily Amount: 40 mg, Disp: 30 tablet, Rfl: 0    Specialty Vitamins Products (Advanced Collagen) TABS, Take by mouth in the morning, Disp: , Rfl:      Allergies: Allergies   Allergen Reactions    Tylenol [Acetaminophen]      Told to avoid due to cancer         Physical Exam:     /64   Pulse 84   Temp 98 1 °F (36 7 °C) (Tympanic)   Resp 16   Ht 5' 5" (1 651 m)   Wt 110 kg (242 lb)   BMI 40 27 kg/m²     Physical Exam  Constitutional:       General: She is not in acute distress  Appearance: Normal appearance  She is well-developed  She is obese  HENT:      Head: Normocephalic and atraumatic  Right Ear: Tympanic membrane, ear canal and external ear normal  There is no impacted cerumen  Left Ear: Tympanic membrane, ear canal and external ear normal  There is no impacted cerumen  Nose: Nose normal  No congestion or rhinorrhea  Mouth/Throat:      Mouth: Mucous membranes are moist       Pharynx: Oropharynx is clear  No oropharyngeal exudate or posterior oropharyngeal erythema  Eyes:      General:         Right eye: No discharge  Left eye: No discharge  Conjunctiva/sclera: Conjunctivae normal       Pupils: Pupils are equal, round, and reactive to light  Neck:      Thyroid: No thyromegaly     Cardiovascular:      Rate and Rhythm: Normal rate and regular rhythm  Pulses: Normal pulses  Heart sounds: Normal heart sounds  No murmur heard  Pulmonary:      Effort: Pulmonary effort is normal  No respiratory distress  Breath sounds: Normal breath sounds  Chest:   Breasts:      Right: Absent  Abdominal:      General: Bowel sounds are normal  There is no distension  Palpations: Abdomen is soft  There is no mass  Tenderness: There is no abdominal tenderness  There is no guarding or rebound  Hernia: No hernia is present  Musculoskeletal:         General: Normal range of motion  Cervical back: Normal range of motion  Lymphadenopathy:      Cervical: No cervical adenopathy  Skin:     General: Skin is warm and dry  Capillary Refill: Capillary refill takes less than 2 seconds  Neurological:      General: No focal deficit present  Mental Status: She is alert and oriented to person, place, and time  Psychiatric:         Mood and Affect: Mood normal          Behavior: Behavior normal          Thought Content: Thought content normal          Judgment: Judgment normal            Data:     Laboratory Results: I have personally reviewed the pertinent laboratory results/reports   Radiology/Other Diagnostic Testing Results: I have personally reviewed pertinent reports  Patient Instructions   Good fat  Good fats come mainly from vegetables, nuts, seeds, and fish  They differ from saturated fats by having fewer hydrogen atoms bonded to their carbon chains  Healthy fats are liquid at room temperature, not solid  There are two broad categories of beneficial fats: monounsaturated and polyunsaturated fats  Monounsaturated fats  When you dip your bread in olive oil at an [place], you're getting mostly monounsaturated fat  Monounsaturated fats have a single carbon-to-carbon double bond   The result is that it has two fewer hydrogen atoms than a saturated fat and a bend at the double bond  This structure keeps monounsaturated fats liquid at room temperature  Good sources of monounsaturated fats are olive oil, peanut oil, canola oil, avocados, and most nuts, as well as high-oleic safflower and sunflower oils  The discovery that monounsaturated fat could be healthful came from the [place] Study during the 1960s  It revealed that people in [place] and other parts of the [facility] enjoyed a low rate of heart disease despite a high-fat diet  The main fat in their diet, though, was not the saturated animal fat common in countries with higher rates of heart disease  It was olive oil, which contains mainly monounsaturated fat  This finding produced a surge of interest in olive oil and the "Mediterranean diet," a style of eating regarded as a healthful choice today  Although there's no recommended daily intake of monounsaturated fats, the [place] of [employer] recommends using them as much as possible along with polyunsaturated fats to replace saturated and trans fats  Polyunsaturated fats  When you pour liquid cooking oil into a pan, there's a good chance you're using polyunsaturated fat  Corn oil, sunflower oil, and safflower oil are common examples  Polyunsaturated fats are essential fats  That means they're required for normal body functions but your body can't make them  So you must get them from food  Polyunsaturated fats are used to build cell membranes and the covering of nerves  They are needed for blood clotting, muscle movement, and inflammation  A polyunsaturated fat has two or more double bonds in its carbon chain  There are two main types of polyunsaturated fats: omega-3 fatty acids and omega-6 fatty acids  The numbers refer to the distance between the beginning of the carbon chain and the first double bond  Both types offer health benefits    Eating polyunsaturated fats in place of saturated fats or highly refined carbohydrates reduces harmful LDL cholesterol and improves the cholesterol profile  It also lowers triglycerides  Good sources of omega-3 fatty acids include fatty fish such as salmon, mackerel, and sardines, flaxseeds, walnuts, canola oil, and unhydrogenated soybean oil  Omega-3 fatty acids may help prevent and even treat heart disease and stroke  In addition to reducing blood pressure, raising HDL, and lowering triglycerides, polyunsaturated fats may help prevent lethal heart rhythms from arising  Evidence also suggests they may help reduce the need for corticosteroid medications in people with rheumatoid arthritis  Studies linking omega-3s to a wide range of other health improvements, including reducing risk of dementia, are inconclusive, and some of them have major flaws, according to a systematic review of the evidence by the Agency for Healthcare Research and Quality  Omega-6 fatty acids have also been linked to protection against heart disease  Foods rich in linoleic acid and other omega-6 fatty acids include vegetable oils such as safflower, soybean, sunflower, walnut, and corn oils  Cholesterol and Your Health   AMBULATORY CARE:   Cholesterol  is a waxy, fat-like substance  Your body uses cholesterol to make hormones and new cells, and to protect nerves  Cholesterol is made by your body  It also comes from certain foods you eat, such as meat and dairy products  Your healthcare provider can help you set goals for your cholesterol levels  He or she can help you create a plan to meet your goals  Cholesterol level goals: Your cholesterol level goals depend on your risk for heart disease, your age, and your other health conditions  The following are general guidelines: Total cholesterol  includes low-density lipoprotein (LDL), high-density lipoprotein (HDL), and triglyceride levels  The total cholesterol level should be lower than 200 mg/dL and is best at about 150 mg/dL      LDL cholesterol  is called bad cholesterol  because it forms plaque in your arteries  As plaque builds up, your arteries become narrow, and less blood flows through  When plaque decreases blood flow to your heart, you may have chest pain  If plaque completely blocks an artery that brings blood to your heart, you may have a heart attack  Plaque can break off and form blood clots  Blood clots may block arteries in your brain and cause a stroke  The level should be less than 130 mg/dL and is best at about 100 mg/dL  HDL cholesterol  is called good cholesterol  because it helps remove LDL cholesterol from your arteries  It does this by attaching to LDL cholesterol and carrying it to your liver  Your liver breaks down LDL cholesterol so your body can get rid of it  High levels of HDL cholesterol can help prevent a heart attack and stroke  Low levels of HDL cholesterol can increase your risk for heart disease, heart attack, and stroke  The level should be 60 mg/dL or higher  Triglycerides  are a type of fat that store energy from foods you eat  High levels of triglycerides also cause plaque buildup  This can increase your risk for a heart attack or stroke  If your triglyceride level is high, your LDL cholesterol level may also be high  The level should be less than 150 mg/dL  Any of the following can increase your risk for high cholesterol:   Smoking cigarettes    Being overweight or obese, or not getting enough exercise    Drinking large amounts of alcohol    A medical condition such as hypertension (high blood pressure) or diabetes    Certain genes passed from your parents to you    Age older than 65 years    What you need to know about having your cholesterol levels checked: Adults 21to 39years of age should have their cholesterol levels checked every 4 to 6 years  Adults 45 years or older should have their cholesterol checked every 1 to 2 years  You may need your cholesterol checked more often, or at a younger age, if you have risk factors for heart disease   You may also need to have your cholesterol checked more often if you have other health conditions, such as diabetes  Blood tests are used to check cholesterol levels  Blood tests measure your levels of triglycerides, LDL cholesterol, and HDL cholesterol  How healthy fats affect your cholesterol levels:  Healthy fats, also called unsaturated fats, help lower LDL cholesterol and triglyceride levels  Healthy fats include the following:  Monounsaturated fats  are found in foods such as olive oil, canola oil, avocado, nuts, and olives  Polyunsaturated fats,  such as omega 3 fats, are found in fish, such as salmon, trout, and tuna  They can also be found in plant foods such as flaxseed, walnuts, and soybeans  How unhealthy fats affect your cholesterol levels:  Unhealthy fats increase LDL cholesterol and triglyceride levels  They are found in foods high in cholesterol, saturated fat, and trans fat:  Cholesterol  is found in eggs, dairy, and meat  Saturated fat  is found in butter, cheese, ice cream, whole milk, and coconut oil  Saturated fat is also found in meat, such as sausage, hot dogs, and bologna  Trans fat  is found in liquid oils and is used in fried and baked foods  Foods that contain trans fats include chips, crackers, muffins, sweet rolls, microwave popcorn, and cookies  Treatment  for high cholesterol will also decrease your risk of heart disease, heart attack, and stroke  Treatment may include any of the following:  Lifestyle changes  may include food, exercise, weight loss, and quitting smoking  You may also need to decrease the amount of alcohol you drink  Your healthcare provider will want you to start with lifestyle changes  Other treatment may be added if lifestyle changes are not enough  Your healthcare provider may recommend you work with a team to manage hyperlipidemia   The team may include medical experts such as a dietitian, an exercise or physical therapist, and a behavior therapist  Your family members may be included in helping you create lifestyle changes  Medicines  may be given to lower your LDL cholesterol, triglyceride levels, or total cholesterol level  You may need medicines to lower your cholesterol if any of the following is true:    You have a history of stroke, TIA, unstable angina, or a heart attack  Your LDL cholesterol level is 190 mg/dL or higher  You are age 36 to 76 years, have diabetes or heart disease risk factors, and your LDL cholesterol is 70 mg/dL or higher  Supplements  include fish oil, red yeast rice, and garlic  Fish oil may help lower your triglyceride and LDL cholesterol levels  It may also increase your HDL cholesterol level  Red yeast rice may help decrease your total cholesterol level and LDL cholesterol level  Garlic may help lower your total cholesterol level  Do not take any supplements without talking to your healthcare provider  Food changes you can make to lower your cholesterol levels:  A dietitian can help you create a healthy eating plan  He or she can show you how to read food labels and choose foods low in saturated fat, trans fats, and cholesterol  Decrease the total amount of fat you eat  Choose lean meats, fat-free or 1% fat milk, and low-fat dairy products, such as yogurt and cheese  Try to limit or avoid red meats  Limit or do not eat fried foods or baked goods, such as cookies  Replace unhealthy fats with healthy fats  Cook foods in olive oil or canola oil  Choose soft margarines that are low in saturated fat and trans fat  Seeds, nuts, and avocados are other examples of healthy fats  Eat foods with omega-3 fats  Examples include salmon, tuna, mackerel, walnuts, and flaxseed  Eat fish 2 times per week  Pregnant women should not eat fish that have high levels of mercury, such as shark, swordfish, and vicki mackerel  Increase the amount of high-fiber foods you eat  High-fiber foods can help lower your LDL cholesterol   Aim to get between 20 and 30 grams of fiber each day  Fruits and vegetables are high in fiber  Eat at least 5 servings each day  Other high-fiber foods are whole-grain or whole-wheat breads, pastas, or cereals, and brown rice  Eat 3 ounces of whole-grain foods each day  Increase fiber slowly  You may have abdominal discomfort, bloating, and gas if you add fiber to your diet too quickly  Eat healthy protein foods  Examples include low-fat dairy products, skinless chicken and turkey, fish, and nuts  Limit foods and drinks that are high in sugar  Your dietitian or healthcare provider can help you create daily limits for high-sugar foods and drinks  The limit may be lower if you have diabetes or another health condition  Limits can also help you lose weight if needed  Lifestyle changes you can make to lower your cholesterol levels:   Maintain a healthy weight  Ask your healthcare provider what a healthy weight is for you  Ask him or her to help you create a weight loss plan if needed  Weight loss can decrease your total cholesterol and triglyceride levels  Weight loss may also help keep your blood pressure at a healthy level  Be physically active throughout the day  Physical activity, such as exercise, can help lower your total cholesterol level and maintain a healthy weight  Physical activity can also help increase your HDL cholesterol level  Work with your healthcare provider to create an program that is right for you  Get at least 30 to 40 minutes of moderate physical activity most days of the week  Examples of exercise include brisk walking, swimming, or biking  Also include strength training at least 2 times each week  Your healthcare providers can help you create a physical activity plan  Do not smoke  Nicotine and other chemicals in cigarettes and cigars can raise your cholesterol levels  Ask your healthcare provider for information if you currently smoke and need help to quit  E-cigarettes or smokeless tobacco still contain nicotine  Talk to your healthcare provider before you use these products  Limit or do not drink alcohol  Alcohol can increase your triglyceride levels  Ask your healthcare provider before you drink alcohol  Ask how much is okay for you to drink in 24 hours or 1 week  Follow up with your doctor as directed:  Write down your questions so you remember to ask them during your visits  © Copyright [employer] 2022 Information is for End User's use only and may not be sold, redistributed or otherwise used for commercial purposes  All illustrations and images included in CareNotes® are the copyrighted property of A D A M , Inc  or [facility]  The above information is an  only  It is not intended as medical advice for individual conditions or treatments  Talk to your doctor, nurse or pharmacist before following any medical regimen to see if it is safe and effective for you  Weight Management   WHAT YOU NEED TO KNOW:   Being overweight increases your risk of health conditions such as heart disease, high blood pressure, type 2 diabetes, and certain types of cancer  It can also increase your risk for osteoarthritis, sleep apnea, and other respiratory problems  Aim for a slow, steady weight loss  Even a small amount of weight loss can lower your risk of health problems  DISCHARGE INSTRUCTIONS:   How to lose weight safely:  A safe and healthy way to lose weight is to eat fewer calories and get regular exercise  You can lose up about 1 pound a week by decreasing the number of calories you eat by 500 calories each day  You can decrease calories by eating smaller portion sizes or by cutting out high-calorie foods  Read labels to find out how many calories are in the foods you eat  You can also burn calories with exercise such as walking, swimming, or biking   You will be more likely to keep weight off if you make these changes part of your lifestyle  Exercise at least 30 minutes per day on most days of the week  You can also fit in more physical activity by taking the stairs instead of the elevator or parking farther away from stores  Ask your healthcare provider about the best exercise plan for you  Healthy meal plan for weight management:  A healthy meal plan includes a variety of foods, contains fewer calories, and helps you stay healthy  A healthy meal plan includes the following:     Eat whole-grain foods more often  A healthy meal plan should contain fiber  Fiber is the part of grains, fruits, and vegetables that is not broken down by your body  Whole-grain foods are healthy and provide extra fiber in your diet  Some examples of whole-grain foods are whole-wheat breads and pastas, oatmeal, brown rice, and bulgur  Eat a variety of vegetables every day  Include dark, leafy greens such as spinach, kale, erika greens, and mustard greens  Eat yellow and orange vegetables such as carrots, sweet potatoes, and winter squash  Eat a variety of fruits every day  Choose fresh or canned fruit (canned in its own juice or light syrup) instead of juice  Fruit juice has very little or no fiber  Eat low-fat dairy foods  Drink fat-free (skim) milk or 1% milk  Eat fat-free yogurt and low-fat cottage cheese  Try low-fat cheeses such as mozzarella and other reduced-fat cheeses  Choose meat and other protein foods that are low in fat  Choose beans or other legumes such as split peas or lentils  Choose fish, skinless poultry (chicken or turkey), or lean cuts of red meat (beef or pork)  Before you cook meat or poultry, cut off any visible fat  Use less fat and oil  Try baking foods instead of frying them  Add less fat, such as margarine, sour cream, regular salad dressing, and mayonnaise to foods  Eat fewer high-fat foods  Some examples of high-fat foods include french fries, doughnuts, ice cream, and cakes  Eat fewer sweets  Limit foods and drinks that are high in sugar  This includes candy, cookies, regular soda, and sweetened drinks  Ways to decrease calories:   Eat smaller portions  Use a small plate with smaller servings  Do not eat second helpings  When you eat at a restaurant, ask for a box and place half of your meal in the box before you eat  Share an entrée with someone else  Replace high-calorie snacks with healthy, low-calorie snacks  Choose fresh fruit, vegetables, fat-free rice cakes, or air-popped popcorn instead of potato chips, nuts, or chocolate  Choose water or calorie-free drinks instead of soda or sweetened drinks  Do not shop for groceries when you are hungry  You may be more likely to make unhealthy food choices  Take a grocery list of healthy foods and shop after you have eaten  Eat regular meals  Do not skip meals  Skipping meals can lead to overeating later in the day  This can make it harder for you to lose weight  Eat a healthy snack in place of a meal if you do not have time to eat a regular meal  Talk with a dietitian to help you create a meal plan and schedule that is right for you  Other things to consider as you try to lose weight:   Be aware of situations that may give you the urge to overeat, such as eating while watching television  Find ways to avoid these situations  For example, read a book, go for a walk, or do crafts  Meet with a weight loss support group or friends who are also trying to lose weight  This may help you stay motivated to continue working on your weight loss goals  © Copyright [employer] 2022 Information is for End User's use only and may not be sold, redistributed or otherwise used for commercial purposes  All illustrations and images included in CareNotes® are the copyrighted property of [employer]  or Naveed Nicholas   The above information is an  only   It is not intended as medical advice for individual conditions or treatments  Talk to your doctor, nurse or pharmacist before following any medical regimen to see if it is safe and effective for you        [address]

## 2022-10-03 NOTE — ASSESSMENT & PLAN NOTE
Patient with serial surveillance imaging  She continues to follow with Neurosurgery and ear nose and throat

## 2022-10-03 NOTE — ASSESSMENT & PLAN NOTE
The patient will continue to follow with Neurosurgery  No surgical interventions needed at this time

## 2022-10-03 NOTE — ASSESSMENT & PLAN NOTE
Patient continues to follow with the surgical oncologist and medical oncologist   She did have a palliative right mastectomy performed  She does have known liver Mets  She continues on oral therapy  She is up-to-date with mammograms  She is BRCA 2 positive

## 2022-10-03 NOTE — ASSESSMENT & PLAN NOTE
Patient with occasional issues with right arm and hand lymphedema    She did have a right mastectomy performed in the past

## 2022-10-03 NOTE — ASSESSMENT & PLAN NOTE
Claudia Murphy is a 62 year old female, presenting to hospitals care. Chart reviewed and updated with patient today including past medical, surgical, social and family history.   also for a complete physical.  She has mammogram scheduled for today. Had pap done 2/26/21. Colonoscopy done in 2019, repeat in 5 years.   Due for labs. Has had elevated LDL in the past.   Noting pain to the right breast for about 6 months. No lump felt. Pain with movements and certain positions.   Right knee pain - pain after sitting/laying. Worse in the cold. Suspects arthritis.         Problem List:  Patient Active Problem List   Diagnosis   • Personal history of colonic polyps   • PND (post-nasal drip)   • Dyslipidemia         Current Outpatient Medications   Medication Sig Dispense Refill   • fluticasone (FLONASE) 50 MCG/ACT nasal spray Spray 1 spray in each nostril 2 times daily. 16 g 11   • ferrous sulfate 325 (65 FE) MG tablet Take 325 mg by mouth daily.     • Calcium Carbonate-Vitamin D (CALCIUM 500/D PO)        No current facility-administered medications for this visit.       Past Medical History:   Diagnosis Date   • Dyslipidemia    • Personal history of colonic polyps     2019 - repeat in 5 years   • PND (post-nasal drip)          Social History     Socioeconomic History   • Marital status: /Civil Union     Spouse name: Not on file   • Number of children: Not on file   • Years of education: Not on file   • Highest education level: Not on file   Occupational History   • Not on file   Tobacco Use   • Smoking status: Former Smoker   • Smokeless tobacco: Never Used   • Tobacco comment: quit 36 years ago   Substance and Sexual Activity   • Alcohol use: Yes     Comment: weekly   • Drug use: Never   • Sexual activity: Not Currently     Partners: Male   Other Topics Concern   • Not on file   Social History Narrative   • Not on file     Social Determinants of Health     Financial Resource Strain: Not on file   Food Insecurity:  Patient continues on treatment with medical oncology  While CT scan was July of 2022 which showed stability of hepatic disease  Not on file   Transportation Needs: Not on file   Physical Activity: Not on file   Stress: Not on file   Social Connections: Not on file   Intimate Partner Violence: Not on file       Past Surgical History:   Procedure Laterality Date   • Hand finger surgery unlisted Left     dupytrens release       Family History   Problem Relation Age of Onset   • Cancer, Colon Mother    • Cancer Father         bone   • Crohn's Disease Sister    • Heart Maternal Grandmother    • Heart Maternal Grandfather    • Heart Paternal Grandmother    • Heart Paternal Grandfather    • Cancer, Breast Maternal Aunt    • Cancer, Breast Maternal Aunt        REVIEW OF SYSTEMS: The patient denies symptoms of:     General: fever, chills, night sweats, fatigue, weight loss, weight gain and decreased appetite  Skin: rash, itching, lumps or bumps or moles that are changing, hair changes and nail changes  Eyes: visual blurring, double vision, spots before the eyes and eye pain  Ears: decreased auditory acuity, ringing or tinnitus in the ears, pain in the ears and discharge from the ears  Nose: nose bleed, discharge from the nose and decrease in ability to smell  Mouth: soreness of the mouth or tongue or lips and abnormality of the teeth or gums  Throat: sore throat and hoarseness or voice change  Neck: stiffness or pain in the neck  Cardiorespiratory: edema, cough, hemoptysis, wheeze and shortness of breath  Gastrointestinal: abdominal pain, nausea, vomiting, constipation, diarrhea, black tarry stools, blood in the stools, change in the bowel habits, sour burps or heart burn and indigestion  Genitourinary: urgency, frequency, dysuria, hematuria and nocturia  Neurologic: headache, weakness, loss of sensation, visual disturbance, trouble with balance or coordination and loss of memory  Musculoskeletal: joint stiffness, joint swelling, muscle pain and blanching of the fingers with cold exposure  Psychiatric: symptoms of depression, feeling sad or  blue    All other review of systems were negative.     PHYSICAL EXAM:   Visit Vitals  /78   Pulse 66   Ht 5' 1.75\" (1.568 m)   Wt 54.4 kg (120 lb)   SpO2 99%   BMI 22.13 kg/m²     GENERAL: Well developed, well nourished. No distress.   HEENT: Normocephalic, Atraumatic. Tympanic membranes are clear bilaterally.  Normal nasal and oral mucosa.  NECK: supple, no lymphadenopathy  LUNGS:  Chest symmetric with normal A/P diameter.  Lungs are clear to auscultation.  There is good aeration.  There are no wheezes, rales or rhonchi noted.  HEART:  S1,S2, regular rate and rhythm no murmur,S3, or S4 auscultated  CHEST: symetrical, mild TTP to anterior chest wall under the right breast, no lumps palpated. No retractions, no bony abnormality  ABDOMEN:   Soft, nontender, no masses, no hepatosplenomegaly, no distension, bowel sounds are normoactive.  EXTREMITIES: no erythema, no edema to bilateral lower extremities. No TTP to the right knee. No pain with flexion or extension.  NEUROLOGICAL: CN 2-12 grossly intact.   SKIN: warm, moist, without erythema, rash, or lesions  MENTAL STATUS: A&O X 3, normal thought, mood and affect    ASSESSMENT/PLAN:   1. Annual physical exam    2. Need for hepatitis C screening test    3. Encounter to establish care    4. Chronic pain of right knee    5. Dyslipidemia    6. Low iron    7. Right-sided chest wall pain      Orders Placed This Encounter   • COLONOSCOPY DIAGNOSTIC   • XR Knee 3 View Right   • Hepatitis C Antibody With Reflex   • CBC No Differential   • Basic Metabolic Panel   • Lipid Panel With Reflex   • Iron And total Iron Binding Capacity   • Ferritin   • Pap Test   • fluticasone (FLONASE) 50 MCG/ACT nasal spray     Xray and fasting labs due soon.   Refilled flonase.     Recent Review Flowsheet Data     Date 3/8/2022    Adult PHQ 2 Score 0    Adult PHQ 2 Interpretation No further screening needed    Little interest or pleasure in activity? Not at all    Feeling down, depressed or  hopeless? Not at all          DEPRESSION ASSESSMENT/PLAN:  Depression screening is negative no further plan needed.      Return in about 1 year (around 3/8/2023) for CPE.    Patient agreed with this plan and had no further questions.       Kimmy Copeland PA-C  Family Practice Department  [facility]    Supervising physician: Calderon Olguin MD and Juli Kwon, DO

## 2022-10-25 ENCOUNTER — TELEPHONE (OUTPATIENT)
Dept: NEUROLOGY | Facility: CLINIC | Age: 52
End: 2022-10-25

## 2022-10-25 NOTE — TELEPHONE ENCOUNTER
MAYANK to confirm your upcoming Virtual Visit,9:30am, stated we would be calling patient 9:10-15 for check in    To confirm/RS if you are unable to keep this appt please call [phone]

## 2022-10-28 ENCOUNTER — PATIENT MESSAGE (OUTPATIENT)
Dept: FAMILY MEDICINE CLINIC | Facility: CLINIC | Age: 52
End: 2022-10-28

## 2022-10-28 ENCOUNTER — TELEPHONE (OUTPATIENT)
Dept: NEUROLOGY | Facility: CLINIC | Age: 52
End: 2022-10-28

## 2022-10-28 ENCOUNTER — TELEMEDICINE (OUTPATIENT)
Dept: NEUROLOGY | Facility: CLINIC | Age: 52
End: 2022-10-28

## 2022-10-28 DIAGNOSIS — I67.1 CEREBRAL ANEURYSM: Primary | ICD-10-CM

## 2022-10-28 DIAGNOSIS — I63.9 STROKE (HCC): ICD-10-CM

## 2022-10-28 DIAGNOSIS — D33.3 SCHWANNOMA OF CRANIAL NERVE (HCC): Chronic | ICD-10-CM

## 2022-10-28 DIAGNOSIS — J44.1 CHRONIC OBSTRUCTIVE PULMONARY DISEASE WITH ACUTE EXACERBATION (HCC): ICD-10-CM

## 2022-10-28 RX ORDER — FLUTICASONE FUROATE AND VILANTEROL 200; 25 UG/1; UG/1
1 POWDER RESPIRATORY (INHALATION) DAILY
Qty: 180 BLISTER | Refills: 0 | Status: SHIPPED | OUTPATIENT
Start: 2022-10-28 | End: 2023-01-26

## 2022-10-28 NOTE — TELEPHONE ENCOUNTER
Seeing pt from neuro for incidental cva found by NS on vestibular schwannoma surveillance imaging  Pt asymptomatic and no tia or cva clinical sxs or focality  Part of her cva work up included carotid ultrasound from 7/11- less than 50% stenosis darnell  Of note on report on the right side- "There is <50% stenosis noted in the internal carotid artery  Plaque is heterogenous and irregular " 2 cvas found incidentally were right frontal   Due to irregular plaque but no stenosis, any recommendation for follow up vascular carotid ultrasound in future? No sources identified to date  Any advice on need for further ordering of follow up ultrasound needed in the future   thanks

## 2022-10-28 NOTE — ASSESSMENT & PLAN NOTE
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

## 2022-10-28 NOTE — PROGRESS NOTES
Virtual Regular Visit    Verification of patient location:    Patient is located in the following state in which I hold an active license [place]      Assessment/Plan:    Problem List Items Addressed This Visit        Cardiovascular and Mediastinum    Stroke ([address] )     Pt with cva found incidentally at time of NS mri head imaging for schwanoma surviellance  Pt was asymptomatic at time of imaging  cva work up included echo -unremarkable  Carotid ultrasound less than 50% stenosis darnell  mra head- no large vessel intracranial occlusion, suspected right aneurysm at SCA origin  Pt with abn chol studies  Elevated total chol from 201 to 220  Elevated TG from 71 to 172  Elevated LDL from 132 to 141 and lower hdl since last labs  Pt on chol lowering meds and asa therapy  No tia or cva like sxs  Pt also had holter monitor and no sustained arrhythmias noted  Thrombosis panel neg as well           Cerebral aneurysm - Primary     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  Pt already seen by neurovasc NS team  Pt following up per NS protocol  Exam stable              Nervous and Auditory    Schwannoma of cranial nerve (HCC) (Chronic)     Pt with known right sided vestibular schwanoma  Pt is followed b dr saeed  Pt had updated imaging in July  Next set of surviellance imaging per NS  No new neuro sxs  Pt also followed by ent for hearing loss                      Reason for visit is   Chief Complaint   Patient presents with   • Virtual Regular Visit        Encounter provider Letty Harper MD    Provider located at [address] [place] [address] Drive  [address]  [place] [address] [address]  [phone]      Recent Visits  No visits were found meeting these conditions    Showing recent visits within past 7 days and meeting all other requirements  Today's Visits  Date Type Provider Dept   10/28/22 Telemedicine Letty Harper MD [facility]   Showing today's visits and meeting all other requirements  Future Appointments  No visits were found meeting these conditions  Showing future appointments within next 150 days and meeting all other requirements       The patient was identified by name and date of birth  Agustin Alvarado was informed that this is a telemedicine visit and that the visit is being conducted through the [employer]  She agrees to proceed     My office door was closed  No one else was in the room  She acknowledged consent and understanding of privacy and security of the video platform  The patient has agreed to participate and understands they can discontinue the visit at any time  Patient is aware this is a billable service  Marcela Joiner is a 47 y o  female with pmh of right breast cancer with liver mets , acoustic schwanoma who presents today for evaluation of abnormal mri head with new incidental finding of cva  pt last seen on 6/29/22 by me   per my last visit  " Pt is currently followed closely by oncology due to known history of breast cancer since 2019  Pt with family history of breast cancer as well  Pt notes she had mastectomy and chemo treatments initially  Pt has been on lynparza for almost 2 yrs  Pt is following with oncology team due to liver mets as well  Pt notes she had been seeing ent due to decreased hearing that occurred rather acutely  Pt had imaging done including iacs and found to have schwannoma  Pt has been following several times now with dr Yasmin Rubio for continued surviellance along with audiology  Pt had imaging in dec and then again in June  At timing of June imaging pt was also alerted to 2 positive dwi infarcts  Pt had study rev with NS and sent to pcp , oncology and neurology to review  Rev study in detail with pt    6/23/22- 2 small infarcts within the right frontal lobe with no mass effect or hemorrhagic transformation  Right CP angle mass consistent with acoustic neuroma, grossly unchanged in size Diffusion imaging demonstrates a small cortical infarct within the right frontal lobe on series 3 image 25 measuring less than 1 cm   There is an additional punctate juxtacortical infarct within the right posterior frontal lobe on series 3 image 26  No mass, mass effect or midline shift   No acute or chronic hemorrhage   Minor white matter change suggestive of chronic microangiopathy within the frontal lobes  There is no abnormal enhancement of the cerebral hemispheres, brainstem or cerebellum  Pt denies any sxs of tia or cva "  Kept above paragraph due to complexities of pt case  Pt notes overall doing well  No new sxs  Pt notes no recent infections  No recent hospitalizations  No falls or trips  No loc  No change in vision  No loss of vision  No change in bowel or bladder  rare vertigo  No diplopia  No ha or n or v   Pt had cva finding incidentally found at time of schwanoma surveillance  No tia or cva prior to imaging and none since last visit  Pt had holter monitor performed 7/14/22 and no sustained arrthymias  Pt had mra head 7/12/22 no large vessel intracranial occlusion  Suspected 2mm aneuryms at the right SCA origin  Pt with known right schwanoma  Pt also had chol studies with elevated total chol from 210 to 220 at current labs and TG elevated from prior 71 to 172, LDL from 132 to 141, HDL lower from 55 to 45  Rev all with pt  Carotid ultrasound less than 50% stenosis darnell  No new or focal findings on exam  Pt with history of lazy eye from youth on right  Pt had thrombosis panel negative as well  Pt follows with oncology regularly due to history of breast cancer  Pt remains on lipitor and asa  To date no clear source for cvas found on imaging  Echo nl lvef  Pt continues to follow with dr Payne Courts for schwanoma and ent / audiology due to right hearing loss  Pt cont to follow with oncology due to breast cancer  No cp or sob  Pt denies any history of htn or dm    Pt smoked for about 30 yrs up till 2017      Pt to also follow up with pcp office for cva risk factor management          The following portions of the patient's history were reviewed and updated as appropriate: allergies, current medications, past family history, past medical history, past social history, past surgical history and problem list and med list and ros rev                  HPI     Past Medical History:   Diagnosis Date   • Bloating    • Bruises easily    • Cancer ([address] )     right breast//to lymph nodes/liver/ and bone   • Depression    • History of cancer chemotherapy 05/2020   • History of pneumonia    • Hypotension     occas   • Low iron     "when pregnant, 20 yrs ago"   • Neuropathy     hands//fingers   • Shortness of breath     occas   • Stroke [facility])    • Tinnitus     right   • Wears glasses        Past Surgical History:   Procedure Laterality Date   • BILATERAL SALPINGOOPHORECTOMY     • BREAST BIOPSY     • IR BIOPSY LIVER MASS  06/11/2019   • MASTECTOMY Right     2019   • MYOMECTOMY      H/O FIBROIDS, NO SURGERY NEEDED   • KS LAP,RMV  ADNEXAL STRUCTURE N/A 07/29/2019    Procedure: LAPAROSCOPIC BILATERAL SALPINGO-OOPHORECTOMY;  Surgeon: Jayashree Gilliam MD;  Location: [facility];  Service: Gynecology Oncology   • KS MASTECTOMY, SIMPLE, COMPLETE Right 06/05/2020    Procedure: MASTECTOMY SIMPLE, axillary node dissection;  Surgeon: Collette Leep, MD;  Location: [facility];  Service: Surgical Oncology   • US GUIDED BREAST BIOPSY RIGHT COMPLETE Right 05/03/2019   • US GUIDED BREAST LYMPH NODE BIOPSY RIGHT Right 05/03/2019       Current Outpatient Medications   Medication Sig Dispense Refill   • albuterol (Proventil HFA) 90 mcg/act inhaler Inhale 2 puffs every 6 (six) hours as needed for wheezing 6 7 g 5   • ALPRAZolam (XANAX) 0 25 mg tablet Take 1 tablet (0 25 mg total) by mouth daily as needed (during day for anxiety as needed) 40 tablet 0   • ASPIRIN 81 PO Take by mouth     • atorvastatin (LIPITOR) 40 mg tablet Take 1 tablet (40 mg total) by mouth daily 90 tablet 1   • Azelaic Acid (Finacea) 15 % FOAM Apply 1 Pump topically 2 (two) times a day 50 g 3   • b complex vitamins capsule Take 1 capsule by mouth daily     • cholecalciferol (VITAMIN D3) 1,000 units tablet Take 1 tablet (1,000 Units total) by mouth daily     • fluticasone-vilanterol (Breo Ellipta) 200-25 mcg/actuation inhaler Inhale 1 puff daily Rinse mouth after use  180 blister 0   • metroNIDAZOLE (METROCREAM) 0 75 % cream Apply topically 2 (two) times a day 45 g 3   • olaparib (Lynparza) tablet TAKE 2 TABLETS (300 MG) TWICE A DAY (Patient taking differently: Take 300 mg by mouth 2 (two) times a day Take 2 tabs in morning 2 tabs in evening) 120 tablet 2   • oxyCODONE (ROXICODONE) 10 MG TABS Take 1 tablet (10 mg total) by mouth every 6 (six) hours as needed for moderate pain Max Daily Amount: 40 mg 30 tablet 0   • Specialty Vitamins Products (Advanced Collagen) TABS Take by mouth in the morning       No current facility-administered medications for this visit  Allergies   Allergen Reactions   • Tylenol [Acetaminophen]      Told to avoid due to cancer        Review of Systems   Constitutional: Negative  Negative for appetite change and fever  HENT: Negative  Negative for hearing loss, tinnitus, trouble swallowing and voice change  Eyes: Negative  Negative for photophobia, pain and visual disturbance  Respiratory: Negative  Negative for shortness of breath  Cardiovascular: Negative  Negative for palpitations  Gastrointestinal: Negative  Negative for nausea and vomiting  Endocrine: Negative  Negative for cold intolerance  Genitourinary: Negative  Negative for dysuria, frequency and urgency  Musculoskeletal: Negative  Negative for gait problem, myalgias and neck pain  Skin: Negative  Negative for rash  Allergic/Immunologic: Negative  Neurological: Negative    Negative for dizziness, tremors, seizures, syncope, facial asymmetry, speech difficulty, weakness, light-headedness, numbness and headaches  Hematological: Negative  Does not bruise/bleed easily  Psychiatric/Behavioral: Negative  Negative for confusion, hallucinations and sleep disturbance  All other systems reviewed and are negative  Video Exam    There were no vitals filed for this visit  Physical Exam  Constitutional:       General: She is not in acute distress  Appearance: She is not ill-appearing  Musculoskeletal:      Right lower leg: No edema  Left lower leg: No edema  Neurological:      Mental Status: She is alert  Cranial Nerves: Cranial nerves are intact  Motor: Motor function is intact  Coordination: Coordination is intact  Gait: Gait is intact                 Nothing to address at this time

## 2022-10-28 NOTE — ASSESSMENT & PLAN NOTE
Pt with known right sided vestibular schwanoma  Pt is followed b dr saeed  Pt had updated imaging in July  Next set of surviellance imaging per NS  No new neuro sxs  Pt also followed by ent for hearing loss

## 2022-10-28 NOTE — TELEPHONE ENCOUNTER
Pt was a virtual appt today  Follow up prn  Let pt know I am checking with vascular team who read carotid ultrasound, any recommendations for follow up time frame for comparison study in future, if needed  We will let her know once I hear

## 2022-11-02 NOTE — TELEPHONE ENCOUNTER
Called and left message for pt to see if pt would consider getting cta neck to further eval her proximal carotids  Left message on answering machine for call back

## 2022-11-03 DIAGNOSIS — I63.9 STROKE (HCC): ICD-10-CM

## 2022-11-03 DIAGNOSIS — I67.1 CEREBRAL ANEURYSM: Primary | ICD-10-CM

## 2022-11-03 NOTE — TELEPHONE ENCOUNTER
Pt made aware, she verbalizes understanding  Provided CS phone #- she will contact them to schedule CTA  She is aware of needed labs prior to imaging  Once scheduled, pre-certification team will be notified and they will look into possible need for PA

## 2022-11-03 NOTE — TELEPHONE ENCOUNTER
Called pt  Overall doing well  Rev possible additional benefit with getting cta neck for further clarification from carotid ultrasound due to incidental cvas  Pt is in agreement  Nursing, please help pt to get cta neck scheduled with and without  Bun and cr in emr as well  Please see if any preauth needed

## 2022-11-14 ENCOUNTER — TELEPHONE (OUTPATIENT)
Dept: OTHER | Facility: OTHER | Age: 52
End: 2022-11-14

## 2022-11-14 NOTE — TELEPHONE ENCOUNTER
Spoke with patient  Reviewed colonoscopy prep instructions  Verified with Dr Salo brothers prep [facility]  Instructions emailed to [email or web address]

## 2022-11-15 ENCOUNTER — OFFICE VISIT (OUTPATIENT)
Dept: DERMATOLOGY | Facility: CLINIC | Age: 52
End: 2022-11-15

## 2022-11-15 DIAGNOSIS — L71.9 ROSACEA: Primary | ICD-10-CM

## 2022-11-15 DIAGNOSIS — D22.9 BENIGN NEVUS: ICD-10-CM

## 2022-11-15 DIAGNOSIS — L91.8 SKIN TAGS, MULTIPLE ACQUIRED: ICD-10-CM

## 2022-11-15 DIAGNOSIS — D18.01 CHERRY ANGIOMA: ICD-10-CM

## 2022-11-15 DIAGNOSIS — L82.1 SEBORRHEIC KERATOSIS: ICD-10-CM

## 2022-11-15 NOTE — PATIENT INSTRUCTIONS
ROSACEA    Assessment and Plan:  Based on a thorough discussion of this condition and the management approach to it (including a comprehensive discussion of the known risks, side effects and potential benefits of treatment), the patient (family) agrees to implement the following specific plan:    Continue finacea 15% foam and metronidazole 0 75% cream once daily  Rosacea is a chronic rash affecting the mid-face including the nose, cheeks, chin, forehead, and eyelids  The incidence is usually greatest between the ages of 30-60 years and is more common in people with fair skin  Common characteristics include redness, telangiectasias, papules and pustules over affected areas  Rosacea may look similar to acne, but there is a lack of comedones  Occasionally the eyes may also be involved in ocular rosacea  In advanced disease, enlargement of the sebaceous glands in the nose, termed rhinophyma, may be present  Rosacea results in red spots (papules) and sometimes pustules over the face, but unlike acne there are no blackheads, whiteheads, or cystic nodules  Patients often experience increased facial flushing with prominent blood vessels (erythematotelangiectatic rosacea) and dry, sensitive skin  These symptoms are exacerbated by sun exposure, hot or spicy foods, topical steroids and oil-based facial products  In ocular rosacea, eyelids may be red and sore due to conjunctivitis, keratitis, and episcleritis  If rhinophyma develops due to enlargement of sebaceous glands, the patient may have an enlarged and irregularly shaped nose with prominent pores  In rosacea that is refractory to treatment, patients can develop persistent redness and swelling of the face due to lymphatic obstruction (Morbihan disease)  Distribution around the cheeks may be confused with the malar or “butterfly rash” of lupus  However, the rash of lupus spares the nasal creases and lacks papules and pustules   If signs of photosensitivity, oral ulcers, arthritis, and kidney dysfunction are present then consider referral to a rheumatologist      There are many potential causes of rosacea including genetic, environmental, vascular, and inflammatory factors  These include, but are not limited to:  Chronic exposure to ultraviolet radiation   Increased immune responses in the form of cathelicidins that promote vessel dilation and infiltration with white blood cells (neutrophils) into the dermis  Increased matrix metalloproteinases such as collagen and elastase that remodel normal tissue may contribute to inflammation of the skin making it thicker and harder  There is some evidence to suggest that increased numbers of demodex mites on patient skin may contribute to rosacea papules     General Treatment Approach   Avoid exacerbating factors such as heat, spicy foods, and alcohol   Use daily SPF30+ sunscreen and other methods of coverage for sun protection  Use water-based make-up   Avoid applying topical steroids to affected areas as they can cause perioral dermatitis and exacerbate rosacea     Topical Treatment Approach  Metronidazole cream or gel by itself or in combination with oral antibiotics for more severe cases  Azelaic acid cream or lotion is effective for mild inflammatory rosacea when applied twice daily to affected areas  Brimonidine gel and oxymetazoline hydrochloride cream can reduce facial redness temporarily   Ivermectin cream can treat papulopustular rosacea by controlling demodex mites and inflammation   Pimecrolimus cream or tacrolimus ointment twice a day for 2-3 months can help reduce inflammation    Oral Treatment Approach  Antibiotics such as doxycycline, minocycline, or erythromycin for 1-3 months  Clonidine and carvedilol can help reduce facial flushing and are generally well tolerated  Common side effects include low blood pressure, gastrointestinal upset, dry eyes, blurred vision and low heart rate     Isotretinoin at low doses can be effective for long term treatment when antibiotics fail  Side effects may make it unsuitable for some patients  NSAIDs such as diclofenac can help reduce discomfort and redness in the skin  Procedural/Surgical Treatment Approach   Vascular lasers or intense pulsed light treatment may be used to treat persistent telangiectasia and papulopustular rosacea  Plastic surgery and carbon dioxide lasers may be used to treat rhinophyma       ACROCHORDON ("SKIN TAG")      Assessment and Plan:  Based on a thorough discussion of this condition and the management approach to it (including a comprehensive discussion of the known risks, side effects and potential benefits of treatment), the patient (family) agrees to implement the following specific plan:    Reassured benign    Skin tags are common, soft, harmless skin lesions that are also called, in the appropriate settings, papillomas, fibroepithelial polyps, and soft fibromas  They are made up of loosely arranged collagen fibers and blood vessels surrounded by a thickened or thinned-out epidermis  Skin tags tend to develop in both men and women as we grow older  They are usually found on the skin folds (neck, armpits, groin)  It is not known what specifically causes skin tags  Certain factors, though, do appear to play a role:  Chaffing and irritation from skin rubbing together  High levels of growth factors (as seen, for example, in pregnancy or in acromegaly/gigantism)  Insulin resistance  Human papillomavirus (wart virus)    We discussed that most skin tags do not need to be treated unless they are specifically causing the patient physical distress or limitation or pose a risk for a larger problem such as an infection that forms secondary to excoriation or chronic irritation      We had a thorough discussion of treatment options and specific risks (including that any procedural treatment may not be covered by insurance and would then be the patient's responsibility) and benefits/alternatives including but not limited to the following:  Cryotherapy (freezing)  Shave removal  Surgical excision (snip excision with scissors)  Electrosurgery  Ligation (we do not do this procedure and counseled against it due to risk of tissue necrosis and infection)    MELANOCYTIC NEVUS      Assessment and Plan:  Based on a thorough discussion of this condition and the management approach to it (including a comprehensive discussion of the known risks, side effects and potential benefits of treatment), the patient (family) agrees to implement the following specific plan:    Reassured benign     Melanocytic Nevi  Melanocytic nevi ("moles") are caused by collections of the color producing skin cells, or melanocytes, in 1 area in the skin  They can range in color from pink to dark brown and be either raised or flat  Some moles are present at birth (I e , "congenital nevi"), while others come up later in life (i e , "acquired nevi")  [facility] exposure also stimulates the body to make more moles, ie the more sun you get the more moles you'll grow  Clinically distinguishing a healthy mole from melanoma may be difficult  The "ABCDE's" of moles have been suggested as a means of helping to alert a person to a suspicious mole and the possible increased risk of melanoma  Asymmetry: Healthy moles tend to be symmetric, while melanomas are often asymmetric  Asymmetry means if you draw a line through the mole, the two halves do not match in color, size, shape, or surface texture Any mole that starts to demonstrate "asymmetry" should be examined promptly by a board certified dermatologist      Border: Healthy moles tend to have discrete, even borders  The border of a melanoma often blends into the normal skin and does not sharply delineate the mole from normal skin    Any mole that starts to demonstrate "uneven borders" should be examined promptly     Color: Healthy moles tend to be one color throughout  Melanomas tend to be made up of different colors ranging from dark black, blue, white, or red  Any mole that demonstrates a color change should be examined promptly    Diameter: Healthy moles tend to be smaller than 0 6 cm in size; an exception are "congenital nevi" that can be larger  Melanomas tend to grow and can often be greater than 0 6 cm (1/4 of an inch, or the size of a pencil eraser)  This is only a guideline, and many normal moles may be larger than 0 6 cm without being unhealthy  Any mole that starts to change in size (small to bigger or bigger to smaller) should be examined promptly    Evolving: Healthy moles tend to "stay the same "  Melanomas may often show signs of change or evolution such as a change in size, shape, color, or elevation  Any mole that starts to itch, bleed, crust, burn, hurt, or ulcerate or demonstrate a change or evolution should be examined promptly by a board certified dermatologist       What are atypical moles or dysplastic nevi? Dysplastic moles are moles that have some of the ABCDE  changes listed above but  are not cancerous  Sometimes a biopsy and microscopic examination are needed to determine the difference  They may indicate an increased risk of melanoma in that person, especially if there is a family history of melanoma  What is a Melanoma? The main concern when looking at a new or changing mole it to evaluate whether it may be a melanoma  The appearance of a "new mole" remains one of the most reliable methods for identifying a malignant melanoma  A melanoma is a type of skin cancer that can be deadly if it spreads throughout the body  The prognosis of a Melanoma depends on how deep it has penetrated in the skin  If caught early, they generally will not have had time to grow into the deeper layers of the skin and they cure rate is then very high  Once the melanoma grows deeper into the skin, the cure rate drops dramatically   Therefore, early detection and removal of a malignant melanoma results in a much better chance of complete cure  CHERRY ANGIOMAS    Assessment and Plan:  Based on a thorough discussion of this condition and the management approach to it (including a comprehensive discussion of the known risks, side effects and potential benefits of treatment), the patient (family) agrees to implement the following specific plan:    Reassured benign    Assessment and Plan:    Cherry angioma, also known as Jackie Breeze spots, are benign vascular skin lesions  A "cherry angioma" is a firm red, blue or purple papule, 0 1-1 cm in diameter  When thrombosed, they can appear black in colour until evaluated with a dermatoscope when the red or purple colour is more easily seen  Cherry angioma may develop on any part of the body but most often appear on the scalp, face, lips and trunk  An angioma is due to proliferating endothelial cells; these are the cells that line the inside of a blood vessel  Angiomas can arise in early life or later in life; the most common type of angioma is a cherry angioma  Cherry angiomas are very common in males and females of any age or race  They are more noticeable in white skin than in skin of colour  They markedly increase in number from about the age of 36  There may be a family history of similar lesions  Eruptive cherry angiomas have been rarely reported to be associated with internal malignancy  The cause of angiomas is unknown  Genetic analysis of cherry angiomas has shown that they frequently carry specific somatic missense mutations in the GNAQ and GNA11 (Q209H) genes, which are involved in other vascular and melanocytic proliferations  Cherry angioma is usually diagnosed clinically and no investigations are necessary for the majority of lesions  It has a characteristic red-clod or lobular pattern on dermatoscopy (called lacunar pattern using conventional pattern analysis)    When there is uncertainty about the diagnosis, a biopsy may be performed  The angioma is composed of venules in a thickened papillary dermis  Collagen bundles may be prominent between the lobules  Cherry angiomas are harmless, so they do not usually have to be treated  Occasionally, they are removed to exclude a malignant skin lesion such as a nodular melanoma or because they are irritated or bleeding (and a subsequent risk for infection)  To decrease friction over the lesions, we recommend Neutrogena Daily Defense SPF 50+ at least 3 times a day  SEBORRHEIC KERATOSIS; NON-INFLAMED      Assessment and Plan:  Based on a thorough discussion of this condition and the management approach to it (including a comprehensive discussion of the known risks, side effects and potential benefits of treatment), the patient (family) agrees to implement the following specific plan:    Reassured benign    Seborrheic Keratosis  A seborrheic keratosis is a harmless warty spot that appears during adult life as a common sign of skin aging  Seborrheic keratoses can arise on any area of skin, covered or uncovered, with the usual exception of the palms and soles  They do not arise from mucous membranes  Seborrheic keratoses can have highly variable appearance  Seborrheic keratoses are extremely common  It has been estimated that over 90% of adults over the age of 61 years have one or more of them  They occur in males and females of all races, typically beginning to erupt in the 35s or 45s  They are uncommon under the age of 21 years  The precise cause of seborrhoeic keratoses is not known  Seborrhoeic keratoses are considered degenerative in nature  As time goes by, seborrheic keratoses tend to become more numerous  Some people inherit a tendency to develop a very large number of them; some people may have hundreds of them      The name "seborrheic keratosis" is misleading, because these lesions are not limited to a seborrhoeic distribution (scalp, mid-face, chest, upper back), nor are they formed from sebaceous glands, nor are they associated with sebum -- which is greasy  Seborrheic keratosis may also be called "SK," "Seb K," "basal cell papilloma," "senile wart," or "barnacle "      Researchers have noted:  Eruptive seborrhoeic keratoses can follow sunburn or dermatitis  Skin friction may be the reason they appear in body folds  Viral cause (e g , human papillomavirus) seems unlikely  Stable and clonal mutations or activation of FRFR3, PIK3CA, KERRY, AKT1 and EGFR genes are found in seborrhoeic keratoses  Seborrhoeic keratosis can arise from solar lentigo  FRFR3 mutations also arise in solar lentigines  These mutations are associated with increased age and location on the head and neck, suggesting a role of ultraviolet radiation in these lesions  Seborrheic keratoses do not harbour tumour suppressor gene mutations  Epidermal growth factor receptor inhibitors, which are used to treat some cancers, often result in an increase in verrucal (warty) keratoses  There is no easy way to remove multiple lesions on a single occasion  Unless a specific lesion is "inflamed" and is causing pain or stinging/burning or is bleeding, most insurance companies do not authorize treatment

## 2022-11-15 NOTE — PROGRESS NOTES
[facility] Note     Patient Name: Annie Steele  Encounter Date: 11/15/22     Have you been cared for by a [facility] [address] [facility] in the last 3 years and, if so, which description applies to you? Yes  I have been here within the last 3 years, and my medical history has NOT changed since that time  I am FEMALE/of child-bearing potential     REVIEW OF SYSTEMS:  Have you recently had or currently have any of the following? · No changes in my recent health  PAST MEDICAL HISTORY:  Have you personally ever had or currently have any of the following? If "YES," then please provide more detail  · No changes in my medical history  FAMILY HISTORY:  Any "first degree relatives" (parent, brother, sister, or child) with the following? • No changes in my family's known health  PATIENT EXPERIENCE:    • Do you want the Dermatologist to perform a COMPLETE skin exam today including a clinical examination under the "bra and underwear" areas? NO  • If necessary, do we have your permission to call and leave a detailed message on your Preferred Phone number that includes your specific medical information?   Yes      Allergies   Allergen Reactions   • Tylenol [Acetaminophen]      Told to avoid due to cancer       Current Outpatient Medications:   •  albuterol (Proventil HFA) 90 mcg/act inhaler, Inhale 2 puffs every 6 (six) hours as needed for wheezing, Disp: 6 7 g, Rfl: 5  •  ALPRAZolam (XANAX) 0 25 mg tablet, Take 1 tablet (0 25 mg total) by mouth daily as needed (during day for anxiety as needed), Disp: 40 tablet, Rfl: 0  •  ASPIRIN 81 PO, Take by mouth, Disp: , Rfl:   •  atorvastatin (LIPITOR) 40 mg tablet, Take 1 tablet (40 mg total) by mouth daily, Disp: 90 tablet, Rfl: 1  •  Azelaic Acid (Finacea) 15 % FOAM, Apply 1 Pump topically 2 (two) times a day, Disp: 50 g, Rfl: 3  •  b complex vitamins capsule, Take 1 capsule by mouth daily, Disp: , Rfl:   •  cholecalciferol (VITAMIN D3) 1,000 units tablet, Take 1 tablet (1,000 Units total) by mouth daily, Disp: , Rfl:   •  fluticasone-vilanterol (Breo Ellipta) 200-25 mcg/actuation inhaler, Inhale 1 puff daily Rinse mouth after use , Disp: 180 blister, Rfl: 0  •  metroNIDAZOLE (METROCREAM) 0 75 % cream, Apply topically 2 (two) times a day, Disp: 45 g, Rfl: 3  •  olaparib (Lynparza) tablet, TAKE 2 TABLETS (300 MG) TWICE A DAY (Patient taking differently: Take 300 mg by mouth 2 (two) times a day Take 2 tabs in morning 2 tabs in evening), Disp: 120 tablet, Rfl: 2  •  oxyCODONE (ROXICODONE) 10 MG TABS, Take 1 tablet (10 mg total) by mouth every 6 (six) hours as needed for moderate pain Max Daily Amount: 40 mg, Disp: 30 tablet, Rfl: 0  •  Specialty Vitamins Products (Advanced Collagen) TABS, Take by mouth in the morning, Disp: , Rfl:           • Whom besides the patient is providing clinical information about today's encounter?   o NO ADDITIONAL HISTORIAN (patient alone provided history)    Physical Exam and Assessment/Plan by Diagnosis:    ROSACEA    Physical Exam:  • Anatomic Location Affected:  face  • Morphological Description: mild background erythema and visible vessels  • Pertinent Positives:  • Pertinent Negatives: Additional History of Present Condition:  Patient states rosacea is controlled with finacea 15% foam and metronidazole 0 75% cream once daily  She is happy with results  Assessment and Plan:  Based on a thorough discussion of this condition and the management approach to it (including a comprehensive discussion of the known risks, side effects and potential benefits of treatment), the patient (family) agrees to implement the following specific plan:    • Continue finacea 15% foam and metronidazole 0 75% cream once daily  Rosacea is a chronic rash affecting the mid-face including the nose, cheeks, chin, forehead, and eyelids  The incidence is usually greatest between the ages of 30-60 years and is more common in people with fair skin   Common characteristics include redness, telangiectasias, papules and pustules over affected areas  Rosacea may look similar to acne, but there is a lack of comedones  Occasionally the eyes may also be involved in ocular rosacea  In advanced disease, enlargement of the sebaceous glands in the nose, termed rhinophyma, may be present  Rosacea results in red spots (papules) and sometimes pustules over the face, but unlike acne there are no blackheads, whiteheads, or cystic nodules  Patients often experience increased facial flushing with prominent blood vessels (erythematotelangiectatic rosacea) and dry, sensitive skin  These symptoms are exacerbated by sun exposure, hot or spicy foods, topical steroids and oil-based facial products  In ocular rosacea, eyelids may be red and sore due to conjunctivitis, keratitis, and episcleritis  If rhinophyma develops due to enlargement of sebaceous glands, the patient may have an enlarged and irregularly shaped nose with prominent pores  In rosacea that is refractory to treatment, patients can develop persistent redness and swelling of the face due to lymphatic obstruction (Morbihan disease)  Distribution around the cheeks may be confused with the malar or “butterfly rash” of lupus  However, the rash of lupus spares the nasal creases and lacks papules and pustules  If signs of photosensitivity, oral ulcers, arthritis, and kidney dysfunction are present then consider referral to a rheumatologist      There are many potential causes of rosacea including genetic, environmental, vascular, and inflammatory factors   These include, but are not limited to:  • Chronic exposure to ultraviolet radiation   • Increased immune responses in the form of cathelicidins that promote vessel dilation and infiltration with white blood cells (neutrophils) into the dermis  • Increased matrix metalloproteinases such as collagen and elastase that remodel normal tissue may contribute to inflammation of the skin making it thicker and harder  • There is some evidence to suggest that increased numbers of demodex mites on patient skin may contribute to rosacea papules     General Treatment Approach   • Avoid exacerbating factors such as heat, spicy foods, and alcohol   • Use daily SPF30+ sunscreen and other methods of coverage for sun protection  • Use water-based make-up   • Avoid applying topical steroids to affected areas as they can cause perioral dermatitis and exacerbate rosacea     Topical Treatment Approach  • Metronidazole cream or gel by itself or in combination with oral antibiotics for more severe cases  • Azelaic acid cream or lotion is effective for mild inflammatory rosacea when applied twice daily to affected areas  • Brimonidine gel and oxymetazoline hydrochloride cream can reduce facial redness temporarily   • Ivermectin cream can treat papulopustular rosacea by controlling demodex mites and inflammation   • Pimecrolimus cream or tacrolimus ointment twice a day for 2-3 months can help reduce inflammation    Oral Treatment Approach  • Antibiotics such as doxycycline, minocycline, or erythromycin for 1-3 months  • Clonidine and carvedilol can help reduce facial flushing and are generally well tolerated  Common side effects include low blood pressure, gastrointestinal upset, dry eyes, blurred vision and low heart rate  • Isotretinoin at low doses can be effective for long term treatment when antibiotics fail  Side effects may make it unsuitable for some patients  • NSAIDs such as diclofenac can help reduce discomfort and redness in the skin       Procedural/Surgical Treatment Approach   • Vascular lasers or intense pulsed light treatment may be used to treat persistent telangiectasia and papulopustular rosacea  • Plastic surgery and carbon dioxide lasers may be used to treat rhinophyma       ACROCHORDON ("SKIN TAG")    Physical Exam:  • Anatomic Location Affected:  Right axilla  • Morphological Description:  1 mm skin colored papules  • Pertinent Positives:  • Pertinent Negatives: Additional History of Present Condition:      Assessment and Plan:  Based on a thorough discussion of this condition and the management approach to it (including a comprehensive discussion of the known risks, side effects and potential benefits of treatment), the patient (family) agrees to implement the following specific plan:    • Reassured benign  • Can f/u for cosmetic removal or attempt to gently exfoliate at home    Skin tags are common, soft, harmless skin lesions that are also called, in the appropriate settings, papillomas, fibroepithelial polyps, and soft fibromas  They are made up of loosely arranged collagen fibers and blood vessels surrounded by a thickened or thinned-out epidermis  Skin tags tend to develop in both men and women as we grow older  They are usually found on the skin folds (neck, armpits, groin)  It is not known what specifically causes skin tags  Certain factors, though, do appear to play a role:  • Chaffing and irritation from skin rubbing together  • High levels of growth factors (as seen, for example, in pregnancy or in acromegaly/gigantism)  • Insulin resistance  • Human papillomavirus (wart virus)    We discussed that most skin tags do not need to be treated unless they are specifically causing the patient physical distress or limitation or pose a risk for a larger problem such as an infection that forms secondary to excoriation or chronic irritation      We had a thorough discussion of treatment options and specific risks (including that any procedural treatment may not be covered by insurance and would then be the patient's responsibility) and benefits/alternatives including but not limited to the following:  • Cryotherapy (freezing)  • Shave removal  • Surgical excision (snip excision with scissors)  • Electrosurgery  • Ligation (we do not do this procedure and counseled against it due to risk of tissue necrosis and infection)    MELANOCYTIC NEVUS    Physical Exam:  • Anatomic Location Affected:  right mid chest  • Morphological Description: 6 mm brown ovoid, symmetrical-appearing papule  • Pertinent Positives:  • Pertinent Negatives: Additional History of Present Condition:  Patient states mole has been present for years  Not aware of it changing  Assessment and Plan:  Based on a thorough discussion of this condition and the management approach to it (including a comprehensive discussion of the known risks, side effects and potential benefits of treatment), the patient (family) agrees to implement the following specific plan:    • Reassured benign  • Return to clinic for changes     Melanocytic Nevi  Melanocytic nevi ("moles") are caused by collections of the color producing skin cells, or melanocytes, in 1 area in the skin  They can range in color from pink to dark brown and be either raised or flat  Some moles are present at birth (I e , "congenital nevi"), while others come up later in life (i e , "acquired nevi")  [place] exposure also stimulates the body to make more moles, ie the more sun you get the more moles you'll grow  Clinically distinguishing a healthy mole from melanoma may be difficult  The "ABCDE's" of moles have been suggested as a means of helping to alert a person to a suspicious mole and the possible increased risk of melanoma  Asymmetry: Healthy moles tend to be symmetric, while melanomas are often asymmetric  Asymmetry means if you draw a line through the mole, the two halves do not match in color, size, shape, or surface texture Any mole that starts to demonstrate "asymmetry" should be examined promptly by a board certified dermatologist      Border: Healthy moles tend to have discrete, even borders  The border of a melanoma often blends into the normal skin and does not sharply delineate the mole from normal skin    Any mole that starts to demonstrate "uneven borders" should be examined promptly     Color: Healthy moles tend to be one color throughout  Melanomas tend to be made up of different colors ranging from dark black, blue, white, or red  Any mole that demonstrates a color change should be examined promptly    Diameter: Healthy moles tend to be smaller than 0 6 cm in size; an exception are "congenital nevi" that can be larger  Melanomas tend to grow and can often be greater than 0 6 cm (1/4 of an inch, or the size of a pencil eraser)  This is only a guideline, and many normal moles may be larger than 0 6 cm without being unhealthy  Any mole that starts to change in size (small to bigger or bigger to smaller) should be examined promptly    Evolving: Healthy moles tend to "stay the same "  Melanomas may often show signs of change or evolution such as a change in size, shape, color, or elevation  Any mole that starts to itch, bleed, crust, burn, hurt, or ulcerate or demonstrate a change or evolution should be examined promptly by a board certified dermatologist       What are atypical moles or dysplastic nevi? Dysplastic moles are moles that have some of the ABCDE  changes listed above but  are not cancerous  Sometimes a biopsy and microscopic examination are needed to determine the difference  They may indicate an increased risk of melanoma in that person, especially if there is a family history of melanoma  What is a Melanoma? The main concern when looking at a new or changing mole it to evaluate whether it may be a melanoma  The appearance of a "new mole" remains one of the most reliable methods for identifying a malignant melanoma  A melanoma is a type of skin cancer that can be deadly if it spreads throughout the body  The prognosis of a Melanoma depends on how deep it has penetrated in the skin  If caught early, they generally will not have had time to grow into the deeper layers of the skin and they cure rate is then very high  Once the melanoma grows deeper into the skin, the cure rate drops dramatically  Therefore, early detection and removal of a malignant melanoma results in a much better chance of complete cure  HERNANDEZ ANGIOMAS    Physical Exam:  • Anatomic Location Affected:  trunk  • Morphological Description:  Scattered cherry red, 1-4 mm papules  • Pertinent Positives:  • Pertinent Negatives:      Assessment and Plan:  Based on a thorough discussion of this condition and the management approach to it (including a comprehensive discussion of the known risks, side effects and potential benefits of treatment), the patient (family) agrees to implement the following specific plan:    • Reassured benign    Assessment and Plan:    Cherry angioma, also known as Josse Si spots, are benign vascular skin lesions  A "cherry angioma" is a firm red, blue or purple papule, 0 1-1 cm in diameter  When thrombosed, they can appear black in colour until evaluated with a dermatoscope when the red or purple colour is more easily seen  Cherry angioma may develop on any part of the body but most often appear on the scalp, face, lips and trunk  An angioma is due to proliferating endothelial cells; these are the cells that line the inside of a blood vessel  Angiomas can arise in early life or later in life; the most common type of angioma is a cherry angioma  Cherry angiomas are very common in males and females of any age or race  They are more noticeable in white skin than in skin of colour  They markedly increase in number from about the age of 36  There may be a family history of similar lesions  Eruptive cherry angiomas have been rarely reported to be associated with internal malignancy  The cause of angiomas is unknown   Genetic analysis of cherry angiomas has shown that they frequently carry specific somatic missense mutations in the GNAQ and GNA11 (Q209H) genes, which are involved in other vascular and melanocytic proliferations  Cherry angioma is usually diagnosed clinically and no investigations are necessary for the majority of lesions  It has a characteristic red-clod or lobular pattern on dermatoscopy (called lacunar pattern using conventional pattern analysis)  When there is uncertainty about the diagnosis, a biopsy may be performed  The angioma is composed of venules in a thickened papillary dermis  Collagen bundles may be prominent between the lobules  Cherry angiomas are harmless, so they do not usually have to be treated  Occasionally, they are removed to exclude a malignant skin lesion such as a nodular melanoma or because they are irritated or bleeding (and a subsequent risk for infection)  To decrease friction over the lesions, we recommend Neutrogena Daily Defense SPF 50+ at least 3 times a day  SEBORRHEIC KERATOSIS; NON-INFLAMED    Physical Exam:  • Anatomic Location Affected:  trunk  • Morphological Description:  Flat and raised, waxy, smooth to warty textured, yellow to brownish-grey to dark brown to blackish, discrete, "stuck-on" appearing papules  • Pertinent Positives:  • Pertinent Negatives: Additional History of Present Condition:  Patient reports new bumps on the skin  Denies itch, burn, pain, bleeding or ulceration  Present constantly; nothing seems to make it worse or better  No prior treatment  Assessment and Plan:  Based on a thorough discussion of this condition and the management approach to it (including a comprehensive discussion of the known risks, side effects and potential benefits of treatment), the patient (family) agrees to implement the following specific plan:    • Reassured benign    Seborrheic Keratosis  A seborrheic keratosis is a harmless warty spot that appears during adult life as a common sign of skin aging  Seborrheic keratoses can arise on any area of skin, covered or uncovered, with the usual exception of the palms and soles   They do not arise from mucous membranes  Seborrheic keratoses can have highly variable appearance  Seborrheic keratoses are extremely common  It has been estimated that over 90% of adults over the age of 61 years have one or more of them  They occur in males and females of all races, typically beginning to erupt in the 35s or 45s  They are uncommon under the age of 21 years  The precise cause of seborrhoeic keratoses is not known  Seborrhoeic keratoses are considered degenerative in nature  As time goes by, seborrheic keratoses tend to become more numerous  Some people inherit a tendency to develop a very large number of them; some people may have hundreds of them  The name "seborrheic keratosis" is misleading, because these lesions are not limited to a seborrhoeic distribution (scalp, mid-face, chest, upper back), nor are they formed from sebaceous glands, nor are they associated with sebum -- which is greasy  Seborrheic keratosis may also be called "SK," "Seb K," "basal cell papilloma," "senile wart," or "barnacle "      Researchers have noted:  • Eruptive seborrhoeic keratoses can follow sunburn or dermatitis  • Skin friction may be the reason they appear in body folds  • Viral cause (e g , human papillomavirus) seems unlikely  • Stable and clonal mutations or activation of FRFR3, PIK3CA, KERRY, AKT1 and EGFR genes are found in seborrhoeic keratoses  • Seborrhoeic keratosis can arise from solar lentigo  • FRFR3 mutations also arise in solar lentigines  These mutations are associated with increased age and location on the head and neck, suggesting a role of ultraviolet radiation in these lesions  • Seborrheic keratoses do not harbour tumour suppressor gene mutations  • Epidermal growth factor receptor inhibitors, which are used to treat some cancers, often result in an increase in verrucal (warty) keratoses  There is no easy way to remove multiple lesions on a single occasion    Unless a specific lesion is "inflamed" and is causing pain or stinging/burning or is bleeding, most insurance companies do not authorize treatment      Scribe Attestation    I,:  Sheba Morfin MA am acting as a scribe while in the presence of the attending physician :       I,:  Mani Adams MD personally performed the services described in this documentation    as scribed in my presence :

## 2022-11-22 DIAGNOSIS — F41.8 ANXIETY ABOUT HEALTH: ICD-10-CM

## 2022-11-22 RX ORDER — ALPRAZOLAM 0.25 MG/1
0.25 TABLET ORAL DAILY PRN
Qty: 40 TABLET | Refills: 0 | Status: SHIPPED | OUTPATIENT
Start: 2022-11-22 | End: 2022-11-23 | Stop reason: SDUPTHER

## 2022-11-22 NOTE — TELEPHONE ENCOUNTER
I reached out to patient  I see she was not seen since February 2022  I told her I could submit a refill but she would need to make an office apt  Patient states shes seeing many drs and would see if she could have her pcp prescribed this medication  I asked patient to call back if her PCP would want her to get this medication through us and we'd be happy to help her

## 2022-11-22 NOTE — TELEPHONE ENCOUNTER
Will provide refill for Xanax and defer to primary palliative provider if follow up appointment needed

## 2022-11-23 DIAGNOSIS — F41.8 ANXIETY ABOUT HEALTH: ICD-10-CM

## 2022-11-23 RX ORDER — ALPRAZOLAM 0.25 MG/1
0.25 TABLET ORAL DAILY PRN
Qty: 40 TABLET | Refills: 0 | Status: SHIPPED | OUTPATIENT
Start: 2022-11-23

## 2022-11-28 ENCOUNTER — ANESTHESIA EVENT (OUTPATIENT)
Dept: GASTROENTEROLOGY | Facility: HOSPITAL | Age: 52
End: 2022-11-28

## 2022-11-28 ENCOUNTER — HOSPITAL ENCOUNTER (OUTPATIENT)
Dept: GASTROENTEROLOGY | Facility: HOSPITAL | Age: 52
Setting detail: OUTPATIENT SURGERY
Discharge: HOME | End: 2022-11-28
Attending: INTERNAL MEDICINE

## 2022-11-28 ENCOUNTER — ANESTHESIA (OUTPATIENT)
Dept: GASTROENTEROLOGY | Facility: HOSPITAL | Age: 52
End: 2022-11-28

## 2022-11-28 VITALS
SYSTOLIC BLOOD PRESSURE: 134 MMHG | DIASTOLIC BLOOD PRESSURE: 85 MMHG | HEART RATE: 86 BPM | RESPIRATION RATE: 18 BRPM | TEMPERATURE: 98.2 F | OXYGEN SATURATION: 96 %

## 2022-11-28 DIAGNOSIS — Z80.0 FAMILY HISTORY OF COLON CANCER IN FATHER: ICD-10-CM

## 2022-11-28 DIAGNOSIS — Z12.11 SCREEN FOR COLON CANCER: ICD-10-CM

## 2022-11-28 RX ORDER — PROPOFOL 10 MG/ML
INJECTION, EMULSION INTRAVENOUS AS NEEDED
Status: DISCONTINUED | OUTPATIENT
Start: 2022-11-28 | End: 2022-11-28

## 2022-11-28 RX ORDER — SODIUM CHLORIDE 9 MG/ML
INJECTION, SOLUTION INTRAVENOUS CONTINUOUS PRN
Status: DISCONTINUED | OUTPATIENT
Start: 2022-11-28 | End: 2022-11-28

## 2022-11-28 RX ADMIN — PROPOFOL 50 MG: 10 INJECTION, EMULSION INTRAVENOUS at 14:49

## 2022-11-28 RX ADMIN — SODIUM CHLORIDE: 0.9 INJECTION, SOLUTION INTRAVENOUS at 14:35

## 2022-11-28 RX ADMIN — PROPOFOL 50 MG: 10 INJECTION, EMULSION INTRAVENOUS at 15:00

## 2022-11-28 RX ADMIN — PROPOFOL 50 MG: 10 INJECTION, EMULSION INTRAVENOUS at 14:43

## 2022-11-28 RX ADMIN — PROPOFOL 100 MG: 10 INJECTION, EMULSION INTRAVENOUS at 14:39

## 2022-11-28 RX ADMIN — PROPOFOL 50 MG: 10 INJECTION, EMULSION INTRAVENOUS at 15:05

## 2022-11-28 RX ADMIN — PROPOFOL 50 MG: 10 INJECTION, EMULSION INTRAVENOUS at 15:10

## 2022-11-28 RX ADMIN — PROPOFOL 50 MG: 10 INJECTION, EMULSION INTRAVENOUS at 14:57

## 2022-11-28 RX ADMIN — PROPOFOL 50 MG: 10 INJECTION, EMULSION INTRAVENOUS at 14:47

## 2022-11-28 RX ADMIN — PROPOFOL 50 MG: 10 INJECTION, EMULSION INTRAVENOUS at 15:13

## 2022-11-28 RX ADMIN — PROPOFOL 50 MG: 10 INJECTION, EMULSION INTRAVENOUS at 14:53

## 2022-11-28 NOTE — ANESTHESIA PREPROCEDURE EVALUATION
Procedure:  COLONOSCOPY    Relevant Problems   CARDIO   (+) Cerebral aneurysm   (+) Mixed hyperlipidemia      GI/HEPATIC   (+) Liver metastases (HCC)      GYN   (+) Malignant neoplasm of central portion of right breast in female, estrogen receptor positive (HCC)      NEURO/PSYCH   (+) Anxiety about health   (+) Stroke (HCC)      PULMONARY   (+) COPD (chronic obstructive pulmonary disease) (HCC)      Respiratory   (+) Allergic rhinitis      Nervous and Auditory   (+) Schwannoma of cranial nerve (HCC)      Immune and Lymphatic   (+) Carcinoma of right breast metastatic to axillary lymph node (HCC)      Other   (+) Acute pain of left knee   (+) Dense breast tissue   (+) Family history of colon cancer   (+) S/P BSO (bilateral salpingo-oophorectomy)        Physical Exam    Airway    Mallampati score: II  TM Distance: >3 FB  Neck ROM: full     Dental   No notable dental hx     Cardiovascular  Cardiovascular exam normal    Pulmonary  Pulmonary exam normal     Other Findings        Anesthesia Plan  ASA Score- 3     Anesthesia Type- IV sedation with anesthesia with ASA Monitors  Additional Monitors:   Airway Plan:           Plan Factors-Exercise tolerance (METS): >4 METS  Chart reviewed  Existing labs reviewed  Patient is not a current smoker  Patient not instructed to abstain from smoking on day of procedure  Patient did not smoke on day of surgery  Induction- intravenous  Postoperative Plan-     Informed Consent- Anesthetic plan and risks discussed with patient  I personally reviewed this patient with the CRNA  Discussed and agreed on the Anesthesia Plan with the CRNA  Cici Reed

## 2022-11-28 NOTE — NURSING NOTE
Pt is awake,alert,tolerated diet, written and verbal instructions given, pt verbalizes an understanding

## 2022-11-28 NOTE — ANESTHESIA POSTPROCEDURE EVALUATION
Post-Op Assessment Note    CV Status:  Stable  Pain Score: 0    Pain management: adequate     Mental Status:  Alert and awake   Hydration Status:  Euvolemic   PONV Controlled:  Controlled   Airway Patency:  Patent      Post Op Vitals Reviewed: Yes      Staff: Anesthesiologist, CRNA         No notable events documented      BP (P) 124/59 (11/28/22 1523)    Temp (!) (P) 97 3 °F (36 3 °C) (11/28/22 1523)    Pulse (P) 67 (11/28/22 1523)   Resp (P) 18 (11/28/22 1523)    SpO2 (P) 98 % (11/28/22 1523)

## 2022-11-28 NOTE — H&P
History and Physical - [facility]  Darby Velez 47 y o  female MRN: [id number]                  HPI: Darby Velez is a 47y o  year old female who presents for CRC screening      REVIEW OF SYSTEMS: Per the HPI, and otherwise unremarkable      Historical Information   Past Medical History:   Diagnosis Date   • Bloating    • Bruises easily    • Cancer ([address] )     right breast//to lymph nodes/liver/ and bone   • Depression    • History of cancer chemotherapy 2020   • History of pneumonia    • Hypotension     occas   • Low iron     "when pregnant, 20 yrs ago"   • Neuropathy     hands//fingers   • Shortness of breath     occas   • Stroke [facility])    • Tinnitus     right   • Wears glasses      Past Surgical History:   Procedure Laterality Date   • BILATERAL SALPINGOOPHORECTOMY     • BREAST BIOPSY     • IR BIOPSY LIVER MASS  2019   • MASTECTOMY Right     2019   • MYOMECTOMY      H/O FIBROIDS, NO SURGERY NEEDED   • MA LAP,RMV  ADNEXAL STRUCTURE N/A 2019    Procedure: LAPAROSCOPIC BILATERAL SALPINGO-OOPHORECTOMY;  Surgeon: Jenna Mercedes MD;  Location: [facility];  Service: Gynecology Oncology   • MA MASTECTOMY, SIMPLE, COMPLETE Right 2020    Procedure: MASTECTOMY SIMPLE, axillary node dissection;  Surgeon: Magali Bernal MD;  Location: [facility];  Service: Surgical Oncology   • US GUIDED BREAST BIOPSY RIGHT COMPLETE Right 2019   • US GUIDED BREAST LYMPH NODE BIOPSY RIGHT Right 2019     Social History   Social History     Substance and Sexual Activity   Alcohol Use Yes    Comment: occassional     Social History     Substance and Sexual Activity   Drug Use No     Social History     Tobacco Use   Smoking Status Former   • Packs/day: 1 00   • Years: 30 00   • Pack years: 30 00   • Types: Cigarettes   • Quit date:    • Years since quittin 9   Smokeless Tobacco Never     Family History   Problem Relation Age of Onset   • Hypotension Mother    • Colon cancer Father 36 • Hypertension Father    • Prostate cancer Father 79   • Throat cancer Maternal Grandmother 61   • Cancer Maternal Grandmother    • Breast cancer Paternal Aunt         age unknown       Meds/Allergies     (Not in a hospital admission)      Allergies   Allergen Reactions   • Tylenol [Acetaminophen]      Told to avoid due to cancer        Objective     Blood pressure 134/85, pulse 86, temperature 98 2 °F (36 8 °C), temperature source Temporal, resp  rate 18, SpO2 96 %  PHYSICAL EXAMINATION:    General Appearance:   Alert, cooperative, no distress   HEENT:  Normocephalic, atraumatic, anicteric  Neck supple, symmetrical, trachea midline  Lungs:   Equal chest rise and unlabored breathing, normal effort, no coughing  Cardiovascular:   No visualized JVD  Abdomen:   No abdominal distension  Skin:   No jaundice, rashes, or lesions  Musculoskeletal:   Normal range of motion visualized  Psych:  Normal affect and normal insight  Neuro:  Alert and appropriate  ASSESSMENT/PLAN:  This is a 47y o  year old female here for colonoscopy, and she is stable and optimized for her procedure

## 2022-12-07 ENCOUNTER — TELEPHONE (OUTPATIENT)
Dept: NEUROLOGY | Facility: CLINIC | Age: 52
End: 2022-12-07

## 2022-12-07 NOTE — TELEPHONE ENCOUNTER
Reviewed instructions for CTA neck with pt- she should not drink barium for this study  Discussed barium was given for CT ordered by hem/onc  No further questions

## 2022-12-07 NOTE — TELEPHONE ENCOUNTER
Pt left VM re: whether or not to take barium before her CT scan tomorrow  Requesting a CB at #[phone]  Transcribed VM:    Hi, my name is the darryl hyde  My birth date is [birth year]  I have a cat scans scheduled for tomorrow, and I wanted to know if I should drink the barium or not  Because I didn't get any instructions for this  I do have barium at home, so if someone can give me a call back and let me know that be great  Thank you   Bye

## 2022-12-08 ENCOUNTER — HOSPITAL ENCOUNTER (OUTPATIENT)
Dept: RADIOLOGY | Facility: HOSPITAL | Age: 52
Discharge: HOME | End: 2022-12-08
Attending: PSYCHIATRY & NEUROLOGY

## 2022-12-08 ENCOUNTER — APPOINTMENT (OUTPATIENT)
Dept: LAB | Facility: CLINIC | Age: 52
End: 2022-12-08

## 2022-12-08 DIAGNOSIS — I67.1 CEREBRAL ANEURYSM: ICD-10-CM

## 2022-12-08 DIAGNOSIS — I63.9 STROKE (HCC): ICD-10-CM

## 2022-12-08 LAB
BUN SERPL-MCNC: 12 MG/DL (ref 5–25)
CREAT SERPL-MCNC: 0.96 MG/DL (ref 0.6–1.3)
GFR SERPL CREATININE-BSD FRML MDRD: 67 ML/MIN/1.73SQ M

## 2022-12-09 ENCOUNTER — HOSPITAL ENCOUNTER (OUTPATIENT)
Dept: RADIOLOGY | Facility: HOSPITAL | Age: 52
Discharge: HOME | End: 2022-12-09
Attending: PSYCHIATRY & NEUROLOGY

## 2022-12-09 RX ADMIN — IOHEXOL 85 ML: 350 INJECTION, SOLUTION INTRAVENOUS at 15:16

## 2022-12-11 DIAGNOSIS — C78.7 LIVER METASTASES (HCC): ICD-10-CM

## 2022-12-11 DIAGNOSIS — C50.911 CARCINOMA OF RIGHT BREAST METASTATIC TO AXILLARY LYMPH NODE (HCC): ICD-10-CM

## 2022-12-11 DIAGNOSIS — C77.3 CARCINOMA OF RIGHT BREAST METASTATIC TO AXILLARY LYMPH NODE (HCC): ICD-10-CM

## 2022-12-14 NOTE — TELEPHONE ENCOUNTER
Received notification from scheduling team that radiology called to report significant findings on imaging  Per CTA neck report:    IMPRESSION:     No acute intracranial abnormality      Known right internal auditory canal lesion extending into right cerebellopontine angle was better evaluated on MRI brain IAC 6/23/2022      Small focal outpouching in origin of right superior cerebellar artery, could represent infundibulum or aneurysm  Attention on follow-up      Negative CTA head large vessel occlusion, dissection, or high-grade stenosis  Mild atherosclerotic disease, as detailed above      Additional chronic/incidental findings as detailed above      The study was marked in EPIC for significant notification  TT and encounter sent to Meg Alves in Dr Ruiz's absence

## 2022-12-14 NOTE — TELEPHONE ENCOUNTER
Per Merissa Friend- she reviewed report  Does not need urgent follow up today-  Would like Dr Jorge A Pitts to review and advise upon her return based on why she was ordering the study  Dr Jorge A Pitts, please review and advise on CTA results  Thanks!

## 2022-12-15 ENCOUNTER — TELEPHONE (OUTPATIENT)
Dept: NEUROLOGY | Facility: CLINIC | Age: 52
End: 2022-12-15

## 2022-12-15 NOTE — TELEPHONE ENCOUNTER
Spoke w/pt  Advised her of results and recommendations below  Pt verbalized understanding to all  She will f/u with PCP regarding thyroid nodule

## 2023-01-01 ENCOUNTER — APPOINTMENT (INPATIENT)
Dept: RADIOLOGY | Facility: HOSPITAL | Age: 53
End: 2023-01-01

## 2023-01-01 ENCOUNTER — HOME CARE VISIT (OUTPATIENT)
Dept: HOME HOSPICE | Facility: HOSPICE | Age: 53
End: 2023-01-01

## 2023-01-01 ENCOUNTER — APPOINTMENT (EMERGENCY)
Dept: RADIOLOGY | Facility: HOSPITAL | Age: 53
End: 2023-01-01

## 2023-01-01 ENCOUNTER — RADIATION THERAPY TREATMENT (OUTPATIENT)
Dept: RADIATION ONCOLOGY | Facility: HOSPITAL | Age: 53
End: 2023-01-01
Attending: STUDENT IN AN ORGANIZED HEALTH CARE EDUCATION/TRAINING PROGRAM

## 2023-01-01 ENCOUNTER — TELEPHONE (OUTPATIENT)
Dept: NEUROSURGERY | Facility: CLINIC | Age: 53
End: 2023-01-01

## 2023-01-01 ENCOUNTER — TELEPHONE (OUTPATIENT)
Dept: NUTRITION | Facility: CLINIC | Age: 53
End: 2023-01-01

## 2023-01-01 ENCOUNTER — APPOINTMENT (OUTPATIENT)
Dept: RADIATION ONCOLOGY | Facility: HOSPITAL | Age: 53
End: 2023-01-01
Attending: STUDENT IN AN ORGANIZED HEALTH CARE EDUCATION/TRAINING PROGRAM
Payer: COMMERCIAL

## 2023-01-01 ENCOUNTER — APPOINTMENT (INPATIENT)
Dept: NON INVASIVE DIAGNOSTICS | Facility: HOSPITAL | Age: 53
End: 2023-01-01

## 2023-01-01 ENCOUNTER — RADIATION ONCOLOGY CONSULT (OUTPATIENT)
Dept: RADIATION ONCOLOGY | Facility: HOSPITAL | Age: 53
End: 2023-01-01
Attending: NEUROLOGICAL SURGERY

## 2023-01-01 ENCOUNTER — HOSPITAL ENCOUNTER (INPATIENT)
Facility: HOSPITAL | Age: 53
LOS: 7 days | End: 2023-05-05
Attending: EMERGENCY MEDICINE | Admitting: INTERNAL MEDICINE

## 2023-01-01 ENCOUNTER — TELEPHONE (OUTPATIENT)
Dept: NEUROLOGY | Facility: CLINIC | Age: 53
End: 2023-01-01

## 2023-01-01 ENCOUNTER — RA CDI HCC (OUTPATIENT)
Dept: OTHER | Facility: HOSPITAL | Age: 53
End: 2023-01-01

## 2023-01-01 ENCOUNTER — APPOINTMENT (INPATIENT)
Dept: RADIOLOGY | Facility: HOSPITAL | Age: 53
End: 2023-01-01
Attending: RADIOLOGY

## 2023-01-01 ENCOUNTER — ANESTHESIA EVENT (INPATIENT)
Dept: PERIOP | Facility: HOSPITAL | Age: 53
End: 2023-01-01

## 2023-01-01 ENCOUNTER — APPOINTMENT (INPATIENT)
Dept: RADIOLOGY | Facility: HOSPITAL | Age: 53
End: 2023-01-01
Attending: NEUROLOGICAL SURGERY

## 2023-01-01 ENCOUNTER — HOME CARE VISIT (OUTPATIENT)
Dept: HOME HEALTH SERVICES | Facility: HOME HEALTHCARE | Age: 53
End: 2023-01-01

## 2023-01-01 ENCOUNTER — ANESTHESIA (INPATIENT)
Dept: PERIOP | Facility: HOSPITAL | Age: 53
End: 2023-01-01

## 2023-01-01 ENCOUNTER — HOSPITAL ENCOUNTER (OUTPATIENT)
Dept: RADIOLOGY | Facility: HOSPITAL | Age: 53
Discharge: HOME | End: 2023-04-25
Attending: STUDENT IN AN ORGANIZED HEALTH CARE EDUCATION/TRAINING PROGRAM

## 2023-01-01 ENCOUNTER — HOSPICE ADMISSION (OUTPATIENT)
Dept: HOME HOSPICE | Facility: HOSPICE | Age: 53
End: 2023-01-01

## 2023-01-01 ENCOUNTER — TRANSCRIBE ORDERS (OUTPATIENT)
Dept: RADIATION ONCOLOGY | Facility: HOSPITAL | Age: 53
End: 2023-01-01

## 2023-01-01 ENCOUNTER — PATIENT OUTREACH (OUTPATIENT)
Dept: CASE MANAGEMENT | Facility: HOSPITAL | Age: 53
End: 2023-01-01

## 2023-01-01 VITALS
TEMPERATURE: 98.5 F | SYSTOLIC BLOOD PRESSURE: 116 MMHG | OXYGEN SATURATION: 99 % | HEIGHT: 66 IN | BODY MASS INDEX: 37.54 KG/M2 | DIASTOLIC BLOOD PRESSURE: 70 MMHG | RESPIRATION RATE: 16 BRPM | HEART RATE: 105 BPM | WEIGHT: 233.6 LBS

## 2023-01-01 VITALS
HEIGHT: 66 IN | SYSTOLIC BLOOD PRESSURE: 169 MMHG | TEMPERATURE: 97.6 F | HEART RATE: 124 BPM | OXYGEN SATURATION: 80 % | BODY MASS INDEX: 37.45 KG/M2 | RESPIRATION RATE: 35 BRPM | DIASTOLIC BLOOD PRESSURE: 88 MMHG | WEIGHT: 233 LBS

## 2023-01-01 DIAGNOSIS — B37.81 THRUSH OF MOUTH AND ESOPHAGUS (HCC): ICD-10-CM

## 2023-01-01 DIAGNOSIS — C79.31 MALIGNANT NEOPLASM METASTATIC TO BRAIN (HCC): ICD-10-CM

## 2023-01-01 DIAGNOSIS — E87.1 HYPONATREMIA: ICD-10-CM

## 2023-01-01 DIAGNOSIS — I26.92 SADDLE EMBOLISM OF PULMONARY ARTERY (HCC): ICD-10-CM

## 2023-01-01 DIAGNOSIS — C79.31 MALIGNANT NEOPLASM METASTATIC TO BRAIN (HCC): Primary | ICD-10-CM

## 2023-01-01 DIAGNOSIS — C50.919 MALIGNANT NEOPLASM OF BREAST METASTATIC TO BRAIN, UNSPECIFIED LATERALITY (HCC): ICD-10-CM

## 2023-01-01 DIAGNOSIS — C79.31 BREAST CANCER METASTASIZED TO BRAIN (HCC): ICD-10-CM

## 2023-01-01 DIAGNOSIS — R79.89 ELEVATED LFTS: ICD-10-CM

## 2023-01-01 DIAGNOSIS — R11.2 NAUSEA AND VOMITING: Primary | ICD-10-CM

## 2023-01-01 DIAGNOSIS — B37.0 THRUSH OF MOUTH AND ESOPHAGUS (HCC): ICD-10-CM

## 2023-01-01 DIAGNOSIS — C79.31 MALIGNANT NEOPLASM OF BREAST METASTATIC TO BRAIN, UNSPECIFIED LATERALITY (HCC): ICD-10-CM

## 2023-01-01 DIAGNOSIS — C50.919 BREAST CANCER METASTASIZED TO BRAIN (HCC): ICD-10-CM

## 2023-01-01 DIAGNOSIS — C79.9 METASTATIC CANCER (HCC): ICD-10-CM

## 2023-01-01 DIAGNOSIS — Z98.890 STATUS POST BRAIN SURGERY: ICD-10-CM

## 2023-01-01 DIAGNOSIS — C50.919 MALIGNANT NEOPLASM OF FEMALE BREAST, UNSPECIFIED ESTROGEN RECEPTOR STATUS, UNSPECIFIED LATERALITY, UNSPECIFIED SITE OF BREAST (HCC): Primary | ICD-10-CM

## 2023-01-01 DIAGNOSIS — Z51.5 PALLIATIVE CARE PATIENT: ICD-10-CM

## 2023-01-01 LAB
2HR DELTA HS TROPONIN: 1 NG/L
4HR DELTA HS TROPONIN: -1 NG/L
ABO GROUP BLD: NORMAL
ALBUMIN SERPL BCP-MCNC: 2.4 G/DL (ref 3.5–5)
ALBUMIN SERPL BCP-MCNC: 2.7 G/DL (ref 3.5–5)
ALBUMIN SERPL BCP-MCNC: 2.8 G/DL (ref 3.5–5)
ALBUMIN SERPL BCP-MCNC: 3.4 G/DL (ref 3.5–5)
ALP SERPL-CCNC: 104 U/L (ref 46–116)
ALP SERPL-CCNC: 86 U/L (ref 46–116)
ALP SERPL-CCNC: 87 U/L (ref 46–116)
ALP SERPL-CCNC: 96 U/L (ref 46–116)
ALT SERPL W P-5'-P-CCNC: 24 U/L (ref 12–78)
ALT SERPL W P-5'-P-CCNC: 62 U/L (ref 12–78)
ALT SERPL W P-5'-P-CCNC: 64 U/L (ref 12–78)
ALT SERPL W P-5'-P-CCNC: 88 U/L (ref 12–78)
ANION GAP SERPL CALCULATED.3IONS-SCNC: 1 MMOL/L (ref 4–13)
ANION GAP SERPL CALCULATED.3IONS-SCNC: 2 MMOL/L (ref 4–13)
ANION GAP SERPL CALCULATED.3IONS-SCNC: 3 MMOL/L (ref 4–13)
ANION GAP SERPL CALCULATED.3IONS-SCNC: 3 MMOL/L (ref 4–13)
ANION GAP SERPL CALCULATED.3IONS-SCNC: 4 MMOL/L (ref 4–13)
ANION GAP SERPL CALCULATED.3IONS-SCNC: 5 MMOL/L (ref 4–13)
ANION GAP SERPL CALCULATED.3IONS-SCNC: 6 MMOL/L (ref 4–13)
ANION GAP SERPL CALCULATED.3IONS-SCNC: 6 MMOL/L (ref 4–13)
ANION GAP SERPL CALCULATED.3IONS-SCNC: 7 MMOL/L (ref 4–13)
ANION GAP SERPL CALCULATED.3IONS-SCNC: 9 MMOL/L (ref 4–13)
ANION GAP SERPL CALCULATED.3IONS-SCNC: 9 MMOL/L (ref 4–13)
ANISOCYTOSIS BLD QL SMEAR: PRESENT
AORTIC ROOT: 2.7 CM
APICAL FOUR CHAMBER EJECTION FRACTION: 69 %
APPEARANCE CSF: CLEAR
APTT PPP: 111 SECONDS (ref 23–37)
APTT PPP: 164 SECONDS (ref 23–37)
APTT PPP: 191 SECONDS (ref 23–37)
APTT PPP: 21 SECONDS (ref 23–37)
APTT PPP: 23 SECONDS (ref 23–37)
APTT PPP: 25 SECONDS (ref 23–37)
APTT PPP: 28 SECONDS (ref 23–37)
APTT PPP: 58 SECONDS (ref 23–37)
APTT PPP: 80 SECONDS (ref 23–37)
APTT PPP: 88 SECONDS (ref 23–37)
APTT PPP: >210 SECONDS (ref 23–37)
ARTERIAL PATENCY WRIST A: YES
ARTERIAL PATENCY WRIST A: YES
ASCENDING AORTA: 2.8 CM
AST SERPL W P-5'-P-CCNC: 19 U/L (ref 5–45)
AST SERPL W P-5'-P-CCNC: 20 U/L (ref 5–45)
AST SERPL W P-5'-P-CCNC: 23 U/L (ref 5–45)
AST SERPL W P-5'-P-CCNC: 44 U/L (ref 5–45)
BACTERIA CSF CULT: NO GROWTH
BASE EXCESS BLDA CALC-SCNC: 2.1 MMOL/L
BASE EXCESS BLDA CALC-SCNC: 3.4 MMOL/L
BASOPHILS # BLD AUTO: 0.01 THOUSANDS/ÂΜL (ref 0–0.1)
BASOPHILS # BLD AUTO: 0.01 THOUSANDS/ÂΜL (ref 0–0.1)
BASOPHILS # BLD AUTO: 0.02 THOUSANDS/ÂΜL (ref 0–0.1)
BASOPHILS # BLD AUTO: 0.02 THOUSANDS/ÂΜL (ref 0–0.1)
BASOPHILS # BLD AUTO: 0.03 THOUSANDS/ÂΜL (ref 0–0.1)
BASOPHILS # BLD AUTO: 0.04 THOUSANDS/ÂΜL (ref 0–0.1)
BASOPHILS # BLD MANUAL: 0 THOUSAND/UL (ref 0–0.1)
BASOPHILS NFR BLD AUTO: 0 % (ref 0–1)
BASOPHILS NFR MAR MANUAL: 0 % (ref 0–1)
BILIRUB DIRECT SERPL-MCNC: 0.29 MG/DL (ref 0–0.2)
BILIRUB SERPL-MCNC: 0.61 MG/DL (ref 0.2–1)
BILIRUB SERPL-MCNC: 0.65 MG/DL (ref 0.2–1)
BILIRUB SERPL-MCNC: 0.77 MG/DL (ref 0.2–1)
BILIRUB SERPL-MCNC: 0.88 MG/DL (ref 0.2–1)
BLD GP AB SCN SERPL QL: NEGATIVE
BUN SERPL-MCNC: 11 MG/DL (ref 5–25)
BUN SERPL-MCNC: 15 MG/DL (ref 5–25)
BUN SERPL-MCNC: 4 MG/DL (ref 5–25)
BUN SERPL-MCNC: 4 MG/DL (ref 5–25)
BUN SERPL-MCNC: 5 MG/DL (ref 5–25)
BUN SERPL-MCNC: 6 MG/DL (ref 5–25)
BUN SERPL-MCNC: 8 MG/DL (ref 5–25)
BUN SERPL-MCNC: 9 MG/DL (ref 5–25)
CA-I BLD-SCNC: 1.22 MMOL/L (ref 1.12–1.32)
CALCIUM ALBUM COR SERPL-MCNC: 10.8 MG/DL (ref 8.3–10.1)
CALCIUM ALBUM COR SERPL-MCNC: 9.6 MG/DL (ref 8.3–10.1)
CALCIUM ALBUM COR SERPL-MCNC: 9.9 MG/DL (ref 8.3–10.1)
CALCIUM SERPL-MCNC: 7.6 MG/DL (ref 8.3–10.1)
CALCIUM SERPL-MCNC: 8 MG/DL (ref 8.3–10.1)
CALCIUM SERPL-MCNC: 8.2 MG/DL (ref 8.3–10.1)
CALCIUM SERPL-MCNC: 8.4 MG/DL (ref 8.3–10.1)
CALCIUM SERPL-MCNC: 8.6 MG/DL (ref 8.3–10.1)
CALCIUM SERPL-MCNC: 8.6 MG/DL (ref 8.3–10.1)
CALCIUM SERPL-MCNC: 9.4 MG/DL (ref 8.3–10.1)
CALCIUM SERPL-MCNC: 9.5 MG/DL (ref 8.3–10.1)
CALCIUM SERPL-MCNC: 9.5 MG/DL (ref 8.3–10.1)
CALCIUM SERPL-MCNC: 9.6 MG/DL (ref 8.3–10.1)
CALCIUM SERPL-MCNC: 9.7 MG/DL (ref 8.3–10.1)
CANCER AG27-29 SERPL-ACNC: 22 U/ML (ref 0–42.3)
CARDIAC TROPONIN I PNL SERPL HS: 6 NG/L
CARDIAC TROPONIN I PNL SERPL HS: 7 NG/L
CARDIAC TROPONIN I PNL SERPL HS: 8 NG/L
CARDIAC TROPONIN I PNL SERPL HS: 8 NG/L (ref 8–18)
CHLORIDE SERPL-SCNC: 101 MMOL/L (ref 96–108)
CHLORIDE SERPL-SCNC: 102 MMOL/L (ref 96–108)
CHLORIDE SERPL-SCNC: 104 MMOL/L (ref 96–108)
CHLORIDE SERPL-SCNC: 104 MMOL/L (ref 96–108)
CHLORIDE SERPL-SCNC: 106 MMOL/L (ref 96–108)
CHLORIDE SERPL-SCNC: 107 MMOL/L (ref 96–108)
CO2 SERPL-SCNC: 21 MMOL/L (ref 21–32)
CO2 SERPL-SCNC: 25 MMOL/L (ref 21–32)
CO2 SERPL-SCNC: 25 MMOL/L (ref 21–32)
CO2 SERPL-SCNC: 27 MMOL/L (ref 21–32)
CO2 SERPL-SCNC: 28 MMOL/L (ref 21–32)
CO2 SERPL-SCNC: 29 MMOL/L (ref 21–32)
CO2 SERPL-SCNC: 29 MMOL/L (ref 21–32)
CO2 SERPL-SCNC: 30 MMOL/L (ref 21–32)
CO2 SERPL-SCNC: 31 MMOL/L (ref 21–32)
CREAT SERPL-MCNC: 0.35 MG/DL (ref 0.6–1.3)
CREAT SERPL-MCNC: 0.36 MG/DL (ref 0.6–1.3)
CREAT SERPL-MCNC: 0.51 MG/DL (ref 0.6–1.3)
CREAT SERPL-MCNC: 0.52 MG/DL (ref 0.6–1.3)
CREAT SERPL-MCNC: 0.53 MG/DL (ref 0.6–1.3)
CREAT SERPL-MCNC: 0.54 MG/DL (ref 0.6–1.3)
CREAT SERPL-MCNC: 0.55 MG/DL (ref 0.6–1.3)
CREAT SERPL-MCNC: 0.6 MG/DL (ref 0.6–1.3)
CREAT SERPL-MCNC: 0.62 MG/DL (ref 0.6–1.3)
CREAT SERPL-MCNC: 0.66 MG/DL (ref 0.6–1.3)
CREAT SERPL-MCNC: 0.74 MG/DL (ref 0.6–1.3)
E WAVE DECELERATION TIME: 155 MS
EOSINOPHIL # BLD AUTO: 0.01 THOUSAND/ÂΜL (ref 0–0.61)
EOSINOPHIL # BLD AUTO: 0.02 THOUSAND/ÂΜL (ref 0–0.61)
EOSINOPHIL # BLD AUTO: 0.04 THOUSAND/ÂΜL (ref 0–0.61)
EOSINOPHIL # BLD AUTO: 0.07 THOUSAND/ÂΜL (ref 0–0.61)
EOSINOPHIL # BLD MANUAL: 0 THOUSAND/UL (ref 0–0.4)
EOSINOPHIL NFR BLD AUTO: 0 % (ref 0–6)
EOSINOPHIL NFR BLD AUTO: 1 % (ref 0–6)
EOSINOPHIL NFR BLD MANUAL: 0 % (ref 0–6)
ERYTHROCYTE [DISTWIDTH] IN BLOOD BY AUTOMATED COUNT: 13.2 % (ref 11.6–15.1)
ERYTHROCYTE [DISTWIDTH] IN BLOOD BY AUTOMATED COUNT: 13.3 % (ref 11.6–15.1)
ERYTHROCYTE [DISTWIDTH] IN BLOOD BY AUTOMATED COUNT: 13.5 % (ref 11.6–15.1)
ERYTHROCYTE [DISTWIDTH] IN BLOOD BY AUTOMATED COUNT: 13.6 % (ref 11.6–15.1)
ERYTHROCYTE [DISTWIDTH] IN BLOOD BY AUTOMATED COUNT: 13.6 % (ref 11.6–15.1)
ERYTHROCYTE [DISTWIDTH] IN BLOOD BY AUTOMATED COUNT: 13.7 % (ref 11.6–15.1)
ERYTHROCYTE [DISTWIDTH] IN BLOOD BY AUTOMATED COUNT: 13.7 % (ref 11.6–15.1)
ERYTHROCYTE [DISTWIDTH] IN BLOOD BY AUTOMATED COUNT: 13.8 % (ref 11.6–15.1)
FRACTIONAL SHORTENING: 45 % (ref 28–44)
GFR SERPL CREATININE-BSD FRML MDRD: 101 ML/MIN/1.73SQ M
GFR SERPL CREATININE-BSD FRML MDRD: 103 ML/MIN/1.73SQ M
GFR SERPL CREATININE-BSD FRML MDRD: 104 ML/MIN/1.73SQ M
GFR SERPL CREATININE-BSD FRML MDRD: 107 ML/MIN/1.73SQ M
GFR SERPL CREATININE-BSD FRML MDRD: 108 ML/MIN/1.73SQ M
GFR SERPL CREATININE-BSD FRML MDRD: 108 ML/MIN/1.73SQ M
GFR SERPL CREATININE-BSD FRML MDRD: 109 ML/MIN/1.73SQ M
GFR SERPL CREATININE-BSD FRML MDRD: 110 ML/MIN/1.73SQ M
GFR SERPL CREATININE-BSD FRML MDRD: 123 ML/MIN/1.73SQ M
GFR SERPL CREATININE-BSD FRML MDRD: 124 ML/MIN/1.73SQ M
GFR SERPL CREATININE-BSD FRML MDRD: 92 ML/MIN/1.73SQ M
GLUCOSE CSF-MCNC: 53 MG/DL (ref 50–80)
GLUCOSE SERPL-MCNC: 104 MG/DL (ref 65–140)
GLUCOSE SERPL-MCNC: 115 MG/DL (ref 65–140)
GLUCOSE SERPL-MCNC: 120 MG/DL (ref 65–140)
GLUCOSE SERPL-MCNC: 121 MG/DL (ref 65–140)
GLUCOSE SERPL-MCNC: 122 MG/DL (ref 65–140)
GLUCOSE SERPL-MCNC: 123 MG/DL (ref 65–140)
GLUCOSE SERPL-MCNC: 129 MG/DL (ref 65–140)
GLUCOSE SERPL-MCNC: 136 MG/DL (ref 65–140)
GLUCOSE SERPL-MCNC: 143 MG/DL (ref 65–140)
GLUCOSE SERPL-MCNC: 146 MG/DL (ref 65–140)
GLUCOSE SERPL-MCNC: 200 MG/DL (ref 65–140)
GLUCOSE SERPL-MCNC: 93 MG/DL (ref 65–140)
GRAM STN SPEC: NORMAL
HCO3 BLDA-SCNC: 26.3 MMOL/L (ref 22–28)
HCO3 BLDA-SCNC: 27.1 MMOL/L (ref 22–28)
HCT VFR BLD AUTO: 30.1 % (ref 34.8–46.1)
HCT VFR BLD AUTO: 34 % (ref 34.8–46.1)
HCT VFR BLD AUTO: 34.6 % (ref 34.8–46.1)
HCT VFR BLD AUTO: 35.2 % (ref 34.8–46.1)
HCT VFR BLD AUTO: 35.3 % (ref 34.8–46.1)
HCT VFR BLD AUTO: 35.5 % (ref 34.8–46.1)
HCT VFR BLD AUTO: 36 % (ref 34.8–46.1)
HCT VFR BLD AUTO: 36.4 % (ref 34.8–46.1)
HCT VFR BLD AUTO: 37.7 % (ref 34.8–46.1)
HCT VFR BLD AUTO: 37.9 % (ref 34.8–46.1)
HCT VFR BLD AUTO: 39.1 % (ref 34.8–46.1)
HFNC FLOW LPM: 15
HGB BLD-MCNC: 10.4 G/DL (ref 11.5–15.4)
HGB BLD-MCNC: 11.4 G/DL (ref 11.5–15.4)
HGB BLD-MCNC: 11.6 G/DL (ref 11.5–15.4)
HGB BLD-MCNC: 12.1 G/DL (ref 11.5–15.4)
HGB BLD-MCNC: 12.1 G/DL (ref 11.5–15.4)
HGB BLD-MCNC: 12.2 G/DL (ref 11.5–15.4)
HGB BLD-MCNC: 12.3 G/DL (ref 11.5–15.4)
HGB BLD-MCNC: 12.4 G/DL (ref 11.5–15.4)
HGB BLD-MCNC: 12.6 G/DL (ref 11.5–15.4)
HGB BLD-MCNC: 12.9 G/DL (ref 11.5–15.4)
HGB BLD-MCNC: 13.4 G/DL (ref 11.5–15.4)
IMM GRANULOCYTES # BLD AUTO: 0.02 THOUSAND/UL (ref 0–0.2)
IMM GRANULOCYTES # BLD AUTO: 0.02 THOUSAND/UL (ref 0–0.2)
IMM GRANULOCYTES # BLD AUTO: 0.04 THOUSAND/UL (ref 0–0.2)
IMM GRANULOCYTES # BLD AUTO: 0.06 THOUSAND/UL (ref 0–0.2)
IMM GRANULOCYTES # BLD AUTO: 0.09 THOUSAND/UL (ref 0–0.2)
IMM GRANULOCYTES # BLD AUTO: 0.2 THOUSAND/UL (ref 0–0.2)
IMM GRANULOCYTES NFR BLD AUTO: 0 % (ref 0–2)
IMM GRANULOCYTES NFR BLD AUTO: 0 % (ref 0–2)
IMM GRANULOCYTES NFR BLD AUTO: 1 % (ref 0–2)
IMM GRANULOCYTES NFR BLD AUTO: 2 % (ref 0–2)
INR PPP: 1.03 (ref 0.84–1.19)
INR PPP: 1.09 (ref 0.84–1.19)
INR PPP: 1.12 (ref 0.84–1.19)
INR PPP: 1.18 (ref 0.84–1.19)
INR PPP: 1.22 (ref 0.84–1.19)
INTERVENTRICULAR SEPTUM IN DIASTOLE (PARASTERNAL SHORT AXIS VIEW): 0.9 CM
INTERVENTRICULAR SEPTUM: 0.9 CM (ref 0.6–1.1)
LAAS-AP2: 16.3 CM2
LAAS-AP4: 20.2 CM2
LEFT ATRIUM SIZE: 3.2 CM
LEFT INTERNAL DIMENSION IN SYSTOLE: 2.4 CM (ref 2.1–4)
LEFT VENTRICULAR INTERNAL DIMENSION IN DIASTOLE: 4.4 CM (ref 3.5–6)
LEFT VENTRICULAR POSTERIOR WALL IN END DIASTOLE: 1.1 CM
LEFT VENTRICULAR STROKE VOLUME: 69 ML
LIPASE SERPL-CCNC: 119 U/L (ref 73–393)
LVSV (TEICH): 69 ML
LYMPHOCYTES # BLD AUTO: 0.49 THOUSAND/UL (ref 0.6–4.47)
LYMPHOCYTES # BLD AUTO: 1 THOUSANDS/ÂΜL (ref 0.6–4.47)
LYMPHOCYTES # BLD AUTO: 1.05 THOUSANDS/ÂΜL (ref 0.6–4.47)
LYMPHOCYTES # BLD AUTO: 1.08 THOUSANDS/ÂΜL (ref 0.6–4.47)
LYMPHOCYTES # BLD AUTO: 1.14 THOUSANDS/ÂΜL (ref 0.6–4.47)
LYMPHOCYTES # BLD AUTO: 1.41 THOUSANDS/ÂΜL (ref 0.6–4.47)
LYMPHOCYTES # BLD AUTO: 1.46 THOUSANDS/ÂΜL (ref 0.6–4.47)
LYMPHOCYTES # BLD AUTO: 3 % (ref 14–44)
LYMPHOCYTES NFR BLD AUTO: 13 % (ref 14–44)
LYMPHOCYTES NFR BLD AUTO: 14 % (ref 14–44)
LYMPHOCYTES NFR BLD AUTO: 16 % (ref 14–44)
LYMPHOCYTES NFR BLD AUTO: 20 % (ref 14–44)
LYMPHOCYTES NFR BLD AUTO: 9 % (ref 14–44)
LYMPHOCYTES NFR BLD AUTO: 9 % (ref 14–44)
LYMPHOCYTES NFR CSF MANUAL: 76 %
MACROCYTES BLD QL AUTO: PRESENT
MAGNESIUM SERPL-MCNC: 2.1 MG/DL (ref 1.6–2.6)
MAGNESIUM SERPL-MCNC: 2.1 MG/DL (ref 1.6–2.6)
MAGNESIUM SERPL-MCNC: 2.2 MG/DL (ref 1.6–2.6)
MAGNESIUM SERPL-MCNC: 2.3 MG/DL (ref 1.6–2.6)
MAGNESIUM SERPL-MCNC: 2.4 MG/DL (ref 1.6–2.6)
MAGNESIUM SERPL-MCNC: 2.5 MG/DL (ref 1.6–2.6)
MAGNESIUM SERPL-MCNC: 2.6 MG/DL (ref 1.6–2.6)
MCH RBC QN AUTO: 33.3 PG (ref 26.8–34.3)
MCH RBC QN AUTO: 33.9 PG (ref 26.8–34.3)
MCH RBC QN AUTO: 34.1 PG (ref 26.8–34.3)
MCH RBC QN AUTO: 34.3 PG (ref 26.8–34.3)
MCH RBC QN AUTO: 34.3 PG (ref 26.8–34.3)
MCH RBC QN AUTO: 34.5 PG (ref 26.8–34.3)
MCH RBC QN AUTO: 34.9 PG (ref 26.8–34.3)
MCH RBC QN AUTO: 35 PG (ref 26.8–34.3)
MCH RBC QN AUTO: 35.1 PG (ref 26.8–34.3)
MCH RBC QN AUTO: 35.3 PG (ref 26.8–34.3)
MCH RBC QN AUTO: 35.5 PG (ref 26.8–34.3)
MCHC RBC AUTO-ENTMCNC: 32.7 G/DL (ref 31.4–37.4)
MCHC RBC AUTO-ENTMCNC: 33 G/DL (ref 31.4–37.4)
MCHC RBC AUTO-ENTMCNC: 33 G/DL (ref 31.4–37.4)
MCHC RBC AUTO-ENTMCNC: 33.2 G/DL (ref 31.4–37.4)
MCHC RBC AUTO-ENTMCNC: 33.5 G/DL (ref 31.4–37.4)
MCHC RBC AUTO-ENTMCNC: 34.2 G/DL (ref 31.4–37.4)
MCHC RBC AUTO-ENTMCNC: 34.4 G/DL (ref 31.4–37.4)
MCHC RBC AUTO-ENTMCNC: 34.6 G/DL (ref 31.4–37.4)
MCHC RBC AUTO-ENTMCNC: 35 G/DL (ref 31.4–37.4)
MCHC RBC AUTO-ENTMCNC: 35.5 G/DL (ref 31.4–37.4)
MCHC RBC AUTO-ENTMCNC: 35.7 G/DL (ref 31.4–37.4)
MCV RBC AUTO: 100 FL (ref 82–98)
MCV RBC AUTO: 101 FL (ref 82–98)
MCV RBC AUTO: 101 FL (ref 82–98)
MCV RBC AUTO: 102 FL (ref 82–98)
MCV RBC AUTO: 103 FL (ref 82–98)
MCV RBC AUTO: 103 FL (ref 82–98)
MCV RBC AUTO: 104 FL (ref 82–98)
MCV RBC AUTO: 99 FL (ref 82–98)
MCV RBC AUTO: 99 FL (ref 82–98)
METAMYELOCYTES NFR BLD MANUAL: 1 % (ref 0–1)
MONOCYTES # BLD AUTO: 0.75 THOUSAND/ÂΜL (ref 0.17–1.22)
MONOCYTES # BLD AUTO: 0.84 THOUSAND/ÂΜL (ref 0.17–1.22)
MONOCYTES # BLD AUTO: 0.84 THOUSAND/ÂΜL (ref 0.17–1.22)
MONOCYTES # BLD AUTO: 0.88 THOUSAND/ÂΜL (ref 0.17–1.22)
MONOCYTES # BLD AUTO: 0.94 THOUSAND/ÂΜL (ref 0.17–1.22)
MONOCYTES # BLD AUTO: 1.31 THOUSAND/UL (ref 0–1.22)
MONOCYTES # BLD AUTO: 1.44 THOUSAND/ÂΜL (ref 0.17–1.22)
MONOCYTES NFR BLD AUTO: 10 % (ref 4–12)
MONOCYTES NFR BLD AUTO: 11 % (ref 4–12)
MONOCYTES NFR BLD AUTO: 12 % (ref 4–12)
MONOCYTES NFR BLD AUTO: 13 % (ref 4–12)
MONOCYTES NFR BLD AUTO: 7 % (ref 4–12)
MONOCYTES NFR BLD AUTO: 9 % (ref 4–12)
MONOCYTES NFR BLD: 8 % (ref 4–12)
MONOS+MACROS CSF MANUAL: 6 %
MV E'TISSUE VEL-SEP: 8 CM/S
MV PEAK A VEL: 0.89 M/S
MV PEAK E VEL: 68 CM/S
MV STENOSIS PRESSURE HALF TIME: 45 MS
MV VALVE AREA P 1/2 METHOD: 4.89 CM2
NASAL CANNULA: 15
NEUTROPHILS # BLD AUTO: 10.08 THOUSANDS/ÂΜL (ref 1.85–7.62)
NEUTROPHILS # BLD AUTO: 10.29 THOUSANDS/ÂΜL (ref 1.85–7.62)
NEUTROPHILS # BLD AUTO: 4.71 THOUSANDS/ÂΜL (ref 1.85–7.62)
NEUTROPHILS # BLD AUTO: 5.18 THOUSANDS/ÂΜL (ref 1.85–7.62)
NEUTROPHILS # BLD AUTO: 5.99 THOUSANDS/ÂΜL (ref 1.85–7.62)
NEUTROPHILS # BLD AUTO: 7.83 THOUSANDS/ÂΜL (ref 1.85–7.62)
NEUTROPHILS # BLD MANUAL: 13.9 THOUSAND/UL (ref 1.85–7.62)
NEUTROPHILS NFR CSF MANUAL: 1 %
NEUTS BAND NFR BLD MANUAL: 15 % (ref 0–8)
NEUTS SEG NFR BLD AUTO: 68 % (ref 43–75)
NEUTS SEG NFR BLD AUTO: 70 % (ref 43–75)
NEUTS SEG NFR BLD AUTO: 70 % (ref 43–75)
NEUTS SEG NFR BLD AUTO: 74 % (ref 43–75)
NEUTS SEG NFR BLD AUTO: 76 % (ref 43–75)
NEUTS SEG NFR BLD AUTO: 78 % (ref 43–75)
NEUTS SEG NFR BLD AUTO: 83 % (ref 43–75)
NON VENT TYPE HFNC: NORMAL
NRBC BLD AUTO-RTO: 0 /100 WBCS
NT-PROBNP SERPL-MCNC: 164 PG/ML
NT-PROBNP SERPL-MCNC: 78 PG/ML
O2 CT BLDA-SCNC: 17.7 ML/DL (ref 16–23)
O2 CT BLDA-SCNC: 18.3 ML/DL (ref 16–23)
OTHER CELLS FLD MANUAL: 17 %
OTHER FIO2: ABNORMAL %
OXYHGB MFR BLDA: 93.1 % (ref 94–97)
OXYHGB MFR BLDA: 95.9 % (ref 94–97)
PATHOLOGIST INTERPRETATION: NORMAL
PATHOLOGY REVIEW: YES
PCO2 BLDA: 38.2 MM HG (ref 36–44)
PCO2 BLDA: 39.5 MM HG (ref 36–44)
PH BLDA: 7.44 [PH] (ref 7.35–7.45)
PH BLDA: 7.47 [PH] (ref 7.35–7.45)
PHOSPHATE SERPL-MCNC: 1.8 MG/DL (ref 2.7–4.5)
PHOSPHATE SERPL-MCNC: 2.5 MG/DL (ref 2.7–4.5)
PHOSPHATE SERPL-MCNC: 2.8 MG/DL (ref 2.7–4.5)
PHOSPHATE SERPL-MCNC: 2.9 MG/DL (ref 2.7–4.5)
PHOSPHATE SERPL-MCNC: 3.4 MG/DL (ref 2.7–4.5)
PHOSPHATE SERPL-MCNC: 5 MG/DL (ref 2.7–4.5)
PLATELET # BLD AUTO: 103 THOUSANDS/UL (ref 149–390)
PLATELET # BLD AUTO: 109 THOUSANDS/UL (ref 149–390)
PLATELET # BLD AUTO: 122 THOUSANDS/UL (ref 149–390)
PLATELET # BLD AUTO: 137 THOUSANDS/UL (ref 149–390)
PLATELET # BLD AUTO: 156 THOUSANDS/UL (ref 149–390)
PLATELET # BLD AUTO: 159 THOUSANDS/UL (ref 149–390)
PLATELET # BLD AUTO: 183 THOUSANDS/UL (ref 149–390)
PLATELET # BLD AUTO: 188 THOUSANDS/UL (ref 149–390)
PLATELET # BLD AUTO: 82 THOUSANDS/UL (ref 149–390)
PLATELET # BLD AUTO: 85 THOUSANDS/UL (ref 149–390)
PLATELET # BLD AUTO: 93 THOUSANDS/UL (ref 149–390)
PLATELET BLD QL SMEAR: ADEQUATE
PMV BLD AUTO: 10.1 FL (ref 8.9–12.7)
PMV BLD AUTO: 10.3 FL (ref 8.9–12.7)
PMV BLD AUTO: 10.3 FL (ref 8.9–12.7)
PMV BLD AUTO: 10.4 FL (ref 8.9–12.7)
PMV BLD AUTO: 10.5 FL (ref 8.9–12.7)
PMV BLD AUTO: 10.6 FL (ref 8.9–12.7)
PMV BLD AUTO: 10.7 FL (ref 8.9–12.7)
PMV BLD AUTO: 9.6 FL (ref 8.9–12.7)
PMV BLD AUTO: 9.8 FL (ref 8.9–12.7)
PMV BLD AUTO: 9.9 FL (ref 8.9–12.7)
PMV BLD AUTO: 9.9 FL (ref 8.9–12.7)
PO2 BLDA: 70.8 MM HG (ref 75–129)
PO2 BLDA: 93.8 MM HG (ref 75–129)
POIKILOCYTOSIS BLD QL SMEAR: PRESENT
POLYCHROMASIA BLD QL SMEAR: PRESENT
POTASSIUM SERPL-SCNC: 2.9 MMOL/L (ref 3.5–5.3)
POTASSIUM SERPL-SCNC: 3.2 MMOL/L (ref 3.5–5.3)
POTASSIUM SERPL-SCNC: 3.4 MMOL/L (ref 3.5–5.3)
POTASSIUM SERPL-SCNC: 3.4 MMOL/L (ref 3.5–5.3)
POTASSIUM SERPL-SCNC: 3.5 MMOL/L (ref 3.5–5.3)
POTASSIUM SERPL-SCNC: 3.5 MMOL/L (ref 3.5–5.3)
POTASSIUM SERPL-SCNC: 3.6 MMOL/L (ref 3.5–5.3)
POTASSIUM SERPL-SCNC: 3.7 MMOL/L (ref 3.5–5.3)
POTASSIUM SERPL-SCNC: 3.7 MMOL/L (ref 3.5–5.3)
POTASSIUM SERPL-SCNC: 3.8 MMOL/L (ref 3.5–5.3)
POTASSIUM SERPL-SCNC: 4.1 MMOL/L (ref 3.5–5.3)
PROT CSF-MCNC: 150 MG/DL (ref 15–45)
PROT SERPL-MCNC: 6.4 G/DL (ref 6.4–8.4)
PROT SERPL-MCNC: 6.5 G/DL (ref 6.4–8.4)
PROT SERPL-MCNC: 6.8 G/DL (ref 6.4–8.4)
PROT SERPL-MCNC: 6.9 G/DL (ref 6.4–8.4)
PROTHROMBIN TIME: 13.7 SECONDS (ref 11.6–14.5)
PROTHROMBIN TIME: 14.3 SECONDS (ref 11.6–14.5)
PROTHROMBIN TIME: 14.6 SECONDS (ref 11.6–14.5)
PROTHROMBIN TIME: 15.2 SECONDS (ref 11.6–14.5)
PROTHROMBIN TIME: 15.6 SECONDS (ref 11.6–14.5)
RBC # BLD AUTO: 2.98 MILLION/UL (ref 3.81–5.12)
RBC # BLD AUTO: 3.32 MILLION/UL (ref 3.81–5.12)
RBC # BLD AUTO: 3.38 MILLION/UL (ref 3.81–5.12)
RBC # BLD AUTO: 3.45 MILLION/UL (ref 3.81–5.12)
RBC # BLD AUTO: 3.49 MILLION/UL (ref 3.81–5.12)
RBC # BLD AUTO: 3.55 MILLION/UL (ref 3.81–5.12)
RBC # BLD AUTO: 3.55 MILLION/UL (ref 3.81–5.12)
RBC # BLD AUTO: 3.57 MILLION/UL (ref 3.81–5.12)
RBC # BLD AUTO: 3.72 MILLION/UL (ref 3.81–5.12)
RBC # BLD AUTO: 3.8 MILLION/UL (ref 3.81–5.12)
RBC # BLD AUTO: 3.8 MILLION/UL (ref 3.81–5.12)
RBC # CSF MANUAL: 55 UL (ref 0–10)
RBC MORPH BLD: PRESENT
RH BLD: POSITIVE
RIGHT ATRIUM AREA SYSTOLE A4C: 14.5 CM2
RIGHT VENTRICLE ID DIMENSION: 3.8 CM
SL CV LEFT ATRIUM LENGTH A2C: 5.1 CM
SL CV LV EF: 60
SL CV LV EF: 65
SL CV PED ECHO LEFT VENTRICLE DIASTOLIC VOLUME (MOD BIPLANE) 2D: 88 ML
SL CV PED ECHO LEFT VENTRICLE SYSTOLIC VOLUME (MOD BIPLANE) 2D: 19 ML
SODIUM SERPL-SCNC: 131 MMOL/L (ref 135–147)
SODIUM SERPL-SCNC: 133 MMOL/L (ref 135–147)
SODIUM SERPL-SCNC: 134 MMOL/L (ref 135–147)
SODIUM SERPL-SCNC: 134 MMOL/L (ref 135–147)
SODIUM SERPL-SCNC: 135 MMOL/L (ref 135–147)
SODIUM SERPL-SCNC: 136 MMOL/L (ref 135–147)
SODIUM SERPL-SCNC: 137 MMOL/L (ref 135–147)
SODIUM SERPL-SCNC: 138 MMOL/L (ref 135–147)
SODIUM SERPL-SCNC: 138 MMOL/L (ref 135–147)
SPECIMEN EXPIRATION DATE: NORMAL
SPECIMEN SOURCE: ABNORMAL
SPECIMEN SOURCE: NORMAL
TOTAL CELLS COUNTED BLD: NO
TOTAL CELLS COUNTED SPEC: 100
TRICUSPID ANNULAR PLANE SYSTOLIC EXCURSION: 2.4 CM
VARIANT LYMPHS # BLD AUTO: 3 %
WBC # BLD AUTO: 10.25 THOUSAND/UL (ref 4.31–10.16)
WBC # BLD AUTO: 11.94 THOUSAND/UL (ref 4.31–10.16)
WBC # BLD AUTO: 12.07 THOUSAND/UL (ref 4.31–10.16)
WBC # BLD AUTO: 12.27 THOUSAND/UL (ref 4.31–10.16)
WBC # BLD AUTO: 13.15 THOUSAND/UL (ref 4.31–10.16)
WBC # BLD AUTO: 16.35 THOUSAND/UL (ref 4.31–10.16)
WBC # BLD AUTO: 16.57 THOUSAND/UL (ref 4.31–10.16)
WBC # BLD AUTO: 6.73 THOUSAND/UL (ref 4.31–10.16)
WBC # BLD AUTO: 7.49 THOUSAND/UL (ref 4.31–10.16)
WBC # BLD AUTO: 7.64 THOUSAND/UL (ref 4.31–10.16)
WBC # BLD AUTO: 8 THOUSAND/UL (ref 4.31–10.16)
WBC # CSF AUTO: 25 /UL (ref 0–5)

## 2023-01-01 PROCEDURE — 3C1ZX8Z IRRIGATION OF INDWELLING DEVICE USING IRRIGATING SUBSTANCE, EXTERNAL APPROACH: ICD-10-PCS | Performed by: PSYCHIATRY & NEUROLOGY

## 2023-01-01 PROCEDURE — B5191ZZ FLUOROSCOPY OF INFERIOR VENA CAVA USING LOW OSMOLAR CONTRAST: ICD-10-PCS | Performed by: RADIOLOGY

## 2023-01-01 PROCEDURE — 06H03DZ INSERTION OF INTRALUMINAL DEVICE INTO INFERIOR VENA CAVA, PERCUTANEOUS APPROACH: ICD-10-PCS | Performed by: RADIOLOGY

## 2023-01-01 PROCEDURE — 00P630Z REMOVAL OF DRAINAGE DEVICE FROM CEREBRAL VENTRICLE, PERCUTANEOUS APPROACH: ICD-10-PCS | Performed by: NEUROLOGICAL SURGERY

## 2023-01-01 PROCEDURE — 02HV33Z INSERTION OF INFUSION DEVICE INTO SUPERIOR VENA CAVA, PERCUTANEOUS APPROACH: ICD-10-PCS | Performed by: PSYCHIATRY & NEUROLOGY

## 2023-01-01 PROCEDURE — 009630Z DRAINAGE OF CEREBRAL VENTRICLE WITH DRAINAGE DEVICE, PERCUTANEOUS APPROACH: ICD-10-PCS | Performed by: PSYCHIATRY & NEUROLOGY

## 2023-01-01 PROCEDURE — 00164J6 BYPASS CEREBRAL VENTRICLE TO PERITONEAL CAVITY WITH SYNTHETIC SUBSTITUTE, PERCUTANEOUS ENDOSCOPIC APPROACH: ICD-10-PCS | Performed by: PSYCHIATRY & NEUROLOGY

## 2023-01-01 PROCEDURE — 8E09XBZ COMPUTER ASSISTED PROCEDURE OF HEAD AND NECK REGION: ICD-10-PCS | Performed by: NEUROLOGICAL SURGERY

## 2023-01-01 DEVICE — BACTISEAL VP CATHETER KIT: Type: IMPLANTABLE DEVICE | Site: BRAIN | Status: FUNCTIONAL

## 2023-01-01 RX ORDER — GLYCOPYRROLATE 0.2 MG/ML
0.1 INJECTION INTRAMUSCULAR; INTRAVENOUS EVERY 4 HOURS PRN
Qty: 1 ML | Refills: 1 | Status: SHIPPED | OUTPATIENT
Start: 2023-01-01 | End: 2023-06-04

## 2023-01-01 RX ORDER — LEVALBUTEROL 1.25 MG/.5ML
1.25 SOLUTION, CONCENTRATE RESPIRATORY (INHALATION) EVERY 6 HOURS PRN
Status: DISCONTINUED | OUTPATIENT
Start: 2023-01-01 | End: 2023-01-01

## 2023-01-01 RX ORDER — LEVALBUTEROL INHALATION SOLUTION 1.25 MG/3ML
1.25 SOLUTION RESPIRATORY (INHALATION)
Status: DISCONTINUED | OUTPATIENT
Start: 2023-01-01 | End: 2023-01-01

## 2023-01-01 RX ORDER — FLUCONAZOLE 200 MG/100ML
100 INJECTION, SOLUTION INTRAVENOUS EVERY 24 HOURS
Status: DISCONTINUED | OUTPATIENT
Start: 2023-01-01 | End: 2023-01-01

## 2023-01-01 RX ORDER — FENTANYL CITRATE 50 UG/ML
50 INJECTION, SOLUTION INTRAMUSCULAR; INTRAVENOUS ONCE
Status: COMPLETED | OUTPATIENT
Start: 2023-01-01 | End: 2023-01-01

## 2023-01-01 RX ORDER — HYDROMORPHONE HCL/PF 1 MG/ML
SYRINGE (ML) INJECTION AS NEEDED
Status: DISCONTINUED | OUTPATIENT
Start: 2023-01-01 | End: 2023-01-01

## 2023-01-01 RX ORDER — GLYCOPYRROLATE 0.2 MG/ML
0.1 INJECTION INTRAMUSCULAR; INTRAVENOUS ONCE
Status: COMPLETED | OUTPATIENT
Start: 2023-01-01 | End: 2023-01-01

## 2023-01-01 RX ORDER — CEFAZOLIN SODIUM 1 G/50ML
1000 SOLUTION INTRAVENOUS EVERY 8 HOURS
Status: COMPLETED | OUTPATIENT
Start: 2023-01-01 | End: 2023-01-01

## 2023-01-01 RX ORDER — LIDOCAINE HYDROCHLORIDE 10 MG/ML
INJECTION, SOLUTION EPIDURAL; INFILTRATION; INTRACAUDAL; PERINEURAL AS NEEDED
Status: COMPLETED | OUTPATIENT
Start: 2023-01-01 | End: 2023-01-01

## 2023-01-01 RX ORDER — MAGNESIUM SULFATE 1 G/100ML
1 INJECTION INTRAVENOUS ONCE
Status: COMPLETED | OUTPATIENT
Start: 2023-01-01 | End: 2023-01-01

## 2023-01-01 RX ORDER — DEXAMETHASONE SODIUM PHOSPHATE 10 MG/ML
INJECTION, SOLUTION INTRAMUSCULAR; INTRAVENOUS AS NEEDED
Status: DISCONTINUED | OUTPATIENT
Start: 2023-01-01 | End: 2023-01-01

## 2023-01-01 RX ORDER — POTASSIUM CHLORIDE 20MEQ/15ML
40 LIQUID (ML) ORAL 2 TIMES DAILY
Status: DISCONTINUED | OUTPATIENT
Start: 2023-01-01 | End: 2023-01-01

## 2023-01-01 RX ORDER — LABETALOL HYDROCHLORIDE 5 MG/ML
10 INJECTION, SOLUTION INTRAVENOUS ONCE
Status: COMPLETED | OUTPATIENT
Start: 2023-01-01 | End: 2023-01-01

## 2023-01-01 RX ORDER — OXYCODONE HYDROCHLORIDE 5 MG/1
5 TABLET ORAL EVERY 4 HOURS PRN
Status: DISCONTINUED | OUTPATIENT
Start: 2023-01-01 | End: 2023-01-01

## 2023-01-01 RX ORDER — ONDANSETRON 2 MG/ML
4 INJECTION INTRAMUSCULAR; INTRAVENOUS EVERY 6 HOURS PRN
Status: DISCONTINUED | OUTPATIENT
Start: 2023-01-01 | End: 2023-01-01

## 2023-01-01 RX ORDER — POTASSIUM CHLORIDE 14.9 MG/ML
20 INJECTION INTRAVENOUS ONCE
Status: COMPLETED | OUTPATIENT
Start: 2023-01-01 | End: 2023-01-01

## 2023-01-01 RX ORDER — SODIUM CHLORIDE 9 MG/ML
100 INJECTION, SOLUTION INTRAVENOUS CONTINUOUS
Status: DISCONTINUED | OUTPATIENT
Start: 2023-01-01 | End: 2023-01-01

## 2023-01-01 RX ORDER — FLUCONAZOLE 200 MG/1
200 TABLET ORAL ONCE
Status: COMPLETED | OUTPATIENT
Start: 2023-01-01 | End: 2023-01-01

## 2023-01-01 RX ORDER — SCOLOPAMINE TRANSDERMAL SYSTEM 1 MG/1
1 PATCH, EXTENDED RELEASE TRANSDERMAL
Status: DISCONTINUED | OUTPATIENT
Start: 2023-01-01 | End: 2023-01-01

## 2023-01-01 RX ORDER — LABETALOL HYDROCHLORIDE 5 MG/ML
20 INJECTION, SOLUTION INTRAVENOUS EVERY 4 HOURS PRN
Status: DISCONTINUED | OUTPATIENT
Start: 2023-01-01 | End: 2023-01-01

## 2023-01-01 RX ORDER — FLUCONAZOLE 100 MG/1
100 TABLET ORAL DAILY
Status: DISCONTINUED | OUTPATIENT
Start: 2023-01-01 | End: 2023-01-01

## 2023-01-01 RX ORDER — ROCURONIUM BROMIDE 10 MG/ML
INJECTION, SOLUTION INTRAVENOUS AS NEEDED
Status: DISCONTINUED | OUTPATIENT
Start: 2023-01-01 | End: 2023-01-01

## 2023-01-01 RX ORDER — POTASSIUM CHLORIDE 29.8 MG/ML
40 INJECTION INTRAVENOUS ONCE
Status: DISCONTINUED | OUTPATIENT
Start: 2023-01-01 | End: 2023-01-01

## 2023-01-01 RX ORDER — POTASSIUM CHLORIDE 20 MEQ/1
40 TABLET, EXTENDED RELEASE ORAL 2 TIMES DAILY
Status: DISCONTINUED | OUTPATIENT
Start: 2023-01-01 | End: 2023-01-01

## 2023-01-01 RX ORDER — DEXTROSE, SODIUM CHLORIDE, SODIUM LACTATE, POTASSIUM CHLORIDE, AND CALCIUM CHLORIDE 5; .6; .31; .03; .02 G/100ML; G/100ML; G/100ML; G/100ML; G/100ML
125 INJECTION, SOLUTION INTRAVENOUS CONTINUOUS
Status: DISCONTINUED | OUTPATIENT
Start: 2023-01-01 | End: 2023-01-01

## 2023-01-01 RX ORDER — ROPIVACAINE HYDROCHLORIDE 5 MG/ML
15 INJECTION, SOLUTION EPIDURAL; INFILTRATION; PERINEURAL ONCE
Status: DISCONTINUED | OUTPATIENT
Start: 2023-01-01 | End: 2023-01-01 | Stop reason: HOSPADM

## 2023-01-01 RX ORDER — ACETYLCYSTEINE 200 MG/ML
3 SOLUTION ORAL; RESPIRATORY (INHALATION)
Status: DISCONTINUED | OUTPATIENT
Start: 2023-01-01 | End: 2023-01-01

## 2023-01-01 RX ORDER — GLYCOPYRROLATE 0.2 MG/ML
0.1 INJECTION INTRAMUSCULAR; INTRAVENOUS EVERY 4 HOURS PRN
Status: DISCONTINUED | OUTPATIENT
Start: 2023-01-01 | End: 2023-01-01 | Stop reason: HOSPADM

## 2023-01-01 RX ORDER — ESMOLOL HYDROCHLORIDE 10 MG/ML
INJECTION INTRAVENOUS AS NEEDED
Status: DISCONTINUED | OUTPATIENT
Start: 2023-01-01 | End: 2023-01-01

## 2023-01-01 RX ORDER — OXYCODONE HYDROCHLORIDE 5 MG/1
2.5 TABLET ORAL EVERY 4 HOURS PRN
Status: DISCONTINUED | OUTPATIENT
Start: 2023-01-01 | End: 2023-01-01

## 2023-01-01 RX ORDER — ONDANSETRON 2 MG/ML
4 INJECTION INTRAMUSCULAR; INTRAVENOUS EVERY 6 HOURS PRN
Status: DISCONTINUED | OUTPATIENT
Start: 2023-01-01 | End: 2023-01-01 | Stop reason: HOSPADM

## 2023-01-01 RX ORDER — BISACODYL 10 MG
10 SUPPOSITORY, RECTAL RECTAL DAILY PRN
Status: DISCONTINUED | OUTPATIENT
Start: 2023-01-01 | End: 2023-01-01

## 2023-01-01 RX ORDER — ONDANSETRON 2 MG/ML
4 INJECTION INTRAMUSCULAR; INTRAVENOUS ONCE
Status: COMPLETED | OUTPATIENT
Start: 2023-01-01 | End: 2023-01-01

## 2023-01-01 RX ORDER — PROPOFOL 10 MG/ML
INJECTION, EMULSION INTRAVENOUS AS NEEDED
Status: DISCONTINUED | OUTPATIENT
Start: 2023-01-01 | End: 2023-01-01

## 2023-01-01 RX ORDER — CALCIUM CARBONATE 200(500)MG
1000 TABLET,CHEWABLE ORAL DAILY PRN
Status: DISCONTINUED | OUTPATIENT
Start: 2023-01-01 | End: 2023-01-01

## 2023-01-01 RX ORDER — GUAIFENESIN 100 MG/5ML
400 SOLUTION ORAL EVERY 6 HOURS
Status: DISCONTINUED | OUTPATIENT
Start: 2023-01-01 | End: 2023-01-01

## 2023-01-01 RX ORDER — LABETALOL HYDROCHLORIDE 5 MG/ML
10 INJECTION, SOLUTION INTRAVENOUS EVERY 4 HOURS PRN
Status: DISCONTINUED | OUTPATIENT
Start: 2023-01-01 | End: 2023-01-01

## 2023-01-01 RX ORDER — CHLORHEXIDINE GLUCONATE 0.12 MG/ML
15 RINSE ORAL EVERY 12 HOURS SCHEDULED
Status: DISCONTINUED | OUTPATIENT
Start: 2023-01-01 | End: 2023-01-01

## 2023-01-01 RX ORDER — ALBUTEROL SULFATE 90 UG/1
2 AEROSOL, METERED RESPIRATORY (INHALATION) EVERY 4 HOURS PRN
Status: DISCONTINUED | OUTPATIENT
Start: 2023-01-01 | End: 2023-01-01

## 2023-01-01 RX ORDER — HYDROMORPHONE HCL IN WATER/PF 6 MG/30 ML
0.2 PATIENT CONTROLLED ANALGESIA SYRINGE INTRAVENOUS
Status: DISCONTINUED | OUTPATIENT
Start: 2023-01-01 | End: 2023-01-01 | Stop reason: HOSPADM

## 2023-01-01 RX ORDER — BUPIVACAINE HYDROCHLORIDE AND EPINEPHRINE 5; 5 MG/ML; UG/ML
INJECTION, SOLUTION EPIDURAL; INTRACAUDAL; PERINEURAL AS NEEDED
Status: DISCONTINUED | OUTPATIENT
Start: 2023-01-01 | End: 2023-01-01 | Stop reason: HOSPADM

## 2023-01-01 RX ORDER — ONDANSETRON HYDROCHLORIDE 8 MG/1
8 TABLET, FILM COATED ORAL EVERY 8 HOURS PRN
Qty: 20 TABLET | Refills: 0 | Status: SHIPPED | OUTPATIENT
Start: 2023-01-01 | End: 2023-01-01

## 2023-01-01 RX ORDER — ONDANSETRON 2 MG/ML
INJECTION INTRAMUSCULAR; INTRAVENOUS AS NEEDED
Status: DISCONTINUED | OUTPATIENT
Start: 2023-01-01 | End: 2023-01-01

## 2023-01-01 RX ORDER — DOCUSATE SODIUM 100 MG/1
100 CAPSULE, LIQUID FILLED ORAL 2 TIMES DAILY
Status: DISCONTINUED | OUTPATIENT
Start: 2023-01-01 | End: 2023-01-01 | Stop reason: HOSPADM

## 2023-01-01 RX ORDER — BENZONATATE 100 MG/1
100 CAPSULE ORAL 3 TIMES DAILY PRN
Status: DISCONTINUED | OUTPATIENT
Start: 2023-01-01 | End: 2023-01-01

## 2023-01-01 RX ORDER — DOCUSATE SODIUM 100 MG/1
100 CAPSULE, LIQUID FILLED ORAL 2 TIMES DAILY
Qty: 60 CAPSULE | Refills: 0 | Status: SHIPPED | OUTPATIENT
Start: 2023-01-01 | End: 2023-06-04

## 2023-01-01 RX ORDER — ROPIVACAINE HYDROCHLORIDE 5 MG/ML
15 INJECTION, SOLUTION EPIDURAL; INFILTRATION; PERINEURAL ONCE
Qty: 15 ML | Refills: 0 | Status: SHIPPED | OUTPATIENT
Start: 2023-01-01 | End: 2023-01-01

## 2023-01-01 RX ORDER — POTASSIUM CHLORIDE 20 MEQ/1
20 TABLET, EXTENDED RELEASE ORAL 2 TIMES DAILY
Status: DISCONTINUED | OUTPATIENT
Start: 2023-01-01 | End: 2023-01-01

## 2023-01-01 RX ORDER — CEFAZOLIN SODIUM 2 G/50ML
2000 SOLUTION INTRAVENOUS ONCE
Status: COMPLETED | OUTPATIENT
Start: 2023-01-01 | End: 2023-01-01

## 2023-01-01 RX ORDER — SODIUM CHLORIDE, SODIUM LACTATE, POTASSIUM CHLORIDE, CALCIUM CHLORIDE 600; 310; 30; 20 MG/100ML; MG/100ML; MG/100ML; MG/100ML
INJECTION, SOLUTION INTRAVENOUS CONTINUOUS PRN
Status: DISCONTINUED | OUTPATIENT
Start: 2023-01-01 | End: 2023-01-01

## 2023-01-01 RX ORDER — MAGNESIUM SULFATE HEPTAHYDRATE 40 MG/ML
2 INJECTION, SOLUTION INTRAVENOUS ONCE
Status: COMPLETED | OUTPATIENT
Start: 2023-01-01 | End: 2023-01-01

## 2023-01-01 RX ORDER — SODIUM CHLORIDE, SODIUM LACTATE, POTASSIUM CHLORIDE, CALCIUM CHLORIDE 600; 310; 30; 20 MG/100ML; MG/100ML; MG/100ML; MG/100ML
75 INJECTION, SOLUTION INTRAVENOUS CONTINUOUS
Status: DISCONTINUED | OUTPATIENT
Start: 2023-01-01 | End: 2023-01-01

## 2023-01-01 RX ORDER — SENNOSIDES 8.6 MG
1 TABLET ORAL DAILY
Status: DISCONTINUED | OUTPATIENT
Start: 2023-01-01 | End: 2023-01-01

## 2023-01-01 RX ORDER — POTASSIUM CHLORIDE 20 MEQ/1
40 TABLET, EXTENDED RELEASE ORAL ONCE
Status: COMPLETED | OUTPATIENT
Start: 2023-01-01 | End: 2023-01-01

## 2023-01-01 RX ORDER — PROMETHAZINE HYDROCHLORIDE 25 MG/ML
25 INJECTION, SOLUTION INTRAMUSCULAR; INTRAVENOUS ONCE
Status: COMPLETED | OUTPATIENT
Start: 2023-01-01 | End: 2023-01-01

## 2023-01-01 RX ORDER — SODIUM CHLORIDE 9 MG/ML
75 INJECTION, SOLUTION INTRAVENOUS CONTINUOUS
Status: DISCONTINUED | OUTPATIENT
Start: 2023-01-01 | End: 2023-01-01

## 2023-01-01 RX ORDER — LANOLIN ALCOHOL/MO/W.PET/CERES
3 CREAM (GRAM) TOPICAL
Status: DISCONTINUED | OUTPATIENT
Start: 2023-01-01 | End: 2023-01-01

## 2023-01-01 RX ORDER — LIDOCAINE HYDROCHLORIDE 10 MG/ML
INJECTION, SOLUTION EPIDURAL; INFILTRATION; INTRACAUDAL; PERINEURAL AS NEEDED
Status: DISCONTINUED | OUTPATIENT
Start: 2023-01-01 | End: 2023-01-01

## 2023-01-01 RX ORDER — IPRATROPIUM BROMIDE AND ALBUTEROL SULFATE 2.5; .5 MG/3ML; MG/3ML
3 SOLUTION RESPIRATORY (INHALATION) EVERY 6 HOURS PRN
Status: DISCONTINUED | OUTPATIENT
Start: 2023-01-01 | End: 2023-01-01

## 2023-01-01 RX ORDER — BISACODYL 10 MG
10 SUPPOSITORY, RECTAL RECTAL DAILY PRN
Qty: 12 SUPPOSITORY | Refills: 0 | Status: SHIPPED | OUTPATIENT
Start: 2023-01-01

## 2023-01-01 RX ORDER — HYDRALAZINE HYDROCHLORIDE 20 MG/ML
5 INJECTION INTRAMUSCULAR; INTRAVENOUS EVERY 6 HOURS PRN
Status: DISCONTINUED | OUTPATIENT
Start: 2023-01-01 | End: 2023-01-01

## 2023-01-01 RX ORDER — BISACODYL 10 MG
10 SUPPOSITORY, RECTAL RECTAL DAILY PRN
Status: DISCONTINUED | OUTPATIENT
Start: 2023-01-01 | End: 2023-01-01 | Stop reason: HOSPADM

## 2023-01-01 RX ORDER — ALPRAZOLAM 0.25 MG/1
0.25 TABLET ORAL
Status: DISCONTINUED | OUTPATIENT
Start: 2023-01-01 | End: 2023-01-01

## 2023-01-01 RX ORDER — HEPARIN SODIUM 10000 [USP'U]/100ML
3-30 INJECTION, SOLUTION INTRAVENOUS
Status: DISCONTINUED | OUTPATIENT
Start: 2023-01-01 | End: 2023-01-01

## 2023-01-01 RX ORDER — FENTANYL CITRATE 50 UG/ML
INJECTION, SOLUTION INTRAMUSCULAR; INTRAVENOUS AS NEEDED
Status: DISCONTINUED | OUTPATIENT
Start: 2023-01-01 | End: 2023-01-01

## 2023-01-01 RX ORDER — POTASSIUM CHLORIDE 14.9 MG/ML
20 INJECTION INTRAVENOUS ONCE
Status: DISCONTINUED | OUTPATIENT
Start: 2023-01-01 | End: 2023-01-01

## 2023-01-01 RX ORDER — SODIUM CHLORIDE, SODIUM GLUCONATE, SODIUM ACETATE, POTASSIUM CHLORIDE, MAGNESIUM CHLORIDE, SODIUM PHOSPHATE, DIBASIC, AND POTASSIUM PHOSPHATE .53; .5; .37; .037; .03; .012; .00082 G/100ML; G/100ML; G/100ML; G/100ML; G/100ML; G/100ML; G/100ML
125 INJECTION, SOLUTION INTRAVENOUS CONTINUOUS
Status: DISCONTINUED | OUTPATIENT
Start: 2023-01-01 | End: 2023-01-01

## 2023-01-01 RX ORDER — LIDOCAINE HYDROCHLORIDE AND EPINEPHRINE 10; 10 MG/ML; UG/ML
INJECTION, SOLUTION INFILTRATION; PERINEURAL AS NEEDED
Status: DISCONTINUED | OUTPATIENT
Start: 2023-01-01 | End: 2023-01-01 | Stop reason: HOSPADM

## 2023-01-01 RX ORDER — ONDANSETRON 2 MG/ML
4 INJECTION INTRAMUSCULAR; INTRAVENOUS EVERY 6 HOURS PRN
Qty: 2 ML | Refills: 1 | Status: SHIPPED | OUTPATIENT
Start: 2023-01-01

## 2023-01-01 RX ORDER — FENTANYL CITRATE 50 UG/ML
INJECTION, SOLUTION INTRAMUSCULAR; INTRAVENOUS
Status: COMPLETED
Start: 2023-01-01 | End: 2023-01-01

## 2023-01-01 RX ORDER — WATER 1000 ML/1000ML
INJECTION, SOLUTION INTRAVENOUS
Status: DISPENSED
Start: 2023-01-01 | End: 2023-01-01

## 2023-01-01 RX ORDER — LORAZEPAM 2 MG/ML
1 INJECTION INTRAMUSCULAR
Status: DISCONTINUED | OUTPATIENT
Start: 2023-01-01 | End: 2023-01-01 | Stop reason: HOSPADM

## 2023-01-01 RX ORDER — HEPARIN SODIUM 5000 [USP'U]/ML
5000 INJECTION, SOLUTION INTRAVENOUS; SUBCUTANEOUS EVERY 12 HOURS SCHEDULED
Status: DISCONTINUED | OUTPATIENT
Start: 2023-01-01 | End: 2023-01-01 | Stop reason: SDUPTHER

## 2023-01-01 RX ORDER — DEXAMETHASONE SODIUM PHOSPHATE 10 MG/ML
10 INJECTION, SOLUTION INTRAMUSCULAR; INTRAVENOUS ONCE
Status: COMPLETED | OUTPATIENT
Start: 2023-01-01 | End: 2023-01-01

## 2023-01-01 RX ORDER — HYDROMORPHONE HCL/PF 1 MG/ML
0.2 SYRINGE (ML) INJECTION EVERY 2 HOUR PRN
Status: DISCONTINUED | OUTPATIENT
Start: 2023-01-01 | End: 2023-01-01

## 2023-01-01 RX ADMIN — POTASSIUM CHLORIDE 40 MEQ: 1.5 SOLUTION ORAL at 09:01

## 2023-01-01 RX ADMIN — POTASSIUM & SODIUM PHOSPHATES POWDER PACK 280-160-250 MG 2 PACKET: 280-160-250 PACK at 21:44

## 2023-01-01 RX ADMIN — ESMOLOL HYDROCHLORIDE 20 MG: 100 INJECTION, SOLUTION INTRAVENOUS at 17:07

## 2023-01-01 RX ADMIN — FENTANYL CITRATE 50 MCG: 50 INJECTION, SOLUTION INTRAMUSCULAR; INTRAVENOUS at 16:01

## 2023-01-01 RX ADMIN — FLUCONAZOLE 200 MG: 200 TABLET ORAL at 20:08

## 2023-01-01 RX ADMIN — SODIUM CHLORIDE, SODIUM GLUCONATE, SODIUM ACETATE, POTASSIUM CHLORIDE, MAGNESIUM CHLORIDE, SODIUM PHOSPHATE, DIBASIC, AND POTASSIUM PHOSPHATE 125 ML/HR: .53; .5; .37; .037; .03; .012; .00082 INJECTION, SOLUTION INTRAVENOUS at 02:16

## 2023-01-01 RX ADMIN — Medication 10 ML: at 08:29

## 2023-01-01 RX ADMIN — LABETALOL HYDROCHLORIDE 20 MG: 5 INJECTION, SOLUTION INTRAVENOUS at 11:31

## 2023-01-01 RX ADMIN — ROCURONIUM BROMIDE 50 MG: 50 INJECTION INTRAVENOUS at 15:42

## 2023-01-01 RX ADMIN — NYSTATIN 500000 UNITS: 100000 SUSPENSION ORAL at 09:50

## 2023-01-01 RX ADMIN — SCOPALAMINE 1 PATCH: 1 PATCH, EXTENDED RELEASE TRANSDERMAL at 21:40

## 2023-01-01 RX ADMIN — HEPARIN SODIUM 5000 UNITS: 5000 INJECTION INTRAVENOUS; SUBCUTANEOUS at 08:29

## 2023-01-01 RX ADMIN — LABETALOL HYDROCHLORIDE 20 MG: 5 INJECTION, SOLUTION INTRAVENOUS at 19:28

## 2023-01-01 RX ADMIN — LABETALOL HYDROCHLORIDE 20 MG: 5 INJECTION, SOLUTION INTRAVENOUS at 08:32

## 2023-01-01 RX ADMIN — POTASSIUM & SODIUM PHOSPHATES POWDER PACK 280-160-250 MG 2 PACKET: 280-160-250 PACK at 21:53

## 2023-01-01 RX ADMIN — HEPARIN SODIUM 5000 UNITS: 5000 INJECTION INTRAVENOUS; SUBCUTANEOUS at 09:02

## 2023-01-01 RX ADMIN — PROPOFOL 20 MG: 10 INJECTION, EMULSION INTRAVENOUS at 18:04

## 2023-01-01 RX ADMIN — ALPRAZOLAM 0.25 MG: 0.25 TABLET ORAL at 19:04

## 2023-01-01 RX ADMIN — Medication 10 ML: at 10:40

## 2023-01-01 RX ADMIN — Medication 10 ML: at 14:00

## 2023-01-01 RX ADMIN — IPRATROPIUM BROMIDE 0.5 MG: 0.5 SOLUTION RESPIRATORY (INHALATION) at 07:27

## 2023-01-01 RX ADMIN — CHLORHEXIDINE GLUCONATE 0.12% ORAL RINSE 15 ML: 1.2 LIQUID ORAL at 08:19

## 2023-01-01 RX ADMIN — ESMOLOL HYDROCHLORIDE 20 MG: 100 INJECTION, SOLUTION INTRAVENOUS at 17:29

## 2023-01-01 RX ADMIN — LIDOCAINE HYDROCHLORIDE 10 ML: 10 INJECTION, SOLUTION EPIDURAL; INFILTRATION; INTRACAUDAL; PERINEURAL at 10:07

## 2023-01-01 RX ADMIN — NYSTATIN 500000 UNITS: 100000 SUSPENSION ORAL at 08:21

## 2023-01-01 RX ADMIN — MAGNESIUM SULFATE HEPTAHYDRATE 1 G: 1 INJECTION, SOLUTION INTRAVENOUS at 05:26

## 2023-01-01 RX ADMIN — NYSTATIN 500000 UNITS: 100000 SUSPENSION ORAL at 12:48

## 2023-01-01 RX ADMIN — IPRATROPIUM BROMIDE 0.5 MG: 0.5 SOLUTION RESPIRATORY (INHALATION) at 14:46

## 2023-01-01 RX ADMIN — OXYCODONE HYDROCHLORIDE 5 MG: 5 TABLET ORAL at 09:01

## 2023-01-01 RX ADMIN — CHLORHEXIDINE GLUCONATE 0.12% ORAL RINSE 15 ML: 1.2 LIQUID ORAL at 08:29

## 2023-01-01 RX ADMIN — MELATONIN 3 MG: at 21:39

## 2023-01-01 RX ADMIN — NYSTATIN 500000 UNITS: 100000 SUSPENSION ORAL at 21:52

## 2023-01-01 RX ADMIN — CEFAZOLIN SODIUM 2000 MG: 2 SOLUTION INTRAVENOUS at 15:50

## 2023-01-01 RX ADMIN — POTASSIUM CHLORIDE 40 MEQ: 1500 TABLET, EXTENDED RELEASE ORAL at 08:18

## 2023-01-01 RX ADMIN — SODIUM CHLORIDE, SODIUM GLUCONATE, SODIUM ACETATE, POTASSIUM CHLORIDE, MAGNESIUM CHLORIDE, SODIUM PHOSPHATE, DIBASIC, AND POTASSIUM PHOSPHATE 125 ML/HR: .53; .5; .37; .037; .03; .012; .00082 INJECTION, SOLUTION INTRAVENOUS at 04:08

## 2023-01-01 RX ADMIN — HYDROMORPHONE HYDROCHLORIDE 0.5 MG: 1 INJECTION, SOLUTION INTRAMUSCULAR; INTRAVENOUS; SUBCUTANEOUS at 16:53

## 2023-01-01 RX ADMIN — FENTANYL CITRATE 50 MCG: 50 INJECTION, SOLUTION INTRAMUSCULAR; INTRAVENOUS at 15:42

## 2023-01-01 RX ADMIN — MAGNESIUM SULFATE HEPTAHYDRATE 2 G: 40 INJECTION, SOLUTION INTRAVENOUS at 08:30

## 2023-01-01 RX ADMIN — LEVALBUTEROL HYDROCHLORIDE 1.25 MG: 1.25 SOLUTION RESPIRATORY (INHALATION) at 14:46

## 2023-01-01 RX ADMIN — IPRATROPIUM BROMIDE 0.5 MG: 0.5 SOLUTION RESPIRATORY (INHALATION) at 20:08

## 2023-01-01 RX ADMIN — OXYCODONE HYDROCHLORIDE 5 MG: 5 TABLET ORAL at 04:33

## 2023-01-01 RX ADMIN — SENNOSIDES 8.6 MG: 8.6 TABLET, FILM COATED ORAL at 09:02

## 2023-01-01 RX ADMIN — ALPRAZOLAM 0.25 MG: 0.25 TABLET ORAL at 20:44

## 2023-01-01 RX ADMIN — CHLORHEXIDINE GLUCONATE 0.12% ORAL RINSE 15 ML: 1.2 LIQUID ORAL at 09:01

## 2023-01-01 RX ADMIN — Medication 100 MG: at 09:24

## 2023-01-01 RX ADMIN — POTASSIUM & SODIUM PHOSPHATES POWDER PACK 280-160-250 MG 2 PACKET: 280-160-250 PACK at 15:36

## 2023-01-01 RX ADMIN — POTASSIUM CHLORIDE 20 MEQ: 14.9 INJECTION, SOLUTION INTRAVENOUS at 06:37

## 2023-01-01 RX ADMIN — POTASSIUM & SODIUM PHOSPHATES POWDER PACK 280-160-250 MG 2 PACKET: 280-160-250 PACK at 17:11

## 2023-01-01 RX ADMIN — DEXTROSE, SODIUM CHLORIDE, SODIUM LACTATE, POTASSIUM CHLORIDE, AND CALCIUM CHLORIDE 125 ML/HR: 5; .6; .31; .03; .02 INJECTION, SOLUTION INTRAVENOUS at 03:01

## 2023-01-01 RX ADMIN — POTASSIUM CHLORIDE 40 MEQ: 1500 TABLET, EXTENDED RELEASE ORAL at 17:11

## 2023-01-01 RX ADMIN — DEXTROSE, SODIUM CHLORIDE, SODIUM LACTATE, POTASSIUM CHLORIDE, AND CALCIUM CHLORIDE 125 ML/HR: 5; .6; .31; .03; .02 INJECTION, SOLUTION INTRAVENOUS at 09:35

## 2023-01-01 RX ADMIN — Medication 100 MG: at 10:41

## 2023-01-01 RX ADMIN — HEPARIN SODIUM 5000 UNITS: 5000 INJECTION INTRAVENOUS; SUBCUTANEOUS at 09:44

## 2023-01-01 RX ADMIN — POTASSIUM CHLORIDE 40 MEQ: 1500 TABLET, EXTENDED RELEASE ORAL at 08:52

## 2023-01-01 RX ADMIN — HEPARIN SODIUM 20 UNITS/KG/HR: 10000 INJECTION, SOLUTION INTRAVENOUS at 04:32

## 2023-01-01 RX ADMIN — Medication 10 ML: at 22:09

## 2023-01-01 RX ADMIN — GUAIFENESIN 400 MG: 200 SOLUTION ORAL at 13:37

## 2023-01-01 RX ADMIN — ONDANSETRON 4 MG: 2 INJECTION INTRAMUSCULAR; INTRAVENOUS at 14:51

## 2023-01-01 RX ADMIN — Medication 100 MG: at 09:06

## 2023-01-01 RX ADMIN — HEPARIN SODIUM 18 UNITS/KG/HR: 10000 INJECTION, SOLUTION INTRAVENOUS at 11:32

## 2023-01-01 RX ADMIN — SUGAMMADEX 211 MG: 100 INJECTION, SOLUTION INTRAVENOUS at 18:06

## 2023-01-01 RX ADMIN — NYSTATIN 500000 UNITS: 100000 SUSPENSION ORAL at 02:39

## 2023-01-01 RX ADMIN — NYSTATIN 500000 UNITS: 100000 SUSPENSION ORAL at 12:00

## 2023-01-01 RX ADMIN — LABETALOL HYDROCHLORIDE 10 MG: 5 INJECTION, SOLUTION INTRAVENOUS at 21:49

## 2023-01-01 RX ADMIN — Medication 10 ML: at 17:01

## 2023-01-01 RX ADMIN — SODIUM CHLORIDE, SODIUM GLUCONATE, SODIUM ACETATE, POTASSIUM CHLORIDE, MAGNESIUM CHLORIDE, SODIUM PHOSPHATE, DIBASIC, AND POTASSIUM PHOSPHATE 125 ML/HR: .53; .5; .37; .037; .03; .012; .00082 INJECTION, SOLUTION INTRAVENOUS at 06:32

## 2023-01-01 RX ADMIN — POTASSIUM & SODIUM PHOSPHATES POWDER PACK 280-160-250 MG 2 PACKET: 280-160-250 PACK at 21:51

## 2023-01-01 RX ADMIN — Medication 10 ML: at 13:37

## 2023-01-01 RX ADMIN — LABETALOL HYDROCHLORIDE 20 MG: 5 INJECTION, SOLUTION INTRAVENOUS at 14:37

## 2023-01-01 RX ADMIN — CEFAZOLIN SODIUM 1000 MG: 1 SOLUTION INTRAVENOUS at 06:06

## 2023-01-01 RX ADMIN — PROMETHAZINE HYDROCHLORIDE 25 MG: 25 INJECTION INTRAMUSCULAR; INTRAVENOUS at 22:44

## 2023-01-01 RX ADMIN — SODIUM CHLORIDE 1000 ML: 0.9 INJECTION, SOLUTION INTRAVENOUS at 20:35

## 2023-01-01 RX ADMIN — POTASSIUM PHOSPHATE, MONOBASIC AND POTASSIUM PHOSPHATE, DIBASIC 30 MMOL: 224; 236 INJECTION, SOLUTION, CONCENTRATE INTRAVENOUS at 08:12

## 2023-01-01 RX ADMIN — Medication 10 ML: at 05:52

## 2023-01-01 RX ADMIN — NYSTATIN 500000 UNITS: 100000 SUSPENSION ORAL at 21:05

## 2023-01-01 RX ADMIN — PROPOFOL 120 MG: 10 INJECTION, EMULSION INTRAVENOUS at 15:42

## 2023-01-01 RX ADMIN — HEPARIN SODIUM 15 UNITS/KG/HR: 10000 INJECTION, SOLUTION INTRAVENOUS at 02:16

## 2023-01-01 RX ADMIN — CHLORHEXIDINE GLUCONATE 0.12% ORAL RINSE 15 ML: 1.2 LIQUID ORAL at 21:53

## 2023-01-01 RX ADMIN — IOHEXOL 90 ML: 350 INJECTION, SOLUTION INTRAVENOUS at 12:06

## 2023-01-01 RX ADMIN — HYDRALAZINE HYDROCHLORIDE 5 MG: 20 INJECTION, SOLUTION INTRAMUSCULAR; INTRAVENOUS at 11:48

## 2023-01-01 RX ADMIN — SCOPALAMINE 1 PATCH: 1 PATCH, EXTENDED RELEASE TRANSDERMAL at 10:40

## 2023-01-01 RX ADMIN — LABETALOL HYDROCHLORIDE 20 MG: 5 INJECTION, SOLUTION INTRAVENOUS at 00:41

## 2023-01-01 RX ADMIN — HEPARIN SODIUM 15 UNITS/KG/HR: 10000 INJECTION, SOLUTION INTRAVENOUS at 20:14

## 2023-01-01 RX ADMIN — HYDRALAZINE HYDROCHLORIDE 5 MG: 20 INJECTION, SOLUTION INTRAMUSCULAR; INTRAVENOUS at 22:26

## 2023-01-01 RX ADMIN — GLYCOPYRROLATE 0.1 MG: 0.2 INJECTION, SOLUTION INTRAMUSCULAR; INTRAVENOUS at 16:04

## 2023-01-01 RX ADMIN — FENTANYL CITRATE 50 MCG: 50 INJECTION INTRAMUSCULAR; INTRAVENOUS at 15:09

## 2023-01-01 RX ADMIN — IPRATROPIUM BROMIDE 0.5 MG: 0.5 SOLUTION RESPIRATORY (INHALATION) at 07:45

## 2023-01-01 RX ADMIN — ESMOLOL HYDROCHLORIDE 20 MG: 100 INJECTION, SOLUTION INTRAVENOUS at 17:00

## 2023-01-01 RX ADMIN — POTASSIUM CHLORIDE 40 MEQ: 1.5 SOLUTION ORAL at 17:00

## 2023-01-01 RX ADMIN — Medication 10 ML: at 12:45

## 2023-01-01 RX ADMIN — ONDANSETRON 4 MG: 2 INJECTION INTRAMUSCULAR; INTRAVENOUS at 12:07

## 2023-01-01 RX ADMIN — SODIUM CHLORIDE 75 ML/HR: 0.9 INJECTION, SOLUTION INTRAVENOUS at 19:37

## 2023-01-01 RX ADMIN — GLYCOPYRROLATE 0.1 MG: 0.2 INJECTION, SOLUTION INTRAMUSCULAR; INTRAVENOUS at 12:27

## 2023-01-01 RX ADMIN — CHLORHEXIDINE GLUCONATE 0.12% ORAL RINSE 15 ML: 1.2 LIQUID ORAL at 21:44

## 2023-01-01 RX ADMIN — LABETALOL HYDROCHLORIDE 20 MG: 5 INJECTION, SOLUTION INTRAVENOUS at 05:34

## 2023-01-01 RX ADMIN — HYDROMORPHONE HYDROCHLORIDE 0.5 MG: 1 INJECTION, SOLUTION INTRAMUSCULAR; INTRAVENOUS; SUBCUTANEOUS at 16:56

## 2023-01-01 RX ADMIN — POTASSIUM CHLORIDE 20 MEQ: 14.9 INJECTION, SOLUTION INTRAVENOUS at 03:26

## 2023-01-01 RX ADMIN — NYSTATIN 500000 UNITS: 100000 SUSPENSION ORAL at 01:49

## 2023-01-01 RX ADMIN — ONDANSETRON 4 MG: 2 INJECTION INTRAMUSCULAR; INTRAVENOUS at 21:16

## 2023-01-01 RX ADMIN — SODIUM CHLORIDE, SODIUM GLUCONATE, SODIUM ACETATE, POTASSIUM CHLORIDE, MAGNESIUM CHLORIDE, SODIUM PHOSPHATE, DIBASIC, AND POTASSIUM PHOSPHATE 125 ML/HR: .53; .5; .37; .037; .03; .012; .00082 INJECTION, SOLUTION INTRAVENOUS at 14:48

## 2023-01-01 RX ADMIN — GUAIFENESIN 400 MG: 200 SOLUTION ORAL at 04:33

## 2023-01-01 RX ADMIN — PROPOFOL 20 MG: 10 INJECTION, EMULSION INTRAVENOUS at 18:01

## 2023-01-01 RX ADMIN — POTASSIUM PHOSPHATE, MONOBASIC AND POTASSIUM PHOSPHATE, DIBASIC 30 MMOL: 224; 236 INJECTION, SOLUTION, CONCENTRATE INTRAVENOUS at 08:52

## 2023-01-01 RX ADMIN — Medication 10 ML: at 21:54

## 2023-01-01 RX ADMIN — IOHEXOL 85 ML: 350 INJECTION, SOLUTION INTRAVENOUS at 04:26

## 2023-01-01 RX ADMIN — Medication 10 ML: at 17:15

## 2023-01-01 RX ADMIN — ACETYLCYSTEINE 600 MG: 200 INHALANT RESPIRATORY (INHALATION) at 14:46

## 2023-01-01 RX ADMIN — CEFAZOLIN SODIUM 1000 MG: 1 SOLUTION INTRAVENOUS at 14:37

## 2023-01-01 RX ADMIN — LIDOCAINE HYDROCHLORIDE 60 MG: 10 INJECTION, SOLUTION EPIDURAL; INFILTRATION; INTRACAUDAL; PERINEURAL at 15:42

## 2023-01-01 RX ADMIN — FENTANYL CITRATE 50 MCG: 50 INJECTION, SOLUTION INTRAMUSCULAR; INTRAVENOUS at 15:09

## 2023-01-01 RX ADMIN — DEXTROSE, SODIUM CHLORIDE, SODIUM LACTATE, POTASSIUM CHLORIDE, AND CALCIUM CHLORIDE 125 ML/HR: 5; .6; .31; .03; .02 INJECTION, SOLUTION INTRAVENOUS at 18:57

## 2023-01-01 RX ADMIN — ACETYLCYSTEINE 600 MG: 200 INHALANT RESPIRATORY (INHALATION) at 07:27

## 2023-01-01 RX ADMIN — NYSTATIN 500000 UNITS: 100000 SUSPENSION ORAL at 12:28

## 2023-01-01 RX ADMIN — POTASSIUM & SODIUM PHOSPHATES POWDER PACK 280-160-250 MG 2 PACKET: 280-160-250 PACK at 12:28

## 2023-01-01 RX ADMIN — ROCURONIUM BROMIDE 10 MG: 50 INJECTION INTRAVENOUS at 16:21

## 2023-01-01 RX ADMIN — ONDANSETRON 4 MG: 2 INJECTION INTRAMUSCULAR; INTRAVENOUS at 21:45

## 2023-01-01 RX ADMIN — ONDANSETRON 4 MG: 2 INJECTION INTRAMUSCULAR; INTRAVENOUS at 12:26

## 2023-01-01 RX ADMIN — ROCURONIUM BROMIDE 20 MG: 50 INJECTION INTRAVENOUS at 17:01

## 2023-01-01 RX ADMIN — Medication 10 ML: at 00:44

## 2023-01-01 RX ADMIN — ALTEPLASE 2 MG: 2.2 INJECTION, POWDER, LYOPHILIZED, FOR SOLUTION INTRAVENOUS at 16:54

## 2023-01-01 RX ADMIN — LEVALBUTEROL HYDROCHLORIDE 1.25 MG: 1.25 SOLUTION RESPIRATORY (INHALATION) at 07:45

## 2023-01-01 RX ADMIN — Medication 10 ML: at 10:14

## 2023-01-01 RX ADMIN — SODIUM CHLORIDE, SODIUM GLUCONATE, SODIUM ACETATE, POTASSIUM CHLORIDE, MAGNESIUM CHLORIDE, SODIUM PHOSPHATE, DIBASIC, AND POTASSIUM PHOSPHATE 125 ML/HR: .53; .5; .37; .037; .03; .012; .00082 INJECTION, SOLUTION INTRAVENOUS at 23:09

## 2023-01-01 RX ADMIN — LEVALBUTEROL HYDROCHLORIDE 1.25 MG: 1.25 SOLUTION RESPIRATORY (INHALATION) at 07:27

## 2023-01-01 RX ADMIN — SODIUM CHLORIDE, SODIUM LACTATE, POTASSIUM CHLORIDE, AND CALCIUM CHLORIDE 75 ML/HR: .6; .31; .03; .02 INJECTION, SOLUTION INTRAVENOUS at 03:26

## 2023-01-01 RX ADMIN — GUAIFENESIN 400 MG: 200 SOLUTION ORAL at 08:00

## 2023-01-01 RX ADMIN — POTASSIUM CHLORIDE 40 MEQ: 1500 TABLET, EXTENDED RELEASE ORAL at 00:05

## 2023-01-01 RX ADMIN — GLYCOPYRROLATE 0.1 MG: 0.2 INJECTION, SOLUTION INTRAMUSCULAR; INTRAVENOUS at 09:44

## 2023-01-01 RX ADMIN — Medication 10 ML: at 05:35

## 2023-01-01 RX ADMIN — POTASSIUM PHOSPHATE, MONOBASIC AND POTASSIUM PHOSPHATE, DIBASIC 21 MMOL: 224; 236 INJECTION, SOLUTION, CONCENTRATE INTRAVENOUS at 09:53

## 2023-01-01 RX ADMIN — Medication 10 ML: at 21:48

## 2023-01-01 RX ADMIN — Medication 10 ML: at 09:06

## 2023-01-01 RX ADMIN — PROPOFOL 80 MG: 10 INJECTION, EMULSION INTRAVENOUS at 16:15

## 2023-01-01 RX ADMIN — Medication 10 ML: at 02:44

## 2023-01-01 RX ADMIN — Medication 100 MG: at 12:06

## 2023-01-01 RX ADMIN — ACETYLCYSTEINE 600 MG: 200 INHALANT RESPIRATORY (INHALATION) at 20:08

## 2023-01-01 RX ADMIN — GUAIFENESIN 400 MG: 200 SOLUTION ORAL at 18:25

## 2023-01-01 RX ADMIN — SODIUM CHLORIDE, SODIUM GLUCONATE, SODIUM ACETATE, POTASSIUM CHLORIDE, MAGNESIUM CHLORIDE, SODIUM PHOSPHATE, DIBASIC, AND POTASSIUM PHOSPHATE 125 ML/HR: .53; .5; .37; .037; .03; .012; .00082 INJECTION, SOLUTION INTRAVENOUS at 20:12

## 2023-01-01 RX ADMIN — CHLORHEXIDINE GLUCONATE 0.12% ORAL RINSE 15 ML: 1.2 LIQUID ORAL at 20:44

## 2023-01-01 RX ADMIN — ONDANSETRON 4 MG: 2 INJECTION INTRAMUSCULAR; INTRAVENOUS at 15:49

## 2023-01-01 RX ADMIN — HYDROMORPHONE HYDROCHLORIDE 0.5 MG: 1 INJECTION, SOLUTION INTRAMUSCULAR; INTRAVENOUS; SUBCUTANEOUS at 16:22

## 2023-01-01 RX ADMIN — Medication 10 ML: at 04:33

## 2023-01-01 RX ADMIN — IOHEXOL 50 ML: 350 INJECTION, SOLUTION INTRAVENOUS at 10:37

## 2023-01-01 RX ADMIN — CHLORHEXIDINE GLUCONATE 0.12% ORAL RINSE 15 ML: 1.2 LIQUID ORAL at 21:51

## 2023-01-01 RX ADMIN — HEPARIN SODIUM 18 UNITS/KG/HR: 10000 INJECTION, SOLUTION INTRAVENOUS at 12:34

## 2023-01-01 RX ADMIN — POTASSIUM & SODIUM PHOSPHATES POWDER PACK 280-160-250 MG 2 PACKET: 280-160-250 PACK at 21:39

## 2023-01-01 RX ADMIN — POTASSIUM CHLORIDE 40 MEQ: 1.5 SOLUTION ORAL at 09:22

## 2023-01-01 RX ADMIN — HEPARIN SODIUM 5000 UNITS: 5000 INJECTION INTRAVENOUS; SUBCUTANEOUS at 21:50

## 2023-01-01 RX ADMIN — POTASSIUM & SODIUM PHOSPHATES POWDER PACK 280-160-250 MG 2 PACKET: 280-160-250 PACK at 08:30

## 2023-01-01 RX ADMIN — POTASSIUM CHLORIDE 40 MEQ: 1500 TABLET, EXTENDED RELEASE ORAL at 09:50

## 2023-01-01 RX ADMIN — HYDRALAZINE HYDROCHLORIDE 5 MG: 20 INJECTION, SOLUTION INTRAMUSCULAR; INTRAVENOUS at 05:45

## 2023-01-01 RX ADMIN — ONDANSETRON 4 MG: 2 INJECTION INTRAMUSCULAR; INTRAVENOUS at 16:15

## 2023-01-01 RX ADMIN — LEVALBUTEROL HYDROCHLORIDE 1.25 MG: 1.25 SOLUTION RESPIRATORY (INHALATION) at 20:08

## 2023-01-01 RX ADMIN — SODIUM CHLORIDE, SODIUM GLUCONATE, SODIUM ACETATE, POTASSIUM CHLORIDE, MAGNESIUM CHLORIDE, SODIUM PHOSPHATE, DIBASIC, AND POTASSIUM PHOSPHATE 125 ML/HR: .53; .5; .37; .037; .03; .012; .00082 INJECTION, SOLUTION INTRAVENOUS at 12:22

## 2023-01-01 RX ADMIN — LABETALOL HYDROCHLORIDE 10 MG: 5 INJECTION, SOLUTION INTRAVENOUS at 03:44

## 2023-01-01 RX ADMIN — POTASSIUM PHOSPHATE, MONOBASIC AND POTASSIUM PHOSPHATE, DIBASIC 30 MMOL: 224; 236 INJECTION, SOLUTION, CONCENTRATE INTRAVENOUS at 12:48

## 2023-01-01 RX ADMIN — HEPARIN SODIUM 5000 UNITS: 5000 INJECTION INTRAVENOUS; SUBCUTANEOUS at 21:53

## 2023-01-01 RX ADMIN — CHLORHEXIDINE GLUCONATE 0.12% ORAL RINSE 15 ML: 1.2 LIQUID ORAL at 21:50

## 2023-01-01 RX ADMIN — POTASSIUM & SODIUM PHOSPHATES POWDER PACK 280-160-250 MG 2 PACKET: 280-160-250 PACK at 08:18

## 2023-01-01 RX ADMIN — NYSTATIN 500000 UNITS: 100000 SUSPENSION ORAL at 18:26

## 2023-01-01 RX ADMIN — SODIUM CHLORIDE, SODIUM GLUCONATE, SODIUM ACETATE, POTASSIUM CHLORIDE, MAGNESIUM CHLORIDE, SODIUM PHOSPHATE, DIBASIC, AND POTASSIUM PHOSPHATE 125 ML/HR: .53; .5; .37; .037; .03; .012; .00082 INJECTION, SOLUTION INTRAVENOUS at 08:53

## 2023-01-01 RX ADMIN — DEXAMETHASONE SODIUM PHOSPHATE 10 MG: 10 INJECTION, SOLUTION INTRAMUSCULAR; INTRAVENOUS at 15:36

## 2023-01-01 RX ADMIN — CHLORHEXIDINE GLUCONATE 0.12% ORAL RINSE 15 ML: 1.2 LIQUID ORAL at 09:23

## 2023-01-01 RX ADMIN — SENNOSIDES 8.6 MG: 8.6 TABLET, FILM COATED ORAL at 09:23

## 2023-01-01 RX ADMIN — DEXAMETHASONE SODIUM PHOSPHATE 10 MG: 10 INJECTION, SOLUTION INTRAMUSCULAR; INTRAVENOUS at 15:49

## 2023-01-01 RX ADMIN — POTASSIUM & SODIUM PHOSPHATES POWDER PACK 280-160-250 MG 2 PACKET: 280-160-250 PACK at 12:00

## 2023-01-01 RX ADMIN — GUAIFENESIN 400 MG: 200 SOLUTION ORAL at 09:23

## 2023-01-01 RX ADMIN — NICARDIPINE HYDROCHLORIDE 3 MG/HR: 2.5 INJECTION, SOLUTION INTRAVENOUS at 16:47

## 2023-01-01 RX ADMIN — LABETALOL HYDROCHLORIDE 10 MG: 5 INJECTION, SOLUTION INTRAVENOUS at 19:33

## 2023-01-01 RX ADMIN — NYSTATIN 500000 UNITS: 100000 SUSPENSION ORAL at 08:53

## 2023-01-01 RX ADMIN — SODIUM CHLORIDE, SODIUM LACTATE, POTASSIUM CHLORIDE, AND CALCIUM CHLORIDE: .6; .31; .03; .02 INJECTION, SOLUTION INTRAVENOUS at 15:34

## 2023-01-01 RX ADMIN — POTASSIUM CHLORIDE 40 MEQ: 1.5 SOLUTION ORAL at 15:36

## 2023-01-01 RX ADMIN — GADOBUTROL 10 ML: 604.72 INJECTION INTRAVENOUS at 17:08

## 2023-01-01 RX ADMIN — Medication 10 ML: at 18:32

## 2023-01-01 RX ADMIN — CEFAZOLIN SODIUM 1000 MG: 1 SOLUTION INTRAVENOUS at 22:01

## 2023-02-03 DIAGNOSIS — C50.911 CARCINOMA OF RIGHT BREAST METASTATIC TO AXILLARY LYMPH NODE (HCC): Primary | ICD-10-CM

## 2023-02-03 DIAGNOSIS — C77.3 CARCINOMA OF RIGHT BREAST METASTATIC TO AXILLARY LYMPH NODE (HCC): Primary | ICD-10-CM

## 2023-02-15 ENCOUNTER — APPOINTMENT (OUTPATIENT)
Dept: LAB | Facility: CLINIC | Age: 53
End: 2023-02-15

## 2023-02-15 DIAGNOSIS — Z17.0 MALIGNANT NEOPLASM OF CENTRAL PORTION OF RIGHT BREAST IN FEMALE, ESTROGEN RECEPTOR POSITIVE (HCC): ICD-10-CM

## 2023-02-15 DIAGNOSIS — C77.3 CARCINOMA OF RIGHT BREAST METASTATIC TO AXILLARY LYMPH NODE (HCC): ICD-10-CM

## 2023-02-15 DIAGNOSIS — C50.111 MALIGNANT NEOPLASM OF CENTRAL PORTION OF RIGHT BREAST IN FEMALE, ESTROGEN RECEPTOR POSITIVE (HCC): ICD-10-CM

## 2023-02-15 DIAGNOSIS — C78.7 LIVER METASTASES (HCC): ICD-10-CM

## 2023-02-15 DIAGNOSIS — C50.911 CARCINOMA OF RIGHT BREAST METASTATIC TO AXILLARY LYMPH NODE (HCC): ICD-10-CM

## 2023-02-15 LAB
ALBUMIN SERPL BCP-MCNC: 3.6 G/DL (ref 3.5–5)
ALP SERPL-CCNC: 86 U/L (ref 46–116)
ALT SERPL W P-5'-P-CCNC: 38 U/L (ref 12–78)
ANION GAP SERPL CALCULATED.3IONS-SCNC: 5 MMOL/L (ref 4–13)
AST SERPL W P-5'-P-CCNC: 27 U/L (ref 5–45)
BASOPHILS # BLD AUTO: 0.02 THOUSANDS/ÂΜL (ref 0–0.1)
BASOPHILS NFR BLD AUTO: 0 % (ref 0–1)
BILIRUB SERPL-MCNC: 0.41 MG/DL (ref 0.2–1)
BUN SERPL-MCNC: 13 MG/DL (ref 5–25)
CALCIUM SERPL-MCNC: 9.4 MG/DL (ref 8.3–10.1)
CANCER AG27-29 SERPL-ACNC: 24.3 U/ML (ref 0–42.3)
CHLORIDE SERPL-SCNC: 110 MMOL/L (ref 96–108)
CO2 SERPL-SCNC: 27 MMOL/L (ref 21–32)
CREAT SERPL-MCNC: 1 MG/DL (ref 0.6–1.3)
EOSINOPHIL # BLD AUTO: 0.11 THOUSAND/ÂΜL (ref 0–0.61)
EOSINOPHIL NFR BLD AUTO: 1 % (ref 0–6)
ERYTHROCYTE [DISTWIDTH] IN BLOOD BY AUTOMATED COUNT: 13.8 % (ref 11.6–15.1)
GFR SERPL CREATININE-BSD FRML MDRD: 64 ML/MIN/1.73SQ M
GLUCOSE P FAST SERPL-MCNC: 80 MG/DL (ref 65–99)
HCT VFR BLD AUTO: 38.1 % (ref 34.8–46.1)
HGB BLD-MCNC: 13.1 G/DL (ref 11.5–15.4)
IMM GRANULOCYTES # BLD AUTO: 0.02 THOUSAND/UL (ref 0–0.2)
IMM GRANULOCYTES NFR BLD AUTO: 0 % (ref 0–2)
LYMPHOCYTES # BLD AUTO: 2.01 THOUSANDS/ÂΜL (ref 0.6–4.47)
LYMPHOCYTES NFR BLD AUTO: 24 % (ref 14–44)
MCH RBC QN AUTO: 35.2 PG (ref 26.8–34.3)
MCHC RBC AUTO-ENTMCNC: 34.4 G/DL (ref 31.4–37.4)
MCV RBC AUTO: 102 FL (ref 82–98)
MONOCYTES # BLD AUTO: 0.57 THOUSAND/ÂΜL (ref 0.17–1.22)
MONOCYTES NFR BLD AUTO: 7 % (ref 4–12)
NEUTROPHILS # BLD AUTO: 5.82 THOUSANDS/ÂΜL (ref 1.85–7.62)
NEUTS SEG NFR BLD AUTO: 68 % (ref 43–75)
NRBC BLD AUTO-RTO: 0 /100 WBCS
PLATELET # BLD AUTO: 194 THOUSANDS/UL (ref 149–390)
PMV BLD AUTO: 11.3 FL (ref 8.9–12.7)
POTASSIUM SERPL-SCNC: 3.7 MMOL/L (ref 3.5–5.3)
PROT SERPL-MCNC: 7.2 G/DL (ref 6.4–8.4)
RBC # BLD AUTO: 3.72 MILLION/UL (ref 3.81–5.12)
SODIUM SERPL-SCNC: 142 MMOL/L (ref 135–147)
WBC # BLD AUTO: 8.55 THOUSAND/UL (ref 4.31–10.16)

## 2023-02-16 DIAGNOSIS — I89.0 LYMPHEDEMA OF RIGHT ARM: Primary | ICD-10-CM

## 2023-02-17 ENCOUNTER — HOSPITAL ENCOUNTER (OUTPATIENT)
Dept: RADIOLOGY | Facility: HOSPITAL | Age: 53
Discharge: HOME | End: 2023-02-17
Attending: INTERNAL MEDICINE

## 2023-02-17 DIAGNOSIS — C50.911 CARCINOMA OF RIGHT BREAST METASTATIC TO AXILLARY LYMPH NODE (HCC): ICD-10-CM

## 2023-02-17 DIAGNOSIS — Z17.0 MALIGNANT NEOPLASM OF CENTRAL PORTION OF RIGHT BREAST IN FEMALE, ESTROGEN RECEPTOR POSITIVE (HCC): ICD-10-CM

## 2023-02-17 DIAGNOSIS — C77.3 CARCINOMA OF RIGHT BREAST METASTATIC TO AXILLARY LYMPH NODE (HCC): ICD-10-CM

## 2023-02-17 DIAGNOSIS — C78.7 LIVER METASTASES (HCC): ICD-10-CM

## 2023-02-17 DIAGNOSIS — C50.111 MALIGNANT NEOPLASM OF CENTRAL PORTION OF RIGHT BREAST IN FEMALE, ESTROGEN RECEPTOR POSITIVE (HCC): ICD-10-CM

## 2023-02-17 RX ADMIN — IOHEXOL 90 ML: 350 INJECTION, SOLUTION INTRAVENOUS at 16:08

## 2023-02-22 ENCOUNTER — EVALUATION (OUTPATIENT)
Dept: PHYSICAL THERAPY | Facility: REHABILITATION | Age: 53
End: 2023-02-22

## 2023-02-22 DIAGNOSIS — I89.0 LYMPHEDEMA OF RIGHT ARM: ICD-10-CM

## 2023-02-22 NOTE — PROGRESS NOTES
PT Evaluation     Today's date: 2023  Patient name: Cecilia Plascencia  : 1970  MRN: [id number]  Referring provider: EMILIANO Benitez  Dx:   Encounter Diagnosis     ICD-10-CM    1   Lymphedema of right arm  I89 0 Ambulatory Referral to Physical Therapy          Start Time: 725          Assessment  Assessment details: Patient has 6% difference of right > left UE and would benefit from manual drainage  Other impairment: edema    Goals  sTG decrease edema 1-3%  LTG controlled Edema  Compression sleeve if flare ups become more frequent    Plan  Plan details: Continue manual drainage with compression as needed  Patient would benefit from: skilled physical therapy  Frequency: 1x week  Duration in weeks: 4  Treatment plan discussed with: patient        Subjective Evaluation    History of Present Illness  Mechanism of injury: Patient reports edema increased a couple weeks ago with peak about a week ago  Pain  No pain reported  Quality: tight    Patient Goals  Patient goals for therapy: decreased edema          Objective       UPPER EXTREMITY LEFT CALCULATIONS    Flowsheet Row Most Recent Value   Volume UE (mL) 4226   Difference from last visit (mL)  -4226   Difference from first visit (mL)  -4226      UPPER EXTREMITY RIGHT CALCULATIONS    Flowsheet Row Most Recent Value   Volume UE (mL) 4473   Difference from last visit (mL)  -4473   Difference from first visit (mL)  -4473             Precautions: anxiety, arthritis, CA right breast, COPD, stroke      Manuals             Manual drainage RUE 15 min                                                   Neuro Re-Ed                                                                                                        Ther Ex                                                                                                                     Ther Activity                                       Gait Training                                       Modalities

## 2023-02-24 ENCOUNTER — TELEPHONE (OUTPATIENT)
Dept: HEMATOLOGY ONCOLOGY | Facility: CLINIC | Age: 53
End: 2023-02-24

## 2023-02-24 NOTE — TELEPHONE ENCOUNTER
CALL RETURN FORM   Reason for patient call? Patient calling in regarding Cat scan results  Patient would also like to know if she needs to get more blood work before her appointment on Monday  Patient's primary oncologist? Dr Tony Alvarado    Name of person the patient was calling for? Amelia    Any additional information to add, if applicable? Best call back number is [phone]   Informed patient that the message will be forwarded to the team and someone will get back to them as soon as possible    Did you relay this information to the patient?  Yes

## 2023-02-24 NOTE — TELEPHONE ENCOUNTER
Called and spoke with Justus Joshua from the radiology reading room to have patient's scan read from 2/17    Justus Joshua stated he would send it over to a radiologist

## 2023-02-24 NOTE — TELEPHONE ENCOUNTER
Message sent to patient via [employer] message  No new labs needed      Eileen Patience to call reading room to have scan read

## 2023-02-27 ENCOUNTER — OFFICE VISIT (OUTPATIENT)
Dept: HEMATOLOGY ONCOLOGY | Facility: CLINIC | Age: 53
End: 2023-02-27

## 2023-02-27 ENCOUNTER — PATIENT MESSAGE (OUTPATIENT)
Dept: PALLIATIVE MEDICINE | Facility: CLINIC | Age: 53
End: 2023-02-27

## 2023-02-27 VITALS
HEART RATE: 85 BPM | WEIGHT: 249 LBS | RESPIRATION RATE: 18 BRPM | OXYGEN SATURATION: 95 % | HEIGHT: 65 IN | TEMPERATURE: 96.9 F | SYSTOLIC BLOOD PRESSURE: 126 MMHG | BODY MASS INDEX: 41.48 KG/M2 | DIASTOLIC BLOOD PRESSURE: 80 MMHG

## 2023-02-27 DIAGNOSIS — C77.3 CARCINOMA OF RIGHT BREAST METASTATIC TO AXILLARY LYMPH NODE (HCC): ICD-10-CM

## 2023-02-27 DIAGNOSIS — C78.7 LIVER METASTASES (HCC): ICD-10-CM

## 2023-02-27 DIAGNOSIS — C50.111 MALIGNANT NEOPLASM OF CENTRAL PORTION OF RIGHT BREAST IN FEMALE, ESTROGEN RECEPTOR POSITIVE (HCC): ICD-10-CM

## 2023-02-27 DIAGNOSIS — C50.911 CARCINOMA OF RIGHT BREAST METASTATIC TO AXILLARY LYMPH NODE (HCC): ICD-10-CM

## 2023-02-27 DIAGNOSIS — Z17.0 MALIGNANT NEOPLASM OF CENTRAL PORTION OF RIGHT BREAST IN FEMALE, ESTROGEN RECEPTOR POSITIVE (HCC): ICD-10-CM

## 2023-02-27 DIAGNOSIS — C78.7 LIVER METASTASES (HCC): Primary | ICD-10-CM

## 2023-02-27 RX ORDER — MULTIVIT-MIN/IRON FUM/FOLIC AC 7.5 MG-4
1 TABLET ORAL DAILY
COMMUNITY

## 2023-03-01 ENCOUNTER — APPOINTMENT (OUTPATIENT)
Dept: PHYSICAL THERAPY | Facility: REHABILITATION | Age: 53
End: 2023-03-01

## 2023-03-03 ENCOUNTER — SOCIAL WORK (OUTPATIENT)
Dept: PALLIATIVE MEDICINE | Facility: CLINIC | Age: 53
End: 2023-03-03

## 2023-03-03 ENCOUNTER — OFFICE VISIT (OUTPATIENT)
Dept: PALLIATIVE MEDICINE | Facility: CLINIC | Age: 53
End: 2023-03-03

## 2023-03-03 VITALS
SYSTOLIC BLOOD PRESSURE: 110 MMHG | BODY MASS INDEX: 42.55 KG/M2 | HEART RATE: 82 BPM | WEIGHT: 257.5 LBS | DIASTOLIC BLOOD PRESSURE: 80 MMHG | OXYGEN SATURATION: 98 % | TEMPERATURE: 97 F

## 2023-03-03 DIAGNOSIS — K21.9 GERD (GASTROESOPHAGEAL REFLUX DISEASE): ICD-10-CM

## 2023-03-03 DIAGNOSIS — C78.7 LIVER METASTASES (HCC): ICD-10-CM

## 2023-03-03 DIAGNOSIS — C50.111 MALIGNANT NEOPLASM OF CENTRAL PORTION OF RIGHT BREAST IN FEMALE, ESTROGEN RECEPTOR POSITIVE (HCC): Primary | ICD-10-CM

## 2023-03-03 DIAGNOSIS — G47.00 INSOMNIA: ICD-10-CM

## 2023-03-03 DIAGNOSIS — Z71.89 COUNSELING AND COORDINATION OF CARE: Primary | ICD-10-CM

## 2023-03-03 DIAGNOSIS — F41.8 ANXIETY ABOUT HEALTH: ICD-10-CM

## 2023-03-03 DIAGNOSIS — K59.00 CONSTIPATION: ICD-10-CM

## 2023-03-03 DIAGNOSIS — Z17.0 MALIGNANT NEOPLASM OF CENTRAL PORTION OF RIGHT BREAST IN FEMALE, ESTROGEN RECEPTOR POSITIVE (HCC): Primary | ICD-10-CM

## 2023-03-03 DIAGNOSIS — C50.911 CARCINOMA OF RIGHT BREAST METASTATIC TO AXILLARY LYMPH NODE (HCC): ICD-10-CM

## 2023-03-03 DIAGNOSIS — Z65.8 PSYCHOSOCIAL STRESSORS: ICD-10-CM

## 2023-03-03 DIAGNOSIS — Z71.89 ADVANCED CARE PLANNING/COUNSELING DISCUSSION: ICD-10-CM

## 2023-03-03 DIAGNOSIS — C77.3 CARCINOMA OF RIGHT BREAST METASTATIC TO AXILLARY LYMPH NODE (HCC): ICD-10-CM

## 2023-03-03 DIAGNOSIS — Z51.5 PALLIATIVE CARE PATIENT: ICD-10-CM

## 2023-03-03 RX ORDER — PANTOPRAZOLE SODIUM 40 MG/1
40 TABLET, DELAYED RELEASE ORAL DAILY
Qty: 30 TABLET | Refills: 2 | Status: SHIPPED | OUTPATIENT
Start: 2023-03-03

## 2023-03-03 RX ORDER — ALPRAZOLAM 0.5 MG/1
.25-.5 TABLET ORAL
Qty: 30 TABLET | Refills: 0 | Status: SHIPPED | OUTPATIENT
Start: 2023-03-03

## 2023-03-03 NOTE — PROGRESS NOTES
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  Disability: Information on applying for disability will be mailed to her per her request   Concerns regarding literacy: None  Spirituality/ Yazidi:    Patient's strengths, social supports, and resources: Supportive family and friends  Cultural information:   Mental Health current or previous: Anxiety  Substance use or history:   Sleep: Difficulty with sleep due to racing thoughts  Exercise: As tolerated  Diet/nutrition: No concerns  Durable Medical Equipment needs: None  Transportation: Drives self  Financial concerns: Pt requesting to speak with [facility] financial counselor  Will f/u with pt to provide contact # for financial counselor office  Advanced Directive: Pt has completed financial and healthcare POA documents  She has designated her friend Polly Cota as healthcare decision-maker and her brother Brigido Lefort as financial   Other medical or social work providers involved: Oncology  Patient/caregiver current level of coping: Pt significantly anxious during visit today  She reports increase in stress due to concerns with insurance/finances (She states she does not wish to switch her insurance to Medicaid as she wants to ensure her assets would go to her son upon her passing)  She plans to further discuss with her      Understanding: Pt appears understanding of current medical status  Patient/family concerns and areas of need: Anxiety; Financial/insurance  Advised pt LSW will gather information for applying for disability, local [employer] contact information, and  financial counselor contact information and will mail out information as pt is time-restricted today  Pt appreciative of same  Patient's interests: Video games; Reading    I have spent 30 minutes with Patient  today in which greater than 50% of this time was spent in counseling/coordination of care regarding SW needs assessment  *All questions may not be answered due to constraints    Follow-up discussions may need to occur

## 2023-03-03 NOTE — PATIENT INSTRUCTIONS
It was good to see you today  Thank you for coming in  Going to increase the strength of your Xanax tab to 0 5 mg (you are on 0 25 mg/tab  )  You can take a half tab (0 25 mg) or a full tab depending upon how anxious you feel or how tough it is to fall asleep  Your choice  30 tabs should last you a month or more, but you can call us for refills  Just call us before you run out  Start Protonix (pantoprazole) - take 40mg every morning before breakfast, to reduce and manages stomach acid all day  Please bring any completed advanced directives (POLST, Living Will, and/or healthcare Power of  documents, etc) in to any [facility] facility; staff can scan it into your chart  We will try to help you connect to the Oncology financial counselor  For constipation:  Drink PLENTY of water  This is important to keep the gut moving  Some people have success w/ using prunes, prune juice, certain fruits or vegetables (apples, bananas, prunes, pears, raspberries, and vegetables like string beans, broccoli, spinach, kale, squash, lentils, peas, and beans), or fiber gummies  Try a probiotic  This could be yogurt or kefir, or fermented beverages such as kombucha, but probiotics are also available in capsule form  Aim for 10-15 billion colony-forming-units, w/ bacteria such as Lactobacillus / Saccharomyces / Actinomyces  Osmotic laxatives (Miralax, magnesium citrate, Milk of Magnesia) can be very useful for opioid-induced constipation (OIC); take daily to prevent OIC  Bulk laxatives (Citrucel, Metamucil, Fibercon, Benefiber, wheat germ) are useful for constipation in patients who are not taking opioids, but are not recommended if you are taking opioids  Colace is good for softening hard stools, or preventing constipation when opioids are being used - but does not stimulate the bowel to move things along once constipation has occurred  You can use senna, 1 to 2 tabs, once or twice daily as needed for constipation   Use as directed on the box/bottle  Senna is also available in a tea ("Smooth Move")  Should that not be enough for your constipation, you can try Dulcolax  Should that not be enough, consider an enema  All of these medications are available over-the-counter  Return in about 1 month  Call us for refills on medications that we supply, as needed  If something changes and you need to come in sooner, please call our office  PRESCRIPTION REFILL REMINDER:  All medication refills should be requested prior to [facility] on Friday  Any refill requests after noon on Friday would be addressed the following Monday  MEDICATION SAFETY ISSUES:  Do not drive under the influence of narcotics (including opioids), watch for adverse effects including confusion / altered mental status / respiratory depression (slowed breathing), keep medications stored in a safe/locked environment, do not use alcohol while opioids or other narcotics are in your system

## 2023-03-03 NOTE — PROGRESS NOTES
Follow-up with Palliative and [address] [place] [address] 47 y o  female [id number]    ASSESSMENT & PLAN:  1  Malignant neoplasm of central portion of right breast in female, estrogen receptor positive ([facility] [address] )    2  GERD (gastroesophageal reflux disease)    3  Anxiety about health    4  Psychosocial stressors    5  Insomnia    6  Carcinoma of right breast metastatic to axillary lymph node ([facility] [address] )    7  Liver metastases ([facility] [address] )    8  Constipation    9  Palliative care patient    10  Advanced care planning/counseling discussion          • Patient returns to [facility] after a significant gap in time; she endorses major financial stressors, anxiousness about her health, insomnia, epigastric discomfort d/t reflux, and constipation  o Resume alprazolam, but increase to a 0 5mg tablet; patient had exhausted her supply (last filled by PCP 11/23/22)  She may take 0 25mg or 0 5mg HS PRN insomnia / anxiousness  She does not wish to take this during the daytime  o She declines offer of other mood medication such as SSRA / SNRI / TCA  o Start pantoprazole 40mg qAM, for reflux discomfort   o Counseled on bowel regimen for constipation  • Continue disease-directed cares  Patient is concerned about her prognosis and part of her stress includes her life expectancy  • ACP: none in chart but patient states she has completed advanced directives with an  (Power of )  She had named her brother Lilliana Horta as financial POA, and her friend Tamiko Abrams as surrogate healthcare decision-maker  Asked patient to bring documentation in to any [facility] visit be scanned into the EMR  • Return in about 1 month (around 4/3/2023)  Patient states she would like to follow up in our [place] office as it is the most convenient location relative to her home  • Emotional support provided   Seen w/ [facility]   • Medication safety issues addressed - no driving under the influence of narcotics (including opioids), watch for adverse effects including AMS or respiratory depression (slowed breathing), keep medications stored in a safe/locked environment, do not use alcohol while opioids or other narcotics are in one's system  Requested Prescriptions     Signed Prescriptions Disp Refills   • ALPRAZolam (XANAX) 0 5 mg tablet 30 tablet 0     Sig: Take 0 5-1 tablets (0 25-0 5 mg total) by mouth daily at bedtime as needed (anxiousness or insomnia)   • pantoprazole (PROTONIX) 40 mg tablet 30 tablet 2     Sig: Take 1 tablet (40 mg total) by mouth daily       Medications Discontinued During This Encounter   Medication Reason   • ALPRAZolam (XANAX) 0 25 mg tablet Reorder       Representatives have queried the patient's controlled substance dispensing history in the Prescription Drug Monitoring Program in compliance with regulations before I have prescribed any controlled substances  The prescription history is consistent with prescribed therapy and our practice policies  30+ minutes were spent in this ambulatory visit with greater than 50% of the time spent face to face with patient in counseling or coordination of care including symptom assessment and management, medication review, medication adjustment, psychosocial support, chart review, imaging review, lab review, advanced directives, goals of care, supportive listening and anticipatory guidance  All of the patient's questions were answered during this discussion  SUBJECTIVE:  Chief Complaint   Patient presents with   • Follow-up   • Anxiety   • Pain        HPI    Irwin Vega is a 47 y o  female w/ R breast cancer metastatic to liver and lymph node, s/p R mastectomy + R axillary dissection, on olaparib; R-sided vestibular schwannoma, symptomatic CVA found incidentally, cerebral aneurysm (R SCA), anxiety, insomnia  She follows w/ Dr Debby Black (Neurology), Dr Enrike White (Medical Oncology, last seen 2/27/23)      Patient is known to [facility] clinic; last seen 2/24/22 by Olga CUMMINGS for symptom management and psychosocial support  Her PCP most recently prescribed Xanax (filled 11/23/22)  She has not had an oxycodone refill since 2/24/22  Today the patient states she has not needed oxycodone for cancer-related pain, and she still has "5 tablets" remaining from her Feb 2022 Rx fill  She states she does not like to take too many medications, and can usually manage without - though the recent spikes in her stress level d/t financial and health concerns led her to return to [facility] today  She notes significant anxiety about her insurance, her estate, the disability process, and her prognosis, and would like to resume taking Xanax  She states she has only been taking it at bedtime, as she can distract herself appropriately with work and activity in the daytime; at night she cannot relax or initiate sleep d/t anxiousness  Xanax was helpful for this, but her stress levels are higher now than they have been in the past     Patient states she "will be dead in less than five years" based upon the aggressive nature of her malignancy  We did not discuss her labs or imaging, though review of those as well as Dr Niesha Mcmullen most recent note indicate normal-range cancer antigen levels, a small increase in conspicuity of one liver lesion, "major response on olaparib", and no indication to change therapy plans  Patient endorses significant epigastric discomfort d/t reflux  Patient reports her brother, her friend Sheri Carranza, and her son are supportive - though she notes her son lives in [place] and has his own health concerns  She has a strong desire to leave assets to him upon her death  PDMP shows no concerns  The following portions of the medical history were reviewed: past medical history, surgical history, problem list, medication list, family history, and social history        Current Outpatient Medications:   •  ALPRAZolam (XANAX) 0 5 mg tablet, Take 0 5-1 tablets (0 25-0 5 mg total) by mouth daily at bedtime as needed (anxiousness or insomnia), Disp: 30 tablet, Rfl: 0  •  pantoprazole (PROTONIX) 40 mg tablet, Take 1 tablet (40 mg total) by mouth daily, Disp: 30 tablet, Rfl: 2  •  albuterol (Proventil HFA) 90 mcg/act inhaler, Inhale 2 puffs every 6 (six) hours as needed for wheezing, Disp: 6 7 g, Rfl: 5  •  ASPIRIN 81 PO, Take by mouth, Disp: , Rfl:   •  atorvastatin (LIPITOR) 40 mg tablet, TAKE 1 TABLET BY MOUTH EVERY DAY, Disp: 90 tablet, Rfl: 0  •  Azelaic Acid (Finacea) 15 % FOAM, Apply 1 Pump topically 2 (two) times a day, Disp: 50 g, Rfl: 3  •  b complex vitamins capsule, Take 1 capsule by mouth daily, Disp: , Rfl:   •  cholecalciferol (VITAMIN D3) 1,000 units tablet, Take 1 tablet (1,000 Units total) by mouth daily, Disp: , Rfl:   •  fluticasone-vilanterol (Breo Ellipta) 200-25 mcg/actuation inhaler, Inhale 1 puff daily Rinse mouth after use , Disp: 180 blister, Rfl: 0  •  metroNIDAZOLE (METROCREAM) 0 75 % cream, Apply topically 2 (two) times a day, Disp: 45 g, Rfl: 3  •  Multiple Vitamins-Minerals (multivitamin with minerals) tablet, Take 1 tablet by mouth daily, Disp: , Rfl:   •  olaparib (LYNPARZA) tablet, Take 2 tablets (300 mg total) by mouth 2 (two) times a day, Disp: 120 tablet, Rfl: 2  •  oxyCODONE (ROXICODONE) 10 MG TABS, Take 1 tablet (10 mg total) by mouth every 6 (six) hours as needed for moderate pain Max Daily Amount: 40 mg, Disp: 30 tablet, Rfl: 0  •  Specialty Vitamins Products (Advanced Collagen) TABS, Take by mouth in the morning, Disp: , Rfl:     Review of Systems   Gastrointestinal: Positive for constipation  Negative for diarrhea  Significant reflux discomfort  Allergic/Immunologic: Positive for immunocompromised state  Psychiatric/Behavioral: Positive for decreased concentration (short-term memory concerns), dysphoric mood and sleep disturbance  The patient is nervous/anxious  All other systems reviewed and are negative          OBJECTIVE:  /80 (BP Location: Left arm, Cuff Size: Standard)   Pulse 82   Temp (!) 97 °F (36 1 °C) (Temporal)   Wt 117 kg (257 lb 8 oz)   SpO2 98%   BMI 42 55 kg/m²   Physical Exam  Vitals reviewed  Constitutional:       General: She is not in acute distress  Appearance: She is well-developed and well-groomed  She is obese  She is not toxic-appearing  HENT:      Head: Normocephalic and atraumatic  Right Ear: External ear normal       Left Ear: External ear normal    Eyes:      General: No scleral icterus  Right eye: No discharge  Left eye: No discharge  Extraocular Movements: Extraocular movements intact  Conjunctiva/sclera: Conjunctivae normal       Pupils: Pupils are equal, round, and reactive to light  Cardiovascular:      Rate and Rhythm: Normal rate  Pulmonary:      Effort: Pulmonary effort is normal  No tachypnea, bradypnea, accessory muscle usage or respiratory distress  Abdominal:      General: There is no distension  Tenderness: There is no guarding  Musculoskeletal:      Cervical back: Normal range of motion  Right lower leg: No edema  Left lower leg: No edema  Skin:     General: Skin is dry  Coloration: Skin is not pale  Comments: Post R mastectomy  Neurological:      Mental Status: She is alert and oriented to person, place, and time  Cranial Nerves: No dysarthria or facial asymmetry  Gait: Gait is intact  Psychiatric:         Attention and Perception: Attention normal          Mood and Affect: Mood is anxious  Speech: Speech is rapid and pressured (at times)  Behavior: Behavior is agitated (at times)  Behavior is not slowed, aggressive, withdrawn or combative  Behavior is cooperative  Thought Content: Thought content does not include homicidal or suicidal ideation  Thought content does not include homicidal or suicidal plan           Cognition and Memory: Cognition and memory normal           Gely Aranda MD  [facility]      Portions of this document may have been created using dictation software and as such some "sound alike" terms may have been generated by the system  Do not hesitate to contact me with any questions or clarifications

## 2023-03-06 ENCOUNTER — HOSPITAL ENCOUNTER (OUTPATIENT)
Dept: RADIOLOGY | Facility: HOSPITAL | Age: 53
Discharge: HOME | End: 2023-03-06

## 2023-03-06 ENCOUNTER — OFFICE VISIT (OUTPATIENT)
Dept: PHYSICAL THERAPY | Facility: REHABILITATION | Age: 53
End: 2023-03-06

## 2023-03-06 ENCOUNTER — OFFICE VISIT (OUTPATIENT)
Dept: FAMILY MEDICINE CLINIC | Facility: CLINIC | Age: 53
End: 2023-03-06

## 2023-03-06 VITALS
TEMPERATURE: 97.1 F | OXYGEN SATURATION: 98 % | WEIGHT: 257.2 LBS | DIASTOLIC BLOOD PRESSURE: 61 MMHG | HEART RATE: 78 BPM | BODY MASS INDEX: 42.85 KG/M2 | SYSTOLIC BLOOD PRESSURE: 121 MMHG | RESPIRATION RATE: 16 BRPM | HEIGHT: 65 IN

## 2023-03-06 DIAGNOSIS — E78.2 MIXED HYPERLIPIDEMIA: ICD-10-CM

## 2023-03-06 DIAGNOSIS — S99.921A INJURY OF RIGHT FOOT, INITIAL ENCOUNTER: ICD-10-CM

## 2023-03-06 DIAGNOSIS — Z13.220 SCREENING FOR LIPID DISORDERS: ICD-10-CM

## 2023-03-06 DIAGNOSIS — F41.8 ANXIETY ABOUT HEALTH: ICD-10-CM

## 2023-03-06 DIAGNOSIS — Z00.00 ANNUAL PHYSICAL EXAM: Primary | ICD-10-CM

## 2023-03-06 DIAGNOSIS — K59.03 DRUG-INDUCED CONSTIPATION: ICD-10-CM

## 2023-03-06 DIAGNOSIS — I67.1 CEREBRAL ANEURYSM: ICD-10-CM

## 2023-03-06 DIAGNOSIS — I89.0 LYMPHEDEMA: ICD-10-CM

## 2023-03-06 DIAGNOSIS — R63.5 WEIGHT GAIN: ICD-10-CM

## 2023-03-06 DIAGNOSIS — E66.01 CLASS 3 SEVERE OBESITY DUE TO EXCESS CALORIES WITH SERIOUS COMORBIDITY AND BODY MASS INDEX (BMI) OF 40.0 TO 44.9 IN ADULT (HCC): ICD-10-CM

## 2023-03-06 DIAGNOSIS — I89.0 LYMPHEDEMA OF RIGHT ARM: Primary | ICD-10-CM

## 2023-03-06 DIAGNOSIS — C78.7 LIVER METASTASES (HCC): ICD-10-CM

## 2023-03-06 DIAGNOSIS — Z51.5 PALLIATIVE CARE PATIENT: ICD-10-CM

## 2023-03-06 PROBLEM — N75.0 BARTHOLIN CYST: Status: RESOLVED | Noted: 2020-08-27 | Resolved: 2023-01-01

## 2023-03-06 PROBLEM — M75.42 SUBACROMIAL IMPINGEMENT OF LEFT SHOULDER: Status: RESOLVED | Noted: 2022-03-14 | Resolved: 2023-01-01

## 2023-03-06 NOTE — PROGRESS NOTES
[facility]    NAME: Laura Mata  AGE: 47 y o  SEX: female  : 1970     DATE: 3/6/2023     Assessment and Plan:     Problem List Items Addressed This Visit        Digestive    Liver metastases [facility])     Continue to follow with hematology  Continues on Jamilah  Recent CT scan shows prominence of know liver mets since previous scan  Follows with palliative care            Cardiovascular and Mediastinum    Cerebral aneurysm     Continue to follow with neurology,  Incidental finding on MRI of the head             Other    Lymphedema     Chronic in nature  Right upper extremity following mastectomy  Lymphatica massages as needed         Anxiety about health     Follows with palliative care  Xanax prn for anxiety/sleep         Obesity     Continues with weight gain  Continues with treatment for metastatic breast cancer  Had ovaries removed in 2019, BRCA positive  Not following healthy diet, not exercising regularly  Recommend lifestyle modifications  TSH ordered         Mixed hyperlipidemia     Continues on lipitor  Lipid panel ordered  Low fat diet, weight loss recommended         Constipation     Continues  With constipation  Had colonoscopy last year, inadequate prep  Will repeat this year  May be caused by her lynparza treatments for metastatic cancer  Instructed to take colace with each meal  May use dulcolax prn  Increase water intake during the day  Palliative care patient     Continue to follow with palliative  Injury of right foot     The patient states she ran into a store last week as she forgot something inside  Since that time she has had right foot/heel pain  Worsens with ambulation  Xray ordered  May wrap with ace or purchase supportive splint   May need to see podiatry          Relevant Orders    XR foot 3+ vw right   Other Visit Diagnoses     Annual physical exam    -  Primary    Weight gain Relevant Orders    TSH, 3rd generation with Free T4 reflex    Screening for lipid disorders        Relevant Orders    Lipid Panel with Direct LDL reflex          Immunizations and preventive care screenings were discussed with patient today  Appropriate education was printed on patient's after visit summary  Counseling:  Alcohol/drug use: discussed moderation in alcohol intake, the recommendations for healthy alcohol use, and avoidance of illicit drug use  Dental Health: discussed importance of regular tooth brushing, flossing, and dental visits  Injury prevention: discussed safety/seat belts, safety helmets, smoke detectors, carbon dioxide detectors, and smoking near bedding or upholstery  Sexual health: discussed sexually transmitted diseases, partner selection, use of condoms, avoidance of unintended pregnancy, and contraceptive alternatives  Exercise: the importance of regular exercise/physical activity was discussed  Recommend exercise 3-5 times per week for at least 30 minutes  BMI Counseling: Body mass index is 42 8 kg/m²  The BMI is above normal  Nutrition recommendations include decreasing portion sizes, encouraging healthy choices of fruits and vegetables, consuming healthier snacks, limiting drinks that contain sugar, moderation in carbohydrate intake, increasing intake of lean protein, reducing intake of saturated and trans fat and reducing intake of cholesterol  Exercise recommendations include moderate physical activity 150 minutes/week and exercising 3-5 times per week  Rationale for BMI follow-up plan is due to patient being overweight or obese  Depression Screening and Follow-up Plan: Patient was screened for depression during today's encounter  They screened negative with a PHQ-2 score of 2  Return in about 6 months (around 9/6/2023) for [facility], Office Visit       Chief Complaint:     Chief Complaint   Patient presents with   • Physical Exam     Annual check up    • Follow-up     6 month       History of Present Illness:     Adult Annual Physical   Patient here for a comprehensive physical exam  The patient reports no problems  Diet and Physical Activity  Diet/Nutrition: poor diet, frequent junk food and limited fruits/vegetables  Exercise: 3-4 times a week on average  Depression Screening  PHQ-2/9 Depression Screening    Little interest or pleasure in doing things: 1 - several days  Feeling down, depressed, or hopeless: 1 - several days  PHQ-2 Score: 2  PHQ-2 Interpretation: Negative depression screen       General Health  Sleep: sleeps well and gets 7-8 hours of sleep on average  Hearing: normal - bilateral   Vision: most recent eye exam <1 year ago and wears glasses  Dental: regular dental visits and brushes teeth twice daily  /GYN Health  Patient is: postmenopausal     Review of Systems:     Review of Systems   Constitutional: Negative for activity change, fatigue and fever  HENT: Negative for congestion, hearing loss, rhinorrhea, trouble swallowing and voice change  Eyes: Negative for photophobia, pain, discharge and visual disturbance  Respiratory: Negative for cough, chest tightness and shortness of breath  Cardiovascular: Negative for chest pain, palpitations and leg swelling  Gastrointestinal: Negative for abdominal pain, blood in stool, constipation, nausea and vomiting  Endocrine: Negative for cold intolerance and heat intolerance  Genitourinary: Negative for difficulty urinating, frequency, hematuria, urgency, vaginal bleeding and vaginal discharge  Musculoskeletal: Negative for arthralgias and myalgias  Skin: Negative  Neurological: Negative for dizziness, weakness, numbness and headaches  Psychiatric/Behavioral: Negative for decreased concentration  The patient is not nervous/anxious         Past Medical History:     Past Medical History:   Diagnosis Date   • Bloating    • Bruises easily    • Cancer ([address] )     right breast//to lymph nodes/liver/ and bone   • Depression    • History of cancer chemotherapy 2020   • History of pneumonia    • Hypotension     occas   • Low iron     "when pregnant, 20 yrs ago"   • Neuropathy     hands//fingers   • Shortness of breath     occas   • Stroke [facility])    • Subacromial impingement of left shoulder 3/14/2022   • Tinnitus     right   • Wears glasses       Past Surgical History:     Past Surgical History:   Procedure Laterality Date   • BILATERAL SALPINGOOPHORECTOMY     • BREAST BIOPSY     • IR BIOPSY LIVER MASS  2019   • MASTECTOMY Right     2019   • MYOMECTOMY      H/O FIBROIDS, NO SURGERY NEEDED   • IN LAPAROSCOPY W/RMVL ADNEXAL STRUCTURES N/A 2019    Procedure: LAPAROSCOPIC BILATERAL SALPINGO-OOPHORECTOMY;  Surgeon: Geremias Villegas MD;  Location: [facility];  Service: Gynecology Oncology   • IN MASTECTOMY SIMPLE COMPLETE Right 2020    Procedure: MASTECTOMY SIMPLE, axillary node dissection;  Surgeon: Nisha Pimentel MD;  Location: [facility];  Service: Surgical Oncology   • US GUIDED BREAST BIOPSY RIGHT COMPLETE Right 2019   • US GUIDED BREAST LYMPH NODE BIOPSY RIGHT Right 2019      Social History:     Social History     Socioeconomic History   • Marital status: Single     Spouse name: None   • Number of children: None   • Years of education: None   • Highest education level: None   Occupational History   • None   Tobacco Use   • Smoking status: Former     Packs/day: 1 00     Years: 30 00     Pack years: 30 00     Types: Cigarettes     Quit date:      Years since quittin 1   • Smokeless tobacco: Never   Vaping Use   • Vaping Use: Never used   Substance and Sexual Activity   • Alcohol use: Yes     Comment: occassional   • Drug use: No   • Sexual activity: Not Currently     Partners: Male     Birth control/protection: None   Other Topics Concern   • None   Social History Narrative    Consumes 2-3 cups of coffee per day     Social Determinants of Health Financial Resource Strain: Not on file   Food Insecurity: Not on file   Transportation Needs: Not on file   Physical Activity: Not on file   Stress: Not on file   Social Connections: Not on file   Intimate Partner Violence: Not on file   Housing Stability: Not on file      Family History:     Family History   Problem Relation Age of Onset   • Hypotension Mother    • Colon cancer Father 36   • Hypertension Father    • Prostate cancer Father 79   • Throat cancer Maternal Grandmother 61   • Cancer Maternal Grandmother    • Breast cancer Paternal Aunt         age unknown      Current Medications:     Current Outpatient Medications   Medication Sig Dispense Refill   • albuterol (Proventil HFA) 90 mcg/act inhaler Inhale 2 puffs every 6 (six) hours as needed for wheezing 6 7 g 5   • ALPRAZolam (XANAX) 0 5 mg tablet Take 0 5-1 tablets (0 25-0 5 mg total) by mouth daily at bedtime as needed (anxiousness or insomnia) 30 tablet 0   • ASPIRIN 81 PO Take by mouth     • atorvastatin (LIPITOR) 40 mg tablet TAKE 1 TABLET BY MOUTH EVERY DAY 90 tablet 0   • Azelaic Acid (Finacea) 15 % FOAM Apply 1 Pump topically 2 (two) times a day 50 g 3   • cholecalciferol (VITAMIN D3) 1,000 units tablet Take 1 tablet (1,000 Units total) by mouth daily     • metroNIDAZOLE (METROCREAM) 0 75 % cream Apply topically 2 (two) times a day 45 g 3   • Multiple Vitamins-Minerals (multivitamin with minerals) tablet Take 1 tablet by mouth daily     • olaparib (LYNPARZA) tablet Take 2 tablets (300 mg total) by mouth 2 (two) times a day 120 tablet 2   • oxyCODONE (ROXICODONE) 10 MG TABS Take 1 tablet (10 mg total) by mouth every 6 (six) hours as needed for moderate pain Max Daily Amount: 40 mg 30 tablet 0   • pantoprazole (PROTONIX) 40 mg tablet Take 1 tablet (40 mg total) by mouth daily 30 tablet 2   • Specialty Vitamins Products (Advanced Collagen) TABS Take by mouth in the morning     • fluticasone-vilanterol (Breo Ellipta) 200-25 mcg/actuation inhaler Inhale 1 puff daily Rinse mouth after use  180 blister 0     No current facility-administered medications for this visit  Allergies: Allergies   Allergen Reactions   • Tylenol [Acetaminophen]      Told to avoid due to cancer       Physical Exam:     /61 (BP Location: Left arm, Patient Position: Sitting, Cuff Size: Large)   Pulse 78   Temp (!) 97 1 °F (36 2 °C) (Tympanic)   Resp 16   Ht 5' 5" (1 651 m)   Wt 117 kg (257 lb 3 2 oz)   SpO2 98%   BMI 42 80 kg/m²     Physical Exam  Vitals and nursing note reviewed  Constitutional:       General: She is not in acute distress  Appearance: Normal appearance  She is obese  HENT:      Head: Normocephalic and atraumatic  Right Ear: Tympanic membrane, ear canal and external ear normal  There is no impacted cerumen  Left Ear: Tympanic membrane, ear canal and external ear normal  There is no impacted cerumen  Nose: Nose normal       Mouth/Throat:      Mouth: Mucous membranes are moist       Pharynx: Oropharynx is clear  No posterior oropharyngeal erythema  Eyes:      General:         Right eye: No discharge  Left eye: No discharge  Extraocular Movements: Extraocular movements intact  Pupils: Pupils are equal, round, and reactive to light  Cardiovascular:      Rate and Rhythm: Normal rate and regular rhythm  Pulses: Normal pulses  Heart sounds: Normal heart sounds  No murmur heard  Pulmonary:      Effort: Pulmonary effort is normal       Breath sounds: Normal breath sounds  Chest:   Breasts:     Right: Absent  Abdominal:      General: Bowel sounds are normal  There is no distension  Palpations: Abdomen is soft  There is no mass  Tenderness: There is no abdominal tenderness  There is no guarding or rebound  Hernia: No hernia is present  Musculoskeletal:         General: Normal range of motion  Cervical back: Normal range of motion  Skin:     General: Skin is warm and dry  Capillary Refill: Capillary refill takes less than 2 seconds  Neurological:      General: No focal deficit present  Mental Status: She is alert and oriented to person, place, and time  Psychiatric:         Mood and Affect: Mood normal          Behavior: Behavior normal          Thought Content:  Thought content normal          Judgment: Judgment normal           Linda Smyth, [id number] [facility]

## 2023-03-06 NOTE — ASSESSMENT & PLAN NOTE
The patient states she ran into a store last week as she forgot something inside  Since that time she has had right foot/heel pain  Worsens with ambulation  Xray ordered  May wrap with ace or purchase supportive splint   May need to see podiatry

## 2023-03-06 NOTE — PROGRESS NOTES
Daily Note     Today's date: 3/6/2023  Patient name: Irwin Vega  : 1970  MRN: [id number]  Referring provider: EMILIANO Lagos  Dx:   Encounter Diagnosis     ICD-10-CM    1  Lymphedema of right arm  I89 0           Start Time: 152          Subjective: It's been pretty good with one day of puffiness    Objective: See treatment diary below      Assessment: Tolerated treatment well   Patient patient feels since she had movement of fluid that she wants to be finished at this time      Plan: DC from skilled care at this time     Precautions: anxiety, arthritis, CA right breast, COPD, stroke      Manuals 2/22 3/6           Manual drainage RUE 15 min 24 min                                                  Neuro Re-Ed                                                                                                        Ther Ex                                                                                                                     Ther Activity                                       Gait Training                                       Modalities

## 2023-03-06 NOTE — PATIENT INSTRUCTIONS
Colace three times daily with meals  Probiotics daily   Drink plenty of water during the day       Wellness Visit for Adults   AMBULATORY CARE:   A wellness visit  is when you see your healthcare provider to get screened for health problems  Your healthcare provider will also give you advice on how to stay healthy  Write down your questions so you remember to ask them  Ask your healthcare provider how often you should have a wellness visit  What happens at a wellness visit:  Your healthcare provider will ask about your health, and your family history of health problems  This includes high blood pressure, heart disease, and cancer  He or she will ask if you have symptoms that concern you, if you smoke, and about your mood  You may also be asked about your intake of medicines, supplements, food, and alcohol  Any of the following may be done: Your weight  will be checked  Your height may also be checked so your body mass index (BMI) can be calculated  Your BMI shows if you are at a healthy weight  Your blood pressure  and heart rate will be checked  Your temperature may also be checked  Blood and urine tests  may be done  Blood tests may be done to check your cholesterol levels  Abnormal cholesterol levels increase your risk for heart disease and stroke  You may also need a blood or urine test to check for diabetes if you are at increased risk  Urine tests may be done to look for signs of an infection or kidney disease  A physical exam  includes checking your heartbeat and lungs with a stethoscope  Your healthcare provider may also check your skin to look for sun damage  Screening tests  may be recommended  A screening test is done to check for diseases that may not cause symptoms  The screening tests you may need depend on your age, gender, family history, and lifestyle habits  For example, colorectal screening may be recommended if you are 48years old or older      Screening tests you need if you are a woman:   A Pap smear  is used to screen for cervical cancer  Pap smears are usually done every 3 to 5 years depending on your age  You may need them more often if you have had abnormal Pap smear test results in the past  Ask your healthcare provider how often you should have a Pap smear  A mammogram  is an x-ray of your breasts to screen for breast cancer  Experts recommend mammograms every 2 years starting at age 48 years  You may need a mammogram at age 52 years or younger if you have an increased risk for breast cancer  Talk to your healthcare provider about when you should start having mammograms and how often you need them  Vaccines you may need:   Get an influenza vaccine  every year  The influenza vaccine protects you from the flu  Several types of viruses cause the flu  The viruses change over time, so new vaccines are made each year  Get a tetanus-diphtheria (Td) booster vaccine  every 10 years  This vaccine protects you against tetanus and diphtheria  Tetanus is a severe infection that may cause painful muscle spasms and lockjaw  Diphtheria is a severe bacterial infection that causes a thick covering in the back of your mouth and throat  Get a human papillomavirus (HPV) vaccine  if you are female and aged 23 to 32 or male 23 to 24 and never received it  This vaccine protects you from HPV infection  HPV is the most common infection spread by sexual contact  HPV may also cause vaginal, penile, and anal cancers  Get a pneumococcal vaccine  if you are aged 72 years or older  The pneumococcal vaccine is an injection given to protect you from pneumococcal disease  Pneumococcal disease is an infection caused by pneumococcal bacteria  The infection may cause pneumonia, meningitis, or an ear infection  Get a shingles vaccine  if you are 60 or older, even if you have had shingles before  The shingles vaccine is an injection to protect you from the varicella-zoster virus   This is the same virus that causes chickenpox  Shingles is a painful rash that develops in people who had chickenpox or have been exposed to the virus  How to eat healthy:  My Plate is a model for planning healthy meals  It shows the types and amounts of foods that should go on your plate  Fruits and vegetables make up about half of your plate, and grains and protein make up the other half  A serving of dairy is included on the side of your plate  The amount of calories and serving sizes you need depends on your age, gender, weight, and height  Examples of healthy foods are listed below:  Eat a variety of vegetables  such as dark green, red, and orange vegetables  You can also include canned vegetables low in sodium (salt) and frozen vegetables without added butter or sauces  Eat a variety of fresh fruits , canned fruit in 100% juice, frozen fruit, and dried fruit  Include whole grains  At least half of the grains you eat should be whole grains  Examples include whole-wheat bread, wheat pasta, brown rice, and whole-grain cereals such as oatmeal     Eat a variety of protein foods such as seafood (fish and shellfish), lean meat, and poultry without skin (turkey and chicken)  Examples of lean meats include pork leg, shoulder, or tenderloin, and beef round, sirloin, tenderloin, and extra lean ground beef  Other protein foods include eggs and egg substitutes, beans, peas, soy products, nuts, and seeds  Choose low-fat dairy products such as skim or 1% milk or low-fat yogurt, cheese, and cottage cheese  Limit unhealthy fats  such as butter, hard margarine, and shortening  Exercise:  Exercise at least 30 minutes per day on most days of the week  Some examples of exercise include walking, biking, dancing, and swimming  You can also fit in more physical activity by taking the stairs instead of the elevator or parking farther away from stores  Include muscle strengthening activities 2 days each week   Regular exercise provides many health benefits  It helps you manage your weight, and decreases your risk for type 2 diabetes, heart disease, stroke, and high blood pressure  Exercise can also help improve your mood  Ask your healthcare provider about the best exercise plan for you  General health and safety guidelines:   Do not smoke  Nicotine and other chemicals in cigarettes and cigars can cause lung damage  Ask your healthcare provider for information if you currently smoke and need help to quit  E-cigarettes or smokeless tobacco still contain nicotine  Talk to your healthcare provider before you use these products  Limit alcohol  A drink of alcohol is 12 ounces of beer, 5 ounces of wine, or 1½ ounces of liquor  Lose weight, if needed  Being overweight increases your risk of certain health conditions  These include heart disease, high blood pressure, type 2 diabetes, and certain types of cancer  Protect your skin  Do not sunbathe or use tanning beds  Use sunscreen with a SPF 15 or higher  Apply sunscreen at least 15 minutes before you go outside  Reapply sunscreen every 2 hours  Wear protective clothing, hats, and sunglasses when you are outside  Drive safely  Always wear your seatbelt  Make sure everyone in your car wears a seatbelt  A seatbelt can save your life if you are in an accident  Do not use your cell phone when you are driving  This could distract you and cause an accident  Pull over if you need to make a call or send a text message  Practice safe sex  Use latex condoms if are sexually active and have more than one partner  Your healthcare provider may recommend screening tests for sexually transmitted infections (STIs)  Wear helmets, lifejackets, and protective gear  Always wear a helmet when you ride a bike or motorcycle, go skiing, or play sports that could cause a head injury  Wear protective equipment when you play sports  Wear a lifejacket when you are on a boat or doing water sports      © Copyright [employer] 2022 Information is for End User's use only and may not be sold, redistributed or otherwise used for commercial purposes  The above information is an  only  It is not intended as medical advice for individual conditions or treatments  Talk to your doctor, nurse or pharmacist before following any medical regimen to see if it is safe and effective for you  Obesity   AMBULATORY CARE:   Obesity  means your body mass index (BMI) is greater than 30  Your healthcare provider will use your age, height, and weight to measure your BMI  The risks of obesity include  many health problems, including injuries or physical disability  Diabetes (high blood sugar level)    High blood pressure or high cholesterol    Heart disease    Stroke    Gallbladder or liver disease    Cancer of the colon, breast, prostate, liver, or kidney    Sleep apnea    Arthritis or gout    Screening  is done to check for health conditions before you have signs or symptoms  If you are 28to 79years old, your blood sugar level may be checked every 3 years for signs of prediabetes or diabetes  Your healthcare provider will check your blood pressure at each visit  High blood pressure can lead to a stroke or other problems  Your provider may check for signs of heart disease, cancer, or other health problems  Seek care immediately if:   You have a severe headache, confusion, or difficulty speaking  You have weakness on one side of your body  You have chest pain, sweating, or shortness of breath  Call your doctor if:   You have symptoms of gallbladder or liver disease, such as pain in your upper abdomen  You have knee or hip pain and discomfort while walking  You have symptoms of diabetes, such as intense hunger and thirst, and frequent urination  You have symptoms of sleep apnea, such as snoring or daytime sleepiness  You have questions or concerns about your condition or care      Treatment for obesity  focuses on helping you lose weight to improve your health  Even a small decrease in BMI can reduce the risk for many health problems  Your healthcare provider will help you set a weight-loss goal   Lifestyle changes  are the first step in treating obesity  These include making healthy food choices and getting regular physical activity  Your healthcare provider may suggest a weight-loss program that involves coaching, education, and therapy  Medicine  may help you lose weight when it is used with a healthy foods and physical activity  Surgery  can help you lose weight if you have obesity along with other health problems  Several types of weight-loss surgery are available  Ask your healthcare provider for more information  Tips for safe weight loss:   Set small, realistic goals  An example of a small goal is to walk for 20 minutes 5 days a week  Anther goal is to lose 5% of your body weight  Ask for support  Tell friends, family members, and coworkers about your goals  Ask someone to lose weight with you  You may also want to join a weight-loss support group  Identify foods or triggers that may cause you to overeat  Remove tempting high-calorie foods from your home and workplace  Place a bowl of fresh fruit on your kitchen counter  If stress causes you to eat, find other ways to cope with stress  A counselor or therapist may be able to help you  Track your daily calories and activity  Write down what you eat and drink  Also write down how many minutes of physical activity you do each day  Track your weekly weight  Weigh yourself in the morning, before you eat or drink anything but after you use the bathroom  Use the same scale, in the same place, and in similar clothing each time  Only weigh yourself 1 to 2 times each week, or as directed  You may become discouraged if you weigh yourself every day  Eating changes:   You will need to eat 500 to 1,000 fewer calories each day than you currently eat to lose 1 to 2 pounds a week  The following changes will help you cut calories:  Eat smaller portions  Use small plates, no larger than 9 inches in diameter  Fill your plate half full of fruits and vegetables  Measure your food using measuring cups until you know what a serving size looks like  Eat 3 meals and 1 or 2 snacks each day  Plan your meals in advance  Peggy Calderon and eat at home most of the time  Eat slowly  Do not skip meals  Skipping meals can lead to overeating later in the day  This can make it harder for you to lose weight  Talk with a dietitian to help you make a meal plan and schedule that is right for you  Eat fruits and vegetables at every meal   They are low in calories and high in fiber, which makes you feel full  Do not add butter, margarine, or cream sauce to vegetables  Use herbs to season steamed vegetables  Eat less fat and fewer fried foods  Eat more baked or grilled chicken and fish  These protein sources are lower in calories and fat than red meat  Limit fast food  Dress your salads with olive oil and vinegar instead of bottled dressing  Limit the amount of sugar you eat  Do not drink sugary beverages  Limit alcohol  Activity changes:  Physical activity is good for your body in many ways  It helps you burn calories and build strong muscles  It decreases stress and depression, and improves your mood  It can also help you sleep better  Talk to your healthcare provider before you begin an exercise program   Exercise for at least 30 minutes 5 days a week  Start slowly  Set aside time each day for physical activity that you enjoy and that is convenient for you  It is best to do both weight training and an activity that increases your heart rate, such as walking, bicycling, or swimming  Find ways to be more active  Do yard work and housecleaning  Walk up the stairs instead of using elevators   Spend your leisure time going to events that require walking, such as outdoor festivals or fairs  This extra physical activity can help you lose weight and keep it off  Follow up with your doctor as directed: You may need to meet with a dietitian  Write down your questions so you remember to ask them during your visits  © Copyright Avi Quivers 2022 Information is for End User's use only and may not be sold, redistributed or otherwise used for commercial purposes  The above information is an  only  It is not intended as medical advice for individual conditions or treatments  Talk to your doctor, nurse or pharmacist before following any medical regimen to see if it is safe and effective for you  Constipation   AMBULATORY CARE:   Constipation  means you have hard, dry bowel movements, or you go longer than usual between bowel movements  Constipation may be caused by a lack of water or high-fiber foods  Certain medicines, or a lack of fiber or physical activity may also cause constipation  Common symptoms include the following:   Trouble pushing out your bowel movement    Pain or bleeding during your bowel movement    A feeling that you did not finish having your bowel movement    Nausea    Bloating    Headache    Call your doctor if:   You have blood in your bowel movements  You have a fever and abdominal pain with the constipation  Your constipation gets worse  You start to vomit  You have questions or concerns about your condition or care  Relieve constipation:  Medicine can keep you have a bowel movement more easily  Medicines may increase moisture in your bowel movement or increase the motion of your intestines  A suppository  may be used to help soften your bowel movements  This may make them easier to pass  A suppository is guided into your rectum through your anus  Laxatives  can help stimulate your bowels to have a bowel movement  Your provider may recommend you only use laxatives for a short time  Long-term use may make your bowels dependent on the medicine  An enema  is liquid medicine used to clear bowel movement from your rectum  The medicine is put into your rectum through your anus  Prevent constipation:   Drink liquids as directed  You may need to drink extra liquids to help soften and move your bowels  Ask how much liquid to drink each day and which liquids are best for you  Eat high-fiber foods  This may help decrease constipation by adding bulk to your bowel movements  High-fiber foods include fruit, vegetables, whole-grain breads and cereals, and beans  Your healthcare provider or dietitian can help you create a high-fiber meal plan  Your provider may also recommend a fiber supplement if you cannot get enough fiber from food  Exercise regularly  Regular physical activity can help stimulate your intestines  Walking is a good exercise to prevent or relieve constipation  Ask which exercises are best for you  Schedule a time each day to have a bowel movement  This may help train your body to have regular bowel movements  Bend forward while you are on the toilet to help move the bowel movement out  Sit on the toilet for at least 10 minutes, even if you do not have a bowel movement  Talk to your healthcare provider about your medicines  Certain medicines, such as opioids, can cause constipation  Your provider may be able to make medicine changes  For example, he or she may change the kind of medicine, or change when you take it  Follow up with your doctor as directed:  Write down your questions so you remember to ask them during your visits  © Copyright Samanta Jarrell 2022 Information is for End User's use only and may not be sold, redistributed or otherwise used for commercial purposes  The above information is an  only  It is not intended as medical advice for individual conditions or treatments   Talk to your doctor, nurse or pharmacist before following any medical regimen to see if it is safe and effective for you  Goals:  Do not skip meals  Food log (ie ) www myfitnesspal com,sparkpeople  com,loseit com,calorieking  com,etc  baritastic (use skinnytaste  com, dietdoctor  com or smartphone zeeshan Star[employer].com for recipes)  No sugary beverages  At least 64oz of water daily  Increase physical activity by 10 minutes daily  Gradually increase physical activity to a goal of 5 days per week for 30 minutes of MODERATE intensity PLUS 2 days per week of FULL BODY resistance training (use smartphone apps Atonometrics, Home Workout, etc )  Start food logging, weighing and measuring food  1300 calories per day  Sample menu given  Keep up the great work with water intake - at least 64 oz daily  Increase exercise to 4-5 days per week with goal of 30 minutes cardiovascular exercise

## 2023-03-06 NOTE — ASSESSMENT & PLAN NOTE
Continues to follow with surgical oncology and hematology oncology   Known liver mets, receiving lynparza

## 2023-03-06 NOTE — ASSESSMENT & PLAN NOTE
Continue to follow with hematology  Continues on Jamilah  Recent CT scan shows prominence of know liver mets since previous scan   Follows with palliative care

## 2023-03-06 NOTE — ASSESSMENT & PLAN NOTE
Continues with weight gain  Continues with treatment for metastatic breast cancer  Had ovaries removed in 2019, BRCA positive  Not following healthy diet, not exercising regularly  Recommend lifestyle modifications   TSH ordered

## 2023-03-06 NOTE — ASSESSMENT & PLAN NOTE
Continues  With constipation  Had colonoscopy last year, inadequate prep  Will repeat this year  May be caused by her lynparza treatments for metastatic cancer  Instructed to take colace with each meal  May use dulcolax prn  Increase water intake during the day

## 2023-03-06 NOTE — PROGRESS NOTES
[facility] - [facility]    NAME: Driscilla Ahumada  AGE: 47 y o  SEX: female  : 1970     DATE: 3/6/2023     Assessment and Plan:     Problem List Items Addressed This Visit        Digestive    Liver metastases [facility])     Continue to follow with hematology  Continues on Jamilah  Recent CT scan shows prominence of know liver mets since previous scan  Follows with palliative care            Cardiovascular and Mediastinum    Cerebral aneurysm     Continue to follow with neurology,  Incidental finding on MRI of the head             Other    Lymphedema     Chronic in nature  Right upper extremity following mastectomy  Lymphatica massages as needed         Anxiety about health     Follows with palliative care  Xanax prn for anxiety/sleep         Obesity     Continues with weight gain  Continues with treatment for metastatic breast cancer  Had ovaries removed in 2019, BRCA positive  Not following healthy diet, not exercising regularly  Recommend lifestyle modifications  TSH ordered         Mixed hyperlipidemia     Continues on lipitor  Lipid panel ordered  Low fat diet, weight loss recommended         Constipation     Continues  With constipation  Had colonoscopy last year, inadequate prep  Will repeat this year  May be caused by her lynparza treatments for metastatic cancer  Instructed to take colace with each meal  May use dulcolax prn  Increase water intake during the day  Palliative care patient     Continue to follow with palliative  Injury of right foot     The patient states she ran into a store last week as she forgot something inside  Since that time she has had right foot/heel pain  Worsens with ambulation  Xray ordered  May wrap with ace or purchase supportive splint   May need to see podiatry          Relevant Orders    XR foot 3+ vw right   Other Visit Diagnoses     Annual physical exam    -  Primary    Weight gain        Relevant Orders    TSH, 3rd generation with Free T4 reflex    Screening for lipid disorders        Relevant Orders    Lipid Panel with Direct LDL reflex            Depression Screening and Follow-up Plan: Patient was screened for depression during today's encounter  They screened negative with a PHQ-2 score of 2  Return in about 6 months (around 9/6/2023) for Calin Reese, Office Visit  Chief Complaint:     Chief Complaint   Patient presents with   • Physical Exam     Annual check up    • Follow-up     6 month         History of Present Illness: The patient with multiple concerns when in the office today, addressed above  I will contact her with results of x ray  Start colace three times a day per meal  TSH ordered for weight gain  Review of Systems:     Review of Systems   Constitutional: Positive for unexpected weight change  Negative for activity change, fatigue and fever  HENT: Negative for congestion, hearing loss, rhinorrhea, trouble swallowing and voice change  Eyes: Negative for photophobia, pain, discharge and visual disturbance  Respiratory: Negative for cough, chest tightness and shortness of breath  Cardiovascular: Negative for chest pain, palpitations and leg swelling  Gastrointestinal: Positive for constipation  Negative for abdominal pain, blood in stool, nausea and vomiting  Endocrine: Negative for cold intolerance and heat intolerance  Genitourinary: Negative for difficulty urinating, frequency, hematuria, urgency, vaginal bleeding and vaginal discharge  Musculoskeletal: Positive for arthralgias and joint swelling (right foot)  Negative for myalgias  Skin: Negative  Neurological: Negative for dizziness, weakness, numbness and headaches  Psychiatric/Behavioral: Negative for decreased concentration  The patient is not nervous/anxious           Problem List:     Patient Active Problem List   Diagnosis   • Malignant neoplasm of central portion of right breast in female, estrogen receptor positive ([place] [address] )   • Carcinoma of right breast metastatic to axillary lymph node (HCC)   • Family history of breast cancer in female   • Family history of colon cancer   • Dense breast tissue   • BRCA2 gene mutation positive   • Abnormal CT scan   • Liver metastases ([place] [address] )   • S/P BSO (bilateral salpingo-oophorectomy)   • On antineoplastic chemotherapy   • Advanced care planning/counseling discussion   • Rash   • Postmenopausal   • Lymphedema   • Anxiety about health   • Allergic rhinitis   • Obesity   • Schwannoma of cranial nerve ([place] [address] )   • Stroke ([place] [address] )   • Cerebral aneurysm   • Rosacea   • Mixed hyperlipidemia   • Constipation   • GERD (gastroesophageal reflux disease)   • Palliative care patient   • Injury of right foot        Objective:     /61 (BP Location: Left arm, Patient Position: Sitting, Cuff Size: Large)   Pulse 78   Temp (!) 97 1 °F (36 2 °C) (Tympanic)   Resp 16   Ht 5' 5" (1 651 m)   Wt 117 kg (257 lb 3 2 oz)   SpO2 98%   BMI 42 80 kg/m²     Current Outpatient Medications   Medication Sig Dispense Refill   • albuterol (Proventil HFA) 90 mcg/act inhaler Inhale 2 puffs every 6 (six) hours as needed for wheezing 6 7 g 5   • ALPRAZolam (XANAX) 0 5 mg tablet Take 0 5-1 tablets (0 25-0 5 mg total) by mouth daily at bedtime as needed (anxiousness or insomnia) 30 tablet 0   • ASPIRIN 81 PO Take by mouth     • atorvastatin (LIPITOR) 40 mg tablet TAKE 1 TABLET BY MOUTH EVERY DAY 90 tablet 0   • Azelaic Acid (Finacea) 15 % FOAM Apply 1 Pump topically 2 (two) times a day 50 g 3   • cholecalciferol (VITAMIN D3) 1,000 units tablet Take 1 tablet (1,000 Units total) by mouth daily     • metroNIDAZOLE (METROCREAM) 0 75 % cream Apply topically 2 (two) times a day 45 g 3   • Multiple Vitamins-Minerals (multivitamin with minerals) tablet Take 1 tablet by mouth daily     • olaparib (LYNPARZA) tablet Take 2 tablets (300 mg total) by mouth 2 (two) times a day 120 tablet 2   • oxyCODONE (ROXICODONE) 10 MG TABS Take 1 tablet (10 mg total) by mouth every 6 (six) hours as needed for moderate pain Max Daily Amount: 40 mg 30 tablet 0   • pantoprazole (PROTONIX) 40 mg tablet Take 1 tablet (40 mg total) by mouth daily 30 tablet 2   • Specialty Vitamins Products (Advanced Collagen) TABS Take by mouth in the morning     • fluticasone-vilanterol (Breo Ellipta) 200-25 mcg/actuation inhaler Inhale 1 puff daily Rinse mouth after use  180 blister 0     No current facility-administered medications for this visit  Physical Exam  Vitals reviewed  Constitutional:       Appearance: Normal appearance  She is obese  HENT:      Head: Normocephalic  Nose: Nose normal       Mouth/Throat:      Mouth: Mucous membranes are moist       Pharynx: Oropharynx is clear  Eyes:      Extraocular Movements: Extraocular movements intact  Pupils: Pupils are equal, round, and reactive to light  Cardiovascular:      Rate and Rhythm: Normal rate and regular rhythm  Pulses: Normal pulses  Heart sounds: Normal heart sounds  Pulmonary:      Effort: Pulmonary effort is normal       Breath sounds: Normal breath sounds  Chest:   Breasts:     Right: Absent  Abdominal:      General: Bowel sounds are normal  There is no distension  Palpations: Abdomen is soft  There is no mass  Tenderness: There is no abdominal tenderness  There is no guarding or rebound  Hernia: No hernia is present  Musculoskeletal:         General: Normal range of motion  Feet:    Skin:     General: Skin is warm and dry  Neurological:      General: No focal deficit present  Mental Status: She is alert and oriented to person, place, and time  Mental status is at baseline  Psychiatric:         Mood and Affect: Mood normal          Behavior: Behavior normal          Thought Content:  Thought content normal          Judgment: Judgment normal          Chaitanya Galicia, [id number] [address]

## 2023-03-20 DIAGNOSIS — J44.1 CHRONIC OBSTRUCTIVE PULMONARY DISEASE WITH ACUTE EXACERBATION (HCC): ICD-10-CM

## 2023-03-20 RX ORDER — FLUTICASONE FUROATE AND VILANTEROL 200; 25 UG/1; UG/1
1 POWDER RESPIRATORY (INHALATION) DAILY
Qty: 180 BLISTER | Refills: 0 | Status: ON HOLD | OUTPATIENT
Start: 2023-03-20 | End: 2023-06-18

## 2023-03-26 DIAGNOSIS — K21.9 GERD (GASTROESOPHAGEAL REFLUX DISEASE): ICD-10-CM

## 2023-03-26 DIAGNOSIS — C50.111 MALIGNANT NEOPLASM OF CENTRAL PORTION OF RIGHT BREAST IN FEMALE, ESTROGEN RECEPTOR POSITIVE (HCC): ICD-10-CM

## 2023-03-26 DIAGNOSIS — Z51.5 PALLIATIVE CARE PATIENT: ICD-10-CM

## 2023-03-26 DIAGNOSIS — Z17.0 MALIGNANT NEOPLASM OF CENTRAL PORTION OF RIGHT BREAST IN FEMALE, ESTROGEN RECEPTOR POSITIVE (HCC): ICD-10-CM

## 2023-03-26 RX ORDER — PANTOPRAZOLE SODIUM 40 MG/1
TABLET, DELAYED RELEASE ORAL
Qty: 90 TABLET | Refills: 0 | Status: ON HOLD | OUTPATIENT
Start: 2023-03-26

## 2023-03-27 ENCOUNTER — TELEPHONE (OUTPATIENT)
Dept: OTHER | Facility: OTHER | Age: 53
End: 2023-03-27

## 2023-03-27 ENCOUNTER — PATIENT MESSAGE (OUTPATIENT)
Dept: PALLIATIVE MEDICINE | Facility: CLINIC | Age: 53
End: 2023-03-27

## 2023-03-27 DIAGNOSIS — I63.9 CEREBROVASCULAR ACCIDENT (CVA), UNSPECIFIED MECHANISM (HCC): ICD-10-CM

## 2023-03-27 DIAGNOSIS — D33.3 SCHWANNOMA OF CRANIAL NERVE (HCC): Chronic | ICD-10-CM

## 2023-03-27 DIAGNOSIS — I67.1 CEREBRAL ANEURYSM: Primary | ICD-10-CM

## 2023-03-27 NOTE — TELEPHONE ENCOUNTER
Pt last seen in Oct ie 5 months ago for incidental cva found on schwanoma surveillance imaging by [facility]  I am concerned by the headache, balance and nausea as this was not present 5 months ago  Due to history of breast cancer, schwanoma, cva, and superior cerebellar aneurysm, rec pt to go to er for more acute evaluation of etiology  Would recommend pt to also call [facility] ie Dr Dasha Varner involved in [address] and Dr Laura Campbell for her aneurysm  Also rec talking to her oncologist due to new neuro sxs in setting of cancer history  For most expedited eval - rec ER  Rev my 10/28/22 note and none of these sxs present at that time

## 2023-03-27 NOTE — TELEPHONE ENCOUNTER
Whitley Brooks  to [facility] - [address] Clinical Team 3 (supporting Hilda James MD)      7:24 AM  I just started taking pantoprazole(?) recently and I'm not sure if that could have anything to do with this because symptoms started around the same time

## 2023-03-27 NOTE — TELEPHONE ENCOUNTER
Spoke w/patient  She reports she started taking pantoprazole 40mg (one tab daily in AM) appx 2-3 weeks ago  She feels the symptoms began appx one week after starting medication  States she feels like a drunk person from the neck down  Difficulty keeping her balance  Does not feel dizzy  No difficulty breathing  Patient has not been using her daily inhaler d/t insurance issues  She does have her rescue inhaler (pt has emphysema)   Became nauseous today 1 hour after taking medication  She spoke w/her PCP who advised pt not to take pantoprazole  Pt currently w/mets to the liver  Wondering if medication could be causing issues because of this  Pt declined going to the ER at this time d/t no insurance coverage until 4/1/23  She agreed to go if symptoms worsened or she experienced any new symptoms  She will go to the ER on 4/1/23 if symptoms are still present  Pt will reach out to Dr Clemencia Skiff, Dr Lonnie Awad and oncologist to advise them of symptoms

## 2023-03-27 NOTE — TELEPHONE ENCOUNTER
Dr Barrera Diamond asked pt to have Dr Walker Munoz office read pt's message and get back to pt ASAP

## 2023-03-27 NOTE — TELEPHONE ENCOUNTER
Nursing, let pt know low threshold to go to er  Last cta head and neck from dec and ct head no acute intracranial pathology at that time  This was part of the incidental cva found on [facility] imaging  However, updated imaging may be helpful ie now 3 months later due to new sxs  I will place order for MRI head attn iacs with and without contrast  If worsening sxs, needs er eval as already discussed  Ccing treatment team due to multiplicity of medical issues and new sxs below     Lorraine Fragoso

## 2023-03-27 NOTE — TELEPHONE ENCOUNTER
----- Message from Abhishek Fajardo sent at 3/26/2023  4:06 PM EDT -----  Regarding: Question about symptoms   Contact: [phone]  Hi, I cannot go to the doctor until April 1st  I literally cannot afford it as my insurance is switching over  Okay now on to the issue  A few days ago I started getting randomly nauseous, getting random headaches, and losing my balance a bit when I'm walking  Not to the point of falling though  My face still smiles evenly, my eyesight's okay, I haven't gotten dizzy, and when I put my arms up they both seem to be holding fine  I was wondering if I should be concerned at all? I'm still doing everything I normally do  Just being a little more careful when moving around  I went on a long walk today with a friend and they mentioned I seemed a bit unbalanced on my feet

## 2023-03-27 NOTE — TELEPHONE ENCOUNTER
Spoke w/pt  Advised her of note below  Pt verbalized understanding  She again agreed to go to ER should her symptoms worsen or she has new acute symptoms  Patient notes after we ended our call earlier, she did have an episode of emesis and currently feels better  Patient already scheduled for MRI brain w/IACs on 6/22/23

## 2023-03-27 NOTE — TELEPHONE ENCOUNTER
Called pt directly  Pt with sxs for past 2 weeks of nausea and headache  Pt denies any facial numbness, no change in hearing, no vertigo, no dysarthria  Pt without headache history in past   Headache in past 2 weeks new  Rev my concerns re new onset nausea and vomiting associated with headache  less likely cva  Less likely related to schwannoma  However in setting of breast cancer, differential includes metastasis [place] to post fossa/ cerebellum  Cta head and neck from dec would not visualize this area well  Last mri iacs in summer from NS  Also less likely sah/ bleed, but also in differential   Pt recommended to call hematology/ oncology as well  I also placed another order in addition to standing order from [facility] for mri head  Rev with pt sxs or increased icp as well  Ideally, want asap  Pt with issues with insurance to go to er  Rev with pt main concern is her own health and my recommendation for more expedited workup  Pt aware of concerns  Will also address with oncology team as well     Fyi to treatment team

## 2023-03-28 ENCOUNTER — PATIENT MESSAGE (OUTPATIENT)
Dept: PALLIATIVE MEDICINE | Facility: CLINIC | Age: 53
End: 2023-03-28

## 2023-03-28 DIAGNOSIS — C50.911 CARCINOMA OF RIGHT BREAST METASTATIC TO AXILLARY LYMPH NODE (HCC): Primary | ICD-10-CM

## 2023-03-28 DIAGNOSIS — C77.3 CARCINOMA OF RIGHT BREAST METASTATIC TO AXILLARY LYMPH NODE (HCC): Primary | ICD-10-CM

## 2023-03-28 RX ORDER — ONDANSETRON 4 MG/1
4 TABLET, FILM COATED ORAL EVERY 8 HOURS PRN
Qty: 20 TABLET | Refills: 0 | Status: ON HOLD | OUTPATIENT
Start: 2023-03-28

## 2023-03-28 NOTE — TELEPHONE ENCOUNTER
Message was sent to May Millard himself- however, [place] and may [place] a little bit longer to respond

## 2023-03-30 ENCOUNTER — TELEPHONE (OUTPATIENT)
Dept: HEMATOLOGY ONCOLOGY | Facility: CLINIC | Age: 53
End: 2023-03-30

## 2023-03-30 PROBLEM — R11.2 NAUSEA & VOMITING: Status: ACTIVE | Noted: 2023-03-30

## 2023-03-30 PROBLEM — R93.7 MULTIPLE LESIONS ON COMPUTED TOMOGRAPHY OF BRAIN AND SPINE: Status: ACTIVE | Noted: 2019-06-03

## 2023-03-30 PROBLEM — G93.89 BRAIN MASS: Status: ACTIVE | Noted: 2023-01-01

## 2023-03-30 PROBLEM — R93.0 MULTIPLE LESIONS ON COMPUTED TOMOGRAPHY OF BRAIN AND SPINE: Status: ACTIVE | Noted: 2019-06-03

## 2023-03-30 NOTE — TELEPHONE ENCOUNTER
Patient Call Form   Reason for patient call? Patient is calling in requesting a call back regarding brain tumors that were found during her ER visit    Patient's primary oncologist? Dr Jacqueline Keith    Name of person the patient was calling for? Amelia   Does the patient issue require a call back? Yes   If call back required, inform patient that the message will be forwarded to the team and someone will get back to them as soon as possible    Did you relay this information to the patient?  Yes

## 2023-03-31 ENCOUNTER — TELEPHONE (OUTPATIENT)
Dept: NEUROSURGERY | Facility: CLINIC | Age: 53
End: 2023-03-31

## 2023-03-31 NOTE — TELEPHONE ENCOUNTER
3/31/23- PT IN HOSPITAL  [facility]  PT WILL NEED MRI BRAIN--SCHEDULED 6/22/23  1YR F/U SCHEDULED 8/28/23 [facility])  PT WILL NEED MRA HEAD--SCHEDULED 6/22/23    *SPOKE W/ SG ABOUT APTS--PER SG, LEAVE SCHEDULED HOW THEY ARE

## 2023-04-01 PROBLEM — C50.919 BREAST CANCER METASTASIZED TO LIVER (HCC): Status: ACTIVE | Noted: 2019-06-20

## 2023-04-02 PROBLEM — R73.9 HYPERGLYCEMIA, DRUG-INDUCED: Status: ACTIVE | Noted: 2023-01-01

## 2023-04-02 PROBLEM — T50.905A HYPERGLYCEMIA, DRUG-INDUCED: Status: ACTIVE | Noted: 2023-04-02

## 2023-04-03 ENCOUNTER — ANESTHESIA EVENT (INPATIENT)
Dept: PERIOP | Facility: HOSPITAL | Age: 53
End: 2023-04-03

## 2023-04-03 ENCOUNTER — ANESTHESIA (INPATIENT)
Dept: PERIOP | Facility: HOSPITAL | Age: 53
End: 2023-04-03

## 2023-04-03 RX ORDER — HYDROMORPHONE HCL/PF 1 MG/ML
SYRINGE (ML) INJECTION AS NEEDED
Status: DISCONTINUED | OUTPATIENT
Start: 2023-04-03 | End: 2023-04-03

## 2023-04-03 RX ORDER — GLYCOPYRROLATE 0.2 MG/ML
INJECTION INTRAMUSCULAR; INTRAVENOUS AS NEEDED
Status: DISCONTINUED | OUTPATIENT
Start: 2023-04-03 | End: 2023-04-03

## 2023-04-03 RX ORDER — FENTANYL CITRATE 50 UG/ML
INJECTION, SOLUTION INTRAMUSCULAR; INTRAVENOUS AS NEEDED
Status: DISCONTINUED | OUTPATIENT
Start: 2023-04-03 | End: 2023-04-03

## 2023-04-03 RX ORDER — SODIUM CHLORIDE 9 MG/ML
INJECTION, SOLUTION INTRAVENOUS CONTINUOUS PRN
Status: DISCONTINUED | OUTPATIENT
Start: 2023-04-03 | End: 2023-04-03

## 2023-04-03 RX ORDER — SUCCINYLCHOLINE/SOD CL,ISO/PF 100 MG/5ML
SYRINGE (ML) INTRAVENOUS AS NEEDED
Status: DISCONTINUED | OUTPATIENT
Start: 2023-04-03 | End: 2023-04-03

## 2023-04-03 RX ORDER — PROPOFOL 10 MG/ML
INJECTION, EMULSION INTRAVENOUS CONTINUOUS PRN
Status: DISCONTINUED | OUTPATIENT
Start: 2023-04-03 | End: 2023-04-03

## 2023-04-03 RX ORDER — PROPOFOL 10 MG/ML
INJECTION, EMULSION INTRAVENOUS AS NEEDED
Status: DISCONTINUED | OUTPATIENT
Start: 2023-04-03 | End: 2023-04-03

## 2023-04-03 RX ORDER — LIDOCAINE HYDROCHLORIDE 20 MG/ML
INJECTION, SOLUTION EPIDURAL; INFILTRATION; INTRACAUDAL; PERINEURAL AS NEEDED
Status: DISCONTINUED | OUTPATIENT
Start: 2023-04-03 | End: 2023-04-03

## 2023-04-03 RX ORDER — ALBUMIN, HUMAN INJ 5% 5 %
SOLUTION INTRAVENOUS CONTINUOUS PRN
Status: DISCONTINUED | OUTPATIENT
Start: 2023-04-03 | End: 2023-04-03

## 2023-04-03 RX ORDER — MIDAZOLAM HYDROCHLORIDE 2 MG/2ML
INJECTION, SOLUTION INTRAMUSCULAR; INTRAVENOUS AS NEEDED
Status: DISCONTINUED | OUTPATIENT
Start: 2023-04-03 | End: 2023-04-03

## 2023-04-03 RX ORDER — ROCURONIUM BROMIDE 10 MG/ML
INJECTION, SOLUTION INTRAVENOUS AS NEEDED
Status: DISCONTINUED | OUTPATIENT
Start: 2023-04-03 | End: 2023-04-03

## 2023-04-03 RX ORDER — DEXAMETHASONE SODIUM PHOSPHATE 10 MG/ML
INJECTION, SOLUTION INTRAMUSCULAR; INTRAVENOUS AS NEEDED
Status: DISCONTINUED | OUTPATIENT
Start: 2023-04-03 | End: 2023-04-03

## 2023-04-03 RX ORDER — SODIUM CHLORIDE, SODIUM LACTATE, POTASSIUM CHLORIDE, CALCIUM CHLORIDE 600; 310; 30; 20 MG/100ML; MG/100ML; MG/100ML; MG/100ML
INJECTION, SOLUTION INTRAVENOUS CONTINUOUS PRN
Status: DISCONTINUED | OUTPATIENT
Start: 2023-04-03 | End: 2023-04-03

## 2023-04-03 RX ORDER — MANNITOL 250 MG/ML
INJECTION, SOLUTION INTRAVENOUS AS NEEDED
Status: DISCONTINUED | OUTPATIENT
Start: 2023-04-03 | End: 2023-04-03

## 2023-04-03 RX ORDER — CEFAZOLIN SODIUM 1 G/3ML
INJECTION, POWDER, FOR SOLUTION INTRAMUSCULAR; INTRAVENOUS AS NEEDED
Status: DISCONTINUED | OUTPATIENT
Start: 2023-04-03 | End: 2023-04-03

## 2023-04-03 RX ADMIN — PROPOFOL 150 MG: 10 INJECTION, EMULSION INTRAVENOUS at 09:18

## 2023-04-03 RX ADMIN — ROCURONIUM BROMIDE 20 MG: 10 INJECTION INTRAVENOUS at 16:49

## 2023-04-03 RX ADMIN — GLYCOPYRROLATE 0.2 MCG: 0.2 INJECTION, SOLUTION INTRAMUSCULAR; INTRAVENOUS at 09:48

## 2023-04-03 RX ADMIN — PROPOFOL 100 MG: 10 INJECTION, EMULSION INTRAVENOUS at 11:18

## 2023-04-03 RX ADMIN — CEFAZOLIN 2000 MG: 1 INJECTION, POWDER, FOR SOLUTION INTRAMUSCULAR; INTRAVENOUS at 20:04

## 2023-04-03 RX ADMIN — FENTANYL CITRATE 50 MCG: 50 INJECTION INTRAMUSCULAR; INTRAVENOUS at 09:18

## 2023-04-03 RX ADMIN — DEXAMETHASONE SODIUM PHOSPHATE 10 MG: 10 INJECTION INTRAMUSCULAR; INTRAVENOUS at 10:13

## 2023-04-03 RX ADMIN — HYDROMORPHONE HYDROCHLORIDE 0.5 MG: 1 INJECTION, SOLUTION INTRAMUSCULAR; INTRAVENOUS; SUBCUTANEOUS at 11:41

## 2023-04-03 RX ADMIN — SODIUM CHLORIDE, SODIUM LACTATE, POTASSIUM CHLORIDE, AND CALCIUM CHLORIDE: .6; .31; .03; .02 INJECTION, SOLUTION INTRAVENOUS at 08:45

## 2023-04-03 RX ADMIN — ROCURONIUM BROMIDE 50 MG: 10 INJECTION INTRAVENOUS at 09:54

## 2023-04-03 RX ADMIN — MANNITOL 50 G: 12.5 INJECTION, SOLUTION INTRAVENOUS at 11:32

## 2023-04-03 RX ADMIN — DEXMEDETOMIDINE 0.2 MCG/KG/HR: 100 INJECTION, SOLUTION, CONCENTRATE INTRAVENOUS at 09:21

## 2023-04-03 RX ADMIN — CEFAZOLIN 1000 MG: 1 INJECTION, POWDER, FOR SOLUTION INTRAMUSCULAR; INTRAVENOUS at 11:11

## 2023-04-03 RX ADMIN — SODIUM CHLORIDE 0.2 MCG/KG/MIN: 900 INJECTION INTRAVENOUS at 09:21

## 2023-04-03 RX ADMIN — SODIUM CHLORIDE 0.1 MCG/KG/MIN: 900 INJECTION INTRAVENOUS at 09:38

## 2023-04-03 RX ADMIN — SODIUM CHLORIDE: 0.9 INJECTION, SOLUTION INTRAVENOUS at 09:21

## 2023-04-03 RX ADMIN — SODIUM CHLORIDE, SODIUM LACTATE, POTASSIUM CHLORIDE, AND CALCIUM CHLORIDE: .6; .31; .03; .02 INJECTION, SOLUTION INTRAVENOUS at 15:44

## 2023-04-03 RX ADMIN — ROCURONIUM BROMIDE 20 MG: 10 INJECTION INTRAVENOUS at 11:15

## 2023-04-03 RX ADMIN — PROPOFOL 100 MCG/KG/MIN: 10 INJECTION, EMULSION INTRAVENOUS at 09:22

## 2023-04-03 RX ADMIN — SODIUM CHLORIDE: 0.9 INJECTION, SOLUTION INTRAVENOUS at 14:07

## 2023-04-03 RX ADMIN — ROCURONIUM BROMIDE 10 MG: 10 INJECTION INTRAVENOUS at 18:11

## 2023-04-03 RX ADMIN — SODIUM CHLORIDE, SODIUM LACTATE, POTASSIUM CHLORIDE, AND CALCIUM CHLORIDE: .6; .31; .03; .02 INJECTION, SOLUTION INTRAVENOUS at 11:59

## 2023-04-03 RX ADMIN — CEFAZOLIN 2000 MG: 1 INJECTION, POWDER, FOR SOLUTION INTRAMUSCULAR; INTRAVENOUS at 17:11

## 2023-04-03 RX ADMIN — ROCURONIUM BROMIDE 20 MG: 10 INJECTION INTRAVENOUS at 17:48

## 2023-04-03 RX ADMIN — MIDAZOLAM 2 MG: 1 INJECTION INTRAMUSCULAR; INTRAVENOUS at 09:08

## 2023-04-03 RX ADMIN — ALBUMIN (HUMAN): 12.5 INJECTION, SOLUTION INTRAVENOUS at 15:11

## 2023-04-03 RX ADMIN — ALBUMIN (HUMAN): 12.5 INJECTION, SOLUTION INTRAVENOUS at 15:33

## 2023-04-03 RX ADMIN — CEFAZOLIN 2000 MG: 1 INJECTION, POWDER, FOR SOLUTION INTRAMUSCULAR; INTRAVENOUS at 10:13

## 2023-04-03 RX ADMIN — Medication 100 MG: at 09:18

## 2023-04-03 RX ADMIN — CEFAZOLIN 2000 MG: 1 INJECTION, POWDER, FOR SOLUTION INTRAMUSCULAR; INTRAVENOUS at 14:20

## 2023-04-03 RX ADMIN — PROPOFOL 50 MG: 10 INJECTION, EMULSION INTRAVENOUS at 09:52

## 2023-04-03 RX ADMIN — SUGAMMADEX 200 MG: 100 INJECTION, SOLUTION INTRAVENOUS at 20:53

## 2023-04-03 RX ADMIN — SODIUM CHLORIDE: 0.9 INJECTION, SOLUTION INTRAVENOUS at 11:59

## 2023-04-03 RX ADMIN — LIDOCAINE HYDROCHLORIDE 100 MG: 20 INJECTION, SOLUTION EPIDURAL; INFILTRATION; INTRACAUDAL; PERINEURAL at 09:18

## 2023-04-03 RX ADMIN — PROPOFOL 100 MG: 10 INJECTION, EMULSION INTRAVENOUS at 11:20

## 2023-04-03 RX ADMIN — HYDROMORPHONE HYDROCHLORIDE 0.5 MG: 1 INJECTION, SOLUTION INTRAMUSCULAR; INTRAVENOUS; SUBCUTANEOUS at 19:16

## 2023-04-03 RX ADMIN — FENTANYL CITRATE 50 MCG: 50 INJECTION INTRAMUSCULAR; INTRAVENOUS at 09:52

## 2023-04-03 RX ADMIN — SODIUM CHLORIDE: 0.9 INJECTION, SOLUTION INTRAVENOUS at 17:13

## 2023-04-03 NOTE — ANESTHESIA PROCEDURE NOTES
Arterial Line Insertion  Performed by: Elvira Hager CRNA  Authorized by: Bossman Schultz MD   Preparation: Patient was prepped and draped in the usual sterile fashion    Indications: hemodynamic monitoring  Orientation:  Right  Location: radial artery  Sedation:  Patient sedated: yes  Sedatives: see MAR for details  Analgesia: see MAR for details    Procedure Details:  Needle gauge: 20  Number of attempts: 2    Post-procedure:  Post-procedure: dressing applied and chlorhexidine patch applied  Waveform: good waveform  Patient tolerance: Patient tolerated the procedure well with no immediate complications  Comments: U/S guidance and micropuncture used

## 2023-04-03 NOTE — ANESTHESIA PREPROCEDURE EVALUATION
Procedure:  Suboccipital craniotomy for tumor resection using neuronavigation (Bilateral: Head)    Relevant Problems   CARDIO   (+) Cerebral aneurysm   (+) Mixed hyperlipidemia      GI/HEPATIC   (+) Breast cancer metastasized to liver (HCC)   (+) GERD (gastroesophageal reflux disease)      GYN   (+) Carcinoma of right breast metastatic to axillary lymph node (HCC)   (+) Malignant neoplasm of central portion of right breast in female, estrogen receptor positive (HCC)      NEURO/PSYCH   (+) Anxiety about health   (+) Stroke (HCC)      Respiratory   (+) Allergic rhinitis      Digestive   (+) Nausea & vomiting      Other   (+) BRCA2 gene mutation positive   (+) Brain mass   (+) Carcinoma of right breast metastatic to axillary lymph node (HCC)   (+) Dense breast tissue   (+) Multiple lesions on computed tomography of brain   s/p right mastectomy & lnd  MRI- There are now a total of 5 mildly enhancing lesions identified within the brain parenchyma  The largest are located within the posterior fossa measuring up to 3 1 cm in diameter with mild surrounding edema and localized mass effect  Findings are most consistent with brain metastasis from patient's known metastatic breast carcinoma  Stable right acoustic neuroma  - discussed imgaing with dr saeed, supratentorial masses are not subacute ischemia but enhancing masses  Physical Exam    Airway    Mallampati score: II  TM Distance: >3 FB  Neck ROM: full     Dental   Comment: Overbite, prominent uppers,     Cardiovascular  Cardiovascular exam normal    Pulmonary  Pulmonary exam normal     Other Findings        Anesthesia Plan  ASA Score- 3     Anesthesia Type- general with ASA Monitors  Additional Monitors: arterial line and central venous line  Airway Plan:     Comment: Right PICC, Left arm restrictions  Large bore PIV- will use saphenous if needed, pt prefer L >R due to remote injury   CVC if needed  Art line likely right radial   All questions answered  Plan Factors-Exercise tolerance (METS): >4 METS  Chart reviewed  EKG reviewed  Imaging results reviewed  Existing labs reviewed  Patient summary reviewed  Patient is not a current smoker  Induction- intravenous  Postoperative Plan-     Informed Consent- Anesthetic plan and risks discussed with patient  I personally reviewed this patient with the CRNA  Discussed and agreed on the Anesthesia Plan with the CRNA  Frederic Chin

## 2023-04-04 NOTE — ANESTHESIA POSTPROCEDURE EVALUATION
Post-Op Assessment Note    CV Status:  Stable  Pain Score: 0    Pain management: adequate     Mental Status:  Alert and awake   Hydration Status:  Euvolemic   PONV Controlled:  Controlled   Airway Patency:  Patent      Post Op Vitals Reviewed: Yes            There were no known notable events for this encounter      BP   145/82   Temp  97 7   Pulse  65   Resp   16   SpO2   100

## 2023-04-05 PROBLEM — D72.829 LEUKOCYTOSIS: Status: ACTIVE | Noted: 2023-01-01

## 2023-04-07 NOTE — TELEPHONE ENCOUNTER
04/07/2023-PT [address]  04/20/2023-2 WK POV PATH [facility] [place]   6/22/2023 MRI BRAIN  06/23/2023-SNPX [facility] [place]

## 2023-04-21 NOTE — PROGRESS NOTES
Consultation - Radiation Oncology      WNA:[id number] : 1970  Encounter: [id number]  Patient Information: [address]  Chief Complaint   Patient presents with   • Consult     Cancer Staging   Malignant neoplasm of central portion of right breast in female, estrogen receptor positive ([facility] [address] )  Staging form: Breast, AJCC 8th Edition  - Clinical: Stage IV (cT4, cN1(f), cM1, G3, ER+, WA+, HER2-) - Signed by Joslyn Lozada MD on 2020  Method of lymph node assessment: Core biopsy  Histologic grading system: 3 grade system  Laterality: Right  Sites of metastasis: Liver  - Pathologic: No Stage Recommended (ypT4b, pN3a, pM1, G3, ER+, WA+, HER2-) - Signed by Joslyn Lozada MD on 2020  Stage prefix: Post-therapy  Neoadjuvant therapy: Yes  Response to neoadjuvant therapy: Partial response  Method of lymph node assessment: Axillary lymph node dissection  Histologic grading system: 3 grade system  Laterality: Right  Sites of metastasis: Liver    Assessment and Plan:  Ms Ralph Pierre is a 47year old woman with stage IV breast cancer currently on olaparib with good response systemically who presented with multiple intracranial metastatic lesions  She is s/p craniotomy for resection of bilateral cerebellar metastases with pathology confirming metastatic breast carcinoma  She is seen today in consideration of SRS/SRT  We reviewed the patient's recent clinical history, imaging studies, and pathology report  We reviewed that post-operative RT would be indicated to improve local control after resection and to treat her remaining unresected intracranial metastases  Given the prior size and distribution of her metastases she would be a good candidate for SRT, though she will need a restaging MRI Brain to confirm this  We reviewed side effects of SRS/SRT to include headache, fatigue, nausea, edema, and radionecrosis   The patient was given the opportunity to ask multiple questions, all of which were answered to her satisfaction; an informed consent was obtained at this visit  The patient will return for repeat MRI Brain and CT simulation on 4/26/23 with plan to start SRT on 5/3/23  I will coordinate treatment planning and delivery with Dr Semaj Cochran  Summary:  - Tentatively recommend SRT to cavity and SRS/SRT to intact metastases, pending repeat MRI Brain   - CT sim on 4/26/23, plan to start treatment on 5/3/23  History of Present Illness   Ms Ricardo Morales is a 47year old BRCA2 positive woman with a history of Stage IV breast cancer initially diagnosed in 5/2019  She was initially treated with bilateral oophorectomy followed by letrozole and palbociclib  She was thereafter treated with docetaxel (1/2020-5/2020) followed by olaparib (beginning in 5/2020)  She also underwent palliative mastectomy in 6/2020  She remains with limited systemic disease with recent CT C/A/P (3/30/23) demonstrating small stable hepatic lesions and otherwise BRIAN  On 3/30/23 she presented with subacute worsening nausea, headache, and imbalance  She underwent CT Head and MRI Brain, which together demonstrated a total of 5 mildly enhancing lesions within the brain parenchyma, the largest of which was in the posterior fossa measuring up to 3 1cm in diameter most consistent with metastatic breast carcinoma  On 4/3/23 she underwent suboccipital craniotomy for resection of bilateral cerebellar masses with pathology confirming metastatic breast carcinoma  Currently she is doing fair overall  She continues to recover after her surgery though does have ongoing difficulty with balance and generalized weakness  She has been ambulating with a walker and up until the day of interview had remained an inpatient in acute rehab recovering  She was seen by neurosurgery on 4/20/23 and was referred to our office for consideration of RT  Of note, she also has a history of a right sided acoustic neuroma       Historical Information   Oncology History   Malignant neoplasm of central portion of right breast in female, estrogen receptor positive ([facility] [address] )   5/3/2019 Biopsy    Invasive ductal carcinoma  Right breast, 10:00 oclock, 5 cmfn  Grade 3  ER 90  WI 90  Her 2 1+    Axillary lymph node biopsy  Metastatic carcinoma             5/3/2019 Biopsy         5/16/2019 -  Cancer Staged    Staging form: Breast, AJCC 8th Edition  - Clinical: Stage IV (cT4, cN1(f), cM1, G3, ER+, WI+, HER2-) - Signed by Paola Hassan MD on 1/7/2020  Laterality: Right  Method of lymph node assessment: Core biopsy  Sites of metastasis: Liver  Histologic grading system: 3 grade system       7/29/2019 Surgery    Laparoscopic BSO  Benign pathology  6/24/2020 -  Cancer Staged    Staging form: Breast, AJCC 8th Edition  - Pathologic: No Stage Recommended (ypT4b, pN3a, pM1, G3, ER+, WI+, HER2-) - Signed by Paola Hassan MD on 6/24/2020  Stage prefix: y  Neoadjuvant therapy: Yes  Response to neoadjuvant therapy: Partial response  Laterality: Right  Method of lymph node assessment: Axillary lymph node dissection  Sites of metastasis: Liver  Histologic grading system: 3 grade system       Carcinoma of right breast metastatic to axillary lymph node ([facility] [address] )   5/3/2019 Initial Diagnosis    Carcinoma of right breast metastatic to axillary lymph node ([facility] [address] )     5/3/2019 Biopsy    A  Breast, Right, us guided bx 1000 5 cm fn:  - Invasive mammary carcinoma  * [place] grade 3 of 3 (total score: 8 of 9)    -- tubule formation 3    -- nuclear grade 2    -- mitoses 3   * Confirmed by tumor cell immunophenotype:    -- positive: Gata3, non-specific p120    -- negative: mammaglobin, E-cadherin   * Invasive carcinoma involves 3 of 3 submitted core biopsies, max  dimension = 14 millimeters  * Estrogen, progesterone & HER2 receptor studies pending, to be described in a separate receptor report      * Ductal carcinoma in situ (DCIS): Not identified    * Lymph-vascular invasion: Not identified    * Microcalcifications: Absent     B  Axillary lymph node, right bx:  - Metastatic carcinoma  ER 90-95%  DC 90-95%  HER2 by IHC 1+ negative     1/7/2020 - 6/4/2020 Chemotherapy    DOCEtaxel (TAXOTERE) 151 6 mg in sodium chloride 0 9 % 250 mL chemo infusion, 75 mg/m2 = 151 6 mg (125 % of original dose 60 mg/m2), Intravenous, Once, 6 of 6 cycles  Dose modification: 75 mg/m2 (original dose 60 mg/m2, Cycle 1, Reason: Dose Not Tolerated)  Administration: 151 6 mg (1/7/2020), 151 6 mg (1/31/2020), 151 6 mg (3/13/2020), 151 6 mg (2/21/2020), 151 6 mg (4/24/2020), 151 6 mg (5/15/2020)     6/5/2020 Surgery    A  Right breast with axillary lymph nodes, mastectomy:  - Residual pleomorphic lobular carcinoma, high grade, numerous foci in all four quartes, up to 3 5 cm  - Pleomorphic lobular carcinoma in situ   - Nipple and skin dermis are positive for invasive carcinoma  - Invasive carcinoma present in deep margin (lower inner quadrate) and is less than 1 mm from superficial margin ( lower outer quadrant)  - Margins are negative for LCIS  - Thirteen of twenty one lymph nodes are positive for tumor (13/21)  Breast cancer metastasized to liver ([address] )   6/11/2019 Initial Diagnosis    Liver metastases ([address] )     6/11/2019 Biopsy    A  Liver (Mass), Biopsy:  - Metastatic adenocarcinoma, consistent with breast primary  See Note       1/7/2020 - 6/4/2020 Chemotherapy    DOCEtaxel (TAXOTERE) 151 6 mg in sodium chloride 0 9 % 250 mL chemo infusion, 75 mg/m2 = 151 6 mg (125 % of original dose 60 mg/m2), Intravenous, Once, 6 of 6 cycles  Dose modification: 75 mg/m2 (original dose 60 mg/m2, Cycle 1, Reason: Dose Not Tolerated)  Administration: 151 6 mg (1/7/2020), 151 6 mg (1/31/2020), 151 6 mg (3/13/2020), 151 6 mg (2/21/2020), 151 6 mg (4/24/2020), 151 6 mg (5/15/2020)     Schwannoma of cranial nerve ([address] )   7/26/2021 Initial Diagnosis    Schwannoma of cranial nerve (HCC)     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 "([facility] )   4/3/2023 Initial Diagnosis    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 ([place] [address] )     4/3/2023 Surgery    Procedure(s):  1  Suboccipital craniotomy for resection of right cerebellar mass then left cerebellar mass, both using neuronavigation and intraoperative microscope  2  Complex dural reconstruction with Durepair, muscle graft, and Duraseal  Dr Kelvin Louis  Brain, \"Cerebellar mass,\" Resection:  - Benign cerebellar tissue  - Negative for malignancy     B  Brain, \"Cerebellar mass #2,\" Resection:  - Metastatic adenocarcinoma, consistent with breast primary     C   Brain, \"Cerebellar mass,\" Resection:  - Metastatic adenocarcinoma, consistent with breast primary    ER 45-55%  IN 70-75%  Ki-67 40-50%  HER2 by IHC 2+ Equivocal  HER2 by FISH: negative/not amplified           Past Medical History:   Diagnosis Date   • Bloating    • Breast cancer ([place] [address] )    • Bruises easily    • Cancer ([place] [address] )     right breast//to lymph nodes/liver/ and bone   • Depression    • History of cancer chemotherapy 05/2020   • History of pneumonia    • Hypotension     occas   • Low iron     \"when pregnant, 20 yrs ago\"   • Neuropathy     hands//fingers   • Shortness of breath     occas   • Stroke [facility])    • Subacromial impingement of left shoulder 03/14/2022   • Tinnitus     right   • Wears glasses      Past Surgical History:   Procedure Laterality Date   • BILATERAL SALPINGOOPHORECTOMY     • BREAST BIOPSY     • CRANIOTOMY Bilateral 4/3/2023    Procedure: Suboccipital craniotomy for tumor resection using neuronavigation;  Surgeon: Lorna Lovell MD;  [facility]: [facility];  Service: Neurosurgery   • IR BIOPSY LIVER MASS  06/11/2019   • MASTECTOMY Right     2019   • MYOMECTOMY      H/O FIBROIDS, NO SURGERY NEEDED   • IN LAPAROSCOPY W/RMVL ADNEXAL STRUCTURES N/A 07/29/2019    Procedure: LAPAROSCOPIC BILATERAL SALPINGO-OOPHORECTOMY;  Surgeon: Bell Vázquez MD;  Location: [facility];  Service: Gynecology Oncology   • IN MASTECTOMY SIMPLE COMPLETE " Right 2020    Procedure: MASTECTOMY SIMPLE, axillary node dissection;  Surgeon: Jeniffer Brice MD;  Location: [facility];  Service: Surgical Oncology   • US GUIDED BREAST BIOPSY RIGHT COMPLETE Right 2019   • US GUIDED BREAST LYMPH NODE BIOPSY RIGHT Right 2019       Family History   Problem Relation Age of Onset   • Hypotension Mother    • Colon cancer Father 36   • Hypertension Father    • Prostate cancer Father 79   • Throat cancer Maternal Grandmother 61   • Cancer Maternal Grandmother    • Breast cancer Paternal Aunt         age unknown       Social History   Social History     Substance and Sexual Activity   Alcohol Use Yes    Comment: occassional     Social History     Substance and Sexual Activity   Drug Use No     Social History     Tobacco Use   Smoking Status Former   • Packs/day:  00   • Years: 30    • Pack years: 30    • Types: Cigarettes   • Quit date:    • Years since quittin 3   Smokeless Tobacco Never         Meds/Allergies     Current Outpatient Medications:   •  albuterol (Proventil HFA) 90 mcg/act inhaler, Inhale 2 puffs every 6 (six) hours as needed for wheezing, Disp: 6 7 g, Rfl: 5  •  aluminum-magnesium hydroxide-simethicone (MYLANTA) 5466-8854-576 mg/30 mL suspension, Take 20 mL by mouth 4 (four) times a day as needed for indigestion or heartburn, Disp: , Rfl:   •  ascorbic acid (VITAMIN C) 500 MG tablet, Take 500 mg by mouth daily, Disp: , Rfl:   •  bisacodyl (DULCOLAX) 5 mg EC tablet, Take 5 mg by mouth 2 (two) times a day as needed for constipation, Disp: , Rfl:   •  Cholecalciferol 25 MCG (1000 UT) capsule, Take 2,000 Units by mouth daily, Disp: , Rfl:   •  fluticasone-vilanterol (Breo Ellipta) 200-25 mcg/actuation inhaler, Inhale 1 puff daily Rinse mouth after use , Disp: 180 blister, Rfl: 0  •  Multiple Vitamins-Minerals (multivitamin with minerals) tablet, Take 1 tablet by mouth daily, Disp: , Rfl:   •  ondansetron (ZOFRAN) 4 mg tablet, Take 1 tablet (4 mg total) by mouth every 8 (eight) hours as needed for nausea or vomiting, Disp: 20 tablet, Rfl: 0  •  senna-docusate sodium (SENOKOT S) 8 6-50 mg per tablet, Take 2 tablets by mouth daily at bedtime, Disp: , Rfl: 0  •  ALPRAZolam (XANAX) 0 5 mg tablet, Take 0 5-1 tablets (0 25-0 5 mg total) by mouth daily at bedtime as needed for anxiety or sleep (anxiousness or insomnia) (Patient not taking: Reported on 4/20/2023), Disp: 5 tablet, Rfl: 0  •  atorvastatin (LIPITOR) 40 mg tablet, TAKE 1 TABLET BY MOUTH EVERY DAY (Patient not taking: Reported on 4/21/2023), Disp: 90 tablet, Rfl: 0  •  Azelaic Acid (Finacea) 15 % FOAM, Apply 1 Pump topically 2 (two) times a day (Patient not taking: Reported on 4/20/2023), Disp: 50 g, Rfl: 3  •  insulin lispro (HumaLOG) 100 units/mL injection, Inject 1-6 Units under the skin 3 (three) times a day before meals Do not start before April 12, 2023  (Patient not taking: Reported on 4/20/2023), Disp: , Rfl: 0  •  meclizine (ANTIVERT) 25 mg tablet, Take 25 mg by mouth 3 (three) times a day as needed for dizziness (Patient not taking: Reported on 4/21/2023), Disp: , Rfl:   •  metroNIDAZOLE (METROCREAM) 0 75 % cream, Apply topically 2 (two) times a day (Patient not taking: Reported on 4/20/2023), Disp: 45 g, Rfl: 3  •  olaparib (LYNPARZA) tablet, Take 2 tablets (300 mg total) by mouth 2 (two) times a day (Patient not taking: Reported on 4/20/2023), Disp: 120 tablet, Rfl: 2  •  oxyCODONE (ROXICODONE) 10 MG TABS, Take 0 5 tablets (5 mg total) by mouth every 6 (six) hours as needed for moderate pain or severe pain for up to 10 days Max Daily Amount: 20 mg (Patient not taking: Reported on 4/21/2023), Disp: 7 tablet, Rfl: 0  •  pantoprazole (PROTONIX) 40 mg tablet, TAKE 1 TABLET BY MOUTH EVERY DAY (Patient not taking: Reported on 4/21/2023), Disp: 90 tablet, Rfl: 0  •  polyethylene glycol (MIRALAX) 17 g packet, Take 17 g by mouth daily Do not start before April 12, 2023   (Patient not taking: Reported "on 4/20/2023), Disp: , Rfl: 0  •  Specialty Vitamins Products (Advanced Collagen) TABS, Take by mouth in the morning (Patient not taking: Reported on 4/20/2023), Disp: , Rfl:   Allergies   Allergen Reactions   • Tylenol [Acetaminophen]      Told to avoid due to cancer      Review of Systems   Constitutional: Positive for appetite change (decreased secondary ot N/V), fatigue and unexpected weight change (approx 20 lbs in the last month)  HENT: Positive for hearing loss (decreased hearing in right ear)  Eyes:        Wears glasses   Respiratory: Negative for chest tightness, shortness of breath and wheezing  Cardiovascular: Positive for leg swelling (mild b/l LE swelling)  Gastrointestinal: Positive for nausea and vomiting  Negative for abdominal pain, constipation and diarrhea  Genitourinary: Positive for difficulty urinating (slow stream)  Negative for dysuria and urgency  Musculoskeletal: Positive for gait problem (using walker)  Skin: Negative for rash and wound  Ecchymosis below left eye/temporal area secondary to fall   Allergic/Immunologic: Negative for environmental allergies and food allergies  Neurological: Positive for tremors (mild, both hands), speech difficulty (mild word finding difficulty) and headaches (occasional mild headaches, #1-2/10)  Negative for dizziness, seizures, weakness, light-headedness and numbness  Hematological: Positive for adenopathy (intermittent right arm lymphedema)  Bruises/bleeds easily  Psychiatric/Behavioral: Negative for dysphoric mood  The patient is not nervous/anxious             OBJECTIVE:   /70 (BP Location: Left arm, Patient Position: Sitting)   Pulse 105   Temp 98 5 °F (36 9 °C) (Temporal)   Resp 16   Ht 5' 6\" (1 676 m)   Wt 106 kg (233 lb 9 6 oz)   SpO2 99%   BMI 37 70 kg/m²   Pain Assessment:  2  Performance Status: ECOG/Zub[place]/WHO: 2 - Symptomatic, <50% confined to bed    Physical Exam   Well appearing  NAD     No increased " work of breathing  Ambulates with walker  RESULTS  Lab Results    Chemistry        Component Value Date/Time    K 3 7 04/09/2023 0600    K 4 0 04/21/2021 1355     04/09/2023 0600     04/21/2021 1355    CO2 29 04/09/2023 0600    CO2 23 04/03/2023 1924    CO2 29 04/21/2021 1355    BUN 25 04/09/2023 0600    BUN 13 04/21/2021 1355    CREATININE 0 75 04/09/2023 0600        Component Value Date/Time    CALCIUM 9 7 04/09/2023 0600    CALCIUM 9 3 04/21/2021 1355    ALKPHOS 61 04/04/2023 0554    ALKPHOS 78 04/21/2021 1355    AST 26 04/04/2023 0554    AST 19 04/21/2021 1355    ALT 28 04/04/2023 0554    ALT 21 04/21/2021 1355            Lab Results   Component Value Date    WBC 10 79 (H) 04/09/2023    HGB 13 7 04/09/2023    HCT 39 5 04/09/2023     (H) 04/09/2023     (L) 04/09/2023         Imaging Studies  CT head wo contrast    Result Date: 4/8/2023  Narrative: CT BRAIN - WITHOUT CONTRAST INDICATION:   Traumatic brain injury (TBI), new or progressive neuro deficits brain mass  COMPARISON:  CT head 4/5/2023  MRI brain 4/4/2023  TECHNIQUE:  CT examination of the brain was performed  Multiplanar 2D reformatted images were created from the source data  Radiation dose length product (DLP) for this visit:  894 mGy-cm   This examination, like all CT scans performed in the [facility], was performed utilizing techniques to minimize radiation dose exposure, including the use of iterative reconstruction and automated exposure control  IMAGE QUALITY:  Diagnostic  FINDINGS: PARENCHYMA:  Reidentified postsurgical changes of suboccipital craniotomy  Known cerebral metastases better visualized on the comparison MRI; for example unchanged 14 mm right frontal lesion #2/29  The other lesions are not well visualized on this study  No new mass or mass effect  No hydrocephalus  No right internal auditory canal lesion is not visualized on this study   High density areas through the posterior fossa surgical bed are unchanged  Unchanged surgical bed postoperative findings  VENTRICLES AND EXTRA-AXIAL SPACES:  Unchanged compressive mass effect on the 4th ventricle  No hydrocephalus  VISUALIZED ORBITS: Normal visualized orbits  PARANASAL SINUSES: Normal visualized paranasal sinuses  CALVARIUM AND EXTRACRANIAL SOFT TISSUES:  Suboccipital craniotomy  Impression: No significant change when compared with the recent CT abdomen 4/5/2023 MRI brain 4/4/2023  Workstation performed: [id number]     CT head wo contrast    Result Date: 4/5/2023  Narrative: CT BRAIN - WITHOUT CONTRAST INDICATION:   fall, recent surgery  COMPARISON:  CT head dated 4/4/2023, MR brain dated 4/4/2023 TECHNIQUE:  CT examination of the brain was performed  Multiplanar 2D reformatted images were created from the source data  Radiation dose length product (DLP) for this visit:  930 33 mGy-cm   This examination, like all CT scans performed in the [facility], was performed utilizing techniques to minimize radiation dose exposure, including the use of iterative  reconstruction and automated exposure control  IMAGE QUALITY:  Diagnostic  FINDINGS: Status post suboccipital craniotomy, status post resection of occipital metastatic lesions  Large area of edema and scattered blood products are redemonstrated in the resection cavity in the posterior fossa, similar to the prior  There is a small amount of pneumocephalus redemonstrated, similar to the prior  Effacement of the 4th ventricle redemonstrated, as before  No territorial infarct is seen  Small amount of blood in the posterior horn of the right lateral ventricle now seen, no hydrocephalus  Basal cisterns remain patent  Several subcentimeter metastatic lesions in the right frontal lobe are redemonstrated  The intracranial structures are otherwise intervally unchanged  VISUALIZED ORBITS: Normal visualized orbits  PARANASAL SINUSES: Normal visualized paranasal sinuses  CALVARIUM AND EXTRACRANIAL SOFT TISSUES:  Calvarium otherwise appears intact  Left-sided soft tissue swelling redemonstrated  Impression: Postsurgical changes status post suboccipital craniotomy as described, similar to the prior  Effacement of the 4th ventricle is redemonstrated, as before  A small amount of blood is now seen dependently in the right lateral ventricle, no hydrocephalus  No other significant interval change  Clinical follow-up recommended  Workstation performed: [id number]     CT head wo contrast    Result Date: 4/5/2023  Narrative: CT BRAIN - WITHOUT CONTRAST INDICATION:   fall  COMPARISON:  CT brain on 3/30/2023 and MRI brain of the same day  TECHNIQUE:  CT examination of the brain was performed  Multiplanar 2D reformatted images were created from the source data  Radiation dose length product (DLP) for this visit:  935 03 mGy-cm   This examination, like all CT scans performed in the [facility], was performed utilizing techniques to minimize radiation dose exposure, including the use of iterative  reconstruction and automated exposure control  IMAGE QUALITY:  Diagnostic  FINDINGS: PARENCHYMA: Scattered foci of intracranial air and subarachnoid hemorrhage in the posterior cranial fossa likely related to recent resection  Suboccipital craniotomy bony defect is seen with adjacent subarachnoid hemorrhage/parenchymal hemorrhage and cystic cavity within the bilateral cerebellum  No gross supratentorial intracranial hemorrhage identified  Increased density foci in the bilateral frontal lobes likely represents metastatic lesions  Trace right subdural hygroma  No significant mass effect, midline shift, or evidence of transtentorial herniation  VENTRICLES AND EXTRA-AXIAL SPACES:  Normal for the patient's age  VISUALIZED ORBITS: Normal visualized orbits and globes  PARANASAL SINUSES: Minimal mucoperiosteal thickening bilateral sphenoid sinuses    The remaining paranasal sinuses and mastoid air cells appear adequately aerated and clear without air-fluid levels CALVARIUM AND EXTRACRANIAL SOFT TISSUES:  Suboccipital craniotomy defect is seen  Bony calvarium and temporal mandibular joints are otherwise intact  Left frontotemporal scalp soft tissue swelling  Impression: Postoperative changes from suboccipital craniotomy and resection of cerebellar lesions as detailed above  Blood products in the posterior cranial fossa likely postsurgical in nature but cannot exclude increased blood products from trauma in this setting  If clinically indicated, consider follow-up exam to ensure stability  Workstation performed: [id number]     CT head w wo contrast    Result Date: 3/30/2023  Narrative: CT BRAIN - WITH AND WITHOUT CONTRAST INDICATION:   hx stage 4 cancer concern for new metastatic brain lesion - assessing for mass  COMPARISON:  CTA neck 12/9/2022 TECHNIQUE:  CT examination of the brain was performed both prior to and after the administration of intravenous contrast   Multiplanar 2D reformatted images were created from the source data  Radiation dose length product (DLP) for this visit:  1822 37 mGy-cm   This examination, like all CT scans performed in the [facility], was performed utilizing techniques to minimize radiation dose exposure, including the use of iterative reconstruction and automated exposure control  IV Contrast:  100 mL of iohexol (OMNIPAQUE) 350  IMAGE QUALITY:  Diagnostic  FINDINGS: PARENCHYMA:  No evidence of acute infarction  No intracranial hemorrhage or midline shift  1 3 x 1 cm right posterior frontal centrally hypodense mass with thin mildly dense enhancing soft tissue rim  1 5 x 1 1 cm mildly hyperdense and mildly enhancing right mid frontal cortical mass  3 2 x 2 9 cm right mid medial cerebellar mildly hyperdense enhancing mass new since December 2022  This may be infiltrating the contralateral cerebellar hemisphere   2 2 x 1 5 cm left inferior medial cerebellar enhancing mass new since December 2022n  5 x 5 mm left mid frontal enhancing lesion  VENTRICLES AND EXTRA-AXIAL SPACES:  Mild effacement of the inferior right fourth ventricle  No acute hydrocephalus  VISUALIZED ORBITS: Normal visualized orbits  PARANASAL SINUSES: Normal visualized paranasal sinuses  CALVARIUM:  Normal      Impression: Multiple supratentorial and infratentorial enhancing lesions, the largest of which is in the right medial cerebellum measuring approximately 3 2 cm  Mild effacement of the inferior fourth ventricle without acute hydrocephalus  The study was marked in [facility] for immediate notification  Workstation performed: [id number]     MRI brain w wo contrast    Result Date: 4/4/2023  Narrative: MRI BRAIN WITH AND WITHOUT CONTRAST INDICATION: post op crani  History of breast cancer with metastases  COMPARISON:  MRI brain PTE with and without contrast March 30, 2023  CT head without contrast March 30, 2023  TECHNIQUE: Multiplanar, multisequence imaging of the brain was performed before and after gadolinium administration  IV Contrast:  14 mL of Gadobutrol injection (SINGLE-DOSE)  IMAGE QUALITY:   Diagnostic  FINDINGS: BRAIN PARENCHYMA: Postsurgical changes of suboccipital craniotomy for resection of posterior midline cerebellar metastatic lesion and left inferior cerebellar metastatic lesion  Cerebellar resection cavity involves the right cerebellum, cerebellar vermis, and a portion of the left inferior cerebellum which contains blood products, locules of gas, and peripheral marginal ischemic change  Small amount of irregular nodular enhancement adjacent to left inferior cerebellar resection margin suspicious for residual tumor  No residual tumor in the right cerebellar or cerebellar vermis resection sites  Several poorly enhancing metastatic lesions some of which have bright DWI signal (measured in long axis): -1 3 cm right posterior frontal lobe lesion (11:24), unchanged  -1 4 cm right frontal lobe lesion (11:23), unchanged  -0 6 cm left frontal lobe lesion (11:23), unchanged  Faintly enhancing lesions in right paramedian cameron and left lateral cameron could represent capillary telangiectasias  1 4 cm right internal auditory canal lesion (12:48), compatible with vestibular schwannoma  New postoperative ischemia in left paramidline cerebellar region  Acute small-volume subarachnoid hemorrhage in bilateral cerebellar folia, likely from recent surgery  Pneumocephalus  No midline shift  Small scattered hyperintensities on T2/FLAIR imaging are noted in the periventricular and subcortical white matter demonstrating an appearance that is statistically most likely to represent mild microangiopathic change  VENTRICLES:  Compressive mass effect on 4th ventricle  Slight dilatation in temporal horns of both lateral ventricles  Acute trace intraventricular hemorrhage layering in occipital horns of both lateral ventricles  SELLA AND PITUITARY GLAND:  Normal  ORBITS:  Normal  PARANASAL SINUSES:  Mild scattered mucosal thickening with scattered air-fluid levels, worse in bilateral sphenoid sinuses  VASCULATURE:  Evaluation of the major intracranial vasculature demonstrates appropriate flow voids  CALVARIUM AND SKULL BASE:  Normal  EXTRACRANIAL SOFT TISSUES:  Partially imaged endotracheal and orogastric tubing  Impression: Postsurgical changes of suboccipital craniotomy for resection of posterior midline cerebellar metastatic lesion and left inferior cerebellar metastatic lesion  Cerebellar resection cavity involves the right cerebellum, cerebellar vermis, and a portion of the left inferior cerebellum which contains blood products, locules of gas, and peripheral marginal ischemic change  Small amount of irregular nodular enhancement adjacent to left inferior cerebellar resection margin suspicious for residual tumor  No  residual tumor in the right cerebellar or cerebellar vermis resection sites  New postoperative ischemia in left paramidline cerebellar region  Acute small-volume subarachnoid hemorrhage in bilateral cerebellar folia, likely from recent surgery  Unchanged several poorly enhancing metastatic lesions in bilateral frontal lobes  Unchanged 1 4 cm right vestibular schwannoma  Faintly enhancing lesions in right paramedian cameron and left lateral cameron could represent capillary telangiectasias  Attention on follow-up  Slight dilatation in temporal horns of both lateral ventricles with compressive mass effect on the 4th ventricle  Pneumocephalus  The study was marked in [facility] for immediate notification  Workstation performed: [id number]     X-ray chest 1 view    Result Date: 4/4/2023  Narrative: CHEST INDICATION:   Endotracheal tube positioning  COMPARISON:  8/12/2020 EXAM PERFORMED/VIEWS:  XR CHEST 1 VIEW FINDINGS:  Endotracheal tube is present, in satisfactory position with its tip 4 cm above the level of the rob  Enteric tube is present with its tip extending below the left hemidiaphragm  Left-sided PICC projects over the SVC  Cardiomediastinal silhouette appears unremarkable  The lungs are clear  No pneumothorax or pleural effusion  Osseous structures appear within normal limits for patient age  Impression: No acute cardiopulmonary disease  Endotracheal tube projects 4 cm above the rob  Workstation performed: [id number]     CT chest abdomen pelvis w contrast    Result Date: 3/31/2023  Narrative: CT CHEST, ABDOMEN AND PELVIS WITH IV CONTRAST INDICATION:   Breast cancer, invasive, stage IV, pre-therapy or re-staging restaging, new brain mets in the setting of stage iv breast cancer  COMPARISON: Multiple prior CT examinations of the chest abdomen pelvis commencing  May 24, 2019 with direct comparison made to February 17, 2023  TECHNIQUE: CT examination of the chest, abdomen and pelvis was performed  Multiplanar 2D reformatted images were created from the source data   Radiation dose length product (DLP) for this visit:  1527 02 mGy-cm   This examination, like all CT scans performed in the [facility], was performed utilizing techniques to minimize radiation dose exposure, including the use of iterative reconstruction and automated exposure control  IV Contrast:  90 mL of iohexol (OMNIPAQUE) Enteric Contrast: Enteric contrast was not administered  FINDINGS: CHEST LUNGS:  Mild emphysema  No suspicious pulmonary nodules  PLEURA:  Unremarkable  HEART/GREAT VESSELS: Heart is unremarkable for patient's age  No thoracic aortic aneurysm  MEDIASTINUM AND HAIM:  Unremarkable  CHEST WALL AND LOWER NECK:  Post right mastectomy ABDOMEN LIVER/BILIARY TREE:  No surface nodularity  Hypoattenuating segment 7 lesion is unchanged measuring 0 7 x 0 4 cm (series 2, image 100)  This corresponds to a metastasis which measured 2 3 x 1 7 cm on October 28, 2019 (series 2, image 156)  Hypoattenuating segment IVb lesion is unchanged measuring 0 7 x 0 5 cm (series 2, image 111)  This corresponds to metastasis which measured 1 6 x 1 6 cm on October 28, 2019 (series 2, image 162)  Hypoattenuating segment 5 lesion is unchanged measuring 1 0 x 0 7 cm (series 2, image 116)  This corresponds to a metastasis which measured 1 5 x 1 4 cm on October 28, 2019 (series 2, image 166)  Retraction of the posterior hepatic capsule at segment 6 corresponds to site of metastases evident on October 28, 2019 (series 2, image 127 compared to series 2, image 170)  Hypoattenuating segment 2 lesion is unchanged measuring 0 3 x 0 3 cm (series 2, image 87)  The lesion is similar in size on the October 20, 2019 CT and therefore may be a cyst  No new hepatic lesion  Patent hepatic and portal veins  No biliary duct dilatation  GALLBLADDER:  No calcified gallstones  No pericholecystic inflammatory change  SPLEEN:  Unremarkable  PANCREAS:  Unremarkable  ADRENAL GLANDS:  Unremarkable  KIDNEYS/URETERS:  Unremarkable   No hydronephrosis  STOMACH AND BOWEL:  Unremarkable  APPENDIX:  A normal appendix was visualized  ABDOMINOPELVIC CAVITY:  No ascites  No pneumoperitoneum  No lymphadenopathy  VESSELS:  Unremarkable for patient's age  PELVIS REPRODUCTIVE ORGANS:  Unremarkable for patient's age  URINARY BLADDER:  Unremarkable  ABDOMINAL WALL/INGUINAL REGIONS:  Unremarkable  OSSEOUS STRUCTURES:  0 3 cm sclerotic lesion in the right iliac bone is unchanged from October 28, 2019 and is likely a bone [address] (series 2, image 190)  No suspicious osseous lesion  No acute fracture  Impression: Since February 17, 2023: 1  Unchanged hepatic lesions  2   No new sites of disease in the chest abdomen or pelvis  Workstation performed: [id number]     MRI Brain BT w wo Contrast    Result Date: 3/31/2023  Narrative: MRI BRAIN AND IACS WITH AND WITHOUT CONTRAST INDICATION: Patient with metastatic breast cancer to liver and lymph node presented with nausea, vomiting, headache, found to have new brain mets      The patient has a known right acoustic neuroma  COMPARISON:  6/23/2022 TECHNIQUE: Multiplanar, multisequence imaging of the brain was performed before and after gadolinium administration  IV Contrast:  11 mL of Gadobutrol injection (SINGLE-DOSE)  IMAGE QUALITY:   Diagnostic  FINDINGS: BRAIN PARENCHYMA:  Enlarging enhancing masses are identified within the brain parenchyma  Previously seen foci of subtle enhancement thought to represent subacute ischemia within the right superior frontal lobe have increased in size  The larger of the  2 is best seen on series 17 image 23 and now measures 1 3 x 1 3 cm  Slightly more anteriorly and superiorly within the right frontal lobe, the 2nd lesion measures 1 3 cm in maximum dimension as well  There are now large enhancing lesions identified within the posterior fossa  The larger of the 2 is located within the inferior aspect of the cerebellar vermis measuring 2 8 x 3 1 x 2 8 cm    More anteriorly and "inferiorly, within the left cerebellum is a 2nd lesion measuring 1 3 x 2 2 x 1 4 cm  There is vasogenic edema surrounding the larger of the 2 lesions resulting in mild localized mass effect  Subcentimeter enhancing lesion within the left posterior superior frontal lobe difficult to visualize on axial imaging is better seen on sagittal postcontrast imaging, series 16 image 10 measuring 6 mm  Arterial spin labeling was performed and there appears to be mild subtle increased cerebral blood flow within portions of the 2 larger lesions in the posterior fossa  Solitary white matter hyperintensity within the right anterior frontal lobe on series 6 image 15 was present on the prior examination and may represent sequela of mild chronic microangiopathic change  There is an enhancing lesion identified within the right CP angle similar to prior examination extending into the IAC measuring 1 6 cm in long axis consistent with patient's known acoustic neuroma  VENTRICLES:  Normal for the patient's age  SELLA AND PITUITARY GLAND:  Normal  ORBITS:  Normal  PARANASAL SINUSES:  Normal  VASCULATURE:  Evaluation of the major intracranial vasculature demonstrates appropriate flow voids  CALVARIUM AND SKULL BASE:  Normal  EXTRACRANIAL SOFT TISSUES:  Normal      Impression: There are now a total of 5 mildly enhancing lesions identified within the brain parenchyma  The largest are located within the posterior fossa measuring up to 3 1 cm in diameter with mild surrounding edema and localized mass effect  Findings are most consistent with brain metastasis from patient's known metastatic breast carcinoma  Stable right acoustic neuroma  This examination was marked \"immediate notification\" in Epic in order to begin the standard process by which the radiology reading room liaison alerts the referring practitioner  Workstation performed: [id number]         Pathology: As above  ASSESSMENT  1   Breast cancer metastasized to brain [facility])  " "Ambulatory Referral to Radiation Oncology    MRI brain w wo contrast        Cancer Staging   Malignant neoplasm of central portion of right breast in female, estrogen receptor positive ([facility] [address] )  Staging form: Breast, AJCC 8th Edition  - Clinical: Stage IV (cT4, cN1(f), cM1, G3, ER+, SC+, HER2-) - Signed by aMame Pimentel MD on 1/7/2020  Method of lymph node assessment: Core biopsy  Histologic grading system: 3 grade system  Laterality: Right  Sites of metastasis: Liver  - Pathologic: No Stage Recommended (ypT4b, pN3a, pM1, G3, ER+, SC+, HER2-) - Signed by Maame Pimentel MD on 6/24/2020  Stage prefix: Post-therapy  Neoadjuvant therapy: Yes  Response to neoadjuvant therapy: Partial response  Method of lymph node assessment: Axillary lymph node dissection  Histologic grading system: 3 grade system  Laterality: Right  Sites of metastasis: Liver        PLAN/DISCUSSION  Orders Placed This Encounter   Procedures   • MRI brain w wo contrast      Boby Servin MD  4/21/2023,3:08 PM    Total time spent reviewing EMR, seeing patient in consultation, documenting visit, placing treatment orders, and communicating with other medical providers: 52 minutes    Portions of the record may have been created with voice recognition software  Occasional wrong word or \"sound a like\" substitutions may have occurred due to the inherent limitations of voice recognition software  Read the chart carefully and recognize, using context, where substitutions have occurred          "

## 2023-04-21 NOTE — ASSESSMENT & PLAN NOTE
Follows with palliative care   Xanax prn for anxiety/sleep Sandhya Oliva)  Urology  [address] [place] [address]  Phone: [phone]  Fax: [phone]  Follow Up Time:

## 2023-04-21 NOTE — PROGRESS NOTES
Hilda Cesar 1970 is a 47 y o  female  with history of metastatic right breast cancer, grade 3, ER 90% positive, ME 90% positive, HER2 negative disease  She has BRCA 2 mutation  She was premenopausal at time of diagnosis, s/p bilateral surgical ovarian ablation July 2019 followed by  palbociclib and letrozole  She had rapid progression of disease in 2019, treated with Taxotere x6 cycle resulting in partial response  She underwent palliative right mastectomy June 2020, followed by olaparib since mid July 2020 with Dr Sreekanth Palmaian has had no prior radiation  She was also followed by neurosurgery and neurology as an outpatient for known right-sided schwannoma, prior history of stroke, and an aneurysm       3/30/23 Admitted with nausea, headache, and vertigo  CT head demonstrative of multiple supra and infratentorial enhancing lesions, the largest of which measures 3 2 cm in the medial cerebellum      3/30/23 CT head w wo contrast  IMPRESSION:   Multiple supratentorial and infratentorial enhancing lesions, the largest of which is in the right medial cerebellum measuring approximately 3 2 cm    Mild effacement of the inferior fourth ventricle without acute hydrocephalus      3/30/23 CT CAP  IMPRESSION:   Since February 17, 2023:  1   Unchanged hepatic lesions  2   No new sites of disease in the chest abdomen or pelvis      4/3/23 Procedure(s):  1  Suboccipital craniotomy for resection of right cerebellar mass then left cerebellar mass, both using neuronavigation and intraoperative microscope  2   Complex dural reconstruction with Durepair, muscle graft, and Duraseal     Operative Findings:  -Gross total resection of two large vascularized lesions in bilateral cerebellar hemispheres, both surrounded by vasogenic edema, confirmed with neuronavigation and ultrasound  -Adjacent vasculature and brainstem remain intact after resection  -Frozen c/w high-grade poorly differentiated malignancy, favor metastatic "carcinoma     Pathology:   A  Brain, \"Cerebellar mass,\" Resection:  - Benign cerebellar tissue  - Negative for malignancy     B  Brain, \"Cerebellar mass #2,\" Resection:  - Metastatic adenocarcinoma, consistent with breast primary     C  Brain, \"Cerebellar mass,\" Resection:  - Metastatic adenocarcinoma, consistent with breast primary     ER 45-55%  CT 70-75%  Ki-67 40-50%  HER2 by IHC 2+ Equivocal  HER2 by FISH: negative/not amplified     4/4/23 MRI brain:  IMPRESSION:   Postsurgical changes of suboccipital craniotomy for resection of posterior midline cerebellar metastatic lesion and left inferior cerebellar metastatic lesion   Cerebellar resection cavity involves the right cerebellum, cerebellar vermis, and a portion of the left inferior cerebellum which contains blood products, locules of gas, and peripheral marginal ischemic change   Small amount of irregular nodular enhancement adjacent to left inferior cerebellar resection margin suspicious for residual tumor  No residual tumor in the right cerebellar or cerebellar vermis resection sites    New postoperative ischemia in left paramidline cerebellar region    Acute small-volume subarachnoid hemorrhage in bilateral cerebellar folia, likely from recent surgery    Unchanged several poorly enhancing metastatic lesions in bilateral frontal lobes    Unchanged 1 4 cm right vestibular schwannoma    Faintly enhancing lesions in right paramedian cameron and left lateral cameron could represent capillary telangiectasias   Attention on follow-up    Slight dilatation in temporal horns of both lateral ventricles with compressive mass effect on the 4th ventricle     Pneumocephalus      4/8/23 CT head wo contrast  INDICATION:   Traumatic brain injury (TBI), new or progressive neuro deficits  brain mass    IMPRESSION:   No significant change when compared with the recent CT abdomen 4/5/2023 MRI brain 4/4/2023 4/11/23 Discharged to [facility]  - PT/OT recommended rehab  - dexamethasone " taper per neurosurgery     4/20/23 Neurosurgery, Dr Bernice Rendon  Incision healing well  Preoperative symptoms, including nausea, dizziness, gait imbalance, are gradually improving in rehab  Rad Onc referral   Next follow up - 6 week POV        5/1/23 CT CAP  5/4/23 Hem Onc, Dr Fredi Paula  5/16/23 Neurosurgery, Dr Bernice Rendon  6/22/23 MRI angiogram head wo contrast, MRI brain IAC  6/23/23 neurosurgery, [address], [address]  8/28/23 Neurosurgery, [address]       Oncology History   Malignant neoplasm of central portion of right breast in female, estrogen receptor positive ([facility] [address] )   5/3/2019 Biopsy    Invasive ductal carcinoma  Right breast, 10:00 oclock, 5 cmfn  Grade 3  ER 90  VA 90  Her 2 1+    Axillary lymph node biopsy  Metastatic carcinoma             5/3/2019 Biopsy         5/16/2019 -  Cancer Staged    Staging form: Breast, AJCC 8th Edition  - Clinical: Stage IV (cT4, cN1(f), cM1, G3, ER+, VA+, HER2-) - Signed by Nii Valles MD on 1/7/2020  Laterality: Right  Method of lymph node assessment: Core biopsy  Sites of metastasis: Liver  Histologic grading system: 3 grade system       7/29/2019 Surgery    Laparoscopic BSO  Benign pathology  6/24/2020 -  Cancer Staged    Staging form: Breast, AJCC 8th Edition  - Pathologic: No Stage Recommended (ypT4b, pN3a, pM1, G3, ER+, VA+, HER2-) - Signed by Nii Valles MD on 6/24/2020  Stage prefix: y  Neoadjuvant therapy: Yes  Response to neoadjuvant therapy: Partial response  Laterality: Right  Method of lymph node assessment: Axillary lymph node dissection  Sites of metastasis: Liver  Histologic grading system: 3 grade system       Carcinoma of right breast metastatic to axillary lymph node ([address] )   5/3/2019 Initial Diagnosis    Carcinoma of right breast metastatic to axillary lymph node ([address] )     5/3/2019 Biopsy    A  Breast, Right, us guided bx 1000 5 cm fn:  - Invasive mammary carcinoma     * Philadelphia grade 3 of 3 (total score: 8 of 9)    -- tubule formation 3    -- nuclear grade 2    -- mitoses 3   * Confirmed by tumor cell immunophenotype:    -- positive: Gata3, non-specific p120    -- negative: mammaglobin, E-cadherin   * Invasive carcinoma involves 3 of 3 submitted core biopsies, max  dimension = 14 millimeters  * Estrogen, progesterone & HER2 receptor studies pending, to be described in a separate receptor report  * Ductal carcinoma in situ (DCIS): Not identified    * Lymph-vascular invasion: Not identified    * Microcalcifications: Absent     B  Axillary lymph node, right bx:  - Metastatic carcinoma  ER 90-95%  IL 90-95%  HER2 by IHC 1+ negative     1/7/2020 - 6/4/2020 Chemotherapy    DOCEtaxel (TAXOTERE) 151 6 mg in sodium chloride 0 9 % 250 mL chemo infusion, 75 mg/m2 = 151 6 mg (125 % of original dose 60 mg/m2), Intravenous, Once, 6 of 6 cycles  Dose modification: 75 mg/m2 (original dose 60 mg/m2, Cycle 1, Reason: Dose Not Tolerated)  Administration: 151 6 mg (1/7/2020), 151 6 mg (1/31/2020), 151 6 mg (3/13/2020), 151 6 mg (2/21/2020), 151 6 mg (4/24/2020), 151 6 mg (5/15/2020)     6/5/2020 Surgery    A  Right breast with axillary lymph nodes, mastectomy:  - Residual pleomorphic lobular carcinoma, high grade, numerous foci in all four quartes, up to 3 5 cm  - Pleomorphic lobular carcinoma in situ   - Nipple and skin dermis are positive for invasive carcinoma  - Invasive carcinoma present in deep margin (lower inner quadrate) and is less than 1 mm from superficial margin ( lower outer quadrant)  - Margins are negative for LCIS  - Thirteen of twenty one lymph nodes are positive for tumor (13/21)  Breast cancer metastasized to liver ([address] )   6/11/2019 Initial Diagnosis    Liver metastases ([address] )     6/11/2019 Biopsy    A  Liver (Mass), Biopsy:  - Metastatic adenocarcinoma, consistent with breast primary  See Note       1/7/2020 - 6/4/2020 Chemotherapy    DOCEtaxel (TAXOTERE) 151 6 mg in sodium chloride 0 9 % 250 mL chemo infusion, 75 mg/m2 = 151 6 mg (125 % of original dose 60 "mg/m2), Intravenous, Once, 6 of 6 cycles  Dose modification: 75 mg/m2 (original dose 60 mg/m2, Cycle 1, Reason: Dose Not Tolerated)  Administration: 151 6 mg (1/7/2020), 151 6 mg (1/31/2020), 151 6 mg (3/13/2020), 151 6 mg (2/21/2020), 151 6 mg (4/24/2020), 151 6 mg (5/15/2020)     Schwannoma of cranial nerve ([facility] [address] )   7/26/2021 Initial Diagnosis    Schwannoma of cranial nerve (HCC)     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 ([facility] [address] )   4/3/2023 Initial Diagnosis    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 ([facility] [address] )     4/3/2023 Surgery    Procedure(s):  1  Suboccipital craniotomy for resection of right cerebellar mass then left cerebellar mass, both using neuronavigation and intraoperative microscope  2  Complex dural reconstruction with Durepair, muscle graft, and Duraseal  Dr Kris Ye  Brain, \"Cerebellar mass,\" Resection:  - Benign cerebellar tissue  - Negative for malignancy     B  Brain, \"Cerebellar mass #2,\" Resection:  - Metastatic adenocarcinoma, consistent with breast primary     C  Brain, \"Cerebellar mass,\" Resection:  - Metastatic adenocarcinoma, consistent with breast primary    ER 45-55%  AZ 70-75%  Ki-67 40-50%  HER2 by IHC 2+ Equivocal  HER2 by FISH: negative/not amplified         Review of Systems:  Review of Systems   Constitutional: Positive for appetite change (decreased secondary ot N/V), fatigue and unexpected weight change (approx 20 lbs in the last month)  HENT: Positive for hearing loss (decreased hearing in right ear)  Eyes:        Wears glasses   Respiratory: Negative for chest tightness, shortness of breath and wheezing  Cardiovascular: Positive for leg swelling (mild b/l LE swelling)  Gastrointestinal: Positive for nausea and vomiting  Negative for abdominal pain, constipation and diarrhea  Genitourinary: Positive for difficulty urinating (slow stream)  Negative for dysuria and urgency  Musculoskeletal: Positive for gait problem (using walker)  Skin: Negative for rash and wound   " Ecchymosis below left eye/temporal area secondary to fall   Allergic/Immunologic: Negative for environmental allergies and food allergies  Neurological: Positive for tremors (mild, both hands), speech difficulty (mild word finding difficulty) and headaches (occasional mild headaches, #1-2/10)  Negative for dizziness, seizures, weakness, light-headedness and numbness  Hematological: Positive for adenopathy (intermittent right arm lymphedema)  Bruises/bleeds easily  Psychiatric/Behavioral: Negative for dysphoric mood  The patient is not nervous/anxious  Clinical Trial: no    OB/GYN History:  No LMP recorded  (Menstrual status: Oopherectomy)      Pregnancy test needed:  no    ONCOTYPE/MAMMOPRINT results: n/a    PFT: n/a    Prior Radiation: No    Teaching: NCI packet with RT to brain information given to patient     MST: completed    Implantable Devices (Port, Pacemaker, pain stimulator): No    Hip Replacement: n/a      [unfilled]  Health Maintenance   Topic Date Due   • COVID-19 Vaccine (5 - Booster for Pfizer series) 08/11/2022   • Pneumococcal Vaccine: Pediatrics (0 to 5 Years) and At-Risk Patients (6 to 59 Years) (2 - PCV) 10/03/2023 (Originally 8/10/2021)   • Breast Cancer Screening: Mammogram  08/15/2023   • MAMMOGRAPHY BRCA POSITIVE  08/15/2023   • Colorectal Cancer Screening  11/28/2023   • Depression Screening  03/06/2024   • BMI: Followup Plan  03/06/2024   • Annual Physical  03/06/2024   • Lung Cancer Screening  03/30/2024   • BMI: Adult  04/20/2024   • Cervical Cancer Screening  07/31/2025   • DTaP,Tdap,and Td Vaccines (2 - Td or Tdap) 10/03/2032   • HIV Screening  Completed   • Hepatitis C Screening  Completed   • Osteoporosis Screening  Completed   • Influenza Vaccine  Completed   • HIB Vaccine  Aged Out   • IPV Vaccine  Aged Out   • Hepatitis A Vaccine  Aged Out   • Meningococcal ACWY Vaccine  Aged Out   • HPV Vaccine  Aged Out     Past Medical History:   Diagnosis Date   • "Bloating    • Bruises easily    • Cancer (HCC)     right breast//to lymph nodes/liver/ and bone   • Depression    • History of cancer chemotherapy 2020   • History of pneumonia    • Hypotension     occas   • Low iron     \"when pregnant, 20 yrs ago\"   • Neuropathy     hands//fingers   • Shortness of breath     occas   • Stroke [facility])    • Subacromial impingement of left shoulder 3/14/2022   • Tinnitus     right   • Wears glasses      Past Surgical History:   Procedure Laterality Date   • BILATERAL SALPINGOOPHORECTOMY     • BREAST BIOPSY     • CRANIOTOMY Bilateral 4/3/2023    Procedure: Suboccipital craniotomy for tumor resection using neuronavigation;  Surgeon: Clifford Stover MD;  Location: [facility];  Service: Neurosurgery   • IR BIOPSY LIVER MASS  2019   • MASTECTOMY Right     2019   • MYOMECTOMY      H/O FIBROIDS, NO SURGERY NEEDED   • SC LAPAROSCOPY W/RMVL ADNEXAL STRUCTURES N/A 2019    Procedure: LAPAROSCOPIC BILATERAL SALPINGO-OOPHORECTOMY;  Surgeon: Enrrique Golden MD;  Location: [facility];  Service: Gynecology Oncology   • SC MASTECTOMY SIMPLE COMPLETE Right 2020    Procedure: MASTECTOMY SIMPLE, axillary node dissection;  Surgeon: Nii Valles MD;  Location: [facility];  Service: Surgical Oncology   • US GUIDED BREAST BIOPSY RIGHT COMPLETE Right 2019   • US GUIDED BREAST LYMPH NODE BIOPSY RIGHT Right 2019     Family History   Problem Relation Age of Onset   • Hypotension Mother    • Colon cancer Father 36   • Hypertension Father    • Prostate cancer Father 79   • Throat cancer Maternal Grandmother 61   • Cancer Maternal Grandmother    • Breast cancer Paternal Aunt         age unknown     Social History     Tobacco Use   • Smoking status: Former     Packs/day: 1 00     Years: 30 00     Pack years: 30 00     Types: Cigarettes     Quit date:      Years since quittin 3   • Smokeless tobacco: Never   Vaping Use   • Vaping Use: Never used   Substance Use Topics   • Alcohol " use: Yes     Comment: occassional   • Drug use: No        Current Outpatient Medications:   •  albuterol (Proventil HFA) 90 mcg/act inhaler, Inhale 2 puffs every 6 (six) hours as needed for wheezing, Disp: 6 7 g, Rfl: 5  •  ALPRAZolam (XANAX) 0 5 mg tablet, Take 0 5-1 tablets (0 25-0 5 mg total) by mouth daily at bedtime as needed for anxiety or sleep (anxiousness or insomnia) (Patient not taking: Reported on 4/20/2023), Disp: 5 tablet, Rfl: 0  •  aluminum-magnesium hydroxide-simethicone (MYLANTA) 8118-9458-286 mg/30 mL suspension, Take 20 mL by mouth 4 (four) times a day as needed for indigestion or heartburn, Disp: , Rfl:   •  ascorbic acid (VITAMIN C) 500 MG tablet, Take 500 mg by mouth daily, Disp: , Rfl:   •  atorvastatin (LIPITOR) 40 mg tablet, TAKE 1 TABLET BY MOUTH EVERY DAY, Disp: 90 tablet, Rfl: 0  •  Azelaic Acid (Finacea) 15 % FOAM, Apply 1 Pump topically 2 (two) times a day (Patient not taking: Reported on 4/20/2023), Disp: 50 g, Rfl: 3  •  bisacodyl (DULCOLAX) 5 mg EC tablet, Take 5 mg by mouth 2 (two) times a day as needed for constipation, Disp: , Rfl:   •  Cholecalciferol 25 MCG (1000 UT) capsule, Take 2,000 Units by mouth daily, Disp: , Rfl:   •  fluticasone-vilanterol (Breo Ellipta) 200-25 mcg/actuation inhaler, Inhale 1 puff daily Rinse mouth after use , Disp: 180 blister, Rfl: 0  •  insulin lispro (HumaLOG) 100 units/mL injection, Inject 1-6 Units under the skin 3 (three) times a day before meals Do not start before April 12, 2023   (Patient not taking: Reported on 4/20/2023), Disp: , Rfl: 0  •  meclizine (ANTIVERT) 25 mg tablet, Take 25 mg by mouth 3 (three) times a day as needed for dizziness, Disp: , Rfl:   •  metroNIDAZOLE (METROCREAM) 0 75 % cream, Apply topically 2 (two) times a day (Patient not taking: Reported on 4/20/2023), Disp: 45 g, Rfl: 3  •  Multiple Vitamins-Minerals (multivitamin with minerals) tablet, Take 1 tablet by mouth daily, Disp: , Rfl:   •  olaparib (LYNPARZA) tablet, "Take 2 tablets (300 mg total) by mouth 2 (two) times a day (Patient not taking: Reported on 4/20/2023), Disp: 120 tablet, Rfl: 2  •  ondansetron (ZOFRAN) 4 mg tablet, Take 1 tablet (4 mg total) by mouth every 8 (eight) hours as needed for nausea or vomiting, Disp: 20 tablet, Rfl: 0  •  oxyCODONE (ROXICODONE) 10 MG TABS, Take 0 5 tablets (5 mg total) by mouth every 6 (six) hours as needed for moderate pain or severe pain for up to 10 days Max Daily Amount: 20 mg, Disp: 7 tablet, Rfl: 0  •  pantoprazole (PROTONIX) 40 mg tablet, TAKE 1 TABLET BY MOUTH EVERY DAY, Disp: 90 tablet, Rfl: 0  •  polyethylene glycol (MIRALAX) 17 g packet, Take 17 g by mouth daily Do not start before April 12, 2023   (Patient not taking: Reported on 4/20/2023), Disp: , Rfl: 0  •  senna-docusate sodium (SENOKOT S) 8 6-50 mg per tablet, Take 2 tablets by mouth daily at bedtime, Disp: , Rfl: 0  •  Specialty Vitamins Products (Advanced Collagen) TABS, Take by mouth in the morning (Patient not taking: Reported on 4/20/2023), Disp: , Rfl:   Allergies   Allergen Reactions   • Tylenol [Acetaminophen]      Told to avoid due to cancer       Vitals:    04/21/23 1340   Height: 5' 6\" (1 676 m)        "

## 2023-04-24 NOTE — TELEPHONE ENCOUNTER
"Received notification by RN, Kwesi Plascencia , on 4/21/23 that pt has triggered for oncology nutrition care (reason for referral: Malnutrition Screening Tool (MST) Triggers: scored a 3 indicating 14-23# (6 4-10 5 kg) recent wt loss and is eating poorly due to a decreased appetite  (Date of MST: 4/21/23) MST Note: \"metastatic breast cancer, brain mets\")  Contacted [facility] and introduced self and explained the reason for today's call  Spoke with Reji Canseco today who reports that she started zofran on Saturday which has helped significantly  She says she has not had N/V since  She says she is now able to tolerate fluids and food  Discussed oncology nutrition services available (options for in-person and phone consultation) and the benefits of meeting for a consultation  Pt requested that I call her back early next week to check in and discuss the need to make an appt or have further nutrition discussion  She says she would like to see how things progress over the next week  She was unable to take my contact info during today's call    "

## 2023-04-25 NOTE — TELEPHONE ENCOUNTER
"Called patient to discuss messages being sent by patient via [address]  Patient states in most recent message on 4/25 0841       \"I'm a bit dehydrated  The drink is not always staying down  Do you have a prescription please at the er?\"    Patient was prescribed zofran Q8 on 4/22  Spoke to patient's brother, he states that she is not vomiting and not in the ER  She has an MRI today and if she is not feeling well afterwards, he will take her to the ER for IV fluids  All questions answered at this time, they were appreciative of the call       "

## 2023-04-25 NOTE — PROGRESS NOTES
Hematology / Oncology Outpatient Follow Up Note    Kareem Franco 47 y o  female :1970 ELD:[id number]         Date:  2023    Assessment / Plan:  A 47year-old surgically postmenopausal woman with metastatic right breast cancer, grade 3, ER 90% positive, KY 90% positive, HER2 negative disease   She has BRCA -2 mutation    She has histologically confirmed liver metastasis   Since she was premenopausal, she underwent as bilateral surgical ovarian ablation in late 2019 followed by starting palbociclib and letrozole   She had initial minor response   However, she had rapid progression in 2019 for which she was treated with Taxotere x6 cycle resulting in partial response   Subsequently, she had palliative right mastectomy followed by olaparib since mid 2020 with no significant toxicity   She achieved major response on olaparib  Recent CT scan showed increased conspicuity of liver lesion which is up to 10 mm  Her cancer antigen is still in normal range  She is still asymptomatic  I recommended her to continue olaparib  We will monitor her disease closely by obtaining CT scan of chest abdomen pelvis as well as CA 27, 29 in early May 2023 followed by office visit    She is in agreement with my recommendations          Subjective:      HPI:  A 11-year-old premenopausal woman who noticed a lump in her right breast in 2019 which she brought to medical attention   Radiographically, she has large right breast mass and right axillary adenopathy both of which were biopsy in May 3, 2019   Biopsy showed invasive ductal carcinoma, grade 3   This was ER 90% positive, KY 90% positive, HER2 negative disease   Biopsy from right axilla lymph node was positive for metastatic disease   She was seen by Dr Claudia Combs of her young age, she underwent genetic testing which showed BRCA-2 mutation   Because of the locally advanced nature, she underwent CT scan of chest abdomen pelvis which showed Problem: Alcohol Withdrawal  Goal: *STG: Participates in treatment plan  Outcome: Progressing Towards Goal  Goal: *STG: Will identify negative impact of chemical dependency including the use of tobacco, alcohol, and other substances  Outcome: Progressing Towards Goal  Goal: *STG: Agrees to participate in outpatient after care program to support ongoing mental health  Outcome: Progressing Towards Goal  Goal: Interventions  Outcome: Progressing Towards Goal     Problem: Falls - Risk of  Goal: *Absence of Falls  Description: Document Antonio Rm Fall Risk and appropriate interventions in the flowsheet. Outcome: Progressing Towards Goal  Note: Fall Risk Interventions:     Pt out on unit engaged with peers. Denies current SI/HI/AVH. Remains medication and meal compliant. Will continue to monitor q15 minutes for safety. multiple liver lesion, suspicious for metastatic disease   Bone scan was negative for osseous metastasis  Lucero Romo is going to have liver biopsy in June 11, 2019   She presents today to discuss treatment option   She has no symptomatology from breast standpoint   She denied any pain  Her weight has been stable   She has no respiratory symptoms   She has no significant past medical history   Her paternal aunt had breast cancer in her 35s   Interestingly enough, her father had colon cancer in his 45s as well as prostate cancer in his 62s   She has no family history of ovarian cancer   She was a smoker until 2 years ago  Lucero Romo does not drink alcohol  Lucero Romo has a son who is 23years old  Lucero Romo also has younger son who was biologically female  Umair Carter is a transgender  Umair Cartre is considering bilateral mastectomy  Scar Mcneill, he has not been tested for BRCA gene mutation            Interval History:   A 47year-old surgically postmenopausal woman with metastatic right breast cancer, grade 3, ER 90% positive, FL 90% positive, HER2 negative disease   She has BRCA -2 mutation   Based on the CT scan, she appeared to have multiple liver metastasis     She subsequently underwent liver biopsy which showed metastatic carcinoma, consistent with breast primary   She was premenopausal at the time of diagnosis   Therefore, she underwent bilateral oophorectomy in late July 2019   Ovary and fallopian tube did not show any malignancy   She started palbociclib and letrozole in late July 2019  She initially had response with decreased right breast mass as well as right axillary adenopathy   However, in late December 2019, she had rapid progression in her right breast mass, skin involvement as well as right axillary adenopathy   Therefore, she started palliative chemotherapy with Taxotere, resulting in partial response   After 6 cycle treatment with Taxotere, she underwent palliative right mastectomy which showed multiple foci of invasive ductal carcinoma measuring up to 3 5 cm and 13 positive axillary lymph node   Since mid June 2020, she has been on olaparib  She achieved major radiographical and biochemical response on olaparib  She presents today for routine follow-up  Symptomatically, she has no significant change  She has no complaint of pain  Her weight is stable  She has no respiratory symptoms  Her tumor marker is still in normal range  However, CT scan showed increased conspicuity of small liver lesions    Her performance status is normal     Objective:      Primary Diagnosis:     1    Metastatic breast cancer, grade 3, ER 90 % positive, ME 90 % positive, HER2 negative disease, diagnosed in June 2019    2    BRCA-2 mutation      Cancer Staging:  Cancer Staging  Malignant neoplasm of central portion of right breast in female, estrogen receptor positive ([address] )  Staging form: Breast, AJCC 8th Edition  - Clinical: Stage IIIB (cT4, cN1(f), cM0, G3, ER+, ME+, HER2-) - Signed by Keegan Mayorga MD on 5/16/2019  Laterality: Right  Method of lymph node assessment: Core biopsy  Histologic grading system: 3 grade system           Previous Hematologic/ Oncologic Treatment:      1  Surgical ovarian ablation in late July 2019   2  Palbociclib and letrozole from July 2018 through January 2020    3  Taxotere x6 cycle, completed in May 2020    4  Palliative right mastectomy      Current Hematologic/ Oncologic Treatment:       Olaparib 300 mg b i d  since mid July 2020       Disease Status:      Good partial response on olaparib      Test Results:     Pathology:     Right breast biopsy in axillary lymph node biopsy showed invasive ductal carcinoma, grade 3   ER 90 % positive, ME 90% positive, HER2 negative disease      Liver biopsy in June 2019 showed metastatic carcinoma, consistent with breast primary      In June 2020, mastectomy specimen showed multiple foci of invasive ductal carcinoma measuring up to 3 5 cm with 13 positive axillary lymph nodes      Radiology:     Bone scan showed no evidence of osseous metastasis      CT scan of chest abdomen pelvis in February 2023 showed interval increase in conspicuity and hepatic lesion       Laboratory:     See below   CA 27, 29 was 24 and February 2023      Physical Exam:        General Appearance:    Alert, oriented          Eyes:    PERRL   Ears:    Normal external ear canals, both ears   Nose:   Nares normal, septum midline   Throat:   Mucosa moist  Pharynx without injection  Neck:   Supple         Lungs:     Clear to auscultation bilaterally   Chest Wall:    No tenderness or deformity    Heart:    Regular rate and rhythm         Abdomen:     Soft, non-tender, bowel sounds +, no organomegaly               Extremities:   Extremities no cyanosis or edema         Skin:   no rash or icterus  Lymph nodes:   Cervical, supraclavicular, and axillary nodes normal   Neurologic:   CNII-XII intact, normal strength, sensation and reflexes     Throughout             Breast exam: Right status post mastectomy without reconstruction   No palpable abnormality on her right chest wall   Left breast exam is negative  ROS: Review of Systems   All other systems reviewed and are negative  Imaging: CT chest abdomen pelvis w contrast    Result Date: 8/4/2022  Narrative: CT CHEST, ABDOMEN AND PELVIS WITH IV CONTRAST INDICATION:   C78 7: Secondary malignant neoplasm of liver and intrahepatic bile duct C50 111: Malignant neoplasm of central portion of right female breast Z17 0: Estrogen receptor positive status (ER+)  COMPARISON:  1/27/2022  TECHNIQUE: CT examination of the chest, abdomen and pelvis was performed  Axial, sagittal, and coronal 2D reformatted images were created from the source data and submitted for interpretation  Radiation dose length product (DLP) for this visit:  2013 85 mGy-cm     This examination, like all CT scans performed in the [facility], was performed utilizing techniques to minimize radiation dose exposure, including the use of iterative reconstruction and automated exposure control  IV Contrast:  70 mL of iohexol (OMNIPAQUE) Enteric Contrast: Enteric contrast was administered  FINDINGS: CHEST LUNGS:  Lungs are clear without airspace consolidation  No suspicious pulmonary parenchymal mass or nodules identified  Minimal centrilobular emphysematous changes noted in the right upper lobe  Central airways are patent  There is no tracheal or endobronchial lesion  PLEURA:  Unremarkable without pneumothorax or pleural effusion  HEART/GREAT VESSELS: Heart is unremarkable for patient's age  No thoracic aortic aneurysm  No pericardial effusion  MEDIASTINUM AND HAIM:  Stable nonspecific subcentimeter mediastinal lymph nodes  No significant lymphadenopathy by size criteria  No mass or fluid collections  Thyroid glands appear unremarkable  Esophagus is normal in course and caliber  No significant prevertebral soft tissue swelling noted  CHEST WALL AND LOWER NECK:  Patient is status post right mastectomy and right axillary dissection  Multiple nonspecific subcentimeter left axillary lymph nodes are again seen, not significantly changed in size or number compared to the previous exam   No subcutaneous mass or fluid collections noted    ABDOMEN LIVER/BILIARY TREE:  Liver is normal in size and contour  5 mm hypodensity in the left hepatic lobe is again seen without interval change  Previously described right hepatic lobe small hypodensity not definitively seen on this exam which could be related to the phase of contrast enhancement  No new or additional suspicious hepatic lesions identified  [place] GALLBLADDER:  No calcified gallstones  No pericholecystic inflammatory change  SPLEEN:  Unremarkable  PANCREAS:  Unremarkable  ADRENAL GLANDS:  Unremarkable  KIDNEYS/URETERS:  Unremarkable  No hydronephrosis  STOMACH AND BOWEL:  Stomach is mildly distended with oral contrast and air    No intraluminal mass or abnormal wall thickening  Small and large bowel loops appear normal in course and caliber without evidence for obstruction or inflammation  Terminal ileum and appendix appear grossly unremarkable  APPENDIX:  No findings to suggest appendicitis  ABDOMINOPELVIC CAVITY:  No ascites  No pneumoperitoneum  No lymphadenopathy  VESSELS:  Unremarkable for patient's age  PELVIS REPRODUCTIVE ORGANS:  Unremarkable for patient's age  URINARY BLADDER:  Mildly distended and grossly unremarkable  ABDOMINAL WALL/INGUINAL REGIONS:  Previous ventral upper abdominal wall hernia repair noted without evidence for recurrent hernia  No subcutaneous mass or fluid collections are seen  A few subcentimeter bilateral inguinal lymph nodes are again noted without interval change  OSSEOUS STRUCTURES:  No acute fracture or destructive osseous lesion  Impression: 1  Stable size and appearance of subcentimeter left axillary lymph nodes  Patient is status post right mastectomy and right axillary dissection  2   No evidence of intrathoracic metastatic disease  3   Stable 5 mm hypodensity in the left hepatic lobe  Previously described small right hepatic lobe hypodensity not definitively seen on this study which could be related to the phase of contrast enhancement  No suspicious or new hepatic lesions identified  Previously noted hepatic steatosis appear resolved  4   No acute intra-abdominal/pelvic abnormalities or evidence of metastatic disease/lymphadenopathy  Workstation performed: [id number]     Mammo diagnostic left w 3d & cad    Result Date: 8/15/2022  Narrative: DIAGNOSIS:  Right breast cancer, right mastectomy in 2019 TECHNIQUE: Digital diagnostic mammography was performed  Computer Aided Detection (CAD) analyzed all applicable images  Computer Aided Detection (CAD) analyzed all applicable images   COMPARISONS: Prior breast imaging dated: 05/03/2019 and 05/03/2019 RELEVANT HISTORY: Family Breast Cancer History: History of breast cancer in Paternal [de-identified]  Family Medical History: Family medical history includes breast cancer in paternal aunt and colon cancer in father  Personal History: No known relevant hormone history  Surgical history includes breast biopsy and mastectomy  No known relevant medical history  RISK ASSESSMENT: 5 Year Tyrer-Cuzick: 1 42 % 10 Year Tyrer-Cuzick: 3 08 % Lifetime Tyrer-Cuzick: 11 21 % TISSUE DENSITY: The breasts are almost entirely fatty  INDICATION: Gabriel Landeros is a 47 y o  female presenting for post mastectomy mammo for other breast  FINDINGS: There are no suspicious masses, grouped microcalcifications or areas of unexplained architectural distortion  The skin and nipple areolar complex are unremarkable  Impression:  No abnormality of the left breast  ASSESSMENT/BI-RADS CATEGORY: Left: 1 - Negative Overall: 1 - Negative RECOMMENDATION:      - Routine screening mammogram in 1 year for the left breast  Workstation ID: [id number]        Labs:   Lab Results   Component Value Date    WBC 8 55 02/15/2023    HGB 13 1 02/15/2023    HCT 38 1 02/15/2023     (H) 02/15/2023     02/15/2023     Lab Results   Component Value Date    K 3 7 02/15/2023     (H) 02/15/2023    CO2 27 02/15/2023    BUN 13 02/15/2023    CREATININE 1 00 02/15/2023    GLUF 80 02/15/2023    CALCIUM 9 4 02/15/2023    CORRECTEDCA 9 7 07/27/2021    AST 27 02/15/2023    ALT 38 02/15/2023    ALKPHOS 86 02/15/2023    EGFR 64 02/15/2023         Current Medications: Reviewed  Allergies: Reviewed  PMH/FH/SH:  Reviewed      Vital Sign:    Body surface area is 2 18 meters squared      Wt Readings from Last 3 Encounters:   02/27/23 113 kg (249 lb)   10/03/22 110 kg (242 lb)   08/25/22 109 kg (241 lb)        Temp Readings from Last 3 Encounters:   02/27/23 (!) 96 9 °F (36 1 °C) (Tympanic)   11/28/22 (!) (P) 97 3 °F (36 3 °C) ((P) Temporal)   10/03/22 98 1 °F (36 7 °C) (Tympanic)        BP Readings from Last 3 Encounters:   02/27/23 126/80   11/28/22 (P) 125/64   10/03/22 122/64         Pulse Readings from Last 3 Encounters:   02/27/23 85   11/28/22 (P) 68   10/03/22 84     @LASTSAO2(3)@

## 2023-04-27 NOTE — ED PROVIDER NOTES
History  Chief Complaint   Patient presents with   • Dehydration     Pt stated that she had surgery on 4/3/23, she stated that she is not drinking  She believes that she is dehydrated  Also noted that she has thrush  44-year-old female with a past medical history of depression and metastatic breast cancer with brain lesions presents with nausea and vomiting  Patient had a craniotomy and resection of cerebellar lesions on 4/3  She was discharged to rehab and she left rehab on Friday  Patient states that she has been dealing with a lot of nausea and vomiting  She states that she was having the symptoms prior to her surgery, which is what led them to decide to do the surgery  She states that after the surgery she was still having nausea and vomiting  Patient was still having nausea and vomiting when she got to rehab  However, she feels like the nausea and vomiting has gotten worse since she got home over the weekend  Patient states that after the surgery she was initially on steroids, which were weaned off  She has been off of steroids for over a week now  She states that she was initially using Zofran for the nausea and vomiting, but she felt like it was not helping, so her doctor recently switched her to [address] [place] [address] Drive  She states she has been using this medication at home over the weekend and she feels like it is working a little better than the Zofran, but she is still having a significant amount of nausea and vomiting  Patient states she has not been able to keep any solid foods down in many days  She has been able to keep down small sips of liquids at times after she takes medication  She states that she is now feeling very weak and fatigued  She notes that she is having headaches, but they have overall improved since her surgery  No vision changes, lightheadedness, dizziness, or other neurologic deficits that she has noticed    Patient also notes that she has thrush on her tongue from being on an inhaler while she was in the hospital   She states that she has been prescribed medication for the thrush, but she has been unable to keep the pills down secondary to vomiting  Patient had an MRI of her brain 2 days ago, which showed worsening of her lesions  Prior to Admission Medications   Prescriptions Last Dose Informant Patient Reported? Taking? ALPRAZolam (XANAX) 0 5 mg tablet   No No   Sig: Take 0 5-1 tablets (0 25-0 5 mg total) by mouth daily at bedtime as needed for anxiety or sleep (anxiousness or insomnia)   Patient not taking: Reported on 4/20/2023   Azelaic Acid (Finacea) 15 % FOAM   No No   Sig: Apply 1 Pump topically 2 (two) times a day   Patient not taking: Reported on 4/20/2023   Cholecalciferol 25 MCG (1000 UT) capsule   Yes No   Sig: Take 2,000 Units by mouth daily   Multiple Vitamins-Minerals (multivitamin with minerals) tablet   Yes No   Sig: Take 1 tablet by mouth daily   Specialty Vitamins Products (Advanced Collagen) TABS   Yes No   Sig: Take by mouth in the morning   Patient not taking: Reported on 4/20/2023   albuterol (Proventil HFA) 90 mcg/act inhaler   No No   Sig: Inhale 2 puffs every 6 (six) hours as needed for wheezing   aluminum-magnesium hydroxide-simethicone (MYLANTA) 5974-4108-662 mg/30 mL suspension   Yes No   Sig: Take 20 mL by mouth 4 (four) times a day as needed for indigestion or heartburn   ascorbic acid (VITAMIN C) 500 MG tablet   Yes No   Sig: Take 500 mg by mouth daily   atorvastatin (LIPITOR) 40 mg tablet   No No   Sig: TAKE 1 TABLET BY MOUTH EVERY DAY   Patient not taking: Reported on 4/21/2023   bisacodyl (DULCOLAX) 5 mg EC tablet   Yes No   Sig: Take 5 mg by mouth 2 (two) times a day as needed for constipation   fluticasone-vilanterol (Breo Ellipta) 200-25 mcg/actuation inhaler   No No   Sig: Inhale 1 puff daily Rinse mouth after use     insulin lispro (HumaLOG) 100 units/mL injection   No No   Sig: Inject 1-6 Units under the skin 3 (three) times a day "before meals Do not start before April 12, 2023  Patient not taking: Reported on 4/20/2023   meclizine (ANTIVERT) 25 mg tablet   Yes No   Sig: Take 25 mg by mouth 3 (three) times a day as needed for dizziness   Patient not taking: Reported on 4/21/2023   metroNIDAZOLE (METROCREAM) 0 75 % cream   No No   Sig: Apply topically 2 (two) times a day   Patient not taking: Reported on 4/20/2023   nystatin (MYCOSTATIN) 500,000 units/5 mL suspension   No No   Sig: Apply 5 mL (500,000 Units total) to the mouth or throat 4 (four) times a day   olaparib [facility] [address]) tablet   No No   Sig: Take 2 tablets (300 mg total) by mouth 2 (two) times a day   Patient not taking: Reported on 4/20/2023   ondansetron (ZOFRAN) 8 mg tablet   No No   Sig: Take 1 tablet (8 mg total) by mouth every 8 (eight) hours as needed for nausea or vomiting   pantoprazole (PROTONIX) 40 mg tablet   No No   Sig: TAKE 1 TABLET BY MOUTH EVERY DAY   Patient not taking: Reported on 4/21/2023   polyethylene glycol (MIRALAX) 17 g packet   No No   Sig: Take 17 g by mouth daily Do not start before April 12, 2023     Patient not taking: Reported on 4/20/2023   senna-docusate sodium (SENOKOT S) 8 6-50 mg per tablet   No No   Sig: Take 2 tablets by mouth daily at bedtime      Facility-Administered Medications: None       Past Medical History:   Diagnosis Date   • Bloating    • Breast cancer (HCC)    • Bruises easily    • Cancer (HCC)     right breast//to lymph nodes/liver/ and bone   • Depression    • History of cancer chemotherapy 05/2020   • History of pneumonia    • Hypotension     occas   • Low iron     \"when pregnant, 20 yrs ago\"   • Neuropathy     hands//fingers   • Shortness of breath     occas   • Stroke [facility])    • Subacromial impingement of left shoulder 03/14/2022   • Tinnitus     right   • Wears glasses        Past Surgical History:   Procedure Laterality Date   • BILATERAL SALPINGOOPHORECTOMY     • BREAST BIOPSY     • CRANIOTOMY Bilateral 4/3/2023    Procedure: " Suboccipital craniotomy for tumor resection using neuronavigation;  Surgeon: Daisy Bailey MD;  [facility];  Service: Neurosurgery   • IR BIOPSY LIVER MASS  2019   • MASTECTOMY Right     2019   • MYOMECTOMY      H/O FIBROIDS, NO SURGERY NEEDED   • AK LAPAROSCOPY W/RMVL ADNEXAL STRUCTURES N/A 2019    Procedure: LAPAROSCOPIC BILATERAL SALPINGO-OOPHORECTOMY;  Surgeon: Felipe Ferris MD;  Location[facility];  Service: Gynecology Oncology   • AK MASTECTOMY SIMPLE COMPLETE Right 2020    Procedure: MASTECTOMY SIMPLE, axillary node dissection;  Surgeon: Jon Mariano MD;  Location: [facility];  Service: Surgical Oncology   • US GUIDED BREAST BIOPSY RIGHT COMPLETE Right 2019   • US GUIDED BREAST LYMPH NODE BIOPSY RIGHT Right 2019       Family History   Problem Relation Age of Onset   • Hypotension Mother    • Colon cancer Father 36   • Hypertension Father    • Prostate cancer Father 79   • Throat cancer Maternal Grandmother 61   • Cancer Maternal Grandmother    • Breast cancer Paternal Aunt         age unknown     I have reviewed and agree with the history as documented  E-Cigarette/Vaping   • E-Cigarette Use Never User      E-Cigarette/Vaping Substances   • Nicotine No    • THC No    • CBD No    • Flavoring No    • Other No    • Unknown No      Social History     Tobacco Use   • Smoking status: Former     Packs/day: 1 00     Years: 30 00     Pack years: 30 00     Types: Cigarettes     Quit date: 2017     Years since quittin 3   • Smokeless tobacco: Never   Vaping Use   • Vaping Use: Never used   Substance Use Topics   • Alcohol use: Yes     Comment: occassional   • Drug use: No        Review of Systems   Constitutional: Positive for fatigue  Negative for chills and fever  HENT: Negative for congestion, rhinorrhea and sore throat  Eyes: Negative for pain and redness  Respiratory: Negative for cough, chest tightness, shortness of breath and wheezing      Cardiovascular: Negative for chest pain and palpitations  Gastrointestinal: Positive for nausea and vomiting  Negative for abdominal pain and diarrhea  Endocrine: Negative  Genitourinary: Negative for difficulty urinating and hematuria  Musculoskeletal: Negative for back pain and myalgias  Skin: Negative for pallor and rash  Allergic/Immunologic: Negative  Neurological: Positive for weakness and headaches  Negative for dizziness and light-headedness  Hematological: Negative  Physical Exam  ED Triage Vitals   Temperature Pulse Respirations Blood Pressure SpO2   04/27/23 1900 04/27/23 1900 04/27/23 1900 04/27/23 1900 04/27/23 1900   97 9 °F (36 6 °C) (!) 120 18 135/78 95 %      Temp Source Heart Rate Source Patient Position - Orthostatic VS BP Location FiO2 (%)   04/27/23 1900 04/27/23 1900 04/27/23 2045 04/27/23 2045 --   Oral Monitor Lying Left arm       Pain Score       04/28/23 0100       No Pain             Orthostatic Vital Signs  Vitals:    04/29/23 0600 04/29/23 0800 04/29/23 0900 04/29/23 1100   BP: 139/88 135/93 135/93 162/76   Pulse: 74 84 92 82   Patient Position - Orthostatic VS: Lying          Physical Exam  Vitals and nursing note reviewed  Constitutional:       General: She is not in acute distress  Appearance: Normal appearance  She is not ill-appearing  HENT:      Head: Normocephalic and atraumatic  Eyes:      Conjunctiva/sclera: Conjunctivae normal    Cardiovascular:      Rate and Rhythm: Normal rate and regular rhythm  Pulmonary:      Effort: Pulmonary effort is normal  No respiratory distress  Abdominal:      General: Abdomen is flat  There is no distension  Palpations: Abdomen is soft  Tenderness: There is no abdominal tenderness  There is no guarding or rebound  Musculoskeletal:         General: Normal range of motion  Cervical back: Normal range of motion and neck supple  Skin:     General: Skin is warm and dry     Neurological:      General: No focal deficit present  Mental Status: She is alert and oriented to person, place, and time  Cranial Nerves: No cranial nerve deficit  Motor: No weakness           ED Medications  Medications   ALPRAZolam (XANAX) tablet 0 25 mg (has no administration in time range)   nystatin (MYCOSTATIN) oral suspension 500,000 Units (500,000 Units Swish & Swallow Given 4/29/23 1248)   ondansetron (ZOFRAN) injection 4 mg (4 mg Intravenous Given 4/29/23 1226)   oxyCODONE (ROXICODONE) split tablet 2 5 mg ( Oral MAR Unhold 4/28/23 1437)   albuterol (PROVENTIL HFA,VENTOLIN HFA) inhaler 2 puff ( Inhalation MAR Unhold 4/28/23 1437)   levalbuterol (XOPENEX) inhalation solution 1 25 mg ( Nebulization MAR Unhold 4/28/23 1437)   ipratropium (ATROVENT) 0 02 % inhalation solution 0 5 mg ( Nebulization MAR Unhold 4/28/23 1437)   multi-electrolyte (PLASMALYTE-A/ISOLYTE-S PH 7 4) IV solution (125 mL/hr Intravenous New Bag 4/29/23 0632)   potassium phosphates 30 mmol in sodium chloride 0 9 % 250 mL infusion (30 mmol Intravenous New Bag 4/29/23 1248)   heparin (porcine) 25,000 units in 0 45% NaCl 250 mL infusion (premix) (18 Units/kg/hr × 105 kg (Order-Specific) Intravenous New Bag 4/29/23 1234)   sodium chloride 0 9 % bolus 1,000 mL (0 mL Intravenous Stopped 4/28/23 0205)   promethazine (PHENERGAN) injection 25 mg (25 mg Intramuscular Given 4/27/23 2244)   potassium chloride (K-DUR,KLOR-CON) CR tablet 40 mEq (40 mEq Oral Given 4/28/23 0005)   potassium chloride 20 mEq IVPB (premix) (0 mEq Intravenous Stopped 4/28/23 1200)   magnesium sulfate IVPB (premix) SOLN 1 g (0 g Intravenous Stopped 4/28/23 0730)   iohexol (OMNIPAQUE) 350 MG/ML injection (SINGLE-DOSE) 100 mL (90 mL Intravenous Given 4/28/23 1206)   fentanyl citrate (PF) 100 MCG/2ML 50 mcg (50 mcg Intravenous Given 4/28/23 7079)   labetalol (NORMODYNE) injection 10 mg (10 mg Intravenous Given 4/29/23 9555)   potassium chloride 20 mEq IVPB (premix) (0 mEq Intravenous Stopped 4/29/23 0800)       Diagnostic Studies  Results Reviewed     Procedure Component Value Units Date/Time    CBC and differential [[id number]]  (Abnormal) Collected: 04/28/23 0657    Lab Status: Final result Specimen: Blood from Arm, Left Updated: 04/28/23 1018     WBC 6 73 Thousand/uL      RBC 3 49 Million/uL      Hemoglobin 12 2 g/dL      Hematocrit 35 5 %       fL      MCH 35 0 pg      MCHC 34 4 g/dL      RDW 13 6 %      MPV 10 6 fL      Platelets 85 Thousands/uL      nRBC 0 /100 WBCs      Neutrophils Relative 70 %      Immat GRANS % 0 %      Lymphocytes Relative 16 %      Monocytes Relative 13 %      Eosinophils Relative 1 %      Basophils Relative 0 %      Neutrophils Absolute 4 71 Thousands/µL      Immature Grans Absolute 0 02 Thousand/uL      Lymphocytes Absolute 1 08 Thousands/µL      Monocytes Absolute 0 84 Thousand/µL      Eosinophils Absolute 0 07 Thousand/µL      Basophils Absolute 0 01 Thousands/µL     Basic metabolic panel [[id number]]  (Abnormal) Collected: 04/28/23 0657    Lab Status: Final result Specimen: Blood from Arm, Left Updated: 04/28/23 0758     Sodium 137 mmol/L      Potassium 3 5 mmol/L      Chloride 107 mmol/L      CO2 21 mmol/L      ANION GAP 9 mmol/L      BUN 5 mg/dL      Creatinine 0 35 mg/dL      Glucose 93 mg/dL      Calcium 8 0 mg/dL      eGFR 124 ml/min/1 73sq m     Narrative:      [place] guidelines for Chronic Kidney Disease (CKD):   •  Stage 1 with normal or high GFR (GFR > 90 mL/min/1 73 square meters)  •  Stage 2 Mild CKD (GFR = 60-89 mL/min/1 73 square meters)  •  Stage 3A Moderate CKD (GFR = 45-59 mL/min/1 73 square meters)  •  Stage 3B Moderate CKD (GFR = 30-44 mL/min/1 73 square meters)  •  Stage 4 Severe CKD (GFR = 15-29 mL/min/1 73 square meters)  •  Stage 5 End Stage CKD (GFR <15 mL/min/1 73 square meters)  Note: GFR calculation is accurate only with a steady state creatinine    Magnesium [[id number]]  (Normal) Collected: 04/28/23 0657    Lab Status: Hemoglobin 13 4 g/dL      Hematocrit 37 7 %      MCV 99 fL      MCH 35 3 pg      MCHC 35 5 g/dL      RDW 13 7 %      MPV 10 7 fL      Platelets 495 Thousands/uL      nRBC 0 /100 WBCs      Neutrophils Relative 68 %      Immat GRANS % 1 %      Lymphocytes Relative 20 %      Monocytes Relative 10 %      Eosinophils Relative 1 %      Basophils Relative 0 %      Neutrophils Absolute 5 18 Thousands/µL      Immature Grans Absolute 0 04 Thousand/uL      Lymphocytes Absolute 1 46 Thousands/µL      Monocytes Absolute 0 75 Thousand/µL      Eosinophils Absolute 0 04 Thousand/µL      Basophils Absolute 0 02 Thousands/µL                  VAS lower limb venous duplex study, complete bilateral   Final Result by Levar Bobby MD (04/29 1344)      CT head wo contrast   Final Result by Lacey 6, DO (04/29 4165)      Stable CT of the brain status post occipital craniotomy and resection of metastatic disease  Postoperative pseudomeningocele noted similar to the prior study  Supratentorial metastatic lesions are again noted essentially stable in size  Small amount of postprocedural hemorrhage identified in the right frontal lobe and adjacent frontal sulcus from shunt catheter placement  Workstation performed: [id number]         CT head wo contrast   Final Result by Edith Bryant MD (04/28 1194)      New postsurgical changes of right frontal approach ventricular catheter with tip in frontal horn of right lateral ventricle with stable ventricular size  New acute trace subarachnoid hemorrhage right frontal region, likely from recent surgery  Prior postsurgical changes of suboccipital craniotomy for resection of cerebellar metastatic lesion  Stable small hyperdense metastatic lesions in bilateral frontal lobes (right larger than left) and hyperdense leptomeningeal cerebellar folia    corresponding with leptomeningeal disease which was better evaluated on recent MRI brain 4/25/2023  The study was marked in [facility] for immediate notification  Workstation performed: [id number]         CT chest abdomen pelvis w contrast   Final Result by Shalini St MD (04/28 1610)      New saddle pulmonary embolism extending into bilateral pulmonary arterial segmental branches  No CT evidence of right heart strain  Stable treated metastatic subcentimeter hepatic lesions  No new or enlarging sites of metastatic disease in chest, abdomen, or pelvis  Additional chronic/incidental findings as detailed above  I personally discussed this study with Elsa Koch on 4/28/2023 4:05 PM                      Workstation performed: [id number]         CT head without contrast   Final Result by Venkatesh Mata MD (04/28 8402)      No CT evidence for acute territorial infarct  No new mass effect or midline shift  Known intracranial metastatic lesions better visualized on the prior recent MRI brain examination  Postsurgical changes of prior suboccipital craniectomy with grossly stable appearance of the surgical resection bed and 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 compared to the recent MRI accounting for differences in modality  Workstation performed: [id number]         CT head wo contrast    (Results Pending)   IR IVC filter placement permanent    (Results Pending)         Procedures  Procedures      ED Course                                       Medical Decision Making  65-year-old female with metastatic cancer with brain lesions presents with nausea and vomiting  Patient had recent surgery on these lesions, but is still having a significant amount of nausea and vomiting, though headaches are improved  She has no neurologic deficits on exam   Her abdomen is soft and nontender, so I do not believe her nausea and vomiting is related to an acute abdominal process    The nausea and vomiting is most likely related to the lesions in "her brain  She had an MRI on 4/25 showing \"Supratentorial lesions are stable to mildly increased in size  Increasing enhancement along the margins of the posterior fossa resection cavity with prominent left cerebellar hemisphere foliar enhancement  , concerning for tumor progression  New subtle enhancement along the left cisternal and canalicular segments of the 7th and 8th cranial nerves without evidence of nodularity or discrete lesion  Close follow-up recommended  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  \"  Patient has close follow-up with her outpatient physicians  We will check CBC, CMP, and lipase to rule out pancreatitis, electrolyte abnormalities, dehydration, or anemia as causes of her symptoms  Will give IV fluids and Phenergan and reevaluate  Patient states that she would like to try to go home if she is able to tolerate p o  Patient was signed out to Dr Gregory Elias pending lab work and p o  challenge  Patient had no abnormalities on her lab work that would require admission and she wanted to try p o  challenge  However, patient failed and was admitted to [facility] for further work-up and management  Elevated LFTs: acute illness or injury  Hyponatremia: acute illness or injury  Metastatic cancer [facility]): acute illness or injury  Nausea and vomiting: acute illness or injury  Amount and/or Complexity of Data Reviewed  External Data Reviewed: radiology and notes  Details: Reviewed past medical history and medications  Reviewed discharge summary from 4/11 when patient had craniotomy  Reviewed MRI from 4/25  Labs: ordered  Radiology: ordered  Risk  Prescription drug management  Decision regarding hospitalization  Risk Details: Patient is at increased risk due to metastatic cancer with brain lesions            Disposition  Final diagnoses:   Nausea and vomiting   Metastatic cancer (HCC)   Hyponatremia   Elevated LFTs     Time reflects when diagnosis was " documented in both MDM as applicable and the Disposition within this note     Time User Action Codes Description Comment    4/27/2023 10:48 PM Boris Fuss A Add [R11 2] Nausea and vomiting     4/27/2023 10:48 PM Boris Fuss A Add [C79 9] Metastatic cancer ([facility] [address] )     4/27/2023 10:48 PM Boris Fuss A Add [E87 1] Hyponatremia     4/27/2023 10:48 PM Boris Fuss A Add [R79 89] Elevated LFTs     4/28/2023  1:43 AM Veatrice Levon Add [C79 31] 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     4/29/2023  9:50 AM Evy Manna Add [Z98 890] Status post brain surgery     4/29/2023  1:18 PM Evy Mannnicole Add [I26 92] Saddle embolism of pulmonary artery [facility])       ED Disposition     ED Disposition   Admit    Condition   Stable    Date/Time   Fri Apr 28, 2023  1:08 AM    Comment   Case was discussed with JERCIA and the patient's admission status was agreed to be Admission Status: inpatient status to the service of Dr Boris Muhammad   Follow-up Information    None         Current Discharge Medication List      CONTINUE these medications which have NOT CHANGED    Details   albuterol (Proventil HFA) 90 mcg/act inhaler Inhale 2 puffs every 6 (six) hours as needed for wheezing  Qty: 6 7 g, Refills: 5    Comments: Substitution to a formulary equivalent within the same pharmaceutical class is authorized  Associated Diagnoses: Chronic obstructive pulmonary disease with acute exacerbation (HCC)      ALPRAZolam (XANAX) 0 5 mg tablet Take 0 5-1 tablets (0 25-0 5 mg total) by mouth daily at bedtime as needed for anxiety or sleep (anxiousness or insomnia)  Qty: 5 tablet, Refills: 0    Associated Diagnoses: Insomnia; Psychosocial stressors;  Anxiety about health      aluminum-magnesium hydroxide-simethicone (MYLANTA) 9989-3342-191 mg/30 mL suspension Take 20 mL by mouth 4 (four) times a day as needed for indigestion or heartburn      ascorbic acid (VITAMIN C) 500 MG tablet Take 500 mg by mouth daily      atorvastatin (LIPITOR) 40 mg tablet TAKE 1 TABLET BY MOUTH EVERY DAY  Qty: 90 tablet, Refills: 0    Comments: DX Code Needed    Associated Diagnoses: Cerebrovascular accident (CVA), unspecified mechanism (HCC)      Azelaic Acid (Finacea) 15 % FOAM Apply 1 Pump topically 2 (two) times a day  Qty: 50 g, Refills: 3    Associated Diagnoses: Rosacea      bisacodyl (DULCOLAX) 5 mg EC tablet Take 5 mg by mouth 2 (two) times a day as needed for constipation      Cholecalciferol 25 MCG (1000 UT) capsule Take 2,000 Units by mouth daily      fluticasone-vilanterol (Breo Ellipta) 200-25 mcg/actuation inhaler Inhale 1 puff daily Rinse mouth after use  Qty: 180 blister, Refills: 0    Associated Diagnoses: Chronic obstructive pulmonary disease with acute exacerbation (HCC)      insulin lispro (HumaLOG) 100 units/mL injection Inject 1-6 Units under the skin 3 (three) times a day before meals Do not start before April 12, 2023  Refills: 0    Associated Diagnoses: Nausea & vomiting      meclizine (ANTIVERT) 25 mg tablet Take 25 mg by mouth 3 (three) times a day as needed for dizziness      metroNIDAZOLE (METROCREAM) 0 75 % cream Apply topically 2 (two) times a day  Qty: 45 g, Refills: 3    Associated Diagnoses: Rosacea      Multiple Vitamins-Minerals (multivitamin with minerals) tablet Take 1 tablet by mouth daily      nystatin (MYCOSTATIN) 500,000 units/5 mL suspension Apply 5 mL (500,000 Units total) to the mouth or throat 4 (four) times a day  Qty: 60 mL, Refills: 1    Associated Diagnoses: Thrush of mouth and esophagus (HCC)      olaparib (LYNPARZA) tablet Take 2 tablets (300 mg total) by mouth 2 (two) times a day  Qty: 120 tablet, Refills: 2    Associated Diagnoses: Carcinoma of right breast metastatic to axillary lymph node ([facility] [address] );  Liver metastases      ondansetron (ZOFRAN) 8 mg tablet Take 1 tablet (8 mg total) by mouth every 8 (eight) hours as needed for nausea or vomiting  Qty: 20 tablet, Refills: 0 Associated Diagnoses: Malignant neoplasm of breast metastatic to brain, unspecified laterality (HCC)      pantoprazole (PROTONIX) 40 mg tablet TAKE 1 TABLET BY MOUTH EVERY DAY  Qty: 90 tablet, Refills: 0    Comments: ICD 10 Dx code K21 9  Associated Diagnoses: Malignant neoplasm of central portion of right breast in female, estrogen receptor positive ([facility] ); GERD (gastroesophageal reflux disease); Palliative care patient      polyethylene glycol (MIRALAX) 17 g packet Take 17 g by mouth daily Do not start before April 12, 2023  Refills: 0    Associated Diagnoses: 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 ([facility] )      senna-docusate sodium (SENOKOT S) 8 6-50 mg per tablet Take 2 tablets by mouth daily at bedtime  Refills: 0    Associated Diagnoses: 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 [facility])      Specialty Vitamins Products (Advanced Collagen) TABS Take by mouth in the morning           No discharge procedures on file  PDMP Review       Value Time User    PDMP Reviewed  Yes 3/3/2023  3:25 PM Wallace Jiménez MD           ED Provider  Attending physically available and evaluated Sampson Campbell I managed the patient along with the ED Attending      Electronically Signed by         Savanah Fontenot DO  04/29/23 [id number]

## 2023-04-28 PROBLEM — R11.10 VOMITING: Status: ACTIVE | Noted: 2023-01-01

## 2023-04-28 PROBLEM — Z98.890 STATUS POST BRAIN SURGERY: Status: ACTIVE | Noted: 2023-01-01

## 2023-04-28 NOTE — ASSESSMENT & PLAN NOTE
· Underwent suboccipital craniotomy for resection of right cerebellar mass and left cerebellar mass with complex dural reconstruction on 4/3/2023  · See vomiting section above

## 2023-04-28 NOTE — ED NOTES
Dr Samir Spencer at the bedside for [address] guided IV access     Heather Merritt, RN  04/27/23 2018

## 2023-04-28 NOTE — ASSESSMENT & PLAN NOTE
· History of metastatic breast cancer to the lymph nodes and liver status post right mastectomy and lymph node resection on chemotherapy  · Recently found to have new large enhancing cerebellar mass at the beginning of April that were found to be metastases and underwent suboccipital craniotomy with resection of right and left cerebellar masses with complex dural reconstruction on 4/3/2023  · Pathology from cerebellar mass consistent with metastatic adenocarcinoma, consistent with breast primary  · Follows outpatient with neurosurgery as well as oncology and has upcoming appointment with radiation oncology for radiation therapy at the beginning of May  · Follows with Dr Mamie García with Oncology and scheduled to see on 5/4;  On pta olaparib  · See vomiting section above

## 2023-04-28 NOTE — H&P
"[address] [facility]  H&P  Name: Jamari Wells 47 y o  female I MRN: [id number]  [facility] I Date of Admission: 4/27/2023   Date of Service: 4/28/2023 I Hospital Day: 0      Assessment/Plan   * Vomiting  Assessment & Plan  · Persistent nausea and vomiting as well as decreased p o  intake since 4/21  · Patient with a history of metastatic breast cancer and back at the end of March/beginning of April she had persistent nausea and vomiting (she reports much more severe than today) as well as headache and gait imbalance; she was found to have a new large enhancing cerebellar mass at the beginning of the month and subsequently was hospitalized and underwent a suboccipital craniotomy for resection of the right cerebellar mass and left cerebellar mass with complex dural reconstruction on 4/3/2023  · Subsequently went to skilled nursing facility and then was discharged from the skilled nursing facility on 4/21  · Saw neurosurgery team and outpatient follow-up on 4/20 with plans for continued post operative follow-up as well as patient with upcoming appointments with oncology as well as radiation oncology  · Subsequently underwent MRI brain on 4/25 with \" supratentorial lesions stable to mildly increased in size  Increasing enhancement along the margins of the posterior fossa resection cavity with prominent left cerebellar hemisphere full ER enhancement, concerning for tumor progression  New subtle enhancement along the left cisternal and pannicular segments of the 7th and 8th cranial nerves without evidence of nodularity or discrete lesion  Close follow-up recommended  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  \"  · Has had persistent nausea and vomiting as well as decreased p o  intake  · On exam, patient alert and oriented  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  Finger-to-nose intact  No new obvious focal deficit    Patient's surgical site " "chemotherapy  · Recently found to have new large enhancing cerebellar mass at the beginning of April that were found to be metastases and underwent suboccipital craniotomy with resection of right and left cerebellar masses with complex dural reconstruction on 4/3/2023  · Pathology from cerebellar mass consistent with metastatic adenocarcinoma, consistent with breast primary  · Follows outpatient with neurosurgery as well as oncology and has upcoming appointment with radiation oncology for radiation therapy at the beginning of May  · Follows with Dr Nellie Cisneros with Oncology and scheduled to see on 5/4; On pta olaparib  · See vomiting section above    Palliative care patient  Assessment & Plan  · Spoke with patient who was alert and oriented and appears at times to understand issues but then forgets important details  Spoke with patient about my concern with her clinical course as well as her underlying comfort  I did try to talk with her several times about the nature of the metastatic malignancy with brain metastases and how it is likely that this will progress  Unclear if patient not understanding or choosing to minimize situation as a protective mechanism  I then asked patient if I could speak with her closest contacts to discuss her situation to find out more  I first asked her if she had any family members who I could call and she reported \"call Amado Padron"  She said to me that Yashira Suarez is her \"medical power of \" and her \"best friend\"  She then reported that she has a brother named Abimael Boyd who is her \"financial power of \"  · Patient's friend Sampson Avelar and I spoke via telephone  Yashira Suarez reports that patient is forgetting important details and she reports patient more confused at times   I spoke with Yashira Suarez about my concerns with patient via telephone and she reports that patient's brother Abimael Boyd is also a medical decision maker and Yashira Suarez reports that she does not feel comfortable discussing goals of " care without Ryan's involvement  Debbiivan Vazquez does report that Merly Pereyra was just here for 3 weeks helping patient but that he flew back to [place]  Debbiivan Vazquez reports that she understands patient's prognosis but reports that Merly Pereyra needs to be involved  I then called Ryan twice on his cell phone with no answer  · Given this situation, will consult palliative care for their assistance on goals of care  Appears palliative care was following during patient's previous hospitalization  VTE Prophylaxis: sequential compression device   Code Status: Level 1 - Full Code       Anticipated Length of Stay:  Patient will be admitted on an Inpatient basis with an anticipated length of stay of  > 2 midnights  Justification for Hospital Stay: Please see detailed plans noted above  Chief Complaint:     Nausea and vomiting  History of Present Illness:  Dori Quezada is a 47 y o  female who has past medical history significant for metastatic breast cancer with known brain metastases as well as liver metastases, recent hospitalization at the beginning of April for brain metastases resection surgery, diabetes who presented to [facility] ER on the evening of 4/27 with symptoms of nausea, vomiting and decreased p o  intake since 4/21  Patient reports that the nausea and vomiting is not as severe as it was at the beginning of the month when she had her brain metastases surgery  Nonetheless she does report that it it seems to be pretty constant since 4/21 when she left the skilled nursing facility where she was staying after the hospitalization  She further reports decreased appetite  She denies any current headache and reports that at the beginning of the month when she was having her nausea and vomiting she had a very severe headache then  Patient denies any fevers or chills  Patient denies any weakness in her extremities or ataxia  Patient denies any chest pain or shortness of breath        Review of "Systems:    Constitutional:  Denies fever or chills   Eyes:  Baseline blindness  HENT:  Denies nasal congestion   Respiratory:  Denies cough or shortness of breath   Cardiovascular:  Denies chest pain or edema   GI: Positive for nausea and vomiting, positive for decreased appetite    Denies abdominal pain  :  Denies dysuria   Musculoskeletal:  Denies back pain or joint pain   Integument:  Denies rash   Neurologic:  Denies headache or sensory changes   Endocrine:  Denies polyuria or polydipsia   Lymphatic:  Denies swollen glands       Past Medical and Surgical History:   Past Medical History:   Diagnosis Date   • Bloating    • Breast cancer ([address] )    • Bruises easily    • Cancer (HCC)     right breast//to lymph nodes/liver/ and bone   • Depression    • History of cancer chemotherapy 05/2020   • History of pneumonia    • Hypotension     occas   • Low iron     \"when pregnant, 20 yrs ago\"   • Neuropathy     hands//fingers   • Shortness of breath     occas   • Stroke [facility])    • Subacromial impingement of left shoulder 03/14/2022   • Tinnitus     right   • Wears glasses      Past Surgical History:   Procedure Laterality Date   • BILATERAL SALPINGOOPHORECTOMY     • BREAST BIOPSY     • CRANIOTOMY Bilateral 4/3/2023    Procedure: Suboccipital craniotomy for tumor resection using neuronavigation;  Surgeon: Bridgette Harris MD;  Location: [facility];  Service: Neurosurgery   • IR BIOPSY LIVER MASS  06/11/2019   • MASTECTOMY Right     2019   • MYOMECTOMY      H/O FIBROIDS, NO SURGERY NEEDED   • GA LAPAROSCOPY W/RMVL ADNEXAL STRUCTURES N/A 07/29/2019    Procedure: LAPAROSCOPIC BILATERAL SALPINGO-OOPHORECTOMY;  Surgeon: Geoffrey Lovelace MD;  Location[facility];  Service: Gynecology Oncology   • GA MASTECTOMY SIMPLE COMPLETE Right 06/05/2020    Procedure: MASTECTOMY SIMPLE, axillary node dissection;  Surgeon: Kelsey Mathew MD;  Location: [facility];  Service: Surgical Oncology   • US GUIDED BREAST BIOPSY RIGHT COMPLETE Right " 05/03/2019   • US GUIDED BREAST LYMPH NODE BIOPSY RIGHT Right 05/03/2019       Meds/Allergies:  Medications Prior to Admission   Medication Sig Dispense Refill Last Dose   • albuterol (Proventil HFA) 90 mcg/act inhaler Inhale 2 puffs every 6 (six) hours as needed for wheezing 6 7 g 5    • ALPRAZolam (XANAX) 0 5 mg tablet Take 0 5-1 tablets (0 25-0 5 mg total) by mouth daily at bedtime as needed for anxiety or sleep (anxiousness or insomnia) (Patient not taking: Reported on 4/20/2023) 5 tablet 0    • aluminum-magnesium hydroxide-simethicone (MYLANTA) 6204-3971-918 mg/30 mL suspension Take 20 mL by mouth 4 (four) times a day as needed for indigestion or heartburn      • ascorbic acid (VITAMIN C) 500 MG tablet Take 500 mg by mouth daily      • atorvastatin (LIPITOR) 40 mg tablet TAKE 1 TABLET BY MOUTH EVERY DAY (Patient not taking: Reported on 4/21/2023) 90 tablet 0    • Azelaic Acid (Finacea) 15 % FOAM Apply 1 Pump topically 2 (two) times a day (Patient not taking: Reported on 4/20/2023) 50 g 3    • bisacodyl (DULCOLAX) 5 mg EC tablet Take 5 mg by mouth 2 (two) times a day as needed for constipation      • Cholecalciferol 25 MCG (1000 UT) capsule Take 2,000 Units by mouth daily      • fluticasone-vilanterol (Breo Ellipta) 200-25 mcg/actuation inhaler Inhale 1 puff daily Rinse mouth after use  180 blister 0    • insulin lispro (HumaLOG) 100 units/mL injection Inject 1-6 Units under the skin 3 (three) times a day before meals Do not start before April 12, 2023   (Patient not taking: Reported on 4/20/2023)  0    • meclizine (ANTIVERT) 25 mg tablet Take 25 mg by mouth 3 (three) times a day as needed for dizziness (Patient not taking: Reported on 4/21/2023)      • metroNIDAZOLE (METROCREAM) 0 75 % cream Apply topically 2 (two) times a day (Patient not taking: Reported on 4/20/2023) 45 g 3    • Multiple Vitamins-Minerals (multivitamin with minerals) tablet Take 1 tablet by mouth daily      • nystatin (MYCOSTATIN) 500,000 units/5 mL suspension Apply 5 mL (500,000 Units total) to the mouth or throat 4 (four) times a day 60 mL 1    • olaparib (LYNPARZA) tablet Take 2 tablets (300 mg total) by mouth 2 (two) times a day (Patient not taking: Reported on 2023) 120 tablet 2    • ondansetron (ZOFRAN) 8 mg tablet Take 1 tablet (8 mg total) by mouth every 8 (eight) hours as needed for nausea or vomiting 20 tablet 0    • pantoprazole (PROTONIX) 40 mg tablet TAKE 1 TABLET BY MOUTH EVERY DAY (Patient not taking: Reported on 2023) 90 tablet 0    • polyethylene glycol (MIRALAX) 17 g packet Take 17 g by mouth daily Do not start before 2023  (Patient not taking: Reported on 2023)  0    • senna-docusate sodium (SENOKOT S) 8 6-50 mg per tablet Take 2 tablets by mouth daily at bedtime  0    • Specialty Vitamins Products (Advanced Collagen) TABS Take by mouth in the morning (Patient not taking: Reported on 2023)          Allergies:    Allergies   Allergen Reactions   • Tylenol [Acetaminophen]      Told to avoid due to cancer        History:  Marital Status: Single     Substance Use History:   Social History     Substance and Sexual Activity   Alcohol Use Yes    Comment: occassional     Social History     Tobacco Use   Smoking Status Former   • Packs/day: 1 00   • Years: 30 00   • Pack years: 30 00   • Types: Cigarettes   • Quit date:    • Years since quittin 3   Smokeless Tobacco Never     Social History     Substance and Sexual Activity   Drug Use No       Family History:  Family History   Problem Relation Age of Onset   • Hypotension Mother    • Colon cancer Father 36   • Hypertension Father    • Prostate cancer Father 79   • Throat cancer Maternal Grandmother 61   • Cancer Maternal Grandmother    • Breast cancer Paternal Aunt         age unknown       Physical Exam:     Vitals:   Blood Pressure: 141/76 (23)  Pulse: 84 (23)  Temperature: 97 5 °F (36 4 °C) (23)  Temp Source: Oral (04/27/23 1900)  Respirations: 18 (04/27/23 2330)  SpO2: 93 % (04/28/23 0201)    Constitutional:  Alert, Oriented to person, place and time  Eyes:  No scleral icterus, baseline blindness  HENT:   Oral thrush, Occipital surgical site appears clean and well healed, external ears normal, external nose normal   Respiratory:  No respiratory distress, no wheezing   Cardiovascular:  Normal rate, no murmurs   GI:  Soft, nondistended, no guarding   :  No costovertebral angle tenderness   Musculoskeletal:  no tenderness, no deformities  Back- no tenderness  Integument:  no jaundice, no rash   Neurologic:  Alert &awake, communicative, known eye blindness, no new obvious CN abnnormalities, finger to nose intact, no weakness or numbness of extremities        Lab Results: I have personally reviewed pertinent reports  Results from last 7 days   Lab Units 04/27/23 2034   WBC Thousand/uL 7 49   HEMOGLOBIN g/dL 13 4   HEMATOCRIT % 37 7   PLATELETS Thousands/uL 103*   NEUTROS PCT % 68   LYMPHS PCT % 20   MONOS PCT % 10   EOS PCT % 1     Results from last 7 days   Lab Units 04/27/23 2034   POTASSIUM mmol/L 2 9*   CHLORIDE mmol/L 101   CO2 mmol/L 29   BUN mg/dL 9   CREATININE mg/dL 0 74   CALCIUM mg/dL 9 4   ALK PHOS U/L 86   ALT U/L 88*   AST U/L 44             Imaging: I have personally reviewed pertinent reports  CT head without contrast    Result Date: 4/28/2023  Narrative: CT BRAIN - WITHOUT CONTRAST INDICATION:   Vomiting, metastatic cancer  COMPARISON: Multiple prior recent studies, most recent MRI brain examination dated 4/25/2023 and CT head examination dated 4/8/2023  TECHNIQUE:  CT examination of the brain was performed  Multiplanar 2D reformatted images were created from the source data  Radiation dose length product (DLP) for this visit:  882 mGy-cm     This examination, like all CT scans performed in the [facility], was performed utilizing techniques to minimize radiation dose exposure, including the use of iterative reconstruction and automated exposure control  IMAGE QUALITY:  Diagnostic  FINDINGS: PARENCHYMA/EXTRA-AXIAL COMPARTMENT: Redemonstrated postsurgical changes of prior suboccipital craniectomy with grossly stable appearance of the surgical resection bed compared to the recent MRI accounting for differences in modality  Known brain parenchymal metastases better visualized on the prior recent MRI again with exception of similar hyperattenuating bifrontal metastases  No CT evidence for for territorial infarct  No parenchymal hemorrhage or new mass effect  VENTRICLES: Stable in size and configuration  VISUALIZED ORBITS: Normal visualized orbits  PARANASAL SINUSES: Normal visualized paranasal sinuses  CALVARIUM AND EXTRACRANIAL SOFT TISSUES: Redemonstrated suboccipital craniectomy changes and associated probable pseudomeningocele as seen on the prior MRI  Impression: No CT evidence for acute territorial infarct  No new mass effect or midline shift  Known intracranial metastatic lesions better visualized on the prior recent MRI brain examination  Postsurgical changes of prior suboccipital craniectomy with grossly stable appearance of the surgical resection bed and 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 compared to the recent MRI accounting for differences in modality  Workstation performed: [id number]     CT head wo contrast    Result Date: 4/8/2023  Narrative: CT BRAIN - WITHOUT CONTRAST INDICATION:   Traumatic brain injury (TBI), new or progressive neuro deficits brain mass  COMPARISON:  CT head 4/5/2023  MRI brain 4/4/2023  TECHNIQUE:  CT examination of the brain was performed  Multiplanar 2D reformatted images were created from the source data  Radiation dose length product (DLP) for this visit:  894 mGy-cm     This examination, like all CT scans performed in the [facility], was performed utilizing techniques to minimize radiation dose exposure, including the use of iterative reconstruction and automated exposure control  IMAGE QUALITY:  Diagnostic  FINDINGS: PARENCHYMA:  Reidentified postsurgical changes of suboccipital craniotomy  Known cerebral metastases better visualized on the comparison MRI; for example unchanged 14 mm right frontal lesion #2/29  The other lesions are not well visualized on this study  No new mass or mass effect  No hydrocephalus  No right internal auditory canal lesion is not visualized on this study  High density areas through the posterior fossa surgical bed are unchanged  Unchanged surgical bed postoperative findings  VENTRICLES AND EXTRA-AXIAL SPACES:  Unchanged compressive mass effect on the 4th ventricle  No hydrocephalus  VISUALIZED ORBITS: Normal visualized orbits  PARANASAL SINUSES: Normal visualized paranasal sinuses  CALVARIUM AND EXTRACRANIAL SOFT TISSUES:  Suboccipital craniotomy  Impression: No significant change when compared with the recent CT abdomen 4/5/2023 MRI brain 4/4/2023  Workstation performed: [id number]     CT head wo contrast    Result Date: 4/5/2023  Narrative: CT BRAIN - WITHOUT CONTRAST INDICATION:   fall, recent surgery  COMPARISON:  CT head dated 4/4/2023, MR brain dated 4/4/2023 TECHNIQUE:  CT examination of the brain was performed  Multiplanar 2D reformatted images were created from the source data  Radiation dose length product (DLP) for this visit:  930 33 mGy-cm   This examination, like all CT scans performed in the [facility], was performed utilizing techniques to minimize radiation dose exposure, including the use of iterative  reconstruction and automated exposure control  IMAGE QUALITY:  Diagnostic  FINDINGS: Status post suboccipital craniotomy, status post resection of occipital metastatic lesions  Large area of edema and scattered blood products are redemonstrated in the resection cavity in the posterior fossa, similar to the prior   There is a small amount of pneumocephalus redemonstrated, similar to the prior  Effacement of the 4th ventricle redemonstrated, as before  No territorial infarct is seen  Small amount of blood in the posterior horn of the right lateral ventricle now seen, no hydrocephalus  Basal cisterns remain patent  Several subcentimeter metastatic lesions in the right frontal lobe are redemonstrated  The intracranial structures are otherwise intervally unchanged  VISUALIZED ORBITS: Normal visualized orbits  PARANASAL SINUSES: Normal visualized paranasal sinuses  CALVARIUM AND EXTRACRANIAL SOFT TISSUES:  Calvarium otherwise appears intact  Left-sided soft tissue swelling redemonstrated  Impression: Postsurgical changes status post suboccipital craniotomy as described, similar to the prior  Effacement of the 4th ventricle is redemonstrated, as before  A small amount of blood is now seen dependently in the right lateral ventricle, no hydrocephalus  No other significant interval change  Clinical follow-up recommended  Workstation performed: [id number]     CT head wo contrast    Result Date: 4/5/2023  Narrative: CT BRAIN - WITHOUT CONTRAST INDICATION:   fall  COMPARISON:  CT brain on 3/30/2023 and MRI brain of the same day  TECHNIQUE:  CT examination of the brain was performed  Multiplanar 2D reformatted images were created from the source data  Radiation dose length product (DLP) for this visit:  935 03 mGy-cm   This examination, like all CT scans performed in the [facility], was performed utilizing techniques to minimize radiation dose exposure, including the use of iterative  reconstruction and automated exposure control  IMAGE QUALITY:  Diagnostic  FINDINGS: PARENCHYMA: Scattered foci of intracranial air and subarachnoid hemorrhage in the posterior cranial fossa likely related to recent resection    Suboccipital craniotomy bony defect is seen with adjacent subarachnoid hemorrhage/parenchymal hemorrhage and cystic cavity within the bilateral cerebellum  No gross supratentorial intracranial hemorrhage identified  Increased density foci in the bilateral frontal lobes likely represents metastatic lesions  Trace right subdural hygroma  No significant mass effect, midline shift, or evidence of transtentorial herniation  VENTRICLES AND EXTRA-AXIAL SPACES:  Normal for the patient's age  VISUALIZED ORBITS: Normal visualized orbits and globes  PARANASAL SINUSES: Minimal mucoperiosteal thickening bilateral sphenoid sinuses  The remaining paranasal sinuses and mastoid air cells appear adequately aerated and clear without air-fluid levels CALVARIUM AND EXTRACRANIAL SOFT TISSUES:  Suboccipital craniotomy defect is seen  Bony calvarium and temporal mandibular joints are otherwise intact  Left frontotemporal scalp soft tissue swelling  Impression: Postoperative changes from suboccipital craniotomy and resection of cerebellar lesions as detailed above  Blood products in the posterior cranial fossa likely postsurgical in nature but cannot exclude increased blood products from trauma in this setting  If clinically indicated, consider follow-up exam to ensure stability  Workstation performed: [id number]     CT head w wo contrast    Result Date: 3/30/2023  Narrative: CT BRAIN - WITH AND WITHOUT CONTRAST INDICATION:   hx stage 4 cancer concern for new metastatic brain lesion - assessing for mass  COMPARISON:  CTA neck 12/9/2022 TECHNIQUE:  CT examination of the brain was performed both prior to and after the administration of intravenous contrast   Multiplanar 2D reformatted images were created from the source data  Radiation dose length product (DLP) for this visit:  1822 37 mGy-cm   This examination, like all CT scans performed in the [facility], was performed utilizing techniques to minimize radiation dose exposure, including the use of iterative reconstruction and automated exposure control    IV Contrast:  100 mL of iohexol (OMNIPAQUE) 350  IMAGE QUALITY:  Diagnostic  FINDINGS: PARENCHYMA:  No evidence of acute infarction  No intracranial hemorrhage or midline shift  1 3 x 1 cm right posterior frontal centrally hypodense mass with thin mildly dense enhancing soft tissue rim  1 5 x 1 1 cm mildly hyperdense and mildly enhancing right mid frontal cortical mass  3 2 x 2 9 cm right mid medial cerebellar mildly hyperdense enhancing mass new since December 2022  This may be infiltrating the contralateral cerebellar hemisphere  2 2 x 1 5 cm left inferior medial cerebellar enhancing mass new since December 2022n  5 x 5 mm left mid frontal enhancing lesion  VENTRICLES AND EXTRA-AXIAL SPACES:  Mild effacement of the inferior right fourth ventricle  No acute hydrocephalus  VISUALIZED ORBITS: Normal visualized orbits  PARANASAL SINUSES: Normal visualized paranasal sinuses  CALVARIUM:  Normal      Impression: Multiple supratentorial and infratentorial enhancing lesions, the largest of which is in the right medial cerebellum measuring approximately 3 2 cm  Mild effacement of the inferior fourth ventricle without acute hydrocephalus  The study was marked in [facility] for immediate notification  Workstation performed: [id number]     MRI brain w wo contrast    Result Date: 4/25/2023  Narrative: MRI BRAIN WITH AND WITHOUT CONTRAST INDICATION: C50 919: Malignant neoplasm of unspecified site of unspecified female breast C79 31: Secondary malignant neoplasm of brain  COMPARISON: 3/30/2023 and 4/4/2023 L5 somewhat TECHNIQUE: Multiplanar, multisequence imaging of the brain was performed before and after gadolinium administration  IV Contrast:  10 mL of Gadobutrol injection (SINGLE-DOSE)  IMAGE QUALITY:   Diagnostic  FINDINGS: BRAIN PARENCHYMA: Postsurgical change from suboccipital craniectomy and resection of bilateral cerebellar hemisphere masses  Blood products and fluid in the resection cavity   Minimal restricted diffusion along the margins of the resection cavity could represent mild infarct and/or blood products  There is increased enhancement along the margins of the resection cavity with mild nodularity and in the adjacent cerebellar folia, particularly in the left cerebellar hemisphere  Two right frontal lesions (11:128 and 11:135), previously visualized, demonstrate stable restricted diffusion and T2/FLAIR hyperintensity, with homogeneous enhancement  The larger more lateral lesion measures 1 6 cm in maximal diameter compared to 1 4 cm  on the previous exam  The more medially located lesion measures 0 9 cm compared to 1 cm on the prior exam  A third lesion (11:137) is present in the left superior frontal gyrus with similar enhancement and signal characteristics measuring  0 6 cm, stable  Stable enhancing lesion in the right internal auditory canal (11:57) and cerebellopontine angle measuring 1 5 x 0 7 x 0 5 cm  Subtle enhancement of the cisternal and canalicular portions of the left 7th and 8th cranial nerves (11:54)  Stable underlying T2/FLAIR hyperintense foci suggesting early microangiopathic change  Decreased mass effect in the posterior fossa  VENTRICLES: Interval resolution of effacement of the fourth ventricle  No hydrocephalus  Prominent perivascular space again noted inferior to the right lentiform nuclei  No mismatch or CT because of the diarrhea SELLA AND PITUITARY GLAND:  Normal  ORBITS: No retro-orbital lesion  PARANASAL SINUSES: Fluid levels in the sphenoid and maxillary sinuses  Mild mucosal thickening in the ethmoid air cells  VASCULATURE:  Evaluation of the major intracranial vasculature demonstrates appropriate flow voids  CALVARIUM AND SKULL BASE: Postsurgical change from suboccipital craniectomy EXTRACRANIAL SOFT TISSUES: Suboccipital pseudomeningocele, new since the prior exam, measuring 2 4 x 5 7 x 3 2 cm  Impression: 1  Supratentorial lesions are stable to mildly increased in size   2  Increasing enhancement along the margins of the posterior fossa resection cavity with prominent left cerebellar hemisphere foliar enhancement  , concerning for tumor progression  3  New subtle enhancement along the left cisternal and canalicular segments of the 7th and 8th cranial nerves without evidence of nodularity or discrete lesion  Close follow-up recommended  4  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  Workstation performed: [id number]     MRI brain w wo contrast    Result Date: 4/4/2023  Narrative: MRI BRAIN WITH AND WITHOUT CONTRAST INDICATION: post op crani  History of breast cancer with metastases  COMPARISON:  MRI brain PTE with and without contrast March 30, 2023  CT head without contrast March 30, 2023  TECHNIQUE: Multiplanar, multisequence imaging of the brain was performed before and after gadolinium administration  IV Contrast:  14 mL of Gadobutrol injection (SINGLE-DOSE)  IMAGE QUALITY:   Diagnostic  FINDINGS: BRAIN PARENCHYMA: Postsurgical changes of suboccipital craniotomy for resection of posterior midline cerebellar metastatic lesion and left inferior cerebellar metastatic lesion  Cerebellar resection cavity involves the right cerebellum, cerebellar vermis, and a portion of the left inferior cerebellum which contains blood products, locules of gas, and peripheral marginal ischemic change  Small amount of irregular nodular enhancement adjacent to left inferior cerebellar resection margin suspicious for residual tumor  No residual tumor in the right cerebellar or cerebellar vermis resection sites  Several poorly enhancing metastatic lesions some of which have bright DWI signal (measured in long axis): -1 3 cm right posterior frontal lobe lesion (11:24), unchanged  -1 4 cm right frontal lobe lesion (11:23), unchanged  -0 6 cm left frontal lobe lesion (11:23), unchanged  Faintly enhancing lesions in right paramedian cameron and left lateral cameron could represent capillary telangiectasias   1 4 cm right internal auditory canal lesion (12:48), compatible with vestibular schwannoma  New postoperative ischemia in left paramidline cerebellar region  Acute small-volume subarachnoid hemorrhage in bilateral cerebellar folia, likely from recent surgery  Pneumocephalus  No midline shift  Small scattered hyperintensities on T2/FLAIR imaging are noted in the periventricular and subcortical white matter demonstrating an appearance that is statistically most likely to represent mild microangiopathic change  VENTRICLES:  Compressive mass effect on 4th ventricle  Slight dilatation in temporal horns of both lateral ventricles  Acute trace intraventricular hemorrhage layering in occipital horns of both lateral ventricles  SELLA AND PITUITARY GLAND:  Normal  ORBITS:  Normal  PARANASAL SINUSES:  Mild scattered mucosal thickening with scattered air-fluid levels, worse in bilateral sphenoid sinuses  VASCULATURE:  Evaluation of the major intracranial vasculature demonstrates appropriate flow voids  CALVARIUM AND SKULL BASE:  Normal  EXTRACRANIAL SOFT TISSUES:  Partially imaged endotracheal and orogastric tubing  Impression: Postsurgical changes of suboccipital craniotomy for resection of posterior midline cerebellar metastatic lesion and left inferior cerebellar metastatic lesion  Cerebellar resection cavity involves the right cerebellum, cerebellar vermis, and a portion of the left inferior cerebellum which contains blood products, locules of gas, and peripheral marginal ischemic change  Small amount of irregular nodular enhancement adjacent to left inferior cerebellar resection margin suspicious for residual tumor  No  residual tumor in the right cerebellar or cerebellar vermis resection sites  New postoperative ischemia in left paramidline cerebellar region  Acute small-volume subarachnoid hemorrhage in bilateral cerebellar folia, likely from recent surgery   Unchanged several poorly enhancing metastatic lesions in bilateral frontal lobes  Unchanged 1 4 cm right vestibular schwannoma  Faintly enhancing lesions in right paramedian cameron and left lateral cameron could represent capillary telangiectasias  Attention on follow-up  Slight dilatation in temporal horns of both lateral ventricles with compressive mass effect on the 4th ventricle  Pneumocephalus  The study was marked in [facility] for immediate notification  Workstation performed: [id number]     X-ray chest 1 view    Result Date: 4/4/2023  Narrative: CHEST INDICATION:   Endotracheal tube positioning  COMPARISON:  8/12/2020 EXAM PERFORMED/VIEWS:  XR CHEST 1 VIEW FINDINGS:  Endotracheal tube is present, in satisfactory position with its tip 4 cm above the level of the rob  Enteric tube is present with its tip extending below the left hemidiaphragm  Left-sided PICC projects over the SVC  Cardiomediastinal silhouette appears unremarkable  The lungs are clear  No pneumothorax or pleural effusion  Osseous structures appear within normal limits for patient age  Impression: No acute cardiopulmonary disease  Endotracheal tube projects 4 cm above the rob  Workstation performed: [id number]     CT chest abdomen pelvis w contrast    Result Date: 3/31/2023  Narrative: CT CHEST, ABDOMEN AND PELVIS WITH IV CONTRAST INDICATION:   Breast cancer, invasive, stage IV, pre-therapy or re-staging restaging, new brain mets in the setting of stage iv breast cancer  COMPARISON: Multiple prior CT examinations of the chest abdomen pelvis commencing  May 24, 2019 with direct comparison made to February 17, 2023  TECHNIQUE: CT examination of the chest, abdomen and pelvis was performed  Multiplanar 2D reformatted images were created from the source data  Radiation dose length product (DLP) for this visit:  1527 02 mGy-cm     This examination, like all CT scans performed in the [facility], was performed utilizing techniques to minimize radiation dose exposure, including the use of iterative reconstruction and automated exposure control  IV Contrast:  90 mL of iohexol (OMNIPAQUE) Enteric Contrast: Enteric contrast was not administered  FINDINGS: CHEST LUNGS:  Mild emphysema  No suspicious pulmonary nodules  PLEURA:  Unremarkable  HEART/GREAT VESSELS: Heart is unremarkable for patient's age  No thoracic aortic aneurysm  MEDIASTINUM AND HAIM:  Unremarkable  CHEST WALL AND LOWER NECK:  Post right mastectomy ABDOMEN LIVER/BILIARY TREE:  No surface nodularity  Hypoattenuating segment 7 lesion is unchanged measuring 0 7 x 0 4 cm (series 2, image 100)  This corresponds to a metastasis which measured 2 3 x 1 7 cm on October 28, 2019 (series 2, image 156)  Hypoattenuating segment IVb lesion is unchanged measuring 0 7 x 0 5 cm (series 2, image 111)  This corresponds to metastasis which measured 1 6 x 1 6 cm on October 28, 2019 (series 2, image 162)  Hypoattenuating segment 5 lesion is unchanged measuring 1 0 x 0 7 cm (series 2, image 116)  This corresponds to a metastasis which measured 1 5 x 1 4 cm on October 28, 2019 (series 2, image 166)  Retraction of the posterior hepatic capsule at segment 6 corresponds to site of metastases evident on October 28, 2019 (series 2, image 127 compared to series 2, image 170)  Hypoattenuating segment 2 lesion is unchanged measuring 0 3 x 0 3 cm (series 2, image 87)  The lesion is similar in size on the October 20, 2019 CT and therefore may be a cyst  No new hepatic lesion  Patent hepatic and portal veins  No biliary duct dilatation  GALLBLADDER:  No calcified gallstones  No pericholecystic inflammatory change  SPLEEN:  Unremarkable  PANCREAS:  Unremarkable  ADRENAL GLANDS:  Unremarkable  KIDNEYS/URETERS:  Unremarkable  No hydronephrosis  STOMACH AND BOWEL:  Unremarkable  APPENDIX:  A normal appendix was visualized  ABDOMINOPELVIC CAVITY:  No ascites  No pneumoperitoneum  No lymphadenopathy  VESSELS:  Unremarkable for patient's age   PELVIS REPRODUCTIVE ORGANS: Unremarkable for patient's age  URINARY BLADDER:  Unremarkable  ABDOMINAL WALL/INGUINAL REGIONS:  Unremarkable  OSSEOUS STRUCTURES:  0 3 cm sclerotic lesion in the right iliac bone is unchanged from October 28, 2019 and is likely a bone [address] (series 2, image 190)  No suspicious osseous lesion  No acute fracture  Impression: Since February 17, 2023: 1  Unchanged hepatic lesions  2   No new sites of disease in the chest abdomen or pelvis  Workstation performed: [id number]     MRI Brain BT w wo Contrast    Result Date: 3/31/2023  Narrative: MRI BRAIN AND IACS WITH AND WITHOUT CONTRAST INDICATION: Patient with metastatic breast cancer to liver and lymph node presented with nausea, vomiting, headache, found to have new brain mets      The patient has a known right acoustic neuroma  COMPARISON:  6/23/2022 TECHNIQUE: Multiplanar, multisequence imaging of the brain was performed before and after gadolinium administration  IV Contrast:  11 mL of Gadobutrol injection (SINGLE-DOSE)  IMAGE QUALITY:   Diagnostic  FINDINGS: BRAIN PARENCHYMA:  Enlarging enhancing masses are identified within the brain parenchyma  Previously seen foci of subtle enhancement thought to represent subacute ischemia within the right superior frontal lobe have increased in size  The larger of the  2 is best seen on series 17 image 23 and now measures 1 3 x 1 3 cm  Slightly more anteriorly and superiorly within the right frontal lobe, the 2nd lesion measures 1 3 cm in maximum dimension as well  There are now large enhancing lesions identified within the posterior fossa  The larger of the 2 is located within the inferior aspect of the cerebellar vermis measuring 2 8 x 3 1 x 2 8 cm  More anteriorly and inferiorly, within the left cerebellum is a 2nd lesion measuring 1 3 x 2 2 x 1 4 cm  There is vasogenic edema surrounding the larger of the 2 lesions resulting in mild localized mass effect   Subcentimeter enhancing lesion within the left "posterior superior frontal lobe difficult to visualize on axial imaging is better seen on sagittal postcontrast imaging, series 16 image 10 measuring 6 mm  Arterial spin labeling was performed and there appears to be mild subtle increased cerebral blood flow within portions of the 2 larger lesions in the posterior fossa  Solitary white matter hyperintensity within the right anterior frontal lobe on series 6 image 15 was present on the prior examination and may represent sequela of mild chronic microangiopathic change  There is an enhancing lesion identified within the right CP angle similar to prior examination extending into the IAC measuring 1 6 cm in long axis consistent with patient's known acoustic neuroma  VENTRICLES:  Normal for the patient's age  SELLA AND PITUITARY GLAND:  Normal  ORBITS:  Normal  PARANASAL SINUSES:  Normal  VASCULATURE:  Evaluation of the major intracranial vasculature demonstrates appropriate flow voids  CALVARIUM AND SKULL BASE:  Normal  EXTRACRANIAL SOFT TISSUES:  Normal      Impression: There are now a total of 5 mildly enhancing lesions identified within the brain parenchyma  The largest are located within the posterior fossa measuring up to 3 1 cm in diameter with mild surrounding edema and localized mass effect  Findings are most consistent with brain metastasis from patient's known metastatic breast carcinoma  Stable right acoustic neuroma  This examination was marked \"immediate notification\" in Epic in order to begin the standard process by which the radiology reading room liaison alerts the referring practitioner  Workstation performed: [id number]       Total time for visit, including counseling/coordination of care: 120 minutes  Greater than 50% of this total time spent on direct patient counseling and coorination of care       [facility] Records Reviewed as well as Records in Care Everywhere    ** Please Note: Dragon 360 Dictation voice to text software was used in the creation " of this document   **

## 2023-04-28 NOTE — H&P (VIEW-ONLY)
[facility]  Consult  Name: Kristen Glaser 47 y o  female I MRN: [id number]  Unit/Bed#: ICU 01 I Date of Admission: 4/27/2023   Date of Service: 4/28/2023 I Hospital Day: 0    Inpatient consult to Neurosurgery  Consult performed by: EMILIANO Restrepo  Consult ordered by: Juan C Reed DO          Assessment/Plan   Status post brain surgery  Assessment & Plan  See above  Vomiting  Assessment & Plan  Secondary to brain metastasis  Medical management per primary team     Malignant neoplasm of central portion of right breast in female, estrogen receptor positive [facility])  Assessment & Plan  ER/IN+, HER2- grade 3, metastatic breast cancer diagnosed June 2019   + BRCA 2 mutation  * 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 [facility])  Assessment & Plan  Patient with PMH of stage IV breast cancer with brain and liver metastases presented to the ED 4/27/2023 c/o headache and progressive nausea and vomiting for several weeks  · Patient is s/p suboccipital craniotomy for resection of right and left cerebellar masses with complex dural reconstruction 4/3/2023 with Dr Dima Rose  · Today, symptoms are improved secondary to medical management  Exam is non-focal     Imaging  · CT head wo (4/27/2023):  No CT evidence for acute territorial infarct  No new mass effect or midline shift  Known intracranial metastatic lesions better visualized on the prior recent MRI brain examination  Postsurgical changes of prior suboccipital craniectomy with grossly stable appearance of the surgical resection bed and 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 compared to the recent MRI accounting for differences in modality  · MRI brain (4/25/2023): Supratentorial lesions are stable to mildly increased in size  Increasing enhancement along the margins of the posterior fossa resection cavity with prominent left cerebellar hemisphere foliar enhancement  , concerning for tumor progression   New subtle enhancement along the left cisternal and canalicular segments of the 7th and 8th cranial nerves without evidence of nodularity or discrete lesion  Close follow-up recommended  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  Plan  · Continue monitoring neurological status/exam  · Transfer patient to ICU for CSF diversion with EVD placement  · Will send CSF for culture and pathology  · Drain 10mL/hour through 4/30/23 then clamp  If patient does not tolerate clamping with increased ICP or is symptomatic, will plan for ventriculoperitoneal shunt placement 5/1/2023  · Mobilize as tolerated with assistance  · Pain management per primary team  · DVT ppx: SCDs  · Patient is established with palliative care outpatient - recommend ongoing [address] discussion  Neurosurgery will continue to follow closely  Call with questions           History of Present Illness   HPI: Ivon Broderick is a 47 y o  female with PMH including stage IV breast cancer with mets to LN, brain, and liver s/p right mastectomy, LN resection, and recent suboccipital craniotomy for tumor resection 4/3/2023 who presents with complaint of progressive N/V and headache for several weeks  Patient was seen in neurosurgery office 4/20/2023 at which time she was doing well  Shortly afterward, she developed progressive, intractable nausea and vomiting with headache causing her to present to the ED 4/27/2023  Today, symptoms of N/V and headache are improving with medications given  Headaches are described as dull  Patient states that headaches are improving and worsen with standing upright  Patient has been able to tolerate ambulation with walker and assistance  Current stage IV breast cancer with metastasis was initially diagnosed in 2019 and treated with preadjuvant treatment which had minimal response and subsequent right mastectomy in 2020  She was then on olaparib with good response   Surveillance CT scanning in 2/2023 showed hepatic lesions  On 3/30/2023, she presented to the ED with vertigo, nausea, and vomiting  Imaging was significant for multiple brain masses  She underwent suboccipital craniotomy for resection of right and left cerebellar masses with complex dural reconstruction done by Dr Margaux Almendarez 4/3/2023  Pathology of both cerebellar masses showed metastatic adenocarcinoma, consistent with breast primary  She was discharged to rehab center on 4/11/2023 (POD #8) with plans for neurosurgical and radiation oncology follow-up  She was discharged from rehab to home on 4/21/2023  Patient was seen by radiation oncology 4/21/2023 who planned to start SRT 5/3/2023  Patient continues to be followed by radiation oncology/palliative care while inpatient  Review of Systems   Constitutional: Positive for activity change, appetite change and fatigue  Negative for chills, diaphoresis, fever and unexpected weight change  HENT: Positive for mouth sores (thrush)  Negative for congestion, ear discharge, ear pain, facial swelling, hearing loss, postnasal drip, rhinorrhea, sore throat, trouble swallowing and voice change  Eyes: Negative for pain, discharge and itching  Respiratory: Negative for cough, chest tightness, shortness of breath and wheezing  Cardiovascular: Negative for chest pain, palpitations and leg swelling  Gastrointestinal: Positive for constipation (last BM 4/22/23)  Negative for abdominal distention, abdominal pain, anal bleeding, blood in stool, diarrhea, nausea, rectal pain and vomiting  Musculoskeletal: Negative for arthralgias, back pain and myalgias  Skin: Negative for color change, pallor, rash and wound  Allergic/Immunologic: Positive for immunocompromised state  Neurological: Positive for headaches (worse when standing upright)         Historical Information   Past Medical History:   Diagnosis Date   • Bloating    • Breast cancer ([address] )    • Bruises easily    • Cancer ([address] )     right breast//to lymph nodes/liver/ "and bone   • Depression    • History of cancer chemotherapy 2020   • History of pneumonia    • Hypotension     occas   • Low iron     \"when pregnant, 20 yrs ago\"   • Neuropathy     hands//fingers   • Shortness of breath     occas   • Stroke [facility])    • Subacromial impingement of left shoulder 2022   • Tinnitus     right   • Wears glasses      Past Surgical History:   Procedure Laterality Date   • BILATERAL SALPINGOOPHORECTOMY     • BREAST BIOPSY     • CRANIOTOMY Bilateral 4/3/2023    Procedure: Suboccipital craniotomy for tumor resection using neuronavigation;  Surgeon: Freddie Oneil MD;  Location: [facility];  Service: Neurosurgery   • IR BIOPSY LIVER MASS  2019   • MASTECTOMY Right     2019   • MYOMECTOMY      H/O FIBROIDS, NO SURGERY NEEDED   • SD LAPAROSCOPY W/RMVL ADNEXAL STRUCTURES N/A 2019    Procedure: LAPAROSCOPIC BILATERAL SALPINGO-OOPHORECTOMY;  Surgeon: Myrtice Nageotte, MD;  Location: [facility];  Service: Gynecology Oncology   • SD MASTECTOMY SIMPLE COMPLETE Right 2020    Procedure: MASTECTOMY SIMPLE, axillary node dissection;  Surgeon: Marietta Ghosh MD;  Location: [facility];  Service: Surgical Oncology   • US GUIDED BREAST BIOPSY RIGHT COMPLETE Right 2019   • US GUIDED BREAST LYMPH NODE BIOPSY RIGHT Right 2019     Social History     Substance and Sexual Activity   Alcohol Use Yes    Comment: occassional     Social History     Substance and Sexual Activity   Drug Use No     Social History     Tobacco Use   Smoking Status Former   • Packs/day: 1 00   • Years: 30 00   • Pack years: 30 00   • Types: Cigarettes   • Quit date:    • Years since quittin 3   Smokeless Tobacco Never     Family History   Problem Relation Age of Onset   • Hypotension Mother    • Colon cancer Father 36   • Hypertension Father    • Prostate cancer Father 79   • Throat cancer Maternal Grandmother 61   • Cancer Maternal Grandmother    • Breast cancer Paternal Aunt         age unknown " Meds/Allergies   current meds:   Current Facility-Administered Medications   Medication Dose Route Frequency   • albuterol (PROVENTIL HFA,VENTOLIN HFA) inhaler 2 puff  2 puff Inhalation Q4H PRN   • ALPRAZolam (XANAX) tablet 0 25 mg  0 25 mg Oral HS PRN   • ipratropium (ATROVENT) 0 02 % inhalation solution 0 5 mg  0 5 mg Nebulization Q6H PRN   • levalbuterol (XOPENEX) inhalation solution 1 25 mg  1 25 mg Nebulization Q6H PRN   • multi-electrolyte (PLASMALYTE-A/ISOLYTE-S PH 7 4) IV solution  125 mL/hr Intravenous Continuous   • nystatin (MYCOSTATIN) oral suspension 500,000 Units  500,000 Units Swish & Swallow 4x Daily   • ondansetron (ZOFRAN) injection 4 mg  4 mg Intravenous Q6H PRN   • oxyCODONE (ROXICODONE) split tablet 2 5 mg  2 5 mg Oral Q6H PRN     Allergies   Allergen Reactions   • Tylenol [Acetaminophen]      Told to avoid due to cancer        Objective   I/O       04/26 0701 04/27 0700 04/27 0701 04/28 0700 04/28 0701 04/29 0700    IV Piggyback  1000     Total Intake  1000     Net  +1000                  Physical Exam  Constitutional:       General: She is not in acute distress  Appearance: Normal appearance  HENT:      Head:      Comments: Nontender with broad bulging fullness  Incision CDI  Right Ear: External ear normal       Left Ear: External ear normal       Nose: Nose normal       Mouth/Throat:      Mouth: Mucous membranes are dry  Comments: Unable to open mouth competely secondary to thrush  White lesions to tongue and inner cheeks  Lips dry and cracked  Eyes:      Extraocular Movements: Extraocular movements intact  Pupils: Pupils are equal, round, and reactive to light  Cardiovascular:      Rate and Rhythm: Normal rate and regular rhythm  Pulmonary:      Effort: Pulmonary effort is normal    Musculoskeletal:         General: Normal range of motion  Cervical back: Normal range of motion and neck supple  Skin:     General: Skin is warm and dry     Neurological: Mental Status: She is alert and oriented to person, place, and time  Motor: Motor strength is normal       Gait: Gait is intact  Deep Tendon Reflexes:      Reflex Scores:       Brachioradialis reflexes are 2+ on the right side and 2+ on the left side  Patellar reflexes are 2+ on the right side and 2+ on the left side  Psychiatric:         Mood and Affect: Mood normal          Speech: Speech normal          Behavior: Behavior normal          Thought Content: Thought content normal          Judgment: Judgment normal        Neurologic Exam     Mental Status   Oriented to person, place, and time  Oriented to country, city and area  Oriented to year, month, date, day and season  Follows 3 step commands  Attention: normal  Concentration: normal    Speech: speech is normal   Level of consciousness: drowsy  Knowledge: good  Able to perform simple calculations  Able to name object  Able to repeat  Normal comprehension  Cranial Nerves     CN II   Visual fields full to confrontation  Visual acuity: normal  Right visual field deficit: none  Left visual field deficit: none     CN III, IV, VI   Pupils are equal, round, and reactive to light  Right pupil: Shape: regular  Consensual response: intact  Accommodation: intact  Left pupil: Shape: regular  Consensual response: intact  Accommodation: intact  CN III: no CN III palsy  Nystagmus: none   Diplopia: none  Upgaze: normal  Downgaze: normal  Conjugate gaze: present    CN V   Facial sensation intact  Right facial sensation deficit: none  Left facial sensation deficit: none    CN VII   Facial expression full, symmetric     Right facial weakness: none  Left facial weakness: none    CN VIII   Hearing: intact    CN XI   Right trapezius strength: normal  Left trapezius strength: normal    CN XII   CN XII normal    Tongue: not atrophic  Tongue deviation: none    Motor Exam   Muscle bulk: normal  Overall muscle tone: normal    Strength Strength 5/5 throughout  Sensory Exam   Light touch normal      Gait, Coordination, and Reflexes     Gait  Gait: normal    Reflexes   Right brachioradialis: 2+  Left brachioradialis: 2+  Right patellar: 2+  Left patellar: 2+  Right plantar: normal  Left plantar: normal  Right Alejandra: absent  Left Alejandra: absent  Right ankle clonus: absent  Left pendular knee jerk: absent      Vitals:Blood pressure 96/56, pulse 80, temperature 97 5 °F (36 4 °C), temperature source Oral, resp  rate 17, SpO2 99 %  ,There is no height or weight on file to calculate BMI  Lab Results:   Results from last 7 days   Lab Units 04/28/23  0657 04/27/23 2034   WBC Thousand/uL 6 73 7 49   HEMOGLOBIN g/dL 12 2 13 4   HEMATOCRIT % 35 5 37 7   PLATELETS Thousands/uL 85* 103*   NEUTROS PCT % 70 68   MONOS PCT % 13* 10     Results from last 7 days   Lab Units 04/28/23  0657 04/27/23 2034   POTASSIUM mmol/L 3 5 2 9*   CHLORIDE mmol/L 107 101   CO2 mmol/L 21 29   BUN mg/dL 5 9   CREATININE mg/dL 0 35* 0 74   CALCIUM mg/dL 8 0* 9 4   ALK PHOS U/L  --  86   ALT U/L  --  88*   AST U/L  --  44     Results from last 7 days   Lab Units 04/28/23  0657   MAGNESIUM mg/dL 2 1         Results from last 7 days   Lab Units 04/28/23  0657   INR  1 12   PTT seconds 21*     Imaging Studies: I have personally reviewed pertinent reports  and I have personally reviewed pertinent films in PACS    CT head without contrast    Result Date: 4/28/2023  Impression: No CT evidence for acute territorial infarct  No new mass effect or midline shift  Known intracranial metastatic lesions better visualized on the prior recent MRI brain examination  Postsurgical changes of prior suboccipital craniectomy with grossly stable appearance of the surgical resection bed and 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 compared to the recent MRI accounting for differences in modality   Workstation performed: [id number]       EKG, Pathology, and Other Studies: I have personally reviewed pertinent reports  and I have personally reviewed pertinent films in PACS    VTE Prophylaxis: Sequential compression device (Venodyne)     Code Status: Level 1 - Full Code  Advance Directive and Living Will:      Power of :    POLST:      Counseling / Coordination of Care  I spent 30 minutes with the patient

## 2023-04-28 NOTE — CONSULTS
"Consultation - Palliative and Supportive Care   Balbir Wayne 47 y o  female [id number]    Assessment:  Breast cancer  Metastasis to brain  Anxiety  Palliative care patient  Goals of care counseling    Plan:  1  Symptom management  -defer to primary treatment team    2  Goals:  Level 1 code status  Full code  Disease focused care  · Patient would like to extend her life with primary focus on quality of life  States, \"I would not want to be a vegetable  \"  · She is comfortable with CPR and intubation  Discussed these interventions thoroughly and she seems to understand the implications of receiving these extreme life saving measures in the setting of worsening metastatic disease  · She would like all treatments including use of artificial nutrition, hemodialysis, antibiotics, and all other indicated therapies to sustain life as long as she is able to talk/communicate, have ability to move her arms and legs to some extent  · Patient's preferred substitute decision maker is her friend Dontrell Mathias and brother Alexa Mahoney  She reports having a living will  · Polly Meek will act as primary contact for the patient per patient, hilaria and ryder Figueroa and Ryder communicate well and share same beliefs on patients care  · Dontrell Mathias says she is willing to participate in patient's medical decisions but is not comfortable in doing this independently for big decisions and would want Ryder to have the final say  · Patient does not want her son Efraín Barlow to make medical decisions  ·  Brother is contacting their  to get a copy of will and fax it to our office and friend Dontrell Mathias says she may be able to bring in a copy as well  3   Social support:  • Patient is supported by her brother Alexa Mahoney who lives in 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  • She has a son Efraín Barlow who she is close with    · Supportive listening provided  · Normalized experience of " patient/family  · Provided anxiety containment  · Provided anticipatory guidance  · Investigated spiritual needs    4  Follow up  • Palliative Care will continue to follow for goals of care discussions will be ongoing  Please reach out via [employer] if questions or concerns arise  Decisional apparatus:  Patient is competent on exam today  If competency is lost, patient states she would like her substitute decision makers her friend Sharyn Cheung and/or brother Claire Gitelman  She would not want her son to make medical decisions  Advance Directive/Living Will: Has but not on file, brother ryder contacting  for a copy and friend Sharyn Cheung will be looking to bring a copy in from patient's home today if she can find it  POLST: none    We appreciate the invitation to be involved in this patient's care  We will continue to follow throughout this hospitalization  Please do not hesitate to reach our on call provider through our clinic answering service at  should you have acute symptom control concerns  Maritza Arias PA-C  Palliative and Supportive Care  Clinic/Answering Service: [phone]  You can find me on [employer]! IDENTIFICATION:  Inpatient consult to Palliative Care  Consult performed by: Maritza Arias PA-C  Consult ordered by: Tara Pal DO        Physician Requesting Consult: Christina Thomas DO  Reason for Consult / Principal Problem: GOC counseling and SM secondary to progression of brain metastasis  History of Present Illness:  Evan Stacy is a 47 y o  female who presents with a palliative diagnosis of malignant breast cancer with metastasis  She was evaluated in the ED at [facility] on 04/27/23 secondary to vomiting and dehydration and was admitted for intractable nausea and vomiting and decreased po intake  Annette Laughter She was recently discharged 04/21 from rehab where she was recovering from craniotomy for resection of cerebellar brain lesions at the beginning of the month  Recent MRI on 04/25 showed further tumor progression  Neurosurgery and oncology consulted  Palliative consulted for goals of care  Patient was seen in the ICU and was able to explain that she was there to have a shunt placed in her brain to help relieve her symptoms  She understands that her prognosis is poor and seems to have reasonable insight to her medical condition  She says she is determined to continue receiving treatments to help prolong her life as long as she can maintain a reasonable quality of life  She says she wouldn't want to be a vegetable  When investigated further she explained she wants to be able to talk or communicate with her friends and family, she would want to be alert, be able to have some level of mobility even if that means being able to move her arms in legs in bed  Discussed thoroughly and patient understands the implications of CPR intubation and other interventions in the setting of worsening metastatic disease and their most likely outcomes  She says she wouldn't be able to make any further decisions on this until these interventions were tried and the outcomes were known  She expresses her trust in her friend Ila Martinez and brother Carrol Stacy in knowing what to do if she were unable to make decisions  She is adamant that her son Yosef Lock not have to make any decisions as she does not want him to have to decide  Patient has a Living will at home that declares a POA, which she says is Ila Martinez and that her brother is financial POA  [phone] - Spoke with brother Carrol Stacy on the phone  He lives in [place]  States he believes both him and Ila Martinez are listed as POA but does not have the document   He says he would be able to make medical decisions but would want assistance from Ila Martinez as she knows his sister well  He said that he would like to be up to date on her cares but to have her friend Ila Martinez as the primary contact since he lives so far away  He does communicate well with Ila Martinez over the phone  "    [phone] - Spoke with friend Lou Root on the phone  She has known the patient for years and 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  She is very up to date with the patient's health and has been helping with her cares  She does not believe she is a legal POA as she does not recall ever signing a document that says so  She states however that she is willing to participate in the patients care and decision making but would not want to make any major decisions without the final say from David Corporal  Both Lou Dk and David Corporal, when asked separately what they believe the patient's medical wishes would be both answered \"she would not want to be a vegetable  \" They have both agreed to work together for any medical decisions should the need arise  Review of Systems   Constitutional: Positive for fatigue  HENT: Negative  Respiratory: Negative for shortness of breath  Cardiovascular: Negative for chest pain  Gastrointestinal: Positive for nausea and vomiting  Genitourinary: Negative  Musculoskeletal: Negative  Skin: Negative  Psychiatric/Behavioral: Negative            Past Medical History:   Diagnosis Date   • Bloating    • Breast cancer ([facility] [address] )    • Bruises easily    • Cancer ([facility] [address] )     right breast//to lymph nodes/liver/ and bone   • Depression    • History of cancer chemotherapy 05/2020   • History of pneumonia    • Hypotension     occas   • Low iron     \"when pregnant, 20 yrs ago\"   • Neuropathy     hands//fingers   • Shortness of breath     occas   • Stroke [facility])    • Subacromial impingement of left shoulder 03/14/2022   • Tinnitus     right   • Wears glasses      Past Surgical History:   Procedure Laterality Date   • BILATERAL SALPINGOOPHORECTOMY     • BREAST BIOPSY     • CRANIOTOMY Bilateral 4/3/2023    Procedure: Suboccipital craniotomy for tumor resection using neuronavigation;  Surgeon: Argentina Andrade MD;  Location: [facility];  Service: Neurosurgery   • IR BIOPSY LIVER MASS " 2019   • MASTECTOMY Right     2019   • MYOMECTOMY      H/O FIBROIDS, NO SURGERY NEEDED   • CA LAPAROSCOPY W/RMVL ADNEXAL STRUCTURES N/A 2019    Procedure: LAPAROSCOPIC BILATERAL SALPINGO-OOPHORECTOMY;  Surgeon: Robbin Brown MD;  Location:  MAIN OR;  Service: Gynecology Oncology   • CA MASTECTOMY SIMPLE COMPLETE Right 2020    Procedure: MASTECTOMY SIMPLE, axillary node dissection;  Surgeon: Lazaro Nogueira MD;  Location: [facility];  Service: Surgical Oncology   • US GUIDED BREAST BIOPSY RIGHT COMPLETE Right 2019   • US GUIDED BREAST LYMPH NODE BIOPSY RIGHT Right 2019     Social History     Socioeconomic History   • Marital status: Single     Spouse name: Not on file   • Number of children: Not on file   • Years of education: Not on file   • Highest education level: Not on file   Occupational History   • Not on file   Tobacco Use   • Smoking status: Former     Packs/day: 1 00     Years: 30 00     Pack years: 30 00     Types: Cigarettes     Quit date:      Years since quittin 3   • Smokeless tobacco: Never   Vaping Use   • Vaping Use: Never used   Substance and Sexual Activity   • Alcohol use: Yes     Comment: occassional   • Drug use: No   • Sexual activity: Not Currently     Partners: Male     Birth control/protection: None   Other Topics Concern   • Not on file   Social History Narrative    Consumes 2-3 cups of coffee per day     Social Determinants of Health     Financial Resource Strain: Not on file   Food Insecurity: No Food Insecurity   • Worried About Running Out of Food in the Last Year: Never true   • Ran Out of Food in the Last Year: Never true   Transportation Needs: No Transportation Needs   • Lack of Transportation (Medical): No   • Lack of Transportation (Non-Medical):  No   Physical Activity: Not on file   Stress: Not on file   Social Connections: Not on file   Intimate Partner Violence: Not on file   Housing Stability: Unknown   • Unable to Pay for Housing in the Last Year: No   • Number of Places Lived in the Last Year: Not on file   • Unstable Housing in the Last Year: No     Family History   Problem Relation Age of Onset   • Hypotension Mother    • Colon cancer Father 36   • Hypertension Father    • Prostate cancer Father 79   • Throat cancer Maternal Grandmother 61   • Cancer Maternal Grandmother    • Breast cancer Paternal Aunt         age unknown       Medications:  all current active meds have been reviewed and current meds:   Current Facility-Administered Medications   Medication Dose Route Frequency   • albuterol (PROVENTIL HFA,VENTOLIN HFA) inhaler 2 puff  2 puff Inhalation Q4H PRN   • ALPRAZolam (XANAX) tablet 0 25 mg  0 25 mg Oral HS PRN   • ipratropium (ATROVENT) 0 02 % inhalation solution 0 5 mg  0 5 mg Nebulization Q6H PRN   • levalbuterol (XOPENEX) inhalation solution 1 25 mg  1 25 mg Nebulization Q6H PRN   • multi-electrolyte (PLASMALYTE-A/ISOLYTE-S PH 7 4) IV solution  125 mL/hr Intravenous Continuous   • nystatin (MYCOSTATIN) oral suspension 500,000 Units  500,000 Units Swish & Swallow 4x Daily   • ondansetron (ZOFRAN) injection 4 mg  4 mg Intravenous Q6H PRN   • oxyCODONE (ROXICODONE) split tablet 2 5 mg  2 5 mg Oral Q6H PRN       Allergies   Allergen Reactions   • Tylenol [Acetaminophen]      Told to avoid due to cancer        Objective:  /74 (BP Location: Left arm)   Pulse 82   Temp 97 5 °F (36 4 °C) (Oral)   Resp 18   SpO2 97%     Physical Exam  Vitals reviewed  Constitutional:       General: She is not in acute distress  HENT:      Head: Normocephalic  Right Ear: External ear normal       Left Ear: External ear normal       Nose: Nose normal       Mouth/Throat:      Pharynx: Oropharynx is clear  Eyes:      Extraocular Movements: Extraocular movements intact  Cardiovascular:      Rate and Rhythm: Normal rate  Pulmonary:      Effort: Pulmonary effort is normal    Abdominal:      General: Abdomen is flat     Skin:     Findings: "No rash  Neurological:      Mental Status: She is oriented to person, place, and time  She is lethargic  Psychiatric:         Mood and Affect: Mood normal          Behavior: Behavior normal  Behavior is cooperative  Thought Content: Thought content normal          Judgment: Judgment normal        Lab Results:   I have personally reviewed pertinent labs  , CBC:   Lab Results   Component Value Date    WBC 6 73 04/28/2023    HGB 12 2 04/28/2023    HCT 35 5 04/28/2023     (H) 04/28/2023    PLT 85 (L) 04/28/2023    MCH 35 0 (H) 04/28/2023    MCHC 34 4 04/28/2023    RDW 13 6 04/28/2023    MPV 10 6 04/28/2023    NRBC 0 04/28/2023   , CMP:   Lab Results   Component Value Date    SODIUM 137 04/28/2023    K 3 5 04/28/2023     04/28/2023    CO2 21 04/28/2023    BUN 5 04/28/2023    CREATININE 0 35 (L) 04/28/2023    CALCIUM 8 0 (L) 04/28/2023    AST 44 04/27/2023    ALT 88 (H) 04/27/2023    ALKPHOS 86 04/27/2023    EGFR 124 04/28/2023     Imaging Studies: I have personally reviewed pertinent reports  EKG, Pathology, and Other Studies: I have personally reviewed pertinent reports  Counseling / Coordination of Care  Total floor / unit time spent today 60 minutes  Greater than 50% of total time was spent with the patient and / or family counseling and / or coordination of care  A description of the counseling / coordination of care: Reviewed chart, provided medical updates, determined goals of care, discussed palliative care and symptom management, discussed comfort care and hospice care, discussed code status, provided anticipatory guidance, determined competency and POA/HCA, determined social/family support, provided psychosocial support  Portions of this document may have been created using dictation software and as such some \"sound alike\" terms may have been generated by the system  Do not hesitate to contact me with any questions or clarifications      "

## 2023-04-28 NOTE — PROGRESS NOTES
[facility]  coding opportunities       Chart reviewed, no opportunity found: CHART REVIEWED, NO OPPORTUNITY FOUND        Patients Insurance        Commercial Insurance: [address] [place] [address]

## 2023-04-28 NOTE — PLAN OF CARE
Problem: OCCUPATIONAL THERAPY ADULT  Goal: Performs self-care activities at highest level of function for planned discharge setting  See evaluation for individualized goals  Description: Treatment Interventions: ADL retraining, Functional transfer training, UE strengthening/ROM, Endurance training, Cognitive reorientation, Patient/family training, Equipment evaluation/education, Compensatory technique education  Equipment Recommended: Bedside commode, Shower/Tub chair with back ($)       See flowsheet documentation for full assessment, interventions and recommendations  Note: Limitation: Decreased ADL status, Decreased Safe judgement during ADL, Decreased endurance, Decreased self-care trans, Decreased high-level ADLs  Prognosis: Fair  Assessment: Pt is a 47 y o  female who was [facility] to [facility] with nausea, vomiting, headaches and now here with  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 [facility]) underwent a few weeks ago resection of a large metastatic lesion in the brain posterior fossa   Pt's problem list also includes PMH of  has a past medical history of Bloating, Breast cancer ([address] ), Bruises easily, Cancer ([address] ), Depression, History of cancer chemotherapy (05/2020), History of pneumonia, Hypotension, Low iron, Neuropathy, Shortness of breath, Stroke ([address] ), Subacromial impingement of left shoulder (03/14/2022), Tinnitus, and Wears glasses    At baseline pt was completing I with ADL's, assistance with IaDL's, +RW with mobilty tasks    Pt lives alone in a [facility] with DARLENE  Currently pt requires berta  for overall ADLS and min a with RW for functional mobility/transfers  Pt currently presents with impairments in the following categories -steps to enter environment, limited home support, difficulty performing ADLS, difficulty performing IADLS  and limited insight into deficits activity tolerance, endurance, standing balance/tolerance and sitting balance/tolerance   These impairments, as well as pt's fatigue, decreased caregiver support and risk for falls  limit pt's ability to safely engage in all baseline areas of occupation, includingbathing, dressing, toileting, functional mobility/transfers, community mobility, laundry , house maintenance, medication management, meal prep, cleaning, social participation  and leisure activities  The patient's raw score on the -PAC Daily Activity Inpatient Short Form is 20  A raw score of greater than or equal to 19 suggests the patient may benefit from discharge to home  Please refer to the recommendation of the Occupational Therapist for safe discharge planning  From OT standpoint, recommend home with increased support and home OT upon D/C  OT will continue to follow to address the below stated goals       OT Discharge Recommendation: Home with home health rehabilitation

## 2023-04-28 NOTE — PLAN OF CARE
Problem: MOBILITY - ADULT  Goal: Maintain or return to baseline ADL function  Description: INTERVENTIONS:  -  Assess patient's ability to carry out ADLs; assess patient's baseline for ADL function and identify physical deficits which impact ability to perform ADLs (bathing, care of mouth/teeth, toileting, grooming, dressing, etc )  - Assess/evaluate cause of self-care deficits   - Assess range of motion  - Assess patient's mobility; develop plan if impaired  - Assess patient's need for assistive devices and provide as appropriate  - Encourage maximum independence but intervene and supervise when necessary  - Involve family in performance of ADLs  - Assess for home care needs following discharge   - Consider OT consult to assist with ADL evaluation and planning for discharge  - Provide patient education as appropriate  Outcome: Progressing  Goal: Maintains/Returns to pre admission functional level  Description: INTERVENTIONS:  - Perform BMAT or MOVE assessment daily    - Set and communicate daily mobility goal to care team and patient/family/caregiver  - Collaborate with rehabilitation services on mobility goals if consulted  - Perform Range of Motion  times a day  - Reposition patient every  hours    - Dangle patient  times a day  - Stand patient  times a day  - Ambulate patient  times a day  - Out of bed to chair  times a day   - Out of bed for meals  times a day  - Out of bed for toileting  - Record patient progress and toleration of activity level   Outcome: Progressing     Problem: PAIN - ADULT  Goal: Verbalizes/displays adequate comfort level or baseline comfort level  Description: Interventions:  - Encourage patient to monitor pain and request assistance  - Assess pain using appropriate pain scale  - Administer analgesics based on type and severity of pain and evaluate response  - Implement non-pharmacological measures as appropriate and evaluate response  - Consider cultural and social influences on pain and pain management  - Notify physician/advanced practitioner if interventions unsuccessful or patient reports new pain  Outcome: Progressing     Problem: INFECTION - ADULT  Goal: Absence or prevention of progression during hospitalization  Description: INTERVENTIONS:  - Assess and monitor for signs and symptoms of infection  - Monitor lab/diagnostic results  - Monitor all insertion sites, i e  indwelling lines, tubes, and drains  - Monitor endotracheal if appropriate and nasal secretions for changes in amount and color  - [place] appropriate cooling/warming therapies per order  - Administer medications as ordered  - Instruct and encourage patient and family to use good hand hygiene technique  - Identify and instruct in appropriate isolation precautions for identified infection/condition  Outcome: Progressing  Goal: Absence of fever/infection during neutropenic period  Description: INTERVENTIONS:  - Monitor WBC    Outcome: Progressing     Problem: SAFETY ADULT  Goal: Maintain or return to baseline ADL function  Description: INTERVENTIONS:  -  Assess patient's ability to carry out ADLs; assess patient's baseline for ADL function and identify physical deficits which impact ability to perform ADLs (bathing, care of mouth/teeth, toileting, grooming, dressing, etc )  - Assess/evaluate cause of self-care deficits   - Assess range of motion  - Assess patient's mobility; develop plan if impaired  - Assess patient's need for assistive devices and provide as appropriate  - Encourage maximum independence but intervene and supervise when necessary  - Involve family in performance of ADLs  - Assess for home care needs following discharge   - Consider OT consult to assist with ADL evaluation and planning for discharge  - Provide patient education as appropriate  Outcome: Progressing  Goal: Maintains/Returns to pre admission functional level  Description: INTERVENTIONS:  - Perform BMAT or MOVE assessment daily    - Set and communicate daily mobility goal to care team and patient/family/caregiver  - Collaborate with rehabilitation services on mobility goals if consulted  - Perform Range of Motion  times a day  - Reposition patient every  hours    - Dangle patient  times a day  - Stand patient  times a day  - Ambulate patient  times a day  - Out of bed to chair  times a day   - Out of bed for meals  times a day  - Out of bed for toileting  - Record patient progress and toleration of activity level   Outcome: Progressing  Goal: Patient will remain free of falls  Description: INTERVENTIONS:  - Educate patient/family on patient safety including physical limitations  - Instruct patient to call for assistance with activity   - Consult OT/PT to assist with strengthening/mobility   - Keep Call bell within reach  - Keep bed low and locked with side rails adjusted as appropriate  - Keep care items and personal belongings within reach  - Initiate and maintain comfort rounds  - Make Fall Risk Sign visible to staff  - Offer Toileting every  Hours, in advance of need  - Initiate/Maintain alarm  - Obtain necessary fall risk management equipment  - Apply yellow socks and bracelet for high fall risk patients  - Consider moving patient to room near nurses station  Outcome: Progressing     Problem: DISCHARGE PLANNING  Goal: Discharge to home or other facility with appropriate resources  Description: INTERVENTIONS:  - Identify barriers to discharge w/patient and caregiver  - Arrange for needed discharge resources and transportation as appropriate  - Identify discharge learning needs (meds, wound care, etc )  - Arrange for interpretive services to assist at discharge as needed  - Refer to Case Management Department for coordinating discharge planning if the patient needs post-hospital services based on physician/advanced practitioner order or complex needs related to functional status, cognitive ability, or social support system  Outcome: Progressing Problem: GASTROINTESTINAL - ADULT  Goal: Minimal or absence of nausea and/or vomiting  Description: INTERVENTIONS:  - Administer IV fluids if ordered to ensure adequate hydration  - Maintain NPO status until nausea and vomiting are resolved  - Nasogastric tube if ordered  - Administer ordered antiemetic medications as needed  - Provide nonpharmacologic comfort measures as appropriate  - Advance diet as tolerated, if ordered  - Consider nutrition services referral to assist patient with adequate nutrition and appropriate food choices  Outcome: Progressing  Goal: Maintains or returns to baseline bowel function  Description: INTERVENTIONS:  - Assess bowel function  - Encourage oral fluids to ensure adequate hydration  - Administer IV fluids if ordered to ensure adequate hydration  - Administer ordered medications as needed  - Encourage mobilization and activity  - Consider nutritional services referral to assist patient with adequate nutrition and appropriate food choices  Outcome: Progressing  Goal: Maintains adequate nutritional intake  Description: INTERVENTIONS:  - Monitor percentage of each meal consumed  - Identify factors contributing to decreased intake, treat as appropriate  - Assist with meals as needed  - Monitor I&O, weight, and lab values if indicated  - Obtain nutrition services referral as needed  Outcome: Progressing  Goal: Establish and maintain optimal ostomy function  Description: INTERVENTIONS:  - Assess bowel function  - Encourage oral fluids to ensure adequate hydration  - Administer IV fluids if ordered to ensure adequate hydration   - Administer ordered medications as needed  - Encourage mobilization and activity  - Nutrition services referral to assist patient with appropriate food choices  - Assess stoma site  - Consider wound care consult   Outcome: Progressing  Goal: Oral mucous membranes remain intact  Description: INTERVENTIONS  - Assess oral mucosa and hygiene practices  - Implement preventative oral hygiene regimen  - Implement oral medicated treatments as ordered  - Initiate Nutrition services referral as needed  Outcome: Progressing     Problem: Knowledge Deficit  Goal: Patient/family/caregiver demonstrates understanding of disease process, treatment plan, medications, and discharge instructions  Description: Complete learning assessment and assess knowledge base    Interventions:  - Provide teaching at level of understanding  - Provide teaching via preferred learning methods  Outcome: Progressing

## 2023-04-28 NOTE — ASSESSMENT & PLAN NOTE
Patient with PMH of stage IV breast cancer with brain and liver metastases presented to the ED 4/27/2023 c/o headache and progressive nausea and vomiting for several weeks  · Patient is s/p suboccipital craniotomy for resection of right and left cerebellar masses with complex dural reconstruction 4/3/2023 with Dr Dima Rose  · Today, symptoms are improved secondary to medical management  Exam is non-focal     Imaging  · CT head wo (4/27/2023):  No CT evidence for acute territorial infarct  No new mass effect or midline shift  Known intracranial metastatic lesions better visualized on the prior recent MRI brain examination  Postsurgical changes of prior suboccipital craniectomy with grossly stable appearance of the surgical resection bed and 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 compared to the recent MRI accounting for differences in modality  · MRI brain (4/25/2023): Supratentorial lesions are stable to mildly increased in size  Increasing enhancement along the margins of the posterior fossa resection cavity with prominent left cerebellar hemisphere foliar enhancement  , concerning for tumor progression  New subtle enhancement along the left cisternal and canalicular segments of the 7th and 8th cranial nerves without evidence of nodularity or discrete lesion  Close follow-up recommended  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  Plan  · Continue monitoring neurological status/exam  · Transfer patient to ICU for CSF diversion with EVD placement  · Will send CSF for culture and pathology  · Drain 10mL/hour through 4/30/23 then clamp  If patient does not tolerate clamping with increased ICP or is symptomatic, will plan for ventriculoperitoneal shunt placement 5/1/2023  · Mobilize as tolerated with assistance  · Pain management per primary team  · DVT ppx: SCDs  · Patient is established with palliative care outpatient - recommend ongoing [address] discussion      Neurosurgery will continue to follow closely   Call with questions

## 2023-04-28 NOTE — PROGRESS NOTES
Pt received from P7 to ICU bed 1 via CT  Transferred without incident  CCM made aware of pt arrival  Pt is AOx4, drowsy but arousable  Complained of nausea and vomitting during CT, P7 nurse administered PRN zofran

## 2023-04-28 NOTE — PLAN OF CARE
Problem: PHYSICAL THERAPY ADULT  Goal: Performs mobility at highest level of function for planned discharge setting  See evaluation for individualized goals  Description: Treatment/Interventions: Functional transfer training, Elevations, LE strengthening/ROM, Endurance training, Therapeutic exercise, Patient/family training, Equipment eval/education, Bed mobility, Gait training, Compensatory technique education, Spoke to nursing, OT  Equipment Recommended:  (pt already has RW)       See flowsheet documentation for full assessment, interventions and recommendations  Note: Prognosis: Good  Problem List: Decreased strength, Decreased endurance, Impaired balance, Decreased mobility, Decreased safety awareness, Impaired vision, Obesity  Assessment: Pt is 47 y o  female seen for a high complexity PT evaluation s/p admit [facility] on 4/27/2023  Pt presenting w/ severe nausea, vomiting, + headaches  Pt to be transferred to ICU for shunt placement  Of note pt has hx of metastatic breast carcinoma, recent suboccipital craniotomy with resection of right cerebellar mass and left cerebellar mass 4/3/23  Pt transferred to [address] [place] [address] following hospitalization and was subsequently d/c'ed home 4/21/23  Please see above for other active problem list / PMH  PT now consulted to assess functional mobility and needs for safe d/c planning  At baseline pt was I w/ ambulation w/o AD, has been [place] w/ RW since d/c from [address] [place] [address], lives alone in a 1 SH w/ DARLENE  Currently pt requires S for bed skills; S for functional transfers; MinAx1 for ambulation w/ RW; MinAx1 for stair negotiation  Pt presents functioning below baseline and w/ overall mobility deficits 2* to: weakness; decreased endurance; impaired balance; gait deviations; fatigue; impaired safety and judgement; limited insight into current deficits; bed/chair alarms; multiple lines  These impairments place pt at risk for falls   Pt will continue to benefit from skilled PT interventions to address stated impairments; to maximize functional potential; for ongoing pt/ family training; and DME needs  PT is currently recommending home w/ home PT + increased social support  Barriers to Discharge: Inaccessible home environment, Decreased caregiver support     PT Discharge Recommendation: Home with home health rehabilitation (+ increased social support)    See flowsheet documentation for full assessment

## 2023-04-28 NOTE — ASSESSMENT & PLAN NOTE
"· Spoke with patient who was alert and oriented and appears at times to understand issues but then forgets important details  Spoke with patient about my concern with her clinical course as well as her underlying comfort  I did try to talk with her several times about the nature of the metastatic malignancy with brain metastases and how it is likely that this will progress  Unclear if patient not understanding or choosing to minimize situation as a protective mechanism  I then asked patient if I could speak with her closest contacts to discuss her situation to find out more  I first asked her if she had any family members who I could call and she reported \"call Jessika Lara"  She said to me that Sharyn Cheung is her \"medical power of \" and her \"best friend\"  She then reported that she has a brother named Claire Gitelman who is her \"financial power of \"  · Patient's friend Felisha Mahan and I spoke via telephone  Sharyn Cheung reports that patient is forgetting important details and she reports patient more confused at times  I spoke with Sharyn Cheung about my concerns with patient via telephone and she reports that patient's brother Claire Gitelman is also a medical decision maker and Sharyn Cheung reports that she does not feel comfortable discussing goals of care without Ryan's involvement  Sharyn Cheung does report that Claire Gitelman was just here for 3 weeks helping patient but that he flew back to [place]  Sharyn Cheung reports that she understands patient's prognosis but reports that Stacy Kuzhanna needs to be involved  I then called Ryan twice on his cell phone with no answer  · Given this situation, will consult palliative care for their assistance on goals of care  Appears palliative care was following during patient's previous hospitalization    "

## 2023-04-28 NOTE — CONSULTS
BIOPSY     • CRANIOTOMY Bilateral 4/3/2023    Procedure: Suboccipital craniotomy for tumor resection using neuronavigation;  Surgeon: Mohini Rodriguez MD;  Location: [facility];  Service: Neurosurgery   • IR BIOPSY LIVER MASS  2019   • MASTECTOMY Right     2019   • MYOMECTOMY      H/O FIBROIDS, NO SURGERY NEEDED   • MA LAPAROSCOPY W/RMVL ADNEXAL STRUCTURES N/A 2019    Procedure: LAPAROSCOPIC BILATERAL SALPINGO-OOPHORECTOMY;  Surgeon: Jacquline Lesch, MD;  Location: [facility];  Service: Gynecology Oncology   • MA MASTECTOMY SIMPLE COMPLETE Right 2020    Procedure: MASTECTOMY SIMPLE, axillary node dissection;  Surgeon: Tomer Mcdonald MD;  Location: [facility];  Service: Surgical Oncology   • US GUIDED BREAST BIOPSY RIGHT COMPLETE Right 2019   • [place] BIOPSY RIGHT Right 2019     OB/GYN History: Noncontributory    Family History   Problem Relation Age of Onset   • Hypotension Mother    • Colon cancer Father 36   • Hypertension Father    • Prostate cancer Father 79   • Throat cancer Maternal Grandmother 61   • Cancer Maternal Grandmother    • Breast cancer Paternal Aunt         age unknown     Social History   Social History     Substance and Sexual Activity   Alcohol Use Yes    Comment: occassional     Social History     Substance and Sexual Activity   Drug Use No     Social History     Tobacco Use   Smoking Status Former   • Packs/day: 1 00   • Years: 30 00   • Pack years: 30 00   • Types: Cigarettes   • Quit date:    • Years since quittin 3   Smokeless Tobacco Never       Meds/Allergies   all current active meds have been reviewed    Allergies   Allergen Reactions   • Tylenol [Acetaminophen]      Told to avoid due to cancer        Objective     Intake/Output Summary (Last 24 hours) at 2023 1038  Last data filed at 2023 0205  Gross per 24 hour   Intake 1000 ml   Output --   Net 1000 ml     Invasive Devices     Peripheral Intravenous Line  Duration Peripheral IV 04/27/23 Dorsal (posterior); Left Forearm <1 day          Drain  Duration           NG/OG/Enteral Tube Orogastric 18 Fr 24 days    External Urinary Catheter 23 days              Physical Exam could not examine patient as she was sound asleep  Lab Results: I have personally reviewed pertinent reports  Imaging Studies: I have personally reviewed pertinent films in PACS  EKG, Pathology, and Other Studies: I have personally reviewed pertinent reports  Assessment/Plan     Assessment and Plan:  Metastatic breast carcinoma admitted with severe nausea, vomiting and headaches according to [facility] patient to be transferred to ICU and subsequently shunted of the large meningocele  Continue supportive and symptomatic care  The plan SRS next week will depend on the patient's condition  Code Status: Level 1 - Full Code  Advance Directive and Living Will:      Power of :    POLST:      Counseling / Coordination of Care  Total floor / unit time spent today 0 minutes  Greater than 50% of total time was spent with the patient and / or family counseling and / or coordination of care  A description of the counseling / coordination of care: Not speak to patient she was resting and deeply asleep

## 2023-04-28 NOTE — PHYSICAL THERAPY NOTE
"     Physical Therapy Evaluation    Patient's Name: Tramaine Greco    Admitting Diagnosis  Hyponatremia [E87 1]  Metastatic cancer ([facility] ) [C79 9]  Nausea and vomiting [R11 2]  Post-op pain [G89 18]  Elevated LFTs [R79 89]  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 [facility]) [C79 31]    Problem List  Patient Active Problem List   Diagnosis    Malignant neoplasm of central portion of right breast in female, estrogen receptor positive ([facility] )    Carcinoma of right breast metastatic to axillary lymph node ([facility] )    Family history of breast cancer in female    Family history of colon cancer    Dense breast tissue    BRCA2 gene mutation positive    Multiple lesions on computed tomography of brain    Breast cancer metastasized to liver [facility] )    S/P BSO (bilateral salpingo-oophorectomy)    On antineoplastic chemotherapy    Advanced care planning/counseling discussion    Rash    Postmenopausal    Lymphedema    Anxiety about health    Allergic rhinitis    Obesity    Schwannoma of cranial nerve ([facility] )    Stroke ([facility] )    Cerebral aneurysm    Rosacea    Mixed hyperlipidemia    Constipation    GERD (gastroesophageal reflux disease)    Palliative care patient    Injury of right foot    Vomiting    Brain mass    Hyperglycemia, drug-induced    Leukocytosis    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 [facility])    Status post brain surgery       Past Medical History  Past Medical History:   Diagnosis Date    Bloating     Breast cancer ([facility] )     Bruises easily     Cancer ([facility] )     right breast//to lymph nodes/liver/ and bone    Depression     History of cancer chemotherapy 05/2020    History of pneumonia     Hypotension     occas    Low iron     \"when pregnant, 20 yrs ago\"    Neuropathy     hands//fingers    Shortness of breath     occas    Stroke [facility])     Subacromial impingement of left shoulder 03/14/2022    Tinnitus     right    Wears glasses        Past Surgical History  Past Surgical History:   Procedure Laterality Date    BILATERAL " SALPINGOOPHORECTOMY      BREAST BIOPSY      CRANIOTOMY Bilateral 4/3/2023    Procedure: Suboccipital craniotomy for tumor resection using neuronavigation;  Surgeon: Reece Porras MD;  Location: [facility];  Service: Neurosurgery    IR BIOPSY LIVER MASS  06/11/2019    MASTECTOMY Right     2019    MYOMECTOMY      H/O FIBROIDS, NO SURGERY NEEDED    HI LAPAROSCOPY W/RMVL ADNEXAL STRUCTURES N/A 07/29/2019    Procedure: LAPAROSCOPIC BILATERAL SALPINGO-OOPHORECTOMY;  Surgeon: Robbin Brown MD;  Location: [facility];  Service: Gynecology Oncology    HI MASTECTOMY SIMPLE COMPLETE Right 06/05/2020    Procedure: MASTECTOMY SIMPLE, axillary node dissection;  Surgeon: Lazaro Nogueira MD;  Location: [facility];  Service: Surgical Oncology    US GUIDED BREAST BIOPSY RIGHT COMPLETE Right 05/03/2019    US GUIDED BREAST LYMPH NODE BIOPSY RIGHT Right 05/03/2019 04/28/23 0925   PT Last Visit   PT Visit Date 04/28/23   Note Type   Note type Evaluation   Pain Assessment   Pain Assessment Tool 0-10   Pain Score No Pain   Restrictions/Precautions   Weight Bearing Precautions Per Order No   Other Precautions Impulsive;Cognitive; Chair Alarm; Bed Alarm;Multiple lines; Fall Risk;Telemetry; Visual impairment   Home Living   Type of [address] [place] [address] One level;Stairs to enter with rails   Bathroom Shower/Tub Walk-in shower   Bathroom Toilet Standard   Bathroom Equipment Grab bars in [place] [id number]  (received RW from [place] [address] upon d/c home)   Prior Function   Level of [place] Independent with ADLs; Independent with functional mobility; Needs assistance with IADLS  (at baseline I w/ ambultation w/o AD, since admission to rehab has been using RW)   Lives With (S)  Alone   Receives Help From Friend(s); Family  (son can come up to stay w/ pt temporarily (son lives in [place]))   IADLs Family/Friend/Other provides transportation; Family/Friend/Other provides meals; Family/Friend/Other provides medication management   Falls in the last 6 months 1 to 4  (1 in the hospital previous admission + pt also endorses 1 when going up the stairs into her house after d/c from rehab)   General   Family/Caregiver Present No   Cognition   Arousal/Participation Alert   Orientation Level Oriented X4   Comments pleasant + cooperative, decreased safety + insight into deficits   Subjective   Subjective Pt agreeable to mobilize  RLE Assessment   RLE Assessment   (grossly 4+/5)   LLE Assessment   LLE Assessment   (hip flexors 4-/5, all others 4+/5)   Coordination   Sensation WFL   Heel to Shin Intact   Bed Mobility   Supine to Sit 5  Supervision   Additional items Increased time required   Sit to Supine Unable to assess   Additional Comments Pt greeted in supine  Transfers   Sit to Stand 5  Supervision   Additional items Increased time required;Verbal cues   Stand to Sit 5  Supervision   Additional items Verbal cues   Additional Comments RW   Ambulation/Elevation   Gait pattern Shuffling;Decreased heel strike;Decreased foot clearance; Improper Weight shift; Wide BHUMI   Gait Assistance 4  Minimal assist   Additional items Assist x 1;Verbal cues; Tactile cues   Assistive Device Rolling walker   Distance 60'x2   Stair Management Assistance 4  Minimal assist   Additional items Assist x 1;Verbal cues; Tactile cues   Stair Management Technique One rail R;Nonreciprocal   Number of Stairs 4   Balance   Static Sitting Fair +   Dynamic Sitting Fair   Static Standing Fair -   Dynamic Standing Poor +   Ambulatory Poor +  (RW)   Endurance Deficit   Endurance Deficit Yes   Endurance Deficit Description gait quality degradation (increased shuffling) w/ fatigue   Activity Tolerance   Activity Tolerance Patient tolerated treatment well;Patient limited by fatigue   Medical Staff Made Aware OT Julienne   Nurse Made Aware yes - cleared for therapy   Assessment   Prognosis Good   Problem List Decreased strength;Decreased endurance; Impaired balance;Decreased mobility; Decreased safety awareness; Impaired vision;Obesity   Assessment Pt is 47 y o  female seen for a high complexity PT evaluation s/p admit to [facility] on 4/27/2023  Pt presenting w/ severe nausea, vomiting, + headaches  Pt to be transferred to ICU for shunt placement  Of note pt has hx of metastatic breast carcinoma, recent suboccipital craniotomy with resection of right cerebellar mass and left cerebellar mass 4/3/23  Pt transferred to [address] [place] [address] following hospitalization and was subsequently d/c'ed home 4/21/23  Please see above for other active problem list / PMH  PT now consulted to assess functional mobility and needs for safe d/c planning  At baseline pt was I w/ ambulation w/o AD, has been Aakash w/ RW since d/c from [address] [place] [address], lives alone in a 1 SH w/ DARLENE  Currently pt requires S for bed skills; S for functional transfers; MinAx1 for ambulation w/ RW; MinAx1 for stair negotiation  Pt presents functioning below baseline and w/ overall mobility deficits 2* to: weakness; decreased endurance; impaired balance; gait deviations; fatigue; impaired safety and judgement; limited insight into current deficits; bed/chair alarms; multiple lines  These impairments place pt at risk for falls  Pt will continue to benefit from skilled PT interventions to address stated impairments; to maximize functional potential; for ongoing pt/ family training; and DME needs  PT is currently recommending home w/ home PT + increased social support  Barriers to Discharge Inaccessible home environment;Decreased caregiver support   Goals   Patient Goals get better   STG Expiration Date 05/12/23   Short Term Goal #1 In 14 days pt will complete: 1) Bed mobility skills with I to facilitate safe return to previous living environment  2) Functional transfers with Aakash to facilitate safe return to previous living environment   3) Ambulation with least restrictive ' w/ Aakash without LOB for safe ambulation in home/community environment  4) Improve balance scores by 1 grade to decrease fall risk  5) Improve LE strength grades by 1 to increase independence w/ all functional mobility, transfers and gait  6) PT for ongoing pt and family education; DME needs and D/C planning to promote highest level of function in least restrictive environment  7) Stair training up/ down 4 steps with most appropriate technique and Aakash for safe access to previous living environment and to increase community access  PT Treatment Day 0   Plan   Treatment/Interventions Functional transfer training;Elevations;LE strengthening/ROM; Endurance training; Therapeutic exercise;Patient/family training;Equipment eval/education; Bed mobility;Gait training; Compensatory technique education;Spoke to nursing;OT   PT Frequency 3-5x/wk   Recommendation   PT Discharge Recommendation Home with home health rehabilitation  (+ increased social support)   Equipment Recommended   (pt already has RW)   AM-PAC Basic Mobility Inpatient   Turning in Flat Bed Without Bedrails 3   Lying on Back to Sitting on Edge of Flat Bed Without Bedrails 3   Moving Bed to Chair 3   Standing Up From Chair Using Arms 3   Walk in Room 3   Climb 3-5 Stairs With Railing 3   Basic Mobility Inpatient Raw Score 18   Basic Mobility Standardized Score 41 05   Highest Level Of Mobility   JH-HLM Goal 6: Walk 10 steps or more   JH-HLM Achieved 7: Walk 25 feet or more   End of Consult   Patient Position at End of Consult Bedside chair;Bed/Chair alarm activated; All needs within reach  (on waffle cushion, all lines in tact, Neurosurgeon at bedside)     Enedelia Mims, PT, DPT

## 2023-04-28 NOTE — CONSULTS
Medical Oncology/Hematology Consult Note  Jen Parker, female, 47 y o , 1970,  [id number], [phone]     Assessment and Plan    1  Metastatic breast cancer, ER/IL+, HER2 low (2+ by IHC)  2  Recent brain mets, s/p resection  3  Nausea/vomiting      Ms Jen Parker is a 47 Y F with ER/IL+, HER2 low metastatic breast cancer with BRCA-2 mutation  She is presenting to the hospital with intermittent episodes of nausea/vomiting, along with trouble with tolerating solids x 1 week  Patient with recent admission on 3/30/23 when she was found to have brain mets, s/p craniotomy with resection of right and left cerebellar mass on 4/3  Path was consistent with ER/IL+, HER2 low disease  Patient has seen rad/onc team (Dr Christian Rodriguez on 4/21, CT simulation done on 4/26), plan to start SRT on 5/3/23 noted  She presents to the hospital with intermittent episodes of nausea/vomiting, difficulty with tolerating solids  MR brain from 4/25 reported increasing enhancement along the margins of the posterior fossa resection cavity with prominent left cerebellar hemisphere foliar enhancement, concerning for tumor progression, in addition to interval development of suboccipital pseudomeningocele measuring 5 7cm  NS team on board, plan for CSF diversion with EVD placement noted  Pt's last CT CAP is from Feb 2023; I ordered a CT CAP to evaluate for any disease progression elsewhere (read is pending at this time)  If she is found to have findings suggestive of disease progression, she will likely benefit from [facility] as next line of therapy, but will leave this decision to the discretion of her primary oncologist, Dr Carlynn Buerger (she has an appointment scheduled with Dr Carlynn Buerger as below)  Outpatient follow up plan: Medical oncology f/u has been scheduled with Dr Carlynn Buerger on 5/4/2023 @ 8:20 AM @ [facility]       Communication with patient/family:  Patient was updated regarding the above recommendations this morning  Communication with team:  ICU team resident was updated regarding the above recommendations  Case was discussed with hematology/oncology attending, Dr Adrian Javed    Reason for consultation: stage IV breast cancer with recent disease progression    History of present illness:  Melly Renner is a 47 y o  female with ER/VA+, HER2- grade 3, metastatic breast cancer (with axillary LN and liver mets), initially dx in June 2019  Patient was noted to be + for BRCA 2 mutation  Patient was started on palbociclib + letrozole in July 2019  Progression of disease was noted in Dec 2019, for which patient was started on taxotere x 6 cycles from Jan to June 2020  Palliative right mastectomy following chemo was performed in June 2020 - 13/21 LN were +  Since June 2020, pt was on olaparib with good response  Surveillance CT CAP from Feb 2023 had reported interval increase in conspicuity of hepatic lesions, for which close imaging surveillance was recommended  3/30: p/w dizziness and unsteadiness  MR brain showed 5 mildly enhancing lesions identified within the brain parenchyma  The largest lesions were located within the posterior fossa measuring up to 3 1 cm in diameter with mild surrounding edema and localized mass effect  Underwent subdural occipital craniotomy for resection of right and left cerebellar mass with complex dural reconstruction on 4/3/23  Path reported ER 55%, VA 75%, HER2 2+ (negative FISH) disease with Ki-67 40-50%  Patient was dc'd to rehab on 4/11 with advise to closely f/u with radiation oncology and NS teams  Patient was dc'd from rehab to home, now presenting with nausea and multiple episodes of vomiting, decreased appetite   Recent MR from 4/25 had reported concern regarding increasing enhancement along the margins of the posterior fossa resection cavity with prominent left cerebellar hemisphere foliar enhancement, concerning for tumor progression, in addition to interval development of suboccipital pseudomeningocele measuring 5 7 cm  CT brain on admission did not report any new mass effect of midline shift  Review of Systems:    Review of Systems   Constitutional: Positive for appetite change (decreased appetite)  Negative for fatigue, fever and unexpected weight change  HENT: Negative for facial swelling, sore throat, trouble swallowing and voice change  Eyes: Positive for visual disturbance (chronic vision blurriness since age 7, b/l)  Negative for pain, discharge and redness  Respiratory: Negative for cough, chest tightness, shortness of breath and wheezing  Cardiovascular: Positive for leg swelling (chronic, nonpitting)  Negative for chest pain and palpitations  Gastrointestinal: Positive for nausea (intermittent) and vomiting (none since admission)  Negative for abdominal pain, blood in stool and constipation  Genitourinary: Negative for difficulty urinating, dysuria, flank pain, frequency, hematuria and vaginal discharge  Musculoskeletal: Positive for gait problem (balance trouble, since March)  Negative for arthralgias and back pain  Neurological: Negative for dizziness, facial asymmetry and light-headedness  Hematological: Negative for adenopathy  Does not bruise/bleed easily  Psychiatric/Behavioral: Positive for confusion (reported by friend)  Negative for agitation and behavioral problems         Oncology History:   Cancer Staging   Malignant neoplasm of central portion of right breast in female, estrogen receptor positive ([facility] [address] )  Staging form: Breast, AJCC 8th Edition  - Clinical: Stage IV (cT4, cN1(f), cM1, G3, ER+, DC+, HER2-) - Signed by Jose Miguel Acuña MD on 1/7/2020  - Pathologic: No Stage Recommended (ypT4b, pN3a, pM1, G3, ER+, DC+, HER2-) - Signed by Jose Miguel Acuña MD on 6/24/2020    Oncology History   Malignant neoplasm of central portion of right breast in female, estrogen receptor positive ([facility] [address] )   5/3/2019 Biopsy    Invasive ductal carcinoma  Right breast, 10:00 oclock, 5 cmfn  Grade 3  ER 90  IN 90  Her 2 1+    Axillary lymph node biopsy  Metastatic carcinoma             5/3/2019 Biopsy         5/16/2019 -  Cancer Staged    Staging form: Breast, AJCC 8th Edition  - Clinical: Stage IV (cT4, cN1(f), cM1, G3, ER+, IN+, HER2-) - Signed by Vasiliy Toledo MD on 1/7/2020  Laterality: Right  Method of lymph node assessment: Core biopsy  Sites of metastasis: Liver  Histologic grading system: 3 grade system       7/29/2019 Surgery    Laparoscopic BSO  Benign pathology  6/24/2020 -  Cancer Staged    Staging form: Breast, AJCC 8th Edition  - Pathologic: No Stage Recommended (ypT4b, pN3a, pM1, G3, ER+, IN+, HER2-) - Signed by Vasiliy Toledo MD on 6/24/2020  Stage prefix: y  Neoadjuvant therapy: Yes  Response to neoadjuvant therapy: Partial response  Laterality: Right  Method of lymph node assessment: Axillary lymph node dissection  Sites of metastasis: Liver  Histologic grading system: 3 grade system       Carcinoma of right breast metastatic to axillary lymph node ([facility] [address] )   5/3/2019 Initial Diagnosis    Carcinoma of right breast metastatic to axillary lymph node ([facility] [address] )     5/3/2019 Biopsy    A  Breast, Right, us guided bx 1000 5 cm fn:  - Invasive mammary carcinoma  * Reyna grade 3 of 3 (total score: 8 of 9)    -- tubule formation 3    -- nuclear grade 2    -- mitoses 3   * Confirmed by tumor cell immunophenotype:    -- positive: Gata3, non-specific p120    -- negative: mammaglobin, E-cadherin   * Invasive carcinoma involves 3 of 3 submitted core biopsies, max  dimension = 14 millimeters  * Estrogen, progesterone & HER2 receptor studies pending, to be described in a separate receptor report  * Ductal carcinoma in situ (DCIS): Not identified    * Lymph-vascular invasion: Not identified    * Microcalcifications: Absent     B  Axillary lymph node, right bx:  - Metastatic carcinoma      ER 90-95%  IN 90-95%  HER2 by IHC 1+ negative     1/7/2020 - 6/4/2020 Chemotherapy    DOCEtaxel (TAXOTERE) 151 6 mg in sodium chloride 0 9 % 250 mL chemo infusion, 75 mg/m2 = 151 6 mg (125 % of original dose 60 mg/m2), Intravenous, Once, 6 of 6 cycles  Dose modification: 75 mg/m2 (original dose 60 mg/m2, Cycle 1, Reason: Dose Not Tolerated)  Administration: 151 6 mg (1/7/2020), 151 6 mg (1/31/2020), 151 6 mg (3/13/2020), 151 6 mg (2/21/2020), 151 6 mg (4/24/2020), 151 6 mg (5/15/2020)     6/5/2020 Surgery    A  Right breast with axillary lymph nodes, mastectomy:  - Residual pleomorphic lobular carcinoma, high grade, numerous foci in all four quartes, up to 3 5 cm  - Pleomorphic lobular carcinoma in situ   - Nipple and skin dermis are positive for invasive carcinoma  - Invasive carcinoma present in deep margin (lower inner quadrate) and is less than 1 mm from superficial margin ( lower outer quadrant)  - Margins are negative for LCIS  - Thirteen of twenty one lymph nodes are positive for tumor (13/21)  Breast cancer metastasized to liver ([facility] [address] )   6/11/2019 Initial Diagnosis    Liver metastases ([facility] [address] )     6/11/2019 Biopsy    A  Liver (Mass), Biopsy:  - Metastatic adenocarcinoma, consistent with breast primary  See Note       1/7/2020 - 6/4/2020 Chemotherapy    DOCEtaxel (TAXOTERE) 151 6 mg in sodium chloride 0 9 % 250 mL chemo infusion, 75 mg/m2 = 151 6 mg (125 % of original dose 60 mg/m2), Intravenous, Once, 6 of 6 cycles  Dose modification: 75 mg/m2 (original dose 60 mg/m2, Cycle 1, Reason: Dose Not Tolerated)  Administration: 151 6 mg (1/7/2020), 151 6 mg (1/31/2020), 151 6 mg (3/13/2020), 151 6 mg (2/21/2020), 151 6 mg (4/24/2020), 151 6 mg (5/15/2020)     Schwannoma of cranial nerve ([address] )   7/26/2021 Initial Diagnosis    Schwannoma of cranial nerve (HCC)     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 ([address] )   4/3/2023 Initial Diagnosis    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 ([address] )     4/3/2023 Surgery    Procedure(s):  1  Suboccipital craniotomy for resection of right cerebellar "mass then left cerebellar mass, both using neuronavigation and intraoperative microscope  2  Complex dural reconstruction with Durepair, muscle graft, and Duraseal  Dr Esparza Heart  Brain, \"Cerebellar mass,\" Resection:  - Benign cerebellar tissue  - Negative for malignancy     B  Brain, \"Cerebellar mass #2,\" Resection:  - Metastatic adenocarcinoma, consistent with breast primary     C   Brain, \"Cerebellar mass,\" Resection:  - Metastatic adenocarcinoma, consistent with breast primary    ER 45-55%  NV 70-75%  Ki-67 40-50%  HER2 by IHC 2+ Equivocal  HER2 by FISH: negative/not amplified         Past Medical History:   Past Medical History:   Diagnosis Date   • Bloating    • Breast cancer ([facility] [address] )    • Bruises easily    • Cancer ([facility] [address] )     right breast//to lymph nodes/liver/ and bone   • Depression    • History of cancer chemotherapy 05/2020   • History of pneumonia    • Hypotension     occas   • Low iron     \"when pregnant, 20 yrs ago\"   • Neuropathy     hands//fingers   • Shortness of breath     occas   • Stroke [facility])    • Subacromial impingement of left shoulder 03/14/2022   • Tinnitus     right   • Wears glasses        Past Surgical History:   Procedure Laterality Date   • BILATERAL SALPINGOOPHORECTOMY     • BREAST BIOPSY     • CRANIOTOMY Bilateral 4/3/2023    Procedure: Suboccipital craniotomy for tumor resection using neuronavigation;  Surgeon: Lilian Child MD;  Location: [facility];  Service: Neurosurgery   • IR BIOPSY LIVER MASS  06/11/2019   • MASTECTOMY Right     2019   • MYOMECTOMY      H/O FIBROIDS, NO SURGERY NEEDED   • NV LAPAROSCOPY W/RMVL ADNEXAL STRUCTURES N/A 07/29/2019    Procedure: LAPAROSCOPIC BILATERAL SALPINGO-OOPHORECTOMY;  Surgeon: Kathleen Nowak MD;  Location: [facility];  Service: Gynecology Oncology   • NV MASTECTOMY SIMPLE COMPLETE Right 06/05/2020    Procedure: MASTECTOMY SIMPLE, axillary node dissection;  Surgeon: Celestino Harris MD;  Location: [facility];  Service: Surgical Oncology   • US " GUIDED BREAST BIOPSY RIGHT COMPLETE Right 2019   • US GUIDED BREAST LYMPH NODE BIOPSY RIGHT Right 2019       Family History   Problem Relation Age of Onset   • Hypotension Mother    • Colon cancer Father 36   • Hypertension Father    • Prostate cancer Father 79   • Throat cancer Maternal Grandmother 61   • Cancer Maternal Grandmother    • Breast cancer Paternal Aunt         age unknown       Social History     Socioeconomic History   • Marital status: Single     Spouse name: None   • Number of children: None   • Years of education: None   • Highest education level: None   Occupational History   • None   Tobacco Use   • Smoking status: Former     Packs/day: 1 00     Years: 30 00     Pack years: 30      Types: Cigarettes     Quit date:      Years since quittin 3   • Smokeless tobacco: Never   Vaping Use   • Vaping Use: Never used   Substance and Sexual Activity   • Alcohol use: Yes     Comment: occassional   • Drug use: No   • Sexual activity: Not Currently     Partners: Male     Birth control/protection: None   Other Topics Concern   • None   Social History Narrative    Consumes 2-3 cups of coffee per day     Social Determinants of Health     Financial Resource Strain: Not on file   Food Insecurity: No Food Insecurity   • Worried About Running Out of Food in the Last Year: Never true   • Ran Out of Food in the Last Year: Never true   Transportation Needs: No Transportation Needs   • Lack of Transportation (Medical): No   • Lack of Transportation (Non-Medical):  No   Physical Activity: Not on file   Stress: Not on file   Social Connections: Not on file   Intimate Partner Violence: Not on file   Housing Stability: Unknown   • Unable to Pay for Housing in the Last Year: No   • Number of Places Lived in the Last Year: Not on file   • Unstable Housing in the Last Year: No         Current Facility-Administered Medications:   •  albuterol (PROVENTIL HFA,VENTOLIN HFA) inhaler 2 puff, 2 puff, Inhalation, Q4H PRN, Carmina Hinsonster, DO  •  ALPRAZolam Abebe Crews) tablet 0 25 mg, 0 25 mg, Oral, HS PRN, Carmina Hinsonster, DO  •  ipratropium (ATROVENT) 0 02 % inhalation solution 0 5 mg, 0 5 mg, Nebulization, Q6H PRN, Carmina Hinsonster, DO  •  lactated ringers infusion, 75 mL/hr, Intravenous, Continuous, Carmina Jang, DO, Last Rate: 75 mL/hr at 04/28/23 0326, 75 mL/hr at 04/28/23 0326  •  levalbuterol (XOPENEX) inhalation solution 1 25 mg, 1 25 mg, Nebulization, Q6H PRN, Carmina Hinsonster, DO  •  nystatin (MYCOSTATIN) oral suspension 500,000 Units, 500,000 Units, Swish & Swallow, 4x Daily, Carmina Lezamaer, DO, 500,000 Units at 04/28/23 0239  •  ondansetron [facility]) injection 4 mg, 4 mg, Intravenous, Q6H PRN, Carmina Lezamaer, DO  •  oxyCODONE (ROXICODONE) split tablet 2 5 mg, 2 5 mg, Oral, Q6H PRN, Carmina Hinsonster, DO    Medications Prior to Admission   Medication   • albuterol (Proventil HFA) 90 mcg/act inhaler   • ALPRAZolam (XANAX) 0 5 mg tablet   • aluminum-magnesium hydroxide-simethicone (MYLANTA) 1606-3040-435 mg/30 mL suspension   • ascorbic acid (VITAMIN C) 500 MG tablet   • atorvastatin (LIPITOR) 40 mg tablet   • Azelaic Acid (Finacea) 15 % FOAM   • bisacodyl (DULCOLAX) 5 mg EC tablet   • Cholecalciferol 25 MCG (1000 UT) capsule   • fluticasone-vilanterol (Breo Ellipta) 200-25 mcg/actuation inhaler   • insulin lispro (HumaLOG) 100 units/mL injection   • meclizine (ANTIVERT) 25 mg tablet   • metroNIDAZOLE (METROCREAM) 0 75 % cream   • Multiple Vitamins-Minerals (multivitamin with minerals) tablet   • nystatin (MYCOSTATIN) 500,000 units/5 mL suspension   • olaparib (LYNPARZA) tablet   • ondansetron (ZOFRAN) 8 mg tablet   • pantoprazole (PROTONIX) 40 mg tablet   • polyethylene glycol (MIRALAX) 17 g packet   • senna-docusate sodium (SENOKOT S) 8 6-50 mg per tablet   • Specialty Vitamins Products (Advanced Collagen) TABS       Allergies   Allergen Reactions   • Tylenol [Acetaminophen]      Told to avoid due to cancer          Physical Exam: /75   Pulse 83   Temp 98 5 °F (36 9 °C)   Resp 18   SpO2 97%     Physical Exam  Vitals reviewed  Constitutional:       General: She is not in acute distress  Appearance: She is obese  She is not ill-appearing, toxic-appearing or diaphoretic  HENT:      Head: Normocephalic and atraumatic  Mouth/Throat:      Mouth: Mucous membranes are dry  Comments: Brown coating over tongue- thrush vs very dry mouth  Eyes:      General: No scleral icterus  Extraocular Movements: Extraocular movements intact  Conjunctiva/sclera: Conjunctivae normal    Cardiovascular:      Rate and Rhythm: Normal rate and regular rhythm  Heart sounds: No murmur heard  No gallop  Pulmonary:      Effort: No respiratory distress  Breath sounds: Normal breath sounds  No wheezing, rhonchi or rales  Abdominal:      General: Bowel sounds are normal  There is no distension  Palpations: Abdomen is soft  There is no mass  Tenderness: There is no abdominal tenderness  There is no guarding  Musculoskeletal:         General: No swelling, tenderness or deformity  Cervical back: Normal range of motion  No rigidity  Right lower leg: No edema  Left lower leg: No edema  Lymphadenopathy:      Cervical: No cervical adenopathy  Skin:     General: Skin is warm and dry  Capillary Refill: Capillary refill takes less than 2 seconds  Coloration: Skin is not jaundiced  Findings: No bruising, erythema, lesion or rash  Comments: S/p R  Breast mastectomy   Neurological:      General: No focal deficit present  Mental Status: She is alert and oriented to person, place, and time  Motor: No weakness (5/5 strength in all 4 extremities)     Psychiatric:         Mood and Affect: Mood normal          Judgment: Judgment normal        Recent Results (from the past 48 hour(s))   CBC and differential    Collection Time: 04/27/23  8:34 PM   Result Value Ref Range    WBC 7 49 4 31 - 10 16 Thousand/uL    RBC 3 80 (L) 3 81 - 5 12 Million/uL    Hemoglobin 13 4 11 5 - 15 4 g/dL    Hematocrit 37 7 34 8 - 46 1 %    MCV 99 (H) 82 - 98 fL    MCH 35 3 (H) 26 8 - 34 3 pg    MCHC 35 5 31 4 - 37 4 g/dL    RDW 13 7 11 6 - 15 1 %    MPV 10 7 8 9 - 12 7 fL    Platelets 029 (L) 530 - 390 Thousands/uL    nRBC 0 /100 WBCs    Neutrophils Relative 68 43 - 75 %    Immat GRANS % 1 0 - 2 %    Lymphocytes Relative 20 14 - 44 %    Monocytes Relative 10 4 - 12 %    Eosinophils Relative 1 0 - 6 %    Basophils Relative 0 0 - 1 %    Neutrophils Absolute 5 18 1 85 - 7 62 Thousands/µL    Immature Grans Absolute 0 04 0 00 - 0 20 Thousand/uL    Lymphocytes Absolute 1 46 0 60 - 4 47 Thousands/µL    Monocytes Absolute 0 75 0 17 - 1 22 Thousand/µL    Eosinophils Absolute 0 04 0 00 - 0 61 Thousand/µL    Basophils Absolute 0 02 0 00 - 0 10 Thousands/µL   Comprehensive metabolic panel    Collection Time: 04/27/23  8:34 PM   Result Value Ref Range    Sodium 136 135 - 147 mmol/L    Potassium 2 9 (L) 3 5 - 5 3 mmol/L    Chloride 101 96 - 108 mmol/L    CO2 29 21 - 32 mmol/L    ANION GAP 6 4 - 13 mmol/L    BUN 9 5 - 25 mg/dL    Creatinine 0 74 0 60 - 1 30 mg/dL    Glucose 123 65 - 140 mg/dL    Calcium 9 4 8 3 - 10 1 mg/dL    Corrected Calcium 9 9 8 3 - 10 1 mg/dL    AST 44 5 - 45 U/L    ALT 88 (H) 12 - 78 U/L    Alkaline Phosphatase 86 46 - 116 U/L    Total Protein 6 8 6 4 - 8 4 g/dL    Albumin 3 4 (L) 3 5 - 5 0 g/dL    Total Bilirubin 0 61 0 20 - 1 00 mg/dL    eGFR 92 ml/min/1 73sq m   Lipase    Collection Time: 04/27/23  8:34 PM   Result Value Ref Range    Lipase 119 73 - 393 u/L   APTT    Collection Time: 04/28/23  6:57 AM   Result Value Ref Range    PTT 21 (L) 23 - 37 seconds   Protime-INR    Collection Time: 04/28/23  6:57 AM   Result Value Ref Range    Protime 14 6 (H) 11 6 - 14 5 seconds    INR 1 12 0 84 - 1 19       CT head without contrast    Result Date: 4/28/2023  Narrative: CT BRAIN - WITHOUT CONTRAST INDICATION: Vomiting, metastatic cancer  COMPARISON: Multiple prior recent studies, most recent MRI brain examination dated 4/25/2023 and CT head examination dated 4/8/2023  TECHNIQUE:  CT examination of the brain was performed  Multiplanar 2D reformatted images were created from the source data  Radiation dose length product (DLP) for this visit:  882 mGy-cm   This examination, like all CT scans performed in the [facility], was performed utilizing techniques to minimize radiation dose exposure, including the use of iterative reconstruction and automated exposure control  IMAGE QUALITY:  Diagnostic  FINDINGS: PARENCHYMA/EXTRA-AXIAL COMPARTMENT: Redemonstrated postsurgical changes of prior suboccipital craniectomy with grossly stable appearance of the surgical resection bed compared to the recent MRI accounting for differences in modality  Known brain parenchymal metastases better visualized on the prior recent MRI again with exception of similar hyperattenuating bifrontal metastases  No CT evidence for for territorial infarct  No parenchymal hemorrhage or new mass effect  VENTRICLES: Stable in size and configuration  VISUALIZED ORBITS: Normal visualized orbits  PARANASAL SINUSES: Normal visualized paranasal sinuses  CALVARIUM AND EXTRACRANIAL SOFT TISSUES: Redemonstrated suboccipital craniectomy changes and associated probable pseudomeningocele as seen on the prior MRI  Impression: No CT evidence for acute territorial infarct  No new mass effect or midline shift  Known intracranial metastatic lesions better visualized on the prior recent MRI brain examination  Postsurgical changes of prior suboccipital craniectomy with grossly stable appearance of the surgical resection bed and 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 compared to the recent MRI accounting for differences in modality   Workstation performed: [id number]     CT head wo contrast    Result Date: 4/8/2023  Narrative: CT BRAIN - WITHOUT CONTRAST INDICATION:   Traumatic brain injury (TBI), new or progressive neuro deficits brain mass  COMPARISON:  CT head 4/5/2023  MRI brain 4/4/2023  TECHNIQUE:  CT examination of the brain was performed  Multiplanar 2D reformatted images were created from the source data  Radiation dose length product (DLP) for this visit:  894 mGy-cm   This examination, like all CT scans performed in the [facility], was performed utilizing techniques to minimize radiation dose exposure, including the use of iterative reconstruction and automated exposure control  IMAGE QUALITY:  Diagnostic  FINDINGS: PARENCHYMA:  Reidentified postsurgical changes of suboccipital craniotomy  Known cerebral metastases better visualized on the comparison MRI; for example unchanged 14 mm right frontal lesion #2/29  The other lesions are not well visualized on this study  No new mass or mass effect  No hydrocephalus  No right internal auditory canal lesion is not visualized on this study  High density areas through the posterior fossa surgical bed are unchanged  Unchanged surgical bed postoperative findings  VENTRICLES AND EXTRA-AXIAL SPACES:  Unchanged compressive mass effect on the 4th ventricle  No hydrocephalus  VISUALIZED ORBITS: Normal visualized orbits  PARANASAL SINUSES: Normal visualized paranasal sinuses  CALVARIUM AND EXTRACRANIAL SOFT TISSUES:  Suboccipital craniotomy  Impression: No significant change when compared with the recent CT abdomen 4/5/2023 MRI brain 4/4/2023  Workstation performed: [id number]     CT head wo contrast    Result Date: 4/5/2023  Narrative: CT BRAIN - WITHOUT CONTRAST INDICATION:   fall, recent surgery  COMPARISON:  CT head dated 4/4/2023, MR brain dated 4/4/2023 TECHNIQUE:  CT examination of the brain was performed  Multiplanar 2D reformatted images were created from the source data  Radiation dose length product (DLP) for this visit:  930 33 mGy-cm     This examination, like all CT scans performed in the [facility], was performed utilizing techniques to minimize radiation dose exposure, including the use of iterative  reconstruction and automated exposure control  IMAGE QUALITY:  Diagnostic  FINDINGS: Status post suboccipital craniotomy, status post resection of occipital metastatic lesions  Large area of edema and scattered blood products are redemonstrated in the resection cavity in the posterior fossa, similar to the prior  There is a small amount of pneumocephalus redemonstrated, similar to the prior  Effacement of the 4th ventricle redemonstrated, as before  No territorial infarct is seen  Small amount of blood in the posterior horn of the right lateral ventricle now seen, no hydrocephalus  Basal cisterns remain patent  Several subcentimeter metastatic lesions in the right frontal lobe are redemonstrated  The intracranial structures are otherwise intervally unchanged  VISUALIZED ORBITS: Normal visualized orbits  PARANASAL SINUSES: Normal visualized paranasal sinuses  CALVARIUM AND EXTRACRANIAL SOFT TISSUES:  Calvarium otherwise appears intact  Left-sided soft tissue swelling redemonstrated  Impression: Postsurgical changes status post suboccipital craniotomy as described, similar to the prior  Effacement of the 4th ventricle is redemonstrated, as before  A small amount of blood is now seen dependently in the right lateral ventricle, no hydrocephalus  No other significant interval change  Clinical follow-up recommended  Workstation performed: [id number]     CT head wo contrast    Result Date: 4/5/2023  Narrative: CT BRAIN - WITHOUT CONTRAST INDICATION:   fall  COMPARISON:  CT brain on 3/30/2023 and MRI brain of the same day  TECHNIQUE:  CT examination of the brain was performed  Multiplanar 2D reformatted images were created from the source data  Radiation dose length product (DLP) for this visit:  935 03 mGy-cm     This examination, like all CT scans performed in the [facility], was performed utilizing techniques to minimize radiation dose exposure, including the use of iterative  reconstruction and automated exposure control  IMAGE QUALITY:  Diagnostic  FINDINGS: PARENCHYMA: Scattered foci of intracranial air and subarachnoid hemorrhage in the posterior cranial fossa likely related to recent resection  Suboccipital craniotomy bony defect is seen with adjacent subarachnoid hemorrhage/parenchymal hemorrhage and cystic cavity within the bilateral cerebellum  No gross supratentorial intracranial hemorrhage identified  Increased density foci in the bilateral frontal lobes likely represents metastatic lesions  Trace right subdural hygroma  No significant mass effect, midline shift, or evidence of transtentorial herniation  VENTRICLES AND EXTRA-AXIAL SPACES:  Normal for the patient's age  VISUALIZED ORBITS: Normal visualized orbits and globes  PARANASAL SINUSES: Minimal mucoperiosteal thickening bilateral sphenoid sinuses  The remaining paranasal sinuses and mastoid air cells appear adequately aerated and clear without air-fluid levels CALVARIUM AND EXTRACRANIAL SOFT TISSUES:  Suboccipital craniotomy defect is seen  Bony calvarium and temporal mandibular joints are otherwise intact  Left frontotemporal scalp soft tissue swelling  Impression: Postoperative changes from suboccipital craniotomy and resection of cerebellar lesions as detailed above  Blood products in the posterior cranial fossa likely postsurgical in nature but cannot exclude increased blood products from trauma in this setting  If clinically indicated, consider follow-up exam to ensure stability  Workstation performed: [id number]     CT head w wo contrast    Result Date: 3/30/2023  Narrative: CT BRAIN - WITH AND WITHOUT CONTRAST INDICATION:   hx stage 4 cancer concern for new metastatic brain lesion - assessing for mass   COMPARISON:  CTA neck 12/9/2022 TECHNIQUE:  CT examination of the brain was performed both prior to and after the administration of intravenous contrast   Multiplanar 2D reformatted images were created from the source data  Radiation dose length product (DLP) for this visit:  1822 37 mGy-cm   This examination, like all CT scans performed in the [facility], was performed utilizing techniques to minimize radiation dose exposure, including the use of iterative reconstruction and automated exposure control  IV Contrast:  100 mL of iohexol (OMNIPAQUE) 350  IMAGE QUALITY:  Diagnostic  FINDINGS: PARENCHYMA:  No evidence of acute infarction  No intracranial hemorrhage or midline shift  1 3 x 1 cm right posterior frontal centrally hypodense mass with thin mildly dense enhancing soft tissue rim  1 5 x 1 1 cm mildly hyperdense and mildly enhancing right mid frontal cortical mass  3 2 x 2 9 cm right mid medial cerebellar mildly hyperdense enhancing mass new since December 2022  This may be infiltrating the contralateral cerebellar hemisphere  2 2 x 1 5 cm left inferior medial cerebellar enhancing mass new since December 2022n  5 x 5 mm left mid frontal enhancing lesion  VENTRICLES AND EXTRA-AXIAL SPACES:  Mild effacement of the inferior right fourth ventricle  No acute hydrocephalus  VISUALIZED ORBITS: Normal visualized orbits  PARANASAL SINUSES: Normal visualized paranasal sinuses  CALVARIUM:  Normal      Impression: Multiple supratentorial and infratentorial enhancing lesions, the largest of which is in the right medial cerebellum measuring approximately 3 2 cm  Mild effacement of the inferior fourth ventricle without acute hydrocephalus  The study was marked in [facility] for immediate notification  Workstation performed: [id number]     MRI brain w wo contrast    Result Date: 4/25/2023  Narrative: MRI BRAIN WITH AND WITHOUT CONTRAST INDICATION: C50 919: Malignant neoplasm of unspecified site of unspecified female breast C79 31: Secondary malignant neoplasm of brain  COMPARISON: 3/30/2023 and 4/4/2023 L5 somewhat TECHNIQUE: Multiplanar, multisequence imaging of the brain was performed before and after gadolinium administration  IV Contrast:  10 mL of Gadobutrol injection (SINGLE-DOSE)  IMAGE QUALITY:   Diagnostic  FINDINGS: BRAIN PARENCHYMA: Postsurgical change from suboccipital craniectomy and resection of bilateral cerebellar hemisphere masses  Blood products and fluid in the resection cavity  Minimal restricted diffusion along the margins of the resection cavity could represent mild infarct and/or blood products  There is increased enhancement along the margins of the resection cavity with mild nodularity and in the adjacent cerebellar folia, particularly in the left cerebellar hemisphere  Two right frontal lesions (11:128 and 11:135), previously visualized, demonstrate stable restricted diffusion and T2/FLAIR hyperintensity, with homogeneous enhancement  The larger more lateral lesion measures 1 6 cm in maximal diameter compared to 1 4 cm  on the previous exam  The more medially located lesion measures 0 9 cm compared to 1 cm on the prior exam  A third lesion (11:137) is present in the left superior frontal gyrus with similar enhancement and signal characteristics measuring  0 6 cm, stable  Stable enhancing lesion in the right internal auditory canal (11:57) and cerebellopontine angle measuring 1 5 x 0 7 x 0 5 cm  Subtle enhancement of the cisternal and canalicular portions of the left 7th and 8th cranial nerves (11:54)  Stable underlying T2/FLAIR hyperintense foci suggesting early microangiopathic change  Decreased mass effect in the posterior fossa  VENTRICLES: Interval resolution of effacement of the fourth ventricle  No hydrocephalus  Prominent perivascular space again noted inferior to the right lentiform nuclei  No mismatch or CT because of the diarrhea SELLA AND PITUITARY GLAND:  Normal  ORBITS: No retro-orbital lesion   PARANASAL SINUSES: Fluid levels in the sphenoid and maxillary sinuses  Mild mucosal thickening in the ethmoid air cells  VASCULATURE:  Evaluation of the major intracranial vasculature demonstrates appropriate flow voids  CALVARIUM AND SKULL BASE: Postsurgical change from suboccipital craniectomy EXTRACRANIAL SOFT TISSUES: Suboccipital pseudomeningocele, new since the prior exam, measuring 2 4 x 5 7 x 3 2 cm  Impression: 1  Supratentorial lesions are stable to mildly increased in size  2  Increasing enhancement along the margins of the posterior fossa resection cavity with prominent left cerebellar hemisphere foliar enhancement  , concerning for tumor progression  3  New subtle enhancement along the left cisternal and canalicular segments of the 7th and 8th cranial nerves without evidence of nodularity or discrete lesion  Close follow-up recommended  4  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  Workstation performed: [id number]     MRI brain w wo contrast    Result Date: 4/4/2023  Narrative: MRI BRAIN WITH AND WITHOUT CONTRAST INDICATION: post op crani  History of breast cancer with metastases  COMPARISON:  MRI brain PTE with and without contrast March 30, 2023  CT head without contrast March 30, 2023  TECHNIQUE: Multiplanar, multisequence imaging of the brain was performed before and after gadolinium administration  IV Contrast:  14 mL of Gadobutrol injection (SINGLE-DOSE)  IMAGE QUALITY:   Diagnostic  FINDINGS: BRAIN PARENCHYMA: Postsurgical changes of suboccipital craniotomy for resection of posterior midline cerebellar metastatic lesion and left inferior cerebellar metastatic lesion  Cerebellar resection cavity involves the right cerebellum, cerebellar vermis, and a portion of the left inferior cerebellum which contains blood products, locules of gas, and peripheral marginal ischemic change  Small amount of irregular nodular enhancement adjacent to left inferior cerebellar resection margin suspicious for residual tumor  No residual tumor in the right cerebellar or cerebellar vermis resection sites  Several poorly enhancing metastatic lesions some of which have bright DWI signal (measured in long axis): -1 3 cm right posterior frontal lobe lesion (11:24), unchanged  -1 4 cm right frontal lobe lesion (11:23), unchanged  -0 6 cm left frontal lobe lesion (11:23), unchanged  Faintly enhancing lesions in right paramedian cameron and left lateral cameron could represent capillary telangiectasias  1 4 cm right internal auditory canal lesion (12:48), compatible with vestibular schwannoma  New postoperative ischemia in left paramidline cerebellar region  Acute small-volume subarachnoid hemorrhage in bilateral cerebellar folia, likely from recent surgery  Pneumocephalus  No midline shift  Small scattered hyperintensities on T2/FLAIR imaging are noted in the periventricular and subcortical white matter demonstrating an appearance that is statistically most likely to represent mild microangiopathic change  VENTRICLES:  Compressive mass effect on 4th ventricle  Slight dilatation in temporal horns of both lateral ventricles  Acute trace intraventricular hemorrhage layering in occipital horns of both lateral ventricles  SELLA AND PITUITARY GLAND:  Normal  ORBITS:  Normal  PARANASAL SINUSES:  Mild scattered mucosal thickening with scattered air-fluid levels, worse in bilateral sphenoid sinuses  VASCULATURE:  Evaluation of the major intracranial vasculature demonstrates appropriate flow voids  CALVARIUM AND SKULL BASE:  Normal  EXTRACRANIAL SOFT TISSUES:  Partially imaged endotracheal and orogastric tubing  Impression: Postsurgical changes of suboccipital craniotomy for resection of posterior midline cerebellar metastatic lesion and left inferior cerebellar metastatic lesion    Cerebellar resection cavity involves the right cerebellum, cerebellar vermis, and a portion of the left inferior cerebellum which contains blood products, locules of gas, and peripheral marginal ischemic change  Small amount of irregular nodular enhancement adjacent to left inferior cerebellar resection margin suspicious for residual tumor  No  residual tumor in the right cerebellar or cerebellar vermis resection sites  New postoperative ischemia in left paramidline cerebellar region  Acute small-volume subarachnoid hemorrhage in bilateral cerebellar folia, likely from recent surgery  Unchanged several poorly enhancing metastatic lesions in bilateral frontal lobes  Unchanged 1 4 cm right vestibular schwannoma  Faintly enhancing lesions in right paramedian cameron and left lateral cameron could represent capillary telangiectasias  Attention on follow-up  Slight dilatation in temporal horns of both lateral ventricles with compressive mass effect on the 4th ventricle  Pneumocephalus  The study was marked in [facility] for immediate notification  Workstation performed: [id number]     X-ray chest 1 view    Result Date: 4/4/2023  Narrative: CHEST INDICATION:   Endotracheal tube positioning  COMPARISON:  8/12/2020 EXAM PERFORMED/VIEWS:  XR CHEST 1 VIEW FINDINGS:  Endotracheal tube is present, in satisfactory position with its tip 4 cm above the level of the rob  Enteric tube is present with its tip extending below the left hemidiaphragm  Left-sided PICC projects over the SVC  Cardiomediastinal silhouette appears unremarkable  The lungs are clear  No pneumothorax or pleural effusion  Osseous structures appear within normal limits for patient age  Impression: No acute cardiopulmonary disease  Endotracheal tube projects 4 cm above the rob  Workstation performed: [id number]     CT chest abdomen pelvis w contrast    Result Date: 3/31/2023  Narrative: CT CHEST, ABDOMEN AND PELVIS WITH IV CONTRAST INDICATION:   Breast cancer, invasive, stage IV, pre-therapy or re-staging restaging, new brain mets in the setting of stage iv breast cancer   COMPARISON: Multiple prior CT examinations of the chest abdomen pelvis commencing  May 24, 2019 with direct comparison made to February 17, 2023  TECHNIQUE: CT examination of the chest, abdomen and pelvis was performed  Multiplanar 2D reformatted images were created from the source data  Radiation dose length product (DLP) for this visit:  1527 02 mGy-cm   This examination, like all CT scans performed in the [facility], was performed utilizing techniques to minimize radiation dose exposure, including the use of iterative reconstruction and automated exposure control  IV Contrast:  90 mL of iohexol (OMNIPAQUE) Enteric Contrast: Enteric contrast was not administered  FINDINGS: CHEST LUNGS:  Mild emphysema  No suspicious pulmonary nodules  PLEURA:  Unremarkable  HEART/GREAT VESSELS: Heart is unremarkable for patient's age  No thoracic aortic aneurysm  MEDIASTINUM AND HAIM:  Unremarkable  CHEST WALL AND LOWER NECK:  Post right mastectomy ABDOMEN LIVER/BILIARY TREE:  No surface nodularity  Hypoattenuating segment 7 lesion is unchanged measuring 0 7 x 0 4 cm (series 2, image 100)  This corresponds to a metastasis which measured 2 3 x 1 7 cm on October 28, 2019 (series 2, image 156)  Hypoattenuating segment IVb lesion is unchanged measuring 0 7 x 0 5 cm (series 2, image 111)  This corresponds to metastasis which measured 1 6 x 1 6 cm on October 28, 2019 (series 2, image 162)  Hypoattenuating segment 5 lesion is unchanged measuring 1 0 x 0 7 cm (series 2, image 116)  This corresponds to a metastasis which measured 1 5 x 1 4 cm on October 28, 2019 (series 2, image 166)  Retraction of the posterior hepatic capsule at segment 6 corresponds to site of metastases evident on October 28, 2019 (series 2, image 127 compared to series 2, image 170)  Hypoattenuating segment 2 lesion is unchanged measuring 0 3 x 0 3 cm (series 2, image 87)  The lesion is similar in size on the October 20, 2019 CT and therefore may be a cyst  No new hepatic lesion   Patent hepatic and portal veins  No biliary duct dilatation  GALLBLADDER:  No calcified gallstones  No pericholecystic inflammatory change  SPLEEN:  Unremarkable  PANCREAS:  Unremarkable  ADRENAL GLANDS:  Unremarkable  KIDNEYS/URETERS:  Unremarkable  No hydronephrosis  STOMACH AND BOWEL:  Unremarkable  APPENDIX:  A normal appendix was visualized  ABDOMINOPELVIC CAVITY:  No ascites  No pneumoperitoneum  No lymphadenopathy  VESSELS:  Unremarkable for patient's age  PELVIS REPRODUCTIVE ORGANS:  Unremarkable for patient's age  URINARY BLADDER:  Unremarkable  ABDOMINAL WALL/INGUINAL REGIONS:  Unremarkable  OSSEOUS STRUCTURES:  0 3 cm sclerotic lesion in the right iliac bone is unchanged from October 28, 2019 and is likely a bone [address] (series 2, image 190)  No suspicious osseous lesion  No acute fracture  Impression: Since February 17, 2023: 1  Unchanged hepatic lesions  2   No new sites of disease in the chest abdomen or pelvis  Workstation performed: [id number]     MRI Brain BT w wo Contrast    Result Date: 3/31/2023  Narrative: MRI BRAIN AND IACS WITH AND WITHOUT CONTRAST INDICATION: Patient with metastatic breast cancer to liver and lymph node presented with nausea, vomiting, headache, found to have new brain mets      The patient has a known right acoustic neuroma  COMPARISON:  6/23/2022 TECHNIQUE: Multiplanar, multisequence imaging of the brain was performed before and after gadolinium administration  IV Contrast:  11 mL of Gadobutrol injection (SINGLE-DOSE)  IMAGE QUALITY:   Diagnostic  FINDINGS: BRAIN PARENCHYMA:  Enlarging enhancing masses are identified within the brain parenchyma  Previously seen foci of subtle enhancement thought to represent subacute ischemia within the right superior frontal lobe have increased in size  The larger of the  2 is best seen on series 17 image 23 and now measures 1 3 x 1 3 cm    Slightly more anteriorly and superiorly within the right frontal lobe, the 2nd lesion measures 1 3 cm in "maximum dimension as well  There are now large enhancing lesions identified within the posterior fossa  The larger of the 2 is located within the inferior aspect of the cerebellar vermis measuring 2 8 x 3 1 x 2 8 cm  More anteriorly and inferiorly, within the left cerebellum is a 2nd lesion measuring 1 3 x 2 2 x 1 4 cm  There is vasogenic edema surrounding the larger of the 2 lesions resulting in mild localized mass effect  Subcentimeter enhancing lesion within the left posterior superior frontal lobe difficult to visualize on axial imaging is better seen on sagittal postcontrast imaging, series 16 image 10 measuring 6 mm  Arterial spin labeling was performed and there appears to be mild subtle increased cerebral blood flow within portions of the 2 larger lesions in the posterior fossa  Solitary white matter hyperintensity within the right anterior frontal lobe on series 6 image 15 was present on the prior examination and may represent sequela of mild chronic microangiopathic change  There is an enhancing lesion identified within the right CP angle similar to prior examination extending into the IAC measuring 1 6 cm in long axis consistent with patient's known acoustic neuroma  VENTRICLES:  Normal for the patient's age  SELLA AND PITUITARY GLAND:  Normal  ORBITS:  Normal  PARANASAL SINUSES:  Normal  VASCULATURE:  Evaluation of the major intracranial vasculature demonstrates appropriate flow voids  CALVARIUM AND SKULL BASE:  Normal  EXTRACRANIAL SOFT TISSUES:  Normal      Impression: There are now a total of 5 mildly enhancing lesions identified within the brain parenchyma  The largest are located within the posterior fossa measuring up to 3 1 cm in diameter with mild surrounding edema and localized mass effect  Findings are most consistent with brain metastasis from patient's known metastatic breast carcinoma  Stable right acoustic neuroma   This examination was marked \"immediate notification\" in Epic in " order to begin the standard process by which the radiology reading room liaison alerts the referring practitioner  Workstation performed: [id number]       Labs and pertinent reports reviewed

## 2023-04-28 NOTE — PROGRESS NOTES
Critical Care Acceptance Note   Reji Vivas 47 y o  female MRN: [id number]  Unit/Bed#: ICU 01 Encounter: [id number]      ----------------------------------------------------------------------------------------    HPI: Reji Vivas is a 48 y/o female with past medical history of diabetes, metastatic breast cancer with known brain and liver metastasis, and recent hospitalization on April for brain metastases resection surgery who presented to ER with nausea, vomiting, and decreased oral intake for the past week  Patient was recently found to have a new large enhancing cerebellar mass and is now status post suboccipital craniotomy for resection of right cerebellar mass and then left cerebellar mass  Final pathology showed metastatic adenocarcinoma consistent with breast primary  Imaging did show stable ventricles sizes and relatively unchanged pseudomeningocele, however, given her worsening of her symptoms neurosurgery decided to proceed with CSF diversion with EVD placement  Patient transferred to neuro ICU for further management     ---------------------------------------------------------------------------------------  SUBJECTIVE  Patient reports feeling well but very sleepy  Denies current headaches, but reports that gets headaches whenever she sits or stands up  No other complaints at this time  Friend/POA at bedside, updated on current plan  All questions and concerns addressed  Review of Systems   Constitutional: Negative for chills and fever  HENT: Negative for ear pain and sore throat  Eyes: Negative for pain and visual disturbance  Respiratory: Negative for cough and shortness of breath  Cardiovascular: Negative for chest pain and palpitations  Gastrointestinal: Negative for abdominal pain and vomiting  Genitourinary: Negative for dysuria and hematuria  Musculoskeletal: Negative for arthralgias and back pain  Skin: Negative for color change and rash     Neurological: Positive for headaches  Negative for seizures and syncope  All other systems reviewed and are negative  Review of systems was reviewed and negative unless stated above in HPI/24-hour events   ---------------------------------------------------------------------------------------  Assessment and Plan:  # Severe nausea, vomting and headaches  # Pulmonary embolism  # History of cerebellar mass s/p suboccipital craniectomy  # Metastatic stage IV breast cancer  # Thrombocytopenia        PLAN:  Neuro:              Diagnoses: Recent history of cerebellar mass s/p suboccipital craniectomy  Plan: CAM-ICU  Delirium precautions  Regulate sleep/wake cycle    -Can consider neuro checks Q4H  -Neurosurgery following  -S/p CSF diversion with EVD placement, await final neurosurgery recommendations  - EVD @ 10  - CSF panel pending  -Pain management for headaches  -Patient is established with palliative care team, ongoing [address]     CV:              MAP: Maintain MAPs > 65 mmHg              Avoid hypotensive episodes     Pulm:              Pulmonary embolism   Holding on anticoagulation given cerebral hemorrhage after EVD placement  For repeat CT head in the morning, if stable might be able to start heparin drip  Currently on room air              SpO2: Around 95-99%  Continuous pulse oximetry  Incentive spirometry  Maintain O2 sat >90%     Stage IV breast cancer of right breast, metastatic, ER+  ER/CA+, HER2- grade 3, diagnosed in June 2019  BRCA2 mutation +  -Medical oncology following  -CTCAP ordered by medical oncology to assess for any disease progression                GI:              GI prophylaxis: Not on any pressors  Will hold off for now       : Has had nausea and vomiting throughout the course of hospitaliztion, would avoid any hypotensive episodes which can cause renal hypoperfusion and JANNA  Creatinine: 0 35, likely at baseline  Monitor urine output and renal indices   Avoid nephrotoxins       F/E/N: - F: On Is[place]te 75 mL/hr                          - E: Monitor and replete electrolytes for Mg >2, Phos >3, K >4                           - N: Has a regular diet     Endo:              Diagnoses: Prediabetic with HbA1c at 5 8 as of 03/2023  Glucose trend:   Insulin: Will hold off on SSI for now, can initiate if glucoses become elevated  Monitor blood glucose for goal 140-180  Heme:     Hemoglobin: 12 2  -Monitor Hgb  -Monitor platelets  VTE prophylaxis: Sequential compression devices and/or foot pumps      ID:              No acute ID concerns at this time              Temperature:   Plan: Monitor temperature and WBC  Maintain normothermia       MSK/Skin:  Plan: Frequent turning and repositioning  Pressure injury prevention  Monitor for skin breakdown  PT/OT when appropriate  Encourage OOB and ambulation when appropriate  Disposition: Admit to Critical Care   Code Status: Level 1 - Full Code  ---------------------------------------------------------------------------------------  ICU CORE MEASURES    Prophylaxis   VTE Pharmacologic Prophylaxis: Starting Heparin gtt tonight  VTE Mechanical Prophylaxis: sequential compression device  Stress Ulcer Prophylaxis: Prophylaxis Not Indicated     ABCDE Protocol (if indicated)  Plan to perform spontaneous awakening trial today? Not applicable  Plan to perform spontaneous breathing trial today? Not applicable  Obvious barriers to extubation? Not applicable  CAM-ICU: Negative    Invasive Devices Review  Invasive Devices     Peripheral Intravenous Line  Duration           Peripheral IV 04/27/23 Dorsal (posterior); Left Forearm <1 day          Drain  Duration           External Urinary Catheter 24 days    NG/OG/Enteral Tube Orogastric 18 Fr 24 days              Can any invasive devices be discontinued today?  No  ---------------------------------------------------------------------------------------  OBJECTIVE    Vitals   Vitals: 23 2330 23 0201 23 0717 23 1230   BP:  141/76 145/75 96/56   BP Location:    Left arm   Pulse: 78 84 83 80   Resp: 18   17   Temp:  97 5 °F (36 4 °C) 98 5 °F (36 9 °C) 97 5 °F (36 4 °C)   TempSrc:    Oral   SpO2: 96% 93% 97% 99%     Temp (24hrs), Av 9 °F (36 6 °C), Min:97 5 °F (36 4 °C), Max:98 5 °F (36 9 °C)  Current: Temperature: 97 5 °F (36 4 °C)    Respiratory:  SpO2: SpO2: 99 %       Invasive/non-invasive ventilation settings   Respiratory    Lab Data (Last 4 hours)    None         O2/Vent Data (Last 4 hours)    None                Physical Exam  Vitals and nursing note reviewed  Constitutional:       General: She is sleeping  She is not in acute distress  Appearance: She is well-developed  HENT:      Head: Normocephalic and atraumatic  Eyes:      Conjunctiva/sclera: Conjunctivae normal    Cardiovascular:      Rate and Rhythm: Normal rate and regular rhythm  Heart sounds: No murmur heard  Pulmonary:      Effort: Pulmonary effort is normal  No respiratory distress  Breath sounds: Normal breath sounds  Abdominal:      Palpations: Abdomen is soft  Tenderness: There is no abdominal tenderness  Musculoskeletal:         General: No swelling  Cervical back: Neck supple  Skin:     General: Skin is warm and dry  Capillary Refill: Capillary refill takes less than 2 seconds  Neurological:      Motor: Motor function is intact     Psychiatric:         Mood and Affect: Mood normal          Laboratory and Diagnostics:  Results from last 7 days   Lab Units 2357 23   WBC Thousand/uL 6 73 7 49   HEMOGLOBIN g/dL 12 2 13 4   HEMATOCRIT % 35 5 37 7   PLATELETS Thousands/uL 85* 103*   NEUTROS PCT % 70 68   MONOS PCT % 13* 10     Results from last 7 days   Lab Units 2357 23   SODIUM mmol/L 137 136   POTASSIUM mmol/L 3 5 2 9*   CHLORIDE mmol/L 107 101   CO2 mmol/L 21 29   ANION GAP mmol/L 9 6   BUN mg/dL 5 9   CREATININE mg/dL 0 35* 0 74   CALCIUM mg/dL 8 0* 9 4   GLUCOSE RANDOM mg/dL 93 123   ALT U/L  --  88*   AST U/L  --  44   ALK PHOS U/L  --  86   ALBUMIN g/dL  --  3 4*   TOTAL BILIRUBIN mg/dL  --  0 61     Results from last 7 days   Lab Units 04/28/23  0657   MAGNESIUM mg/dL 2 1      Results from last 7 days   Lab Units 04/28/23  0657   INR  1 12   PTT seconds 21*        Imaging:  I have personally reviewed pertinent reports  Intake and Output  I/O       04/26 0701 04/27 0700 04/27 0701 04/28 0700 04/28 0701 04/29 0700    IV Piggyback  1000     Total Intake  1000     Net  +1000                  Height and Weights         There is no height or weight on file to calculate BMI  Weight (last 2 days)     None            Nutrition       Diet Orders   (From admission, onward)             Start     Ordered    04/28/23 1407  Diet Regular; Regular House  Diet effective now        References:    Nutrtion Support Algorithm Enteral vs  Parenteral   Question Answer Comment   Diet Type Regular    Regular Regular House    RD to adjust diet per protocol?  Yes        04/28/23 1408                Active Medications  Scheduled Meds:  Current Facility-Administered Medications   Medication Dose Route Frequency Provider Last Rate   • [MAR Hold] albuterol  2 puff Inhalation Q4H PRN Oumar Qiu DO     • ALPRAZolam  0 25 mg Oral HS PRN Oumar Qiu DO     • [facility] Hold] ipratropium  0 5 mg Nebulization Q6H PRN Oumar Qiu DO     • lactated ringers  75 mL/hr Intravenous Continuous Oumar Qiu DO 75 mL/hr (04/28/23 0326)   • [MAR Hold] levalbuterol  1 25 mg Nebulization Q6H PRN Oumar Qiu DO     • nystatin  500,000 Units Swish & Swallow 4x Daily Oumar Qiu DO     • ondansetron  4 mg Intravenous Q6H PRN Oumar Qiu DO     • [facility] Hold] oxyCODONE  2 5 mg Oral Q6H PRN Oumar Qiu DO     • trimethobenzamide  200 mg Intramuscular Q6H PRN Bryan Peters DO       Continuous Infusions:  lactated ringers, 75 mL/hr, Last Rate: "75 mL/hr (04/28/23 0326)      PRN Meds:   [MAR Hold] albuterol, 2 puff, Q4H PRN  ALPRAZolam, 0 25 mg, HS PRN  [MAR Hold] ipratropium, 0 5 mg, Q6H PRN  [MAR Hold] levalbuterol, 1 25 mg, Q6H PRN  ondansetron, 4 mg, Q6H PRN  [MAR Hold] oxyCODONE, 2 5 mg, Q6H PRN  trimethobenzamide, 200 mg, Q6H PRN        Allergies   Allergies   Allergen Reactions   • Tylenol [Acetaminophen]      Told to avoid due to cancer      ---------------------------------------------------------------------------------------  Advance Directive and Living Will:      Power of :    POLST:    ---------------------------------------------------------------------------------------  Care Time Delivered:   Upon my evaluation, this patient had a high probability of imminent or life-threatening deterioration due to metastatic brain lesions and increased ICP pressure, which required my direct attention, intervention, and personal management  I have personally provided 45 minutes of critical care time, exclusive of procedures, teaching, family meetings, and any prior time recorded by providers other than myself  Adeola Debbie Dillon MD      Portions of the record may have been created with voice recognition software  Occasional wrong word or \"sound a like\" substitutions may have occurred due to the inherent limitations of voice recognition software    Read the chart carefully and recognize, using context, where substitutions have occurred  "

## 2023-04-28 NOTE — PLAN OF CARE
Problem: PAIN - ADULT  Goal: Verbalizes/displays adequate comfort level or baseline comfort level  Description: Interventions:  - Encourage patient to monitor pain and request assistance  - Assess pain using appropriate pain scale  - Administer analgesics based on type and severity of pain and evaluate response  - Implement non-pharmacological measures as appropriate and evaluate response  - Consider cultural and social influences on pain and pain management  - Notify physician/advanced practitioner if interventions unsuccessful or patient reports new pain  Outcome: Progressing     Problem: INFECTION - ADULT  Goal: Absence or prevention of progression during hospitalization  Description: INTERVENTIONS:  - Assess and monitor for signs and symptoms of infection  - Monitor lab/diagnostic results  - Monitor all insertion sites, i e  indwelling lines, tubes, and drains  - Monitor endotracheal if appropriate and nasal secretions for changes in amount and color  - [place] appropriate cooling/warming therapies per order  - Administer medications as ordered  - Instruct and encourage patient and family to use good hand hygiene technique  - Identify and instruct in appropriate isolation precautions for identified infection/condition  Outcome: Progressing

## 2023-04-28 NOTE — PROGRESS NOTES
Neurosurgery progress note    Interval HPI  Patient is a 34-year-old female with h/o breast cancer metastatic to LN and liver s/p right mastectomy and LN resection (with baseline right arm numbness) on chemotherapy and obesity who presented with several weeks of progressively worsening headache, nausea/vomiting, dizziness, and gait imbalance, found to have a new large enhancing cerebellar mass s/p suboccipital craniotomy for resection of right cerebellar mass then left cerebellar mass with complex dural reconstruction on 4/3/23  Final pathology c/w “metastatic adenocarcinoma, consistent with breast primary” with following immunohistochemical findings:  ER moderately positive (45-55%)  IN strongly positive (70-75%)  HER2 (by FISH) negative, not amplified  Ki-67 40-50%    I recently evaluated in my outpatient clinic on 4/20/23, where she was doing well and about to be discharged from rehab  Shortly after this visit, she developed persistent and intractable nausea/vomiting  She came to the ED and was admitted on 4/27 then found to have AMS this morning  Interval CTH showed stable ventricular size (previously effaced 4th ventricle improved since surgery, bilateral temporal horns perhaps slightly more prominent) and relatively unchanged pseudomeningocele (previously noted on immediately postop MRI)  Given her worsening symptoms and exam, Dr Natalie Henriquez and I decided to proceed with urgent CSF diversion with EVD placement  Opening pressure was > 20 cm H2O per report  CSF was sent for routine analysis, culture, and pathology (r/o leptomeningeal disease)  The plan is to drain 10 cc/hour for several days to monitor ICPs and symptoms, attempt clamp trial Yifan night   If she “fails” this clamp trial (elevated ICPs and/or recurrent/worsening symptoms), then I will consider permanent CSF diversion with VPS placement using neuronavigation Monday afternoon (obtain thin cut stereotactic CT scan Monday "morning for intraoperative neuronavigation)  I have already discussed this tentative plan with my partner in GI surgery Dr Edwardo Love, who is available Monday afternoon  Of note, CT CAP showed no evidence of new metastatic disease and stable hepatic lesions but with saddle PE  Post-EVD CTH showed small focal amount of SAH at the EVD tract  After the procedure, I examined her  She is easily arousable (despite having Fentanyl and Versed on board for EVD), communicative, and at baseline reporting \"I feel better\"  Given new iatrogenic SAH, we will repeat CTH in morning then if stable start heparin gtt (low dose) for PE  Then hold heparin gtt at midnight Sunday if she fails EVD clamp trial, in preparation for VPS Monday  I discussed this tentative plan with Yamini Villeda, who is agreeable  I will also call and update her friend/POA Vinicius Herman and brother Man Chavarria      Vitals:    04/27/23 2200 04/27/23 2330 04/28/23 0201 04/28/23 0717   BP:   141/76 145/75   BP Location:       Pulse: 80 78 84 83   Resp:  18     Temp:   97 5 °F (36 4 °C) 98 5 °F (36 9 °C)   TempSrc:       SpO2: 93% 96% 93% 97%       Lab Results   Component Value Date/Time    SODIUM 137 04/28/2023 06:57 AM    K 3 5 04/28/2023 06:57 AM    WBC 6 73 04/28/2023 06:57 AM    HGB 12 2 04/28/2023 06:57 AM    HCT 35 5 04/28/2023 06:57 AM    PLT 85 (L) 04/28/2023 06:57 AM    PTT 21 (L) 04/28/2023 06:57 AM    INR 1 12 04/28/2023 06:57 AM         Intake/Output Summary (Last 24 hours) at 4/28/2023 1052  Last data filed at 4/28/2023 0205  Gross per 24 hour   Intake 1000 ml   Output --   Net 1000 ml         Current Facility-Administered Medications:   •  albuterol (PROVENTIL HFA,VENTOLIN HFA) inhaler 2 puff, 2 puff, Inhalation, Q4H PRN, Carmina Jang DO  •  ALPRAZolam Abebetemo Diamond) tablet 0 25 mg, 0 25 mg, Oral, HS PRN, Carmina Lezamaer, DO  •  ipratropium (ATROVENT) 0 02 % inhalation solution 0 5 mg, 0 5 mg, Nebulization, Q6H PRN, Carmina Jang, DO  •  lactated ringers " infusion, 75 mL/hr, Intravenous, Continuous, Damian Faraz, DO, Last Rate: 75 mL/hr at 04/28/23 0326, 75 mL/hr at 04/28/23 0326  •  levalbuterol (XOPENEX) inhalation solution 1 25 mg, 1 25 mg, Nebulization, Q6H PRN, [place] Faraz, DO  •  nystatin (MYCOSTATIN) oral suspension 500,000 Units, 500,000 Units, Swish & Swallow, 4x Daily, Damian Faraz, DO, 500,000 Units at 04/28/23 0239  •  ondansetron (ZOFRAN) injection 4 mg, 4 mg, Intravenous, Q6H PRN, Damian Faraz, DO  •  oxyCODONE (ROXICODONE) split tablet 2 5 mg, 2 5 mg, Oral, Q6H PRN, [place] Faraz, DO     Neurologic exam  Arousable, waking up from sedation after procedure   PERRL, EOMI, no dysconjugate gaze or gaze palsy  Face symmetric  5/5 in all extremities, following commands briskly, no noticeable dysmetria   Sensation intact throughout  Incision intact without erythema, edema, dehiscence, drainage, or fluid collection    Deb REED    Neurosurgeon

## 2023-04-28 NOTE — OCCUPATIONAL THERAPY NOTE
"    Occupational Therapy Evaluation     Patient Name: Ivon Broderick  Today's Date: 4/28/2023  Problem List  Principal Problem:    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 [facility])  Active Problems:    Palliative care patient    Vomiting    Status post brain surgery    Past Medical History  Past Medical History:   Diagnosis Date    Bloating     Breast cancer ([address] )     Bruises easily     Cancer ([address] )     right breast//to lymph nodes/liver/ and bone    Depression     History of cancer chemotherapy 05/2020    History of pneumonia     Hypotension     occas    Low iron     \"when pregnant, 20 yrs ago\"    Neuropathy     hands//fingers    Shortness of breath     occas    Stroke ([address] )     Subacromial impingement of left shoulder 03/14/2022    Tinnitus     right    Wears glasses      Past Surgical History  Past Surgical History:   Procedure Laterality Date    BILATERAL SALPINGOOPHORECTOMY      BREAST BIOPSY      CRANIOTOMY Bilateral 4/3/2023    Procedure: Suboccipital craniotomy for tumor resection using neuronavigation;  Surgeon: Reece Porras MD;  [facility];  Service: Neurosurgery    IR BIOPSY LIVER MASS  06/11/2019    MASTECTOMY Right     2019    MYOMECTOMY      H/O FIBROIDS, NO SURGERY NEEDED    NH LAPAROSCOPY W/RMVL ADNEXAL STRUCTURES N/A 07/29/2019    Procedure: LAPAROSCOPIC BILATERAL SALPINGO-OOPHORECTOMY;  Surgeon: Robbin Brown MD;  [facility];  Service: Gynecology Oncology    NH MASTECTOMY SIMPLE COMPLETE Right 06/05/2020    Procedure: MASTECTOMY SIMPLE, axillary node dissection;  Surgeon: Lazaro Nogueira MD;  Location: [facility];  Service: Surgical Oncology    US GUIDED BREAST BIOPSY RIGHT COMPLETE Right 05/03/2019    US GUIDED BREAST LYMPH NODE BIOPSY RIGHT Right 05/03/2019 04/28/23 0900   OT Last Visit   OT Visit Date 04/28/23   Note Type   Note type Evaluation   Pain Assessment   Pain Assessment Tool 0-10   Pain Score No Pain   Restrictions/Precautions   Weight Bearing Precautions Per " Order No   Other Precautions Impulsive; Bed Alarm; Chair Alarm; Restraints;Cognitive;Telemetry; Fall Risk   Home Living   Type of [address] One level;Stairs to enter with rails   Bathroom Shower/Tub Walk-in shower   Bathroom Toilet Standard   Bathroom Equipment Grab bars in [facility]   Prior Function   Level of [address] with ADLs   Lives With (S)  Alone   Receives Help From Friend(s); Family  (friend -assists two hours daily with IADL's) pt reports 2nd friend is visiting soon to assist as needed (?) continue to clarify  IADLs Family/Friend/Other provides medication management; Family/Friend/Other provides meals; Family/Friend/Other provides transportation   Falls in the last 6 months 1 to 4   Vocational Full time employment   Lifestyle   Autonomy I with ADl's/assistance with IADL's (from friend), (-) drives   Reciprocal Relationships friends   Service to Others self employed-cleaning business   Intrinsic Gratification spending time with cats, reading   General   Family/Caregiver Present Yes   ADL   Eating Assistance 7  Independent   Grooming Assistance 5  Supervision/Setup   [id number]  [address] 5  Supervision/Setup   [address] Deficit Don/doff R sock; Don/doff L sock   Toileting Assistance  4  Minimal Assistance   Bed Mobility   Supine to Sit 5  Supervision   Sit to Supine Unable to assess   Transfers   Sit to Stand 5  Supervision   Additional items Assist x 1   Stand to Sit 5  Supervision   Additional items Assist x 1   Additional Comments +RW   Functional Mobility   Functional Mobility 4  Minimal assistance   Additional Comments min a with RW short distance functional mobiltiy, no LOB/MACHADO with tasks     Balance   Static Sitting Fair +   Dynamic Sitting Fair   Static Standing Fair -   Dynamic Standing Poor +   Ambulatory Poor +   Activity Tolerance   Activity Tolerance Patient tolerated treatment well   Medical Staff Made Aware PT Prashanth Royals due to the patient's co-morbidities, clinically unstable presentation, and present impairments which are a regression from the patient's baseline  Nurse Made Aware RN cleared pt for therapy   RUE Assessment   RUE Assessment WFL   LUE Assessment   LUE Assessment WFL   Hand Function   Gross Motor Coordination Functional   Fine Motor Coordination Functional   Cognition   Arousal/Participation Alert; Responsive; Cooperative   Attention Attends with cues to redirect   Orientation Level Oriented to person;Oriented to place;Oriented to time   Memory Decreased recall of precautions;Decreased recall of recent events   Following Commands Follows one step commands with increased time or repetition   Comments pt cooperative with therapy, decrease insight into deficits/mildly slow processing  Assessment   Limitation Decreased ADL status; Decreased Safe judgement during ADL;Decreased endurance;Decreased self-care trans;Decreased high-level ADLs   Prognosis Fair   Assessment Pt is a 47 y o  female who was admitted to [facility] on 4/27/2023 with nausea, vomiting, headaches and now here with  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 [facility]) underwent a few weeks ago resection of a large metastatic lesion in the brain posterior fossa   Pt's problem list also includes PMH of  has a past medical history of Bloating, Breast cancer ([address] ), Bruises easily, Cancer ([address] ), Depression, History of cancer chemotherapy (05/2020), History of pneumonia, Hypotension, Low iron, Neuropathy, Shortness of breath, Stroke ([address] ), Subacromial impingement of left shoulder (03/14/2022), Tinnitus, and Wears glasses    At baseline pt was completing I with ADL's, assistance with IaDL's, +RW with mobilty tasks    Pt lives alone in a [facility] with DARLENE   Currently pt requires berta  for overall ADLS and min a with RW for functional mobility/transfers  Pt currently presents with impairments in the following categories -steps to enter environment, limited home support, difficulty performing ADLS, difficulty performing IADLS  and limited insight into deficits activity tolerance, endurance, standing balance/tolerance and sitting balance/tolerance  These impairments, as well as pt's fatigue, decreased caregiver support and risk for falls  limit pt's ability to safely engage in all baseline areas of occupation, includingbathing, dressing, toileting, functional mobility/transfers, community mobility, laundry , house maintenance, medication management, meal prep, cleaning, social participation  and leisure activities  The patient's raw score on the AM-PAC Daily Activity Inpatient Short Form is 20  A raw score of greater than or equal to 19 suggests the patient may benefit from discharge to home  Please refer to the recommendation of the Occupational Therapist for safe discharge planning  From OT standpoint, recommend home with increased support and home OT upon D/C  OT will continue to follow to address the below stated goals  Goals   Patient Goals figure this out (medical issures)   LTG Time Frame 10-14   Long Term Goal #1 see goals below   Plan   Treatment Interventions ADL retraining;Functional transfer training;UE strengthening/ROM; Endurance training;Cognitive reorientation;Patient/family training;Equipment evaluation/education; Compensatory technique education   Recommendation   OT Discharge Recommendation Home with home health rehabilitation   Equipment Recommended Bedside commode; Shower/Tub chair with back ($)   AM-PAC Daily Activity Inpatient   Lower Body Dressing 3   Bathing 3   Toileting 3   Upper Body Dressing 3   Grooming 4   Eating 4   Daily Activity Raw Score 20   Daily Activity Standardized Score (Calc for Raw Score >=11) 42 03   AM-PAC Applied Cognition Inpatient   Following a Speech/Presentation 3   Understanding Ordinary Conversation 4   Taking Medications 4   Remembering Where Things Are Placed or Put Away 4   Remembering List of 4-5 Errands 4   Taking Care of Complicated Tasks 3   Applied Cognition Raw Score 22   Applied Cognition Standardized Score 47 83   End of Consult   Patient Position at End of Consult Bedside chair;Bed/Chair alarm activated; All needs within reach   Nurse Communication Nurse aware of consult    Occupational Therapy Goals:    *Mod I with bed mobility to engage in functional tasks  *Mod I Adl's after setup with use of AE PRN  *Mod I toileting and clothing management   *Mod I functional mobility and transfers to/from all surfaces with Fair + dynamic balance and safety for participation in dynamic adls and iadl tasks   *Demonstrate good carryover with safe use of RW during functional tasks   *Assess DME needs   *Increase activity tolerance to 25-30 minutes for participation in adls and enjoyable activities  *Pt to participate in further cognitive testing with good attention and participation to assist with safe d/c recommendations  *Mod I with Simulated IADL management task  *Demonstrate good carryover of pt/family education and training with good tolerance for increased safety and independence with ADL's/ADl's  *Pt will improve standing balance to 4-5 minutes with functional tasks to increase I with toileting/transfers  *Patient will demonstrate 100% carryover of energy conservation techniques t/o functional I/ADL/leisure tasks w/o cues s/p skilled education to increase endurance during functional tasks  *Pt will follow 100% simple one step verbal commands and be A/Ox4 consistently t/o use of external environmental cues w/ mod I  *Patient will follow multistep instructions with no cueing to increase cognitive function and independence with tasks  *Pt will participate in fine/gross motor coordination/strenthening/dexterity exercises to Good in order to increase participation in functional activities     *Pt will improve B/L UE ROM 1/2 MMT via AROM/AAROM/PROM in all planes as tolerated in order to participate in functional activities      Vita Zhang MOT, OTR/L

## 2023-04-28 NOTE — ASSESSMENT & PLAN NOTE
"· Persistent nausea and vomiting as well as decreased p o  intake since 4/21  · Patient with a history of metastatic breast cancer and back at the end of March/beginning of April she had persistent nausea and vomiting (she reports much more severe than today) as well as headache and gait imbalance; she was found to have a new large enhancing cerebellar mass at the beginning of the month and subsequently was hospitalized and underwent a suboccipital craniotomy for resection of the right cerebellar mass and left cerebellar mass with complex dural reconstruction on 4/3/2023  · Subsequently went to skilled nursing facility and then was discharged from the skilled nursing facility on 4/21  · Saw neurosurgery team and outpatient follow-up on 4/20 with plans for continued post operative follow-up as well as patient with upcoming appointments with oncology as well as radiation oncology  · Subsequently underwent MRI brain on 4/25 with \" supratentorial lesions stable to mildly increased in size  Increasing enhancement along the margins of the posterior fossa resection cavity with prominent left cerebellar hemisphere full ER enhancement, concerning for tumor progression  New subtle enhancement along the left cisternal and pannicular segments of the 7th and 8th cranial nerves without evidence of nodularity or discrete lesion  Close follow-up recommended  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  \"  · Has had persistent nausea and vomiting as well as decreased p o  intake  · On exam, patient alert and oriented  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  Finger-to-nose intact  No new obvious focal deficit  Patient's surgical site at the posterior head appears to be healing well  · CT head in the ER on 4/28: \"No CT evidence for acute territorial infarct  No new mass effect or midline shift    Known intracranial metastatic lesions better visualized on the prior recent MRI brain exam   " "Postsurgical changes of prior suboccipital craniectomy with grossly stable appearance of the surgical resection bed and 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 compared to the recent MRI accounting for differences in modality\"  · Noted hypokalemia as well as mild thrombocytopenia  · ER team discussed case with Dr Tata Tom with neurosurgery who reports okay for admission to [facility] on MedSurg with neurochecks every 4 hours  · Patient reports that she is no longer on any steroids  · Admit to medicine on MedSurg with neurochecks every 4 hours per neurosurgery  Consult neurosurgery on case given symptoms of nausea and vomiting in the setting of known brain mets and having recently undergone neurosurgical procedure  We will also consult oncology team given patient's recent MRI with concern for cranial metastases progression  As patient has been planning to follow-up with radiation oncology and had an upcoming appointment for her brain metastases early next week, we will consult their service     · Consult palliative care (please see palliative care patient section for discussion and reasoning for consultation)  · Replace potassium, supplemental IV fluids, pain control  · Continue PTA nystatin swish and swallow  · Antiemetics  · Daily labs  "

## 2023-04-28 NOTE — UTILIZATION REVIEW
Initial Clinical Review    Admission: Date/Time/Statement:   Admission Orders (From admission, onward)     Ordered        04/28/23 0108  INPATIENT ADMISSION  Once                      Orders Placed This Encounter   Procedures   • INPATIENT ADMISSION     Standing Status:   Standing     Number of Occurrences:   1     Order Specific Question:   Level of Care     Answer:   Med Surg [16]     Order Specific Question:   Estimated length of stay     Answer:   More than 2 Midnights     Order Specific Question:   Certification     Answer:   I certify that inpatient services are medically necessary for this patient for a duration of greater than two midnights  See H&P and MD Progress Notes for additional information about the patient's course of treatment  ED Arrival Information     Expected   -    Arrival   4/27/2023 18:52    Acuity   Urgent            Means of arrival   Walk-In    Escorted by   Family Member    Service   Critical Care/ICU    Admission type   Emergency            Arrival complaint   Post Op Problem           Chief Complaint   Patient presents with   • Dehydration     Pt stated that she had surgery on 4/3/23, she stated that she is not drinking  She believes that she is dehydrated  Also noted that she has thrush  Initial Presentation: 47 y o  female presents to ed from home for evaluation and treatment of vomiting and dehydration since 4-21  She is not tolerating anything by mouth  Surgery on 4-3 followed by inpatient rehab  Now home from rehab and deteriorating  PMHX: metastatic breast cancer  Craniotomy for resection of right cerebellar mass on 4 / 3   4-25 MRI shows brain tumor progression and new pseudomeningocele  Clinical assessment significant for  K 2 9  Recent change from zofran to reglan  Initially treated with iv  9% ns bolus, iv phenergan x1, kdur x 1  Admit to inpatient med surg    Plan includes neuro checks q4 hr,  Neuro surgery and oncology consults, daily labs, antiemetics, daily labs, replete electrolytes, iv fluids, analgesia  , palliative care consult  Note : unclear how much patient comprehends the progression of her condition  Friend reports patient is forgetful and confused at times  Patient's brother was with her for 3 weeks and recently  flew back to [place]  Unable to reach brother  Palliative care consulted for goals of care  Date: 4- 28-23  Day 2: inpatient med surg    Patient with thrush, oral dry mucous membranes  Palliative care consult completed  Today patient is competent to understand  Neuro surgery  considering CSF diversion with EVD placement  Radiation oncology plans SRS next week   Monitor on telemetry  Neuro checks q4 hr   PT/OT evaluations recommend home with home health  Patient lives alone  Received iv mag at 0536, iv kcl at 0326, iv zofran at 1207  Start nystatin  Continue iv LR 75/hr  ED Triage Vitals   04/27/23 1900 04/27/23 1900 04/27/23 1900 04/27/23 1900 04/27/23 1900   97 9 °F (36 6 °C) (!) 120 18 135/78 95 %      Oral Monitor         No Pain          04/21/23 106 kg (233 lb 9 6 oz)     Additional Vital Signs:     Date/Time Temp Pulse Resp BP MAP SpO2 O2 Device   04/28/23 07:17:55 98 5 °F (36 9 °C) 83 -- 145/75 98 97 % --   04/28/23 02:01:39 97 5 °F (36 4 °C) 84 -- 141/76 98 93 % --   04/27/23 2330 -- 78 18 -- -- 96 % None (Room air)   04/27/23 2200 -- 80 -- -- -- 93 % None (Room air)   04/27/23 2045 -- 84 18 162/89 118 95 % None (Room air)   04/27/23 1900 97 9 °F (36 6 °C) 120   Abnormal  18 135/78 -- 95 % None (Room air)             Pertinent Labs/Diagnostic Test Results:     CT head without contrast   Final  (04/28 0039)      No CT evidence for acute territorial infarct  No new mass effect or midline shift  Known intracranial metastatic lesions better visualized on the prior recent MRI brain examination        Postsurgical changes of prior suboccipital craniectomy with grossly stable appearance of the surgical "resection bed and 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 compared to the recent MRI accounting for differences in modality              Results from last 7 days   Lab Units 04/28/23 0657 04/27/23 2034   WBC Thousand/uL 6 73 7 49   HEMOGLOBIN g/dL 12 2 13 4   HEMATOCRIT % 35 5 37 7   PLATELETS Thousands/uL 85* 103*   NEUTROS ABS Thousands/µL 4 71 5 18         Results from last 7 days   Lab Units 04/28/23 0657 04/27/23 2034   SODIUM mmol/L 137 136   POTASSIUM mmol/L 3 5 2 9*   CHLORIDE mmol/L 107 101   CO2 mmol/L 21 29   ANION GAP mmol/L 9 6   BUN mg/dL 5 9   CREATININE mg/dL 0 35* 0 74   EGFR ml/min/1 73sq m 124 92   CALCIUM mg/dL 8 0* 9 4   MAGNESIUM mg/dL 2 1  --      Results from last 7 days   Lab Units 04/27/23 2034   AST U/L 44   ALT U/L 88*   ALK PHOS U/L 86   TOTAL PROTEIN g/dL 6 8   ALBUMIN g/dL 3 4*   TOTAL BILIRUBIN mg/dL 0 61         Results from last 7 days   Lab Units 04/28/23  0657 04/27/23 2034   GLUCOSE RANDOM mg/dL 93 123       Results from last 7 days   Lab Units 04/28/23 0657   PROTIME seconds 14 6*   INR  1 12   PTT seconds 21*       Results from last 7 days   Lab Units 04/27/23 2034   LIPASE u/L 119       ED Treatment:   Medication Administration from 04/27/2023 1852 to 04/28/2023 0155       Date/Time Order Dose Route Action     04/27/2023 2035 EDT sodium chloride 0 9 % bolus 1,000 mL 1,000 mL Intravenous New Bag     04/27/2023 2244 EDT promethazine (PHENERGAN) injection 25 mg 25 mg Intramuscular Given     04/28/2023 0005 EDT potassium chloride (K-DUR,KLOR-CON) CR tablet 40 mEq 40 mEq Oral Given        Past Medical History:   Diagnosis Date   • Bloating    • Breast cancer (HCC)    • Bruises easily    • Cancer (HCC)     right breast//to lymph nodes/liver/ and bone   • Depression    • History of cancer chemotherapy 05/2020   • History of pneumonia    • Hypotension     occas   • Low iron     \"when pregnant, 20 yrs ago\"   • Neuropathy     hands//fingers   • Shortness of breath     " occas   • Stroke [facility])    • Subacromial impingement of left shoulder 03/14/2022   • Tinnitus     right   • Wears glasses      Present on Admission:  • 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 [facility])      Admitting Diagnosis:     Hyponatremia [E87 1]  Metastatic cancer (HCC) [C79 9]  Nausea and vomiting [R11 2]  Post-op pain [G89 18]  Elevated LFTs [R79 89]  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 ([address] ) [C79 31]    Age/Sex: 47 y o  female    Scheduled Medications:    nystatin, 500,000 Units, Swish & Swallow, 4x Daily      Continuous IV Infusions:  lactated ringers, 75 mL/hr, Intravenous, Continuous      PRN Meds:  [MAR Hold] albuterol, 2 puff, Inhalation, Q4H PRN  ALPRAZolam, 0 25 mg, Oral, HS PRN  [MAR Hold] ipratropium, 0 5 mg, Nebulization, Q6H PRN  [MAR Hold] levalbuterol, 1 25 mg, Nebulization, Q6H PRN  ondansetron, 4 mg, Intravenous, Q6H PRN  [MAR Hold] oxyCODONE, 2 5 mg, Oral, Q6H PRN  trimethobenzamide, 200 mg, Intramuscular, Q6H PRN        IP CONSULT TO NEUROSURGERY  IP CONSULT TO RADIATION ONCOLOGY  IP CONSULT TO PALLIATIVE CARE  IP CONSULT TO ONCOLOGY    Network Utilization Review Department  ATTENTION: Please call with any questions or concerns to [phone] and carefully listen to the prompts so that you are directed to the right person  All voicemails are confidential   Saji Fines all requests for admission clinical reviews, approved or denied determinations and any other requests to dedicated fax number below belonging to the campus where the patient is receiving treatment   List of dedicated fax numbers for the Facilities:  [address] [place] [address] DENIALS (Administrative/Medical Necessity) [phone]   [address] [place] [address] (Maternity/NICU/Pediatrics) [phone]   [address] [phone]   [address] [phone]   [address] - [place] [address] [facility] [place] [address] [facility] [address] [facility] [place] [address] [phone]

## 2023-04-28 NOTE — PROGRESS NOTES
Neurosurgery PA's at bedside to place EVD  50 mcg fent given for pain and relaxation  Drain initially put out 21 ml of CSF  Drain leveled and zeroed, currently at 10 mmhg above tragus  PT transferred to STAT CT  Per [facility] pt has saddle PE, non-symptomatic at this time  Per Fran ct showed minor bleed, planned to repeat CT tomorrow  Upon arrival pt stated that she has right limb alert due to mastectomy and lymphadenectomy  Dr Huerta Angry updated pt and family on plan of care  Pt drowsy but still arousable

## 2023-04-28 NOTE — TELEPHONE ENCOUNTER
04/28/2023-PT [address]  [facility] [address]  05/16/2023 POV APT W/MELLO IN [place] (NO IMAGING)  06/22/2023 MRI BRAIN/MRA HEAD  06/23/2023-1 YR F/U SNPX [facility] [employer] [facility] W/MRI BRAIN  08/28/2023 SNPX Xochitl Crump W/MRA HEAD

## 2023-04-28 NOTE — PROCEDURES
PreOp Diagnosis:   1  Acute hydrocephalus / pseudomeningocele secondary to suboccipital craniectomy for tumor resection  2  Metastatic breast cancern    PostOp Diagnosis:  1  Acute hydrocephalus / pseudomeningocele secondary to suboccipital craniectomy for tumor resection  2  Metastatic breast cancer    Procedure:  Right frontal ventriculostomy    Anesthesia:  Local with fentanyl/versed     Performing provider: Mirian Brock PA-C    Supervising Physician: Chris Garcia    Assistant:  Kirk Eli PA-C    Indications:  Pastora Stephen is a 47 y o  female with a PMH significant for metastatic breast cancer s/p suboccipital craniectomy for resection of right and left cerebellar masses on 4/3/23 by Dr Ade Samano who presents with intractable nausea and vomiting  Imaging significant for progressive pseudomeningocele and stable ventricular system  Due to her intractable symptoms, we elected to proceed with emergent EVD placement  We discussed the risks and benefits of a right frontal ventriculostomy  The patient and her friend, who is involved in her medical decision making, understood the risks and the benefits including bleeding infection stroke paralysis seizure and death  Operative details: The head was clipped in the usual fashion  [facility] [place] point was marked  The head was then prepped and draped in the usual sterile fashion  A surgical time-out was performed at 3:45pm   Team members present were Kirk Eli PA-C and Ralph Aguirre PA-C  The incision site was injected with 5cc lidocaine 1%  A 15 blade was then used to make a 2 cm linear incision  Self-retaining retractor was then placed  The periosteum was then elevated  A hand drill was then used to make a single oneyda hole  The dura was opened sharply with a ventriculostomy trocar  Next a ventriculostomy was placed into the right frontal horn on the 1st attempt  Brisk egress of clear CSF was and countered    The opening pressure was over 20 cm water  The catheter was then tunneled posterior laterally and secured in place  The incision was closed using 3 0 nylons  The drain was secured in place with a tension loop  The patient was then brought for a CT to assess catheter positioning  Patient tolerated the procedure well

## 2023-04-28 NOTE — TREATMENT PLAN
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  13  No meningeal spread of tumor pontine cistern and increase gross margins of tumor cavity left cerebellum  14  Plan  1  Admit to ICU  2  Frequent neurological checks  3  Right frontal EVD  4  Likely proceeding proceeding with right  shunt 72 hours once results of CSF panel analyzed and after expected progress over weekend wothreevaluation    I discussed her serious condition and likely prognosis and planed the need for external ventricular drain and likely  shunt in the near future with POELLA Huerta [phone]    Her brother Prince Mckeon in [place] who is also POA    She has 2 sons     4/28/2023 3:51 PM    Moses Velez MD, Attending Neurological Surgeon

## 2023-04-28 NOTE — CONSULTS
[address] [facility]  Consult  Name: Catherine Monique 47 y o  female I MRN: [id number]  Unit/Bed#: ICU 01 I Date of Admission: 4/27/2023   Date of Service: 4/28/2023 I Hospital Day: 0    Inpatient consult to Neurosurgery  Consult performed by: EMILIANO Mejia  Consult ordered by: Muriel Dominguez, DO          Assessment/Plan   Status post brain surgery  Assessment & Plan  See above  Vomiting  Assessment & Plan  Secondary to brain metastasis  Medical management per primary team     Malignant neoplasm of central portion of right breast in female, estrogen receptor positive [facility])  Assessment & Plan  ER/KS+, HER2- grade 3, metastatic breast cancer diagnosed June 2019   + BRCA 2 mutation  * 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 [facility])  Assessment & Plan  Patient with PMH of stage IV breast cancer with brain and liver metastases presented to the ED 4/27/2023 c/o headache and progressive nausea and vomiting for several weeks  · Patient is s/p suboccipital craniotomy for resection of right and left cerebellar masses with complex dural reconstruction 4/3/2023 with Dr Jose Alfredo Swain  · Today, symptoms are improved secondary to medical management  Exam is non-focal     Imaging  · CT head wo (4/27/2023):  No CT evidence for acute territorial infarct  No new mass effect or midline shift  Known intracranial metastatic lesions better visualized on the prior recent MRI brain examination  Postsurgical changes of prior suboccipital craniectomy with grossly stable appearance of the surgical resection bed and 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 compared to the recent MRI accounting for differences in modality  · MRI brain (4/25/2023): Supratentorial lesions are stable to mildly increased in size  Increasing enhancement along the margins of the posterior fossa resection cavity with prominent left cerebellar hemisphere foliar enhancement  , concerning for tumor progression   New subtle enhancement along the left cisternal and canalicular segments of the 7th and 8th cranial nerves without evidence of nodularity or discrete lesion  Close follow-up recommended  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  Plan  · Continue monitoring neurological status/exam  · Transfer patient to ICU for CSF diversion with EVD placement  · Will send CSF for culture and pathology  · Drain 10mL/hour through 4/30/23 then clamp  If patient does not tolerate clamping with increased ICP or is symptomatic, will plan for ventriculoperitoneal shunt placement 5/1/2023  · Mobilize as tolerated with assistance  · Pain management per primary team  · DVT ppx: SCDs  · Patient is established with palliative care outpatient - recommend ongoing [address] discussion  Neurosurgery will continue to follow closely  Call with questions           History of Present Illness   HPI: Darshan Chaney is a 47 y o  female with PMH including stage IV breast cancer with mets to LN, brain, and liver s/p right mastectomy, LN resection, and recent suboccipital craniotomy for tumor resection 4/3/2023 who presents with complaint of progressive N/V and headache for several weeks  Patient was seen in neurosurgery office 4/20/2023 at which time she was doing well  Shortly afterward, she developed progressive, intractable nausea and vomiting with headache causing her to present to the ED 4/27/2023  Today, symptoms of N/V and headache are improving with medications given  Headaches are described as dull  Patient states that headaches are improving and worsen with standing upright  Patient has been able to tolerate ambulation with walker and assistance  Current stage IV breast cancer with metastasis was initially diagnosed in 2019 and treated with preadjuvant treatment which had minimal response and subsequent right mastectomy in 2020  She was then on olaparib with good response   Surveillance CT scanning in 2/2023 showed hepatic lesions  On 3/30/2023, she presented to the ED with vertigo, nausea, and vomiting  Imaging was significant for multiple brain masses  She underwent suboccipital craniotomy for resection of right and left cerebellar masses with complex dural reconstruction done by Dr Kole Sarah 4/3/2023  Pathology of both cerebellar masses showed metastatic adenocarcinoma, consistent with breast primary  She was discharged to rehab center on 4/11/2023 (POD #8) with plans for neurosurgical and radiation oncology follow-up  She was discharged from rehab to home on 4/21/2023  Patient was seen by radiation oncology 4/21/2023 who planned to start SRT 5/3/2023  Patient continues to be followed by radiation oncology/palliative care while inpatient  Review of Systems   Constitutional: Positive for activity change, appetite change and fatigue  Negative for chills, diaphoresis, fever and unexpected weight change  HENT: Positive for mouth sores (thrush)  Negative for congestion, ear discharge, ear pain, facial swelling, hearing loss, postnasal drip, rhinorrhea, sore throat, trouble swallowing and voice change  Eyes: Negative for pain, discharge and itching  Respiratory: Negative for cough, chest tightness, shortness of breath and wheezing  Cardiovascular: Negative for chest pain, palpitations and leg swelling  Gastrointestinal: Positive for constipation (last BM 4/22/23)  Negative for abdominal distention, abdominal pain, anal bleeding, blood in stool, diarrhea, nausea, rectal pain and vomiting  Musculoskeletal: Negative for arthralgias, back pain and myalgias  Skin: Negative for color change, pallor, rash and wound  Allergic/Immunologic: Positive for immunocompromised state  Neurological: Positive for headaches (worse when standing upright)         Historical Information   Past Medical History:   Diagnosis Date   • Bloating    • Breast cancer ([address] )    • Bruises easily    • Cancer ([address] )     right breast//to lymph nodes/liver/ "and bone   • Depression    • History of cancer chemotherapy 2020   • History of pneumonia    • Hypotension     occas   • Low iron     \"when pregnant, 20 yrs ago\"   • Neuropathy     hands//fingers   • Shortness of breath     occas   • Stroke [facility])    • Subacromial impingement of left shoulder 2022   • Tinnitus     right   • Wears glasses      Past Surgical History:   Procedure Laterality Date   • BILATERAL SALPINGOOPHORECTOMY     • BREAST BIOPSY     • CRANIOTOMY Bilateral 4/3/2023    Procedure: Suboccipital craniotomy for tumor resection using neuronavigation;  Surgeon: James Ferris MD;  Location[facility];  Service: Neurosurgery   • IR BIOPSY LIVER MASS  2019   • MASTECTOMY Right     2019   • MYOMECTOMY      H/O FIBROIDS, NO SURGERY NEEDED   • DC LAPAROSCOPY W/RMVL ADNEXAL STRUCTURES N/A 2019    Procedure: LAPAROSCOPIC BILATERAL SALPINGO-OOPHORECTOMY;  Surgeon: Fredy Hull MD;  Location: [facility];  Service: Gynecology Oncology   • DC MASTECTOMY SIMPLE COMPLETE Right 2020    Procedure: MASTECTOMY SIMPLE, axillary node dissection;  Surgeon: Leonela Bernal MD;  Location: [facility];  Service: Surgical Oncology   • US GUIDED BREAST BIOPSY RIGHT COMPLETE Right 2019   • US GUIDED BREAST LYMPH NODE BIOPSY RIGHT Right 2019     Social History     Substance and Sexual Activity   Alcohol Use Yes    Comment: occassional     Social History     Substance and Sexual Activity   Drug Use No     Social History     Tobacco Use   Smoking Status Former   • Packs/day: 1 00   • Years: 30 00   • Pack years: 30 00   • Types: Cigarettes   • Quit date:    • Years since quittin 3   Smokeless Tobacco Never     Family History   Problem Relation Age of Onset   • Hypotension Mother    • Colon cancer Father 36   • Hypertension Father    • Prostate cancer Father 79   • Throat cancer Maternal Grandmother 61   • Cancer Maternal Grandmother    • Breast cancer Paternal Aunt         age unknown " Meds/Allergies   current meds:   Current Facility-Administered Medications   Medication Dose Route Frequency   • albuterol (PROVENTIL HFA,VENTOLIN HFA) inhaler 2 puff  2 puff Inhalation Q4H PRN   • ALPRAZolam (XANAX) tablet 0 25 mg  0 25 mg Oral HS PRN   • ipratropium (ATROVENT) 0 02 % inhalation solution 0 5 mg  0 5 mg Nebulization Q6H PRN   • levalbuterol (XOPENEX) inhalation solution 1 25 mg  1 25 mg Nebulization Q6H PRN   • multi-electrolyte (PLASMALYTE-A/ISOLYTE-S PH 7 4) IV solution  125 mL/hr Intravenous Continuous   • nystatin (MYCOSTATIN) oral suspension 500,000 Units  500,000 Units Swish & Swallow 4x Daily   • ondansetron (ZOFRAN) injection 4 mg  4 mg Intravenous Q6H PRN   • oxyCODONE (ROXICODONE) split tablet 2 5 mg  2 5 mg Oral Q6H PRN     Allergies   Allergen Reactions   • Tylenol [Acetaminophen]      Told to avoid due to cancer        Objective   I/O       04/26 0701 04/27 0700 04/27 0701 04/28 0700 04/28 0701 04/29 0700    IV Piggyback  1000     Total Intake  1000     Net  +1000                  Physical Exam  Constitutional:       General: She is not in acute distress  Appearance: Normal appearance  HENT:      Head:      Comments: Nontender with broad bulging fullness  Incision CDI  Right Ear: External ear normal       Left Ear: External ear normal       Nose: Nose normal       Mouth/Throat:      Mouth: Mucous membranes are dry  Comments: Unable to open mouth competely secondary to thrush  White lesions to tongue and inner cheeks  Lips dry and cracked  Eyes:      Extraocular Movements: Extraocular movements intact  Pupils: Pupils are equal, round, and reactive to light  Cardiovascular:      Rate and Rhythm: Normal rate and regular rhythm  Pulmonary:      Effort: Pulmonary effort is normal    Musculoskeletal:         General: Normal range of motion  Cervical back: Normal range of motion and neck supple  Skin:     General: Skin is warm and dry     Neurological: Mental Status: She is alert and oriented to person, place, and time  Motor: Motor strength is normal       Gait: Gait is intact  Deep Tendon Reflexes:      Reflex Scores:       Brachioradialis reflexes are 2+ on the right side and 2+ on the left side  Patellar reflexes are 2+ on the right side and 2+ on the left side  Psychiatric:         Mood and Affect: Mood normal          Speech: Speech normal          Behavior: Behavior normal          Thought Content: Thought content normal          Judgment: Judgment normal        Neurologic Exam     Mental Status   Oriented to person, place, and time  Oriented to country, city and area  Oriented to year, month, date, day and season  Follows 3 step commands  Attention: normal  Concentration: normal    Speech: speech is normal   Level of consciousness: drowsy  Knowledge: good  Able to perform simple calculations  Able to name object  Able to repeat  Normal comprehension  Cranial Nerves     CN II   Visual fields full to confrontation  Visual acuity: normal  Right visual field deficit: none  Left visual field deficit: none     CN III, IV, VI   Pupils are equal, round, and reactive to light  Right pupil: Shape: regular  Consensual response: intact  Accommodation: intact  Left pupil: Shape: regular  Consensual response: intact  Accommodation: intact  CN III: no CN III palsy  Nystagmus: none   Diplopia: none  Upgaze: normal  Downgaze: normal  Conjugate gaze: present    CN V   Facial sensation intact  Right facial sensation deficit: none  Left facial sensation deficit: none    CN VII   Facial expression full, symmetric     Right facial weakness: none  Left facial weakness: none    CN VIII   Hearing: intact    CN XI   Right trapezius strength: normal  Left trapezius strength: normal    CN XII   CN XII normal    Tongue: not atrophic  Tongue deviation: none    Motor Exam   Muscle bulk: normal  Overall muscle tone: normal    Strength Strength 5/5 throughout  Sensory Exam   Light touch normal      Gait, Coordination, and Reflexes     Gait  Gait: normal    Reflexes   Right brachioradialis: 2+  Left brachioradialis: 2+  Right patellar: 2+  Left patellar: 2+  Right plantar: normal  Left plantar: normal  Right Alejandra: absent  Left Alejandra: absent  Right ankle clonus: absent  Left pendular knee jerk: absent      Vitals:Blood pressure 96/56, pulse 80, temperature 97 5 °F (36 4 °C), temperature source Oral, resp  rate 17, SpO2 99 %  ,There is no height or weight on file to calculate BMI  Lab Results:   Results from last 7 days   Lab Units 04/28/23  0657 04/27/23 2034   WBC Thousand/uL 6 73 7 49   HEMOGLOBIN g/dL 12 2 13 4   HEMATOCRIT % 35 5 37 7   PLATELETS Thousands/uL 85* 103*   NEUTROS PCT % 70 68   MONOS PCT % 13* 10     Results from last 7 days   Lab Units 04/28/23  0657 04/27/23 2034   POTASSIUM mmol/L 3 5 2 9*   CHLORIDE mmol/L 107 101   CO2 mmol/L 21 29   BUN mg/dL 5 9   CREATININE mg/dL 0 35* 0 74   CALCIUM mg/dL 8 0* 9 4   ALK PHOS U/L  --  86   ALT U/L  --  88*   AST U/L  --  44     Results from last 7 days   Lab Units 04/28/23  0657   MAGNESIUM mg/dL 2 1         Results from last 7 days   Lab Units 04/28/23  0657   INR  1 12   PTT seconds 21*     Imaging Studies: I have personally reviewed pertinent reports  and I have personally reviewed pertinent films in PACS    CT head without contrast    Result Date: 4/28/2023  Impression: No CT evidence for acute territorial infarct  No new mass effect or midline shift  Known intracranial metastatic lesions better visualized on the prior recent MRI brain examination  Postsurgical changes of prior suboccipital craniectomy with grossly stable appearance of the surgical resection bed and 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 compared to the recent MRI accounting for differences in modality   Workstation performed: [id number]       EKG, Pathology, and Other Studies: I have personally reviewed pertinent reports  and I have personally reviewed pertinent films in PACS    VTE Prophylaxis: Sequential compression device (Venodyne)     Code Status: Level 1 - Full Code  Advance Directive and Living Will:      Power of :    POLST:      Counseling / Coordination of Care  I spent 30 minutes with the patient

## 2023-04-28 NOTE — ED ATTENDING ATTESTATION
4/27/2023  Melissa MCFARLAND DO, saw and evaluated the patient  I have discussed the patient with the resident/non-physician practitioner and agree with the resident's/non-physician practitioner's findings, Plan of Care, and MDM as documented in the resident's/non-physician practitioner's note, except where noted  All available labs and Radiology studies were reviewed  I was present for key portions of any procedure(s) performed by the resident/non-physician practitioner and I was immediately available to provide assistance  At this point I agree with the current assessment done in the Emergency Department  I have conducted an independent evaluation of this patient a history and physical is as follows:    54-year-old female presents with vomiting and dehydration  Patient's been unable to tolerate anything by mouth  Had surgery on April 3 and was at rehab until recently  Friend states that since getting home from rehab patient has deteriorated  Was doing well and now having inability to tolerate p o  Was originally on Zofran  Was changed to Reglan  Patient denies headache, no abdominal pain  Had MRI done 2 days ago  On exam- appears ill, heart tachycardic, no respiratory distress  Plan- IV fluids, reviewed MRI report, will check labs, Phenergan, reassess  Suspect patient will require admission secondary to intractable nausea and vomiting      ED Course         Critical Care Time  Procedures

## 2023-04-29 NOTE — PLAN OF CARE
Problem: MOBILITY - ADULT  Goal: Maintain or return to baseline ADL function  Description: INTERVENTIONS:  -  Assess patient's ability to carry out ADLs; assess patient's baseline for ADL function and identify physical deficits which impact ability to perform ADLs (bathing, care of mouth/teeth, toileting, grooming, dressing, etc )  - Assess/evaluate cause of self-care deficits   - Assess range of motion  - Assess patient's mobility; develop plan if impaired  - Assess patient's need for assistive devices and provide as appropriate  - Encourage maximum independence but intervene and supervise when necessary  - Involve family in performance of ADLs  - Assess for home care needs following discharge   - Consider OT consult to assist with ADL evaluation and planning for discharge  - Provide patient education as appropriate  Outcome: Progressing  Goal: Maintains/Returns to pre admission functional level  Description: INTERVENTIONS:  - Perform BMAT or MOVE assessment daily    - Set and communicate daily mobility goal to care team and patient/family/caregiver  - Collaborate with rehabilitation services on mobility goals if consulted  - Perform Range of Motion 3 times a day  - Reposition patient every 2 hours    - Dangle patient 2 times a day  - Stand patient 2 times a day  - Ambulate patient 2 times a day  - Out of bed to chair 2 times a day   - Out of bed for meals 3 times a day  - Out of bed for toileting  - Record patient progress and toleration of activity level   Outcome: Progressing     Problem: PAIN - ADULT  Goal: Verbalizes/displays adequate comfort level or baseline comfort level  Description: Interventions:  - Encourage patient to monitor pain and request assistance  - Assess pain using appropriate pain scale  - Administer analgesics based on type and severity of pain and evaluate response  - Implement non-pharmacological measures as appropriate and evaluate response  - Consider cultural and social influences on pain and pain management  - Notify physician/advanced practitioner if interventions unsuccessful or patient reports new pain  Outcome: Progressing     Problem: INFECTION - ADULT  Goal: Absence or prevention of progression during hospitalization  Description: INTERVENTIONS:  - Assess and monitor for signs and symptoms of infection  - Monitor lab/diagnostic results  - Monitor all insertion sites, i e  indwelling lines, tubes, and drains  - Monitor endotracheal if appropriate and nasal secretions for changes in amount and color  - [place] appropriate cooling/warming therapies per order  - Administer medications as ordered  - Instruct and encourage patient and family to use good hand hygiene technique  - Identify and instruct in appropriate isolation precautions for identified infection/condition  Outcome: Progressing  Goal: Absence of fever/infection during neutropenic period  Description: INTERVENTIONS:  - Monitor WBC    Outcome: Progressing     Problem: SAFETY ADULT  Goal: Maintain or return to baseline ADL function  Description: INTERVENTIONS:  -  Assess patient's ability to carry out ADLs; assess patient's baseline for ADL function and identify physical deficits which impact ability to perform ADLs (bathing, care of mouth/teeth, toileting, grooming, dressing, etc )  - Assess/evaluate cause of self-care deficits   - Assess range of motion  - Assess patient's mobility; develop plan if impaired  - Assess patient's need for assistive devices and provide as appropriate  - Encourage maximum independence but intervene and supervise when necessary  - Involve family in performance of ADLs  - Assess for home care needs following discharge   - Consider OT consult to assist with ADL evaluation and planning for discharge  - Provide patient education as appropriate  Outcome: Progressing  Goal: Maintains/Returns to pre admission functional level  Description: INTERVENTIONS:  - Perform BMAT or MOVE assessment daily    - Set and communicate daily mobility goal to care team and patient/family/caregiver  - Collaborate with rehabilitation services on mobility goals if consulted  - Perform Range of Motion 3 times a day  - Reposition patient every 2 hours    - Dangle patient 2 times a day  - Stand patient 2 times a day  - Ambulate patient 2 times a day  - Out of bed to chair 2 times a day   - Out of bed for meals 3 times a day  - Out of bed for toileting  - Record patient progress and toleration of activity level   Outcome: Progressing  Goal: Patient will remain free of falls  Description: INTERVENTIONS:  - Educate patient/family on patient safety including physical limitations  - Instruct patient to call for assistance with activity   - Consult OT/PT to assist with strengthening/mobility   - Keep Call bell within reach  - Keep bed low and locked with side rails adjusted as appropriate  - Keep care items and personal belongings within reach  - Initiate and maintain comfort rounds  - Make Fall Risk Sign visible to staff  - Offer Toileting every 2 Hours, in advance of need  - Initiate/Maintain bed alarm  - Obtain necessary fall risk management equipment: bed alarm  - Apply yellow socks and bracelet for high fall risk patients  - Consider moving patient to room near nurses station  Outcome: Progressing     Problem: DISCHARGE PLANNING  Goal: Discharge to home or other facility with appropriate resources  Description: INTERVENTIONS:  - Identify barriers to discharge w/patient and caregiver  - Arrange for needed discharge resources and transportation as appropriate  - Identify discharge learning needs (meds, wound care, etc )  - Arrange for interpretive services to assist at discharge as needed  - Refer to Case Management Department for coordinating discharge planning if the patient needs post-hospital services based on physician/advanced practitioner order or complex needs related to functional status, cognitive ability, or social support system  Outcome: Progressing     Problem: Knowledge Deficit  Goal: Patient/family/caregiver demonstrates understanding of disease process, treatment plan, medications, and discharge instructions  Description: Complete learning assessment and assess knowledge base    Interventions:  - Provide teaching at level of understanding  - Provide teaching via preferred learning methods  Outcome: Progressing     Problem: GASTROINTESTINAL - ADULT  Goal: Minimal or absence of nausea and/or vomiting  Description: INTERVENTIONS:  - Administer IV fluids if ordered to ensure adequate hydration  - Maintain NPO status until nausea and vomiting are resolved  - Nasogastric tube if ordered  - Administer ordered antiemetic medications as needed  - Provide nonpharmacologic comfort measures as appropriate  - Advance diet as tolerated, if ordered  - Consider nutrition services referral to assist patient with adequate nutrition and appropriate food choices  Outcome: Progressing  Goal: Maintains or returns to baseline bowel function  Description: INTERVENTIONS:  - Assess bowel function  - Encourage oral fluids to ensure adequate hydration  - Administer IV fluids if ordered to ensure adequate hydration  - Administer ordered medications as needed  - Encourage mobilization and activity  - Consider nutritional services referral to assist patient with adequate nutrition and appropriate food choices  Outcome: Progressing  Goal: Maintains adequate nutritional intake  Description: INTERVENTIONS:  - Monitor percentage of each meal consumed  - Identify factors contributing to decreased intake, treat as appropriate  - Assist with meals as needed  - Monitor I&O, weight, and lab values if indicated  - Obtain nutrition services referral as needed  Outcome: Progressing  Goal: Establish and maintain optimal ostomy function  Description: INTERVENTIONS:  - Assess bowel function  - Encourage oral fluids to ensure adequate hydration  - Administer IV fluids if ordered to ensure adequate hydration   - Administer ordered medications as needed  - Encourage mobilization and activity  - Nutrition services referral to assist patient with appropriate food choices  - Assess stoma site  - Consider wound care consult   Outcome: Progressing  Goal: Oral mucous membranes remain intact  Description: INTERVENTIONS  - Assess oral mucosa and hygiene practices  - Implement preventative oral hygiene regimen  - Implement oral medicated treatments as ordered  - Initiate Nutrition services referral as needed  Outcome: Progressing     Problem: Prexisting or High Potential for Compromised Skin Integrity  Goal: Skin integrity is maintained or improved  Description: INTERVENTIONS:  - Identify patients at risk for skin breakdown  - Assess and monitor skin integrity  - Assess and monitor nutrition and hydration status  - Monitor labs   - Assess for incontinence   - Turn and reposition patient  - Assist with mobility/ambulation  - Relieve pressure over bony prominences  - Avoid friction and shearing  - Provide appropriate hygiene as needed including keeping skin clean and dry  - Evaluate need for skin moisturizer/barrier cream  - Collaborate with interdisciplinary team   - Patient/family teaching  - Consider wound care consult   Outcome: Progressing

## 2023-04-29 NOTE — PROGRESS NOTES
Pastoral Care Progress Note    2023  Patient: Nancy Jasso : 1970  Admission Date & Time: 2023 192  MRN: [id number] [facility]: [id number]        This  met w pt's best friend and Swetha Avendanoaicha, outside of ICU lobby  Ila Martinez was tearful and sorrowful  This  offered active listening and safe space as Ila Martinez expressed her grief around pt approaching the end of her life   provided compassionate presence and emotional support as Ila Martinez shared stories of pt's life and their almost 2 decade friendship  Pt and Ila Martinez co-own a business  and have supported one another through many life difficulties  This  offered emotional and spiritual support to Ila Martinez during short meeting w pt's neurologist as well as afterwards as [place] processed the doctor's information and discerned which steps to take next for pt,including updating pt's son and brother, Carrol Stacy, on (co-MPOA) pt's medical condition   offered kindness, grief support  and prayer to Ila Martinez  Spiritual Care remains available               Chaplaincy Interventions Utilized:   Empowerment: Clarified, confirmed, or reviewed information from treatment team , Encouraged focus on present, Encouraged self-care, Provided anticipatory guidance, Provided anxiety containment, and Provided grief counseling    Exploration: Explored emotional needs & resources, Explored relational needs & resources, Explored spiritual needs & resources, and Facilitated story telling    Collaboration: Consulted with interdisciplinary team    Relationship Building: Cultivated a relationship of care and support, Listened empathically, Hospitality, Provided relationship counseling, and Provided silent and supportive presence    Ritual: Provided prayer    Chaplaincy Outcomes Achieved:  Catharsis, Distress reduced, Expressed gratitude, Tearfully processed emotions, and Verbally processed emotions      Spiritual Coping Strategies Utilized:   Connectedness and Spiritual comfort

## 2023-04-29 NOTE — PROGRESS NOTES
Daily Progress Note - Critical Care   Dori Quezada 47 y o  female MRN: [id number]  Unit/Bed#: ICU 01 Encounter: [id number]        ----------------------------------------------------------------------------------------  HPI/24hr events: Dori Quezada is a 46 y/o female with past medical history of diabetes, metastatic breast cancer with known brain and liver metastasis, and recent hospitalization on April for brain metastases resection surgery who presented to ER with nausea, vomiting, and decreased oral intake for the past week  Patient was recently found to have a new large enhancing cerebellar mass and is now status post suboccipital craniotomy for resection of right cerebellar mass and then left cerebellar mass  Final pathology showed metastatic adenocarcinoma consistent with breast primary  Imaging did show stable ventricles sizes and relatively unchanged pseudomeningocele, however, given her worsening of her symptoms neurosurgery decided to proceed with CSF diversion with EVD placement  Patient transferred to neuro ICU for further management  · No acute events overnight    ---------------------------------------------------------------------------------------  SUBJECTIVE  Patient seen and examined at bedside today morning  Patient mentions feeling well apart from being bothered by frequent neurochecks overnight causing sleep disruption  Eating and drinking well  Adequate urine output and bowel movement  Review of systems was reviewed and negative unless stated above in HPI/24-hour events   ---------------------------------------------------------------------------------------  Assessment and Plan:    Neuro:              Diagnoses: Recent history of cerebellar mass s/p suboccipital craniectomy                                   S/p CSF diversion with EVD placement,    Plan: CAM-ICU  Delirium precautions   Regulate sleep/wake cycle    -Can consider neuro checks Q4H  -Neurosurgery following  -EVD @ 10  -CSF panel pending  -Pain management for headaches  -Patient is established with palliative care team, ongoing [address]     CV:              MAP: Maintain MAPs > 65 mmHg              Avoid hypotensive episodes     Pulm:              Pulmonary embolism              Holding on anticoagulation given cerebral hemorrhage after EVD placement  Might start heparin drip if CTH normal              Currently saturating well on room air               Continuous pulse oximetry  Incentive spirometry  Maintain O2 sat >90%        Stage IV breast cancer of right breast, metastatic, ER+  ER/MN+, HER2- grade 3, diagnosed in June 2019  BRCA2 mutation +  -Medical oncology following  -CTCAP ordered by medical oncology to assess for any disease progression                GI:              DH prophylaxis: Not on any pressors  Will hold off for now       :              Has had nausea and vomiting throughout the course of hospitaliztion, would avoid any hypotensive episodes which can cause renal hypoperfusion and JANNA  Creatinine: 0 35, likely at baseline  Monitor urine output and renal indices  Avoid nephrotoxins       F/E/N:                          - F: On Isolyte 75 mL/hr                          - E: Monitor and replete electrolytes for Mg >2, Phos >3, K >4                           - N: Has a regular diet     Endo:              RGXLMLWIU: [id number] with HbA1c at 5 8 as of 03/2023  Glucose trend:   Insulin: Will hold off on SSI for now, can initiate if glucoses become elevated  Monitor blood glucose for goal 140-180                  Heme:     Hemoglobin: 12 2  -Monitor Hgb  -Monitor platelets  VTE prophylaxis: Sequential compression devices and/or foot pumps      ID:              No acute ID concerns at this time              Temperature:   Plan: Monitor temperature and WBC  Maintain normothermia       MSK/Skin:  Plan: Frequent turning and repositioning  Pressure injury prevention  Monitor for skin breakdown  PT/OT when appropriate  Encourage OOB and ambulation when appropriate  Patient appropriate for transfer out of the ICU today?: No  Disposition: Continue Critical Care   Code Status: Level 1 - Full Code  ---------------------------------------------------------------------------------------  ICU CORE MEASURES    Prophylaxis   VTE Pharmacologic Prophylaxis: Pharmacologic VTE Prophylaxis contraindicated due to recent brain surgery risk of bleeding  VTE Mechanical Prophylaxis: sequential compression device  Stress Ulcer Prophylaxis: Prophylaxis Not Indicated     ABCDE Protocol (if indicated)  Plan to perform spontaneous awakening trial today? Not applicable  Plan to perform spontaneous breathing trial today? Not applicable  Obvious barriers to extubation? Not applicable  CAM-ICU: Negative    Invasive Devices Review  Invasive Devices     Peripheral Intravenous Line  Duration           Peripheral IV 23 Dorsal (posterior); Left Forearm 1 day    Peripheral IV 23 Left;Upper;Ventral (anterior) Arm <1 day          Drain  Duration           External Urinary Catheter <1 day    Ventriculostomy/Subdural Ventricular drainage catheter Right Frontal region <1 day              Can any invasive devices be discontinued today?  Not applicable  ---------------------------------------------------------------------------------------  OBJECTIVE    Vitals   Vitals:    23 0300 23 0400 23 0500 23 0600   BP: 169/88 130/76 124/65 139/88   BP Location: Left arm Left arm Left arm Left leg   Pulse: 82 82 84 74   Resp:    Temp:  98 7 °F (37 1 °C)     TempSrc:  Oral     SpO2: 93% 99% 99% 98%     Temp (24hrs), Av 4 °F (36 9 °C), Min:97 5 °F (36 4 °C), Max:98 7 °F (37 1 °C)  Current: Temperature: 98 7 °F (37 1 °C)  HR:   BP: 110-130/60-80  RR: 18-22  SpO2: 98%    Respiratory:  SpO2: SpO2: 98 %  Nasal Cannula O2 Flow Rate (L/min): 2 L/min    Invasive/non-invasive ventilation settings   Respiratory Lab Data (Last 4 hours)    None         O2/Vent Data (Last 4 hours)    None                Physical Exam  Vitals and nursing note reviewed  Constitutional:       General: She is not in acute distress  Appearance: Normal appearance  She is not toxic-appearing or diaphoretic  HENT:      Mouth/Throat:      Mouth: Mucous membranes are moist       Pharynx: Oropharynx is clear  Eyes:      Conjunctiva/sclera: Conjunctivae normal       Pupils: Pupils are equal, round, and reactive to light  Cardiovascular:      Rate and Rhythm: Normal rate and regular rhythm  Pulses: Normal pulses  Heart sounds: Normal heart sounds  Pulmonary:      Effort: Pulmonary effort is normal       Breath sounds: Normal breath sounds  Abdominal:      General: Bowel sounds are normal  There is no distension  Palpations: Abdomen is soft  There is no mass  Tenderness: There is no abdominal tenderness  Musculoskeletal:      Right lower leg: No edema  Left lower leg: No edema  Skin:     General: Skin is warm  Capillary Refill: Capillary refill takes less than 2 seconds  Coloration: Skin is not jaundiced  Findings: No rash  Neurological:      General: No focal deficit present  Mental Status: She is alert and oriented to person, place, and time     Psychiatric:         Mood and Affect: Mood normal          Behavior: Behavior normal              Laboratory and Diagnostics:  Results from last 7 days   Lab Units 04/29/23  0538 04/28/23  0657 04/27/23 2034   WBC Thousand/uL 8 00 6 73 7 49   HEMOGLOBIN g/dL 12 1 12 2 13 4   HEMATOCRIT % 34 6* 35 5 37 7   PLATELETS Thousands/uL 93* 85* 103*   NEUTROS PCT % 74 70 68   MONOS PCT % 12 13* 10     Results from last 7 days   Lab Units 04/29/23  0538 04/28/23  0657 04/27/23 2034   SODIUM mmol/L 138 137 136   POTASSIUM mmol/L 3 2* 3 5 2 9*   CHLORIDE mmol/L 104 107 101   CO2 mmol/L 28 21 29   ANION GAP mmol/L 6 9 6   BUN mg/dL 5 5 9   CREATININE mg/dL 0 54* 0 35* 0 74   CALCIUM mg/dL 8 2* 8 0* 9 4   GLUCOSE RANDOM mg/dL 115 93 123   ALT U/L  --   --  88*   AST U/L  --   --  44   ALK PHOS U/L  --   --  86   ALBUMIN g/dL  --   --  3 4*   TOTAL BILIRUBIN mg/dL  --   --  0 61     Results from last 7 days   Lab Units 04/28/23  0657   MAGNESIUM mg/dL 2 1      Results from last 7 days   Lab Units 04/28/23  0657   INR  1 12   PTT seconds 21*        Micro  Results from last 7 days   Lab Units 04/28/23  1605   GRAM STAIN RESULT  1+ Mononuclear Cells  No polys seen  No bacteria seen       Imaging:  I have personally reviewed pertinent reports  Intake and Output  I/O       04/27 0701 04/28 0700 04/28 0701 04/29 0700 04/29 0701 04/30 0700    P  O   100     I V   2050     IV Piggyback 1000      Total Intake 1000 2150     Urine  2125     Drains  213     Total Output  2338     Net +1000 -188            Unmeasured Urine Occurrence  1 x         UOP: 80 ml/hr     Height and Weights         There is no height or weight on file to calculate BMI  Weight (last 2 days)     None            Nutrition       Diet Orders   (From admission, onward)             Start     Ordered    04/28/23 1407  Diet Regular; Regular House  Diet effective now        References:    Nutrtion Support Algorithm Enteral vs  Parenteral   Question Answer Comment   Diet Type Regular    Regular Regular House    RD to adjust diet per protocol?  Yes        04/28/23 1408                    Active Medications  Scheduled Meds:  Current Facility-Administered Medications   Medication Dose Route Frequency Provider Last Rate   • albuterol  2 puff Inhalation Q4H PRN Destini Dillon MD     • ALPRAZolam  0 25 mg Oral HS PRN Destini Dillon MD     • ipratropium  0 5 mg Nebulization Q6H PRN Destini Dillon MD     • levalbuterol  1 25 mg Nebulization Q6H PRN Destini Dillon MD     • multi-electrolyte  125 mL/hr Intravenous Continuous Chuck Polanco  mL/hr "(04/29/23 6143)   • nystatin  500,000 Units Swish & Swallow 4x Daily Vladimir Dillon MD     • ondansetron  4 mg Intravenous Q6H PRN Vladimir Dillon MD     • oxyCODONE  2 5 mg Oral Q6H PRN Vladimir Dillon MD     • potassium chloride  40 mEq Oral BID Fito Aviles MD     • potassium chloride  20 mEq Intravenous Once Fito Aviles MD 20 mEq (04/29/23 5965)   • potassium chloride  20 mEq Intravenous Once Fito Aviles MD       Continuous Infusions:  multi-electrolyte, 125 mL/hr, Last Rate: 125 mL/hr (04/29/23 3593)      PRN Meds:   albuterol, 2 puff, Q4H PRN  ALPRAZolam, 0 25 mg, HS PRN  ipratropium, 0 5 mg, Q6H PRN  levalbuterol, 1 25 mg, Q6H PRN  ondansetron, 4 mg, Q6H PRN  oxyCODONE, 2 5 mg, Q6H PRN        Allergies   Allergies   Allergen Reactions   • Tylenol [Acetaminophen]      Told to avoid due to cancer      ---------------------------------------------------------------------------------------  Advance Directive and Living Will:      Power of :    POLST:    ---------------------------------------------------------------------------------------  Care Time Delivered:   No Critical Care time spent     Fito Aviles MD  PGY-1, Internal Medicine  [address]        Portions of the record may have been created with voice recognition software  Occasional wrong word or \"sound a like\" substitutions may have occurred due to the inherent limitations of voice recognition software    Read the chart carefully and recognize, using context, where substitutions have occurred    "

## 2023-04-29 NOTE — PROCEDURES
Insert PICC line    Date/Time: 4/29/2023 12:08 PM  Performed by: Alva Fulton RN  Authorized by: Bishnu Eaton MD     Patient location:  Bedside  Other Assisting Provider: Yes (comment) (Betina Prasad Infusion Tech)    Consent:     Consent obtained:  Written (Physician obtained)    Consent given by:  Patient    Procedural risks discussed: with MD   [place] protocol:     Procedure explained and questions answered to patient or proxy's satisfaction: yes      Relevant documents present and verified: yes      Test results available and properly labeled: yes      Radiology Images displayed and confirmed  If images not available, report reviewed: yes      Required blood products, implants, devices, and special equipment available: yes      Site/side marked: yes      Immediately prior to procedure, a time out was called: yes      Patient identity confirmed:  Verbally with patient  Pre-procedure details:     Hand hygiene: Hand hygiene performed prior to insertion      Sterile barrier technique: All elements of maximal sterile technique followed      Skin preparation:  ChloraPrep    Skin preparation agent: Skin preparation agent completely dried prior to procedure    Indications:     PICC line indications: medications requiring central line    Anesthesia (see MAR for exact dosages):      Anesthesia method:  Local infiltration    Local anesthetic:  Lidocaine 1% w/o epi (2 ml)  Procedure details:     Location:  Basilic    Vessel type: vein      Laterality:  Left    Site selection rationale:  Rt arm limb alert    Approach: percutaneous technique used      Patient position:  Flat    Procedural supplies:  Double lumen    Catheter size:  5 Fr    Landmarks identified: yes      Ultrasound guidance: yes      Ultrasound image availability:  Not saved    Sterile ultrasound techniques: Sterile gel and sterile probe covers were used      Number of attempts:  1    Successful placement: yes      Vessel of catheter tip end:  Sherlock 3CG confirmed    Total catheter length (cm):  42    Catheter out on skin (cm):  0    Max flow rate:  999    Arm circumference:  35  Post-procedure details:     Post-procedure:  Dressing applied and securement device placed    Assessment:  Blood return through all ports and free fluid flow    Post-procedure complications: none      Patient tolerance of procedure:   Tolerated well, no immediate complications

## 2023-04-29 NOTE — TELEMEDICINE
e-Consult (IPC)  - Interventional Radiology  Maximo Lynn 47 y o  female MRN: [id number]  Unit/Bed#: ICU 01 Encounter: [id number]          Interventional Radiology has been consulted to evaluate Maximo Lynn    We were consulted by Dr Sury Serrato concerning this patient with PE/DVT  Inpatient Consult to IR  Consult performed by: Yesenia Quiles DO  Consult ordered by: Darling Lisa MD        04/29/23    Assessment/Recommendation:   47 y F w/ metastatic breast CA c/b N/V with improvement s/p right frontal EVD with imaging during hospitalization showing asymptomatic incidental saddle PE and DVT  Currently on heparin  Poor prognosis given metastatic cancer  Request for IVC filter  Will plan on filter hopefully today or first thing tomorrow AM     5-10 minutes, >50% of the total time devoted to medical consultative verbal/EMR discussion between providers  Written report will be generated in the EMR  Thank you for allowing Interventional Radiology to participate in the care of Maximo Lynn  Please don't hesitate to call or TigerText us with any questions       Yesenia Quiles DO

## 2023-04-29 NOTE — PROGRESS NOTES
Duplex US completed  Extensive DVT LLE from FV to gastrocnemius  ECHO completed, EF 60%, wall motion normal, normal RV and RA  Trace TR and NH  Patient to have IVC filter placed  PE/DVT Heparin infusion started  Repeat CT head when therapeutic  Trend platelets, goal > 978K  PICC line placed, LUE  LEFT LOWER LIMB:  Occlusive acute deep vein thrombus noted in the femoral, popliteal,  gastrocnemius, posterior tibial and peroneal veins  No evidence of superficial thrombophlebitis noted  No evidence of valvular incompetence noted in the deep veins  Popliteal, posterior tibial and anterior tibial arterial Doppler waveform's are  triphasic/biphasic

## 2023-04-29 NOTE — PROGRESS NOTES
Daily Progress Note - Critical Care   Lorelei Whitley 47 y o  female MRN: [id number]  Unit/Bed#: ICU 01 Encounter: [id number]        ----------------------------------------------------------------------------------------  HPI/24hr events: Lorelei Whitley is a 48 y/o female with past medical history of diabetes, metastatic breast cancer with known brain and liver metastasis, and recent hospitalization on April for brain metastases resection surgery who presented to ER with nausea, vomiting, and decreased oral intake for the past week   Patient was recently found to have a new large enhancing cerebellar mass and is now status post suboccipital craniotomy for resection of right cerebellar mass and then left cerebellar mass   Final pathology showed metastatic adenocarcinoma consistent with breast primary  Huong Bars did show stable ventricles sizes and relatively unchanged pseudomeningocele, however, given her worsening of her symptoms neurosurgery decided to proceed with CSF diversion with EVD placement  Patient transferred to neuro ICU for further management  24 hour events: found to have saddle PE and LLE DVT; started on heparin gtt; plan for IVC filter placement today  Overnight events: Supratherapeutic PTT  Stat CT head ordered which was stable  Intermittent nausea, relieved with zofran    ---------------------------------------------------------------------------------------  SUBJECTIVE  She denies any headache, nausea, vomiting, SOB, chest pain, or LLE pain  Review of systems was reviewed and negative unless stated above in HPI/24-hour events   ---------------------------------------------------------------------------------------  Assessment and Plan:    Neuro:              Diagnoses: Recent history of cerebellar mass s/p suboccipital craniectomy                                   S/p CSF diversion with EVD placement,     Plan: CAM-ICU  Delirium precautions   Regulate sleep/wake cycle    -Neuro checks Q4H  -Neurosurgery following  -EVD @ 10; possibly clamp today pending neurosurgery recommendations  - CSF fluid consistent with high atypical cells, suspicious for metastatic tumor cells  -Pain management for headaches  -Patient is established with palliative care team, ongoing [address]     CV:              MAP: Maintain MAPs > 65 mmHg              Avoid hypotensive episodes     Pulm:              Pulmonary embolism  Incidental finding on CT CAP  No CT evidence of RHS  Asymptomatic, hemodynamically stable     IR providers don't believe that embolectomy is necessary at this time    Duplex showed extensive DVT in left leg  IR consulted for IVC filter  On Heparin gtt VTE/PE high         Stage IV breast cancer of right breast, metastatic, ER+  ER/IL+, HER2- grade 3, diagnosed in June 2019  BRCA2 mutation +  -Medical oncology following                GI:              KB prophylaxis: Not on any pressors  Will hold off for now       :              Has had nausea and vomiting throughout the course of hospitaliztion, would avoid any hypotensive episodes which can cause renal hypoperfusion and JANNA  Creatinine: 0 35, likely at baseline  Monitor urine output and renal indices  Avoid nephrotoxins       F/E/N:  Josette Dias- F: On Isolyte 75 mL/hr                          - E: Monitor and replete electrolytes for Mg >2, Phos   >3, K >4                           - N: Has a regular diet     Endo:              ZQTOWECBC: WAOWNCBMITF with HbA1c at 5 8 as of 03/2023  Glucose trend:   Insulin: Will hold off on SSI for now, can initiate if glucoses  become elevated  Monitor blood glucose for goal 140-180                  Heme:     Hemoglobin: 12 2  -Monitor Hgb  -Monitor platelets  VTE prophylaxis: Sequential compression devices and/or foot pumps      ID:              Oral thrush              oral Nystatin   Plan: Monitor temperature and WBC   Maintain normothermia       MSK/Skin:  Plan: Frequent turning and "repositioning  Pressure injury prevention  Monitor for skin breakdown  PT/OT when appropriate  Encourage OOB and ambulation when appropriate  Patient appropriate for transfer out of the ICU today?: No  Disposition: Continue Critical Care   Code Status: Level 1 - Full Code  ---------------------------------------------------------------------------------------  ICU CORE MEASURES    Prophylaxis   VTE Pharmacologic Prophylaxis: Heparin Drip  VTE Mechanical Prophylaxis: sequential compression device  Stress Ulcer Prophylaxis: Prophylaxis Not Indicated     ABCDE Protocol (if indicated)  Plan to perform spontaneous awakening trial today? Not applicable  Plan to perform spontaneous breathing trial today? Not applicable  Obvious barriers to extubation? Not applicable  CAM-ICU: Negative    Invasive Devices Review  Invasive Devices     Peripherally Inserted Central Catheter Line  Duration           PICC Line 6542/57 Left Basilic <1 day          Peripheral Intravenous Line  Duration           Peripheral IV 23 Dorsal (posterior); Left Forearm 1 day          Drain  Duration           External Urinary Catheter <1 day    Ventriculostomy/Subdural Ventricular drainage catheter Right Frontal region <1 day              Can any invasive devices be discontinued today?  Not applicable  ---------------------------------------------------------------------------------------  OBJECTIVE    Vitals   Vitals:    23 0600 23 0800 23 0900 23 1100   BP: 139/88 135/93 135/93 162/76   BP Location: Left leg      Pulse: 74 84 92 82   Resp:  17 19   Temp:   98 7 °F (37 1 °C)    TempSrc:       SpO2: 98% 99% 100% 98%   Weight:  106 kg (233 lb)     Height:  5' 6\" (1 676 m)       Temp (24hrs), Av 6 °F (37 °C), Min:98 5 °F (36 9 °C), Max:98 7 °F (37 1 °C)  Current: Temperature: 98 7 °F (37 1 °C)  HR:   BP: 150/70  RR: 12  SpO2: 92    Respiratory:  SpO2: SpO2: 98 %  Nasal Cannula O2 Flow Rate (L/min): 2 " L/min    Invasive/non-invasive ventilation settings   Respiratory    Lab Data (Last 4 hours)    None         O2/Vent Data (Last 4 hours)    None                Physical Exam  Constitutional:       General: She is not in acute distress  HENT:      Head:      Comments: EVD in place     Mouth/Throat:      Mouth: Mucous membranes are moist    Eyes:      Extraocular Movements: Extraocular movements intact  Conjunctiva/sclera: Conjunctivae normal       Pupils: Pupils are equal, round, and reactive to light  Cardiovascular:      Rate and Rhythm: Normal rate and regular rhythm  Pulmonary:      Effort: Pulmonary effort is normal  No respiratory distress  Breath sounds: No wheezing  Abdominal:      General: Bowel sounds are normal  There is no distension  Palpations: Abdomen is soft  Tenderness: There is no abdominal tenderness  There is no guarding  Musculoskeletal:      Right lower leg: No edema  Left lower leg: No edema  Skin:     General: Skin is warm and dry  Capillary Refill: Capillary refill takes less than 2 seconds  Neurological:      General: No focal deficit present  Mental Status: She is alert and oriented to person, place, and time  Mental status is at baseline  Cranial Nerves: No cranial nerve deficit  Sensory: No sensory deficit  Motor: No weakness  Psychiatric:         Mood and Affect: Mood normal          Behavior: Behavior normal          Thought Content:  Thought content normal          Laboratory and Diagnostics:  Results from last 7 days   Lab Units 04/29/23  0538 04/28/23  0657 04/27/23 2034   WBC Thousand/uL 8 00 6 73 7 49   HEMOGLOBIN g/dL 12 1 12 2 13 4   HEMATOCRIT % 34 6* 35 5 37 7   PLATELETS Thousands/uL 93* 85* 103*   NEUTROS PCT % 74 70 68   MONOS PCT % 12 13* 10     Results from last 7 days   Lab Units 04/29/23  0538 04/28/23  0657 04/27/23 2034   SODIUM mmol/L 138 137 136   POTASSIUM mmol/L 3 2* 3 5 2 9*   CHLORIDE mmol/L 104 "107 101   CO2 mmol/L 28 21 29   ANION GAP mmol/L 6 9 6   BUN mg/dL 5 5 9   CREATININE mg/dL 0 54* 0 35* 0 74   CALCIUM mg/dL 8 2* 8 0* 9 4   GLUCOSE RANDOM mg/dL 115 93 123   ALT U/L  --   --  88*   AST U/L  --   --  44   ALK PHOS U/L  --   --  86   ALBUMIN g/dL  --   --  3 4*   TOTAL BILIRUBIN mg/dL  --   --  0 61     Results from last 7 days   Lab Units 04/29/23  0538 04/28/23  0657   MAGNESIUM mg/dL 2 4 2 1   PHOSPHORUS mg/dL 2 5*  --       Results from last 7 days   Lab Units 04/29/23  1230 04/28/23  0657   INR  1 03 1 12   PTT seconds 23 21*              ABG:    VBG:          Micro  Results from last 7 days   Lab Units 04/28/23  1605   GRAM STAIN RESULT  1+ Mononuclear Cells  No polys seen  No bacteria seen       Imaging:  I have personally reviewed pertinent reports  Intake and Output  I/O       04/27 0701 04/28 0700 04/28 0701  04/29 0700 04/29 0701 04/30 0700    P  O   100     I V  (mL/kg)  2050 847 2 (8)    IV Piggyback 1000  100    Total Intake(mL/kg) 1000 2150 947 2 (8 9)    Urine (mL/kg/hr)  2125     Drains  213 106    Total Output  2338 106    Net +1000 -188 +841  2           Unmeasured Urine Occurrence  1 x         UOP: 2 6 ml/hr     Height and Weights   Height: 5' 6\" (167 6 cm)  IBW (Ideal Body Weight): 59 3 kg  Body mass index is 37 61 kg/m²  Weight (last 2 days)     Date/Time Weight    04/29/23 0800 106 (233)            Nutrition       Diet Orders   (From admission, onward)             Start     Ordered    04/28/23 1407  Diet Regular; Regular House  Diet effective now        References:    Nutrtion Support Algorithm Enteral vs  Parenteral   Question Answer Comment   Diet Type Regular    Regular Regular House    RD to adjust diet per protocol?  Yes        04/28/23 1408                    Active Medications  Scheduled Meds:  Current Facility-Administered Medications   Medication Dose Route Frequency Provider Last Rate   • albuterol  2 puff Inhalation Q4H PRN Darcie Dillon MD   " "  • ALPRAZolam  0 25 mg Oral HS PRN Latoya Dillon MD     • heparin (porcine)  3-30 Units/kg/hr (Order-Specific) Intravenous Titrated Suha Nagel MD 18 Units/kg/hr (04/29/23 1234)   • ipratropium  0 5 mg Nebulization Q6H PRN Latoya Dillon MD     • levalbuterol  1 25 mg Nebulization Q6H PRN Latoya Dillon MD     • multi-electrolyte  125 mL/hr Intravenous Continuous Saniya Hall  mL/hr (04/29/23 2318)   • nystatin  500,000 Units Swish & Swallow 4x Daily Latoya Dillon MD     • ondansetron  4 mg Intravenous Q6H PRN Latoya Dillon MD     • oxyCODONE  2 5 mg Oral Q6H PRN Latoya Dillon MD     • potassium phosphate  30 mmol Intravenous Once Suha Nagel MD 30 mmol (04/29/23 1378)     Continuous Infusions:  heparin (porcine), 3-30 Units/kg/hr (Order-Specific), Last Rate: 18 Units/kg/hr (04/29/23 1234)  multi-electrolyte, 125 mL/hr, Last Rate: 125 mL/hr (04/29/23 5289)      PRN Meds:   albuterol, 2 puff, Q4H PRN  ALPRAZolam, 0 25 mg, HS PRN  ipratropium, 0 5 mg, Q6H PRN  levalbuterol, 1 25 mg, Q6H PRN  ondansetron, 4 mg, Q6H PRN  oxyCODONE, 2 5 mg, Q6H PRN        Allergies   Allergies   Allergen Reactions   • Tylenol [Acetaminophen]      Told to avoid due to cancer      ---------------------------------------------------------------------------------------  Advance Directive and Living Will:      Power of :    POLST:    ---------------------------------------------------------------------------------------  Care Time Delivered:   No Critical Care time spent     [facility], DO    Portions of the record may have been created with voice recognition software  Occasional wrong word or \"sound a like\" substitutions may have occurred due to the inherent limitations of voice recognition software    Read the chart carefully and recognize, using context, where substitutions have occurred    "

## 2023-04-29 NOTE — PLAN OF CARE
Problem: MOBILITY - ADULT  Goal: Maintain or return to baseline ADL function  Description: INTERVENTIONS:  -  Assess patient's ability to carry out ADLs; assess patient's baseline for ADL function and identify physical deficits which impact ability to perform ADLs (bathing, care of mouth/teeth, toileting, grooming, dressing, etc )  - Assess/evaluate cause of self-care deficits   - Assess range of motion  - Assess patient's mobility; develop plan if impaired  - Assess patient's need for assistive devices and provide as appropriate  - Encourage maximum independence but intervene and supervise when necessary  - Involve family in performance of ADLs  - Assess for home care needs following discharge   - Consider OT consult to assist with ADL evaluation and planning for discharge  - Provide patient education as appropriate  Outcome: Progressing  Goal: Maintains/Returns to pre admission functional level  Description: INTERVENTIONS:  - Perform BMAT or MOVE assessment daily    - Set and communicate daily mobility goal to care team and patient/family/caregiver  - Collaborate with rehabilitation services on mobility goals if consulted  - Perform Range of Motion times a day  - Reposition patient every  hours    - Dangle patient  times a day  - Stand patient  times a day  - Ambulate patient  times a day  - Out of bed to chair  times a day   - Out of bed for meals  times a day  - Out of bed for toileting  - Record patient progress and toleration of activity level   Outcome: Progressing     Problem: PAIN - ADULT  Goal: Verbalizes/displays adequate comfort level or baseline comfort level  Description: Interventions:  - Encourage patient to monitor pain and request assistance  - Assess pain using appropriate pain scale  - Administer analgesics based on type and severity of pain and evaluate response  - Implement non-pharmacological measures as appropriate and evaluate response  - Consider cultural and social influences on pain and pain management  - Notify physician/advanced practitioner if interventions unsuccessful or patient reports new pain  Outcome: Progressing     Problem: INFECTION - ADULT  Goal: Absence or prevention of progression during hospitalization  Description: INTERVENTIONS:  - Assess and monitor for signs and symptoms of infection  - Monitor lab/diagnostic results  - Monitor all insertion sites, i e  indwelling lines, tubes, and drains  - Monitor endotracheal if appropriate and nasal secretions for changes in amount and color  - [place] appropriate cooling/warming therapies per order  - Administer medications as ordered  - Instruct and encourage patient and family to use good hand hygiene technique  - Identify and instruct in appropriate isolation precautions for identified infection/condition  Outcome: Progressing  Goal: Absence of fever/infection during neutropenic period  Description: INTERVENTIONS:  - Monitor WBC    Outcome: Progressing     Problem: SAFETY ADULT  Goal: Maintain or return to baseline ADL function  Description: INTERVENTIONS:  -  Assess patient's ability to carry out ADLs; assess patient's baseline for ADL function and identify physical deficits which impact ability to perform ADLs (bathing, care of mouth/teeth, toileting, grooming, dressing, etc )  - Assess/evaluate cause of self-care deficits   - Assess range of motion  - Assess patient's mobility; develop plan if impaired  - Assess patient's need for assistive devices and provide as appropriate  - Encourage maximum independence but intervene and supervise when necessary  - Involve family in performance of ADLs  - Assess for home care needs following discharge   - Consider OT consult to assist with ADL evaluation and planning for discharge  - Provide patient education as appropriate  Outcome: Progressing  Goal: Maintains/Returns to pre admission functional level  Description: INTERVENTIONS:  - Perform BMAT or MOVE assessment daily    - Set and communicate daily mobility goal to care team and patient/family/caregiver  - Collaborate with rehabilitation services on mobility goals if consulted  - Perform Range of Motion  times a day  - Reposition patient every  hours    - Dangle patient  times a day  - Stand patient  times a day  - Ambulate patient  times a day  - Out of bed to chair  times a day   - Out of bed for meals  times a day  - Out of bed for toileting  - Record patient progress and toleration of activity level   Outcome: Progressing  Goal: Patient will remain free of falls  Description: INTERVENTIONS:  - Educate patient/family on patient safety including physical limitations  - Instruct patient to call for assistance with activity   - Consult OT/PT to assist with strengthening/mobility   - Keep Call bell within reach  - Keep bed low and locked with side rails adjusted as appropriate  - Keep care items and personal belongings within reach  - Initiate and maintain comfort rounds  - Make Fall Risk Sign visible to staff  - Offer Toileting every  Hours, in advance of need  - Initiate/Maintain alarm  - Obtain necessary fall risk management equipment:   - Apply yellow socks and bracelet for high fall risk patients  - Consider moving patient to room near nurses station  Outcome: Progressing     Problem: DISCHARGE PLANNING  Goal: Discharge to home or other facility with appropriate resources  Description: INTERVENTIONS:  - Identify barriers to discharge w/patient and caregiver  - Arrange for needed discharge resources and transportation as appropriate  - Identify discharge learning needs (meds, wound care, etc )  - Arrange for interpretive services to assist at discharge as needed  - Refer to Case Management Department for coordinating discharge planning if the patient needs post-hospital services based on physician/advanced practitioner order or complex needs related to functional status, cognitive ability, or social support system  Outcome: Progressing Problem: Knowledge Deficit  Goal: Patient/family/caregiver demonstrates understanding of disease process, treatment plan, medications, and discharge instructions  Description: Complete learning assessment and assess knowledge base    Interventions:  - Provide teaching at level of understanding  - Provide teaching via preferred learning methods  Outcome: Progressing     Problem: GASTROINTESTINAL - ADULT  Goal: Minimal or absence of nausea and/or vomiting  Description: INTERVENTIONS:  - Administer IV fluids if ordered to ensure adequate hydration  - Maintain NPO status until nausea and vomiting are resolved  - Nasogastric tube if ordered  - Administer ordered antiemetic medications as needed  - Provide nonpharmacologic comfort measures as appropriate  - Advance diet as tolerated, if ordered  - Consider nutrition services referral to assist patient with adequate nutrition and appropriate food choices  Outcome: Progressing  Goal: Maintains or returns to baseline bowel function  Description: INTERVENTIONS:  - Assess bowel function  - Encourage oral fluids to ensure adequate hydration  - Administer IV fluids if ordered to ensure adequate hydration  - Administer ordered medications as needed  - Encourage mobilization and activity  - Consider nutritional services referral to assist patient with adequate nutrition and appropriate food choices  Outcome: Progressing  Goal: Maintains adequate nutritional intake  Description: INTERVENTIONS:  - Monitor percentage of each meal consumed  - Identify factors contributing to decreased intake, treat as appropriate  - Assist with meals as needed  - Monitor I&O, weight, and lab values if indicated  - Obtain nutrition services referral as needed  Outcome: Progressing  Goal: Establish and maintain optimal ostomy function  Description: INTERVENTIONS:  - Assess bowel function  - Encourage oral fluids to ensure adequate hydration  - Administer IV fluids if ordered to ensure adequate hydration   - Administer ordered medications as needed  - Encourage mobilization and activity  - Nutrition services referral to assist patient with appropriate food choices  - Assess stoma site  - Consider wound care consult   Outcome: Progressing  Goal: Oral mucous membranes remain intact  Description: INTERVENTIONS  - Assess oral mucosa and hygiene practices  - Implement preventative oral hygiene regimen  - Implement oral medicated treatments as ordered  - Initiate Nutrition services referral as needed  Outcome: Progressing     Problem: Prexisting or High Potential for Compromised Skin Integrity  Goal: Skin integrity is maintained or improved  Description: INTERVENTIONS:  - Identify patients at risk for skin breakdown  - Assess and monitor skin integrity  - Assess and monitor nutrition and hydration status  - Monitor labs   - Assess for incontinence   - Turn and reposition patient  - Assist with mobility/ambulation  - Relieve pressure over bony prominences  - Avoid friction and shearing  - Provide appropriate hygiene as needed including keeping skin clean and dry  - Evaluate need for skin moisturizer/barrier cream  - Collaborate with interdisciplinary team   - Patient/family teaching  - Consider wound care consult   Outcome: Progressing     Problem: SAFETY,RESTRAINT: NV/NON-SELF DESTRUCTIVE BEHAVIOR  Goal: Remains free of harm/injury (restraint for non violent/non self-detsructive behavior)  Description: INTERVENTIONS:  - Instruct patient/family regarding restraint use   - Assess and monitor physiologic and psychological status   - Provide interventions and comfort measures to meet assessed patient needs   - Identify and implement measures to help patient regain control  - Assess readiness for release of restraint   Outcome: Progressing  Goal: Returns to optimal restraint-free functioning  Description: INTERVENTIONS:  - Assess the patient's behavior and symptoms that indicate continued need for restraint  - Identify and implement measures to help patient regain control  - Assess readiness for release of restraint   Outcome: Progressing

## 2023-04-29 NOTE — PROGRESS NOTES
"Progress Note - Neurosurgery   Tony Mt 47 y o  female MRN: [id number]  Unit/Bed#: ICU 01 Encounter: [id number]    Assessment:  79-year-old woman with metastatic breast cancer post procedure day 1 insertion of right frontal EVD  The patient's nausea and vomiting and headaches have improved significantly since placing the EVD  213 cc of straw-colored CSF since insertion yesterday afternoon  ICPs have been within normal limits  Plan:  1  Continue to monitor neurologic examination closely  2   Continue to drain CSF as planned  We will monitor for symptomatic improvement in nausea and vomiting and his headache  Plan for clamp tomorrow  Tentative plan for insertion of  shunt on Monday  Rationale as a palliative procedure was reviewed with the patient today  3   Patient has saddle PE   CT head is stable  Consider therapeutic heparin without bolus  4   Medical management as per neuro critical care  VTE Prophylaxis: Sequential compression device Junita Degree)     Subjective/Objective   Chief Complaint: \"I am tired\"  Vitals: Blood pressure 139/88, pulse 74, temperature 98 7 °F (37 1 °C), temperature source Oral, resp  rate 21, SpO2 98 %  ,There is no height or weight on file to calculate BMI  Hemodynamic Monitoring: ICP Mean: ICP Mean (mmHg): 9 mmHg    Physical Exam:   Physical Exam  Vitals reviewed  Constitutional:       General: She is not in acute distress  Eyes:      Extraocular Movements: Extraocular movements intact  Cardiovascular:      Rate and Rhythm: Normal rate and regular rhythm  Pulmonary:      Effort: Pulmonary effort is normal  No respiratory distress  Skin:     General: Skin is warm and dry  Comments: EVD site clean and dry  Neurological:      Mental Status: She is alert and oriented to person, place, and time  Cranial Nerves: No cranial nerve deficit  Sensory: No sensory deficit  Motor: No weakness     Psychiatric:         Mood and Affect: Mood " normal          Behavior: Behavior normal        Neurologic Exam     Mental Status   Oriented to person, place, and time  Invasive Devices     Peripheral Intravenous Line  Duration           Peripheral IV 04/27/23 Dorsal (posterior); Left Forearm 1 day    Peripheral IV 04/29/23 Left;Upper;Ventral (anterior) Arm <1 day          Drain  Duration           External Urinary Catheter <1 day    Ventriculostomy/Subdural Ventricular drainage catheter Right Frontal region <1 day              Lab Results:   I have personally reviewed pertinent results  Lab Results   Component Value Date    WBC 8 00 04/29/2023    HGB 12 1 04/29/2023    HCT 34 6 (L) 04/29/2023     (H) 04/29/2023    PLT 93 (L) 04/29/2023    MCH 35 1 (H) 04/29/2023    MCHC 35 0 04/29/2023    RDW 13 7 04/29/2023    MPV 10 4 04/29/2023    NRBC 0 04/29/2023    SODIUM 138 04/29/2023     04/29/2023    CO2 28 04/29/2023    BUN 5 04/29/2023    CREATININE 0 54 (L) 04/29/2023    CALCIUM 8 2 (L) 04/29/2023    EGFR 108 04/29/2023       Imaging Studies: I have personally reviewed pertinent reports  and I have personally reviewed pertinent films in PACS     CT scan of the head dated April 29, 2023  Comparison to previous imaging  Stable appearance of suboccipital decompression and pseudomeningocele  Stable supratentorial metastases  Stable small amount of hemorrhage around insertion site of EVD  EVD catheter in stable position without significant change in ventricular system  Other Studies: None

## 2023-04-30 PROBLEM — I26.99 PULMONARY EMBOLISM (HCC): Status: ACTIVE | Noted: 2023-01-01

## 2023-04-30 NOTE — DISCHARGE INSTRUCTIONS
Inferior Vena Cava Filter Placement     WHAT YOU NEED TO KNOW:   Inferior vena cava filter placement is surgery to place a filter into your inferior vena cava (IVC)  The IVC is a large blood vessel that brings blood from your lower body back to your heart  The filter is a small mesh strainer made of thin wires  It is placed in the center of the IVC to trap blood clots going to your heart or lungs  Filter types: Permanent Filters are used for patients who can't have anticoagulation medications  The filters are left in place permanently  Optional filters: Are filters that can be removed when a patient's risk for clotting is decreased  DISCHARGE INSTRUCTIONS:     Wound care: Keep your wound clean and dry  Band aid may come off in 24 hours  Self-care:   Limit activity: Do not lift, pull or push heavy objects for 24 hours  Slowly start to do more each day  Return to your daily activities as directed  Resume your normal diet  Small sips of flat soda will help with nausea  Contact Interventional Radiology at [phone] Tam PATIENTS: Contact Interventional Radiology at [phone]) Kalani Rayo PATIENTS: Contact Interventional Radiology at [phone]) if:  You have a fever  You have chills, a cough, or feel weak and achy  Persistent nausea or vomiting  Your wound is red, swollen, or draining pus  You have questions or concerns about your condition  Optional filters can and should  be removed when you no longer need it  Please call Interventional Radiology to make your removal appointment

## 2023-04-30 NOTE — SEDATION DOCUMENTATION
IVC filter placed by Dr Loyde Primrose  Patient tolerated well and vss  Steri strips placed on right neck  After transfer to stretcher steri strips in place no signs of bleeding or hematomas    Report given to [facility]

## 2023-04-30 NOTE — PLAN OF CARE
Problem: MOBILITY - ADULT  Goal: Maintain or return to baseline ADL function  Description: INTERVENTIONS:  -  Assess patient's ability to carry out ADLs; assess patient's baseline for ADL function and identify physical deficits which impact ability to perform ADLs (bathing, care of mouth/teeth, toileting, grooming, dressing, etc )  - Assess/evaluate cause of self-care deficits   - Assess range of motion  - Assess patient's mobility; develop plan if impaired  - Assess patient's need for assistive devices and provide as appropriate  - Encourage maximum independence but intervene and supervise when necessary  - Involve family in performance of ADLs  - Assess for home care needs following discharge   - Consider OT consult to assist with ADL evaluation and planning for discharge  - Provide patient education as appropriate  Outcome: Progressing  Goal: Maintains/Returns to pre admission functional level  Description: INTERVENTIONS:  - Perform BMAT or MOVE assessment daily    - Set and communicate daily mobility goal to care team and patient/family/caregiver  - Collaborate with rehabilitation services on mobility goals if consulted  - Perform Range of Motion 3 times a day  - Reposition patient every 2 hours    - Dangle patient 2 times a day  - Stand patient 2 times a day  - Ambulate patient 2 times a day  - Out of bed to chair 2 times a day   - Out of bed for meals 3 times a day  - Out of bed for toileting  - Record patient progress and toleration of activity level   Outcome: Progressing     Problem: PAIN - ADULT  Goal: Verbalizes/displays adequate comfort level or baseline comfort level  Description: Interventions:  - Encourage patient to monitor pain and request assistance  - Assess pain using appropriate pain scale  - Administer analgesics based on type and severity of pain and evaluate response  - Implement non-pharmacological measures as appropriate and evaluate response  - Consider cultural and social influences on pain and pain management  - Notify physician/advanced practitioner if interventions unsuccessful or patient reports new pain  Outcome: Progressing     Problem: INFECTION - ADULT  Goal: Absence or prevention of progression during hospitalization  Description: INTERVENTIONS:  - Assess and monitor for signs and symptoms of infection  - Monitor lab/diagnostic results  - Monitor all insertion sites, i e  indwelling lines, tubes, and drains  - Monitor endotracheal if appropriate and nasal secretions for changes in amount and color  - [place] appropriate cooling/warming therapies per order  - Administer medications as ordered  - Instruct and encourage patient and family to use good hand hygiene technique  - Identify and instruct in appropriate isolation precautions for identified infection/condition  Outcome: Progressing  Goal: Absence of fever/infection during neutropenic period  Description: INTERVENTIONS:  - Monitor WBC    Outcome: Progressing     Problem: SAFETY ADULT  Goal: Maintain or return to baseline ADL function  Description: INTERVENTIONS:  -  Assess patient's ability to carry out ADLs; assess patient's baseline for ADL function and identify physical deficits which impact ability to perform ADLs (bathing, care of mouth/teeth, toileting, grooming, dressing, etc )  - Assess/evaluate cause of self-care deficits   - Assess range of motion  - Assess patient's mobility; develop plan if impaired  - Assess patient's need for assistive devices and provide as appropriate  - Encourage maximum independence but intervene and supervise when necessary  - Involve family in performance of ADLs  - Assess for home care needs following discharge   - Consider OT consult to assist with ADL evaluation and planning for discharge  - Provide patient education as appropriate  Outcome: Progressing  Goal: Maintains/Returns to pre admission functional level  Description: INTERVENTIONS:  - Perform BMAT or MOVE assessment daily    - Set and communicate daily mobility goal to care team and patient/family/caregiver  - Collaborate with rehabilitation services on mobility goals if consulted  - Perform Range of Motion 3 times a day  - Reposition patient every 2 hours    - Dangle patient 2 times a day  - Stand patient 2 times a day  - Ambulate patient 2 times a day  - Out of bed to chair 2 times a day   - Out of bed for meals 3 times a day  - Out of bed for toileting  - Record patient progress and toleration of activity level   Outcome: Progressing  Goal: Patient will remain free of falls  Description: INTERVENTIONS:  - Educate patient/family on patient safety including physical limitations  - Instruct patient to call for assistance with activity   - Consult OT/PT to assist with strengthening/mobility   - Keep Call bell within reach  - Keep bed low and locked with side rails adjusted as appropriate  - Keep care items and personal belongings within reach  - Initiate and maintain comfort rounds  - Make Fall Risk Sign visible to staff  - Offer Toileting every 2 Hours, in advance of need  - Initiate/Maintain bed alarm  - Obtain necessary fall risk management equipment: bed alarm  - Apply yellow socks and bracelet for high fall risk patients  - Consider moving patient to room near nurses station  Outcome: Progressing     Problem: DISCHARGE PLANNING  Goal: Discharge to home or other facility with appropriate resources  Description: INTERVENTIONS:  - Identify barriers to discharge w/patient and caregiver  - Arrange for needed discharge resources and transportation as appropriate  - Identify discharge learning needs (meds, wound care, etc )  - Arrange for interpretive services to assist at discharge as needed  - Refer to Case Management Department for coordinating discharge planning if the patient needs post-hospital services based on physician/advanced practitioner order or complex needs related to functional status, cognitive ability, or social support system  Outcome: Progressing     Problem: Knowledge Deficit  Goal: Patient/family/caregiver demonstrates understanding of disease process, treatment plan, medications, and discharge instructions  Description: Complete learning assessment and assess knowledge base    Interventions:  - Provide teaching at level of understanding  - Provide teaching via preferred learning methods  Outcome: Progressing     Problem: GASTROINTESTINAL - ADULT  Goal: Minimal or absence of nausea and/or vomiting  Description: INTERVENTIONS:  - Administer IV fluids if ordered to ensure adequate hydration  - Maintain NPO status until nausea and vomiting are resolved  - Nasogastric tube if ordered  - Administer ordered antiemetic medications as needed  - Provide nonpharmacologic comfort measures as appropriate  - Advance diet as tolerated, if ordered  - Consider nutrition services referral to assist patient with adequate nutrition and appropriate food choices  Outcome: Progressing  Goal: Maintains or returns to baseline bowel function  Description: INTERVENTIONS:  - Assess bowel function  - Encourage oral fluids to ensure adequate hydration  - Administer IV fluids if ordered to ensure adequate hydration  - Administer ordered medications as needed  - Encourage mobilization and activity  - Consider nutritional services referral to assist patient with adequate nutrition and appropriate food choices  Outcome: Progressing  Goal: Maintains adequate nutritional intake  Description: INTERVENTIONS:  - Monitor percentage of each meal consumed  - Identify factors contributing to decreased intake, treat as appropriate  - Assist with meals as needed  - Monitor I&O, weight, and lab values if indicated  - Obtain nutrition services referral as needed  Outcome: Progressing  Goal: Establish and maintain optimal ostomy function  Description: INTERVENTIONS:  - Assess bowel function  - Encourage oral fluids to ensure adequate hydration  - Administer IV fluids if ordered to ensure adequate hydration   - Administer ordered medications as needed  - Encourage mobilization and activity  - Nutrition services referral to assist patient with appropriate food choices  - Assess stoma site  - Consider wound care consult   Outcome: Progressing  Goal: Oral mucous membranes remain intact  Description: INTERVENTIONS  - Assess oral mucosa and hygiene practices  - Implement preventative oral hygiene regimen  - Implement oral medicated treatments as ordered  - Initiate Nutrition services referral as needed  Outcome: Progressing     Problem: Prexisting or High Potential for Compromised Skin Integrity  Goal: Skin integrity is maintained or improved  Description: INTERVENTIONS:  - Identify patients at risk for skin breakdown  - Assess and monitor skin integrity  - Assess and monitor nutrition and hydration status  - Monitor labs   - Assess for incontinence   - Turn and reposition patient  - Assist with mobility/ambulation  - Relieve pressure over bony prominences  - Avoid friction and shearing  - Provide appropriate hygiene as needed including keeping skin clean and dry  - Evaluate need for skin moisturizer/barrier cream  - Collaborate with interdisciplinary team   - Patient/family teaching  - Consider wound care consult   Outcome: Progressing     Problem: SAFETY,RESTRAINT: NV/NON-SELF DESTRUCTIVE BEHAVIOR  Goal: Remains free of harm/injury (restraint for non violent/non self-detsructive behavior)  Description: INTERVENTIONS:  - Instruct patient/family regarding restraint use   - Assess and monitor physiologic and psychological status   - Provide interventions and comfort measures to meet assessed patient needs   - Identify and implement measures to help patient regain control  - Assess readiness for release of restraint   Outcome: Progressing  Goal: Returns to optimal restraint-free functioning  Description: INTERVENTIONS:  - Assess the patient's behavior and symptoms that indicate continued need for restraint  - Identify and implement measures to help patient regain control  - Assess readiness for release of restraint   Outcome: Progressing

## 2023-04-30 NOTE — ASSESSMENT & PLAN NOTE
Imaging:   • CT chest abdomen pelvis with 4/28: New saddle pulmonary embolism extending into bilateral pulmonary artery segment branches  No evidence of right heart strain  • Lower extremity duplex 4/29: Left lower extremity with occlusive acute DVT noted in the femoral, popliteal, gastrinomas, posterior tibial and peroneal veins       Plan:   • Continue on heparin drip     o This will need to be held at midnight with anticipated need for surgery tomorrow Monday, May 1   • Order placed per primary team for IVC filter placement

## 2023-04-30 NOTE — PROGRESS NOTES
[address] [facility]  Progress Note  Name: Ricky Nascimento  MRN: [id number]  Unit/Bed#: ICU 01 I Date of Admission: 4/27/2023   Date of Service: 4/30/2023 I Hospital Day: 2    Assessment/Plan   * 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 [facility])  Assessment & Plan  PPD2 EVD placement  · Patient with PMH of stage IV breast cancer with brain and liver metastases presented to the ED 4/27/2023 c/o headache and progressive nausea and vomiting for several weeks  · Patient is s/p suboccipital craniotomy for resection of right and left cerebellar masses with complex dural reconstruction 4/3/2023 with Dr Marya Tamayo  Imaging  · CT head wo 4/29/23: No interval change since prior exam    Plan  · Continue monitoring neurological status/exam with frequent neurological checks  · EVD management  · Continue drain at 10 mmHg  · 282 mL output over 24 hours  Slightly yellow tinted  · ICP remain <20  ICP 3-4 while in room  · CSF 4/28: WBC 25, protein 150, RBCs 55, glucose 53, Gram stain without bacteria, culture no growth (preliminary),   · CSF cytology 4/28: Highly atypical cells suspicious for metastatic tumor cells  · EVD to be clamped today  If patient does not tolerate clamping with increased ICP or is symptomatic, will plan for ventriculoperitoneal shunt placement 5/1/2023  · CT head with thin slices (Stealth protocol) for surgical planning to be completed today  · Goal platelets greater than 100  Repeat labs this afternoon  Will order platelets on hold for OR pending results  · Mobilize as tolerated with assistance  · Pain management per primary team  · DVT ppx: SCDs  Heparin drip  · Palliative care consult appreciated    Neurosurgery will continue to follow closely  Call with questions        Pulmonary embolism and DVT  Assessment & Plan  Imaging:   • CT chest abdomen pelvis with 4/28: New saddle pulmonary embolism extending into bilateral pulmonary artery segment branches    No evidence of "right heart strain  • Lower extremity duplex 4/29: Left lower extremity with occlusive acute DVT noted in the femoral, popliteal, gastrinomas, posterior tibial and peroneal veins  Plan:   • Continue on heparin drip     o This will need to be held at midnight with anticipated need for surgery tomorrow Monday, May 1   • Order placed per primary team for IVC filter placement      Status post brain surgery  Assessment & Plan  See above  Subjective/Objective   Chief Complaint: I am okay    Subjective: Patient states she is overall doing better  She did have episodes of vomiting last evening  This was discussed further with the nurse who states that emesis followed dose of nystatin  Patient reported to refuse nystatin multiple prior doses with concerns that it was going to make her vomit  She states her headache is improved and currently rated 2 out of 10/minimal   She denies any additional symptoms  APTT was supratherapeutic last evening so STAT CT head was completed demonstrating no increased hemorrhage  Objective: Sleeping in bed  Arousable  NAD  I/O       04/28 0701  04/29 0700 04/29 0701  04/30 0700 04/30 0701  05/01 0700    P  O  100 120     I V  (mL/kg) 2050 3281 9 (31)     IV Piggyback  350     Total Intake(mL/kg) 2150 3751 9 (35 4)     Urine (mL/kg/hr) 2125 2349 (0 9)     Emesis/NG output  0     Drains 213 265     Total Output 2338 2614     Net -188 +1137 9            Unmeasured Urine Occurrence 1 x 1 x     Unmeasured Emesis Occurrence  3 x           Invasive Devices     Peripherally Inserted Central Catheter Line  Duration           PICC Line 99/88/06 Left Basilic <1 day          Drain  Duration           External Urinary Catheter 1 day    Ventriculostomy/Subdural Ventricular drainage catheter Right Frontal region 1 day                Physical Exam:  Vitals: Blood pressure 170/68, pulse 96, temperature 98 9 °F (37 2 °C), temperature source Oral, resp   rate 20, height 5' 6\" (1 676 m), " weight 106 kg (233 lb), SpO2 96 %  ,Body mass index is 37 61 kg/m²  Hemodynamic Monitoring: MAP: Arterial Line MAP (mmHg): 97 mmHg, ICP Mean: ICP Mean (mmHg): 9 mmHg    General appearance: Arousable to voice, appears stated age, cooperative and no distress  Head: Normocephalic, without obvious abnormality, EVD site CDI  Eyes: EOMI, pupils reactive bilaterally  Neck: supple, symmetrical, trachea midline   Lungs: non labored breathing  Heart: regular heart rate  Neurologic:   Mental status: Alert, oriented x3, thought content appropriate  Cranial nerves: grossly intact (Cranial nerves II-XII)  Sensory: normal to crude touch x4  Motor: moving all extremities without focal weakness   Coordination: finger to nose normal bilaterally, no drift bilaterally    Lab Results:  Results from last 7 days   Lab Units 04/30/23 0602 04/29/23 1822 04/29/23 0538 04/28/23  0657 04/27/23  2034   WBC Thousand/uL 12 27* 7 64 8 00 6 73 7 49   HEMOGLOBIN g/dL 12 6 10 4* 12 1 12 2 13 4   HEMATOCRIT % 35 3 30 1* 34 6* 35 5 37 7   PLATELETS Thousands/uL 109* 82* 93* 85* 103*   NEUTROS PCT %  --   --  74 70 68   MONOS PCT %  --   --  12 13* 10     Results from last 7 days   Lab Units 04/30/23 0602 04/29/23 1822 04/29/23 0538 04/28/23  0657 04/27/23  2034   POTASSIUM mmol/L 3 4* 3 6 3 2*   < > 2 9*   CHLORIDE mmol/L 102 102 104   < > 101   CO2 mmol/L 28 25 28   < > 29   BUN mg/dL 4* 4* 5   < > 9   CREATININE mg/dL 0 52* 0 36* 0 54*   < > 0 74   CALCIUM mg/dL 8 4 7 6* 8 2*   < > 9 4   ALK PHOS U/L  --   --   --   --  86   ALT U/L  --   --   --   --  88*   AST U/L  --   --   --   --  44    < > = values in this interval not displayed       Results from last 7 days   Lab Units 04/30/23  0602 04/29/23 0538 04/28/23  0657   MAGNESIUM mg/dL 2 3 2 4 2 1     Results from last 7 days   Lab Units 04/30/23  0602 04/29/23  0538   PHOSPHORUS mg/dL 2 8 2 5*     Results from last 7 days   Lab Units 04/30/23  0147 04/29/23  1945 04/29/23  1822 04/29/23  1230 04/28/23  0657   INR   --   --   --  1 03 1 12   PTT seconds 80* 111* >210* 23 21*     No results found for: TROPONINT  ABG:No results found for: PHART, QTC9POF, PO2ART, LGL5QTQ, O1HOARQD, BEART, SOURCE    Imaging Studies: I have personally reviewed pertinent reports  and I have personally reviewed pertinent films in PACS    CT head wo contrast    Result Date: 4/29/2023  Impression: No interval change since the prior exam of the same day with findings detailed above  Workstation performed: [id number]     CT head wo contrast    Result Date: 4/29/2023  Impression: Stable CT of the brain status post occipital craniotomy and resection of metastatic disease  Postoperative pseudomeningocele noted similar to the prior study  Supratentorial metastatic lesions are again noted essentially stable in size  Small amount of postprocedural hemorrhage identified in the right frontal lobe and adjacent frontal sulcus from shunt catheter placement  Workstation performed: [id number]     CT head wo contrast    Result Date: 4/28/2023  Impression: New postsurgical changes of right frontal approach ventricular catheter with tip in frontal horn of right lateral ventricle with stable ventricular size  New acute trace subarachnoid hemorrhage right frontal region, likely from recent surgery  Prior postsurgical changes of suboccipital craniotomy for resection of cerebellar metastatic lesion  Stable small hyperdense metastatic lesions in bilateral frontal lobes (right larger than left) and 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 which was better evaluated on recent MRI brain 4/25/2023  The study was marked in [facility] for immediate notification  Workstation performed: [id number]     CT head without contrast    Result Date: 4/28/2023  Impression: No CT evidence for acute territorial infarct  No new mass effect or midline shift   Known intracranial metastatic lesions better visualized on the prior recent MRI brain examination  Postsurgical changes of prior suboccipital craniectomy with grossly stable appearance of the surgical resection bed and 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 compared to the recent MRI accounting for differences in modality  Workstation performed: [id number]     CT chest abdomen pelvis w contrast    Result Date: 4/28/2023  Impression: New saddle pulmonary embolism extending into bilateral pulmonary arterial segmental branches  No CT evidence of right heart strain  Stable treated metastatic subcentimeter hepatic lesions  No new or enlarging sites of metastatic disease in chest, abdomen, or pelvis  Additional chronic/incidental findings as detailed above   I personally discussed this study with Roland Mayorga on 4/28/2023 4:05 PM  Workstation performed: [id number]       VTE Pharmacologic Prophylaxis: Heparin    VTE Mechanical Prophylaxis: sequential compression device

## 2023-04-30 NOTE — ASSESSMENT & PLAN NOTE
PPD2 EVD placement  · Patient with PMH of stage IV breast cancer with brain and liver metastases presented to the ED 4/27/2023 c/o headache and progressive nausea and vomiting for several weeks  · Patient is s/p suboccipital craniotomy for resection of right and left cerebellar masses with complex dural reconstruction 4/3/2023 with Dr Darrell Almaguer  Imaging  · CT head wo 4/29/23: No interval change since prior exam    Plan  · Continue monitoring neurological status/exam with frequent neurological checks  · EVD management  · Continue drain at 10 mmHg  · 282 mL output over 24 hours  Slightly yellow tinted  · ICP remain <20  ICP 3-4 while in room  · CSF 4/28: WBC 25, protein 150, RBCs 55, glucose 53, Gram stain without bacteria, culture no growth (preliminary),   · CSF cytology 4/28: Highly atypical cells suspicious for metastatic tumor cells  · EVD to be clamped today  If patient does not tolerate clamping with increased ICP or is symptomatic, will plan for ventriculoperitoneal shunt placement 5/1/2023  · CT head with thin slices (Stealth protocol) for surgical planning to be completed today  · Goal platelets greater than 100  Repeat labs this afternoon  Will order platelets on hold for OR pending results  · Mobilize as tolerated with assistance  · Pain management per primary team  · DVT ppx: SCDs  Heparin drip  · Palliative care consult appreciated    Neurosurgery will continue to follow closely   Call with questions

## 2023-04-30 NOTE — BRIEF OP NOTE (RAD/CATH)
IVC Filter Placement Procedure Note    PATIENT NAME: Constanza Ballesteros  : 1970  MRN: [id number]     Pre-op Diagnosis:   1  Nausea and vomiting    2  Metastatic cancer ([facility] )    3  Hyponatremia    4  Elevated LFTs    5  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 ([facility] )    6  Status post brain surgery    7  Saddle embolism of pulmonary artery (HCC)      Post-op Diagnosis:   1  Nausea and vomiting    2  Metastatic cancer ([facility] )    3  Hyponatremia    4  Elevated LFTs    5  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    6  Status post brain surgery    7  Saddle embolism of pulmonary artery [facility])        Surgeon:   Olu Lugo DO  Assistants:     No qualified resident was available  Estimated Blood Loss: none  Findings:   · R IJ access, closed with manual pressure  · Infrarenal IVC filter ([employer]) placed      Specimens: none    Complications:  none    Anesthesia: local    Olu Lugo DO     Date: 2023  Time: 10:20 AM

## 2023-04-30 NOTE — PROGRESS NOTES
"Patient arrived to IR for IVC filter placement  /68   Pulse 94   Temp 98 9 °F (37 2 °C) (Oral)   Resp (!) 24   Ht 5' 6\" (1 676 m)   Wt 106 kg (233 lb)   SpO2 95%   BMI 37 61 kg/m²       The procedure and risks were discussed with the patient  All questions were answered  Informed consent was obtained      "

## 2023-05-01 NOTE — PROGRESS NOTES
"Neurosurgery progress note    Overnight events  Failed EVD clamp trial overnight per Dr Ever Galicia: \" ICPs elevated to 40s around 8:30PM with headaches, same thing around 10:30PM, CTH showed slightly enlarged ventricles, symptoms persistent so reopened EVD to 10 then maintained at 15 overnight, symptoms improved since\"      Vitals:    05/01/23 0300 05/01/23 0400 05/01/23 0500 05/01/23 0600   BP: 154/80 147/82 161/86 166/84   BP Location: Left arm Left arm Left arm Left arm   Pulse: 94 90 96 (!) 108   Resp: 21 (!) 23 21 22   Temp:  99 °F (37 2 °C)     TempSrc:  Oral     SpO2: 97% 97% 98% 94%   Weight:       Height:           Lab Results   Component Value Date/Time    SODIUM 133 (L) 05/01/2023 05:31 AM    K 3 5 05/01/2023 05:31 AM    WBC 12 07 (H) 05/01/2023 05:31 AM    HGB 12 3 05/01/2023 05:31 AM    HCT 36 0 05/01/2023 05:31 AM     (L) 05/01/2023 05:31 AM    PTT 28 05/01/2023 05:31 AM    INR 1 09 05/01/2023 05:31 AM         Intake/Output Summary (Last 24 hours) at 5/1/2023 0705  Last data filed at 5/1/2023 0600  Gross per 24 hour   Intake 3584 4 ml   Output 2762 ml   Net 822 4 ml         Current Facility-Administered Medications:   •  albuterol (PROVENTIL HFA,VENTOLIN HFA) inhaler 2 puff, 2 puff, Inhalation, Q4H PRN, Elizabeth Dillon MD  •  ALPRAZolam Barbara Shannan) tablet 0 25 mg, 0 25 mg, Oral, HS PRN, Elizabeth Dillon MD  •  ceFAZolin (ANCEF) IVPB (premix in dextrose) 2,000 mg 50 mL, 2,000 mg, Intravenous, Once, Maddie Monsivais PA-C  •  chlorhexidine (PERIDEX) 0 12 % oral rinse 15 mL, 15 mL, Swish & Spit, Q12H [address], Maddie Biundo, PA-C, 15 mL at 04/30/23 3419  •  [COMPLETED] fluconazole (DIFLUCAN) tablet 200 mg, 200 mg, Oral, Once, 200 mg at 04/30/23 2008 **FOLLOWED BY** fluconazole (DIFLUCAN) tablet 100 mg, 100 mg, Oral, Daily, Sharda Rubin, DO  •  multi-electrolyte (PLASMALYTE-A/ISOLYTE-S PH 7 4) IV solution, 125 mL/hr, Intravenous, Continuous, Jovan Simon MD, " Last Rate: 125 mL/hr at 05/01/23 0408, 125 mL/hr at 05/01/23 0408  •  nystatin (MYCOSTATIN) oral suspension 500,000 Units, 500,000 Units, Swish & Swallow, 4x Daily, EMILIANO Fox, 500,000 Units at 05/01/23 0149  •  ondansetron (ZOFRAN) injection 4 mg, 4 mg, Intravenous, Q6H PRN, Ferman Schwab Ramos-Feliciano, MD, 4 mg at 04/29/23 2116  •  oxyCODONE (ROXICODONE) split tablet 2 5 mg, 2 5 mg, Oral, Q6H PRN, Ferman Schwab Ramos-Feliciano, MD  •  potassium chloride (K-DUR,KLOR-CON) CR tablet 40 mEq, 40 mEq, Oral, BID, Rebeca Ericksone Trager, DO, 40 mEq at 04/30/23 1711  •  potassium-sodium phosphates (PHOS-NAK) packet 2 packet, 2 packet, Oral, 4x Daily (with meals and at bedtime), Lisa Fetch, DO, 2 packet at 04/30/23 2144     Neurologic exam  Alert, oriented X 3  PERRL, EOMI  Face symmetric  5/5 in all extremities, following commands briskly  Sensation intact throughout  Incision CDI  EVD patent with good waveform, ICP < 10 currently     Assessment  Patient is a 58-year-old female with h/o breast cancer metastatic to LN and liver s/p right mastectomy and LN resection (with baseline right arm numbness) on chemotherapy and obesity who presented with several weeks of progressively worsening headache, nausea/vomiting, dizziness, and gait imbalance, found to have a new large enhancing cerebellar mass s/p suboccipital craniotomy for resection of right cerebellar mass then left cerebellar mass with complex dural reconstruction on 4/3/23 (final pathology c/w “metastatic adenocarcinoma, consistent with breast primary” with aggressive features including high Ki-67 index), who p/w HA, nausea/vomiting, AMS s/p urgent R frontal EVD placement for acute HCP (OP > 20), failed clamp trial over weekend (both ICPs and symptoms), also with LLE DVT and saddle PE s/p IVCF and started on heparin gtt (held since midnight)  Plan  OR today for palliative VPS placement with Dr Melanie Fontanez I've reiterated to [facility], [place], operated by [facility] and her family multiple times that this is not a curative surgery for aggressive metastatic cancer and overall extensive disease burden  VPS will improve her current symptoms related to delayed HCP s/p resection of cerebellar metastases  I discussed goal of surgery, expected postoperative recovery, as well as potential risks and complications including hemorrhage, especially in setting of heparin gtt for saddle PE, shunt obstruction/malfunction (increased likelihood given significant cancer burden and 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

## 2023-05-01 NOTE — PROGRESS NOTES
Progress note - Palliative and Supportive Care   Geovanni Carrillo 47 y o  female [id number]    Patient Active Problem List   Diagnosis   • Malignant neoplasm of central portion of right breast in female, estrogen receptor positive ([facility] [address] )   • Carcinoma of right breast metastatic to axillary lymph node ([facility] )   • Family history of breast cancer in female   • Family history of colon cancer   • Dense breast tissue   • BRCA2 gene mutation positive   • Multiple lesions on computed tomography of brain   • Breast cancer metastasized to liver ([facility] )   • S/P BSO (bilateral salpingo-oophorectomy)   • On antineoplastic chemotherapy   • Advanced care planning/counseling discussion   • Rash   • Postmenopausal   • Lymphedema   • Anxiety about health   • Allergic rhinitis   • Obesity   • Schwannoma of cranial nerve (HCC)   • Stroke [facility])   • Cerebral aneurysm   • Rosacea   • Mixed hyperlipidemia   • Constipation   • GERD (gastroesophageal reflux disease)   • Palliative care patient   • Injury of right foot   • Vomiting   • Brain mass   • Hyperglycemia, drug-induced   • Leukocytosis   • 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 [facility])   • Status post brain surgery   • Pulmonary embolism and DVT     Active issues specifically addressed today include:   Breast cancer  Metastasis to brain  Anxiety  Palliative care patient  Goals of care counseling    Plan:  1  Symptom management -no changes, current symptom management effective   -     2  Goals -continue full cares  Plan to go to the OR today for shunt internalization   -    3  Psychosocial support-provided   -    4  [facility] [id number] to follow       Code Status: Full- Level 1   Decisional apparatus:  Patient is competent on my exam today  If competence is lost, patient's substitute decision maker would default to friend and brother by [place]  Advance Directive / Living Will / POLST: Yes    Interval history:       Patient seen today during nursing cares    She denies any symptoms and states overall she is feeling well and ready to go home once her operation is done  MEDICATIONS / ALLERGIES:     all current active meds have been reviewed and current meds:   Current Facility-Administered Medications   Medication Dose Route Frequency   • [MAR Hold] albuterol (PROVENTIL HFA,VENTOLIN HFA) inhaler 2 puff  2 puff Inhalation Q4H PRN   • [MAR Hold] ALPRAZolam (XANAX) tablet 0 25 mg  0 25 mg Oral HS PRN   • [MAR Hold] ceFAZolin (ANCEF) IVPB (premix in dextrose) 2,000 mg 50 mL  2,000 mg Intravenous Once   • [MAR Hold] chlorhexidine (PERIDEX) 0 12 % oral rinse 15 mL  15 mL Swish & Spit Q12H [address]   • [MAR Hold] fluconazole (DIFLUCAN) tablet 100 mg  100 mg Oral Daily   • multi-electrolyte (PLASMALYTE-A/ISOLYTE-S PH 7 4) IV solution  125 mL/hr Intravenous Continuous   • [MAR Hold] nystatin (MYCOSTATIN) oral suspension 500,000 Units  500,000 Units Swish & Swallow 4x Daily   • [MAR Hold] ondansetron (ZOFRAN) injection 4 mg  4 mg Intravenous Q6H PRN   • [MAR Hold] oxyCODONE (ROXICODONE) split tablet 2 5 mg  2 5 mg Oral Q6H PRN   • [MAR Hold] potassium chloride (K-DUR,KLOR-CON) CR tablet 40 mEq  40 mEq Oral BID   • [MAR Hold] potassium-sodium phosphates (PHOS-NAK) packet 2 packet  2 packet Oral 4x Daily (with meals and at bedtime)   • thrombin (THROMBIN-JMI) 5,000 units topical solution    PRN   • vancomycin (VANCOCIN) 1,000 mg, neomycin-polymyxin B (NEOSPORIN-) 1 mL in sodium chloride 0 9 % 1,000 mL OR irrigation   Irrigation Once       Allergies   Allergen Reactions   • Tylenol [Acetaminophen]      Told to avoid due to cancer        OBJECTIVE:    Physical Exam  Physical Exam  Vitals and nursing note reviewed  Constitutional:       General: She is not in acute distress  Appearance: She is obese  She is ill-appearing  HENT:      Head: Normocephalic and atraumatic  Right Ear: External ear normal       Left Ear: External ear normal    Eyes:      General:         Right eye: No discharge  "Left eye: No discharge  Cardiovascular:      Rate and Rhythm: Normal rate  Abdominal:      General: There is no distension  Palpations: Abdomen is soft  Skin:     Coloration: Skin is pale  Neurological:      Comments: Mild slurred speech, otherwise at baseline           Lab Results:   I have personally reviewed pertinent labs  , CBC:   Lab Results   Component Value Date    WBC 12 07 (H) 05/01/2023    HGB 12 3 05/01/2023    HCT 36 0 05/01/2023     (H) 05/01/2023     (L) 05/01/2023    MCH 34 5 (H) 05/01/2023    MCHC 34 2 05/01/2023    RDW 13 3 05/01/2023    MPV 10 3 05/01/2023    NRBC 0 05/01/2023   , CMP:   Lab Results   Component Value Date    SODIUM 133 (L) 05/01/2023    K 3 5 05/01/2023     05/01/2023    CO2 29 05/01/2023    BUN 5 05/01/2023    CREATININE 0 51 (L) 05/01/2023    CALCIUM 8 6 05/01/2023    AST 20 05/01/2023    ALT 62 05/01/2023    ALKPHOS 96 05/01/2023    EGFR 110 05/01/2023   , PT/PTT:  Lab Results   Component Value Date    PTT 28 05/01/2023     Imaging Studies: Reviewed  EKG, Pathology, and Other Studies: Reviewed    Counseling / Coordination of Care    Total floor / unit time spent today 15+ minutes  Greater than 50% of total time was spent with the patient and / or family counseling and / or coordination of care  A description of the counseling / coordination of care: Chart review, medication review, supportive listening    Portions of this document may have been created using dictation software and as such some \"sound alike\" terms may have been generated by the system  Do not hesitate to contact me with any questions or clarifications      "

## 2023-05-01 NOTE — PLAN OF CARE
Problem: MOBILITY - ADULT  Goal: Maintain or return to baseline ADL function  Description: INTERVENTIONS:  -  Assess patient's ability to carry out ADLs; assess patient's baseline for ADL function and identify physical deficits which impact ability to perform ADLs (bathing, care of mouth/teeth, toileting, grooming, dressing, etc )  - Assess/evaluate cause of self-care deficits   - Assess range of motion  - Assess patient's mobility; develop plan if impaired  - Assess patient's need for assistive devices and provide as appropriate  - Encourage maximum independence but intervene and supervise when necessary  - Involve family in performance of ADLs  - Assess for home care needs following discharge   - Consider OT consult to assist with ADL evaluation and planning for discharge  - Provide patient education as appropriate  Outcome: Progressing  Goal: Maintains/Returns to pre admission functional level  Description: INTERVENTIONS:  - Perform BMAT or MOVE assessment daily    - Set and communicate daily mobility goal to care team and patient/family/caregiver  - Collaborate with rehabilitation services on mobility goals if consulted  - Perform Range of Motion 3 times a day  - Reposition patient every 2 hours    - Dangle patient 2 times a day  - Stand patient 2 times a day  - Ambulate patient 2 times a day  - Out of bed to chair 2 times a day   - Out of bed for meals 3 times a day  - Out of bed for toileting  - Record patient progress and toleration of activity level   Outcome: Progressing     Problem: PAIN - ADULT  Goal: Verbalizes/displays adequate comfort level or baseline comfort level  Description: Interventions:  - Encourage patient to monitor pain and request assistance  - Assess pain using appropriate pain scale  - Administer analgesics based on type and severity of pain and evaluate response  - Implement non-pharmacological measures as appropriate and evaluate response  - Consider cultural and social influences on pain and pain management  - Notify physician/advanced practitioner if interventions unsuccessful or patient reports new pain  Outcome: Progressing     Problem: INFECTION - ADULT  Goal: Absence or prevention of progression during hospitalization  Description: INTERVENTIONS:  - Assess and monitor for signs and symptoms of infection  - Monitor lab/diagnostic results  - Monitor all insertion sites, i e  indwelling lines, tubes, and drains  - Monitor endotracheal if appropriate and nasal secretions for changes in amount and color  - [place] appropriate cooling/warming therapies per order  - Administer medications as ordered  - Instruct and encourage patient and family to use good hand hygiene technique  - Identify and instruct in appropriate isolation precautions for identified infection/condition  Outcome: Progressing  Goal: Absence of fever/infection during neutropenic period  Description: INTERVENTIONS:  - Monitor WBC    Outcome: Progressing     Problem: SAFETY ADULT  Goal: Maintain or return to baseline ADL function  Description: INTERVENTIONS:  -  Assess patient's ability to carry out ADLs; assess patient's baseline for ADL function and identify physical deficits which impact ability to perform ADLs (bathing, care of mouth/teeth, toileting, grooming, dressing, etc )  - Assess/evaluate cause of self-care deficits   - Assess range of motion  - Assess patient's mobility; develop plan if impaired  - Assess patient's need for assistive devices and provide as appropriate  - Encourage maximum independence but intervene and supervise when necessary  - Involve family in performance of ADLs  - Assess for home care needs following discharge   - Consider OT consult to assist with ADL evaluation and planning for discharge  - Provide patient education as appropriate  Outcome: Progressing  Goal: Maintains/Returns to pre admission functional level  Description: INTERVENTIONS:  - Perform BMAT or MOVE assessment daily    - Set and communicate daily mobility goal to care team and patient/family/caregiver  - Collaborate with rehabilitation services on mobility goals if consulted  - Perform Range of Motion 3 times a day  - Reposition patient every 2 hours    - Dangle patient 2 times a day  - Stand patient 2 times a day  - Ambulate patient 2 times a day  - Out of bed to chair 2 times a day   - Out of bed for meals 3 times a day  - Out of bed for toileting  - Record patient progress and toleration of activity level   Outcome: Progressing  Goal: Patient will remain free of falls  Description: INTERVENTIONS:  - Educate patient/family on patient safety including physical limitations  - Instruct patient to call for assistance with activity   - Consult OT/PT to assist with strengthening/mobility   - Keep Call bell within reach  - Keep bed low and locked with side rails adjusted as appropriate  - Keep care items and personal belongings within reach  - Initiate and maintain comfort rounds  - Make Fall Risk Sign visible to staff  - Offer Toileting every 2 Hours, in advance of need  - Initiate/Maintain bed alarm  - Obtain necessary fall risk management equipment: bed alarm  - Apply yellow socks and bracelet for high fall risk patients  - Consider moving patient to room near nurses station  Outcome: Progressing     Problem: DISCHARGE PLANNING  Goal: Discharge to home or other facility with appropriate resources  Description: INTERVENTIONS:  - Identify barriers to discharge w/patient and caregiver  - Arrange for needed discharge resources and transportation as appropriate  - Identify discharge learning needs (meds, wound care, etc )  - Arrange for interpretive services to assist at discharge as needed  - Refer to Case Management Department for coordinating discharge planning if the patient needs post-hospital services based on physician/advanced practitioner order or complex needs related to functional status, cognitive ability, or social support system  Outcome: Progressing     Problem: Knowledge Deficit  Goal: Patient/family/caregiver demonstrates understanding of disease process, treatment plan, medications, and discharge instructions  Description: Complete learning assessment and assess knowledge base    Interventions:  - Provide teaching at level of understanding  - Provide teaching via preferred learning methods  Outcome: Progressing     Problem: GASTROINTESTINAL - ADULT  Goal: Minimal or absence of nausea and/or vomiting  Description: INTERVENTIONS:  - Administer IV fluids if ordered to ensure adequate hydration  - Maintain NPO status until nausea and vomiting are resolved  - Nasogastric tube if ordered  - Administer ordered antiemetic medications as needed  - Provide nonpharmacologic comfort measures as appropriate  - Advance diet as tolerated, if ordered  - Consider nutrition services referral to assist patient with adequate nutrition and appropriate food choices  Outcome: Progressing  Goal: Maintains or returns to baseline bowel function  Description: INTERVENTIONS:  - Assess bowel function  - Encourage oral fluids to ensure adequate hydration  - Administer IV fluids if ordered to ensure adequate hydration  - Administer ordered medications as needed  - Encourage mobilization and activity  - Consider nutritional services referral to assist patient with adequate nutrition and appropriate food choices  Outcome: Progressing  Goal: Maintains adequate nutritional intake  Description: INTERVENTIONS:  - Monitor percentage of each meal consumed  - Identify factors contributing to decreased intake, treat as appropriate  - Assist with meals as needed  - Monitor I&O, weight, and lab values if indicated  - Obtain nutrition services referral as needed  Outcome: Progressing  Goal: Establish and maintain optimal ostomy function  Description: INTERVENTIONS:  - Assess bowel function  - Encourage oral fluids to ensure adequate hydration  - Administer IV fluids if ordered to ensure adequate hydration   - Administer ordered medications as needed  - Encourage mobilization and activity  - Nutrition services referral to assist patient with appropriate food choices  - Assess stoma site  - Consider wound care consult   Outcome: Progressing  Goal: Oral mucous membranes remain intact  Description: INTERVENTIONS  - Assess oral mucosa and hygiene practices  - Implement preventative oral hygiene regimen  - Implement oral medicated treatments as ordered  - Initiate Nutrition services referral as needed  Outcome: Progressing     Problem: Prexisting or High Potential for Compromised Skin Integrity  Goal: Skin integrity is maintained or improved  Description: INTERVENTIONS:  - Identify patients at risk for skin breakdown  - Assess and monitor skin integrity  - Assess and monitor nutrition and hydration status  - Monitor labs   - Assess for incontinence   - Turn and reposition patient  - Assist with mobility/ambulation  - Relieve pressure over bony prominences  - Avoid friction and shearing  - Provide appropriate hygiene as needed including keeping skin clean and dry  - Evaluate need for skin moisturizer/barrier cream  - Collaborate with interdisciplinary team   - Patient/family teaching  - Consider wound care consult   Outcome: Progressing     Problem: SAFETY,RESTRAINT: NV/NON-SELF DESTRUCTIVE BEHAVIOR  Goal: Remains free of harm/injury (restraint for non violent/non self-detsructive behavior)  Description: INTERVENTIONS:  - Instruct patient/family regarding restraint use   - Assess and monitor physiologic and psychological status   - Provide interventions and comfort measures to meet assessed patient needs   - Identify and implement measures to help patient regain control  - Assess readiness for release of restraint   Outcome: Progressing  Goal: Returns to optimal restraint-free functioning  Description: INTERVENTIONS:  - Assess the patient's behavior and symptoms that indicate continued need for restraint  - Identify and implement measures to help patient regain control  - Assess readiness for release of restraint   Outcome: Progressing

## 2023-05-01 NOTE — OP NOTE
OPERATIVE REPORT  PATIENT NAME: Sampson Campbell    :  1970  MRN: [id number]  Pt Location: BE OR ROOM 09    SURGERY DATE: 2023    Surgeon(s) and Role:  Panel 1:     * Jose Low MD - Primary  Panel 2:     * Avelina Garcia MD - Primary    Preop Diagnosis:  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 [facility]) [C79 31]    Post-Op Diagnosis Codes:     * 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 ([address] ) [C79 31]    Procedure(s):  Right - Right  possible left  ventriculoperitoneal shunt placement using neuronavigation  laparoscopic abdominal portion with GI surgery  Diagnostic laparoscopy  LAPAROSCOPIC APPROACH FOR VENTRICULAR PERITONEAL SHUNT INSERTION    Specimen(s):  * No specimens in log *    Estimated Blood Loss:   Minimal    Drains:  External Urinary Catheter (Active)   Collection Container Canister and suction tubing (For Female) 23 0600   Securement Method for Male Tape 23 0600   Suction Pressure (mmHg) 110 mmHg 23 0600   Interventions Removed and skin assessed; Pericare performed;Device changed;Suction canister changed;Suction tubing changed 23 0600   Output (mL) 250 mL 23 1200   Number of days: 3     Anesthesia Type:   General    Operative Indications:  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 [facility]) [C79 31]    Operative Findings:  -Fluid CSF egress seen on laparoscopic visualization, distal catheter looped into the pelvis    Complications:   None    Procedure and Technique:  51yo female complex cancer/surgical history, 'Patient is a 24-year-old female with h/o breast cancer metastatic to LN and liver s/p right mastectomy and LN resection (with baseline right arm numbness) on chemotherapy and obesity who presented with several weeks of progressively worsening headache, nausea/vomiting, dizziness, and gait imbalance, found to have a new large enhancing cerebellar mass s/p suboccipital craniotomy for resection of right cerebellar mass then left cerebellar mass with complex dural reconstruction on 4/3/23 (final pathology c/w “metastatic adenocarcinoma, consistent with breast primary” with aggressive features including high Ki-67 index), who p/w HA, nausea/vomiting, AMS s/p urgent R frontal EVD placement for acute HCP (OP > 20), failed clamp trial over weekend (both ICPs and symptoms), also with LLE DVT and saddle PE s/p IVCF and started on heparin gtt (held since midnight)  '     She has hydrocephalus, requested for assistance for laparoscopic positioning of distal  shunt by Dr Елена Hale      Discussed diagnostic laparoscopy, laparoscopic possible open distal shunt placement, benefits, alternatives, risks during personal face to face consent including not limited to bleeding, infection, risks of anesthesia, open surgery, DVT/PE, heart attack, stroke, death, damage to local structures, enterotomy, temporary versus permanent stoma, CSF infection, need for redo  shunt as well as the traditional risks as discussed by neurosurgical colleagues      She signed informed consent and was brought to the operating room where general endotracheal anesthesia was induced without event  Venodynes were on and running throughout the procedure  Cefazolin given prior to skin incision  She was electric skin clipped and abdomen chlorhexidine sterile prepped,draped in neurosurgical fashion with head chest and abdomen prepped      After time-out was taken per protocol procedure began with creating midline skin incision to tunnel abdominal catheter down to distally, Dr Елена Hale short tunneled to clavicle where additional incision was made, tunneling all the way eventually to subxiphoid incision  I then placed right upper quadrant incision with 15 blade, 5 mm Opti View gasless trocar was placed under direct vision    15 mm CO2 pneumoperitoneum established, given fatty adhesions at this position the port was left in placed and right lower quadrant periumbilical port was placed under direct vision into the already insufflated cavity, the first port was inspected and there was some omental insufflation and diagnostic laparoscopy showed no injury or bleeding on entry inspection      Then a 9 Hungarian peel-away sheath was placed through the midline incision over top of inner obturator of 5 mm trocar, tubing was placed through this and peel-away sheath was taken away while grasper was used to grasp the shunt tip which was brought out without tension  The distal end was held and visualized as Dr Sam Morgan pumped the proximal side showing active fluid egress,and the distal tip was intact,and tip placed into the pelvis under direct visualization, repeated pump on proximal side showing again fluid egress in its final position       Abdomen was desufflated under direct vision showing catheter and its proper placement  Hemostasis was assured  Ports sites and periclavicular incisions were closed with 4-0 Monocryl followed by histoacryl glue  All sponge, needle,instrument counts were correct  I was present/scrubbed for the entire procedure      Patient Disposition:  Patient handed back to Dr Sam Morgan for disposition to recovery/ICU      SIGNATURE: Elena Howe MD  DATE: May 1, 2023  TIME: 5:56 PM

## 2023-05-01 NOTE — PROGRESS NOTES
Post-op Check:  Pt  S/p R  shunt  On face mask 4L O2  Still drowsy post-op, but did attempt to open eyes to command, Squeezed hands bilaterally and wiggled toes bilaterally  Laparoscopic sites clean, dry and intact  Hypertensive post-op  PRN labetalol ordered  Keep SBP <160  Q1 hour neuro checks  Stat head CT for GCS change >2  Keep heparin gtt off until okayed by neurosurgery  PRN pain meds as needed     Re-assess once awake from anesthesia

## 2023-05-01 NOTE — PROGRESS NOTES
Patient had an episode earlier in the evening where her ICPs elevated to the 40s  EVD had been at 10 mmHg  and clamped for planned  shunt placement in AM   Patient complained of a headache at that time  Head of bed was elevated with improvement in ICPs to 10  Patient also had an improvement in her headache  Plan was to continue to monitor patient  She remained nonfocal on exam she is awake alert and oriented x3  Patient does have some dysarthria secondary to her known thrush  Approximately 10:45 PM patient had another episode of elevated ICPs with headache as well as nausea  Patient was sitting upright at this time  She was taken for an emergent CT scan  Ventricular size appeared similar may be slight increase in size of the lateral ventricles  ICP waveform showed significant P2 spike concerning for noncompliant ventricular system  Patient's EVD has been clamped when it was prior opened at 10 mmHg  EVD opened at 10 mmHg  Patient had immediate drainage of CSF approximately 8 mL  CSF was allowed to drain and ICP waveform rechecked  ICP decreased from the high 30s/low 40s to approximately 13-15 with drainage of 8 mL of CSF  Patient was allowed to drain 10 mL of CSF and maintained open at 10 mmHg for an hour  Plan is to increase EVD to 15 mmHg  Neurosurgery will be informed of events in AM   Patient does not tolerate clamping trial   Her headache and nausea are improved  ICPs remaining stable in the teens  Critical care time 49 minutes, critical care time does not include procedures or family updates

## 2023-05-01 NOTE — PROGRESS NOTES
[facility]  Interval Progress Note: Critical Care  Name: Linda Flower  MRN: [id number]  Unit/Bed#: ICU 01 I Date of Admission: 4/27/2023   Date of Service: 4/30/2023 I Hospital Day: 2    Interval Events:          notified by RN about ICP 40s, on exam ICP 42 good wave form pt c/o HA  HOB elevated and ICP 10 with improvement in HA  Exam unchanged- A&Ox3, strength 5/5 throughout, slight dysarthria secondary to thrush      Pertinent New Data:   blood pressure, pulse, temperature, respirations and pulse oximetry  I have personally reviewed pertinent lab results  ICP 42, repeat 7-10    Assessment and Plan  · Diagnosis: elevated ICP   · Plan: elevated HOB   · Keep EVD clamped- if occurs again will send for STAT CTH   · q2h neuro checks       Billing Level:  During this visit, Critical care services were medically necessary as this patient had a high probability of imminent or life-threatening deterioration due to elevated ICP , required my direct attention, intervention, and personal management to implement the following: direct patient care and documentation of findings  I have personally provided 25 minutes on 04/30/23 of critical care time, exclusive of procedures, teaching, family meetings, and any prior time recorded by providers other than myself      SIGNATURE: EMILIANO Nesbitt

## 2023-05-01 NOTE — QUICK NOTE
"Post- OP Note - Colorectal Surgery   Pastora Stephen 47 y o  female MRN: [id number]  Unit/Bed#: ICU 01 Encounter: [id number]    Assessment:  47 y o  female Day of Surgery s/p Procedure(s) (LRB):  Right, possible left, ventriculoperitoneal shunt placement using neuronavigation, laparoscopic abdominal portion with GI surgery (Right)  LAPAROSCOPIC APPROACH FOR VENTRICULAR PERITONEAL SHUNT INSERTION (N/A)     Subjective/Objective     Subjective:   Patient denies pain  No N/v      Objective:    Blood pressure (!) 191/87, pulse (!) 115, temperature 98 4 °F (36 9 °C), temperature source Oral, resp  rate (!) 27, height 5' 6\" (1 676 m), weight 106 kg (233 lb), SpO2 94 %  ,Body mass index is 37 61 kg/m²  Physical Exam:   Gen: NAD  HEENT: MMM  CV: RRR, well perfused  Lungs: Normal respiratory effort on mask 4L  Abd: soft, appropriately tender, nondistended, Incisions clean dry and intact  Skin: warm/ dry  Neuro:  AxO x3      Please tigertext on call [facility] white surgery resident with any questions   "

## 2023-05-01 NOTE — PROGRESS NOTES
Daily Progress Note - [facility] 47 y o  female MRN: [id number]  Unit/Bed#: ICU 01 Encounter: [id number]        ----------------------------------------------------------------------------------------  HPI/24hr events: 48 y/o female with past medical history of diabetes, metastatic breast cancer with known brain and liver metastasis, and recent hospitalization on April for brain metastases resection surgery who presented to ER with nausea, vomiting, and decreased oral intake for the past week   Patient was recently found to have a new large enhancing cerebellar mass and is now status post suboccipital craniotomy for resection of right cerebellar mass and then left cerebellar mass   Final pathology showed metastatic adenocarcinoma consistent with breast primary  [facility] did show stable ventricles sizes and relatively unchanged pseudomeningocele, however, given her worsening of her symptoms neurosurgery decided to proceed with CSF diversion with EVD placement  Patient transferred to neuro ICU for further management  Found to have saddle PE without right heart strain and LLE DVT, status post IVC filter placement 4/30  Overnight events: EVD clamped in the evening with ICPs elevated to the 40s  Head of bed elevated with improvement in ICPs to 10  At 10:45 PM another episode of ICP elevation with headache and nausea, status post CT head with slight ventricular enlargement  EVD open at 13 with plan for  shunt today  Heparin gtt held for procedure     ---------------------------------------------------------------------------------------  SUBJECTIVE  Did report some headache and nausea overnight which is now resolved  Has no new acute complaints today  Review of Systems  Review of systems was reviewed and negative unless stated above in HPI/24-hour events   ---------------------------------------------------------------------------------------  Assessment and Plan:    1   Hydrocephalus s/p EVD placement 4/28  2  Iatrogenic SAH  3  Metastatic breast cancer  4  Saddle pulmonary embolism  5  Extensive DVT LLE  6  Nausea and vomiting  7  Right acoustic neuroma  8  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  9  Hypokalemia  10  Oral thrush  11  Thrombocytopenia  12  LUE PICC 4/29  13  S/p IVC filter 4/30    Neuro:              Diagnoses: Recent history of cerebellar mass s/p suboccipital craniectomy                                   S/p CSF diversion with EVD placement,     Plan: CAM-ICU  Delirium precautions  Regulate sleep/wake cycle    -Neuro checks every 2 hours  -Neurosurgery following  -EVD @ 15; tentative plan for  shunt today  - CSF fluid consistent with high atypical cells, suspicious for metastatic tumor cells  -Pain management for headaches  -Patient is established with palliative care team, ongoing [address]     CV:              MAP: Maintain MAPs > 65 mmHg              Avoid hypotensive episodes     Pulm:              Pulmonary embolism  Incidental finding on CT CAP  No CT evidence of RHS  Asymptomatic, hemodynamically stable      IR providers don't believe that embolectomy is necessary at this time     Duplex showed extensive DVT in left leg  Status post IVC filter placement, heparin gtt held for procedure           Stage IV breast cancer of right breast, metastatic, ER+  ER/IN+, HER2- grade 3, diagnosed in June 2019  BRCA2 mutation +  -Medical oncology following                GI:              ZH prophylaxis: Not on any pressors  Will hold off for now       :              Has had nausea and vomiting throughout the course of hospitaliztion, would avoid any hypotensive episodes which can cause renal hypoperfusion and JANNA  Creatinine: Currently at baseline  Monitor urine output and renal indices  Avoid nephrotoxins     Has PureWick in place x2 days     F/E/N:                          - F: On [place] 125/hour                          - E: Monitor and replete electrolytes for Mg >2, Phos >3, K >4                           - N: NPO since midnight, restart regular diet after procedure     Endo:              CAROLJ: [id number] with HbA1c at 5 8 as of 03/2023  Glucose trend:   Insulin: Will hold off on SSI for now, can initiate if glucoses  become elevated  Monitor blood glucose for goal 140-180                  Heme:     Hemoglobin: 12 2  -Monitor Hgb  -Monitor platelets  VTE prophylaxis: Sequential compression devices and/or foot pumps      ID:              Oral thrush              oral Nystatin   Plan: Monitor temperature and WBC  Maintain normothermia       MSK/Skin:  Plan: Frequent turning and repositioning  Pressure injury prevention  Monitor for skin breakdown  PT/OT when appropriate  Encourage OOB and ambulation when appropriate  Patient appropriate for transfer out of the ICU today?: No  Disposition: Continue Critical Care   Code Status: Level 1 - Full Code  ---------------------------------------------------------------------------------------  ICU CORE MEASURES    Prophylaxis   VTE Pharmacologic Prophylaxis: none  VTE Mechanical Prophylaxis: sequential compression device  Stress Ulcer Prophylaxis: Prophylaxis Not Indicated     ABCDE Protocol (if indicated)  Plan to perform spontaneous awakening trial today? Not applicable  Plan to perform spontaneous breathing trial today? Not applicable  Obvious barriers to extubation? Not applicable  CAM-ICU: Negative    Invasive Devices Review  Invasive Devices     Peripherally Inserted Central Catheter Line  Duration           PICC Line 91/78/45 Left Basilic 1 day          Drain  Duration           External Urinary Catheter 2 days    Ventriculostomy/Subdural Ventricular drainage catheter Right Frontal region 2 days              Can any invasive devices be discontinued today?  Not applicable  ---------------------------------------------------------------------------------------  OBJECTIVE    Vitals   Vitals:    05/01/23 0200 23 0300 23 0400 23 0500   BP: 156/87 154/80 147/82 161/86   BP Location: Left arm Left arm Left arm Left arm   Pulse: 104 94 90 96   Resp: 18 21 (!) 23 21   Temp:   99 °F (37 2 °C)    TempSrc:   Oral    SpO2: 97% 97% 97% 98%   Weight:       Height:         Temp (24hrs), Av 5 °F (36 9 °C), Min:98 °F (36 7 °C), Max:99 °F (37 2 °C)  Current: Temperature: 99 °F (37 2 °C)    Respiratory:  Nasal Cannula O2 Flow Rate (L/min): 2 L/min    Invasive/non-invasive ventilation settings   Respiratory    Lab Data (Last 4 hours)    None         O2/Vent Data (Last 4 hours)    None                Physical Exam  Constitutional:       General: She is not in acute distress  HENT:      Head:      Comments: EVD in place     Mouth/Throat:      Mouth: Mucous membranes are moist    Eyes:      Extraocular Movements: Extraocular movements intact  Conjunctiva/sclera: Conjunctivae normal       Pupils: Pupils are equal, round, and reactive to light  Cardiovascular:      Rate and Rhythm: Normal rate and regular rhythm  Pulmonary:      Effort: Pulmonary effort is normal  No respiratory distress  Breath sounds: No wheezing  Abdominal:      General: Bowel sounds are normal  There is no distension  Palpations: Abdomen is soft  Tenderness: There is no abdominal tenderness  There is no guarding  Musculoskeletal:      Right lower leg: No edema  Left lower leg: No edema  Skin:     General: Skin is warm and dry  Capillary Refill: Capillary refill takes less than 2 seconds  Neurological:      General: Slight dysarthria, otherwise ANO x3 without focal deficits  Mental Status: She is alert and oriented to person, place, and time  Mental status is at baseline  Cranial Nerves: No cranial nerve deficit  Sensory: No sensory deficit  Motor: No weakness  Psychiatric:         Mood and Affect: Mood normal          Behavior: Behavior normal          Thought Content:  Thought content normal          Laboratory and Diagnostics:  Results from last 7 days   Lab Units 04/30/23  0602 04/29/23  1822 04/29/23  0538 04/28/23  0657 04/27/23  2034   WBC Thousand/uL 12 27* 7 64 8 00 6 73 7 49   HEMOGLOBIN g/dL 12 6 10 4* 12 1 12 2 13 4   HEMATOCRIT % 35 3 30 1* 34 6* 35 5 37 7   PLATELETS Thousands/uL 109* 82* 93* 85* 103*   NEUTROS PCT %  --   --  74 70 68   MONOS PCT %  --   --  12 13* 10     Results from last 7 days   Lab Units 04/30/23  1852 04/30/23  0602 04/29/23  1822 04/29/23  0538 04/28/23  0657 04/27/23  2034   SODIUM mmol/L 134* 135 136 138 137 136   POTASSIUM mmol/L 3 7 3 4* 3 6 3 2* 3 5 2 9*   CHLORIDE mmol/L 101 102 102 104 107 101   CO2 mmol/L 30 28 25 28 21 29   ANION GAP mmol/L 3* 5 9 6 9 6   BUN mg/dL 5 4* 4* 5 5 9   CREATININE mg/dL 0 55* 0 52* 0 36* 0 54* 0 35* 0 74   CALCIUM mg/dL 8 6 8 4 7 6* 8 2* 8 0* 9 4   GLUCOSE RANDOM mg/dL 136 129 104 115 93 123   ALT U/L  --  64  --   --   --  88*   AST U/L  --  23  --   --   --  44   ALK PHOS U/L  --  87  --   --   --  86   ALBUMIN g/dL  --  2 8*  --   --   --  3 4*   TOTAL BILIRUBIN mg/dL  --  0 77  --   --   --  0 61     Results from last 7 days   Lab Units 04/30/23  0602 04/29/23  0538 04/28/23  0657   MAGNESIUM mg/dL 2 3 2 4 2 1   PHOSPHORUS mg/dL 2 8 2 5*  --       Results from last 7 days   Lab Units 04/30/23  0751 04/30/23  0147 04/29/23  1945 04/29/23  1822 04/29/23  1230 04/28/23  0657   INR   --   --   --   --  1 03 1 12   PTT seconds 88* 80* 111* >210* 23 21*              ABG:    VBG:          Micro  Results from last 7 days   Lab Units 04/28/23  1605   GRAM STAIN RESULT  1+ Mononuclear Cells  No polys seen  No bacteria seen       EKG: Reviewed  Imaging: Reviewed    Intake and Output  I/O       04/29 0701 04/30 0700 04/30 0701 05/01 0700    P  O  120     I V  (mL/kg) 3281 9 (31) 3034 4 (28 6)    IV Piggyback 350 300    Total Intake(mL/kg) 3751 9 (35 4) 3334 4 (31 5)    Urine (mL/kg/hr) 2349 (0 9) 2509 (1)    Emesis/NG "output 0     Drains 265 144    Total Output 2614 2653    Net +1137 9 +681 4          Unmeasured Urine Occurrence 1 x     Unmeasured Emesis Occurrence 3 x           Height and Weights   Height: 5' 6\" (167 6 cm)  IBW (Ideal Body Weight): 59 3 kg  Body mass index is 37 61 kg/m²  Weight (last 2 days)     Date/Time Weight    04/29/23 0800 106 (233)          Nutrition       Diet Orders   (From admission, onward)             Start     Ordered    05/01/23 0001  Diet NPO; Sips with meds  (Order Panel)  Diet effective midnight        References:    Nutrtion Support Algorithm Enteral vs  Parenteral   Question Answer Comment   Diet Type NPO    NPO Except: Sips with meds    RD to adjust diet per protocol?  Yes        04/30/23 1812                Active Medications  Scheduled Meds:  Current Facility-Administered Medications   Medication Dose Route Frequency Provider Last Rate   • albuterol  2 puff Inhalation Q4H PRN Elizabeth Dillon MD     • ALPRAZolam  0 25 mg Oral HS PRN Elizabeth Dillon MD     • cefazolin  2,000 mg Intravenous Once Colvillejt Eisenberg PA-C     • chlorhexidine  15 mL Swish & Spit Q12H [address] Maddiebautista Monsivais PA-C     • fluconazole  100 mg Oral Daily [facility]     • multi-electrolyte  125 mL/hr Intravenous Continuous Jovan Simon  mL/hr (05/01/23 0408)   • nystatin  500,000 Units Swish & Swallow 4x Daily EMILIANO Flores     • ondansetron  4 mg Intravenous Q6H PRN Elizabeth Dillon MD     • oxyCODONE  2 5 mg Oral Q6H PRN Elizabeth Dillon MD     • potassium chloride  40 mEq Oral BID Izora Reed DO     • potassium-sodium phosphates  2 packet Oral 4x Daily (with meals and at bedtime) Izora Abed DO       Continuous Infusions:  multi-electrolyte, 125 mL/hr, Last Rate: 125 mL/hr (05/01/23 0408)      PRN Meds:   albuterol, 2 puff, Q4H PRN  ALPRAZolam, 0 25 mg, HS PRN  ondansetron, 4 mg, Q6H " "PRN  oxyCODONE, 2 5 mg, Q6H PRN        Allergies   Allergies   Allergen Reactions   • Tylenol [Acetaminophen]      Told to avoid due to cancer      ---------------------------------------------------------------------------------------  Advance Directive and Living Will:      Power of :    POLST:    ---------------------------------------------------------------------------------------  Care Time Delivered:   30 mins    Lina Rodríguez DO      Portions of the record may have been created with voice recognition software  Occasional wrong word or \"sound a like\" substitutions may have occurred due to the inherent limitations of voice recognition software    Read the chart carefully and recognize, using context, where substitutions have occurred   "

## 2023-05-01 NOTE — PLAN OF CARE
Problem: PHYSICAL THERAPY ADULT  Goal: Performs mobility at highest level of function for planned discharge setting  See evaluation for individualized goals  Description: Treatment/Interventions: Functional transfer training, Elevations, LE strengthening/ROM, Endurance training, Therapeutic exercise, Patient/family training, Equipment eval/education, Bed mobility, Gait training, Compensatory technique education, Spoke to nursing, OT  Equipment Recommended:  (pt already has RW)       See flowsheet documentation for full assessment, interventions and recommendations  Note: Prognosis: Fair  Problem List: Decreased strength, Decreased range of motion, Decreased endurance, Decreased mobility, Impaired balance, Decreased coordination, Decreased cognition, Impaired judgement, Decreased safety awareness, Pain, Orthopedic restrictions  Assessment: Pt required single step instruction for all mobility to decrease impulsivity throughout session  Required increased A during mobility with frequent knee buckling requring therapist A to recover  Pt will benfit form continue inpt skilled PT and rehab to maximize functional mobility & safety  Barriers to Discharge: Inaccessible home environment     PT Discharge Recommendation: Post acute rehabilitation services    See flowsheet documentation for full assessment

## 2023-05-01 NOTE — PHYSICAL THERAPY NOTE
"Physical Therapy Re-Evaluation     Patient's Name: Valentin Rocha    Admitting Diagnosis  Hyponatremia [E87 1]  Metastatic cancer ([facility] ) [C79 9]  Nausea and vomiting [R11 2]  Post-op pain [G89 18]  Elevated LFTs [R79 89]  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 [facility]) [C79 31]    Problem List  Patient Active Problem List   Diagnosis    Malignant neoplasm of central portion of right breast in female, estrogen receptor positive ([facility] )    Carcinoma of right breast metastatic to axillary lymph node ([facility] )    Family history of breast cancer in female    Family history of colon cancer    Dense breast tissue    BRCA2 gene mutation positive    Multiple lesions on computed tomography of brain    Breast cancer metastasized to liver ([place] [address] )    S/P BSO (bilateral salpingo-oophorectomy)    On antineoplastic chemotherapy    Advanced care planning/counseling discussion    Rash    Postmenopausal    Lymphedema    Anxiety about health    Allergic rhinitis    Obesity    Schwannoma of cranial nerve ([place] [address] )    Stroke ([place] [address] )    Cerebral aneurysm    Rosacea    Mixed hyperlipidemia    Constipation    GERD (gastroesophageal reflux disease)    Palliative care patient    Injury of right foot    Vomiting    Brain mass    Hyperglycemia, drug-induced    Leukocytosis    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 [facility])    Status post brain surgery    Pulmonary embolism and DVT       Past Medical History  Past Medical History:   Diagnosis Date    Bloating     Breast cancer ([facility] )     Bruises easily     Cancer ([facility] )     right breast//to lymph nodes/liver/ and bone    Depression     History of cancer chemotherapy 05/2020    History of pneumonia     Hypotension     occas    Low iron     \"when pregnant, 20 yrs ago\"    Neuropathy     hands//fingers    Shortness of breath     occas    Stroke [facility])     Subacromial impingement of left shoulder 03/14/2022    Tinnitus     right    Wears glasses        Past Surgical History  Past Surgical History:   Procedure Laterality " Date    BILATERAL SALPINGOOPHORECTOMY      BREAST BIOPSY      CRANIOTOMY Bilateral 4/3/2023    Procedure: Suboccipital craniotomy for tumor resection using neuronavigation;  Surgeon: Abeba Sanchez MD;  [facility];  Service: Neurosurgery    IR BIOPSY LIVER MASS  06/11/2019    IR IVC FILTER PLACEMENT PERMANENT  4/30/2023    MASTECTOMY Right     2019    MYOMECTOMY      H/O FIBROIDS, NO SURGERY NEEDED    AR LAPAROSCOPY W/RMVL ADNEXAL STRUCTURES N/A 07/29/2019    Procedure: LAPAROSCOPIC BILATERAL SALPINGO-OOPHORECTOMY;  Surgeon: Judy Hale MD;  Location: [facility];  Service: Gynecology Oncology    AR MASTECTOMY SIMPLE COMPLETE Right 06/05/2020    Procedure: MASTECTOMY SIMPLE, axillary node dissection;  Surgeon: Yazmin Teixeira MD;  Location: [facility];  Service: Surgical Oncology    US GUIDED BREAST BIOPSY RIGHT COMPLETE Right 05/03/2019    US GUIDED BREAST LYMPH NODE BIOPSY RIGHT Right 05/03/2019 05/01/23 1201   PT Last Visit   PT Visit Date 05/01/23   Note Type   Note type Re-Evaluation   Pain Assessment   Pain Assessment Tool 0-10   Pain Score No Pain   Restrictions/Precautions   Weight Bearing Precautions Per Order No   Other Precautions Impulsive;Cognitive; Chair Alarm; Bed Alarm;Pain; Fall Risk;Multiple lines   Cognition   Orientation Level Oriented X4   RLE Assessment   RLE Assessment   (grossly 3/5 with movement)   LLE Assessment   LLE Assessment   (grossly 3/5 with movmennt)   Coordination   Movements are Fluid and Coordinated 0   Coordination and Movement Description impulsive, noted knee buckling   Bed Mobility   Supine to Sit 4  Minimal assistance   Transfers   Sit to Stand 3  Moderate assistance   Stand to Sit 3  Moderate assistance   Ambulation/Elevation   Gait pattern Shuffling;Decreased foot clearance; Forward Flexion;Narrow BHUMI; Excessively slow   Gait Assistance 3  Moderate assist  (Max A during buckling)   Additional items Assist x 1   Assistive Device Rolling walker   Distance 12+4 Balance   Static Sitting Fair   Dynamic Sitting Fair -   Static Standing Poor +   Dynamic Standing Poor   Ambulatory Poor -   Endurance Deficit   Endurance Deficit Yes   Endurance Deficit Description limited by fatigue, weakness   Activity Tolerance   Activity Tolerance Patient limited by fatigue;Patient limited by pain   Medical Staff Made Aware OT   Nurse Made Aware yes   Assessment   Prognosis Fair   Problem List Decreased strength;Decreased range of motion;Decreased endurance;Decreased mobility; Impaired balance;Decreased coordination;Decreased cognition; Impaired judgement;Decreased safety awareness;Pain;Orthopedic restrictions   Assessment Pt required single step instruction for all mobility to decrease impulsivity throughout session  Required increased A during mobility with frequent knee buckling requring therapist A to recover  Pt will benfit form continue inpt skilled PT and rehab to maximize functional mobility & safety  Barriers to Discharge Inaccessible home environment   Goals   Patient Goals To use the bathroom   STG Expiration Date 05/13/23   Short Term Goal #1 1  Complete bed mobility and transfers I to decrease need for caregiver in home  2  Ambulate 300' I to complete household and community mobility without A  3  Improve dynamic balance to good to decrease need for UE support during ambulation  4  Be educated & demonstate 12 steps to be able to enter home without A  Plan   Treatment/Interventions OT; Spoke to case management;Spoke to nursing;Gait training;Bed mobility; Patient/family training; Endurance training;LE strengthening/ROM; Functional transfer training   PT Frequency 3-5x/wk   Recommendation   PT Discharge Recommendation Post acute rehabilitation services   AM-PAC Basic Mobility Inpatient   Turning in Flat Bed Without Bedrails 2   Lying on Back to Sitting on Edge of Flat Bed Without Bedrails 2   Moving Bed to Chair 2   Standing Up From Chair Using Arms 2   Walk in Room 2   Climb 3-5 Stairs With Railing 2   Basic Mobility Inpatient Raw Score 12   Basic Mobility Standardized Score 32 23   Highest Level Of Mobility   -[facility] Goal 4: Move to chair/commode   -HL Achieved 4: Move to chair/commode             Max Tay, PT

## 2023-05-01 NOTE — ANESTHESIA POSTPROCEDURE EVALUATION
Post-Op Assessment Note    CV Status:  Stable  Pain Score: 0    Pain management: adequate     Mental Status:  Sleepy and arousable   Hydration Status:  Stable   PONV Controlled:  None   Airway Patency:  Patent      Post Op Vitals Reviewed: Yes      Staff: CRNA         No notable events documented      BP   123/63   Temp   98 4   Pulse  113   Resp  16   SpO2  98% 4 l mask

## 2023-05-01 NOTE — PLAN OF CARE
Problem: MOBILITY - ADULT  Goal: Maintain or return to baseline ADL function  Description: INTERVENTIONS:  -  Assess patient's ability to carry out ADLs; assess patient's baseline for ADL function and identify physical deficits which impact ability to perform ADLs (bathing, care of mouth/teeth, toileting, grooming, dressing, etc )  - Assess/evaluate cause of self-care deficits   - Assess range of motion  - Assess patient's mobility; develop plan if impaired  - Assess patient's need for assistive devices and provide as appropriate  - Encourage maximum independence but intervene and supervise when necessary  - Involve family in performance of ADLs  - Assess for home care needs following discharge   - Consider OT consult to assist with ADL evaluation and planning for discharge  - Provide patient education as appropriate  Outcome: Progressing  Goal: Maintains/Returns to pre admission functional level  Description: INTERVENTIONS:  - Perform BMAT or MOVE assessment daily    - Set and communicate daily mobility goal to care team and patient/family/caregiver     - Collaborate with rehabilitation services on mobility goals if consulted  - Out of bed for toileting  - Record patient progress and toleration of activity level   Outcome: Progressing     Problem: PAIN - ADULT  Goal: Verbalizes/displays adequate comfort level or baseline comfort level  Description: Interventions:  - Encourage patient to monitor pain and request assistance  - Assess pain using appropriate pain scale  - Administer analgesics based on type and severity of pain and evaluate response  - Implement non-pharmacological measures as appropriate and evaluate response  - Consider cultural and social influences on pain and pain management  - Notify physician/advanced practitioner if interventions unsuccessful or patient reports new pain  Outcome: Progressing     Problem: INFECTION - ADULT  Goal: Absence or prevention of progression during hospitalization  Description: INTERVENTIONS:  - Assess and monitor for signs and symptoms of infection  - Monitor lab/diagnostic results  - Monitor all insertion sites, i e  indwelling lines, tubes, and drains  - Monitor endotracheal if appropriate and nasal secretions for changes in amount and color  - [place] appropriate cooling/warming therapies per order  - Administer medications as ordered  - Instruct and encourage patient and family to use good hand hygiene technique  - Identify and instruct in appropriate isolation precautions for identified infection/condition  Outcome: Progressing  Goal: Absence of fever/infection during neutropenic period  Description: INTERVENTIONS:  - Monitor WBC    Outcome: Progressing     Problem: SAFETY ADULT  Goal: Maintain or return to baseline ADL function  Description: INTERVENTIONS:  -  Assess patient's ability to carry out ADLs; assess patient's baseline for ADL function and identify physical deficits which impact ability to perform ADLs (bathing, care of mouth/teeth, toileting, grooming, dressing, etc )  - Assess/evaluate cause of self-care deficits   - Assess range of motion  - Assess patient's mobility; develop plan if impaired  - Assess patient's need for assistive devices and provide as appropriate  - Encourage maximum independence but intervene and supervise when necessary  - Involve family in performance of ADLs  - Assess for home care needs following discharge   - Consider OT consult to assist with ADL evaluation and planning for discharge  - Provide patient education as appropriate  Outcome: Progressing  Goal: Maintains/Returns to pre admission functional level  Description: INTERVENTIONS:  - Perform BMAT or MOVE assessment daily    - Set and communicate daily mobility goal to care team and patient/family/caregiver     - Collaborate with rehabilitation services on mobility goals if consulted  - Out of bed for toileting  - Record patient progress and toleration of activity level   Outcome: Progressing  Goal: Patient will remain free of falls  Description: INTERVENTIONS:  - Educate patient/family on patient safety including physical limitations  - Instruct patient to call for assistance with activity   - Consult OT/PT to assist with strengthening/mobility   - Keep Call bell within reach  - Keep bed low and locked with side rails adjusted as appropriate  - Keep care items and personal belongings within reach  - Initiate and maintain comfort rounds  - Make Fall Risk Sign visible to staff  - Apply yellow socks and bracelet for high fall risk patients  - Consider moving patient to room near nurses station  Outcome: Progressing     Problem: DISCHARGE PLANNING  Goal: Discharge to home or other facility with appropriate resources  Description: INTERVENTIONS:  - Identify barriers to discharge w/patient and caregiver  - Arrange for needed discharge resources and transportation as appropriate  - Identify discharge learning needs (meds, wound care, etc )  - Arrange for interpretive services to assist at discharge as needed  - Refer to Case Management Department for coordinating discharge planning if the patient needs post-hospital services based on physician/advanced practitioner order or complex needs related to functional status, cognitive ability, or social support system  Outcome: Progressing     Problem: Knowledge Deficit  Goal: Patient/family/caregiver demonstrates understanding of disease process, treatment plan, medications, and discharge instructions  Description: Complete learning assessment and assess knowledge base    Interventions:  - Provide teaching at level of understanding  - Provide teaching via preferred learning methods  Outcome: Progressing     Problem: GASTROINTESTINAL - ADULT  Goal: Minimal or absence of nausea and/or vomiting  Description: INTERVENTIONS:  - Administer IV fluids if ordered to ensure adequate hydration  - Maintain NPO status until nausea and vomiting are resolved  - Nasogastric tube if ordered  - Administer ordered antiemetic medications as needed  - Provide nonpharmacologic comfort measures as appropriate  - Advance diet as tolerated, if ordered  - Consider nutrition services referral to assist patient with adequate nutrition and appropriate food choices  Outcome: Progressing  Goal: Maintains or returns to baseline bowel function  Description: INTERVENTIONS:  - Assess bowel function  - Encourage oral fluids to ensure adequate hydration  - Administer IV fluids if ordered to ensure adequate hydration  - Administer ordered medications as needed  - Encourage mobilization and activity  - Consider nutritional services referral to assist patient with adequate nutrition and appropriate food choices  Outcome: Progressing  Goal: Maintains adequate nutritional intake  Description: INTERVENTIONS:  - Monitor percentage of each meal consumed  - Identify factors contributing to decreased intake, treat as appropriate  - Assist with meals as needed  - Monitor I&O, weight, and lab values if indicated  - Obtain nutrition services referral as needed  Outcome: Progressing  Goal: Establish and maintain optimal ostomy function  Description: INTERVENTIONS:  - Assess bowel function  - Encourage oral fluids to ensure adequate hydration  - Administer IV fluids if ordered to ensure adequate hydration   - Administer ordered medications as needed  - Encourage mobilization and activity  - Nutrition services referral to assist patient with appropriate food choices  - Assess stoma site  - Consider wound care consult   Outcome: Progressing  Goal: Oral mucous membranes remain intact  Description: INTERVENTIONS  - Assess oral mucosa and hygiene practices  - Implement preventative oral hygiene regimen  - Implement oral medicated treatments as ordered  - Initiate Nutrition services referral as needed  Outcome: Progressing     Problem: Prexisting or High Potential for Compromised Skin Integrity  Goal: Skin integrity is maintained or improved  Description: INTERVENTIONS:  - Identify patients at risk for skin breakdown  - Assess and monitor skin integrity  - Assess and monitor nutrition and hydration status  - Monitor labs   - Assess for incontinence   - Turn and reposition patient  - Assist with mobility/ambulation  - Relieve pressure over bony prominences  - Avoid friction and shearing  - Provide appropriate hygiene as needed including keeping skin clean and dry  - Evaluate need for skin moisturizer/barrier cream  - Collaborate with interdisciplinary team   - Patient/family teaching  - Consider wound care consult   Outcome: Progressing     Problem: SAFETY,RESTRAINT: NV/NON-SELF DESTRUCTIVE BEHAVIOR  Goal: Remains free of harm/injury (restraint for non violent/non self-detsructive behavior)  Description: INTERVENTIONS:  - Instruct patient/family regarding restraint use   - Assess and monitor physiologic and psychological status   - Provide interventions and comfort measures to meet assessed patient needs   - Identify and implement measures to help patient regain control  - Assess readiness for release of restraint   Outcome: Progressing  Goal: Returns to optimal restraint-free functioning  Description: INTERVENTIONS:  - Assess the patient's behavior and symptoms that indicate continued need for restraint  - Identify and implement measures to help patient regain control  - Assess readiness for release of restraint   Outcome: Progressing

## 2023-05-01 NOTE — OP NOTE
OPERATIVE REPORT  PATIENT NAME: Rudy Michele    :  1970  MRN: [id number]  Pt Location: [facility] OR ROOM 09    SURGERY DATE: 2023    Surgeon(s) and Role:  Neurosurgery:     * Bridgette Harris MD - Primary  GI surgery:     * Bernard Hutton MD - Primary    Preop Diagnosis:  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 [facility]) [C79 31]  Hydrocephalus     Postop Diagnosis:  Same    Procedure(s):  -Right frontal ventriculoperitoneal shunt placement using neuronavigation, using Certas Plus valve preset at 5  -Removal of right frontal EVD  -Laparoscopic abdominal portion with GI surgery (Dr Silvio Hull)    Specimen(s):  None    Estimated Blood Loss:   20 cc    Drains:  None    Anesthesia Type:   General    Operative Indications:  Hydrocephalus     Operative Findings:  Adequate location and trajectory of new proximal shunt catheter, confirmed with intraoperative neuronavigation, clear CSF drainage visualized laparoscopically into peritoneal cavity      Complications:   None     Procedure and Technique:  The patient was brought to the OR and successfully induced with general endotracheal anesthesia, and sufficient IV access placed  New cranial incisions were planned in right frontal and right posterior auricular regions  All relevant areas were prepped and draped in standard fashion  A timeout was performed  The patient received antibiotics per protocol  The skin was infiltrated with 1% lidocaine with epinephrine at the incision site(s)  Skin incisions were opened with a skin scalpel, and dissection carried down with the Bovie cautery  The right frontal region was tunneled, with adequate soft tissue dissection, to posterior auricular region for another small incision  Due to copious fat and scar tissue from prior surgery involving right mastectomy, I used another tunneler from the posterior auricular region to the sternal region right above the clavicle to tunnel the distal catheter   Then the distal catheter was tunneled to the peritoneal cavity, under direct visual guidance laparoscopically with general surgery assistance  This portion of the procedure will be dictated separately  The new distal catheter was inserted into the peritoneal cavity through this long tunneler then eventually tunneled to right frontal incision  A new Certas Plus valve, preset at 5, was then connected and reinforced with silk tie  Then, I proceeded with placement of a new proximal catheter after removing previously placed EVD catheter  Of note, there was some bleeding encountered upon removal of the EVD catheter (likely some of the subarachnoid blood on prior CT scans since placement of EVD), which was copiously irrigated until cleared  Using intraoperative Axiem Stealth navigation, a navigated stylet was used to place a new proximal catheter into the right lateral ventricle  There was brisk flow of clear CSF from this new proximal catheter, under moderately high pressure, which was anchored in place with the tip approximately 7 cm from the inner table of the skull  The proximal catheter was then connected proximally to the new valve and reinforced with silk tie  With general surgery assistance, the distal catheter was pulled gently from the peritoneal cavity (under direct laparoscopic visualization) to allow the valve to sit in soft tissue without any slack, torquing, or bending of the entire distal catheter system  We were able to visualize clear CSF flow at the tip of the distal catheter in the peritoneal cavity, to confirm that this new shunt system is functional       Hemostasis was confirmed prior to closure of all incisions  The galea was closed with inverted, interrupted 2-0 Vicryl Plus suture  The skin was closed with monocryl absorbable sutures  Incisions were infiltrated with additional local anesthetic, Marcaine with epinephrine        All instrument counts, sponge counts, and needle counts were correct prior to closure of the skin  The incision was cleansed and then sealed with surgical glue  The drapes were withdrawn and the area cleansed  I performed the neurosurgical portion of surgery without assistance, and a qualified resident physician was not available       Disposition:   Return to ICU    Eddie Tovar

## 2023-05-01 NOTE — PROGRESS NOTES
"Postop check  Patient is now s/p right frontal VPS, recovering well and neurologically stable  Vitals:    05/01/23 1235 05/01/23 1300 05/01/23 1400 05/01/23 1500   BP: 170/96 162/94 166/95 152/97   BP Location:       Pulse: (!) 126 (!) 116 102    Resp: (!) 29 (!) 28 (!) 27    Temp:   98 2 °F (36 8 °C)    TempSrc:   Oral    SpO2: 94% 93% (!) 88%    Weight:       Height:           Neurologic exam  Alert, waking up from anesthesia, saying \"I'm cold\"  PERRL   Face symmetric  5/5 in all extremities, non-focal, following commands  All incisions CDI    Assessment  Patient is a 19-year-old female with h/o breast cancer metastatic to LN and liver s/p right mastectomy and LN resection (with baseline right arm numbness) on chemotherapy and obesity who presented with several weeks of progressively worsening headache, nausea/vomiting, dizziness, and gait imbalance, found to have a new large enhancing cerebellar mass s/p suboccipital craniotomy for resection of right cerebellar mass then left cerebellar mass with complex dural reconstruction on 4/3/23 (final pathology c/w “metastatic adenocarcinoma, consistent with breast primary” with aggressive features including high Ki-67 index), who p/w HA, nausea/vomiting, AMS s/p urgent R frontal EVD placement for acute HCP (OP > 20), failed clamp trial over weekend (both ICPs and symptoms), also with LLE DVT and saddle PE s/p IVCF and started on heparin gtt then held prior to OR, now s/p R frontal VPS (Certas Plus at 5)       Plan  -Q1H neurologic exams  -SBP<160  -Postop CTH and shunt series (including direct valve view)  -Hold all forms of AC/AP including heparin gtt for at least 2 days, will need repeat CTH before resuming AC and when therapeutic  -Monitor closely in ICU or SDU while undergoing heparin to Coumadin bridge  -PT/OT, speech therapy   -Appreciate NCCU care    Called and update both son Ximena Mendosa and brother Marlin Andrade REED    Neurosurgeon     "

## 2023-05-01 NOTE — PLAN OF CARE
Problem: OCCUPATIONAL THERAPY ADULT  Goal: Performs self-care activities at highest level of function for planned discharge setting  See evaluation for individualized goals  Description: Treatment Interventions: ADL retraining, Functional transfer training, UE strengthening/ROM, Endurance training, Cognitive reorientation, Patient/family training, Equipment evaluation/education, Compensatory technique education  Equipment Recommended: Bedside commode, Shower/Tub chair with back ($)       See flowsheet documentation for full assessment, interventions and recommendations     Note: Limitation: Decreased ADL status, Decreased Safe judgement during ADL, Decreased cognition, Decreased endurance, Decreased self-care trans, Decreased high-level ADLs  Prognosis: Good, Fair  Assessment: Pt seen for OT reeval 2* decreased overall medical status, transfer to ICU and insertion of EVD - pt presents awake - oriented to self, place and general time - follows all simple commands with increased time - difficulty noted with more complex tasks - impulsive t/o - functionally requires mod a for adls and mobility with sba/cga of 2nd for safety 2* impulsivity and B knee buckling during mobility - d/c recommendation at this time would be for inpt rehab -OT to continue to follow to address goals as stated below     OT Discharge Recommendation: Post acute rehabilitation services

## 2023-05-01 NOTE — OCCUPATIONAL THERAPY NOTE
"    Occupational Therapy Re-Evaluation     Patient Name: Keiry Arellano  Today's Date: 5/1/2023  Problem List  Principal Problem:    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 [facility])  Active Problems:    Malignant neoplasm of central portion of right breast in female, estrogen receptor positive ([facility] [address] )    Palliative care patient    Vomiting    Status post brain surgery    Pulmonary embolism and DVT    Past Medical History  Past Medical History:   Diagnosis Date    Bloating     Breast cancer ([facility] [address] )     Bruises easily     Cancer ([facility] [address] )     right breast//to lymph nodes/liver/ and bone    Depression     History of cancer chemotherapy 05/2020    History of pneumonia     Hypotension     occas    Low iron     \"when pregnant, 20 yrs ago\"    Neuropathy     hands//fingers    Shortness of breath     occas    Stroke ([facility] [address] )     Subacromial impingement of left shoulder 03/14/2022    Tinnitus     right    Wears glasses      Past Surgical History  Past Surgical History:   Procedure Laterality Date    BILATERAL SALPINGOOPHORECTOMY      BREAST BIOPSY      CRANIOTOMY Bilateral 4/3/2023    Procedure: Suboccipital craniotomy for tumor resection using neuronavigation;  Surgeon: Taiwo Mata MD;  Location: [facility];  Service: Neurosurgery    IR BIOPSY LIVER MASS  06/11/2019    IR IVC FILTER PLACEMENT PERMANENT  4/30/2023    MASTECTOMY Right     2019    MYOMECTOMY      H/O FIBROIDS, NO SURGERY NEEDED    ME LAPAROSCOPY W/RMVL ADNEXAL STRUCTURES N/A 07/29/2019    Procedure: LAPAROSCOPIC BILATERAL SALPINGO-OOPHORECTOMY;  Surgeon: Deven Guevara MD;  Location: [facility];  Service: Gynecology Oncology    ME MASTECTOMY SIMPLE COMPLETE Right 06/05/2020    Procedure: MASTECTOMY SIMPLE, axillary node dissection;  Surgeon: Adonna Skiff, MD;  Location: [facility];  Service: Surgical Oncology    US GUIDED BREAST BIOPSY RIGHT COMPLETE Right 05/03/2019    US GUIDED BREAST LYMPH NODE BIOPSY RIGHT Right 05/03/2019 05/01/23 1200   OT Last Visit   OT " "Visit Date 05/01/23   Note Type   Note type Re-Evaluation  (s/p transfer to ICU and insertion of EVD)   Pain Assessment   Pain Assessment Tool 0-10   Pain Score No Pain   Restrictions/Precautions   Weight Bearing Precautions Per Order No   Other Precautions Impulsive;Cognitive; Bed Alarm; Chair Alarm; Fall Risk;Multiple lines   Home Living   Type of [address] One level;Stairs to enter with rails   Bathroom Shower/Tub Walk-in shower   Bathroom Toilet Standard   Bathroom Equipment Grab bars in [address]   Level of [address] with ADLs; Independent with functional mobility; Needs assistance with IADLS   Lives With (S)  Alone   Receives Help From Family;Friend(s)   Falls in the last 6 months 1 to 4   Vocational Full time employment   Lifestyle   Autonomy I adls and mobility - has assist from friend for iadls   Reciprocal Relationships supportive friends who are able to assist   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   Intrinsic Gratification sedentary   Subjective   Subjective \"I need to go to bathroom\"   ADL   Eating Assistance 4  Minimal Assistance   Grooming Assistance 4  Minimal Assistance   UB Bathing Assistance 3  Moderate Assistance   LB Bathing Assistance 3  Moderate Assistance   [address] 3  Moderate Assistance    [facility] [address] 3  Moderate [address]  3  Moderate Assistance   Bed Mobility   Supine to Sit 4  Minimal assistance   Transfers   Sit to Stand 3  Moderate assistance   Stand to Sit 3  Moderate assistance   Toilet transfer 3  Moderate assistance   Functional Mobility   Functional Mobility 3  Moderate assistance   Additional Comments assist of 2nd (SBA/CGA 2* knee buckling and impulsivity)   Additional items Rolling walker   Balance   Static Sitting Fair   Dynamic Sitting Fair -   Static Standing Poor +   Dynamic Standing Poor +   Ambulatory Poor   Activity Tolerance   Activity Tolerance " Patient limited by fatigue;Patient limited by pain;Treatment limited secondary to medical complications (Comment)   Medical Staff Made Beto Yates DPT and Patrick Fragoso DPT present for coeval 2* medical complexity, comorbidities and limited overall tolerance to activity   RUE Assessment   RUE Assessment WFL   LUE Assessment   LUE Assessment WFL   Psychosocial   Psychosocial (WDL) WDL   Patient Behaviors/Mood Calm; Cooperative   Cognition   Overall Cognitive Status Impaired   Arousal/Participation Arousable; Cooperative   Attention Attends with cues to redirect   Orientation Level Oriented X4;Oriented to place;Oriented to time;Oriented to situation;Oriented to person   Memory Decreased short term memory;Decreased recall of recent events;Decreased recall of precautions   Following Commands Follows one step commands with increased time or repetition   Comments impulsive with limited insight/safety awareness   Assessment   Limitation Decreased ADL status; Decreased Safe judgement during ADL;Decreased cognition;Decreased endurance;Decreased self-care trans;Decreased high-level ADLs   Prognosis Good;Fair   Assessment Pt seen for OT reeval 2* decreased overall medical status, transfer to ICU and insertion of EVD - pt presents awake - oriented to self, place and general time - follows all simple commands with increased time - difficulty noted with more complex tasks - impulsive t/o - functionally requires mod a for adls and mobility with sba/cga of 2nd for safety 2* impulsivity and B knee buckling during mobility - d/c recommendation at this time would be for inpt rehab -OT to continue to follow to address goals as stated below   Goals   Patient Goals go to bathroom   LTG Time Frame 10-14   Long Term Goal #1 refer to established goals below   Plan   Treatment Interventions ADL retraining;Functional transfer training; Endurance training;Cognitive reorientation;Patient/family training;Equipment evaluation/education; Compensatory technique education; Activityengagement   Goal Expiration Date 05/15/23   OT Frequency 3-5x/wk   Recommendation   OT Discharge Recommendation Post acute rehabilitation services   AM-[facility] Daily Activity Inpatient   Lower Body Dressing 2   Bathing 2   Toileting 2   Upper Body Dressing 3   Grooming 3   Eating 4   Daily Activity Raw Score 16   Daily Activity Standardized Score (Calc for Raw Score >=11) 35 96   [facility] Applied Cognition Inpatient   Following a Speech/Presentation 3   Understanding Ordinary Conversation 4   Taking Medications 3   Remembering Where Things Are Placed or Put Away 3   Remembering List of 4-5 Errands 2   Taking Care of Complicated Tasks 2   Applied Cognition Raw Score 17   Applied Cognition Standardized Score 36 52   End of Consult   Education Provided Yes   Patient Position at End of Consult Bedside chair;Bed/Chair alarm activated; All needs within reach   Nurse Communication Nurse aware of consult       OCCUPATIONAL THERAPY GOALS:    *SBA adls after setup with use of AE PRN  *SBA toileting and clothing management   *SBA functional mobility and transfers to/from all surfaces with fair+ dynamic balance and safety for participation in dynamic adls and iadl tasks   *Demonstrate fair+ carryover with safe use of RW during functional tasks   *Assess DME needs   *Increase activity tolerance to 35-40 minutes for participation in adls and enjoyable activities  *Pt to participate in ongoing functional cognitive assessment with good attention/participation to assist with safe d/c recommendations        The patient's raw score on the AM-[facility] Daily Activity Inpatient Short Form is 16  A raw score of less than 19 suggests the patient may benefit from discharge to home  Please refer to the recommendation of the Occupational Therapist for safe discharge planning        [employer]

## 2023-05-01 NOTE — INTERVAL H&P NOTE
49yo female complex cancer/surgical history, 'Patient is a 50-year-old female with h/o breast cancer metastatic to LN and liver s/p right mastectomy and LN resection (with baseline right arm numbness) on chemotherapy and obesity who presented with several weeks of progressively worsening headache, nausea/vomiting, dizziness, and gait imbalance, found to have a new large enhancing cerebellar mass s/p suboccipital craniotomy for resection of right cerebellar mass then left cerebellar mass with complex dural reconstruction on 4/3/23 (final pathology c/w “metastatic adenocarcinoma, consistent with breast primary” with aggressive features including high Ki-67 index), who p/w HA, nausea/vomiting, AMS s/p urgent R frontal EVD placement for acute HCP (OP > 20), failed clamp trial over weekend (both ICPs and symptoms), also with LLE DVT and saddle PE s/p IVCF and started on heparin gtt (held since midnight)  '    She has hydrocephalus, requested for assistance for laparoscopic positioning of distal  shunt by Dr Sharif Ke  Discussed diagnostic laparoscopy, laparoscopic possible open distal shunt placement, benefits, alternatives, risks during personal face to face consent including not limited to bleeding, infection, risks of anesthesia, open surgery, DVT/PE, heart attack, stroke, death, damage to local structures, enterotomy, temporary versus permanent stoma, CSF infection, need for redo  shunt as well as the traditional risks as discussed by neurosurgical colleagues  H&P reviewed  After examining the patient I find no changes in the patients condition since the H&P had been written      Vitals:    05/01/23 1300   BP: 162/94   Pulse: (!) 116   Resp: (!) 28   Temp:    SpO2: 93%

## 2023-05-02 NOTE — ASSESSMENT & PLAN NOTE
Imaging:   • CT chest abdomen pelvis with 4/28: New saddle pulmonary embolism extending into bilateral pulmonary artery segment branches  No evidence of right heart strain  • Lower extremity duplex 4/29: Left lower extremity with occlusive acute DVT noted in the femoral, popliteal, gastrinomas, posterior tibial and peroneal veins       Plan:   • S/P IVC filter  • Ok for [facility] BID today  • Can start heparin gtt on POD 3  • Patient to remain in ICU or SD1 during transition to oral [facility]

## 2023-05-02 NOTE — PROGRESS NOTES
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 [facility])  Assessment & Plan  POD 1 Right, possible left, ventriculoperitoneal shunt placement using neuronavigation, laparoscopic abdominal portion with GI surgery  · Patient with PMH of stage IV breast cancer with brain and liver metastases presented to the ED 4/27/2023 c/o headache and progressive nausea and vomiting for several weeks  · Patient is s/p suboccipital craniotomy for resection of right and left cerebellar masses with complex dural reconstruction 4/3/2023 with Dr Sloan Perea  · Now with Certas Plus valve set at 5    Imaging  · CT head w/o, 5/2/23: Final read pending  Per my interpretation, stable post op findings and presence of  shunt right lateral ventricle  Plan  · Continue monitoring neurological status/exam with frequent neurological checks  · Shunt series pending  · PT/OT evaluation  · Mobilize as tolerated with assistance  · Pain management per primary team  · Medical management per primary team  · Palliative care following  · DVT ppx: SCD, ok to start SQH BID today    Neurosurgery will continue to follow closely  Patient remain in either ICU or SD1 during heparin to coumadin transition  Call with questions        Pulmonary embolism and DVT  Assessment & Plan  Imaging:   • CT chest abdomen pelvis with 4/28: New saddle pulmonary embolism extending into bilateral pulmonary artery segment branches  No evidence of right heart strain  • Lower extremity duplex 4/29: Left lower extremity with occlusive acute DVT noted in the femoral, popliteal, gastrinomas, posterior tibial and peroneal veins       Plan:   • S/P IVC filter  • Ok for SQ[facility] BID today  • Can start heparin gtt on POD 3  • Patient to remain in ICU or SD1 during transition to oral "AC      Status post brain surgery  Assessment & Plan  See above  Subjective/Objective   Chief Complaint: \"My neck hurts, I can't swallow\"    Subjective: Patient dysarthric with slightly swollen tongue / thrush today  C/o sore throat  Otherwise, doing much better than on admission  Denies further episodes of nausea / vomiting  Son at bedside  Objective: Patient laying in bed in NAD  VSS  Invasive Devices     Peripherally Inserted Central Catheter Line  Duration           PICC Line 20/84/10 Left Basilic 2 days          Drain  Duration           External Urinary Catheter 3 days                Vitals: Blood pressure 166/83, pulse 96, temperature 98 4 °F (36 9 °C), temperature source Oral, resp  rate (!) 33, height 5' 6\" (1 676 m), weight 106 kg (233 lb), SpO2 98 %  ,Body mass index is 37 61 kg/m²  General appearance: alert, appears stated age, cooperative and no distress  Head: right frontal incision CDI, retroauricular incision CDI  Eyes: EOMI, PERRL, conjugate gaze  Neck: mild swelling  Abdomen: incision CDI  Lungs: non labored breathing  Heart: regular heart rate  Neurologic:   Mental status: Alert, oriented x3, thought content appropriate, slightly dysarthric  Cranial nerves: grossly intact (Cranial nerves II-XII), cannot stick out tongue (unwilling vs unable, difficult to tell)  Sensory: normal to LT  Motor: moving all extremities without focal weakness   Coordination: finger to nose normal bilaterally, no drift bilaterally    Lab Results: I have personally reviewed pertinent results        Results from last 7 days   Lab Units 05/02/23  0424 05/01/23  0531 04/30/23  0602 04/29/23  1822 04/29/23  0538 04/28/23  0657   WBC Thousand/uL 11 94* 12 07* 12 27*   < > 8 00 6 73   HEMOGLOBIN g/dL 12 1 12 3 12 6   < > 12 1 12 2   HEMATOCRIT % 36 4 36 0 35 3   < > 34 6* 35 5   PLATELETS Thousands/uL 137* 122* 109*   < > 93* 85*   NEUTROS PCT %  --  83*  --   --  74 70   MONOS PCT %  --  7  --   " --  12 13*    < > = values in this interval not displayed  Results from last 7 days   Lab Units 05/02/23  0424 05/01/23  0531 04/30/23  1852 04/30/23  0602 04/28/23  0657 04/27/23  2034   POTASSIUM mmol/L 4 1 3 5 3 7 3 4*   < > 2 9*   CHLORIDE mmol/L 104 102 101 102   < > 101   CO2 mmol/L 31 29 30 28   < > 29   BUN mg/dL 8 5 5 4*   < > 9   CREATININE mg/dL 0 53* 0 51* 0 55* 0 52*   < > 0 74   CALCIUM mg/dL 9 5 8 6 8 6 8 4   < > 9 4   ALK PHOS U/L  --  96  --  87  --  86   ALT U/L  --  62  --  64  --  88*   AST U/L  --  20  --  23  --  44    < > = values in this interval not displayed  Results from last 7 days   Lab Units 05/01/23  0531 04/30/23  0602 04/29/23  0538   MAGNESIUM mg/dL 2 6 2 3 2 4     Results from last 7 days   Lab Units 05/01/23  0531 04/30/23  0602 04/29/23  0538   PHOSPHORUS mg/dL 2 9 2 8 2 5*     Results from last 7 days   Lab Units 05/02/23  0424 05/01/23  0531 04/30/23  0751 04/29/23  1822 04/29/23  1230   INR  1 18 1 09  --   --  1 03   PTT seconds 25 28 88*   < > 23    < > = values in this interval not displayed  No results found for: TROPONINT  ABG:No results found for: PHART, CQF7QSJ, PO2ART, PJD8GIF, Z0IIEEMZ, BEART, SOURCE    Imaging Studies: I have personally reviewed pertinent reports  and I have personally reviewed pertinent films in PACS     XR chest portable    Result Date: 4/30/2023  Narrative: CHEST INDICATION:   PICC placement  COMPARISON: Chest x-ray April 3, 2023 EXAM PERFORMED/VIEWS:  XR CHEST PORTABLE FINDINGS: Left arm PICC line tip is in the superior vena cava  Cardiomediastinal silhouette appears unremarkable  The lungs are clear  No pneumothorax or pleural effusion  Osseous structures appear within normal limits for patient age  Surgical clips in the right breast and axilla  Impression: PICC line tip in the superior vena cava  No acute cardiopulmonary disease   Workstation performed: [id number]     CT head wo contrast    Result Date: 5/1/2023  Narrative: CT BRAIN - WITHOUT CONTRAST INDICATION:   ICP elevation suspected elevated ICP  Intracranial metastasis with prior resection of a posterior fossa lesion  COMPARISON: Multiple prior examinations, most recently 4/30/2023 and 4/29/2023  TECHNIQUE:  CT examination of the brain was performed  Multiplanar 2D reformatted images were created from the source data  Radiation dose length product (DLP) for this visit:  897 95 mGy-cm   This examination, like all CT scans performed in the [facility], was performed utilizing techniques to minimize radiation dose exposure, including the use of iterative  reconstruction and automated exposure control  IMAGE QUALITY:  Diagnostic  FINDINGS: PARENCHYMA: The patient has undergone a suboccipital craniotomy for resection of a previous midline metastasis once again identified is a resection cavity within the inferior aspect of the posterior fossa at and just to the right of midline  There is increased density seen along the anterior and left posterior lateral aspect of the resection cavity which is unchanged from the prior examination  Mild surrounding edema resulting in mild mass effect upon the fourth ventricle which is not significantly compressed  Supratentorial brain parenchyma demonstrates a hyperdense mass within the right lateral frontal lobe on series 2 image 31 which is grossly unchanged  No new masses are identified  No signs of acute ischemia  VENTRICLES AND EXTRA-AXIAL SPACES: A shunt catheter via a right frontal approach is unchanged in position, extending into the anterior body of the right lateral ventricle  Stable size of the lateral ventricles, third ventricle and fourth ventricle when compared to the 2 most recent examinations  There is a small amount of subarachnoid hemorrhage within the anterior superior right frontal lobe adjacent to the shunt catheter, stable  VISUALIZED ORBITS: Normal visualized orbits   PARANASAL SINUSES: Normal visualized paranasal sinuses  CALVARIUM AND EXTRACRANIAL SOFT TISSUES: As described above there has been a previous suboccipital craniotomy in the midline  This is unchanged  There is a small amount of fluid anterior and posterior to the inferior aspect of this craniotomy grossly unchanged in size and configuration from the prior examination  Impression: Stable appearance of the posterior fossa in this patient with a prior mass resection  Persistent mild edema and slight mass effect upon the fourth ventricle without obstructive hydrocephalus  Shunt catheter unchanged in position and ventricular size is unchanged  Stable small amount of subarachnoid hemorrhage within the anterior superior right frontal lobe  Stable well-circumscribed hyperdense metastatic lesion within the right frontal lobe  Stable small pseudomeningocele anterior and posterior to the occipital craniotomy flap  Workstation performed: [id number]     CT head wo contrast    Result Date: 4/30/2023  Narrative: CT BRAIN - WITHOUT CONTRAST INDICATION:   Craniotomy, post-op s/p suboccipital craniotomy  Stage IV breast cancer with brain and liver metastasis  Status post suboccipital craniotomy for resection of cerebellar masses  Status post ventriculostomy placement  COMPARISON: CT of the head from 4/29/2023 TECHNIQUE:  CT examination of the brain was performed  Multiplanar 2D reformatted images were created from the source data  Radiation dose length product (DLP) for this visit:  2009 84 mGy-cm   This examination, like all CT scans performed in the [facility], was performed utilizing techniques to minimize radiation dose exposure, including the use of iterative reconstruction and automated exposure control  IMAGE QUALITY:  Diagnostic  FINDINGS: PARENCHYMA: The patient is status post right frontal approach ventriculostomy catheter placement, which is stable in position in the right lateral ventricle  Ventricular size is unchanged   There is a small amount of right frontal subarachnoid hemorrhage stable  There are no new areas of acute intracranial hemorrhage identified  The patient is status post suboccipital craniotomy and dural reconstruction with redemonstration of a pseudomeningocele and overall similar to prior MRI measuring approximately 2 2 x 5 3 cm on sagittal imaging  Current imaging extends to the level of C4-C5  There are stable postoperative changes with a small amount of hemorrhage in the operative cerebellar bed  Degree of edema is unchanged  Correlate with prior MRI for findings related to intracranial metastatic disease  A hyperdense rounded lesion is noted within the right frontal lobe  VENTRICLES AND EXTRA-AXIAL SPACES: Please see above  VISUALIZED ORBITS: Normal visualized orbits  PARANASAL SINUSES: Normal visualized paranasal sinuses  CALVARIUM AND EXTRACRANIAL SOFT TISSUES: Please see above  Impression: No significant interval change since prior exam  Status post right frontal approach ventriculostomy catheter placement with unchanged ventricular size  Small amount of hemorrhage within the right frontal region is stable  Status post suboccipital craniotomy with a grossly stable pseudomeningocele  Workstation performed: [id number]     CT head wo contrast    Result Date: 4/29/2023  Narrative: CT BRAIN - WITHOUT CONTRAST INDICATION:   Small sah/sdh  Mets  Supratherapeutic ptt  COMPARISON: 4/29/2023  TECHNIQUE:  CT examination of the brain was performed  Multiplanar 2D reformatted images were created from the source data  Radiation dose length product (DLP) for this visit:  973 mGy-cm   This examination, like all CT scans performed in the [facility], was performed utilizing techniques to minimize radiation dose exposure, including the use of iterative reconstruction and automated exposure control  IMAGE QUALITY:  Diagnostic   FINDINGS: PARENCHYMA: Right frontal approach ventriculostomy shunt catheter with tip terminating in the body of the right lateral ventricle is again seen  There is a small amount of parenchymal hemorrhage and subarachnoid hemorrhage in the right frontal lobe along  the track of the catheter without interval change  No new areas of intracranial hemorrhage or extra-axial fluid collection identified  Several slightly increased density lesions are again seen consistent with metastatic disease which appear stable compared to the prior exam of the same day  Please see previous report  No significant mass effect or midline shift  Patient is status post occipital craniotomy and resection with postoperative pseudomeningocele again seen  Overall findings are unchanged compared to the prior exam  VENTRICLES AND EXTRA-AXIAL SPACES: Slight prominence of the ventricular system with normal appearing sulci and cisterns  Findings appear stable compared to the prior exam  VISUALIZED ORBITS: Normal visualized orbits  PARANASAL SINUSES: Normal visualized paranasal sinuses  Mastoid air cells are also well aerated and clear  CALVARIUM AND EXTRACRANIAL SOFT TISSUES:  Midline occipital craniotomy defect noted with pseudomeningocele  The inferior margin of the craniotomy is displaced posteriorly and inferiorly  Impression: No interval change since the prior exam of the same day with findings detailed above  Workstation performed: [id number]     CT head wo contrast    Result Date: 4/29/2023  Narrative: CT BRAIN - WITHOUT CONTRAST INDICATION:   Cerebral hemorrhage suspected F/u hemorrhage  COMPARISON: Prior CT April 28, 2023 TECHNIQUE:  CT examination of the brain was performed  Multiplanar 2D reformatted images were created from the source data  Radiation dose length product (DLP) for this visit:  895 mGy-cm   This examination, like all CT scans performed in the [facility], was performed utilizing techniques to minimize radiation dose exposure, including the use of iterative reconstruction and automated exposure control  IMAGE QUALITY:  Diagnostic  FINDINGS: PARENCHYMA: Right frontal shunt catheter terminates in the frontal horn of the right lateral ventricle unchanged from prior study  There is small amount of blood products in the right frontal cortex and a single sulcus adjacent to the shunt catheter entry site unchanged from prior  Mild ventriculomegaly noted unchanged from the prior study  Patient is status post midline occipital craniotomy and resection of cerebellar metastatic lesion  Typical postoperative findings are seen in the post operative bed with low density representing residual edema and some blood products identified in the midline of the cerebellum  No significant mass effect identified on the fourth ventricle  Supratentorial masses are again noted including left superior frontal gyrus series 2 image 33 measuring approximately 8 mm, right frontal lobe series 2 image 31 measuring approximately 16 mm, and barely visible right posterior frontal lobe in the superior frontal gyrus series 2 image 35 difficult to measure but approximately 8 mm in size with  No edema identified surrounding the supratentorial masses  VENTRICLES AND EXTRA-AXIAL SPACES: Stable mild ventriculomegaly VISUALIZED ORBITS: Normal visualized orbits  PARANASAL SINUSES: Normal visualized paranasal sinuses  CALVARIUM AND EXTRACRANIAL SOFT TISSUES: Midline occipital craniotomy defect noted with pseudomeningocele  The inferior margin of the craniotomy is displaced posteriorly and inferiorly  Impression: Stable CT of the brain status post occipital craniotomy and resection of metastatic disease  Postoperative pseudomeningocele noted similar to the prior study  Supratentorial metastatic lesions are again noted essentially stable in size  Small amount of postprocedural hemorrhage identified in the right frontal lobe and adjacent frontal sulcus from shunt catheter placement   Workstation performed: [id number]     CT head wo contrast    Result Date: 4/28/2023  Narrative: CT BRAIN - WITHOUT CONTRAST INDICATION:   Hydrocephalus s/p EVD placement  COMPARISON: CT head without contrast 4/27/2023, 4/8/2023  MRI brain with and without contrast 4/25/2023, 4/4/2023  TECHNIQUE:  CT examination of the brain was performed  Multiplanar 2D reformatted images were created from the source data  Radiation dose length product (DLP) for this visit:  901 82 mGy-cm   This examination, like all CT scans performed in the [facility], was performed utilizing techniques to minimize radiation dose exposure, including the use of iterative  reconstruction and automated exposure control  IMAGE QUALITY:  Diagnostic  FINDINGS: PARENCHYMA, VENTRICLES AND EXTRA-AXIAL SPACES:  : New postsurgical changes of right frontal approach ventricular catheter with tip in frontal horn of right lateral ventricle with stable ventricular size  New acute trace subarachnoid hemorrhage right frontal region, likely from recent surgery  Prior postsurgical changes of suboccipital craniotomy for resection of cerebellar metastatic lesion  Stable small hyperdense metastatic lesions in bilateral frontal lobes (right larger than left) and 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 which was better evaluated on recent MRI brain 4/25/2023  No mass effect or midline shift  No CT signs of acute infarction  No acute parenchymal hemorrhage  VISUALIZED ORBITS: Normal visualized orbits  PARANASAL SINUSES: Normal visualized paranasal sinuses  CALVARIUM AND EXTRACRANIAL SOFT TISSUES: Suboccipital craniotomy  Partially imaged small suboccipital pseudomeningocele  Impression: New postsurgical changes of right frontal approach ventricular catheter with tip in frontal horn of right lateral ventricle with stable ventricular size  New acute trace subarachnoid hemorrhage right frontal region, likely from recent surgery   Prior postsurgical changes of suboccipital craniotomy for resection of cerebellar metastatic lesion  Stable small hyperdense metastatic lesions in bilateral frontal lobes (right larger than left) and 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 which was better evaluated on recent MRI brain 4/25/2023  The study was marked in [facility] for immediate notification  Workstation performed: [id number]     CT head without contrast    Result Date: 4/28/2023  Narrative: CT BRAIN - WITHOUT CONTRAST INDICATION:   Vomiting, metastatic cancer  COMPARISON: Multiple prior recent studies, most recent MRI brain examination dated 4/25/2023 and CT head examination dated 4/8/2023  TECHNIQUE:  CT examination of the brain was performed  Multiplanar 2D reformatted images were created from the source data  Radiation dose length product (DLP) for this visit:  882 mGy-cm   This examination, like all CT scans performed in the [facility], was performed utilizing techniques to minimize radiation dose exposure, including the use of iterative reconstruction and automated exposure control  IMAGE QUALITY:  Diagnostic  FINDINGS: PARENCHYMA/EXTRA-AXIAL COMPARTMENT: Redemonstrated postsurgical changes of prior suboccipital craniectomy with grossly stable appearance of the surgical resection bed compared to the recent MRI accounting for differences in modality  Known brain parenchymal metastases better visualized on the prior recent MRI again with exception of similar hyperattenuating bifrontal metastases  No CT evidence for for territorial infarct  No parenchymal hemorrhage or new mass effect  VENTRICLES: Stable in size and configuration  VISUALIZED ORBITS: Normal visualized orbits  PARANASAL SINUSES: Normal visualized paranasal sinuses  CALVARIUM AND EXTRACRANIAL SOFT TISSUES: Redemonstrated suboccipital craniectomy changes and associated probable pseudomeningocele as seen on the prior MRI       Impression: No CT evidence for acute territorial infarct  No new mass effect or midline shift  Known intracranial metastatic lesions better visualized on the prior recent MRI brain examination  Postsurgical changes of prior suboccipital craniectomy with grossly stable appearance of the surgical resection bed and 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 compared to the recent MRI accounting for differences in modality  Workstation performed: [id number]     CT head wo contrast    Result Date: 4/8/2023  Narrative: CT BRAIN - WITHOUT CONTRAST INDICATION:   Traumatic brain injury (TBI), new or progressive neuro deficits brain mass  COMPARISON:  CT head 4/5/2023  MRI brain 4/4/2023  TECHNIQUE:  CT examination of the brain was performed  Multiplanar 2D reformatted images were created from the source data  Radiation dose length product (DLP) for this visit:  894 mGy-cm   This examination, like all CT scans performed in the [facility], was performed utilizing techniques to minimize radiation dose exposure, including the use of iterative reconstruction and automated exposure control  IMAGE QUALITY:  Diagnostic  FINDINGS: PARENCHYMA:  Reidentified postsurgical changes of suboccipital craniotomy  Known cerebral metastases better visualized on the comparison MRI; for example unchanged 14 mm right frontal lesion #2/29  The other lesions are not well visualized on this study  No new mass or mass effect  No hydrocephalus  No right internal auditory canal lesion is not visualized on this study  High density areas through the posterior fossa surgical bed are unchanged  Unchanged surgical bed postoperative findings  VENTRICLES AND EXTRA-AXIAL SPACES:  Unchanged compressive mass effect on the 4th ventricle  No hydrocephalus  VISUALIZED ORBITS: Normal visualized orbits  PARANASAL SINUSES: Normal visualized paranasal sinuses  CALVARIUM AND EXTRACRANIAL SOFT TISSUES:  Suboccipital craniotomy       Impression: No significant change when compared with the recent CT abdomen 4/5/2023 MRI brain 4/4/2023  Workstation performed: [id number]     CT head wo contrast    Result Date: 4/5/2023  Narrative: CT BRAIN - WITHOUT CONTRAST INDICATION:   fall, recent surgery  COMPARISON:  CT head dated 4/4/2023, MR brain dated 4/4/2023 TECHNIQUE:  CT examination of the brain was performed  Multiplanar 2D reformatted images were created from the source data  Radiation dose length product (DLP) for this visit:  930 33 mGy-cm   This examination, like all CT scans performed in the [facility], was performed utilizing techniques to minimize radiation dose exposure, including the use of iterative  reconstruction and automated exposure control  IMAGE QUALITY:  Diagnostic  FINDINGS: Status post suboccipital craniotomy, status post resection of occipital metastatic lesions  Large area of edema and scattered blood products are redemonstrated in the resection cavity in the posterior fossa, similar to the prior  There is a small amount of pneumocephalus redemonstrated, similar to the prior  Effacement of the 4th ventricle redemonstrated, as before  No territorial infarct is seen  Small amount of blood in the posterior horn of the right lateral ventricle now seen, no hydrocephalus  Basal cisterns remain patent  Several subcentimeter metastatic lesions in the right frontal lobe are redemonstrated  The intracranial structures are otherwise intervally unchanged  VISUALIZED ORBITS: Normal visualized orbits  PARANASAL SINUSES: Normal visualized paranasal sinuses  CALVARIUM AND EXTRACRANIAL SOFT TISSUES:  Calvarium otherwise appears intact  Left-sided soft tissue swelling redemonstrated  Impression: Postsurgical changes status post suboccipital craniotomy as described, similar to the prior  Effacement of the 4th ventricle is redemonstrated, as before  A small amount of blood is now seen dependently in the right lateral ventricle, no hydrocephalus   No other significant interval change  Clinical follow-up recommended  Workstation performed: [id number]     CT head wo contrast    Result Date: 4/5/2023  Narrative: CT BRAIN - WITHOUT CONTRAST INDICATION:   fall  COMPARISON:  CT brain on 3/30/2023 and MRI brain of the same day  TECHNIQUE:  CT examination of the brain was performed  Multiplanar 2D reformatted images were created from the source data  Radiation dose length product (DLP) for this visit:  935 03 mGy-cm   This examination, like all CT scans performed in the [facility], was performed utilizing techniques to minimize radiation dose exposure, including the use of iterative  reconstruction and automated exposure control  IMAGE QUALITY:  Diagnostic  FINDINGS: PARENCHYMA: Scattered foci of intracranial air and subarachnoid hemorrhage in the posterior cranial fossa likely related to recent resection  Suboccipital craniotomy bony defect is seen with adjacent subarachnoid hemorrhage/parenchymal hemorrhage and cystic cavity within the bilateral cerebellum  No gross supratentorial intracranial hemorrhage identified  Increased density foci in the bilateral frontal lobes likely represents metastatic lesions  Trace right subdural hygroma  No significant mass effect, midline shift, or evidence of transtentorial herniation  VENTRICLES AND EXTRA-AXIAL SPACES:  Normal for the patient's age  VISUALIZED ORBITS: Normal visualized orbits and globes  PARANASAL SINUSES: Minimal mucoperiosteal thickening bilateral sphenoid sinuses  The remaining paranasal sinuses and mastoid air cells appear adequately aerated and clear without air-fluid levels CALVARIUM AND EXTRACRANIAL SOFT TISSUES:  Suboccipital craniotomy defect is seen  Bony calvarium and temporal mandibular joints are otherwise intact  Left frontotemporal scalp soft tissue swelling  Impression: Postoperative changes from suboccipital craniotomy and resection of cerebellar lesions as detailed above  Blood products in the posterior cranial fossa likely postsurgical in nature but cannot exclude increased blood products from trauma in this setting  If clinically indicated, consider follow-up exam to ensure stability  Workstation performed: [id number]     MRI brain w wo contrast    Result Date: 4/25/2023  Narrative: MRI BRAIN WITH AND WITHOUT CONTRAST INDICATION: C50 919: Malignant neoplasm of unspecified site of unspecified female breast C79 31: Secondary malignant neoplasm of brain  COMPARISON: 3/30/2023 and 4/4/2023 L5 somewhat TECHNIQUE: Multiplanar, multisequence imaging of the brain was performed before and after gadolinium administration  IV Contrast:  10 mL of Gadobutrol injection (SINGLE-DOSE)  IMAGE QUALITY:   Diagnostic  FINDINGS: BRAIN PARENCHYMA: Postsurgical change from suboccipital craniectomy and resection of bilateral cerebellar hemisphere masses  Blood products and fluid in the resection cavity  Minimal restricted diffusion along the margins of the resection cavity could represent mild infarct and/or blood products  There is increased enhancement along the margins of the resection cavity with mild nodularity and in the adjacent cerebellar folia, particularly in the left cerebellar hemisphere  Two right frontal lesions (11:128 and 11:135), previously visualized, demonstrate stable restricted diffusion and T2/FLAIR hyperintensity, with homogeneous enhancement  The larger more lateral lesion measures 1 6 cm in maximal diameter compared to 1 4 cm  on the previous exam  The more medially located lesion measures 0 9 cm compared to 1 cm on the prior exam  A third lesion (11:137) is present in the left superior frontal gyrus with similar enhancement and signal characteristics measuring  0 6 cm, stable  Stable enhancing lesion in the right internal auditory canal (11:57) and cerebellopontine angle measuring 1 5 x 0 7 x 0 5 cm   Subtle enhancement of the cisternal and canalicular portions of the left 7th and 8th cranial nerves (11:54)  Stable underlying T2/FLAIR hyperintense foci suggesting early microangiopathic change  Decreased mass effect in the posterior fossa  VENTRICLES: Interval resolution of effacement of the fourth ventricle  No hydrocephalus  Prominent perivascular space again noted inferior to the right lentiform nuclei  No mismatch or CT because of the diarrhea SELLA AND PITUITARY GLAND:  Normal  ORBITS: No retro-orbital lesion  PARANASAL SINUSES: Fluid levels in the sphenoid and maxillary sinuses  Mild mucosal thickening in the ethmoid air cells  VASCULATURE:  Evaluation of the major intracranial vasculature demonstrates appropriate flow voids  CALVARIUM AND SKULL BASE: Postsurgical change from suboccipital craniectomy EXTRACRANIAL SOFT TISSUES: Suboccipital pseudomeningocele, new since the prior exam, measuring 2 4 x 5 7 x 3 2 cm  Impression: 1  Supratentorial lesions are stable to mildly increased in size  2  Increasing enhancement along the margins of the posterior fossa resection cavity with prominent left cerebellar hemisphere foliar enhancement  , concerning for tumor progression  3  New subtle enhancement along the left cisternal and canalicular segments of the 7th and 8th cranial nerves without evidence of nodularity or discrete lesion  Close follow-up recommended  4  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  Workstation performed: [id number]     MRI brain w wo contrast    Result Date: 4/4/2023  Narrative: MRI BRAIN WITH AND WITHOUT CONTRAST INDICATION: post op crani  History of breast cancer with metastases  COMPARISON:  MRI brain PTE with and without contrast March 30, 2023  CT head without contrast March 30, 2023  TECHNIQUE: Multiplanar, multisequence imaging of the brain was performed before and after gadolinium administration  IV Contrast:  14 mL of Gadobutrol injection (SINGLE-DOSE)  IMAGE QUALITY:   Diagnostic   FINDINGS: BRAIN PARENCHYMA: Postsurgical changes of suboccipital craniotomy for resection of posterior midline cerebellar metastatic lesion and left inferior cerebellar metastatic lesion  Cerebellar resection cavity involves the right cerebellum, cerebellar vermis, and a portion of the left inferior cerebellum which contains blood products, locules of gas, and peripheral marginal ischemic change  Small amount of irregular nodular enhancement adjacent to left inferior cerebellar resection margin suspicious for residual tumor  No residual tumor in the right cerebellar or cerebellar vermis resection sites  Several poorly enhancing metastatic lesions some of which have bright DWI signal (measured in long axis): -1 3 cm right posterior frontal lobe lesion (11:24), unchanged  -1 4 cm right frontal lobe lesion (11:23), unchanged  -0 6 cm left frontal lobe lesion (11:23), unchanged  Faintly enhancing lesions in right paramedian cameron and left lateral cameron could represent capillary telangiectasias  1 4 cm right internal auditory canal lesion (12:48), compatible with vestibular schwannoma  New postoperative ischemia in left paramidline cerebellar region  Acute small-volume subarachnoid hemorrhage in bilateral cerebellar folia, likely from recent surgery  Pneumocephalus  No midline shift  Small scattered hyperintensities on T2/FLAIR imaging are noted in the periventricular and subcortical white matter demonstrating an appearance that is statistically most likely to represent mild microangiopathic change  VENTRICLES:  Compressive mass effect on 4th ventricle  Slight dilatation in temporal horns of both lateral ventricles  Acute trace intraventricular hemorrhage layering in occipital horns of both lateral ventricles  SELLA AND PITUITARY GLAND:  Normal  ORBITS:  Normal  PARANASAL SINUSES:  Mild scattered mucosal thickening with scattered air-fluid levels, worse in bilateral sphenoid sinuses   VASCULATURE:  Evaluation of the major intracranial vasculature demonstrates appropriate flow voids  CALVARIUM AND SKULL BASE:  Normal  EXTRACRANIAL SOFT TISSUES:  Partially imaged endotracheal and orogastric tubing  Impression: Postsurgical changes of suboccipital craniotomy for resection of posterior midline cerebellar metastatic lesion and left inferior cerebellar metastatic lesion  Cerebellar resection cavity involves the right cerebellum, cerebellar vermis, and a portion of the left inferior cerebellum which contains blood products, locules of gas, and peripheral marginal ischemic change  Small amount of irregular nodular enhancement adjacent to left inferior cerebellar resection margin suspicious for residual tumor  No  residual tumor in the right cerebellar or cerebellar vermis resection sites  New postoperative ischemia in left paramidline cerebellar region  Acute small-volume subarachnoid hemorrhage in bilateral cerebellar folia, likely from recent surgery  Unchanged several poorly enhancing metastatic lesions in bilateral frontal lobes  Unchanged 1 4 cm right vestibular schwannoma  Faintly enhancing lesions in right paramedian cameron and left lateral cameron could represent capillary telangiectasias  Attention on follow-up  Slight dilatation in temporal horns of both lateral ventricles with compressive mass effect on the 4th ventricle  Pneumocephalus  The study was marked in [facility] for immediate notification  Workstation performed: [id number]     X-ray chest 1 view    Result Date: 4/4/2023  Narrative: CHEST INDICATION:   Endotracheal tube positioning  COMPARISON:  8/12/2020 EXAM PERFORMED/VIEWS:  XR CHEST 1 VIEW FINDINGS:  Endotracheal tube is present, in satisfactory position with its tip 4 cm above the level of the rob  Enteric tube is present with its tip extending below the left hemidiaphragm  Left-sided PICC projects over the SVC  Cardiomediastinal silhouette appears unremarkable  The lungs are clear  No pneumothorax or pleural effusion   Osseous structures appear within normal limits for patient age  Impression: No acute cardiopulmonary disease  Endotracheal tube projects 4 cm above the rob  Workstation performed: [id number]     CT chest abdomen pelvis w contrast    Result Date: 4/28/2023  Narrative: CT CHEST, ABDOMEN AND PELVIS WITH IV CONTRAST INDICATION:   patient with stage IV metastatic breast cancer, concern for progression of disease, recently had brain mets s/p resection on 4/3  COMPARISON: CT chest abdomen pelvis with contrast 3/30/2023, 2/17/2023, 1/29/2022  TECHNIQUE: CT examination of the chest, abdomen and pelvis was performed  Multiplanar 2D reformatted images were created from the source data  Radiation dose length product (DLP) for this visit:  1415 02 mGy-cm   This examination, like all CT scans performed in the [place], was performed utilizing techniques to minimize radiation dose exposure, including the use of iterative reconstruction and automated exposure control  IV Contrast:  90 mL of iohexol (OMNIPAQUE) Enteric Contrast: Enteric contrast was administered  FINDINGS: CHEST LUNGS:  There is no tracheal or endobronchial lesion  Mild emphysema  No focal consolidation  No suspicious pulmonary nodule  PLEURA:  Unremarkable  HEART/GREAT VESSELS: Normal heart size  No paracardial effusion  No CT evidence of right heart strain  No thoracic aortic aneurysm  Mild scattered calcified atherosclerotic disease  New saddle pulmonary embolism extending into bilateral pulmonary arterial segmental branches  MEDIASTINUM AND HAIM:  Unremarkable  CHEST WALL AND LOWER NECK: Postsurgical changes of right mastectomy and right axillary lidia dissection  No right chest wall mass  No axillary lymphadenopathy  ABDOMEN LIVER/BILIARY TREE: Stable several subcentimeter lesions in bilateral hepatic lobes (2:91, 103, 114, 117), likely treatment-related change  No new or enlarging metastatic liver lesions  No biliary duct dilation   GALLBLADDER:  No calcified gallstones  No pericholecystic inflammatory change  SPLEEN:  Unremarkable  PANCREAS:  Unremarkable  ADRENAL GLANDS:  Unremarkable  KIDNEYS/URETERS:  Unremarkable  No hydronephrosis  STOMACH AND BOWEL:  Unremarkable  APPENDIX:  No findings to suggest appendicitis  ABDOMINOPELVIC CAVITY:  No ascites  No pneumoperitoneum  No lymphadenopathy  VESSELS: Minimal calcified atherosclerotic disease of abdominal aorta and iliac vasculature  No abdominal aortic aneurysm  IVC is normal in caliber  Hepatic veins, portal vein, SMV, and splenic vein are patent  PELVIS REPRODUCTIVE ORGANS: Calcified fibroid uterus  No suspicious adnexal mass  URINARY BLADDER:  Unremarkable  ABDOMINAL WALL/INGUINAL REGIONS: Small fat-containing umbilical hernia  OSSEOUS STRUCTURES:  No acute fracture or destructive osseous lesion  Unchanged small sclerotic lesion right iliac bone, likely bone island  Spinal degenerative changes  Impression: New saddle pulmonary embolism extending into bilateral pulmonary arterial segmental branches  No CT evidence of right heart strain  Stable treated metastatic subcentimeter hepatic lesions  No new or enlarging sites of metastatic disease in chest, abdomen, or pelvis  Additional chronic/incidental findings as detailed above  I personally discussed this study with Kyle Mason on 4/28/2023 4:05 PM  Workstation performed: [id number]     IR IVC filter placement permanent    Result Date: 4/30/2023  Narrative: INDICATION: Stage IV breast cancer with metastasis to the brain and liver  History of PE and DVT  Request for IVC filter placement  PROCEDURE: 1  IVC venogram 2  Infrarenal temporary IVC filter placement FINDINGS: 1  Patent IVC with no evidence of clot  IVC is not duplicated based on review of CT  2   Successful placement of an infrarenal permanent [employer] IVC filter       Impression: Infrarenal IVC filter placement  _______________________________________________________________ COMPARISON: CT abdomen pelvis 4/28/2023 PROCEDURE DETAILS: Operators: Dr Chris Adrian Anesthesia:  Local anesthesia  Medications: 1% lidocaine Contrast: 40 mL of Omnipaque 300 Fluoroscopy time: One-point minutes Images: 32 COMMENTS: A preprocedure timeout was performed per [facility] protocol  The right neck was prepped and draped in the usual sterile fashion  Under ultrasound guidance, the patent and compressible right internal jugular vein was punctured using a 21-gauge micropuncture needle  A static ultrasound image was saved to PACS  A microwire was advanced through the needle into the right atrium  The needle was exchanged for a microsheath allowing exchange of the microwire for a heavy wire which was advanced into the IVC  The IVC filter 9 Bulgarian sheath was advanced over the wire into the IVC  The wire was removed and a small hand contrast injection confirmed placement  A IVC venogram was performed  The wire was removed, the filter was advanced using the pusher through the sheath into appropriate position  The sheath was then withdrawn until the filter was deployed  IVC venogram was repeated  Sheath was removed and manual pressure was performed of the right neck to ensure hemostasis  Workstation performed: [id number]     VAS lower limb venous duplex study, complete bilateral    Result Date: 4/29/2023  Narrative:  [facility] REPORT CLINICAL: Indications: Patient presents with recent discovery of pulmonary embolism and physician wants to determine potential source  Patient is currently asymptomatic  Operative History: Patient denies previous cardiovascular surgery Risk Factors The patient has history of Hyperlipidemia and previous smoking (quit 1-5yrs ago)    FINDINGS:  Left           Impression              FV Prox        Occlusive Subsegmental  FV Mid         Occlusive Subsegmental  FV Dist        Occlusive Subsegmental  Popliteal      Occlusive Subsegmental  PostTibial     Occlusive Subsegmental  Peroneal       Occlusive Subsegmental  Gastrocnemius  Occlusive Subsegmental     CONCLUSION:  Impression: RIGHT LOWER LIMB: No evidence of acute or chronic deep vein thrombosis  No evidence of superficial thrombophlebitis noted  No evidence of valvular incompetence noted in the deep veins  Popliteal, posterior tibial and anterior tibial arterial Doppler waveform's are triphasic/biphasic  LEFT LOWER LIMB: Occlusive acute deep vein thrombus noted in the femoral, popliteal, gastrocnemius, posterior tibial and peroneal veins  No evidence of superficial thrombophlebitis noted  No evidence of valvular incompetence noted in the deep veins  Popliteal, posterior tibial and anterior tibial arterial Doppler waveform's are triphasic/biphasic  SIGNATURE: Electronically Signed by: Sydney Tejada MD on 2023-04-29 01:44:07 PM    Echo complete w/ contrast if indicated    Result Date: 4/29/2023  Narrative: •  Left Ventricle: Left ventricular cavity size is normal  Wall thickness is normal  The left ventricular ejection fraction is 60%  Systolic function is normal  Wall motion is normal  Diastolic function is normal  •  Right Ventricle: Right ventricular cavity size is normal  Systolic function is normal      Echo follow up/limited w/ contrast if indicated    Result Date: 5/1/2023  Narrative: •  Left Ventricle: The left ventricular ejection fraction is 65%  Systolic function is normal  Wall motion cannot be accurately assessed  EKG, Pathology, and Other Studies: I have personally reviewed pertinent reports        VTE Pharmacologic Prophylaxis: Heparin (SQH BID, can start heparin gtt POD3)    VTE Mechanical Prophylaxis: sequential compression device

## 2023-05-02 NOTE — UTILIZATION REVIEW
Continued Stay Review    Date: 05/02/23                          Current Patient Class: IP  Current Level of Care: Critical Care    HPI:52 y o  female initially admitted on 4/27 5/1 Surgery  Procedure:   Right - Right  possible left  ventriculoperitoneal shunt placement using neuronavigation  laparoscopic abdominal portion with GI surgery  Diagnostic laparoscopy  LAPAROSCOPIC APPROACH FOR VENTRICULAR PERITONEAL SHUNT INSERTION  -Right frontal ventriculoperitoneal shunt placement using neuronavigation, using Certas Plus valve preset at 5  -Removal of right frontal EVD  Indication: 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 ([address] ) [[address]79 31]; Hydrocephalus   Anesthesia: General  Findings:  · Fluid CSF egress seen on laparoscopic visualization, distal catheter looped into the pelvis  · Adequate location and trajectory of new proximal shunt catheter, confirmed with intraoperative neuronavigation, clear CSF drainage visualized laparoscopically into peritoneal cavity       Assessment/Plan: Notes mild discomfort at  shunt insertion site  No BM yet  On exam, thrush, mild dysarthria  Plan: q4h neuro check, analgesics, MAP > 65, tentative plan to resume heparin gtt tomorrow, IVF, Trend labs, replete electrolytes as needed  Frequent repositioning  Okay for SQH BID today  Repeat CTH on POD 3 prior to resuming heparin gtt then repeat again once therapeutic, transition gradually to Coumadin      Vitals:  Date/Time Temp Pulse Resp BP MAP (mmHg) SpO2 Calculated FIO2 (%) - Nasal Cannula Nasal Cannula O2 Flow Rate (L/min) O2 Device   05/02/23 1148 -- -- -- 185/85 Abnormal  -- -- -- -- --   05/02/23 1000 -- 80 25 Abnormal  170/98 125 96 % -- 2 L/min Nasal cannula   05/02/23 0800 98 °F (36 7 °C) 94 -- -- -- 97 % -- -- --   05/02/23 0630 -- 96 33 Abnormal  166/83 106 98 % -- -- --   05/02/23 0523 -- 90 21 162/90 124 97 % -- -- --   05/02/23 0431 -- 82 20 160/87 117 95 % -- -- --   05/02/23 0400 98 4 °F (36 9 °C) 96 39 Abnormal  176/90 Abnormal  129 95 % -- -- --   05/02/23 0300 -- 80 18 166/88 111 97 % -- -- --   05/02/23 0200 -- 80 17 152/83 110 98 % -- -- --   05/02/23 0100 -- 84 18 140/79 103 96 % -- -- --   05/02/23 0000 -- 76 17 142/80 101 96 % -- -- --   05/01/23 2300 -- 90 19 156/87 110 96 % -- -- --   05/01/23 2249 -- 92 18 159/80 104 97 % -- -- --   05/01/23 2158 -- 78 16 136/79 94 96 % -- -- --   05/01/23 2100 -- 98 17 161/87 121 97 % -- -- --   05/01/23 2009 -- 94 15 168/94 118 100 % 28 2 L/min Nasal cannula       Pertinent Labs/Diagnostic Results:   5/2 - CT Head  1   Interval removal of previous right frontal approach ventricular catheter and similar approach  shunt catheter placement  Small postsurgical layering hemorrhage within posterior horns of lateral ventricles and small pneumocephalus within anterior   horn of right lateral ventricle  Stable ventricular size without hydrocephalus  2   No significant change in small subarachnoid hemorrhage in the anterior right frontal lobe  3   Grossly stable postsurgical change in the posterior fossa from prior tumor resection with unchanged mass effect upon the fourth ventricle  Again partially imaged pseudomeningocele extending to the right posterior superior neck  4   Stable right frontal lobe cortical lesion  Additional smaller lesions are not well visualized due to CT modality limitation  Lesions are demonstrated in prior MRI           Results from last 7 days   Lab Units 05/02/23  0424 05/01/23  0531 04/30/23  0602 04/29/23  1822 04/29/23  0538 04/28/23  0657   WBC Thousand/uL 11 94* 12 07* 12 27* 7 64 8 00 6 73   HEMOGLOBIN g/dL 12 1 12 3 12 6 10 4* 12 1 12 2   HEMATOCRIT % 36 4 36 0 35 3 30 1* 34 6* 35 5   PLATELETS Thousands/uL 137* 122* 109* 82* 93* 85*   NEUTROS ABS Thousands/µL  --  10 08*  --   --  5 99 4 71         Results from last 7 days   Lab Units 05/02/23  0424 05/01/23  0531 04/30/23  1852 04/30/23  0602 04/29/23  1822 04/29/23  0538 04/28/23  3318 SODIUM mmol/L 136 133* 134* 135 136 138 137   POTASSIUM mmol/L 4 1 3 5 3 7 3 4* 3 6 3 2* 3 5   CHLORIDE mmol/L 104 102 101 102 102 104 107   CO2 mmol/L 31 29 30 28 25 28 21   ANION GAP mmol/L 1* 2* 3* 5 9 6 9   BUN mg/dL 8 5 5 4* 4* 5 5   CREATININE mg/dL 0 53* 0 51* 0 55* 0 52* 0 36* 0 54* 0 35*   EGFR ml/min/1 73sq m 108 110 107 109 123 108 124   CALCIUM mg/dL 9 5 8 6 8 6 8 4 7 6* 8 2* 8 0*   MAGNESIUM mg/dL  --  2 6  --  2 3  --  2 4 2 1   PHOSPHORUS mg/dL  --  2 9  --  2 8  --  2 5*  --      Results from last 7 days   Lab Units 05/01/23  0531 04/30/23  0602 04/27/23  2034   AST U/L 20 23 44   ALT U/L 62 64 88*   ALK PHOS U/L 96 87 86   TOTAL PROTEIN g/dL 6 5 6 4 6 8   ALBUMIN g/dL 2 7* 2 8* 3 4*   TOTAL BILIRUBIN mg/dL 0 88 0 77 0 61   BILIRUBIN DIRECT mg/dL  --  0 29*  --      Results from last 7 days   Lab Units 05/01/23  0604   POC GLUCOSE mg/dl 121     Results from last 7 days   Lab Units 05/02/23  0424 05/01/23  0531 04/30/23  1852 04/30/23  0602 04/29/23  1822 04/29/23  0538 04/28/23  0657 04/27/23  2034   GLUCOSE RANDOM mg/dL 143* 122 136 129 104 115 93 123       Results from last 7 days   Lab Units 05/02/23  0424 05/01/23  0531 04/30/23  0751 04/29/23  1822 04/29/23  1230   PROTIME seconds 15 2* 14 3  --   --  13 7   INR  1 18 1 09  --   --  1 03   PTT seconds 25 28 88*   < > 23    < > = values in this interval not displayed         Results from last 7 days   Lab Units 04/29/23  0538   NT-PRO BNP pg/mL 78             Results from last 7 days   Lab Units 04/27/23  2034   LIPASE u/L 119       Results from last 7 days   Lab Units 04/28/23  1605   GRAM STAIN RESULT  1+ Mononuclear Cells  No polys seen  No bacteria seen     Results from last 7 days   Lab Units 04/28/23  1605   TOTAL COUNTED  100     Results from last 7 days   Lab Units 04/28/23  1608 04/28/23  1605   APPEARANCE CSF   --  CLEAR   WBC CSF /uL  --  25*   XANTHOCHROMIA   --  No   NEUTROPHILS % (CSF) %  --  1   LYMPHS % (CSF) %  --  76 MONOCYTES % (CSF) %  --  6   GLUCOSE CSF mg/dL  --  53   PROTEIN CSF mg/dL  --  150*   RBC CSF uL  --  55*   CSF CULTURE  No growth  --          Medications:   Scheduled Medications:  al mag oxide-diphenhydramine-lidocaine viscous, 10 mL, Swish & Swallow, Q4H  cefazolin, 1,000 mg, Intravenous, Q8H  chlorhexidine, 15 mL, Swish & Spit, Q12H [address]  docusate sodium, 100 mg, Oral, BID  fluconazole, 100 mg, Intravenous, Q24H  heparin (porcine), 5,000 Units, Subcutaneous, Q12H CHANDLER  potassium chloride, 40 mEq, Oral, BID  potassium-sodium phosphates, 2 packet, Oral, 4x Daily (with meals and at bedtime)  scopolamine, 1 patch, Transdermal, Q72H  senna, 1 tablet, Oral, Daily    Continuous IV Infusions:  dextrose 5% lactated ringer's, 125 mL/hr, Intravenous, Continuous    PRN Meds:  albuterol, 2 puff, Inhalation, Q4H PRN  ALPRAZolam, 0 25 mg, Oral, HS PRN  bisacodyl, 10 mg, Rectal, Daily PRN  calcium carbonate, 1,000 mg, Oral, Daily PRN  glycopyrrolate, 0 1 mg, Intravenous; 5/2 x1  hydrALAZINE, 5 mg, Intravenous, Q6H PRN; 5/2 x1  HYDROmorphone, 0 2 mg, Intravenous, Q2H PRN  labetalol, 20 mg, Intravenous, Q4H PRN; 5/2 x1  ondansetron, 4 mg, Intravenous, Q6H PRN  oxyCODONE, 2 5 mg, Oral, Q4H PRN  oxyCODONE, 5 mg, Oral, Q4H PRN        Discharge Plan: D    Network Utilization Review Department  ATTENTION: Please call with any questions or concerns to [phone] and carefully listen to the prompts so that you are directed to the right person  All voicemails are confidential   Cornel Rdz all requests for admission clinical reviews, approved or denied determinations and any other requests to dedicated fax number below belonging to the campus where the patient is receiving treatment   List of dedicated fax numbers for the Facilities:  [address] DENIALS (Administrative/Medical Necessity) [phone]   [address] (Maternity/NICU/Pediatrics) Diamond Torres [address] [phone]   [address] [place] [address] [facility] Piyush Lux [address] [phone] [address] [place] [address] 8[address] [facility] [phone]

## 2023-05-02 NOTE — SPEECH THERAPY NOTE
"Speech Language/Pathology  Speech-Language Pathology Bedside Swallow Evaluation      Patient Name: Tramaine Greco    Today's Date: 5/2/2023     Problem List  Principal Problem:    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 [facility])  Active Problems:    Malignant neoplasm of central portion of right breast in female, estrogen receptor positive ([facility] [address] )    Palliative care patient    Vomiting    Status post brain surgery    Pulmonary embolism and DVT      Past Medical History  Past Medical History:   Diagnosis Date   • Bloating    • Breast cancer ([facility] [address] )    • Bruises easily    • Cancer ([facility] [address] )     right breast//to lymph nodes/liver/ and bone   • Depression    • History of cancer chemotherapy 05/2020   • History of pneumonia    • Hypotension     occas   • Low iron     \"when pregnant, 20 yrs ago\"   • Neuropathy     hands//fingers   • Shortness of breath     occas   • Stroke [facility])    • Subacromial impingement of left shoulder 03/14/2022   • Tinnitus     right   • Wears glasses        Past Surgical History  Past Surgical History:   Procedure Laterality Date   • BILATERAL SALPINGOOPHORECTOMY     • BREAST BIOPSY     • CRANIOTOMY Bilateral 4/3/2023    Procedure: Suboccipital craniotomy for tumor resection using neuronavigation;  Surgeon: Mohini Rodriguez MD;  [facility]: [facility];  Service: Neurosurgery   • IR BIOPSY LIVER MASS  06/11/2019   • IR IVC FILTER PLACEMENT PERMANENT  4/30/2023   • MASTECTOMY Right     2019   • MYOMECTOMY      H/O FIBROIDS, NO SURGERY NEEDED   • WI LAPAROSCOPY W/RMVL ADNEXAL STRUCTURES N/A 07/29/2019    Procedure: LAPAROSCOPIC BILATERAL SALPINGO-OOPHORECTOMY;  Surgeon: Jacquline Lesch, MD;  [facility];  Service: Gynecology Oncology   • WI MASTECTOMY SIMPLE COMPLETE Right 06/05/2020    Procedure: MASTECTOMY SIMPLE, axillary node dissection;  Surgeon: Tomer Mcdonald MD;  Location: [facility];  Service: Surgical Oncology   • US GUIDED BREAST BIOPSY RIGHT COMPLETE Right 05/03/2019   • US GUIDED BREAST LYMPH " NODE BIOPSY RIGHT Right 05/03/2019   • VENTRICULOPERITONEAL SHUNT Right 5/1/2023    Procedure: Right, possible left, ventriculoperitoneal shunt placement using neuronavigation, laparoscopic abdominal portion with GI surgery;  Surgeon: Monalisa Pak MD;  [facility];  Service: Neurosurgery       Summary   Pt presented with s/s suggestive of moderate-severe oral and suspected moderate-severe pharyngeal dysphagia  Symptoms or concerns included decreased bolus propulsion, decreased bolus formation, suspected decreased control of liquids and purees , oral residue with all materials offered  and piecemeal deglutition suspected pharyngeal swallow delay, suspected decreased hyolaryngeal elevation upon palpation and suspected pharyngeal residue  Movement of oral structures (minimal lingual movement)/ suspected pharyngeal structures significantly reduced  Laryngeal elevation/hyoid excursion on palpation was significantly reduced  Minimal oral clearance obtained  Suction was used on several occasions to clear oral cavity  Pt was able to transfer nt and puree with mod effort  Coughing was observed w/ all materials offered  Attempted to give crushed meds in applesauce, pt was not able to finish  Coughing was observed for several minutes after session  Recommend NPO w/ potential NG tube placement  ST will follow       Risk/s for Aspiration: mod-severe     Recommended Diet: NPO   Recommended Form of Meds: non-oral if possible   Aspiration precautions and swallowing strategies: upright posture, only feed when fully alert, slow rate of feeding and small bites/sips  Other Recommendations: Continue frequent oral care, may require NG     Current Medical Status  Pt is a 47 y o  female who presented to [facility] with past medical history of diabetes, metastatic breast cancer with known brain and liver metastasis, and recent hospitalization on April for brain metastases resection surgery who presented to ER with nausea, vomiting, and decreased oral intake for the past week   Patient was recently found to have a new large enhancing cerebellar mass and is now status post suboccipital craniotomy for resection of right cerebellar mass and then left cerebellar mass   Final pathology showed metastatic adenocarcinoma consistent with breast primary  Savannah Hanumble did show stable ventricles sizes and relatively unchanged pseudomeningocele, however, given her worsening of her symptoms neurosurgery decided to proceed with CSF diversion with EVD placement  Patient transferred to neuro ICU for further management  Found to have saddle PE without right heart strain and LLE DVT, status post IVC filter placement 4/30   shunt placed 5/1  Pt intubated 5/1 and extubated same day  C  urrent Precautions:  Fall  Aspiration     Allergies:  No known food allergies    Past medical history:  Please see H&P for details    Special Studies:  CT BRAIN - WITHOUT CONTRAST 5/2  IMPRESSION:  1  Interval removal of previous right frontal approach ventricular catheter and similar approach  shunt catheter placement  Small postsurgical layering hemorrhage within posterior horns of lateral ventricles and small pneumocephalus within anterior   horn of right lateral ventricle  Stable ventricular size without hydrocephalus  2   No significant change in small subarachnoid hemorrhage in the anterior right frontal lobe  3   Grossly stable postsurgical change in the posterior fossa from prior tumor resection with unchanged mass effect upon the fourth ventricle  Again partially imaged pseudomeningocele extending to the right posterior superior neck  4   Stable right frontal lobe cortical lesion  Additional smaller lesions are not well visualized due to CT modality limitation  Lesions are demonstrated in prior MRI       Social/Education/Vocational Hx:  Pt lives alone    Swallow Information   Current Risks for Dysphagia & Aspiration: recent surgery  Current Symptoms/Concerns: coughing with po  Current Diet: NPO   Baseline Diet: regular diet and thin liquids      Baseline Assessment   Behavior/Cognition: lethargic  Speech/Language Status: able to participate in conversation and able to follow commands  Patient Positioning: upright in bed  Pain Status/Interventions/Response to Interventions:   No report of or nonverbal indications of pain  Swallow Mechanism Exam  Facial: symmetrical  Labial: WFL  Lingual: decreased ROM - minimal to no anterior or lateral movement  Velum: unable to visualize  Mandible: adequate ROM  Dentition: adequate  Vocal quality:breathy and weak   Volitional Cough: strong/productive   Respiratory Status: on L O2    Tracheostomy: n/a      Consistencies Assessed and Performance   Consistencies Administered: ice chips, thin liquids, nectar thick and puree  Materials administered included ice chips, thin water, nt juice, applesauce    Oral Stage: moderate-severe  Mastication was not attempted with the materials administered today 2/2 safety concerns  Bolus formation and transfer were minimal d/t lingual ROM deficits-unable to protrude or lateralize  Significant oral residue noted w/ purees  Pt struggled to tranfer bolus, having to use suction at times to clear oral cavity  Pharyngeal Stage: moderate-severe  Swallow Mechanics:  Swallowing initiation appeared significantly delayed  Laryngeal rise was palpated and judged to be significantly reduced  Coughing was noted by the nurse this morning, seen significantly w/ all  po trials  Mod pharyngeal residue suspected  ? Clearance of pharynx       Esophageal Concerns: none reported    Strategies and Efficacy: multiple swallows, small sips (by teaspoon only)- coughing continued to be observed    Summary and Recommendations (see above)    Results Reviewed with: patient, RN and family     Treatment Recommended: yes     Frequency of treatment: as able and appropriate     Patient Stated Goal:    Dysphagia LTG  -Patient will demonstrate safe and effective oral intake (without overt s/s significant oral/pharyngeal dysphagia including s/s penetration or aspiration) for the highest appropriate diet level       Short Term Goals:    -Patient will tolerate trials of upgraded food and/or liquid texture with no significant s/s of oral or pharyngeal dysphagia including aspiration across 1-3 diagnostic sessions     -Patient will comply with a Video/Modified Barium Swallow study for more complete assessment of swallowing anatomy/physiology/aspiration risk and to assess efficacy of treatment techniques so as to best guide treatment plan        Speech Therapy Prognosis   Prognosis: guarded     Prognosis Considerations: medical status

## 2023-05-02 NOTE — PHYSICAL THERAPY NOTE
Physical Therapy Cancellation Note    PT orders received chart review completed  PT spoke to nsg who reports is currently esperiencing increased difficulty swallowing/breathing and not appropriate to participate in skilled PT at this time  PT will follow and treat as medically appropriate       05/02/23 1000   Note Type   Note type Cancelled Session   Cancel Reasons Medical status   Cognition   Orientation Level Oriented X4       Joe Perea, PT

## 2023-05-02 NOTE — PLAN OF CARE
Problem: INFECTION - ADULT  Goal: Absence or prevention of progression during hospitalization  Description: INTERVENTIONS:  - Assess and monitor for signs and symptoms of infection  - Monitor lab/diagnostic results  - Monitor all insertion sites, i e  indwelling lines, tubes, and drains  - Monitor endotracheal if appropriate and nasal secretions for changes in amount and color  - [place] appropriate cooling/warming therapies per order  - Administer medications as ordered  - Instruct and encourage patient and family to use good hand hygiene technique  - Identify and instruct in appropriate isolation precautions for identified infection/condition  Outcome: Progressing     Problem: Knowledge Deficit  Goal: Patient/family/caregiver demonstrates understanding of disease process, treatment plan, medications, and discharge instructions  Description: Complete learning assessment and assess knowledge base    Interventions:  - Provide teaching at level of understanding  - Provide teaching via preferred learning methods  Outcome: Progressing

## 2023-05-02 NOTE — OCCUPATIONAL THERAPY NOTE
OT CANCEL NOTE  Pt chart reviewed  Per RN, pt not medically appropriate for skilled OT tx at this time  Will continue to follow pt on caseload and see pt when medically stable and as clinically appropriate         05/02/23 1044   OT Last Visit   OT Visit Date 05/02/23   Note Type   Note Type Cancelled Session   Cancel Reasons Medical status         Marisol Garcia MS, OTR/L

## 2023-05-02 NOTE — NURSING NOTE
md notified of difficiluty swallowing and speaking this am , md at bedside to assess pt  Swallow eval completed and failed   Will now be NPO glycopyrolated started as ordered   Iv fluids adjusted as ordered

## 2023-05-02 NOTE — PROGRESS NOTES
Daily Progress Note - Critical Care   Saeed Gonzalez 47 y o  female MRN: [id number]  Unit/Bed#: ICU 01 Encounter: [id number]        ----------------------------------------------------------------------------------------  HPI/24hr events: 52F - PMH DM2, metastatic brain cancer with known brain and liver metastasis, recent hospitalization April '23 after she was found to have R + L cerebellar masses s/p craniotomy w/ resection of cerebellar masses 4/3  She now presents 4/27 with worsening N/V + decreased PO intake x1 week  NSGY proceeded with CSF diversion and EVD placement  Now found to have saddle PE without RHS and LLE DVT  Started on heparin gtt, s/p IVF filter 4/30  Patient failed EVD clamp and is now status post palliative VPS  Overnight events: Went for  shunt, no complications  A little drowsy after the procedure but then woke up     ---------------------------------------------------------------------------------------  SUBJECTIVE  Reports some mild discomfort at the VPS insertion site, otherwise no acute complaints  Denies nausea, vomiting, fevers, chills, cardiopulmonary complaints  Still has not moved her bowels  Like to try to eat something today  Review of Systems  Review of systems was reviewed and negative unless stated above in HPI/24-hour events   ---------------------------------------------------------------------------------------  Assessment and Plan:    1  Hydrocephalus s/p VPS 5/1   2  Iatrogenic SAH  3  Metastatic breast cancer  4  Saddle pulmonary embolism  5  Extensive DVT LLE  6  Nausea and vomiting  7  Right acoustic neuroma  8  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  9  Hypokalemia  10  Oral thrush  11  Thrombocytopenia  12  LUE PICC 4/29  13   S/p IVC filter 4/30     Neuro:   Diagnoses: Recent history of cerebellar mass c/b hydrocephalus s/p suboccipital craniectomy and now s/p VPS 5/1      Plan:    -S/p palliative VPS 5/1  -Neuro checks q 4 hours  -Appreciate neurosurgery input  -Pain management for headaches  -Patient is established with palliative care team, ongoing [address]     CV:              MAP: Maintain MAPs > 65 mmHg              Avoid hypotensive episodes     Pulm:             Diagnosis: Pulmonary embolism  -Incidental finding on CT CAP  No CT evidence of RHS  -Asymptomatic, hemodynamically stable   -IR providers don't believe that embolectomy is necessary at this time  -Duplex showed extensive DVT in left leg  -Status post IVC filter placement  Plan:  -heparin gtt on hold  -Tentative plan to restart heparin gtt  5/3     Diagnosis: Stage IV breast cancer of right breast, metastatic, ER+ ER/SC+, HER2- grade 3, diagnosed in June 2019  BRCA2 mutation +  -Medical oncology following                GI:              GK prophylaxis: Not on any pressors  Will hold off for now       :              Has had nausea and vomiting throughout the course of hospitaliztion, would avoid any hypotensive episodes which can cause renal hypoperfusion and JANNA  Creatinine: Currently at baseline  Monitor urine output and renal indices  Avoid nephrotoxins  Has PureWick in place x3 days - DC today     F/E/N:                          - F: NS @ 75/hr -DC today                          - E: Monitor and replete electrolytes for Mg >2, Phos                       >3, K >4                           - N:  Regular diet      Endo:              QGZXPKBVG: [id number] with HbA1c at 5 8 as of 03/2023  Glucose trend:   Insulin: Will hold off on SSI for now, can initiate if glucoses  become elevated  Monitor blood glucose for goal 140-180                  Heme:     Hemoglobin stable  -Monitor Hgb  -Monitor platelets  VTE prophylaxis: On hold     ID:              Oral thrush              oral Nystatin   Plan: Monitor temperature and WBC  Maintain normothermia       MSK/Skin:  Plan: Frequent turning and repositioning  Pressure injury prevention  Monitor for skin breakdown   PT/OT when appropriate  Encourage OOB and ambulation when appropriate  Disposition: Continue Critical Care   Code Status: Level 1 - Full Code  ---------------------------------------------------------------------------------------  ICU CORE MEASURES    Prophylaxis   VTE Pharmacologic Prophylaxis: On hold  VTE Mechanical Prophylaxis: sequential compression device  Stress Ulcer Prophylaxis: Prophylaxis Not Indicated     ABCDE Protocol (if indicated)  Plan to perform spontaneous awakening trial today? Not applicable  Plan to perform spontaneous breathing trial today? Not applicable  Obvious barriers to extubation? Not applicable  CAM-ICU: Negative    Invasive Devices Review  Invasive Devices     Peripherally Inserted Central Catheter Line  Duration           PICC Line 80/71/99 Left Basilic 2 days          Drain  Duration           External Urinary Catheter 3 days              Can any invasive devices be discontinued today? Not applicable  ---------------------------------------------------------------------------------------  OBJECTIVE    Vitals   Vitals:    23 0300 23 0400 23 0431 23 0523   BP: 166/88 (!) 176/90 160/87 162/90   Pulse: 80 96 82 90   Resp: 18 (!) 39 20 21   Temp:  98 4 °F (36 9 °C)     TempSrc:  Oral     SpO2: 97% 95% 95% 97%   Weight:       Height:         Temp (24hrs), Av 1 °F (36 7 °C), Min:97 8 °F (36 6 °C), Max:98 4 °F (36 9 °C)  Current: Temperature: 98 4 °F (36 9 °C)    Respiratory:  Nasal Cannula O2 Flow Rate (L/min): 2 L/min    Invasive/non-invasive ventilation settings   Respiratory    Lab Data (Last 4 hours)    None         O2/Vent Data (Last 4 hours)    None                Physical Exam:  General: No acute distress, non-toxic  Eyes: EOMI, anicteric   ENT: Thrush  Respiratory: No respiratory distress   symmetric thorax expansion  Cardiovascular: Regular rate Extremities appear well-perfused  Abdomen: Soft, Non-distended  Extremities: Moves extremities spontaneously  Skin: No obvious lesions  Neuro: Mild dysarthria, no focal deficits         Laboratory and Diagnostics:  Results from last 7 days   Lab Units 05/02/23  0424 05/01/23  0531 04/30/23 0602 04/29/23 1822 04/29/23  0538 04/28/23  0657 04/27/23 2034   WBC Thousand/uL 11 94* 12 07* 12 27* 7 64 8 00 6 73 7 49   HEMOGLOBIN g/dL 12 1 12 3 12 6 10 4* 12 1 12 2 13 4   HEMATOCRIT % 36 4 36 0 35 3 30 1* 34 6* 35 5 37 7   PLATELETS Thousands/uL 137* 122* 109* 82* 93* 85* 103*   NEUTROS PCT %  --  83*  --   --  74 70 68   MONOS PCT %  --  7  --   --  12 13* 10     Results from last 7 days   Lab Units 05/02/23  0424 05/01/23  0531 04/30/23 1852 04/30/23 0602 04/29/23 1822 04/29/23 0538 04/28/23  0657 04/27/23 2034   SODIUM mmol/L 136 133* 134* 135 136 138 137 136   POTASSIUM mmol/L 4 1 3 5 3 7 3 4* 3 6 3 2* 3 5 2 9*   CHLORIDE mmol/L 104 102 101 102 102 104 107 101   CO2 mmol/L 31 29 30 28 25 28 21 29   ANION GAP mmol/L 1* 2* 3* 5 9 6 9 6   BUN mg/dL 8 5 5 4* 4* 5 5 9   CREATININE mg/dL 0 53* 0 51* 0 55* 0 52* 0 36* 0 54* 0 35* 0 74   CALCIUM mg/dL 9 5 8 6 8 6 8 4 7 6* 8 2* 8 0* 9 4   GLUCOSE RANDOM mg/dL 143* 122 136 129 104 115 93 123   ALT U/L  --  62  --  64  --   --   --  88*   AST U/L  --  20  --  23  --   --   --  44   ALK PHOS U/L  --  96  --  87  --   --   --  86   ALBUMIN g/dL  --  2 7*  --  2 8*  --   --   --  3 4*   TOTAL BILIRUBIN mg/dL  --  0 88  --  0 77  --   --   --  0 61     Results from last 7 days   Lab Units 05/01/23  0531 04/30/23  0602 04/29/23  0538 04/28/23  0657   MAGNESIUM mg/dL 2 6 2 3 2 4 2 1   PHOSPHORUS mg/dL 2 9 2 8 2 5*  --       Results from last 7 days   Lab Units 05/02/23  0424 05/01/23  0531 04/30/23  0751 04/30/23  0147 04/29/23  1945 04/29/23  1822 04/29/23  1230 04/28/23  0657   INR  1 18 1 09  --   --   --   --  1 03 1 12   PTT seconds 25 28 88* 80* 111* >210* 23 21*              ABG:    VBG:          Micro  Results from last 7 days   Lab Units 04/28/23  1605   GRAM "STAIN RESULT  1+ Mononuclear Cells  No polys seen  No bacteria seen       EKG: Reviewed  Imaging: Reviewed    Intake and Output  I/O       04/30 0701 05/01 0700 05/01 0701 05/02 0700    P  O   0    I V  (mL/kg) 3284 4 (31) 2280 (21 5)    IV Piggyback 300 100    Total Intake(mL/kg) 3584 4 (33 8) 2380 (22 5)    Urine (mL/kg/hr) 2609 (1) 2600 (1)    Drains 153 43    Total Output 2762 2643    Net +822 4 -263                Height and Weights   Height: 5' 6\" (167 6 cm)  IBW (Ideal Body Weight): 59 3 kg  Body mass index is 37 61 kg/m²  Weight (last 2 days)     Date/Time Weight    04/30/23 1745 106 (233)          Nutrition       Diet Orders   (From admission, onward)             Start     Ordered    05/01/23 1841  Diet Regular; Regular House  Diet effective now        References:    Nutrtion Support Algorithm Enteral vs  Parenteral   Question Answer Comment   Diet Type Regular    Regular Regular House    RD to adjust diet per protocol?  Yes        05/01/23 1840                Active Medications  Scheduled Meds:  Current Facility-Administered Medications   Medication Dose Route Frequency Provider Last Rate   • albuterol  2 puff Inhalation Q4H PRN Evan Pierre PA-C     • ALPRAZolam  0 25 mg Oral HS PRN Evan Pierre PA-C     • bisacodyl  10 mg Rectal Daily PRN Evan Pierre PA-C     • calcium carbonate  1,000 mg Oral Daily PRN Evan Pierre PA-C     • cefazolin  1,000 mg Intravenous Q8H Evan Pierre PA-C 1,000 mg (05/01/23 2201)   • chlorhexidine  15 mL Swish & Spit Q12H [facility] Evan Pierre PA-C     • docusate sodium  100 mg Oral BID Evan Pierre PA-C     • fluconazole  100 mg Oral Daily Evan Pierre PA-C     • HYDROmorphone  0 2 mg Intravenous Q2H PRN Evan Pierre PA-C     • labetalol  20 mg Intravenous Q4H PRN EMILIANO Judd     • nystatin  500,000 Units Swish & Swallow 4x Daily Evan Pierre PA-C     • ondansetron  4 mg Intravenous Q6H PRN Evan Pierre PA-C     • " "oxyCODONE  2 5 mg Oral Q4H PRN Tiny Dayton, PA-C     • oxyCODONE  5 mg Oral Q4H PRN Tiny Dayton, PA-C     • potassium chloride  40 mEq Oral BID Tiny Dayton, PA-C     • potassium-sodium phosphates  2 packet Oral 4x Daily (with meals and at bedtime) Tiny Dayton, PA-C     • senna  1 tablet Oral Daily Tiny Dayton, PA-C     • sodium chloride  75 mL/hr Intravenous Continuous Tiny Dayton, PA-C 75 mL/hr (05/01/23 1937)     Continuous Infusions:  sodium chloride, 75 mL/hr, Last Rate: 75 mL/hr (05/01/23 1937)      PRN Meds:   albuterol, 2 puff, Q4H PRN  ALPRAZolam, 0 25 mg, HS PRN  bisacodyl, 10 mg, Daily PRN  calcium carbonate, 1,000 mg, Daily PRN  HYDROmorphone, 0 2 mg, Q2H PRN  labetalol, 20 mg, Q4H PRN  ondansetron, 4 mg, Q6H PRN  oxyCODONE, 2 5 mg, Q4H PRN  oxyCODONE, 5 mg, Q4H PRN        Allergies   Allergies   Allergen Reactions   • Tylenol [Acetaminophen]      Told to avoid due to cancer      ---------------------------------------------------------------------------------------  Advance Directive and Living Will:      Power of :    POLST:    ---------------------------------------------------------------------------------------  Care Time Delivered:   30 mins    Lina Rodríguez DO      Portions of the record may have been created with voice recognition software  Occasional wrong word or \"sound a like\" substitutions may have occurred due to the inherent limitations of voice recognition software    Read the chart carefully and recognize, using context, where substitutions have occurred   "

## 2023-05-02 NOTE — PROGRESS NOTES
PT with poor po tolerance and dysphagia  Per ST recommending NPO  Recommend initiating TF via NG tube, [email or web address], advance by 10mL every 6hrs to goal of 70mL/hr, water flushes 110mL every 4hrs provides total of 2016cal, 93g pro, 2016mL

## 2023-05-02 NOTE — CASE MANAGEMENT
Case Management Assessment & Discharge Planning Note    Patient name Mac Jama  Location ICU ICU  MRN [id number]  : 1970 Date 2023       Current Admission Date: 2023  Current Admission Diagnosis: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 [facility])   Patient Active Problem List    Diagnosis Date Noted   • Pulmonary embolism and DVT 2023   • Status post brain surgery 2023   • 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 [facility])    • Leukocytosis 2023   • Hyperglycemia, drug-induced 2023   • Vomiting 2023   • Brain mass 2023   • Injury of right foot 2023   • Constipation 2023   • GERD (gastroesophageal reflux disease) 2023   • Palliative care patient 2023   • Rosacea 10/03/2022   • Mixed hyperlipidemia 10/03/2022   • Cerebral aneurysm 2022   • Stroke ([facility] [address] ) 2022   • Schwannoma of cranial nerve ([facility] [address] ) 2021   • Allergic rhinitis 07/15/2021   • Obesity 07/15/2021   • Anxiety about health 2021   • Lymphedema 2020   • Postmenopausal 2020   • Advanced care planning/counseling discussion 01/15/2020   • Rash 01/15/2020   • On antineoplastic chemotherapy 2020   • S/P BSO (bilateral salpingo-oophorectomy) 2019   • Breast cancer metastasized to liver ([facility] ) 2019   • Multiple lesions on computed tomography of brain 2019   • BRCA2 gene mutation positive 2019   • Carcinoma of right breast metastatic to axillary lymph node ([facility] [address] ) 2019   • Family history of breast cancer in female 2019   • Family history of colon cancer 2019   • Dense breast tissue 2019   • Malignant neoplasm of central portion of right breast in female, estrogen receptor positive ([facility] [address] ) 2019      LOS (days): 4  Geometric Mean LOS (GMLOS) (days): 6 60  Days to GMLOS:2     OBJECTIVE:  PATIENT READMITTED TO HOSPITAL  Risk of Unplanned Readmission Score: 27 16         Current admission status: Inpatient Preferred Pharmacy:   [facility] [address] [place] - [address] [id number]  Phone: [phone] Fax: [phone]    Tre [place], [address] [facility] [address]  Phone: [phone] Fax: [phone]    Primary Care Provider: EMILIANO Alberto    Primary Insurance: BLUE CROSS  Secondary Insurance:     ASSESSMENT:  Active Health Care Proxies    There are no active Health Care Proxies on file  Advance Directives  Does patient have a Health Care POA?:  (unsure)  Does patient have Advance Directives? :  (unsure)  Primary Contact: Annie Dural (Brother)         Readmission Root Cause  30 Day Readmission: Yes  Who directed you to return to the hospital?: Self  Did you understand whom to contact if you had questions or problems?: Yes  Did you get your prescriptions before you left the hospital?: Yes  Were you able to get your prescriptions filled when you left the hospital?: Yes  Did you take your medications as prescribed?: Yes  Were you able to get to your follow-up appointments?: Yes  During previous admission, was a post-acute recommendation made?: Yes  What post-acute resources were offered?: STR ([facility])  Patient was readmitted due to: 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    Patient Information  Admitted from[de-identified] Home  Mental Status: Confused  During Assessment patient was accompanied by: Son  Assessment information provided by[de-identified] Son (CM confirmed information w/ Pt son via chart review 03/31/23)        DISCHARGE DETAILS:    Discharge planning discussed with[de-identified] Pt son  Freedom of Choice: Yes  Comments - Freedom of Choice: CM discueed with Pt son STR choice  PT son stated he would like for Pt to go to [facility] since she had a recent stay there

## 2023-05-02 NOTE — ASSESSMENT & PLAN NOTE
POD 1 Right, possible left, ventriculoperitoneal shunt placement using neuronavigation, laparoscopic abdominal portion with GI surgery  · Patient with PMH of stage IV breast cancer with brain and liver metastases presented to the ED 4/27/2023 c/o headache and progressive nausea and vomiting for several weeks  · Patient is s/p suboccipital craniotomy for resection of right and left cerebellar masses with complex dural reconstruction 4/3/2023 with Dr Hank Mancini  · Now with Certas Plus valve set at 5    Imaging  · CT head w/o, 5/2/23: Final read pending  Per my interpretation, stable post op findings and presence of  shunt right lateral ventricle  Plan  · Continue monitoring neurological status/exam with frequent neurological checks  · Shunt series pending  · PT/OT evaluation  · Mobilize as tolerated with assistance  · Pain management per primary team  · Medical management per primary team  · Palliative care following  · DVT ppx: SCD, ok to start SQH BID today    Neurosurgery will continue to follow closely  Patient remain in either ICU or SD1 during heparin to coumadin transition   Call with questions

## 2023-05-02 NOTE — PLAN OF CARE
Problem: INFECTION - ADULT  Goal: Absence or prevention of progression during hospitalization  Description: INTERVENTIONS:  - Assess and monitor for signs and symptoms of infection  - Monitor lab/diagnostic results  - Monitor all insertion sites, i e  indwelling lines, tubes, and drains  - Monitor endotracheal if appropriate and nasal secretions for changes in amount and color  - [place] appropriate cooling/warming therapies per order  - Administer medications as ordered  - Instruct and encourage patient and family to use good hand hygiene technique  - Identify and instruct in appropriate isolation precautions for identified infection/condition  Outcome: Progressing     Problem: Knowledge Deficit  Goal: Patient/family/caregiver demonstrates understanding of disease process, treatment plan, medications, and discharge instructions  Description: Complete learning assessment and assess knowledge base    Interventions:  - Provide teaching at level of understanding  - Provide teaching via preferred learning methods  Outcome: Progressing     Problem: Prexisting or High Potential for Compromised Skin Integrity  Goal: Skin integrity is maintained or improved  Description: INTERVENTIONS:  - Identify patients at risk for skin breakdown  - Assess and monitor skin integrity  - Assess and monitor nutrition and hydration status  - Monitor labs   - Assess for incontinence   - Turn and reposition patient  - Assist with mobility/ambulation  - Relieve pressure over bony prominences  - Avoid friction and shearing  - Provide appropriate hygiene as needed including keeping skin clean and dry  - Evaluate need for skin moisturizer/barrier cream  - Collaborate with interdisciplinary team   - Patient/family teaching  - Consider wound care consult   Outcome: Progressing

## 2023-05-02 NOTE — RESPIRATORY THERAPY NOTE
"RT Protocol Note  Catherine Monique 47 y o  female MRN: [id number]  Unit/Bed#: ICU 01 Encounter: [id number]    Assessment    Principal Problem:    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 [facility])  Active Problems:    Malignant neoplasm of central portion of right breast in female, estrogen receptor positive ([facility] )    Palliative care patient    Vomiting    Status post brain surgery    Pulmonary embolism and DVT      Home Pulmonary Medications:  N/A    Home Devices/Therapy: Other (Comment)    Past Medical History:   Diagnosis Date    Bloating     Breast cancer ([address] [address] )     Bruises easily     Cancer ([place] [address] )     right breast//to lymph nodes/liver/ and bone    Depression     History of cancer chemotherapy 2020    History of pneumonia     Hypotension     occas    Low iron     \"when pregnant, 20 yrs ago\"    Neuropathy     hands//fingers    Shortness of breath     occas    Stroke ([place] [address] )     Subacromial impingement of left shoulder 2022    Tinnitus     right    Wears glasses      Social History     Socioeconomic History    Marital status: Single     Spouse name: None    Number of children: None    Years of education: None    Highest education level: None   Occupational History    None   Tobacco Use    Smoking status: Former     Packs/day: 1 00     Years: 30 00     Pack years: 30 00     Types: Cigarettes     Quit date:      Years since quittin 3    Smokeless tobacco: Never   Vaping Use    Vaping Use: Never used   Substance and Sexual Activity    Alcohol use: Yes     Comment: occassional    Drug use: No    Sexual activity: Not Currently     Partners: Male     Birth control/protection: None   Other Topics Concern    None   Social History Narrative    Consumes 2-3 cups of coffee per day     Social Determinants of Health     Financial Resource Strain: Not on file   Food Insecurity: No Food Insecurity    Worried About Running Out of Food in the Last Year: Never true    Ran Out of Food in the Last Year: Never true " "  Transportation Needs: No Transportation Needs    Lack of Transportation (Medical): No    Lack of Transportation (Non-Medical): No   Physical Activity: Not on file   Stress: Not on file   Social Connections: Not on file   Intimate Partner Violence: Not on file   Housing Stability: Unknown    Unable to Pay for Housing in the Last Year: No    Number of Places Lived in the Last Year: Not on file    Unstable Housing in the Last Year: No       Subjective    Subjective Data: pt currently in no resp distress  Pt drowsy due to medication from OR procedure, otherwise arousable  No SOB or increased WOB noted at this time  BS clear, SpO2 100% on 6L simple mask  No resp UDNs/interventions required at this time  D/C resp protocol    Objective    Physical Exam:   Assessment Type: Assess only  General Appearance: Drowsy  Respiratory Pattern: Spontaneous  Chest Assessment: Chest expansion symmetrical  Bilateral Breath Sounds: Clear  O2 Device: simple mask    Vitals:  Blood pressure (!) 191/87, pulse (!) 115, temperature 98 4 °F (36 9 °C), temperature source Oral, resp  rate (!) 27, height 5' 6\" (1 676 m), weight 106 kg (233 lb), SpO2 100 %  Imaging and other studies: I have personally reviewed pertinent reports  O2 Device: simple mask     Plan: D/C resp protocol    Respiratory Plan: Home Bronchodilator Patient pathway        Resp Comments: pt assessed per resp protocol  pt presents s/p shunt placement  Pt has no significant resp PMH   No home resp nebs, + smoking hx   "

## 2023-05-03 NOTE — PROCEDURES
"Speech Pathology - Modified Barium Swallow Study    Patient Name: Tony Peterson    Today's Date: 5/3/2023     Problem List  Principal Problem:    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 [facility])  Active Problems:    Malignant neoplasm of central portion of right breast in female, estrogen receptor positive ([facility] [address] )    Palliative care patient    Vomiting    Pulmonary embolism and DVT      Past Medical History  Past Medical History:   Diagnosis Date   • Bloating    • Breast cancer ([facility] [address] )    • Bruises easily    • Cancer ([facility] [address] )     right breast//to lymph nodes/liver/ and bone   • Depression    • History of cancer chemotherapy 05/2020   • History of pneumonia    • Hypotension     occas   • Low iron     \"when pregnant, 20 yrs ago\"   • Neuropathy     hands//fingers   • Shortness of breath     occas   • Stroke [facility])    • Subacromial impingement of left shoulder 03/14/2022   • Tinnitus     right   • Wears glasses        Past Surgical History  Past Surgical History:   Procedure Laterality Date   • BILATERAL SALPINGOOPHORECTOMY     • BREAST BIOPSY     • CRANIOTOMY Bilateral 4/3/2023    Procedure: Suboccipital craniotomy for tumor resection using neuronavigation;  Surgeon: Brenda Hernandez MD;  [facility];  Service: Neurosurgery   • IR BIOPSY LIVER MASS  06/11/2019   • IR IVC FILTER PLACEMENT PERMANENT  4/30/2023   • MASTECTOMY Right     2019   • MYOMECTOMY      H/O FIBROIDS, NO SURGERY NEEDED   • ME LAPAROSCOPY W/RMVL ADNEXAL STRUCTURES N/A 07/29/2019    Procedure: LAPAROSCOPIC BILATERAL SALPINGO-OOPHORECTOMY;  Surgeon: Ria Santacruz MD;  [facility];  Service: Gynecology Oncology   • ME MASTECTOMY SIMPLE COMPLETE Right 06/05/2020    Procedure: MASTECTOMY SIMPLE, axillary node dissection;  Surgeon: Chalo Franco MD;  [facility];  Service: Surgical Oncology   • US GUIDED BREAST BIOPSY RIGHT COMPLETE Right 05/03/2019   • [address] [place] [address] Road RIGHT Right 05/03/2019   • VENTRICULOPERITONEAL SHUNT " Right 5/1/2023    Procedure: Right, possible left, ventriculoperitoneal shunt placement using neuronavigation, laparoscopic abdominal portion with GI surgery;  Surgeon: Madison Phillips MD;  [facility];  Service: Neurosurgery       Assessment Summary:    Pt presents with profound oropharyngeal dysphagia characterized by minimal to no lingual motion/bolus transport, significantly reduced propulsion, minimal initiation of swallow, minimal to no laryngeal elevation/ laryngeal vestibule closure, no epiglottic inversion, minimal to no tongue base retraction, significantly reduced PES opening  duration and significant pharyngeal residue  Pt was unable to initiate oral propulsion, tilting head back to transport thins  Aspiration was observed w/ most materials offered d/t limited movement of structures/ inability to protect airway  Pharyngeal residue was significant w/ very little clearance through PES  Concern for pooling and spillage into airway w/ larger boluses  Majority of boluses offered were spit out into a cup/ wiped from oral cavity  PO not appropriate at this time  Consider NG for nutrition  Repeat VBS likely required  Note: Images are available for review in PACS as desired  Recommendations:   Recommended Diet: NPO   Recommended Form of Medications: non-oral if possible   Aspiration precautions and compensatory swallowing strategies: upright posture, only feed when fully alert, slow rate of feeding and small bites/sips  Consider referral to:    SLP Dysphagia therapy recommended:     Results Reviewed with: patient and RN   Pt/Family Education: initiated  Pt and caregivers would benefit from/require continued education  Patient Stated Goal:    Dysphagia Goals per SLP: pt will participate in repeat swallow eval to determine safety of po intake and/or further instrumental testing        General Information;  Pt is a 47 y o  female with a PMH remarkable for diabetes, metastatic breast cancer with known brain and liver metastasis, and recent hospitalization on April for brain metastases resection surgery who presented to ER with nausea, vomiting, and decreased oral intake for the past week   Patient was recently found to have a new large enhancing cerebellar mass and is now status post suboccipital craniotomy for resection of right cerebellar mass and then left cerebellar mass   Final pathology showed metastatic adenocarcinoma consistent with breast primary  Veto Jobs did show stable ventricles sizes and relatively unchanged pseudomeningocele, however, given her worsening of her symptoms neurosurgery decided to proceed with CSF diversion with EVD placement  Patient transferred to neuro ICU for further management  Found to have saddle PE without right heart strain and LLE DVT, status post IVC filter placement 4/30   shunt placed 5/1  Pt intubated 5/1 and extubated same day       Current concerns for dysphagia include limited lingual ROM, coughing w/ po  MBS was recommended to assess oropharyngeal stage swallowing skills at this time  Prior MBS: n/a    Oral Mechanism Exam  Facial: symmetrical  Labial: WFL  Lingual: decreased ROM- little to no movement possible  Velum: unable to visualize  Mandible: adequate ROM  Dentition: adequate  Vocal quality: breathy and weak   Volitional Cough: strong/productive   Respiratory Status: on RA    Tracheostomy: n/a    Pt was viewed sitting upright in the lateral and AP positions  Due to concerns for patient safety / patient refusal, trials provided deviated from the MBSImP Validated Protocol  Pt was given 5-mL thin liquid x2, 5-mL nectar thick, 5-mL honey thick and 5-mL pudding   All material only offered x1      Initial view observations/comments: Clear view of the upper airway      8-Point Penetration-Aspiration Scale   Thin liquid 8 - Material enters the airway, passes below the vocal folds, and no effort is made to eject     Nectar thick liquid 8 - Material enters the airway, passes below the vocal folds, and no effort is made to eject     Honey thick liquid 8 - Material enters the airway, passes below the vocal folds, and no effort is made to eject     Puree (pudding) 8 - Material enters the airway, passes below the vocal folds, and no effort is made to eject     Solid Solids not attempted      Strategies and Efficacy: dry spoon w/ pressure on tongue attempted, did not improve swallow initiation    Aspiration Response and Efficacy:  Pt silently aspirated- was able to be prompted to cough     MBS IMP Rating    ORAL Impairment  Compinent 1--Lip Closure  Judged at any point during the swallow  1 - Interlabial escape; no progression to anterior lip    Component 2--Tongue Control During Bolus Hold  Judged on held liquid boluses only and prior to productive tongue movement  2 - Posterior escape of less than half of bolus - minimal tongue ROM, cannot lateralize or protrude  Material is contained in the anterior oral cavity  Component 3--Bolus Preparation/Mastication  Judged only during presentation of 1/2 shortbread cookie coated in pudding  Solids not attempted 2/2 safety concerns     Component 4--Bolus Transport/Lingual Motion  Judged after first productive tongue movement for oral bolus transport  4 - Minimal to tongue motion- head tilted back w/ 5ml thin to move bolus back     Component 5--Oral Residue  Judged after first swallow or after the last swallow of the sequential swallow task  4 - Minimal to no clearance   Location   A - Floor of Mouth, B - Palate and C - Tongue    Component 6--Initiation of Pharyngeal Swallow  Judged at first movement of the brisk superior-anterior hyoid trajectory  3 - Bolus head in pyriforms- minimal oral or pharyngeal initiation observed- minimal clearance through PES    PHARYNGEAL Impairment  Component 7--Soft Palate Elevation  Judged during maximum displacement of soft palate    0 - No bolus between the soft palate (SP)/pharyngeal wall (PW)    Component 8--Laryngeal Elevation  Judged when epiglottis is in its most horizontal position  2 - Minimal superior movement of thyroid cartilage with minimal approximation of arytenoids to epiglottic petiole    Component 9--Anterior Hyoid Excursion  Judged at height of swallow/maximal anterior hyoid displacement  1 - Partial anterior movement-small amount of displacement    Component 10--Epiglottic Movement  Judged at height of swallow/maximal anterior hyoid displacement  2 - No inversion    Component 11--Laryngeal Vestibular Closure  Judged at height of swallow/maximal anterior hyoid displacement  2 - None; wide column air/contrast in laryngeal vestibule    Component 12--Pharyngeal Stripping Wave  Judged during the full duration of the pharyngeal swallow  2 - Absent    Component 13--Pharyngeal Contraction  Judged in AP view at rest and throughout maximum movement of structures  AP not attempted 2/2 safety concerns     Component 14--Pharyngoesophageal Segment Opening  Judged during maximum distension of PES and throughout opening and closure  2 - Minimal distension/minimal duration; marked obstruction of flow - very minimal clearance observed on swallow- majority of bolus remained in pharynx/ pooled in pyriforms     Component 15--Tongue Base (TB) Retraction  Judged during maximum retraction of the tongue base  4 - No visible posterior motion of TB- very minimal if any retraction observed    Component 16--Pharyngeal Residue  Judged after first swallow or after the last swallow of the sequential swallow task    4 - Minimal to no pharyngeal clearance   Location   F - Diffuse (>3 areas)      ESOPHAGEAL Impairment  Component 17--Esophageal Clearance Upright Position  Judged in AP view during bolus transit through the oral cavity to the LES  0 - Complete clearance; esophageal coating- bolus that was cleared from pharynx was cleared from esophagus

## 2023-05-03 NOTE — PLAN OF CARE
Problem: Knowledge Deficit  Goal: Patient/family/caregiver demonstrates understanding of disease process, treatment plan, medications, and discharge instructions  Description: Complete learning assessment and assess knowledge base    Interventions:  - Provide teaching at level of understanding  - Provide teaching via preferred learning methods  Outcome: Progressing     Problem: SAFETY ADULT  Goal: Maintain or return to baseline ADL function  Description: INTERVENTIONS:  -  Assess patient's ability to carry out ADLs; assess patient's baseline for ADL function and identify physical deficits which impact ability to perform ADLs (bathing, care of mouth/teeth, toileting, grooming, dressing, etc )  - Assess/evaluate cause of self-care deficits   - Assess range of motion  - Assess patient's mobility; develop plan if impaired  - Assess patient's need for assistive devices and provide as appropriate  - Encourage maximum independence but intervene and supervise when necessary  - Involve family in performance of ADLs  - Assess for home care needs following discharge   - Consider OT consult to assist with ADL evaluation and planning for discharge  - Provide patient education as appropriate  Outcome: Progressing     Problem: INFECTION - ADULT  Goal: Absence or prevention of progression during hospitalization  Description: INTERVENTIONS:  - Assess and monitor for signs and symptoms of infection  - Monitor lab/diagnostic results  - Monitor all insertion sites, i e  indwelling lines, tubes, and drains  - Monitor endotracheal if appropriate and nasal secretions for changes in amount and color  - [place] appropriate cooling/warming therapies per order  - Administer medications as ordered  - Instruct and encourage patient and family to use good hand hygiene technique  - Identify and instruct in appropriate isolation precautions for identified infection/condition  Outcome: Progressing

## 2023-05-03 NOTE — ASSESSMENT & PLAN NOTE
ER/CA+, HER2- grade 3, metastatic breast cancer diagnosed June 2019   + BRCA 2 mutation      Plan:  · Continue comfort care

## 2023-05-03 NOTE — SPEECH THERAPY NOTE
"Speech Language/Pathology    Speech/Language Pathology Progress Note    Patient Name: Landy Horton  Today's Date: 5/3/2023     Problem List  Principal Problem:    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 [facility])  Active Problems:    Malignant neoplasm of central portion of right breast in female, estrogen receptor positive ([facility] [address] )    Palliative care patient    Vomiting    Pulmonary embolism and DVT       Past Medical History  Past Medical History:   Diagnosis Date   • Bloating    • Breast cancer ([facility] [address] )    • Bruises easily    • Cancer ([facility] [address] )     right breast//to lymph nodes/liver/ and bone   • Depression    • History of cancer chemotherapy 05/2020   • History of pneumonia    • Hypotension     occas   • Low iron     \"when pregnant, 20 yrs ago\"   • Neuropathy     hands//fingers   • Shortness of breath     occas   • Stroke [facility])    • Subacromial impingement of left shoulder 03/14/2022   • Tinnitus     right   • Wears glasses         Past Surgical History  Past Surgical History:   Procedure Laterality Date   • BILATERAL SALPINGOOPHORECTOMY     • BREAST BIOPSY     • CRANIOTOMY Bilateral 4/3/2023    Procedure: Suboccipital craniotomy for tumor resection using neuronavigation;  Surgeon: Jim Fry MD;  [facility];  Service: Neurosurgery   • IR BIOPSY LIVER MASS  06/11/2019   • IR IVC FILTER PLACEMENT PERMANENT  4/30/2023   • MASTECTOMY Right     2019   • MYOMECTOMY      H/O FIBROIDS, NO SURGERY NEEDED   • VA LAPAROSCOPY W/RMVL ADNEXAL STRUCTURES N/A 07/29/2019    Procedure: LAPAROSCOPIC BILATERAL SALPINGO-OOPHORECTOMY;  Surgeon: Aarti Ftizgerald MD;  [facility];  Service: Gynecology Oncology   • VA MASTECTOMY SIMPLE COMPLETE Right 06/05/2020    Procedure: MASTECTOMY SIMPLE, axillary node dissection;  Surgeon: Yumiko Camarillo MD;  [facility];  Service: Surgical Oncology   • US GUIDED BREAST BIOPSY RIGHT COMPLETE Right 05/03/2019   • US GUIDED BREAST LYMPH NODE BIOPSY RIGHT Right 05/03/2019   • " VENTRICULOPERITONEAL SHUNT Right 5/1/2023    Procedure: Right, possible left, ventriculoperitoneal shunt placement using neuronavigation, laparoscopic abdominal portion with GI surgery;  Surgeon: Sanjay Peacock MD;  [facility];  Service: Neurosurgery         Subjective:  Pt awake and upright, OOB in chair  Pt slightly impulsive, grabbing food items brought in from table  Current diet: NPO  Objective:  Pt seen for ongoing tx of dysphagia  Pt seen w/ trials of ice chips, nt liquids and pudding  Pt continues to have minimal movement of tongue, unable to protrude or lateralize beyond floor of mouth  Propulsions significantly reduced w/ little to no transfer observed  Majority of bolus remained in oral cavity after attempted transfer, noted to fall from pt mouth when she attempted to speak  Some coughing noted w/ ice chips and nt, reduced from yesterday  Assessment:  VBS warranted to gauge movement of structures  Concern for inability to clear pharynx/ potenital for aspiration  Oral phase is significantly disorders w/ minimal to no ability to transfer       Plan/Recommendations:  VBS planned for today  Continue NPO- diet recs after vbs  Continue oral care as able   ST to follow

## 2023-05-03 NOTE — PROGRESS NOTES
[facility] [facility]  Progress Note  Name: Chapis Langley  MRN: [id number]  Unit/Bed#: ICU 01 I Date of Admission: 4/27/2023   Date of Service: 5/3/2023 I Hospital Day: 5    Assessment/Plan   * 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 [facility])  Assessment & Plan  POD 2 Right frontal ventriculoperitoneal shunt placement using neuronavigation and laparoscopic abdominal assistance  · Patient with PMH of stage IV breast cancer with brain and liver metastases presented to the ED 4/27/2023 c/o headache and progressive nausea and vomiting for several weeks  · Patient is s/p suboccipital craniotomy for resection of right and left cerebellar masses with complex dural reconstruction 4/3/2023 with Dr Margaux Almendarez  · Now with Certas Plus valve set at 5    Imaging  · CT head w/o, 5/2/23: Interval removal of right frontal ventriculostomy catheter with placement of similar approach  shunt catheter  Small postsurgical layering hemorrhage in the posterior horns  Stable ventricular size without hydrocephalus  No significant change in small amount of anterior right frontal lobe subarachnoid hemorrhage  Grossly stable postsurgical change in the posterior fossa from tumor resection  Partially imaged pseudomeningocele  Stable right frontal lobe cortical lesion  · X-ray shunt series 5/3/23: pending this morning     Plan  · Continue monitoring neurological status/exam with frequent neurological checks  · Postoperative imaging completed  · PT/OT evaluation  · Mobilize as tolerated with assistance  · Pain management per primary team  · Medical management per primary team  · Palliative care following  · DVT ppx: SCD, HSQ BID   · Once anticipated DC date is established an order will be placed for MRI brain with brain tumor protocol prior to DC to assist with radiation planning  Neurosurgery will continue to follow closely  Patient remain in either ICU or SD1 during heparin to coumadin transition   Call with "questions        Pulmonary embolism and DVT  Assessment & Plan  Imaging:   • CT chest abdomen pelvis with 4/28: New saddle pulmonary embolism extending into bilateral pulmonary artery segment branches  No evidence of right heart strain  • Lower extremity duplex 4/29: Left lower extremity with occlusive acute DVT noted in the femoral, popliteal, gastrinomas, posterior tibial and peroneal veins  Plan:   • S/P IVC filter  • SQH BID  • Plan to initiate heparin gtt tomorrow after CT head in am    o Plan for low dose with low PTT goal and no bolus   o Once therapeutic can transition to javy PTT goal and begin transition to coumadin   o Will require CT head once therapeutic on hep gtt to ensure no delayed or progressive hemorrhage  • Patient to remain in ICU or SD1 during transition to oral AC       Subjective/Objective   Chief Complaint: none     Subjective: Patient is laying in bed this morning without any acute issues ort concerns  She has not had any vomiting or nausea since yesterday  Improved pain control today     Objective: laying in bed in NAD    I/O       05/01 0701 05/02 0700 05/02 0701  05/03 0700 05/03 0701  05/04 0700    P  O  0      I V  (mL/kg) 2436 3 (23) 2673 7 (25 2) 270 8 (2 6)    IV Piggyback 150 200     Total Intake(mL/kg) 2586 3 (24 4) 2873 7 (27 1) 270 8 (2 6)    Urine (mL/kg/hr) 2950 (1 2) 2550 (1) 300 (1 3)    Drains 43      Total Output 2993 2550 300    Net -406 8 +323 7 -29 2           Unmeasured Urine Occurrence  1 x           Invasive Devices     Peripherally Inserted Central Catheter Line  Duration           PICC Line 63/30/44 Left Basilic 3 days          Drain  Duration           External Urinary Catheter 4 days                Physical Exam:  Vitals: Blood pressure 161/67, pulse 94, temperature 97 7 °F (36 5 °C), temperature source Oral, resp  rate (!) 25, height 5' 6\" (1 676 m), weight 106 kg (233 lb), SpO2 94 %  ,Body mass index is 37 61 kg/m²      Hemodynamic Monitoring: MAP: " Arterial Line MAP (mmHg): 97 mmHg, CPP: CPP Cuff-Calculated (mmHg): 117, ICP Mean: ICP Mean (mmHg): 5 mmHg    General appearance: alert, appears stated age, cooperative and no distress  Head: Normocephalic, R frontal  shunt palpable  Incision healing without complication   Eyes: EOMI, PERRL  Neck: supple, symmetrical, trachea midline  Neck incisions healing well  No palpable abnormality  Back: no kyphosis present  Lungs: non labored breathing  Heart: regular heart rate  Neurologic:   Mental status: Alert, oriented x3, thought content appropriate  Cranial nerves: grossly intact (Cranial nerves II-XII)  Sensory: normal to light touch   Motor: moving all extremities without focal weakness 5/5    Lab Results:  Results from last 7 days   Lab Units 05/03/23 0433 05/02/23 0424 05/01/23  0531 04/29/23  1822 04/29/23  0538   WBC Thousand/uL 10 25* 11 94* 12 07*   < > 8 00   HEMOGLOBIN g/dL 11 4* 12 1 12 3   < > 12 1   HEMATOCRIT % 34 0* 36 4 36 0   < > 34 6*   PLATELETS Thousands/uL 159 137* 122*   < > 93*   NEUTROS PCT % 76*  --  83*  --  74   MONOS PCT % 9  --  7  --  12    < > = values in this interval not displayed  Results from last 7 days   Lab Units 05/03/23 0433 05/02/23 0424 05/01/23  0531 04/30/23  1852 04/30/23  0602 04/28/23  0657 04/27/23  2034   POTASSIUM mmol/L 3 4* 4 1 3 5   < > 3 4*   < > 2 9*   CHLORIDE mmol/L 102 104 102   < > 102   < > 101   CO2 mmol/L 28 31 29   < > 28   < > 29   BUN mg/dL 6 8 5   < > 4*   < > 9   CREATININE mg/dL 0 60 0 53* 0 51*   < > 0 52*   < > 0 74   CALCIUM mg/dL 9 7 9 5 8 6   < > 8 4   < > 9 4   ALK PHOS U/L  --   --  96  --  87  --  86   ALT U/L  --   --  62  --  64  --  88*   AST U/L  --   --  20  --  23  --  44    < > = values in this interval not displayed       Results from last 7 days   Lab Units 05/03/23 0433 05/01/23  0531 04/30/23  0602   MAGNESIUM mg/dL 2 1 2 6 2 3     Results from last 7 days   Lab Units 05/03/23  0433 05/01/23  0531 04/30/23  0602 PHOSPHORUS mg/dL 1 8* 2 9 2 8     Results from last 7 days   Lab Units 05/02/23  0424 05/01/23  0531 04/30/23  0751 04/29/23  1822 04/29/23  1230   INR  1 18 1 09  --   --  1 03   PTT seconds 25 28 88*   < > 23    < > = values in this interval not displayed  No results found for: TROPONINT  ABG:No results found for: PHART, YMT2MRP, PO2ART, NUR2EIU, T5HGESVF, BEART, SOURCE    Imaging Studies: I have personally reviewed pertinent reports  and I have personally reviewed pertinent films in PACS  XR chest portable    Result Date: 4/30/2023  Impression: PICC line tip in the superior vena cava  No acute cardiopulmonary disease  Workstation performed: [id number]     CT head wo contrast    Result Date: 5/2/2023  Impression: 1  Interval removal of previous right frontal approach ventricular catheter and similar approach  shunt catheter placement  Small postsurgical layering hemorrhage within posterior horns of lateral ventricles and small pneumocephalus within anterior horn of right lateral ventricle  Stable ventricular size without hydrocephalus  2   No significant change in small subarachnoid hemorrhage in the anterior right frontal lobe  3   Grossly stable postsurgical change in the posterior fossa from prior tumor resection with unchanged mass effect upon the fourth ventricle  Again partially imaged pseudomeningocele extending to the right posterior superior neck  4   Stable right frontal lobe cortical lesion  Additional smaller lesions are not well visualized due to CT modality limitation  Lesions are demonstrated in prior MRI  Workstation performed: [id number]     CT head wo contrast    Result Date: 5/1/2023  Impression: Stable appearance of the posterior fossa in this patient with a prior mass resection  Persistent mild edema and slight mass effect upon the fourth ventricle without obstructive hydrocephalus  Shunt catheter unchanged in position and ventricular size is unchanged   Stable small amount of subarachnoid hemorrhage within the anterior superior right frontal lobe  Stable well-circumscribed hyperdense metastatic lesion within the right frontal lobe  Stable small pseudomeningocele anterior and posterior to the occipital craniotomy flap  Workstation performed: [id number]     CT head wo contrast    Result Date: 4/30/2023  Impression: No significant interval change since prior exam  Status post right frontal approach ventriculostomy catheter placement with unchanged ventricular size  Small amount of hemorrhage within the right frontal region is stable  Status post suboccipital craniotomy with a grossly stable pseudomeningocele  Workstation performed: [id number]     CT head wo contrast    Result Date: 4/29/2023  Impression: No interval change since the prior exam of the same day with findings detailed above  Workstation performed: [id number]     CT head wo contrast    Result Date: 4/29/2023  Impression: Stable CT of the brain status post occipital craniotomy and resection of metastatic disease  Postoperative pseudomeningocele noted similar to the prior study  Supratentorial metastatic lesions are again noted essentially stable in size  Small amount of postprocedural hemorrhage identified in the right frontal lobe and adjacent frontal sulcus from shunt catheter placement  Workstation performed: [id number]     CT head wo contrast    Result Date: 4/28/2023  Impression: New postsurgical changes of right frontal approach ventricular catheter with tip in frontal horn of right lateral ventricle with stable ventricular size  New acute trace subarachnoid hemorrhage right frontal region, likely from recent surgery  Prior postsurgical changes of suboccipital craniotomy for resection of cerebellar metastatic lesion   Stable small hyperdense metastatic lesions in bilateral frontal lobes (right larger than left) and 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 which was better evaluated on recent MRI brain 4/25/2023  The study was marked in [facility] for immediate notification  Workstation performed: [id number]     CT head without contrast    Result Date: 4/28/2023  Impression: No CT evidence for acute territorial infarct  No new mass effect or midline shift  Known intracranial metastatic lesions better visualized on the prior recent MRI brain examination  Postsurgical changes of prior suboccipital craniectomy with grossly stable appearance of the surgical resection bed and 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 compared to the recent MRI accounting for differences in modality  Workstation performed: [id number]     CT chest abdomen pelvis w contrast    Result Date: 4/28/2023  Impression: New saddle pulmonary embolism extending into bilateral pulmonary arterial segmental branches  No CT evidence of right heart strain  Stable treated metastatic subcentimeter hepatic lesions  No new or enlarging sites of metastatic disease in chest, abdomen, or pelvis  Additional chronic/incidental findings as detailed above  I personally discussed this study with Saniya Hall on 4/28/2023 4:05 PM  Workstation performed: [id number]     IR IVC filter placement permanent    Result Date: 4/30/2023  Impression: Infrarenal IVC filter placement  _______________________________________________________________ COMPARISON: CT abdomen pelvis 4/28/2023 PROCEDURE DETAILS: Operators: Dr Margot Padilla Anesthesia:  Local anesthesia  Medications: 1% lidocaine Contrast: 40 mL of Omnipaque 300 Fluoroscopy time: One-point minutes Images: 32 COMMENTS: A preprocedure timeout was performed per [facility] protocol  The right neck was prepped and draped in the usual sterile fashion  Under ultrasound guidance, the patent and compressible right internal jugular vein was punctured using a 21-gauge micropuncture needle  A static ultrasound image was saved to PACS  A microwire was advanced through the needle into the right atrium   The needle was exchanged for a microsheath allowing exchange of the microwire for a heavy wire which was advanced into the IVC  The IVC filter 9 [place] sheath was advanced over the wire into the IVC  The wire was removed and a small hand contrast injection confirmed placement  A IVC venogram was performed  The wire was removed, the filter was advanced using the pusher through the sheath into appropriate position  The sheath was then withdrawn until the filter was deployed  IVC venogram was repeated  Sheath was removed and manual pressure was performed of the right neck to ensure hemostasis  Workstation performed: [id number]       EKG, Pathology, and Other Studies: I have personally reviewed pertinent reports        VTE Pharmacologic Prophylaxis: Heparin    VTE Mechanical Prophylaxis: sequential compression device

## 2023-05-03 NOTE — ASSESSMENT & PLAN NOTE
POD 3 Right frontal ventriculoperitoneal shunt placement using neuronavigation and laparoscopic abdominal assistance  · Patient with PMH of stage IV breast cancer with brain and liver metastases presented to the ED 4/27/2023 c/o headache and progressive nausea and vomiting for several weeks  · Patient is s/p suboccipital craniotomy for resection of right and left cerebellar masses with complex dural reconstruction 4/3/2023 with Dr Petra Jensen  · Now with Certas Plus valve set at 5    Imaging  · CT head w/o, 5/4/23: Stable right transfrontal ventriculostomy catheter with new interval collapse of the right lateral ventricle and mild to moderate dilatation of the left lateral ventricle  · X-ray shunt series 5/3/23: Intact  shunt catheter  Plan  · Continue monitoring neurological status/exam with frequent neurological checks  · Postoperative imaging completed  CTH with compressed right lateral ventricle and enlarged left lateral ventricle  Will adjust shunt to 6 today with skull XR to follow for confirmation  · PT/OT evaluation  · Mobilize as tolerated with assistance  · Pain management per primary team  · Medical management per primary team  · Palliative care following  · DVT ppx: SCD, HSQ BID   · MRI brain BT protocol ordered for tomorrow for radiation planning    Neurosurgery will continue to follow closely  Patient remain in either ICU or SD1 during heparin to coumadin transition   Call with questions

## 2023-05-03 NOTE — ASSESSMENT & PLAN NOTE
Imaging:   • CT chest abdomen pelvis with 4/28: New saddle pulmonary embolism extending into bilateral pulmonary artery segment branches  No evidence of right heart strain  • Lower extremity duplex 4/29: Left lower extremity with occlusive acute DVT noted in the femoral, popliteal, gastrinomas, posterior tibial and peroneal veins       Plan:   • S/P IVC filter  • SQH BID  • Plan to initiate heparin gtt tomorrow after CT head in am    o Plan for low dose with low PTT goal and no bolus   o Once therapeutic can transition to javy PTT goal and begin transition to coumadin   o Will require CT head once therapeutic on hep gtt to ensure no delayed or progressive hemorrhage  • Patient to remain in ICU or SD1 during transition to oral [facility]

## 2023-05-03 NOTE — ASSESSMENT & PLAN NOTE
46 y/o female with a history of metastatic lung cancer with brain and liver metastasis was recently hospitalized in April '23 after she was found to have bilateral cerebellar masses s/p craniotomy w/ resection of cerebellar masses 4/3  She now presents 4/27/23 with symptoms of delayed hydrocephalus  NSGY proceeded with CSF diversion and EVD placement then conversion to right frontal VPS on 5/1 (after failed EVD clamp trial)      Plan:  · April Roberts was transitioned to level 4 comfort care on 5/5/2023  · Plan to transfer to inpatient hospice house

## 2023-05-03 NOTE — PROGRESS NOTES
[facility]  Progress Note: Critical Care  Name: Sharmaine Castañeda 47 y o  female I MRN: [id number]  Unit/Bed#: ICU 01 I Date of Admission: 4/27/2023   Date of Service: 5/3/2023 I Hospital Day: 5    Assessment/Plan   Neuro:              Diagnoses: Recent history of cerebellar mass c/b hydrocephalus s/p suboccipital craniectomy and now s/p VPS 5/1                  Plan:               -S/p palliative VPS 5/1  -Neuro checks q 4 hours  -Appreciate neurosurgery input  -Pain management for headaches  -Patient is established with palliative care team, ongoing [address]     CV:              No Active issues     Pulm:             Diagnosis: Pulmonary embolism  -Incidental finding on CT CAP  No CT evidence of RHS  -Asymptomatic, hemodynamically stable   -IR providers don't believe that embolectomy is necessary at this time  -Duplex showed extensive DVT in left leg  -Status post IVC filter placement  Plan:  -Continue to Hold Heparin per neurosurgery  -Tentative plan to restart heparin gtt  5/3                 GI:              No active issues     :              No active issues   F/E/N:                          - F: None                          - E: Monitor and replete as needed                          - N:  Regular diet      Endo:              MHYNLVVSY: HADMOPPHGBL with HbA1c at 5 8 as of 03/2023                            Monitor BS, start SSI if BS >180 X2              Heme:  Diagnosis: Stage IV breast cancer of right breast, metastatic, ER+ ER/DE+, HER2- grade 3, diagnosed in June 2019  BRCA2 mutation +  -Medical oncology following     Hemoglobin stable  -Monitor Hgb  -Monitor platelets  VTE prophylaxis: On hold- start Heparin gtt when okay with neurosurgery     ID:              Oral thrush              Continue magic mouthwash              Continue IV fluconazole         MSK/Skin:  Plan: Frequent turning and repositioning  Pressure injury prevention  Monitor for skin breakdown   PT/OT when appropriate  Encourage OOB and ambulation when appropriate  Disposition: Stepdown Level 2    ICU Core Measures     A: Assess, Prevent, and Manage Pain · Has pain been assessed? Yes  · Need for changes to pain regimen? No   B: Both SAT/SAT  · N/A   C: Choice of Sedation · RASS Goal: 0 Alert and Calm  · Need for changes to sedation or analgesia regimen? No   D: Delirium · CAM-ICU: Negative   E: Early Mobility  · Plan for early mobility? Yes   F: Family Engagement · Plan for family engagement today? Yes       Antibiotic Review: Patient on appropriate coverage based on culture data  Prophylaxis:  VTE VTE covered by:  heparin (porcine), Subcutaneous, 5,000 Units at 05/02/23 2150       Stress Ulcer  not ordered          Subjective   HPI/24hr events: No acute overnight events  Review of Systems   Constitutional: Negative  HENT: Negative  Eyes: Negative  Respiratory: Negative  Cardiovascular: Negative  Gastrointestinal: Negative  Endocrine: Negative  Genitourinary: Negative  Musculoskeletal: Negative  Skin: Negative  Allergic/Immunologic: Negative  Neurological: Negative  Hematological: Negative  Psychiatric/Behavioral: Negative  Objective                            Vitals I/O      Most Recent Min/Max in 24hrs   Temp 97 7 °F (36 5 °C) Temp  Min: 97 7 °F (36 5 °C)  Max: 98 °F (36 7 °C)   Pulse 84 Pulse  Min: 72  Max: 96   Resp (!) 23 Resp  Min: 20  Max: 33   /71 BP  Min: 125/71  Max: 185/84   O2 Sat 94 % SpO2  Min: 91 %  Max: 98 %      Intake/Output Summary (Last 24 hours) at 5/3/2023 0423  Last data filed at 5/3/2023 0401  Gross per 24 hour   Intake 2852 91 ml   Output 2550 ml   Net 302 91 ml         Diet NPO     Invasive Monitoring Physical exam    Physical Exam  Vitals and nursing note reviewed  Constitutional:       Appearance: Normal appearance  She is normal weight  HENT:      Head: Normocephalic and atraumatic        Nose: Nose normal  Mouth/Throat:      Mouth: Mucous membranes are dry  Comments: Oral thrush noted  Eyes:      Extraocular Movements: Extraocular movements intact  Conjunctiva/sclera: Conjunctivae normal       Pupils: Pupils are equal, round, and reactive to light  Cardiovascular:      Rate and Rhythm: Normal rate and regular rhythm  Pulses: Normal pulses  Heart sounds: Normal heart sounds  Pulmonary:      Effort: Pulmonary effort is normal       Breath sounds: Normal breath sounds  Abdominal:      General: Abdomen is flat  Palpations: Abdomen is soft  Genitourinary:     Comments: Not assessed  Musculoskeletal:         General: Normal range of motion  Cervical back: Normal range of motion  Skin:     General: Skin is warm and dry  Capillary Refill: Capillary refill takes less than 2 seconds  Coloration: Skin is pale  Neurological:      General: No focal deficit present  Mental Status: She is alert and oriented to person, place, and time  Psychiatric:         Behavior: Behavior normal             Diagnostic Studies      EKG: Tele reviewed  Imaging:  I have personally reviewed pertinent reports         Medications:  Scheduled PRN   al mag oxide-diphenhydramine-lidocaine viscous, 10 mL, Q4H  chlorhexidine, 15 mL, Q12H CHANDLER  docusate sodium, 100 mg, BID  fluconazole, 100 mg, Q24H  heparin (porcine), 5,000 Units, Q12H CHANDLER  potassium chloride, 40 mEq, BID  potassium-sodium phosphates, 2 packet, 4x Daily (with meals and at bedtime)  ropivacaine, 15 mL, Once  scopolamine, 1 patch, Q72H  senna, 1 tablet, Daily      albuterol, 2 puff, Q4H PRN  ALPRAZolam, 0 25 mg, HS PRN  bisacodyl, 10 mg, Daily PRN  calcium carbonate, 1,000 mg, Daily PRN  hydrALAZINE, 5 mg, Q6H PRN  HYDROmorphone, 0 2 mg, Q2H PRN  labetalol, 20 mg, Q4H PRN  ondansetron, 4 mg, Q6H PRN  oxyCODONE, 2 5 mg, Q4H PRN  oxyCODONE, 5 mg, Q4H PRN       Continuous    dextrose 5% lactated ringer's, 125 mL/hr, Last Rate: 125 mL/hr (05/03/23 0301)         Labs:    CBC    Recent Labs     05/01/23  0531 05/02/23  0424   WBC 12 07* 11 94*   HGB 12 3 12 1   HCT 36 0 36 4   * 137*     BMP    Recent Labs     05/01/23  0531 05/02/23  0424   SODIUM 133* 136   K 3 5 4 1    104   CO2 29 31   AGAP 2* 1*   BUN 5 8   CREATININE 0 51* 0 53*   CALCIUM 8 6 9 5       Coags    Recent Labs     05/01/23  0531 05/02/23  0424   INR 1 09 1 18   PTT 28 25        Additional Electrolytes  Recent Labs     05/01/23  0531   MG 2 6   PHOS 2 9          Blood Gas    No recent results  No recent results LFTs  Recent Labs     05/01/23  0531   ALT 62   AST 20   ALKPHOS 96   ALB 2 7*   TBILI 0 88       Infectious  No recent results  Glucose  Recent Labs     05/01/23  0531 05/02/23  0424   GLUC 122 [id number] [facility], [facility]

## 2023-05-03 NOTE — ASSESSMENT & PLAN NOTE
CT chest 4/28 showed a saddle PE extending into bilateral pulmonary artery segment branches  No evidence of right heart strain   Lower extremity duplex 4/29 showed left lower extremity with occlusive acute DVT noted in the femoral, popliteal, gastrinomas, posterior tibial and peroneal veins       Plan:   • S/P IVC filter  • Comfort care measures

## 2023-05-03 NOTE — ASSESSMENT & PLAN NOTE
Imaging:   • CT chest abdomen pelvis with 4/28: New saddle pulmonary embolism extending into bilateral pulmonary artery segment branches  No evidence of right heart strain  • Lower extremity duplex 4/29: Left lower extremity with occlusive acute DVT noted in the femoral, popliteal, gastrinomas, posterior tibial and peroneal veins  Plan:   • S/P IVC filter  • SQH BID  Patient is now cleared from a neurosurgical standpoint to start heparin gtt for their history of saddle PE  Communication has been made personally to the primary team  Patient will remain in ICU vs SD1 bed with q2hour neuro checks  NO BOLUSES should be given throughout the duration of the heparin gtt  PTT goal per heparin gtt protocol - 50-60 (goal here) - ECMO protocol to start then increase if CTH is stable  Once patient has ONE therapeutic / supratherapeutic PTT value, neurosurgery will order a CT head without contrast to be completed to assess for stability  CT head w/wo contrast ordered   Will need contrast given recent shunt adjustment for radiation planning  If imaging is stable at that time, patient may transition to [facility] therapy of choice, preferably a reversible agent, with possible need for repeat imaging once therapeutic on an oral agent  If new hemorrhage is noted on CT head, patient is to be reversed STAT with protamine with plan for short interval CT head  • Patient to remain in ICU or SD1 during transition to oral [facility]

## 2023-05-03 NOTE — ASSESSMENT & PLAN NOTE
POD 2 Right frontal ventriculoperitoneal shunt placement using neuronavigation and laparoscopic abdominal assistance  · Patient with PMH of stage IV breast cancer with brain and liver metastases presented to the ED 4/27/2023 c/o headache and progressive nausea and vomiting for several weeks  · Patient is s/p suboccipital craniotomy for resection of right and left cerebellar masses with complex dural reconstruction 4/3/2023 with Dr Елена Hale  · Now with Certas Plus valve set at 5    Imaging  · CT head w/o, 5/2/23: Interval removal of right frontal ventriculostomy catheter with placement of similar approach  shunt catheter  Small postsurgical layering hemorrhage in the posterior horns  Stable ventricular size without hydrocephalus  No significant change in small amount of anterior right frontal lobe subarachnoid hemorrhage  Grossly stable postsurgical change in the posterior fossa from tumor resection  Partially imaged pseudomeningocele  Stable right frontal lobe cortical lesion  · X-ray shunt series 5/3/23:    Plan  · Continue monitoring neurological status/exam with frequent neurological checks  · Postoperative imaging completed  · PT/OT evaluation  · Mobilize as tolerated with assistance  · Pain management per primary team  · Medical management per primary team  · Palliative care following  · DVT ppx: SCD, HSQ BID   · Once anticipated DC date is established an order will be placed for MRI brain with brain tumor protocol prior to DC to assist with radiation planning  Neurosurgery will continue to follow closely  Patient remain in either ICU or SD1 during heparin to coumadin transition   Call with questions

## 2023-05-03 NOTE — PROGRESS NOTES
Neurosurgery progress note    Overnight events  Reports headache, nausea, and incisional pain improved  No significant complaints overnight or this morning  Of note, she has had dysarthria and dysphagia due to thrush 2-3 days before shunt surgery (not new symptoms)  She is eager to resume PO intake, once cleared by Speech Therapy  Has not worked with PT OT yet (she is ready today as she feels better)  About to go down for XR (postop shunt series)  Son Nance Goodell at bedside      Vitals:    05/03/23 0200 05/03/23 0330 05/03/23 0515 05/03/23 0612   BP: 145/83 163/71 (!) 171/83 161/67   Pulse: 86 84 78 94   Resp: 22 (!) 23 22 (!) 25   Temp:       TempSrc:       SpO2: 94% 94% 93% 94%   Weight:       Height:           Lab Results   Component Value Date/Time    SODIUM 134 (L) 05/03/2023 04:33 AM    K 3 4 (L) 05/03/2023 04:33 AM    WBC 10 25 (H) 05/03/2023 04:33 AM    HGB 11 4 (L) 05/03/2023 04:33 AM    HCT 34 0 (L) 05/03/2023 04:33 AM     05/03/2023 04:33 AM    PTT 25 05/02/2023 04:24 AM    INR 1 18 05/02/2023 04:24 AM         Intake/Output Summary (Last 24 hours) at 5/3/2023 0906  Last data filed at 5/3/2023 0800  Gross per 24 hour   Intake 3002 07 ml   Output 2850 ml   Net 152 07 ml         Current Facility-Administered Medications:   •  al mag oxide-diphenhydramine-lidocaine viscous (MAGIC MOUTHWASH) suspension 10 mL, 10 mL, Swish & Swallow, Q4H, Ryan Jurado MD, 10 mL at 05/03/23 0829  •  albuterol (PROVENTIL HFA,VENTOLIN HFA) inhaler 2 puff, 2 puff, Inhalation, Q4H PRN, Love Galeas PA-C  •  ALPRAZolam Loise Simmer) tablet 0 25 mg, 0 25 mg, Oral, HS PRN, Love Galeas PA-C  •  bisacodyl (DULCOLAX) rectal suppository 10 mg, 10 mg, Rectal, Daily PRN, Love Galeas PA-C  •  calcium carbonate (TUMS) chewable tablet 1,000 mg, 1,000 mg, Oral, Daily PRN, Love Galeas PA-C  •  chlorhexidine (PERIDEX) 0 12 % oral rinse 15 mL, 15 mL, Swish & Spit, Q12H [address], Love Galeas PA-C, 15 mL at 05/03/23 0567  • dextrose 5 % in lactated Ringer's infusion, 125 mL/hr, Intravenous, Continuous, Carmina Montaño MD, Last Rate: 125 mL/hr at 05/03/23 0301, 125 mL/hr at 05/03/23 0301  •  docusate sodium (COLACE) capsule 100 mg, 100 mg, Oral, BID, Job Henderson PA-C  •  fluconazole (DIFLUCAN) (LOW DOSE) IVPB 100 mg, 100 mg, Intravenous, Q24H, Lina Rodríguez DO, Stopped at 05/02/23 1149  •  heparin (porcine) subcutaneous injection 5,000 Units, 5,000 Units, Subcutaneous, Q12H [address], Lina Rodríguez DO, 5,000 Units at 05/03/23 5466  •  hydrALAZINE (APRESOLINE) injection 5 mg, 5 mg, Intravenous, Q6H PRN, Carmina Montaño MD, 5 mg at 05/03/23 0545  •  HYDROmorphone (DILAUDID) injection 0 2 mg, 0 2 mg, Intravenous, Q2H PRN, Job Henderson PA-C  •  labetalol (NORMODYNE) injection 20 mg, 20 mg, Intravenous, Q4H PRN, Ng Amanda, CRNP, 20 mg at 05/03/23 0041  •  ondansetron (ZOFRAN) injection 4 mg, 4 mg, Intravenous, Q6H PRN, Job Henderson PA-C, 4 mg at 05/02/23 1451  •  oxyCODONE (ROXICODONE) IR tablet 2 5 mg, 2 5 mg, Oral, Q4H PRN, Job Henderson PA-C  •  oxyCODONE (ROXICODONE) IR tablet 5 mg, 5 mg, Oral, Q4H PRN, Job eHnderson PA-C  •  potassium chloride (K-DUR,KLOR-CON) CR tablet 40 mEq, 40 mEq, Oral, BID, Job Henderson PA-C, 40 mEq at 05/01/23 0818  •  potassium chloride 40 mEq IVPB (premix), 40 mEq, Intravenous, Once, Lina Rodríguez DO  •  potassium phosphates 21 mmol in sodium chloride 0 9 % 250 mL infusion, 21 mmol, Intravenous, Once, Lina Rodríguez DO  •  potassium-sodium phosphates (PHOS-NAK) packet 2 packet, 2 packet, Oral, 4x Daily (with meals and at bedtime), Job Henderson PA-C, 2 packet at 05/01/23 2151  •  ropivacaine (NAROPIN) 0 5 % injection 15 mL, 15 mL, Infiltration, Once, Lina Rodríguez DO  •  scopolamine (TRANSDERM-SCOP) 1 mg/3 days TD 72 hr patch 1 patch, 1 patch, Transdermal, Q72H, Lina Rodríguez DO, 1 patch at 05/02/23 1040  •  senna (SENOKOT) tablet 8 6 mg, 1 tablet, Oral, Daily, Sreedhar Cardinal Austen Riojas PA-C     Neurologic exam  Alert, oriented X 3, speech slightly limited by dysarthria due to thrush   PERRL, EOMI  Face symmetric  5/5 in all extremities, following commands briskly  Sensation intact throughout  All incisions CDI  No nuchal rigidity  No abdominal tenderness    Assessment  Patient is a 50-year-old female with h/o breast cancer metastatic to LN and liver s/p right mastectomy and LN resection (with baseline right arm numbness) on chemotherapy and obesity who presented with several weeks of progressively worsening headache, nausea/vomiting, dizziness, and gait imbalance, found to have a new large enhancing cerebellar mass s/p suboccipital craniotomy for resection of right cerebellar mass then left cerebellar mass with complex dural reconstruction on 4/3/23 (final pathology c/w “metastatic adenocarcinoma, consistent with breast primary” with aggressive features including high Ki-67 index), who p/w HA, nausea/vomiting, AMS s/p urgent R frontal EVD placement for acute HCP (OP > 20), failed clamp trial over weekend (both ICPs and symptoms), also with LLE DVT and saddle PE s/p IVCF and started on heparin gtt then held prior to OR, now s/p R frontal VPS (Certas Plus at 5)       Plan  -Neurologically at preoperative baseline and postoperative symptoms improving   -Continue neurologic monitoring in ICU   -SBP<160, intermittently hypertensive, continue PRN meds for strict BP control  -Postop CTH and SS both satisfactory  -SQH BID started on POD 1, IVCF in place for LLE DVT  -Repeat CTH wo contrast tomorrow AM prior to resuming heparin gtt, obtain another CTH once therapeutic on heparin gtt then bridge to Coumadin  -Will need updated MRI BT protocol for SRS/SRT planning just prior to discharge (RT tentatively scheduled next Wednesday)  -PT/OT and speech therapy (for both dysarthria and dysphagia)  -Appreciate [facility] care    Updated son Joseph Saldana at bedside    Tone REED    Neurosurgeon

## 2023-05-04 NOTE — PROCEDURES
CT head w/wo from 5/4/23 personally reviewed and discussed with Dr Petra Jensen, showing slit like right lateral ventricle with slight increase in left lateral ventricle  Certas plus adjusted from 5 to 6 at bedside, confirmed on shunt   For final confirmation will order portable skull XR, to be done at bedside today  Repeat CT head w/wo once therapeutic / supratherapeutic on heparin

## 2023-05-04 NOTE — PROGRESS NOTES
[address] [facility]  Progress Note  Name: Angel Burrows  MRN: [id number]  Unit/Bed#: ICU 01 I Date of Admission: 4/27/2023   Date of Service: 5/4/2023 I Hospital Day: 6    Assessment/Plan   * 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 [facility])  Assessment & Plan  POD 3 Right frontal ventriculoperitoneal shunt placement using neuronavigation and laparoscopic abdominal assistance  · Patient with PMH of stage IV breast cancer with brain and liver metastases presented to the ED 4/27/2023 c/o headache and progressive nausea and vomiting for several weeks  · Patient is s/p suboccipital craniotomy for resection of right and left cerebellar masses with complex dural reconstruction 4/3/2023 with Dr Marc Moreno  · Now with Certas Plus valve set at 5    Imaging  · CT head w/o, 5/4/23: Stable right transfrontal ventriculostomy catheter with new interval collapse of the right lateral ventricle and mild to moderate dilatation of the left lateral ventricle  · X-ray shunt series 5/3/23: Intact  shunt catheter  Plan  · Continue monitoring neurological status/exam with frequent neurological checks  · Postoperative imaging completed  CTH with compressed right lateral ventricle and enlarged left lateral ventricle  Will adjust shunt to 6 today with skull XR to follow for confirmation  · PT/OT evaluation  · Mobilize as tolerated with assistance  · Pain management per primary team  · Medical management per primary team  · Palliative care following  · DVT ppx: SCD, HSQ BID   · MRI brain BT protocol ordered for tomorrow for radiation planning    Neurosurgery will continue to follow closely  Patient remain in either ICU or SD1 during heparin to coumadin transition  Call with questions        Pulmonary embolism and DVT  Assessment & Plan  Imaging:   • CT chest abdomen pelvis with 4/28: New saddle pulmonary embolism extending into bilateral pulmonary artery segment branches    No evidence of right heart strain  • Lower extremity duplex 4/29: Left lower extremity with occlusive acute DVT noted in the femoral, popliteal, gastrinomas, posterior tibial and peroneal veins  Plan:   • S/P IVC filter  • SQH BID  Patient is now cleared from a neurosurgical standpoint to start heparin gtt for their history of saddle PE  Communication has been made personally to the primary team  Patient will remain in ICU vs SD1 bed with q2hour neuro checks  NO BOLUSES should be given throughout the duration of the heparin gtt  PTT goal per heparin gtt protocol - 50-60 (goal here) - ECMO protocol to start then increase if CTH is stable  Once patient has ONE therapeutic / supratherapeutic PTT value, neurosurgery will order a CT head without contrast to be completed to assess for stability  CT head w/wo contrast ordered  Will need contrast given recent shunt adjustment for radiation planning  If imaging is stable at that time, patient may transition to [facility] therapy of choice, preferably a reversible agent, with possible need for repeat imaging once therapeutic on an oral agent  If new hemorrhage is noted on CT head, patient is to be reversed STAT with protamine with plan for short interval CT head  • Patient to remain in ICU or SD1 during transition to oral AC                   Subjective/Objective     Subjective: Patient pulled out NG tube overnight  Also with some respiratory distress overnight, currently on 5L NC  Vest therapy ordered and will start mid or high flow if necessary  Overall she looks well this morning  Denies HA or new neurological symptoms  She still has mouth pain and cannot stick out her tongue 2/2 pain from thrush  She is still very dysarthric  She failed VBS yesterday  Objective: Patient sitting in bed in NAD  Family at bedside           Invasive Devices     Peripherally Inserted Central Catheter Line  Duration           PICC Line 73/85/34 Left Basilic 4 days          Drain  Duration           External Urinary "Catheter 5 days    NG/OG/Enteral Tube Enteral Feeding Tube Right nare <1 day                Vitals: Blood pressure 149/92, pulse 90, temperature 98 7 °F (37 1 °C), temperature source Oral, resp  rate (!) 24, height 5' 6\" (1 676 m), weight 106 kg (233 lb), SpO2 92 %  ,Body mass index is 37 61 kg/m²  General appearance: alert, appears stated age, cooperative and no distress  Head: Right frontal shunt incision CDI  Eyes: EOMI, PERRL, slightly dysconjugate gaze (stable)  Lungs: tachypneic on 5L NC  Heart: regular heart rate  Neurologic:   Mental status: Alert, oriented, thought content appropriate, dysarthric  FC x4  Cranial nerves: grossly intact (Cranial nerves II-XII)  Sensory: normal to LT  Motor: moving all extremities without focal weakness   Coordination: finger to nose normal bilaterally, no drift bilaterally     Lab Results: I have personally reviewed pertinent results  Results from last 7 days   Lab Units 05/04/23 0452 05/03/23 0433 05/02/23  0424 05/01/23  0531   WBC Thousand/uL 13 15* 10 25* 11 94* 12 07*   HEMOGLOBIN g/dL 12 4 11 4* 12 1 12 3   HEMATOCRIT % 37 9 34 0* 36 4 36 0   PLATELETS Thousands/uL 188 159 137* 122*   NEUTROS PCT % 78* 76*  --  83*   MONOS PCT % 11 9  --  7     Results from last 7 days   Lab Units 05/04/23 0452 05/03/23 0433 05/02/23  0424 05/01/23  0531 04/30/23  1852 04/30/23  0602 04/28/23  0657 04/27/23  2034   POTASSIUM mmol/L 3 7 3 4* 4 1 3 5   < > 3 4*   < > 2 9*   CHLORIDE mmol/L 101 102 104 102   < > 102   < > 101   CO2 mmol/L 27 28 31 29   < > 28   < > 29   BUN mg/dL 11 6 8 5   < > 4*   < > 9   CREATININE mg/dL 0 62 0 60 0 53* 0 51*   < > 0 52*   < > 0 74   CALCIUM mg/dL 9 6 9 7 9 5 8 6   < > 8 4   < > 9 4   ALK PHOS U/L  --   --   --  96  --  87  --  86   ALT U/L  --   --   --  62  --  64  --  88*   AST U/L  --   --   --  20  --  23  --  44    < > = values in this interval not displayed       Results from last 7 days   Lab Units 05/04/23  0452 05/03/23  0433 " 05/01/23  0531   MAGNESIUM mg/dL 2 2 2 1 2 6     Results from last 7 days   Lab Units 05/04/23  0452 05/03/23  0433 05/01/23  0531   PHOSPHORUS mg/dL 5 0* 1 8* 2 9     Results from last 7 days   Lab Units 05/02/23  0424 05/01/23  0531 04/30/23  0751 04/29/23  1822 04/29/23  1230   INR  1 18 1 09  --   --  1 03   PTT seconds 25 28 88*   < > 23    < > = values in this interval not displayed  No results found for: TROPONINT  ABG:  Lab Results   Component Value Date    PHART 7 469 (H) 05/04/2023    XMZ7STJ 38 2 05/04/2023    PO2ART 70 8 (L) 05/04/2023    PKP6BRZ 27 1 05/04/2023    BEART 3 4 05/04/2023    SOURCE Radial, Left 05/04/2023       Imaging Studies: I have personally reviewed pertinent reports  and I have personally reviewed pertinent films in PACS     XR chest portable    Result Date: 5/4/2023  Narrative: CHEST INDICATION:   keofeed tube placement  COMPARISON: 5/3/2023  EXAM PERFORMED/VIEWS:  XR CHEST PORTABLE FINDINGS: Feeding tube tip overlies the mid esophagus  Left PICC line unchanged in position  Ventriculoperitoneal shunt catheter noted  Cardiomediastinal silhouette appears unremarkable  The lungs are clear  No pneumothorax or pleural effusion  Osseous structures appear within normal limits for patient age  Impression: Feeding tube tip overlying the midesophagus, this has been since repositioned  Workstation performed: [id number]     XR chest portable    Result Date: 4/30/2023  Narrative: CHEST INDICATION:   PICC placement  COMPARISON: Chest x-ray April 3, 2023 EXAM PERFORMED/VIEWS:  XR CHEST PORTABLE FINDINGS: Left arm PICC line tip is in the superior vena cava  Cardiomediastinal silhouette appears unremarkable  The lungs are clear  No pneumothorax or pleural effusion  Osseous structures appear within normal limits for patient age  Surgical clips in the right breast and axilla  Impression: PICC line tip in the superior vena cava  No acute cardiopulmonary disease   Workstation performed: [id number]     XR abdomen 1 view kub    Result Date: 5/4/2023  Narrative: ABDOMEN INDICATION: Feeding tube placement  COMPARISON: Chest x-ray dated 5/3/2023  VIEWS:  AP frontal IMAGES: 1 FINDINGS: Feeding tube courses below the diaphragms, tip terminating in the region of the stomach  There is a nonobstructive bowel gas pattern  No discernible free air  Visualized lung bases are clear  Osseous structures stable  Impression: Feeding tube position as described  Workstation performed: [id number]     CT head wo contrast    Result Date: 5/2/2023  Narrative: CT BRAIN - WITHOUT CONTRAST INDICATION:   Intracranial shunt placement, follow up s/p  shunt  COMPARISON: Head CT 4/30/2023 TECHNIQUE:  CT examination of the brain was performed  Multiplanar 2D reformatted images were created from the source data  Radiation dose length product (DLP) for this visit:  942 mGy-cm   This examination, like all CT scans performed in the [facility], was performed utilizing techniques to minimize radiation dose exposure, including the use of iterative reconstruction and automated exposure control  IMAGE QUALITY:  Diagnostic  FINDINGS: PARENCHYMA AND EXTRA-AXIAL SPACES: Grossly stable postsurgical change in the posterior fossa from prior tumor resection with resection cavity and mild surrounding edema  There is no significant change in mass effect upon the fourth ventricle  No significant change in the small subarachnoid hemorrhage in the right frontal lobe anterior to the insertion site of right frontal approach ventricular catheter  Stable 1 7 cm right frontal lobe cortical lesion  Additional smaller lesions are not well visualized due to modality limitation, delineated in prior MRI  No supratentorial mass effect or midline shift  No CT evidence of acute infarct  No new hemorrhage   VENTRICLES AND EXTRA-AXIAL SPACES: Interval removal of previous right frontal approach ventricular catheter and similar approach  shunt catheter placement  The tip terminates within the right lateral ventricle  There is postsurgical small pneumocephalus within the right anterior horn and small layering hemorrhage within bilateral posterior horns  Grossly stable ventricular size without hydrocephalus  VISUALIZED ORBITS: Normal visualized orbits  PARANASAL SINUSES: Normal visualized paranasal sinuses  CALVARIUM AND EXTRACRANIAL SOFT TISSUES: Previous suboccipital craniotomy  Partially redemonstrated pseudomeningocele extending to the right posterior superior neck  Impression: 1  Interval removal of previous right frontal approach ventricular catheter and similar approach  shunt catheter placement  Small postsurgical layering hemorrhage within posterior horns of lateral ventricles and small pneumocephalus within anterior horn of right lateral ventricle  Stable ventricular size without hydrocephalus  2   No significant change in small subarachnoid hemorrhage in the anterior right frontal lobe  3   Grossly stable postsurgical change in the posterior fossa from prior tumor resection with unchanged mass effect upon the fourth ventricle  Again partially imaged pseudomeningocele extending to the right posterior superior neck  4   Stable right frontal lobe cortical lesion  Additional smaller lesions are not well visualized due to CT modality limitation  Lesions are demonstrated in prior MRI  Workstation performed: [id number]     CT head wo contrast    Result Date: 5/1/2023  Narrative: CT BRAIN - WITHOUT CONTRAST INDICATION:   ICP elevation suspected elevated ICP  Intracranial metastasis with prior resection of a posterior fossa lesion  COMPARISON: Multiple prior examinations, most recently 4/30/2023 and 4/29/2023  TECHNIQUE:  CT examination of the brain was performed  Multiplanar 2D reformatted images were created from the source data  Radiation dose length product (DLP) for this visit:  897 95 mGy-cm     This examination, like all CT scans performed in the [facility], was performed utilizing techniques to minimize radiation dose exposure, including the use of iterative  reconstruction and automated exposure control  IMAGE QUALITY:  Diagnostic  FINDINGS: PARENCHYMA: The patient has undergone a suboccipital craniotomy for resection of a previous midline metastasis once again identified is a resection cavity within the inferior aspect of the posterior fossa at and just to the right of midline  There is increased density seen along the anterior and left posterior lateral aspect of the resection cavity which is unchanged from the prior examination  Mild surrounding edema resulting in mild mass effect upon the fourth ventricle which is not significantly compressed  Supratentorial brain parenchyma demonstrates a hyperdense mass within the right lateral frontal lobe on series 2 image 31 which is grossly unchanged  No new masses are identified  No signs of acute ischemia  VENTRICLES AND EXTRA-AXIAL SPACES: A shunt catheter via a right frontal approach is unchanged in position, extending into the anterior body of the right lateral ventricle  Stable size of the lateral ventricles, third ventricle and fourth ventricle when compared to the 2 most recent examinations  There is a small amount of subarachnoid hemorrhage within the anterior superior right frontal lobe adjacent to the shunt catheter, stable  VISUALIZED ORBITS: Normal visualized orbits  PARANASAL SINUSES: Normal visualized paranasal sinuses  CALVARIUM AND EXTRACRANIAL SOFT TISSUES: As described above there has been a previous suboccipital craniotomy in the midline  This is unchanged  There is a small amount of fluid anterior and posterior to the inferior aspect of this craniotomy grossly unchanged in size and configuration from the prior examination  Impression: Stable appearance of the posterior fossa in this patient with a prior mass resection   Persistent mild edema and slight mass effect upon the fourth ventricle without obstructive hydrocephalus  Shunt catheter unchanged in position and ventricular size is unchanged  Stable small amount of subarachnoid hemorrhage within the anterior superior right frontal lobe  Stable well-circumscribed hyperdense metastatic lesion within the right frontal lobe  Stable small pseudomeningocele anterior and posterior to the occipital craniotomy flap  Workstation performed: [id number]     CT head wo contrast    Result Date: 4/30/2023  Narrative: CT BRAIN - WITHOUT CONTRAST INDICATION:   Craniotomy, post-op s/p suboccipital craniotomy  Stage IV breast cancer with brain and liver metastasis  Status post suboccipital craniotomy for resection of cerebellar masses  Status post ventriculostomy placement  COMPARISON: CT of the head from 4/29/2023 TECHNIQUE:  CT examination of the brain was performed  Multiplanar 2D reformatted images were created from the source data  Radiation dose length product (DLP) for this visit:  2009 84 mGy-cm   This examination, like all CT scans performed in the [facility], was performed utilizing techniques to minimize radiation dose exposure, including the use of iterative reconstruction and automated exposure control  IMAGE QUALITY:  Diagnostic  FINDINGS: PARENCHYMA: The patient is status post right frontal approach ventriculostomy catheter placement, which is stable in position in the right lateral ventricle  Ventricular size is unchanged  There is a small amount of right frontal subarachnoid hemorrhage stable  There are no new areas of acute intracranial hemorrhage identified  The patient is status post suboccipital craniotomy and dural reconstruction with redemonstration of a pseudomeningocele and overall similar to prior MRI measuring approximately 2 2 x 5 3 cm on sagittal imaging  Current imaging extends to the level of C4-C5   There are stable postoperative changes with a small amount of hemorrhage in the operative cerebellar bed  Degree of edema is unchanged  Correlate with prior MRI for findings related to intracranial metastatic disease  A hyperdense rounded lesion is noted within the right frontal lobe  VENTRICLES AND EXTRA-AXIAL SPACES: Please see above  VISUALIZED ORBITS: Normal visualized orbits  PARANASAL SINUSES: Normal visualized paranasal sinuses  CALVARIUM AND EXTRACRANIAL SOFT TISSUES: Please see above  Impression: No significant interval change since prior exam  Status post right frontal approach ventriculostomy catheter placement with unchanged ventricular size  Small amount of hemorrhage within the right frontal region is stable  Status post suboccipital craniotomy with a grossly stable pseudomeningocele  Workstation performed: [id number]     CT head wo contrast    Result Date: 4/29/2023  Narrative: CT BRAIN - WITHOUT CONTRAST INDICATION:   Small sah/sdh  Mets  Supratherapeutic ptt  COMPARISON: 4/29/2023  TECHNIQUE:  CT examination of the brain was performed  Multiplanar 2D reformatted images were created from the source data  Radiation dose length product (DLP) for this visit:  973 mGy-cm   This examination, like all CT scans performed in the [facility], was performed utilizing techniques to minimize radiation dose exposure, including the use of iterative reconstruction and automated exposure control  IMAGE QUALITY:  Diagnostic  FINDINGS: PARENCHYMA: Right frontal approach ventriculostomy shunt catheter with tip terminating in the body of the right lateral ventricle is again seen  There is a small amount of parenchymal hemorrhage and subarachnoid hemorrhage in the right frontal lobe along  the track of the catheter without interval change  No new areas of intracranial hemorrhage or extra-axial fluid collection identified  Several slightly increased density lesions are again seen consistent with metastatic disease which appear stable compared to the prior exam of the same day   Please see previous report  No significant mass effect or midline shift  Patient is status post occipital craniotomy and resection with postoperative pseudomeningocele again seen  Overall findings are unchanged compared to the prior exam  VENTRICLES AND EXTRA-AXIAL SPACES: Slight prominence of the ventricular system with normal appearing sulci and cisterns  Findings appear stable compared to the prior exam  VISUALIZED ORBITS: Normal visualized orbits  PARANASAL SINUSES: Normal visualized paranasal sinuses  Mastoid air cells are also well aerated and clear  CALVARIUM AND EXTRACRANIAL SOFT TISSUES:  Midline occipital craniotomy defect noted with pseudomeningocele  The inferior margin of the craniotomy is displaced posteriorly and inferiorly  Impression: No interval change since the prior exam of the same day with findings detailed above  Workstation performed: [id number]     CT head wo contrast    Result Date: 4/29/2023  Narrative: CT BRAIN - WITHOUT CONTRAST INDICATION:   Cerebral hemorrhage suspected F/u hemorrhage  COMPARISON: Prior CT April 28, 2023 TECHNIQUE:  CT examination of the brain was performed  Multiplanar 2D reformatted images were created from the source data  Radiation dose length product (DLP) for this visit:  895 mGy-cm   This examination, like all CT scans performed in the [facility], was performed utilizing techniques to minimize radiation dose exposure, including the use of iterative reconstruction and automated exposure control  IMAGE QUALITY:  Diagnostic  FINDINGS: PARENCHYMA: Right frontal shunt catheter terminates in the frontal horn of the right lateral ventricle unchanged from prior study  There is small amount of blood products in the right frontal cortex and a single sulcus adjacent to the shunt catheter entry site unchanged from prior  Mild ventriculomegaly noted unchanged from the prior study   Patient is status post midline occipital craniotomy and resection of cerebellar metastatic lesion  Typical postoperative findings are seen in the post operative bed with low density representing residual edema and some blood products identified in the midline of the cerebellum  No significant mass effect identified on the fourth ventricle  Supratentorial masses are again noted including left superior frontal gyrus series 2 image 33 measuring approximately 8 mm, right frontal lobe series 2 image 31 measuring approximately 16 mm, and barely visible right posterior frontal lobe in the superior frontal gyrus series 2 image 35 difficult to measure but approximately 8 mm in size with  No edema identified surrounding the supratentorial masses  VENTRICLES AND EXTRA-AXIAL SPACES: Stable mild ventriculomegaly VISUALIZED ORBITS: Normal visualized orbits  PARANASAL SINUSES: Normal visualized paranasal sinuses  CALVARIUM AND EXTRACRANIAL SOFT TISSUES: Midline occipital craniotomy defect noted with pseudomeningocele  The inferior margin of the craniotomy is displaced posteriorly and inferiorly  Impression: Stable CT of the brain status post occipital craniotomy and resection of metastatic disease  Postoperative pseudomeningocele noted similar to the prior study  Supratentorial metastatic lesions are again noted essentially stable in size  Small amount of postprocedural hemorrhage identified in the right frontal lobe and adjacent frontal sulcus from shunt catheter placement  Workstation performed: [id number]     CT head wo contrast    Result Date: 4/28/2023  Narrative: CT BRAIN - WITHOUT CONTRAST INDICATION:   Hydrocephalus s/p EVD placement  COMPARISON: CT head without contrast 4/27/2023, 4/8/2023  MRI brain with and without contrast 4/25/2023, 4/4/2023  TECHNIQUE:  CT examination of the brain was performed  Multiplanar 2D reformatted images were created from the source data  Radiation dose length product (DLP) for this visit:  901 82 mGy-cm     This examination, like all CT scans performed in the [facility], was performed utilizing techniques to minimize radiation dose exposure, including the use of iterative  reconstruction and automated exposure control  IMAGE QUALITY:  Diagnostic  FINDINGS: PARENCHYMA, VENTRICLES AND EXTRA-AXIAL SPACES:  : New postsurgical changes of right frontal approach ventricular catheter with tip in frontal horn of right lateral ventricle with stable ventricular size  New acute trace subarachnoid hemorrhage right frontal region, likely from recent surgery  Prior postsurgical changes of suboccipital craniotomy for resection of cerebellar metastatic lesion  Stable small hyperdense metastatic lesions in bilateral frontal lobes (right larger than left) and 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 which was better evaluated on recent MRI brain 4/25/2023  No mass effect or midline shift  No CT signs of acute infarction  No acute parenchymal hemorrhage  VISUALIZED ORBITS: Normal visualized orbits  PARANASAL SINUSES: Normal visualized paranasal sinuses  CALVARIUM AND EXTRACRANIAL SOFT TISSUES: Suboccipital craniotomy  Partially imaged small suboccipital pseudomeningocele  Impression: New postsurgical changes of right frontal approach ventricular catheter with tip in frontal horn of right lateral ventricle with stable ventricular size  New acute trace subarachnoid hemorrhage right frontal region, likely from recent surgery  Prior postsurgical changes of suboccipital craniotomy for resection of cerebellar metastatic lesion  Stable small hyperdense metastatic lesions in bilateral frontal lobes (right larger than left) and 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 which was better evaluated on recent MRI brain 4/25/2023  The study was marked in [facility] for immediate notification   Workstation performed: [id number]     CT head without contrast    Result Date: 4/28/2023  Narrative: CT BRAIN - WITHOUT CONTRAST INDICATION:   Vomiting, metastatic cancer  COMPARISON: Multiple prior recent studies, most recent MRI brain examination dated 4/25/2023 and CT head examination dated 4/8/2023  TECHNIQUE:  CT examination of the brain was performed  Multiplanar 2D reformatted images were created from the source data  Radiation dose length product (DLP) for this visit:  882 mGy-cm   This examination, like all CT scans performed in the [facility], was performed utilizing techniques to minimize radiation dose exposure, including the use of iterative reconstruction and automated exposure control  IMAGE QUALITY:  Diagnostic  FINDINGS: PARENCHYMA/EXTRA-AXIAL COMPARTMENT: Redemonstrated postsurgical changes of prior suboccipital craniectomy with grossly stable appearance of the surgical resection bed compared to the recent MRI accounting for differences in modality  Known brain parenchymal metastases better visualized on the prior recent MRI again with exception of similar hyperattenuating bifrontal metastases  No CT evidence for for territorial infarct  No parenchymal hemorrhage or new mass effect  VENTRICLES: Stable in size and configuration  VISUALIZED ORBITS: Normal visualized orbits  PARANASAL SINUSES: Normal visualized paranasal sinuses  CALVARIUM AND EXTRACRANIAL SOFT TISSUES: Redemonstrated suboccipital craniectomy changes and associated probable pseudomeningocele as seen on the prior MRI  Impression: No CT evidence for acute territorial infarct  No new mass effect or midline shift  Known intracranial metastatic lesions better visualized on the prior recent MRI brain examination  Postsurgical changes of prior suboccipital craniectomy with grossly stable appearance of the surgical resection bed and 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 compared to the recent MRI accounting for differences in modality   Workstation performed: [id number]     CT head wo contrast    Result Date: 4/8/2023  Narrative: CT BRAIN - WITHOUT CONTRAST INDICATION:   Traumatic brain injury (TBI), new or progressive neuro deficits brain mass  COMPARISON:  CT head 4/5/2023  MRI brain 4/4/2023  TECHNIQUE:  CT examination of the brain was performed  Multiplanar 2D reformatted images were created from the source data  Radiation dose length product (DLP) for this visit:  894 mGy-cm   This examination, like all CT scans performed in the [facility], was performed utilizing techniques to minimize radiation dose exposure, including the use of iterative reconstruction and automated exposure control  IMAGE QUALITY:  Diagnostic  FINDINGS: PARENCHYMA:  Reidentified postsurgical changes of suboccipital craniotomy  Known cerebral metastases better visualized on the comparison MRI; for example unchanged 14 mm right frontal lesion #2/29  The other lesions are not well visualized on this study  No new mass or mass effect  No hydrocephalus  No right internal auditory canal lesion is not visualized on this study  High density areas through the posterior fossa surgical bed are unchanged  Unchanged surgical bed postoperative findings  VENTRICLES AND EXTRA-AXIAL SPACES:  Unchanged compressive mass effect on the 4th ventricle  No hydrocephalus  VISUALIZED ORBITS: Normal visualized orbits  PARANASAL SINUSES: Normal visualized paranasal sinuses  CALVARIUM AND EXTRACRANIAL SOFT TISSUES:  Suboccipital craniotomy  Impression: No significant change when compared with the recent CT abdomen 4/5/2023 MRI brain 4/4/2023  Workstation performed: [id number]     CT head wo contrast    Result Date: 4/5/2023  Narrative: CT BRAIN - WITHOUT CONTRAST INDICATION:   fall, recent surgery  COMPARISON:  CT head dated 4/4/2023, MR brain dated 4/4/2023 TECHNIQUE:  CT examination of the brain was performed  Multiplanar 2D reformatted images were created from the source data   Radiation dose length product (DLP) for this visit:  930 33 mGy-cm   This examination, like all CT scans performed in the [facility], was performed utilizing techniques to minimize radiation dose exposure, including the use of iterative  reconstruction and automated exposure control  IMAGE QUALITY:  Diagnostic  FINDINGS: Status post suboccipital craniotomy, status post resection of occipital metastatic lesions  Large area of edema and scattered blood products are redemonstrated in the resection cavity in the posterior fossa, similar to the prior  There is a small amount of pneumocephalus redemonstrated, similar to the prior  Effacement of the 4th ventricle redemonstrated, as before  No territorial infarct is seen  Small amount of blood in the posterior horn of the right lateral ventricle now seen, no hydrocephalus  Basal cisterns remain patent  Several subcentimeter metastatic lesions in the right frontal lobe are redemonstrated  The intracranial structures are otherwise intervally unchanged  VISUALIZED ORBITS: Normal visualized orbits  PARANASAL SINUSES: Normal visualized paranasal sinuses  CALVARIUM AND EXTRACRANIAL SOFT TISSUES:  Calvarium otherwise appears intact  Left-sided soft tissue swelling redemonstrated  Impression: Postsurgical changes status post suboccipital craniotomy as described, similar to the prior  Effacement of the 4th ventricle is redemonstrated, as before  A small amount of blood is now seen dependently in the right lateral ventricle, no hydrocephalus  No other significant interval change  Clinical follow-up recommended  Workstation performed: [id number]     CT head wo contrast    Result Date: 4/5/2023  Narrative: CT BRAIN - WITHOUT CONTRAST INDICATION:   fall  COMPARISON:  CT brain on 3/30/2023 and MRI brain of the same day  TECHNIQUE:  CT examination of the brain was performed  Multiplanar 2D reformatted images were created from the source data  Radiation dose length product (DLP) for this visit:  935 03 mGy-cm     This examination, like all CT scans performed in the [facility], was performed utilizing techniques to minimize radiation dose exposure, including the use of iterative  reconstruction and automated exposure control  IMAGE QUALITY:  Diagnostic  FINDINGS: PARENCHYMA: Scattered foci of intracranial air and subarachnoid hemorrhage in the posterior cranial fossa likely related to recent resection  Suboccipital craniotomy bony defect is seen with adjacent subarachnoid hemorrhage/parenchymal hemorrhage and cystic cavity within the bilateral cerebellum  No gross supratentorial intracranial hemorrhage identified  Increased density foci in the bilateral frontal lobes likely represents metastatic lesions  Trace right subdural hygroma  No significant mass effect, midline shift, or evidence of transtentorial herniation  VENTRICLES AND EXTRA-AXIAL SPACES:  Normal for the patient's age  VISUALIZED ORBITS: Normal visualized orbits and globes  PARANASAL SINUSES: Minimal mucoperiosteal thickening bilateral sphenoid sinuses  The remaining paranasal sinuses and mastoid air cells appear adequately aerated and clear without air-fluid levels CALVARIUM AND EXTRACRANIAL SOFT TISSUES:  Suboccipital craniotomy defect is seen  Bony calvarium and temporal mandibular joints are otherwise intact  Left frontotemporal scalp soft tissue swelling  Impression: Postoperative changes from suboccipital craniotomy and resection of cerebellar lesions as detailed above  Blood products in the posterior cranial fossa likely postsurgical in nature but cannot exclude increased blood products from trauma in this setting  If clinically indicated, consider follow-up exam to ensure stability  Workstation performed: [id number]     CT head w wo contrast    Result Date: 5/4/2023  Narrative: CT BRAIN - WITH AND WITHOUT CONTRAST INDICATION:   per rad onc request for radiaiton eval  COMPARISON: CT head 5/2/2023   MRI brain 4/25/2023 TECHNIQUE: CT examination of the brain was performed both prior to and after the administration of intravenous contrast   Multiplanar 2D reformatted images were created from the source data  Radiation dose length product (DLP) for this visit:  1752 72 mGy-cm   This examination, like all CT scans performed in the [facility], was performed utilizing techniques to minimize radiation dose exposure, including the use of iterative reconstruction and automated exposure control  IV Contrast:  85 mL of iohexol (OMNIPAQUE) IMAGE QUALITY:  Diagnostic  FINDINGS: PARENCHYMA: Stable postsurgical changes at the midline posterior fossa  There is enhancement along the surgical margins similar to the recent MRI, most notable along the anterior left margins  Stable enhancing lesions within the superior frontal lobes  The previously noted right cerebellopontine angle/IAC lesion is better delineated on the recent prior MRI examination CT signs of acute infarction  Stable right frontal subarachnoid hemorrhage  No acute parenchymal hemorrhage  VENTRICLES AND EXTRA-AXIAL SPACES:  Stable right transfrontal ventriculostomy catheter terminating within the right frontal horn new interval collapse of the right lateral ventricle and mild to moderate dilatation of the left lateral ventricle  The third  and fourth ventricles remain unchanged  VISUALIZED ORBITS: Normal visualized orbits  PARANASAL SINUSES: Normal visualized paranasal sinuses  CALVARIUM: Occipital craniotomy with improved pseudomeningocele  Impression: 1  Stable midline posterior fossa postsurgical changes with marginal enhancement similar to the recent prior MRI examination  Stable enhancing lesions are noted within the superior frontal lobes  2  Stable right transfrontal ventriculostomy catheter with new interval collapse of the right lateral ventricle and mild to moderate dilatation of the left lateral ventricle  3  Stable right frontal subarachnoid hemorrhage   There is no new intra or extra-axial hemorrhage  Workstation performed: [id number]     MRI brain w wo contrast    Result Date: 4/25/2023  Narrative: MRI BRAIN WITH AND WITHOUT CONTRAST INDICATION: C50 919: Malignant neoplasm of unspecified site of unspecified female breast C79 31: Secondary malignant neoplasm of brain  COMPARISON: 3/30/2023 and 4/4/2023 L5 somewhat TECHNIQUE: Multiplanar, multisequence imaging of the brain was performed before and after gadolinium administration  IV Contrast:  10 mL of Gadobutrol injection (SINGLE-DOSE)  IMAGE QUALITY:   Diagnostic  FINDINGS: BRAIN PARENCHYMA: Postsurgical change from suboccipital craniectomy and resection of bilateral cerebellar hemisphere masses  Blood products and fluid in the resection cavity  Minimal restricted diffusion along the margins of the resection cavity could represent mild infarct and/or blood products  There is increased enhancement along the margins of the resection cavity with mild nodularity and in the adjacent cerebellar folia, particularly in the left cerebellar hemisphere  Two right frontal lesions (11:128 and 11:135), previously visualized, demonstrate stable restricted diffusion and T2/FLAIR hyperintensity, with homogeneous enhancement  The larger more lateral lesion measures 1 6 cm in maximal diameter compared to 1 4 cm  on the previous exam  The more medially located lesion measures 0 9 cm compared to 1 cm on the prior exam  A third lesion (11:137) is present in the left superior frontal gyrus with similar enhancement and signal characteristics measuring  0 6 cm, stable  Stable enhancing lesion in the right internal auditory canal (11:57) and cerebellopontine angle measuring 1 5 x 0 7 x 0 5 cm  Subtle enhancement of the cisternal and canalicular portions of the left 7th and 8th cranial nerves (11:54)  Stable underlying T2/FLAIR hyperintense foci suggesting early microangiopathic change  Decreased mass effect in the posterior fossa   VENTRICLES: Interval resolution of effacement of the fourth ventricle  No hydrocephalus  Prominent perivascular space again noted inferior to the right lentiform nuclei  No mismatch or CT because of the diarrhea SELLA AND PITUITARY GLAND:  Normal  ORBITS: No retro-orbital lesion  PARANASAL SINUSES: Fluid levels in the sphenoid and maxillary sinuses  Mild mucosal thickening in the ethmoid air cells  VASCULATURE:  Evaluation of the major intracranial vasculature demonstrates appropriate flow voids  CALVARIUM AND SKULL BASE: Postsurgical change from suboccipital craniectomy EXTRACRANIAL SOFT TISSUES: Suboccipital pseudomeningocele, new since the prior exam, measuring 2 4 x 5 7 x 3 2 cm  Impression: 1  Supratentorial lesions are stable to mildly increased in size  2  Increasing enhancement along the margins of the posterior fossa resection cavity with prominent left cerebellar hemisphere foliar enhancement  , concerning for tumor progression  3  New subtle enhancement along the left cisternal and canalicular segments of the 7th and 8th cranial nerves without evidence of nodularity or discrete lesion  Close follow-up recommended  4  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  Workstation performed: [id number]     FL barium swallow video w speech    Result Date: 5/3/2023  Narrative: A video barium swallow study was performed by the Department of Speech Pathology  Please refer to the report for the official interpretation  The images are stored for archival purposes only  Study images were not formally reviewed by the Radiology Department  CT chest abdomen pelvis w contrast    Result Date: 4/28/2023  Narrative: CT CHEST, ABDOMEN AND PELVIS WITH IV CONTRAST INDICATION:   patient with stage IV metastatic breast cancer, concern for progression of disease, recently had brain mets s/p resection on 4/3  COMPARISON: CT chest abdomen pelvis with contrast 3/30/2023, 2/17/2023, 1/29/2022   TECHNIQUE: CT examination of the chest, abdomen and pelvis was performed  Multiplanar 2D reformatted images were created from the source data  Radiation dose length product (DLP) for this visit:  1415 02 mGy-cm   This examination, like all CT scans performed in the [facility], was performed utilizing techniques to minimize radiation dose exposure, including the use of iterative reconstruction and automated exposure control  IV Contrast:  90 mL of iohexol (OMNIPAQUE) Enteric Contrast: Enteric contrast was administered  FINDINGS: CHEST LUNGS:  There is no tracheal or endobronchial lesion  Mild emphysema  No focal consolidation  No suspicious pulmonary nodule  PLEURA:  Unremarkable  HEART/GREAT VESSELS: Normal heart size  No paracardial effusion  No CT evidence of right heart strain  No thoracic aortic aneurysm  Mild scattered calcified atherosclerotic disease  New saddle pulmonary embolism extending into bilateral pulmonary arterial segmental branches  MEDIASTINUM AND HAIM:  Unremarkable  CHEST WALL AND LOWER NECK: Postsurgical changes of right mastectomy and right axillary lidia dissection  No right chest wall mass  No axillary lymphadenopathy  ABDOMEN LIVER/BILIARY TREE: Stable several subcentimeter lesions in bilateral hepatic lobes (2:91, 103, 114, 117), likely treatment-related change  No new or enlarging metastatic liver lesions  No biliary duct dilation  GALLBLADDER:  No calcified gallstones  No pericholecystic inflammatory change  SPLEEN:  Unremarkable  PANCREAS:  Unremarkable  ADRENAL GLANDS:  Unremarkable  KIDNEYS/URETERS:  Unremarkable  No hydronephrosis  STOMACH AND BOWEL:  Unremarkable  APPENDIX:  No findings to suggest appendicitis  ABDOMINOPELVIC CAVITY:  No ascites  No pneumoperitoneum  No lymphadenopathy  VESSELS: Minimal calcified atherosclerotic disease of abdominal aorta and iliac vasculature  No abdominal aortic aneurysm  IVC is normal in caliber   Hepatic veins, portal vein, SMV, and splenic vein are patent  PELVIS REPRODUCTIVE ORGANS: Calcified fibroid uterus  No suspicious adnexal mass  URINARY BLADDER:  Unremarkable  ABDOMINAL WALL/INGUINAL REGIONS: Small fat-containing umbilical hernia  OSSEOUS STRUCTURES:  No acute fracture or destructive osseous lesion  Unchanged small sclerotic lesion right iliac bone, likely bone island  Spinal degenerative changes  Impression: New saddle pulmonary embolism extending into bilateral pulmonary arterial segmental branches  No CT evidence of right heart strain  Stable treated metastatic subcentimeter hepatic lesions  No new or enlarging sites of metastatic disease in chest, abdomen, or pelvis  Additional chronic/incidental findings as detailed above  I personally discussed this study with Chuck Polanco on 4/28/2023 4:05 PM  Workstation performed: [id number]     IR IVC filter placement permanent    Result Date: 4/30/2023  Narrative: INDICATION: Stage IV breast cancer with metastasis to the brain and liver  History of PE and DVT  Request for IVC filter placement  PROCEDURE: 1  IVC venogram 2  Infrarenal temporary IVC filter placement FINDINGS: 1  Patent IVC with no evidence of clot  IVC is not duplicated based on review of CT  2   Successful placement of an infrarenal permanent [employer] IVC filter  Impression: Infrarenal IVC filter placement  _______________________________________________________________ COMPARISON: CT abdomen pelvis 4/28/2023 PROCEDURE DETAILS: Operators: Dr Giovana Snow Anesthesia:  Local anesthesia  Medications: 1% lidocaine Contrast: 40 mL of Omnipaque 300 Fluoroscopy time: One-point minutes Images: 32 COMMENTS: A preprocedure timeout was performed per [facility] protocol  The right neck was prepped and draped in the usual sterile fashion  Under ultrasound guidance, the patent and compressible right internal jugular vein was punctured using a 21-gauge micropuncture needle  A static ultrasound image was saved to PACS   A microwire was advanced through the needle into the right atrium  The needle was exchanged for a microsheath allowing exchange of the microwire for a heavy wire which was advanced into the IVC  The IVC filter 9 [place] sheath was advanced over the wire into the IVC  The wire was removed and a small hand contrast injection confirmed placement  A IVC venogram was performed  The wire was removed, the filter was advanced using the pusher through the sheath into appropriate position  The sheath was then withdrawn until the filter was deployed  IVC venogram was repeated  Sheath was removed and manual pressure was performed of the right neck to ensure hemostasis  Workstation performed: [id number]     XR shunt series    Result Date: 5/3/2023  Narrative: SHUNT SERIES INDICATION:  s/p  shunt insertion  COMPARISON:  None VIEWS:  XR SHUNT SERIES FINDINGS: Right side  shunt catheter is identified  The tubing appears contiguous through its visualized course in the neck, chest and abdomen  The caudal tip terminates in the right lower pelvis  The lungs are clear  Nonobstructive bowel gas pattern is noted with copious amount of stool within the colon  An IVC filter is noted in place  Impression: Intact  shunt catheter  Workstation performed: [id number]     XR chest portable ICU    Result Date: 5/4/2023  Narrative: CHEST INDICATION:   hypoxia  COMPARISON: 5/3/2023  EXAM PERFORMED/VIEWS:  XR CHEST PORTABLE ICU FINDINGS: Left PICC line is unchanged in position  Ventriculoperitoneal shunt catheter again seen  Cardiomediastinal silhouette appears unremarkable  The lungs are clear  No pneumothorax or pleural effusion  Osseous structures appear within normal limits for patient age  Impression: No acute cardiopulmonary disease   Workstation performed: [id number]     VAS lower limb venous duplex study, complete bilateral    Result Date: 4/29/2023  Narrative:  [facility] REPORT CLINICAL: Indications: Patient presents with recent discovery of pulmonary embolism and physician wants to determine potential source  Patient is currently asymptomatic  Operative History: Patient denies previous cardiovascular surgery Risk Factors The patient has history of Hyperlipidemia and previous smoking (quit 1-5yrs ago)  FINDINGS:  Left           Impression              FV Prox        Occlusive Subsegmental  FV Mid         Occlusive Subsegmental  FV Dist        Occlusive Subsegmental  Popliteal      Occlusive Subsegmental  PostTibial     Occlusive Subsegmental  Peroneal       Occlusive Subsegmental  Gastrocnemius  Occlusive Subsegmental     CONCLUSION:  Impression: RIGHT LOWER LIMB: No evidence of acute or chronic deep vein thrombosis  No evidence of superficial thrombophlebitis noted  No evidence of valvular incompetence noted in the deep veins  Popliteal, posterior tibial and anterior tibial arterial Doppler waveform's are triphasic/biphasic  LEFT LOWER LIMB: Occlusive acute deep vein thrombus noted in the femoral, popliteal, gastrocnemius, posterior tibial and peroneal veins  No evidence of superficial thrombophlebitis noted  No evidence of valvular incompetence noted in the deep veins  Popliteal, posterior tibial and anterior tibial arterial Doppler waveform's are triphasic/biphasic  SIGNATURE: Electronically Signed by: Mary Carmen Marte MD on 2023-04-29 01:44:07 PM    Echo complete w/ contrast if indicated    Result Date: 4/29/2023  Narrative: •  Left Ventricle: Left ventricular cavity size is normal  Wall thickness is normal  The left ventricular ejection fraction is 60%  Systolic function is normal  Wall motion is normal  Diastolic function is normal  •  Right Ventricle: Right ventricular cavity size is normal  Systolic function is normal      Echo follow up/limited w/ contrast if indicated    Result Date: 5/1/2023  Narrative: •  Left Ventricle: The left ventricular ejection fraction is 65%   Systolic function is normal  Wall motion cannot be accurately assessed  EKG, Pathology, and Other Studies: I have personally reviewed pertinent reports        VTE Pharmacologic Prophylaxis: Heparin    VTE Mechanical Prophylaxis: sequential compression device

## 2023-05-04 NOTE — PROGRESS NOTES
Daily Progress Note - Critical Care   Mac Jama 47 y o  female MRN: [id number]  Unit/Bed#: ICU 01 Encounter: [id number]      ----------------------------------------------------------------------------------------  HPI/24hr events: 52F - PMH DM2, metastatic brain cancer with known brain and liver metastasis, recent hospitalization April '23 after she was found to have R + L cerebellar masses s/p craniotomy w/ resection of cerebellar masses 4/3  She now presents 4/27 with symptoms of delayed hydrocephalus- worsening N/V + decreased PO intake x1 week  NSGY proceeded with CSF diversion and EVD placement then conversion to right frontal VPS on 5/1 (after failed EVD clamp trial)  Also found to have saddle PE without RHS and LLE DVT s/p IVC filter 4/30  Overnight events: Patient pulled out KO fed  She was tachycardic and hypoxic with increasing oxygen requirements, currently on 15 L mid flow  Hemodynamically stable, required x2 labetalol and x1 hydralazine  CXR shows possible left lower lung atelectasis     ---------------------------------------------------------------------------------------  SUBJECTIVE  Patient reports she did not have a good night  Denies any new neurologic deficits  Reports a persistent cough that is worsening throughout the night  Denies any new fevers, chills, cardiopulmonary complaints  No nausea or vomiting  Given her worsening oxygen requirements, discussed CODE STATUS with her  She wants to remain level 1 full code      Review of Systems  Review of systems was reviewed and negative unless stated above in HPI/24-hour events   ---------------------------------------------------------------------------------------  Assessment and Plan:        Neuro:              Diagnoses: Recent history of cerebellar mass c/b hydrocephalus s/p suboccipital craniectomy and now s/p VPS 5/1                  Plan:               -S/p palliative VPS 5/1  -Neuro checks q 4 hours  -Appreciate neurosurgery input  -Patient is established with palliative care team, ongoing [address]  -Patient not able to move her tongue, concerning for mass effect versus LMD versus stroke however no new deficits  -Recommend continuing Decadron, appreciate neurosurgery input  -Follow-up head CT     CV:              No Active issues     Pulm:             Diagnosis: Pulmonary embolism  -Incidental finding on CT CAP  No CT evidence of RHS  -Asymptomatic, hemodynamically stable   -IR providers don't believe that embolectomy is necessary at this time  -Duplex showed extensive DVT in left leg  -Status post IVC filter placement  Plan:  -Restart heparin gtt today, ASAP given patient's worsening oxygen requirements and tachycardia  -Obtain troponin/BNP, bedside echo to assess for right heart strain                 GI:              No active issues     :              No active issues   F/E/N:                          - F: None                          - E: Monitor and replete as needed                          - N: N p o  at this time-patient pulled out K fed     Endo:              ZMNHBMNUT: TWFPBWQNIVL with HbA1c at 5 8 as of 03/2023                            Monitor BS, start SSI if BS >180 X2              Heme:  Diagnosis: Stage IV breast cancer of right breast, metastatic, ER+ ER/TN+, HER2- grade 3, diagnosed in June 2019  BRCA2 mutation +  -Medical oncology following      Hemoglobin stable  -Monitor Hgb  -Monitor platelets  VTE prophylaxis: On hold- start Heparin gtt when okay with neurosurgery     ID:              Oral thrush              Continue magic mouthwash              Continue IV fluconazole          MSK/Skin:  Plan: Frequent turning and repositioning  Pressure injury prevention  Monitor for skin breakdown  PT/OT when appropriate  Encourage OOB and ambulation when appropriate      Patient appropriate for transfer out of the ICU today?: No  Disposition: Continue Critical Care   Code Status: Level 1 - Full Code  ---------------------------------------------------------------------------------------  ICU CORE MEASURES    Prophylaxis   VTE Pharmacologic Prophylaxis: Heparin  VTE Mechanical Prophylaxis: sequential compression device  Stress Ulcer Prophylaxis: Prophylaxis Not Indicated     ABCDE Protocol (if indicated)  Plan to perform spontaneous awakening trial today? Not applicable  Plan to perform spontaneous breathing trial today? Not applicable  Obvious barriers to extubation? Not applicable  CAM-ICU: Negative    Invasive Devices Review  Invasive Devices     Peripherally Inserted Central Catheter Line  Duration           PICC Line 55/50/69 Left Basilic 4 days          Drain  Duration           External Urinary Catheter 5 days    NG/OG/Enteral Tube Enteral Feeding Tube Right nare <1 day              Can any invasive devices be discontinued today? Not applicable  ---------------------------------------------------------------------------------------  OBJECTIVE    Vitals   Vitals:    23 0115 23 0215 23 0317 23 0545   BP: (!) 154/108 140/73 152/91 111/55   Pulse: (!) 136 (!) 134 (!) 130 94   Resp: (!) 35 (!) 26 22 22   Temp:       TempSrc:       SpO2: 91% 92% 95% 93%   Weight:       Height:         Temp (24hrs), Av °F (36 7 °C), Min:97 5 °F (36 4 °C), Max:98 7 °F (37 1 °C)  Current: Temperature: 98 7 °F (37 1 °C)    Respiratory:  Nasal Cannula O2 Flow Rate (L/min): 15 L/min    Invasive/non-invasive ventilation settings   Respiratory    Lab Data (Last 4 hours)    None         O2/Vent Data (Last 4 hours)    None                Physical Exam:  General: Ill-appearing  HEENT: Muffled speech, inability to protrude tongue, appears midline  EOMI  Respiratory: Left lower lobe rhonchi  Symmetric thorax expansion  Cardiovascular: Regular rate  Extremities appear well-perfused  Abdomen: Soft, non-distended  Extremities: Trace lower extremity edema  Skin: No obvious rashes  Neuro: GCS [id number]    No facial droop  Aside from inability to protrude tongue, cranial nerves grossly intact  Left lower extremity proximal weakness 2/5, otherwise 5/5 strength throughout  Sensation intact  Laboratory and Diagnostics:  Results from last 7 days   Lab Units 05/04/23 0452 05/03/23  0433 05/02/23  0424 05/01/23  0531 04/30/23  0602 04/29/23  1822 04/29/23  0538 04/28/23  0657 04/27/23 2034   WBC Thousand/uL 13 15* 10 25* 11 94* 12 07* 12 27* 7 64 8 00 6 73 7 49   HEMOGLOBIN g/dL 12 4 11 4* 12 1 12 3 12 6 10 4* 12 1 12 2 13 4   HEMATOCRIT % 37 9 34 0* 36 4 36 0 35 3 30 1* 34 6* 35 5 37 7   PLATELETS Thousands/uL 188 159 137* 122* 109* 82* 93* 85* 103*   NEUTROS PCT % 78* 76*  --  83*  --   --  74 70 68   MONOS PCT % 11 9  --  7  --   --  12 13* 10     Results from last 7 days   Lab Units 05/04/23 0452 05/03/23  0433 05/02/23  0424 05/01/23  0531 04/30/23  1852 04/30/23  0602 04/29/23  1822 04/28/23  0657 04/27/23 2034   SODIUM mmol/L 131* 134* 136 133* 134* 135 136   < > 136   POTASSIUM mmol/L 3 7 3 4* 4 1 3 5 3 7 3 4* 3 6   < > 2 9*   CHLORIDE mmol/L 101 102 104 102 101 102 102   < > 101   CO2 mmol/L 27 28 31 29 30 28 25   < > 29   ANION GAP mmol/L 3* 4 1* 2* 3* 5 9   < > 6   BUN mg/dL 11 6 8 5 5 4* 4*   < > 9   CREATININE mg/dL 0 62 0 60 0 53* 0 51* 0 55* 0 52* 0 36*   < > 0 74   CALCIUM mg/dL 9 6 9 7 9 5 8 6 8 6 8 4 7 6*   < > 9 4   GLUCOSE RANDOM mg/dL 120 146* 143* 122 136 129 104   < > 123   ALT U/L  --   --   --  62  --  64  --   --  88*   AST U/L  --   --   --  20  --  23  --   --  44   ALK PHOS U/L  --   --   --  96  --  87  --   --  86   ALBUMIN g/dL  --   --   --  2 7*  --  2 8*  --   --  3 4*   TOTAL BILIRUBIN mg/dL  --   --   --  0 88  --  0 77  --   --  0 61    < > = values in this interval not displayed       Results from last 7 days   Lab Units 05/04/23  0452 05/03/23  0433 05/01/23  0531 04/30/23  0602 04/29/23  0538 04/28/23  0657   MAGNESIUM mg/dL 2 2 2 1 2 6 2 3 2 4 2 1   PHOSPHORUS mg/dL 5 0* 1 8* "2 9 2 8 2 5*  --       Results from last 7 days   Lab Units 05/02/23  0424 05/01/23  0531 04/30/23  0751 04/30/23  0147 04/29/23  1945 04/29/23  1822 04/29/23  1230 04/28/23  0657   INR  1 18 1 09  --   --   --   --  1 03 1 12   PTT seconds 25 28 88* 80* 111* >210* 23 21*              ABG:    VBG:          Micro  Results from last 7 days   Lab Units 04/28/23  1605   GRAM STAIN RESULT  1+ Mononuclear Cells  No polys seen  No bacteria seen       EKG: Reviewed  Imaging: Reviewed    Intake and Output  I/O       05/02 0701 05/03 0700 05/03 0701 05/04 0700    I V  (mL/kg) 2673 7 (25 2) 979 2 (9 2)    NG/GT  200    IV Piggyback 200 350    Feedings  208    Total Intake(mL/kg) 2873 7 (27 1) 1737 2 (16 4)    Urine (mL/kg/hr) 2550 (1) 1150 (0 5)    Total Output 2550 1150    Net +323 7 +587 2          Unmeasured Urine Occurrence 1 x 2 x          Height and Weights   Height: 5' 6\" (167 6 cm)  IBW (Ideal Body Weight): 59 3 kg  Body mass index is 37 61 kg/m²  Weight (last 2 days)     None          Nutrition       Diet Orders   (From admission, onward)             Start     Ordered    05/03/23 1312  Diet Enteral/Parenteral; Tube Feeding No Oral Diet; Glucerna 1 2; Continuous; 20; 110; Water; Every 4 hours  Diet effective now        Comments: advance by 10mL every 6hrs to goal of 70mL/hr   References:    Nutrtion Support Algorithm Enteral vs  Parenteral   Question Answer Comment   Diet Type Enteral/Parenteral    Enteral/Parenteral Tube Feeding No Oral Diet    Tube Feeding Formula: Glucerna 1 2    Bolus/Cyclic/Continuous Continuous    Tube Feeding Goal Rate (mL/hr): 20    Tube Feeding flush (mL): 110    Water Flush type: Water    Water flush frequency: Every 4 hours    RD to adjust diet per protocol?  Yes        05/03/23 1312                Active Medications  Scheduled Meds:  Current Facility-Administered Medications   Medication Dose Route Frequency Provider Last Rate   • al mag oxide-diphenhydramine-lidocaine viscous  10 mL " Lore Estrada MD     • albuterol  2 puff Inhalation Q4H PRN April Leone PA-C     • ALPRAZolam  0 25 mg Oral HS PRN April Leone PA-C     • bisacodyl  10 mg Rectal Daily PRN April Leone PA-C     • calcium carbonate  1,000 mg Oral Daily PRN April Leone PA-C     • chlorhexidine  15 mL Swish & Spit Q12H [address] April Leone PA-C     • docusate sodium  100 mg Oral BID April Leone PA-C     • fluconazole  100 mg Intravenous Q24H Lina Rodríguez DO Stopped (05/03/23 1330)   • heparin (porcine)  5,000 Units Subcutaneous Q12H [address] Lina Rodríguez DO     • hydrALAZINE  5 mg Intravenous Q6H PRN Serjio Melendez MD     • HYDROmorphone  0 2 mg Intravenous Q2H PRN April Leone PA-C     • ipratropium  0 5 mg Nebulization TID Oliver Ro MD     • labetalol  20 mg Intravenous Q4H PRN EMILIANO Muse     • levalbuterol  1 25 mg Nebulization TID Oliver Ro MD     • ondansetron  4 mg Intravenous Q6H PRN April Leone PA-C     • oxyCODONE  2 5 mg Oral Q4H PRN April Leone PA-C     • oxyCODONE  5 mg Oral Q4H PRN April Leone PA-C     • potassium chloride  40 mEq Oral BID Lina Rodríguez DO     • potassium-sodium phosphates  2 packet Oral 4x Daily (with meals and at bedtime) April Leone PA-C     • ropivacaine  15 mL Infiltration Once Lina Rodríguez DO     • scopolamine  1 patch Transdermal Q72H Lina Rodríguez DO     • senna  1 tablet Oral Daily April Leone PA-C     • sterile water            Continuous Infusions:     PRN Meds:   albuterol, 2 puff, Q4H PRN  ALPRAZolam, 0 25 mg, HS PRN  bisacodyl, 10 mg, Daily PRN  calcium carbonate, 1,000 mg, Daily PRN  hydrALAZINE, 5 mg, Q6H PRN  HYDROmorphone, 0 2 mg, Q2H PRN  labetalol, 20 mg, Q4H PRN  ondansetron, 4 mg, Q6H PRN  oxyCODONE, 2 5 mg, Q4H PRN  oxyCODONE, 5 mg, Q4H PRN        Allergies   Allergies   Allergen Reactions   • Tylenol [Acetaminophen]      Told to avoid due to cancer "    ---------------------------------------------------------------------------------------  Advance Directive and Living Will: Yes    Power of :    POLST:    ---------------------------------------------------------------------------------------  Care Time Delivered:   30 mins    Lina Rodríguez,       Portions of the record may have been created with voice recognition software  Occasional wrong word or \"sound a like\" substitutions may have occurred due to the inherent limitations of voice recognition software    Read the chart carefully and recognize, using context, where substitutions have occurred   "

## 2023-05-04 NOTE — QUICK NOTE
Asked to assess patient due to increased work of breathing, increased respiratory rate, tachycardia, and hypoxia  S: Complains of cough       O:   Vitals:    05/04/23 0030   BP: 169/91   Pulse: (!) 116   Resp: (!) 35   Temp:    SpO2: 91%     PE:   Flushed  Tachypneic   Nonproductive cough  No wheezing  Follows commands     A:    Nonproductive cough, Tachycardia, Hypoxia possibly in the setting of aspiration pneumonia versus PE     P:     CXR   [place]ow NC  Plan to initiate heparin gtt today per neurosurgery   Beverly PRN

## 2023-05-04 NOTE — PLAN OF CARE
Problem: PHYSICAL THERAPY ADULT  Goal: Performs mobility at highest level of function for planned discharge setting  See evaluation for individualized goals  Description: Treatment/Interventions: Functional transfer training, Elevations, LE strengthening/ROM, Endurance training, Therapeutic exercise, Patient/family training, Equipment eval/education, Bed mobility, Gait training, Compensatory technique education, Spoke to nursing, OT  Equipment Recommended:  (pt already has RW)       See flowsheet documentation for full assessment, interventions and recommendations  Outcome: Progressing  Note: Prognosis: Fair  Problem List: Decreased strength, Decreased range of motion, Decreased endurance, Impaired balance, Decreased mobility, Decreased coordination, Decreased cognition, Impaired judgement, Decreased safety awareness, Obesity, Decreased skin integrity, Pain  Assessment: pt seen for PT session for bed skills; seated balance; standing trials and xfer trianing Bed> chair  in comparison to prior session pt w/ significant knee buckling on all attmpts at standing requiring Ax2 + B/L knee block  Pt unableto initiate steps for functional gait this date  Pt c/o increased HA at completion of session when repositioned in chair   BP noted to be elevated and RN was notified of same  chair alarm intact post session   PT continues to recommend rehab on d c   Barriers to Discharge: Decreased caregiver support, Inaccessible home environment     PT Discharge Recommendation: Post acute rehabilitation services    See flowsheet documentation for full assessment

## 2023-05-04 NOTE — OCCUPATIONAL THERAPY NOTE
Occupational Therapy Progress Note     Patient Name: Nancy Jasso  Today's Date: 5/4/2023  Problem List  Principal Problem:    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 [facility])  Active Problems:    Malignant neoplasm of central portion of right breast in female, estrogen receptor positive [facility])    Palliative care patient    Vomiting    Pulmonary embolism and DVT          05/04/23 1130   OT Last Visit   OT Visit Date 05/04/23   Note Type   Note Type Treatment   Pain Assessment   Pain Score No Pain   Restrictions/Precautions   Weight Bearing Precautions Per Order No   Other Precautions Impulsive;Cognitive; Chair Alarm;Multiple lines;Telemetry;O2;Fall Risk  (NGT, aphasia)   ADL   Where Assessed Chair   Grooming Assistance 3  Moderate Assistance   LB Dressing Assistance 2  Maximal Assistance   Toileting Assistance  2  Maximal Assistance   Bed Mobility   Supine to Sit 3  Moderate assistance   Additional items Assist x 1; Increased time required;LE management   Additional Comments OOB at end of session   Transfers   Sit to Stand 2  Maximal assistance   Additional items Assist x 2   Stand to Sit 2  Maximal assistance   Additional items Assist x 2   Stand pivot 2  Maximal assistance   Additional items Assist x 2   Additional Comments HHA, BL knee buckle with blocking required   Functional Mobility   Additional Comments JAMISON this date   Subjective   Subjective mostly non-verbal attempting to write hoever writting is non-sensical   Cognition   Overall Cognitive Status Impaired   Arousal/Participation Cooperative   Attention Attends with cues to redirect   Orientation Level Oriented X4   Memory Decreased recall of precautions;Decreased recall of recent events;Decreased short term memory   Following Commands Follows one step commands with increased time or repetition   Comments impulsive, aphasic, requires increased TC and VC   Activity Tolerance   Activity Tolerance Patient limited by fatigue   Medical Staff Made Aware DPT, NSG aware   Assessment   Assessment Pt was seen this date for OT tx session focusing on self care tasks, bed mobility, sit to stand progressions, standing tolerance, tranfers, safety awareness, compensatory techniques, energy conservation, and overall activity tolerance  Pt presents supine and is agreeable to OT tx session  All vitals WNL  Pt completes previously mentioned tasks at documented assist levels please see above in flow sheet  Pt now requiring Max a x2 with BL knee block for transfers  and max A for self care tasks  Pt is overall limited by decreased balance, aphasia, increased fatigue, decreased strength, endurance, and activity tolerance and continues to function below baseline level  Pt resting OOB in chair at end of session with all needs in reach and alarm on  Pt would benefit from continued OT Tx to improve overall functional abilities and increase independence  Will continue to follow with current POC  Plan   Treatment Interventions ADL retraining;Functional transfer training;UE strengthening/ROM; Endurance training;Cognitive reorientation;Patient/family training;Equipment evaluation/education; Compensatory technique education   Goal Expiration Date 05/15/23   OT Treatment Day 2   OT Frequency 3-5x/wk   Recommendation   OT Discharge Recommendation Post acute rehabilitation services   AM-PAC Daily Activity Inpatient   Lower Body Dressing 2   Bathing 2   Toileting 2   Upper Body Dressing 2   Grooming 2   Eating 2   Daily Activity Raw Score 12   Daily Activity Standardized Score (Calc for Raw Score >=11) 30 6   AM-PAC Applied Cognition Inpatient   Following a Speech/Presentation 2   Understanding Ordinary Conversation 3   Taking Medications 3   Remembering Where Things Are Placed or Put Away 2   Remembering List of 4-5 Errands 1   Taking Care of Complicated Tasks 1   Applied Cognition Raw Score 12   Applied Cognition Standardized Score 28 82

## 2023-05-04 NOTE — PHYSICAL THERAPY NOTE
"       PHYSICAL THERAPY TREATMENT  NAME:  Tony Peterson  DATE: 05/04/23    AGE:   47 y o    Mrn:   [id number]  ADMIT DX:  Hyponatremia [E87 1]  Metastatic cancer ([address] ) [C79 9]  Nausea and vomiting [R11 2]  Post-op pain [G89 18]  Elevated LFTs [R79 89]  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 ([address] ) [C79 31]    Past Medical History:   Diagnosis Date    Bloating     Breast cancer ([facility] [address] )     Bruises easily     Cancer ([address] )     right breast//to lymph nodes/liver/ and bone    Depression     History of cancer chemotherapy 05/2020    History of pneumonia     Hypotension     occas    Low iron     \"when pregnant, 20 yrs ago\"    Neuropathy     hands//fingers    Shortness of breath     occas    Stroke ([facility] [address] )     Subacromial impingement of left shoulder 03/14/2022    Tinnitus     right    Wears glasses      Past Surgical History:   Procedure Laterality Date    BILATERAL SALPINGOOPHORECTOMY      BREAST BIOPSY      CRANIOTOMY Bilateral 4/3/2023    Procedure: Suboccipital craniotomy for tumor resection using neuronavigation;  Surgeon: Brenda Hernandez MD;  [facility]: [facility];  Service: Neurosurgery    IR BIOPSY LIVER MASS  06/11/2019    IR IVC FILTER PLACEMENT PERMANENT  4/30/2023    MASTECTOMY Right     2019    MYOMECTOMY      H/O FIBROIDS, NO SURGERY NEEDED    DC LAPAROSCOPY W/RMVL ADNEXAL STRUCTURES N/A 07/29/2019    Procedure: LAPAROSCOPIC BILATERAL SALPINGO-OOPHORECTOMY;  Surgeon: Ria Santacruz MD;  [facility];  Service: Gynecology Oncology    DC MASTECTOMY SIMPLE COMPLETE Right 06/05/2020    Procedure: MASTECTOMY SIMPLE, axillary node dissection;  Surgeon: Chalo Franco MD;  Location: [facility];  Service: Surgical Oncology    US GUIDED BREAST BIOPSY RIGHT COMPLETE Right 05/03/2019    US GUIDED BREAST LYMPH NODE BIOPSY RIGHT Right 05/03/2019    VENTRICULOPERITONEAL SHUNT Right 5/1/2023    Procedure: Right, possible left, ventriculoperitoneal shunt placement using neuronavigation, laparoscopic abdominal portion with " GI surgery;  Surgeon: Jim Fry MD;  [facility];  Service: Neurosurgery       Length Of Stay: 6     05/04/23 1122   PT Last Visit   PT Visit Date 05/04/23   Note Type   Note Type Treatment   Pain Assessment   Pain Assessment Tool 0-10   Pain Score 8   Pain Location/Orientation Location: Head  (headache)   Pain Radiating Towards N/A   Pain Onset/Description Frequency: Intermittent  (onset w/ mobility)   Patient's Stated Pain Goal No pain   Hospital Pain Intervention(s) Ambulation/increased activity;Repositioned; Emotional support   Multiple Pain Sites No   Restrictions/Precautions   Weight Bearing Precautions Per Order No   Braces or Orthoses   (none)   Other Precautions Impulsive;Cognitive; Chair Alarm; Bed Alarm;Multiple lines;Telemetry;O2;Fall Risk;Pain  (15L O2 midflow; NGT; B/L knee buckling w/ xfers and gait)   General   Chart Reviewed Yes   Additional Pertinent History BP sitting up in chair post session 178/105 w/ pt reporting GRIFFITHS- RN aware Tiki Shoe)   Response to Previous Treatment Patient unable to report, no changes reported from family or staff   Family/Caregiver Present No   Cognition   Overall Cognitive Status Impaired   Arousal/Participation Cooperative   Attention Attends with cues to redirect   Orientation Level Oriented X4   Memory Decreased recall of precautions;Decreased recall of recent events;Decreased short term memory   Following Commands Follows one step commands with increased time or repetition   Comments highly impulsive w/ limited insight  into current deficits  cooperative w/ cues throughout session   Bed Mobility   Supine to Sit 3  Moderate assistance   Additional items Assist x 1;HOB elevated; Increased time required;Verbal cues;LE management   Additional Comments sits EOB w/ min> CGA once positioned     Transfers   Sit to Stand 2  Maximal assistance  (B/L knee block + buckling in stance)   Additional items Assist x 2   Stand to Sit 2  Maximal assistance   Additional items Assist x 2   Stand pivot 2  Maximal assistance   Additional items Assist x 2  (to L drop arm chair w/ knee block adn amx A x2)   Ambulation/Elevation   Gait pattern Not appropriate  (2* knee buckling on attempts at standing)   Balance   Static Sitting Poor +   Dynamic Sitting Poor   Static Standing Poor -   Ambulatory Zero   Endurance Deficit   Endurance Deficit Yes   Endurance Deficit Description HA; fatigue and knee buckling limiting session (elevated) BP   Activity Tolerance   Activity Tolerance Patient limited by fatigue;Patient limited by pain;Treatment limited secondary to medical complications (Comment)   Medical Staff Made Aware OT   Nurse Made Aware yes   Exercises   Balance training  sitting EOB/ postural control in sitting ; static / dyn sitting + attemps to stand   Assessment   Prognosis Fair   Problem List Decreased strength;Decreased range of motion;Decreased endurance; Impaired balance;Decreased mobility; Decreased coordination;Decreased cognition; Impaired judgement;Decreased safety awareness; Obesity; Decreased skin integrity;Pain   Assessment pt seen for PT session for bed skills; seated balance; standing trials and xfer trianing Bed> chair  in comparison to prior session pt w/ significant knee buckling on all attmpts at standing requiring Ax2 + B/L knee block  Pt unableto initiate steps for functional gait this date  Pt c/o increased HA at completion of session when repositioned in chair   BP noted to be elevated and RN was notified of same  chair alarm intact post session   PT continues to recommend rehab on d c    Barriers to Discharge Decreased caregiver support; Inaccessible home environment   Goals   Patient Goals to get up   STG Expiration Date 05/13/23   PT Treatment Day 1   Plan   Treatment/Interventions ADL retraining;Functional transfer training;LE strengthening/ROM; Elevations; Therapeutic exercise; Endurance training;Cognitive reorientation;Equipment eval/education; Bed mobility;Gait training;Patient/family training; Compensatory technique education;Spoke to nursing;Spoke to case management;Spoke to advanced practitioner;OT   Progress Slow progress, decreased activity tolerance   PT Frequency 3-5x/wk   Recommendation   PT Discharge Recommendation Post acute rehabilitation services   AM-PAC Basic Mobility Inpatient   Turning in Flat Bed Without Bedrails 2   Lying on Back to Sitting on Edge of Flat Bed Without Bedrails 2   Moving Bed to Chair 1   Standing Up From Chair Using Arms 1   Walk in Room 1   Climb 3-5 Stairs With Railing 1   Basic Mobility Inpatient Raw Score 8   Turning Head Towards Sound 4   Follow Simple Instructions 3   Low Function Basic Mobility Raw Score  15   Low Function Basic Mobility Standardized Score  23 9   Highest Level Of Mobility   -HL Goal 3: Sit at edge of bed   JH-HLM Achieved 4: Move to chair/commode   End of Consult   Patient Position at End of Consult Bed/Chair alarm activated; Bedside chair; All needs within reach       The patient's AM-PAC Basic Mobility Inpatient Short Form Raw Score is 8  A Raw score of less than or equal to 16 suggests the patient may benefit from discharge to post-acute rehabilitation services  Please also refer to the recommendation of the Physical Therapist for safe discharge planning            Braulio Moon, PT

## 2023-05-04 NOTE — ACP (ADVANCE CARE PLANNING)
I called the patient's appointed healthcare agent Radha Quinn (brother) to discuss code status given patient's worsening mental status and oxygen requirement today  I explained that there is concern for airway compromise given her rapid decline today  Given patient's current mental status, she is not competent to make decisions on her own  After discussion with other healthcare agent Adalgisa Fraire (friend) they have both agreed to change her code status to level 3 DNR/DNI  We will plan for goals of care meeting tomorrow with palliative care (Dr Marcelo Grant) at [address] [place] [address] are in agreement with plan  All questions and concerns answered       Pleas Babinski, D O   PGY-2 Internal Medicine   [facility]

## 2023-05-04 NOTE — PLAN OF CARE
Problem: MOBILITY - ADULT  Goal: Maintain or return to baseline ADL function  Description: INTERVENTIONS:  -  Assess patient's ability to carry out ADLs; assess patient's baseline for ADL function and identify physical deficits which impact ability to perform ADLs (bathing, care of mouth/teeth, toileting, grooming, dressing, etc )  - Assess/evaluate cause of self-care deficits   - Assess range of motion  - Assess patient's mobility; develop plan if impaired  - Assess patient's need for assistive devices and provide as appropriate  - Encourage maximum independence but intervene and supervise when necessary  - Involve family in performance of ADLs  - Assess for home care needs following discharge   - Consider OT consult to assist with ADL evaluation and planning for discharge  - Provide patient education as appropriate  Outcome: Progressing  Goal: Maintains/Returns to pre admission functional level  Description: INTERVENTIONS:  - Perform BMAT or MOVE assessment daily    - Set and communicate daily mobility goal to care team and patient/family/caregiver  - Collaborate with rehabilitation services on mobility goals if consulted  - Perform Range of Motion  times a day  - Reposition patient every  hours    - Dangle patient  times a day  - Stand patient  times a day  - Ambulate patient  times a day  - Out of bed to chair  times a day   - Out of bed for meals  times a day  - Out of bed for toileting  - Record patient progress and toleration of activity level   Outcome: Progressing     Problem: PAIN - ADULT  Goal: Verbalizes/displays adequate comfort level or baseline comfort level  Description: Interventions:  - Encourage patient to monitor pain and request assistance  - Assess pain using appropriate pain scale  - Administer analgesics based on type and severity of pain and evaluate response  - Implement non-pharmacological measures as appropriate and evaluate response  - Consider cultural and social influences on pain and pain management  - Notify physician/advanced practitioner if interventions unsuccessful or patient reports new pain  Outcome: Progressing     Problem: INFECTION - ADULT  Goal: Absence or prevention of progression during hospitalization  Description: INTERVENTIONS:  - Assess and monitor for signs and symptoms of infection  - Monitor lab/diagnostic results  - Monitor all insertion sites, i e  indwelling lines, tubes, and drains  - Monitor endotracheal if appropriate and nasal secretions for changes in amount and color  - [place] appropriate cooling/warming therapies per order  - Administer medications as ordered  - Instruct and encourage patient and family to use good hand hygiene technique  - Identify and instruct in appropriate isolation precautions for identified infection/condition  Outcome: Progressing  Goal: Absence of fever/infection during neutropenic period  Description: INTERVENTIONS:  - Monitor WBC    Outcome: Progressing     Problem: SAFETY ADULT  Goal: Maintain or return to baseline ADL function  Description: INTERVENTIONS:  -  Assess patient's ability to carry out ADLs; assess patient's baseline for ADL function and identify physical deficits which impact ability to perform ADLs (bathing, care of mouth/teeth, toileting, grooming, dressing, etc )  - Assess/evaluate cause of self-care deficits   - Assess range of motion  - Assess patient's mobility; develop plan if impaired  - Assess patient's need for assistive devices and provide as appropriate  - Encourage maximum independence but intervene and supervise when necessary  - Involve family in performance of ADLs  - Assess for home care needs following discharge   - Consider OT consult to assist with ADL evaluation and planning for discharge  - Provide patient education as appropriate  Outcome: Progressing  Goal: Maintains/Returns to pre admission functional level  Description: INTERVENTIONS:  - Perform BMAT or MOVE assessment daily    - Set and communicate daily mobility goal to care team and patient/family/caregiver  - Collaborate with rehabilitation services on mobility goals if consulted  - Perform Range of Motion  times a day  - Reposition patient every  hours    - Dangle patient  times a day  - Stand patient  times a day  - Ambulate patient times a day  - Out of bed to chair  times a day   - Out of bed for meals  times a day  - Out of bed for toileting  - Record patient progress and toleration of activity level   Outcome: Progressing  Goal: Patient will remain free of falls  Description: INTERVENTIONS:  - Educate patient/family on patient safety including physical limitations  - Instruct patient to call for assistance with activity   - Consult OT/PT to assist with strengthening/mobility   - Keep Call bell within reach  - Keep bed low and locked with side rails adjusted as appropriate  - Keep care items and personal belongings within reach  - Initiate and maintain comfort rounds  - Make Fall Risk Sign visible to staff  - Offer Toileting every  Hours, in advance of need  - Initiate/Maintain alarm  - Obtain necessary fall risk management equipment:   - Apply yellow socks and bracelet for high fall risk patients  - Consider moving patient to room near nurses station  Outcome: Progressing     Problem: DISCHARGE PLANNING  Goal: Discharge to home or other facility with appropriate resources  Description: INTERVENTIONS:  - Identify barriers to discharge w/patient and caregiver  - Arrange for needed discharge resources and transportation as appropriate  - Identify discharge learning needs (meds, wound care, etc )  - Arrange for interpretive services to assist at discharge as needed  - Refer to Case Management Department for coordinating discharge planning if the patient needs post-hospital services based on physician/advanced practitioner order or complex needs related to functional status, cognitive ability, or social support system  Outcome: Progressing Problem: Knowledge Deficit  Goal: Patient/family/caregiver demonstrates understanding of disease process, treatment plan, medications, and discharge instructions  Description: Complete learning assessment and assess knowledge base    Interventions:  - Provide teaching at level of understanding  - Provide teaching via preferred learning methods  Outcome: Progressing     Problem: GASTROINTESTINAL - ADULT  Goal: Minimal or absence of nausea and/or vomiting  Description: INTERVENTIONS:  - Administer IV fluids if ordered to ensure adequate hydration  - Maintain NPO status until nausea and vomiting are resolved  - Nasogastric tube if ordered  - Administer ordered antiemetic medications as needed  - Provide nonpharmacologic comfort measures as appropriate  - Advance diet as tolerated, if ordered  - Consider nutrition services referral to assist patient with adequate nutrition and appropriate food choices  Outcome: Progressing  Goal: Maintains or returns to baseline bowel function  Description: INTERVENTIONS:  - Assess bowel function  - Encourage oral fluids to ensure adequate hydration  - Administer IV fluids if ordered to ensure adequate hydration  - Administer ordered medications as needed  - Encourage mobilization and activity  - Consider nutritional services referral to assist patient with adequate nutrition and appropriate food choices  Outcome: Progressing  Goal: Maintains adequate nutritional intake  Description: INTERVENTIONS:  - Monitor percentage of each meal consumed  - Identify factors contributing to decreased intake, treat as appropriate  - Assist with meals as needed  - Monitor I&O, weight, and lab values if indicated  - Obtain nutrition services referral as needed  Outcome: Progressing  Goal: Establish and maintain optimal ostomy function  Description: INTERVENTIONS:  - Assess bowel function  - Encourage oral fluids to ensure adequate hydration  - Administer IV fluids if ordered to ensure adequate hydration   - Administer ordered medications as needed  - Encourage mobilization and activity  - Nutrition services referral to assist patient with appropriate food choices  - Assess stoma site  - Consider wound care consult   Outcome: Progressing  Goal: Oral mucous membranes remain intact  Description: INTERVENTIONS  - Assess oral mucosa and hygiene practices  - Implement preventative oral hygiene regimen  - Implement oral medicated treatments as ordered  - Initiate Nutrition services referral as needed  Outcome: Progressing     Problem: Prexisting or High Potential for Compromised Skin Integrity  Goal: Skin integrity is maintained or improved  Description: INTERVENTIONS:  - Identify patients at risk for skin breakdown  - Assess and monitor skin integrity  - Assess and monitor nutrition and hydration status  - Monitor labs   - Assess for incontinence   - Turn and reposition patient  - Assist with mobility/ambulation  - Relieve pressure over bony prominences  - Avoid friction and shearing  - Provide appropriate hygiene as needed including keeping skin clean and dry  - Evaluate need for skin moisturizer/barrier cream  - Collaborate with interdisciplinary team   - Patient/family teaching  - Consider wound care consult   Outcome: Progressing     Problem: SAFETY,RESTRAINT: NV/NON-SELF DESTRUCTIVE BEHAVIOR  Goal: Remains free of harm/injury (restraint for non violent/non self-detsructive behavior)  Description: INTERVENTIONS:  - Instruct patient/family regarding restraint use   - Assess and monitor physiologic and psychological status   - Provide interventions and comfort measures to meet assessed patient needs   - Identify and implement measures to help patient regain control  - Assess readiness for release of restraint   Outcome: Progressing  Goal: Returns to optimal restraint-free functioning  Description: INTERVENTIONS:  - Assess the patient's behavior and symptoms that indicate continued need for restraint  - Identify and implement measures to help patient regain control  - Assess readiness for release of restraint   Outcome: Progressing     Problem: Nutrition/Hydration-ADULT  Goal: Nutrient/Hydration intake appropriate for improving, restoring or maintaining nutritional needs  Description: Monitor and assess patient's nutrition/hydration status for malnutrition  Collaborate with interdisciplinary team and initiate plan and interventions as ordered  Monitor patient's weight and dietary intake as ordered or per policy  Utilize nutrition screening tool and intervene as necessary  Determine patient's food preferences and provide high-protein, high-caloric foods as appropriate       INTERVENTIONS:  - Monitor oral intake, urinary output, labs, and treatment plans  - Assess nutrition and hydration status and recommend course of action  - Evaluate amount of meals eaten  - Assist patient with eating if necessary   - Allow adequate time for meals  - Recommend/ encourage appropriate diets, oral nutritional supplements, and vitamin/mineral supplements  - Order, calculate, and assess calorie counts as needed  - Recommend, monitor, and adjust tube feedings and TPN/PPN based on assessed needs  - Assess need for intravenous fluids  - Provide specific nutrition/hydration education as appropriate  - Include patient/family/caregiver in decisions related to nutrition  Outcome: Progressing

## 2023-05-05 NOTE — PLAN OF CARE
Problem: MOBILITY - ADULT  Goal: Maintain or return to baseline ADL function  Description: INTERVENTIONS:  -  Assess patient's ability to carry out ADLs; assess patient's baseline for ADL function and identify physical deficits which impact ability to perform ADLs (bathing, care of mouth/teeth, toileting, grooming, dressing, etc )  - Assess/evaluate cause of self-care deficits   - Assess range of motion  - Assess patient's mobility; develop plan if impaired  - Assess patient's need for assistive devices and provide as appropriate  - Encourage maximum independence but intervene and supervise when necessary  - Involve family in performance of ADLs  - Assess for home care needs following discharge   - Consider OT consult to assist with ADL evaluation and planning for discharge  - Provide patient education as appropriate  Outcome: Not Progressing  Goal: Maintains/Returns to pre admission functional level  Description: INTERVENTIONS:  - Perform BMAT or MOVE assessment daily    - Set and communicate daily mobility goal to care team and patient/family/caregiver  - Collaborate with rehabilitation services on mobility goals if consulted  - Perform Range of Motion  times a day  - Reposition patient every  hours    - Dangle patient  times a day  - Stand patient  times a day  - Ambulate patient  times a day  - Out of bed to chair  times a day   - Out of bed for meals  times a day  - Out of bed for toileting  - Record patient progress and toleration of activity level   Outcome: Not Progressing     Problem: PAIN - ADULT  Goal: Verbalizes/displays adequate comfort level or baseline comfort level  Description: Interventions:  - Encourage patient to monitor pain and request assistance  - Assess pain using appropriate pain scale  - Administer analgesics based on type and severity of pain and evaluate response  - Implement non-pharmacological measures as appropriate and evaluate response  - Consider cultural and social influences on pain and pain management  - Notify physician/advanced practitioner if interventions unsuccessful or patient reports new pain  Outcome: Not Progressing     Problem: INFECTION - ADULT  Goal: Absence or prevention of progression during hospitalization  Description: INTERVENTIONS:  - Assess and monitor for signs and symptoms of infection  - Monitor lab/diagnostic results  - Monitor all insertion sites, i e  indwelling lines, tubes, and drains  - Monitor endotracheal if appropriate and nasal secretions for changes in amount and color  - [place] appropriate cooling/warming therapies per order  - Administer medications as ordered  - Instruct and encourage patient and family to use good hand hygiene technique  - Identify and instruct in appropriate isolation precautions for identified infection/condition  Outcome: Not Progressing  Goal: Absence of fever/infection during neutropenic period  Description: INTERVENTIONS:  - Monitor WBC    Outcome: Not Progressing     Problem: SAFETY ADULT  Goal: Maintain or return to baseline ADL function  Description: INTERVENTIONS:  -  Assess patient's ability to carry out ADLs; assess patient's baseline for ADL function and identify physical deficits which impact ability to perform ADLs (bathing, care of mouth/teeth, toileting, grooming, dressing, etc )  - Assess/evaluate cause of self-care deficits   - Assess range of motion  - Assess patient's mobility; develop plan if impaired  - Assess patient's need for assistive devices and provide as appropriate  - Encourage maximum independence but intervene and supervise when necessary  - Involve family in performance of ADLs  - Assess for home care needs following discharge   - Consider OT consult to assist with ADL evaluation and planning for discharge  - Provide patient education as appropriate  Outcome: Not Progressing  Goal: Maintains/Returns to pre admission functional level  Description: INTERVENTIONS:  - Perform BMAT or MOVE assessment daily    - Set and communicate daily mobility goal to care team and patient/family/caregiver  - Collaborate with rehabilitation services on mobility goals if consulted  - Perform Range of Motion  times a day  - Reposition patient every  hours    - Dangle patient  times a day  - Stand patient  times a day  - Ambulate patient  times a day  - Out of bed to chair  times a day   - Out of bed for meals  times a day  - Out of bed for toileting  - Record patient progress and toleration of activity level   Outcome: Not Progressing  Goal: Patient will remain free of falls  Description: INTERVENTIONS:  - Educate patient/family on patient safety including physical limitations  - Instruct patient to call for assistance with activity   - Consult OT/PT to assist with strengthening/mobility   - Keep Call bell within reach  - Keep bed low and locked with side rails adjusted as appropriate  - Keep care items and personal belongings within reach  - Initiate and maintain comfort rounds  - Make Fall Risk Sign visible to staff  - Offer Toileting every  Hours, in advance of need  - Initiate/Maintain alarm  - Obtain necessary fall risk management equipment    - Apply yellow socks and bracelet for high fall risk patients  - Consider moving patient to room near nurses station  Outcome: Not Progressing     Problem: DISCHARGE PLANNING  Goal: Discharge to home or other facility with appropriate resources  Description: INTERVENTIONS:  - Identify barriers to discharge w/patient and caregiver  - Arrange for needed discharge resources and transportation as appropriate  - Identify discharge learning needs (meds, wound care, etc )  - Arrange for interpretive services to assist at discharge as needed  - Refer to Case Management Department for coordinating discharge planning if the patient needs post-hospital services based on physician/advanced practitioner order or complex needs related to functional status, cognitive ability, or social support system  Outcome: Not Progressing     Problem: Knowledge Deficit  Goal: Patient/family/caregiver demonstrates understanding of disease process, treatment plan, medications, and discharge instructions  Description: Complete learning assessment and assess knowledge base    Interventions:  - Provide teaching at level of understanding  - Provide teaching via preferred learning methods  Outcome: Not Progressing     Problem: GASTROINTESTINAL - ADULT  Goal: Minimal or absence of nausea and/or vomiting  Description: INTERVENTIONS:  - Administer IV fluids if ordered to ensure adequate hydration  - Maintain NPO status until nausea and vomiting are resolved  - Nasogastric tube if ordered  - Administer ordered antiemetic medications as needed  - Provide nonpharmacologic comfort measures as appropriate  - Advance diet as tolerated, if ordered  - Consider nutrition services referral to assist patient with adequate nutrition and appropriate food choices  Outcome: Not Progressing  Goal: Maintains or returns to baseline bowel function  Description: INTERVENTIONS:  - Assess bowel function  - Encourage oral fluids to ensure adequate hydration  - Administer IV fluids if ordered to ensure adequate hydration  - Administer ordered medications as needed  - Encourage mobilization and activity  - Consider nutritional services referral to assist patient with adequate nutrition and appropriate food choices  Outcome: Not Progressing  Goal: Maintains adequate nutritional intake  Description: INTERVENTIONS:  - Monitor percentage of each meal consumed  - Identify factors contributing to decreased intake, treat as appropriate  - Assist with meals as needed  - Monitor I&O, weight, and lab values if indicated  - Obtain nutrition services referral as needed  Outcome: Not Progressing  Goal: Establish and maintain optimal ostomy function  Description: INTERVENTIONS:  - Assess bowel function  - Encourage oral fluids to ensure adequate hydration  - Administer IV fluids if ordered to ensure adequate hydration   - Administer ordered medications as needed  - Encourage mobilization and activity  - Nutrition services referral to assist patient with appropriate food choices  - Assess stoma site  - Consider wound care consult   Outcome: Not Progressing  Goal: Oral mucous membranes remain intact  Description: INTERVENTIONS  - Assess oral mucosa and hygiene practices  - Implement preventative oral hygiene regimen  - Implement oral medicated treatments as ordered  - Initiate Nutrition services referral as needed  Outcome: Not Progressing     Problem: Prexisting or High Potential for Compromised Skin Integrity  Goal: Skin integrity is maintained or improved  Description: INTERVENTIONS:  - Identify patients at risk for skin breakdown  - Assess and monitor skin integrity  - Assess and monitor nutrition and hydration status  - Monitor labs   - Assess for incontinence   - Turn and reposition patient  - Assist with mobility/ambulation  - Relieve pressure over bony prominences  - Avoid friction and shearing  - Provide appropriate hygiene as needed including keeping skin clean and dry  - Evaluate need for skin moisturizer/barrier cream  - Collaborate with interdisciplinary team   - Patient/family teaching  - Consider wound care consult   Outcome: Not Progressing     Problem: SAFETY,RESTRAINT: NV/NON-SELF DESTRUCTIVE BEHAVIOR  Goal: Remains free of harm/injury (restraint for non violent/non self-detsructive behavior)  Description: INTERVENTIONS:  - Instruct patient/family regarding restraint use   - Assess and monitor physiologic and psychological status   - Provide interventions and comfort measures to meet assessed patient needs   - Identify and implement measures to help patient regain control  - Assess readiness for release of restraint   Outcome: Not Progressing  Goal: Returns to optimal restraint-free functioning  Description: INTERVENTIONS:  - Assess the patient's behavior and symptoms that indicate continued need for restraint  - Identify and implement measures to help patient regain control  - Assess readiness for release of restraint   Outcome: Not Progressing     Problem: Nutrition/Hydration-ADULT  Goal: Nutrient/Hydration intake appropriate for improving, restoring or maintaining nutritional needs  Description: Monitor and assess patient's nutrition/hydration status for malnutrition  Collaborate with interdisciplinary team and initiate plan and interventions as ordered  Monitor patient's weight and dietary intake as ordered or per policy  Utilize nutrition screening tool and intervene as necessary  Determine patient's food preferences and provide high-protein, high-caloric foods as appropriate       INTERVENTIONS:  - Monitor oral intake, urinary output, labs, and treatment plans  - Assess nutrition and hydration status and recommend course of action  - Evaluate amount of meals eaten  - Assist patient with eating if necessary   - Allow adequate time for meals  - Recommend/ encourage appropriate diets, oral nutritional supplements, and vitamin/mineral supplements  - Order, calculate, and assess calorie counts as needed  - Recommend, monitor, and adjust tube feedings and TPN/PPN based on assessed needs  - Assess need for intravenous fluids  - Provide specific nutrition/hydration education as appropriate  - Include patient/family/caregiver in decisions related to nutrition  Outcome: Not Progressing

## 2023-05-05 NOTE — PLAN OF CARE
Problem: MOBILITY - ADULT  Goal: Maintain or return to baseline ADL function  Description: INTERVENTIONS:  -  Assess patient's ability to carry out ADLs; assess patient's baseline for ADL function and identify physical deficits which impact ability to perform ADLs (bathing, care of mouth/teeth, toileting, grooming, dressing, etc )  - Assess/evaluate cause of self-care deficits   - Assess range of motion  - Assess patient's mobility; develop plan if impaired  - Assess patient's need for assistive devices and provide as appropriate  - Encourage maximum independence but intervene and supervise when necessary  - Involve family in performance of ADLs  - Assess for home care needs following discharge   - Consider OT consult to assist with ADL evaluation and planning for discharge  - Provide patient education as appropriate  Outcome: Progressing  Goal: Maintains/Returns to pre admission functional level  Description: INTERVENTIONS:  - Perform BMAT or MOVE assessment daily    - Set and communicate daily mobility goal to care team and patient/family/caregiver  - Collaborate with rehabilitation services on mobility goals if consulted  - Perform Range of Motion  times a day  - Reposition patient every  hours    - Dangle patient  times a day  - Stand patient  times a day  - Ambulate patient  times a day  - Out of bed to chair  times a day   - Out of bed for meals  times a day  - Out of bed for toileting  - Record patient progress and toleration of activity level   Outcome: Progressing     Problem: PAIN - ADULT  Goal: Verbalizes/displays adequate comfort level or baseline comfort level  Description: Interventions:  - Encourage patient to monitor pain and request assistance  - Assess pain using appropriate pain scale  - Administer analgesics based on type and severity of pain and evaluate response  - Implement non-pharmacological measures as appropriate and evaluate response  - Consider cultural and social influences on pain and pain management  - Notify physician/advanced practitioner if interventions unsuccessful or patient reports new pain  Outcome: Progressing     Problem: INFECTION - ADULT  Goal: Absence or prevention of progression during hospitalization  Description: INTERVENTIONS:  - Assess and monitor for signs and symptoms of infection  - Monitor lab/diagnostic results  - Monitor all insertion sites, i e  indwelling lines, tubes, and drains  - Monitor endotracheal if appropriate and nasal secretions for changes in amount and color  - [place] appropriate cooling/warming therapies per order  - Administer medications as ordered  - Instruct and encourage patient and family to use good hand hygiene technique  - Identify and instruct in appropriate isolation precautions for identified infection/condition  Outcome: Progressing  Goal: Absence of fever/infection during neutropenic period  Description: INTERVENTIONS:  - Monitor WBC    Outcome: Progressing     Problem: SAFETY ADULT  Goal: Maintain or return to baseline ADL function  Description: INTERVENTIONS:  -  Assess patient's ability to carry out ADLs; assess patient's baseline for ADL function and identify physical deficits which impact ability to perform ADLs (bathing, care of mouth/teeth, toileting, grooming, dressing, etc )  - Assess/evaluate cause of self-care deficits   - Assess range of motion  - Assess patient's mobility; develop plan if impaired  - Assess patient's need for assistive devices and provide as appropriate  - Encourage maximum independence but intervene and supervise when necessary  - Involve family in performance of ADLs  - Assess for home care needs following discharge   - Consider OT consult to assist with ADL evaluation and planning for discharge  - Provide patient education as appropriate  Outcome: Progressing  Goal: Maintains/Returns to pre admission functional level  Description: INTERVENTIONS:  - Perform BMAT or MOVE assessment daily    - Set and communicate daily mobility goal to care team and patient/family/caregiver  - Collaborate with rehabilitation services on mobility goals if consulted  - Perform Range of Motion  times a day  - Reposition patient every  hours    - Dangle patient  times a day  - Stand patient  times a day  - Ambulate patient  times a day  - Out of bed to chair  times a day   - Out of bed for meals  times a day  - Out of bed for toileting  - Record patient progress and toleration of activity level   Outcome: Progressing  Goal: Patient will remain free of falls  Description: INTERVENTIONS:  - Educate patient/family on patient safety including physical limitations  - Instruct patient to call for assistance with activity   - Consult OT/PT to assist with strengthening/mobility   - Keep Call bell within reach  - Keep bed low and locked with side rails adjusted as appropriate  - Keep care items and personal belongings within reach  - Initiate and maintain comfort rounds  - Make Fall Risk Sign visible to staff  - Offer Toileting every  Hours, in advance of need  - Initiate/Maintain alarm  - Obtain necessary fall risk management equipmet  - Apply yellow socks and bracelet for high fall risk patients  - Consider moving patient to room near nurses station  Outcome: Progressing     Problem: DISCHARGE PLANNING  Goal: Discharge to home or other facility with appropriate resources  Description: INTERVENTIONS:  - Identify barriers to discharge w/patient and caregiver  - Arrange for needed discharge resources and transportation as appropriate  - Identify discharge learning needs (meds, wound care, etc )  - Arrange for interpretive services to assist at discharge as needed  - Refer to Case Management Department for coordinating discharge planning if the patient needs post-hospital services based on physician/advanced practitioner order or complex needs related to functional status, cognitive ability, or social support system  Outcome: Progressing Problem: Knowledge Deficit  Goal: Patient/family/caregiver demonstrates understanding of disease process, treatment plan, medications, and discharge instructions  Description: Complete learning assessment and assess knowledge base    Interventions:  - Provide teaching at level of understanding  - Provide teaching via preferred learning methods  Outcome: Progressing     Problem: GASTROINTESTINAL - ADULT  Goal: Minimal or absence of nausea and/or vomiting  Description: INTERVENTIONS:  - Administer IV fluids if ordered to ensure adequate hydration  - Maintain NPO status until nausea and vomiting are resolved  - Nasogastric tube if ordered  - Administer ordered antiemetic medications as needed  - Provide nonpharmacologic comfort measures as appropriate  - Advance diet as tolerated, if ordered  - Consider nutrition services referral to assist patient with adequate nutrition and appropriate food choices  Outcome: Progressing  Goal: Maintains or returns to baseline bowel function  Description: INTERVENTIONS:  - Assess bowel function  - Encourage oral fluids to ensure adequate hydration  - Administer IV fluids if ordered to ensure adequate hydration  - Administer ordered medications as needed  - Encourage mobilization and activity  - Consider nutritional services referral to assist patient with adequate nutrition and appropriate food choices  Outcome: Progressing  Goal: Maintains adequate nutritional intake  Description: INTERVENTIONS:  - Monitor percentage of each meal consumed  - Identify factors contributing to decreased intake, treat as appropriate  - Assist with meals as needed  - Monitor I&O, weight, and lab values if indicated  - Obtain nutrition services referral as needed  Outcome: Progressing  Goal: Establish and maintain optimal ostomy function  Description: INTERVENTIONS:  - Assess bowel function  - Encourage oral fluids to ensure adequate hydration  - Administer IV fluids if ordered to ensure adequate hydration   - Administer ordered medications as needed  - Encourage mobilization and activity  - Nutrition services referral to assist patient with appropriate food choices  - Assess stoma site  - Consider wound care consult   Outcome: Progressing  Goal: Oral mucous membranes remain intact  Description: INTERVENTIONS  - Assess oral mucosa and hygiene practices  - Implement preventative oral hygiene regimen  - Implement oral medicated treatments as ordered  - Initiate Nutrition services referral as needed  Outcome: Progressing     Problem: Prexisting or High Potential for Compromised Skin Integrity  Goal: Skin integrity is maintained or improved  Description: INTERVENTIONS:  - Identify patients at risk for skin breakdown  - Assess and monitor skin integrity  - Assess and monitor nutrition and hydration status  - Monitor labs   - Assess for incontinence   - Turn and reposition patient  - Assist with mobility/ambulation  - Relieve pressure over bony prominences  - Avoid friction and shearing  - Provide appropriate hygiene as needed including keeping skin clean and dry  - Evaluate need for skin moisturizer/barrier cream  - Collaborate with interdisciplinary team   - Patient/family teaching  - Consider wound care consult   Outcome: Progressing     Problem: SAFETY,RESTRAINT: NV/NON-SELF DESTRUCTIVE BEHAVIOR  Goal: Remains free of harm/injury (restraint for non violent/non self-detsructive behavior)  Description: INTERVENTIONS:  - Instruct patient/family regarding restraint use   - Assess and monitor physiologic and psychological status   - Provide interventions and comfort measures to meet assessed patient needs   - Identify and implement measures to help patient regain control  - Assess readiness for release of restraint   Outcome: Progressing  Goal: Returns to optimal restraint-free functioning  Description: INTERVENTIONS:  - Assess the patient's behavior and symptoms that indicate continued need for restraint  - Identify and implement measures to help patient regain control  - Assess readiness for release of restraint   Outcome: Progressing     Problem: Nutrition/Hydration-ADULT  Goal: Nutrient/Hydration intake appropriate for improving, restoring or maintaining nutritional needs  Description: Monitor and assess patient's nutrition/hydration status for malnutrition  Collaborate with interdisciplinary team and initiate plan and interventions as ordered  Monitor patient's weight and dietary intake as ordered or per policy  Utilize nutrition screening tool and intervene as necessary  Determine patient's food preferences and provide high-protein, high-caloric foods as appropriate       INTERVENTIONS:  - Monitor oral intake, urinary output, labs, and treatment plans  - Assess nutrition and hydration status and recommend course of action  - Evaluate amount of meals eaten  - Assist patient with eating if necessary   - Allow adequate time for meals  - Recommend/ encourage appropriate diets, oral nutritional supplements, and vitamin/mineral supplements  - Order, calculate, and assess calorie counts as needed  - Recommend, monitor, and adjust tube feedings and TPN/PPN based on assessed needs  - Assess need for intravenous fluids  - Provide specific nutrition/hydration education as appropriate  - Include patient/family/caregiver in decisions related to nutrition  Outcome: Progressing

## 2023-05-05 NOTE — ASSESSMENT & PLAN NOTE
Imaging:   • CT chest abdomen pelvis with 4/28: New saddle pulmonary embolism extending into bilateral pulmonary artery segment branches  No evidence of right heart strain  • Lower extremity duplex 4/29: Left lower extremity with occlusive acute DVT noted in the femoral, popliteal, gastrinomas, posterior tibial and peroneal veins  Plan:   • S/P IVC filter  • SQH BID  • Heparin gtt, VTE protocol, started 5/4/23  PTT supratherapeutic today at 191; STAT CTH stable with no evidence of worsening hemorrhage     • Ok to continue heparin and transition to coumadin once appropriate  • STAT CTH for decline in GCS greater than 2 points in 1 hour

## 2023-05-05 NOTE — PROGRESS NOTES
Daily Progress Note - Critical Care   Manual Ryne 47 y o  female MRN: [id number]  Unit/Bed#: ICU 01 Encounter: [id number]        ----------------------------------------------------------------------------------------  HPI/24hr events:  52F - PMH DM2, metastatic brain cancer with known brain and liver metastasis, recent hospitalization April '23 after she was found to have R + L cerebellar masses s/p craniotomy w/ resection of cerebellar masses 4/3  She now presents 4/27 with symptoms of delayed hydrocephalus- worsening N/V + decreased PO intake x1 week  NSGY proceeded with CSF diversion and EVD placement then conversion to right frontal VPS on 5/1 (after failed EVD clamp trial)  Also found to have saddle PE without RHS and LLE DVT s/p IVC filter 4/30  Following VPS placement, patient has had worsening encephalopathy and oxygen requirements  Overnight events: None  Received Xanax 0 25x1, oxycodone 5 mg x 1    ---------------------------------------------------------------------------------------  SUBJECTIVE  Patient is lethargic/somnolent in bed  Unable to answer questions appropriately      Review of Systems  Review of systems was unable to be performed secondary to Encephalopathy, dysarthria  ---------------------------------------------------------------------------------------  Assessment and Plan:    # Breast cancer with mets to the brain  # Hydrocephalus s/p EVD placement 4/28 and s/p  shunt 5/1  # iatrogenic SAH     # Saddle pulmonary embolism     # Acute hypoxia respiratory failure     # Encephalopathy   # Extensive DVT LLE  -s/p IVC filter 4/30  # 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     # Hypokalemia     # hypophosphatemia   # Oral thrush  # Dysphagia   # Severe dysarthria   # Thrombocytopenia: improved  # Moderate hyponatremia  # Leukocytosis      Neuro:              Diagnoses: Recent history of cerebellar mass c/b hydrocephalus s/p suboccipital craniectomy and now s/p VPS 5/1                  Plan:   -Neuro checks q 4 hours  -Appreciate neurosurgery input  -Patient is established with palliative care team, ongoing [address]  -Patient not able to move her tongue, concerning for mass effect versus LMD versus stroke however no new deficits       Diagnosis: Encephalopathy - 2/2 brain mets and likely LMD   -Blood gas does not show hypercapnia    -No signs of metabolic derangements as etiology   -No signs of infection as etiology     Plan:   -Continue to monitor neuro exam   -Ongoing GoC discussion     CV:              Diagnosis: Hypertension   Plan:   -Continue PRN labetalol/hydralazine since patient is NPO     Pulm:             Diagnosis: Pulmonary embolism  -Incidental finding on CT CAP  No CT evidence of RHS    -Asymptomatic, hemodynamically stable   -IR providers don't believe that embolectomy is necessary at this time  -Duplex showed extensive DVT in left leg  -Status post IVC filter placement  Plan:  -Patient with worsening oxygen requirements and tachycardia, likely secondary to saddle PE  -No evidence of RHS-negative BNP/troponins  -Continue heparin gtt , once therapeutic obtain head CT with contrast                 GI:              No active issues     :              No active issues   F/E/N:                          - F: None                          - E: Monitor and replete as needed                          - N: NPO at this time- continue tube feeds via NG tube     Endo:              ONTTDPBIN: HVHOQIFOATD with HbA1c at 5 8 as of 03/2023                            Monitor BS, start SSI if BS >180 X2              Heme:  Diagnosis: Stage IV breast cancer of right breast, metastatic, ER+ ER/MO+, HER2- grade 3, diagnosed in June 2019  BRCA2 mutation +  -Medical oncology following  -ongoing GoC discussion    Plan:   -Hemoglobin stable  -Monitor Hgb  -Monitor platelets  VTE prophylaxis: Heparin gtt       ID:              Oral thrush   Plan:              -Continue magic mouthwash              -Continue IV fluconazole  - day 4        MSK/Skin:  Plan: Frequent turning and repositioning  Pressure injury prevention  Monitor for skin breakdown  PT/OT when appropriate  Encourage OOB and ambulation when appropriate  Patient appropriate for transfer out of the ICU today?: No  Disposition: Continue Critical Care   Code Status: Level 3 - DNAR and DNI  ---------------------------------------------------------------------------------------  ICU CORE MEASURES    Prophylaxis   VTE Pharmacologic Prophylaxis: Heparin Drip  VTE Mechanical Prophylaxis: sequential compression device  Stress Ulcer Prophylaxis: Prophylaxis Not Indicated     ABCDE Protocol (if indicated)  Plan to perform spontaneous awakening trial today? Not applicable  Plan to perform spontaneous breathing trial today? Not applicable  Obvious barriers to extubation? Not applicable  CAM-ICU: Negative    Invasive Devices Review  Invasive Devices     Peripherally Inserted Central Catheter Line  Duration           PICC Line  Left Basilic 5 days          Drain  Duration           External Urinary Catheter 6 days    NG/OG/Enteral Tube Enteral Feeding Tube Right nare 1 day              Can any invasive devices be discontinued today?  Not applicable  ---------------------------------------------------------------------------------------  OBJECTIVE    Vitals   Vitals:    23 0300 23 0400 23 0500 23 0512   BP:  152/82 149/77 149/77   BP Location:  Left arm     Pulse: (!) 108 (!) 126 96 98   Resp: (!) 35 (!) 46 21 21   Temp:  98 1 °F (36 7 °C)     TempSrc:  Oral     SpO2: 97% 92% 98% 97%   Weight:       Height:         Temp (24hrs), Av 3 °F (36 8 °C), Min:98 1 °F (36 7 °C), Max:98 5 °F (36 9 °C)  Current: Temperature: 98 1 °F (36 7 °C)    Respiratory:  Nasal Cannula O2 Flow Rate (L/min): 15 L/min    Invasive/non-invasive ventilation settings   Respiratory    Lab Data (Last 4 hours)    None         O2/Vent Data (Last 4 hours)    None              Physical Exam:  General: Ill-appearing, somnolent  Eyes: Left eyelid droop  Pupils equal bilaterally, sluggish but reactive to light bilaterally  Respiratory: No respiratory distress  Symmetric thorax expansion  Cardiovascular: Regular rate and rhythm  Extremities appear well-perfused  Abdomen: Soft, nontender, non-distended  Extremities: Warm  Moves extremities spontaneously  Neuro: GCS 11 - E3V2M6  Unable to answer questions appropriately  Bilateral lower extremity 4/5 strength, distal 5/5 strength  Bilateral upper extremity 5/5 strength      Laboratory and Diagnostics:  Results from last 7 days   Lab Units 05/04/23  1129 05/04/23  0452 05/03/23  0433 05/02/23  0424 05/01/23  0531 04/30/23  0602 04/29/23  1822 04/29/23  0538 04/28/23  0657   WBC Thousand/uL 16 57* 13 15* 10 25* 11 94* 12 07* 12 27* 7 64 8 00 6 73   HEMOGLOBIN g/dL 12 9 12 4 11 4* 12 1 12 3 12 6 10 4* 12 1 12 2   HEMATOCRIT % 39 1 37 9 34 0* 36 4 36 0 35 3 30 1* 34 6* 35 5   PLATELETS Thousands/uL 183 188 159 137* 122* 109* 82* 93* 85*   NEUTROS PCT %  --  78* 76*  --  83*  --   --  74 70   MONOS PCT %  --  11 9  --  7  --   --  12 13*     Results from last 7 days   Lab Units 05/04/23  0452 05/03/23  0433 05/02/23  0424 05/01/23  0531 04/30/23  1852 04/30/23  0602 04/29/23  1822   SODIUM mmol/L 131* 134* 136 133* 134* 135 136   POTASSIUM mmol/L 3 7 3 4* 4 1 3 5 3 7 3 4* 3 6   CHLORIDE mmol/L 101 102 104 102 101 102 102   CO2 mmol/L 27 28 31 29 30 28 25   ANION GAP mmol/L 3* 4 1* 2* 3* 5 9   BUN mg/dL 11 6 8 5 5 4* 4*   CREATININE mg/dL 0 62 0 60 0 53* 0 51* 0 55* 0 52* 0 36*   CALCIUM mg/dL 9 6 9 7 9 5 8 6 8 6 8 4 7 6*   GLUCOSE RANDOM mg/dL 120 146* 143* 122 136 129 104   ALT U/L  --   --   --  62  --  64  --    AST U/L  --   --   --  20  --  23  --    ALK PHOS U/L  --   --   --  96  --  87  --    ALBUMIN g/dL  --   --   --  2 7*  --  2 8*  --    TOTAL BILIRUBIN mg/dL  --   --   --  0 88  --  0 77  -- "     Results from last 7 days   Lab Units 05/04/23  0452 05/03/23  0433 05/01/23  0531 04/30/23  0602 04/29/23  0538 04/28/23  0657   MAGNESIUM mg/dL 2 2 2 1 2 6 2 3 2 4 2 1   PHOSPHORUS mg/dL 5 0* 1 8* 2 9 2 8 2 5*  --       Results from last 7 days   Lab Units 05/04/23  2139 05/04/23  1502 05/04/23  1129 05/02/23  0424 05/01/23  0531 04/30/23  0751 04/30/23  0147 04/29/23  1822 04/29/23  1230 04/28/23  0657   INR   --   --  1 22* 1 18 1 09  --   --   --  1 03 1 12   PTT seconds 58* 25 25 25 28 88* 80*   < > 23 21*    < > = values in this interval not displayed  ABG:  Results from last 7 days   Lab Units 05/04/23  1833   PH ART  7 441   PCO2 ART mm Hg 39 5   PO2 ART mm Hg 93 8   HCO3 ART mmol/L 26 3   BASE EXC ART mmol/L 2 1   ABG SOURCE  Radial, Left     VBG:  Results from last 7 days   Lab Units 05/04/23  1833   ABG SOURCE  Radial, Left         Micro  Results from last 7 days   Lab Units 04/28/23  1605   GRAM STAIN RESULT  1+ Mononuclear Cells  No polys seen  No bacteria seen       EKG: Reviewed  Imaging: Reviewed    Intake and Output  I/O       05/03 0701 05/04 0700 05/04 0701 05/05 0700    I V  (mL/kg) 979 2 (9 2) 263 7 (2 5)    NG/ 730    IV Piggyback 350     Feedings 208 280    Total Intake(mL/kg) 1737 2 (16 4) 1273 7 (12)    Urine (mL/kg/hr) 1400 (0 6) 850 (0 3)    Total Output 1400 850    Net +337 2 +423 7          Unmeasured Urine Occurrence 3 x 2 x          Height and Weights   Height: 5' 6\" (167 6 cm)  IBW (Ideal Body Weight): 59 3 kg  Body mass index is 37 61 kg/m²  Weight (last 2 days)     None          Nutrition       Diet Orders   (From admission, onward)             Start     Ordered    05/04/23 1039  Diet Enteral/Parenteral; Tube Feeding No Oral Diet; Glucerna 1 2; Continuous; 20; 110; Saline;  Every 4 hours  Diet effective now        Comments: Normal saline flushes 30ml every 4hours  Tube feed: advance by 10mL every 6hrs to goal of 70mL/hr   References:    Nutrtion Support " Algorithm Enteral vs  Parenteral   Question Answer Comment   Diet Type Enteral/Parenteral    Enteral/Parenteral Tube Feeding No Oral Diet    Tube Feeding Formula: Glucerna 1 2    Bolus/Cyclic/Continuous Continuous    Tube Feeding Goal Rate (mL/hr): 20    Tube Feeding flush (mL): 110    Water Flush type: Saline    Water flush frequency: Every 4 hours    RD to adjust diet per protocol?  Yes        05/04/23 1038                Active Medications  Scheduled Meds:  Current Facility-Administered Medications   Medication Dose Route Frequency Provider Last Rate   • acetylcysteine  3 mL Nebulization TID Mariajose Mercedes MD     • al mag oxide-diphenhydramine-lidocaine viscous  10 mL Swish & Swallow Q4H Randy Jackson MD     • albuterol  2 puff Inhalation Q4H PRN Bassam Thorpe PA-C     • ALPRAZolam  0 25 mg Oral HS PRN Bassam Thorpe PA-C     • bisacodyl  10 mg Rectal Daily PRN Bassam Thorpe PA-C     • calcium carbonate  1,000 mg Oral Daily PRN Bassam Thorpe PA-C     • chlorhexidine  15 mL Swish & Spit Q12H [facility] Bassam Thorpe PA-C     • docusate sodium  100 mg Oral BID Bassam Thorpe PA-C     • fluconazole  100 mg Intravenous Q24H Lina Rodríguez  mg (05/04/23 0906)   • guaiFENesin  400 mg Per NG Tube Q6H Mariajose Mercedes MD     • heparin (porcine)  3-30 Units/kg/hr (Order-Specific) Intravenous Titrated Randy Jackson MD 20 Units/kg/hr (05/05/23 0432)   • hydrALAZINE  5 mg Intravenous Q6H PRN Randy Jackson MD     • HYDROmorphone  0 2 mg Intravenous Q2H PRN Bassam Thorpe PA-C     • ipratropium  0 5 mg Nebulization TID Sancho Christopher MD     • labetalol  20 mg Intravenous Q4H PRN EMILIANO Daniel     • levalbuterol  1 25 mg Nebulization TID Sancho Christopher MD     • melatonin  3 mg Per NG Tube HS Lina Rodríguez DO     • ondansetron  4 mg Intravenous Q6H PRN Bassam Thorpe PA-C     • oxyCODONE  2 5 mg Oral Q4H PRN Bassam Thorpe PA-C     • oxyCODONE  5 mg Oral Q4H PRN Bassam Thorpe PA-C "  • potassium chloride  40 mEq Oral BID Lina Rodríguez DO     • potassium-sodium phosphates  2 packet Oral 4x Daily (with meals and at bedtime) Chepe Woods PA-C     • ropivacaine  15 mL Infiltration Once Lina Rodríguez DO     • scopolamine  1 patch Transdermal Q72H Lina Rodríguez DO     • senna  1 tablet Oral Daily Chepe Woods PA-C       Continuous Infusions:  heparin (porcine), 3-30 Units/kg/hr (Order-Specific), Last Rate: 20 Units/kg/hr (05/05/23 0432)      PRN Meds:   albuterol, 2 puff, Q4H PRN  ALPRAZolam, 0 25 mg, HS PRN  bisacodyl, 10 mg, Daily PRN  calcium carbonate, 1,000 mg, Daily PRN  hydrALAZINE, 5 mg, Q6H PRN  HYDROmorphone, 0 2 mg, Q2H PRN  labetalol, 20 mg, Q4H PRN  ondansetron, 4 mg, Q6H PRN  oxyCODONE, 2 5 mg, Q4H PRN  oxyCODONE, 5 mg, Q4H PRN        Allergies   Allergies   Allergen Reactions   • Tylenol [Acetaminophen]      Told to avoid due to cancer      ---------------------------------------------------------------------------------------  Advance Directive and Living Will: Yes    Power of :    POLST:    ---------------------------------------------------------------------------------------  Care Time Delivered:   60 mins    Lina Rodríguez DO      Portions of the record may have been created with voice recognition software  Occasional wrong word or \"sound a like\" substitutions may have occurred due to the inherent limitations of voice recognition software    Read the chart carefully and recognize, using context, where substitutions have occurred   "

## 2023-05-05 NOTE — PROGRESS NOTES
[address] [facility]  Progress Note  Name: Tomy Concepcion  MRN: [id number]  Unit/Bed#: ICU 01 I Date of Admission: 4/27/2023   Date of Service: 5/5/2023 I Hospital Day: 7    Assessment/Plan   * 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 [facility])  Assessment & Plan  POD 4 Right frontal ventriculoperitoneal shunt placement using neuronavigation and laparoscopic abdominal assistance  · Patient with PMH of stage IV breast cancer with brain and liver metastases presented to the ED 4/27/2023 c/o headache and progressive nausea and vomiting for several weeks  · Patient is s/p suboccipital craniotomy for resection of right and left cerebellar masses with complex dural reconstruction 4/3/2023 with Dr Szymanski Monterey  · Now with Certas Plus valve set at 6    Imaging  · CT head w/wo, 5/5/23: Similar trace subacute subarachnoid hemorrhage in right frontal region (vertex and anteriorly)  Similar trace intraventricular hemorrhage in occipital horn of left lateral ventricle  Worsened slitlike right lateral ventricle status post right frontal approach ventricular catheter with stable mild dilation of left lateral ventricle and fourth ventricle  Increased evidence of diffuse leptomeningeal disease  Plan  · Continue monitoring neurological status/exam with frequent neurological checks  · Worsening R slit like ventricle s/p shunt adjustment yesterday  Will continue at this setting  · Pain management per primary team  · Medical management per primary team  · Palliative care following, plan for family meeting today  · DVT ppx: SCD, HSQ BID   · MRI brain BT protocol ordered for tomorrow for radiation planning    Neurosurgery will follow from the periphery  Patient due for incision check in 2 weeks   Please call with questions or concerns         Pulmonary embolism and DVT  Assessment & Plan  Imaging:   • CT chest abdomen pelvis with 4/28: New saddle pulmonary embolism extending into bilateral pulmonary artery segment "branches  No evidence of right heart strain  • Lower extremity duplex 4/29: Left lower extremity with occlusive acute DVT noted in the femoral, popliteal, gastrinomas, posterior tibial and peroneal veins  Plan:   • S/P IVC filter  • SQH BID  • Heparin gtt, VTE protocol, started 5/4/23  PTT supratherapeutic today at 191; STAT CTH stable with no evidence of worsening hemorrhage  • Ok to continue heparin and transition to coumadin once appropriate  • STAT CTH for decline in GCS greater than 2 points in 1 hour                     Subjective/Objective   Chief Complaint: none    Subjective: Patient increasingly encephalopathic overnight  Family at bedside  Plan for family meeting this morning to determine goals of care  PTT supratherapeutic at 191 this morning, stat CT head negative for worsening hemorrhage  She did have a fall out of bed yesterday; CT head showed no worsening hemorrhage  She is now restrained  Objective: Patient in bed in no acute distress  Encephalopathic  NG tube and nasal cannula in place  Posey and bilateral wrist restraints in place  Invasive Devices     Peripherally Inserted Central Catheter Line  Duration           PICC Line 60/11/91 Left Basilic 5 days          Drain  Duration           External Urinary Catheter 6 days    NG/OG/Enteral Tube Enteral Feeding Tube Right nare 1 day                Vitals: Blood pressure 152/65, pulse 98, temperature 97 6 °F (36 4 °C), temperature source Oral, resp  rate 21, height 5' 6\" (1 676 m), weight 106 kg (233 lb), SpO2 95 %  ,Body mass index is 37 61 kg/m²  General appearance: alert, appears stated age, cooperative and no distress  Head: Shunt incision CDI  Eyes: Dysconjugate gaze, pupils 5mm and sluggish  Left eye drooping  Lungs: non labored breathing  Heart: regular heart rate  Neurologic:   Mental status: Alert, dysarthric, encephalopathic  Cranial nerves: unable to stick out tongue  Left eyelid drooping  Dysconjugate gaze   " Pupils large and sluggishly reactive  Sensory: normal to LT  Motor: 1/5 left hip flexion, 3/5 right hip flexion  All other muscle groups at least antigravity    Lab Results: I have personally reviewed pertinent results  Results from last 7 days   Lab Units 05/05/23 0546 05/04/23  1129 05/04/23 0452 05/03/23  0433 05/02/23  0424 05/01/23  0531   WBC Thousand/uL 16 35* 16 57* 13 15* 10 25*   < > 12 07*   HEMOGLOBIN g/dL 11 6 12 9 12 4 11 4*   < > 12 3   HEMATOCRIT % 35 2 39 1 37 9 34 0*   < > 36 0   PLATELETS Thousands/uL 156 183 188 159   < > 122*   NEUTROS PCT %  --   --  78* 76*  --  83*   MONOS PCT %  --   --  11 9  --  7   MONO PCT % 8  --   --   --   --   --     < > = values in this interval not displayed  Results from last 7 days   Lab Units 05/05/23 0546 05/04/23 0452 05/03/23  0433 05/02/23  0424 05/01/23  0531 04/30/23  1852 04/30/23  0602   POTASSIUM mmol/L 3 8 3 7 3 4*   < > 3 5   < > 3 4*   CHLORIDE mmol/L 106 101 102   < > 102   < > 102   CO2 mmol/L 25 27 28   < > 29   < > 28   BUN mg/dL 15 11 6   < > 5   < > 4*   CREATININE mg/dL 0 66 0 62 0 60   < > 0 51*   < > 0 52*   CALCIUM mg/dL 9 5 9 6 9 7   < > 8 6   < > 8 4   ALK PHOS U/L 104  --   --   --  96  --  87   ALT U/L 24  --   --   --  62  --  64   AST U/L 19  --   --   --  20  --  23    < > = values in this interval not displayed  Results from last 7 days   Lab Units 05/05/23 0546 05/04/23 0452 05/03/23  0433   MAGNESIUM mg/dL 2 5 2 2 2 1     Results from last 7 days   Lab Units 05/05/23 0546 05/04/23 0452 05/03/23  0433   PHOSPHORUS mg/dL 3 4 5 0* 1 8*     Results from last 7 days   Lab Units 05/05/23  0749 05/05/23  0546 05/04/23  2139 05/04/23  1502 05/04/23  1129 05/02/23  0424 05/01/23  0531   INR   --   --   --   --  1 22* 1 18 1 09   PTT seconds 191* 164* 58*   < > 25 25 28    < > = values in this interval not displayed       No results found for: TROPONINT  ABG:  Lab Results   Component Value Date    PHART 7 441 05/04/2023 WTR3EOW 39 5 05/04/2023    PO2ART 93 8 05/04/2023    ALE0NZJ 26 3 05/04/2023    BEART 2 1 05/04/2023    SOURCE Radial, Left 05/04/2023       Imaging Studies: I have personally reviewed pertinent reports  and I have personally reviewed pertinent films in PACS     XR chest portable    Result Date: 5/4/2023  Narrative: CHEST INDICATION:   keofeed tube placement  COMPARISON: 5/3/2023  EXAM PERFORMED/VIEWS:  XR CHEST PORTABLE FINDINGS: Feeding tube tip overlies the mid esophagus  Left PICC line unchanged in position  Ventriculoperitoneal shunt catheter noted  Cardiomediastinal silhouette appears unremarkable  The lungs are clear  No pneumothorax or pleural effusion  Osseous structures appear within normal limits for patient age  Impression: Feeding tube tip overlying the midesophagus, this has been since repositioned  Workstation performed: [id number]     XR chest portable    Result Date: 4/30/2023  Narrative: CHEST INDICATION:   PICC placement  COMPARISON: Chest x-ray April 3, 2023 EXAM PERFORMED/VIEWS:  XR CHEST PORTABLE FINDINGS: Left arm PICC line tip is in the superior vena cava  Cardiomediastinal silhouette appears unremarkable  The lungs are clear  No pneumothorax or pleural effusion  Osseous structures appear within normal limits for patient age  Surgical clips in the right breast and axilla  Impression: PICC line tip in the superior vena cava  No acute cardiopulmonary disease  Workstation performed: [id number]     XR abdomen 1 view kub    Result Date: 5/4/2023  Narrative: ABDOMEN INDICATION: Nasogastric tube placement  COMPARISON: 5/3/2023 VIEWS:  AP frontal IMAGES: FINDINGS: Nasogastric tube courses below the diaphragms, tip terminating in the region of the gastric fundus  There is a nonobstructive bowel gas pattern  No discernible free air  Visualized lung bases are clear  Osseous structures stable  Impression: Nasogastric tube position as described   Workstation performed: [id number]     XR abdomen 1 view kub    Result Date: 5/4/2023  Narrative: ABDOMEN INDICATION: Feeding tube placement  COMPARISON: Chest x-ray dated 5/3/2023  VIEWS:  AP frontal IMAGES: 1 FINDINGS: Feeding tube courses below the diaphragms, tip terminating in the region of the stomach  There is a nonobstructive bowel gas pattern  No discernible free air  Visualized lung bases are clear  Osseous structures stable  Impression: Feeding tube position as described  Workstation performed: [id number]     CT head wo contrast    Result Date: 5/4/2023  Narrative: CT BRAIN - WITHOUT CONTRAST INDICATION:   Head trauma, skull fracture or hematoma (Age 24-59y) unwitnessed fall  COMPARISON: May 4, 2023 TECHNIQUE:  CT examination of the brain was performed  Multiplanar 2D reformatted images were created from the source data  Radiation dose length product (DLP) for this visit:  929 15 mGy-cm   This examination, like all CT scans performed in the [facility], was performed utilizing techniques to minimize radiation dose exposure, including the use of iterative  reconstruction and automated exposure control  IMAGE QUALITY:  Diagnostic  FINDINGS: PARENCHYMA: Again noted are post surgical changes from suboccipital craniectomy Again noted is a heterogeneous area in the cerebellum with the interspersed high-attenuation area and low-attenuation areas, measuring 3 8 x 2 9 cm  There is also an mass effect on the fourth ventricle with resultant dilatation  Additional mass in the right frontal region measuring about 1 7 cm, stable Again noted is a right frontal ventriculostomy shunt catheter through the right frontal approach and terminating in the right frontal horn  The right lateral ventricle remains decompressed and collapsed  Mild opening of the right lateral ventricle seen  Dilatation of the left lateral ventricle, frontal horn, fourth ventricle and third ventricle again noted   VENTRICLES AND EXTRA-AXIAL SPACES: Foci of subarachnoid hemorrhage in the right frontal region near the vertex and adjacent to the right frontal ventriculostomy shunt catheter tract, stable  Additional focal area seen in the high right frontal region, image 78 series 402 corresponding to the previously noted lesion in this area (image 35 series 5) VISUALIZED ORBITS: Normal visualized orbits  PARANASAL SINUSES: Normal visualized paranasal sinuses  CALVARIUM AND EXTRACRANIAL SOFT TISSUES: Postsurgical changes from left occipital craniotomy     Impression: No new extra-axial hemorrhage or increasing mass effect  The previously noted subarachnoid hemorrhage in the right frontal lesion, stable Mild increasing opening of the right lateral ventricle/frontal horn Dilated left lateral ventricle, third ventricle, fourth ventricle stable Heterogeneous area in the posterior fossa with mass effect on the fourth ventricle as seen on the previous study along with the right frontal region mass is an occult additional masses Workstation performed: [id number]     CT head wo contrast    Result Date: 5/2/2023  Narrative: CT BRAIN - WITHOUT CONTRAST INDICATION:   Intracranial shunt placement, follow up s/p  shunt  COMPARISON: Head CT 4/30/2023 TECHNIQUE:  CT examination of the brain was performed  Multiplanar 2D reformatted images were created from the source data  Radiation dose length product (DLP) for this visit:  942 mGy-cm   This examination, like all CT scans performed in the [facility], was performed utilizing techniques to minimize radiation dose exposure, including the use of iterative reconstruction and automated exposure control  IMAGE QUALITY:  Diagnostic  FINDINGS: PARENCHYMA AND EXTRA-AXIAL SPACES: Grossly stable postsurgical change in the posterior fossa from prior tumor resection with resection cavity and mild surrounding edema  There is no significant change in mass effect upon the fourth ventricle   No significant change in the small subarachnoid hemorrhage in the right frontal lobe anterior to the insertion site of right frontal approach ventricular catheter  Stable 1 7 cm right frontal lobe cortical lesion  Additional smaller lesions are not well visualized due to modality limitation, delineated in prior MRI  No supratentorial mass effect or midline shift  No CT evidence of acute infarct  No new hemorrhage  VENTRICLES AND EXTRA-AXIAL SPACES: Interval removal of previous right frontal approach ventricular catheter and similar approach  shunt catheter placement  The tip terminates within the right lateral ventricle  There is postsurgical small pneumocephalus within the right anterior horn and small layering hemorrhage within bilateral posterior horns  Grossly stable ventricular size without hydrocephalus  VISUALIZED ORBITS: Normal visualized orbits  PARANASAL SINUSES: Normal visualized paranasal sinuses  CALVARIUM AND EXTRACRANIAL SOFT TISSUES: Previous suboccipital craniotomy  Partially redemonstrated pseudomeningocele extending to the right posterior superior neck  Impression: 1  Interval removal of previous right frontal approach ventricular catheter and similar approach  shunt catheter placement  Small postsurgical layering hemorrhage within posterior horns of lateral ventricles and small pneumocephalus within anterior horn of right lateral ventricle  Stable ventricular size without hydrocephalus  2   No significant change in small subarachnoid hemorrhage in the anterior right frontal lobe  3   Grossly stable postsurgical change in the posterior fossa from prior tumor resection with unchanged mass effect upon the fourth ventricle  Again partially imaged pseudomeningocele extending to the right posterior superior neck  4   Stable right frontal lobe cortical lesion  Additional smaller lesions are not well visualized due to CT modality limitation  Lesions are demonstrated in prior MRI   Workstation performed: [id number]     CT head wo contrast    Result Date: 5/1/2023  Narrative: CT BRAIN - WITHOUT CONTRAST INDICATION:   ICP elevation suspected elevated ICP  Intracranial metastasis with prior resection of a posterior fossa lesion  COMPARISON: Multiple prior examinations, most recently 4/30/2023 and 4/29/2023  TECHNIQUE:  CT examination of the brain was performed  Multiplanar 2D reformatted images were created from the source data  Radiation dose length product (DLP) for this visit:  897 95 mGy-cm   This examination, like all CT scans performed in the [facility], was performed utilizing techniques to minimize radiation dose exposure, including the use of iterative  reconstruction and automated exposure control  IMAGE QUALITY:  Diagnostic  FINDINGS: PARENCHYMA: The patient has undergone a suboccipital craniotomy for resection of a previous midline metastasis once again identified is a resection cavity within the inferior aspect of the posterior fossa at and just to the right of midline  There is increased density seen along the anterior and left posterior lateral aspect of the resection cavity which is unchanged from the prior examination  Mild surrounding edema resulting in mild mass effect upon the fourth ventricle which is not significantly compressed  Supratentorial brain parenchyma demonstrates a hyperdense mass within the right lateral frontal lobe on series 2 image 31 which is grossly unchanged  No new masses are identified  No signs of acute ischemia  VENTRICLES AND EXTRA-AXIAL SPACES: A shunt catheter via a right frontal approach is unchanged in position, extending into the anterior body of the right lateral ventricle  Stable size of the lateral ventricles, third ventricle and fourth ventricle when compared to the 2 most recent examinations  There is a small amount of subarachnoid hemorrhage within the anterior superior right frontal lobe adjacent to the shunt catheter, stable   VISUALIZED ORBITS: Normal visualized orbits  PARANASAL SINUSES: Normal visualized paranasal sinuses  CALVARIUM AND EXTRACRANIAL SOFT TISSUES: As described above there has been a previous suboccipital craniotomy in the midline  This is unchanged  There is a small amount of fluid anterior and posterior to the inferior aspect of this craniotomy grossly unchanged in size and configuration from the prior examination  Impression: Stable appearance of the posterior fossa in this patient with a prior mass resection  Persistent mild edema and slight mass effect upon the fourth ventricle without obstructive hydrocephalus  Shunt catheter unchanged in position and ventricular size is unchanged  Stable small amount of subarachnoid hemorrhage within the anterior superior right frontal lobe  Stable well-circumscribed hyperdense metastatic lesion within the right frontal lobe  Stable small pseudomeningocele anterior and posterior to the occipital craniotomy flap  Workstation performed: [id number]     CT head wo contrast    Result Date: 4/30/2023  Narrative: CT BRAIN - WITHOUT CONTRAST INDICATION:   Craniotomy, post-op s/p suboccipital craniotomy  Stage IV breast cancer with brain and liver metastasis  Status post suboccipital craniotomy for resection of cerebellar masses  Status post ventriculostomy placement  COMPARISON: CT of the head from 4/29/2023 TECHNIQUE:  CT examination of the brain was performed  Multiplanar 2D reformatted images were created from the source data  Radiation dose length product (DLP) for this visit:  2009 84 mGy-cm   This examination, like all CT scans performed in the [facility], was performed utilizing techniques to minimize radiation dose exposure, including the use of iterative reconstruction and automated exposure control  IMAGE QUALITY:  Diagnostic  FINDINGS: PARENCHYMA: The patient is status post right frontal approach ventriculostomy catheter placement, which is stable in position in the right lateral ventricle  Ventricular size is unchanged  There is a small amount of right frontal subarachnoid hemorrhage stable  There are no new areas of acute intracranial hemorrhage identified  The patient is status post suboccipital craniotomy and dural reconstruction with redemonstration of a pseudomeningocele and overall similar to prior MRI measuring approximately 2 2 x 5 3 cm on sagittal imaging  Current imaging extends to the level of C4-C5  There are stable postoperative changes with a small amount of hemorrhage in the operative cerebellar bed  Degree of edema is unchanged  Correlate with prior MRI for findings related to intracranial metastatic disease  A hyperdense rounded lesion is noted within the right frontal lobe  VENTRICLES AND EXTRA-AXIAL SPACES: Please see above  VISUALIZED ORBITS: Normal visualized orbits  PARANASAL SINUSES: Normal visualized paranasal sinuses  CALVARIUM AND EXTRACRANIAL SOFT TISSUES: Please see above  Impression: No significant interval change since prior exam  Status post right frontal approach ventriculostomy catheter placement with unchanged ventricular size  Small amount of hemorrhage within the right frontal region is stable  Status post suboccipital craniotomy with a grossly stable pseudomeningocele  Workstation performed: [id number]     CT head wo contrast    Result Date: 4/29/2023  Narrative: CT BRAIN - WITHOUT CONTRAST INDICATION:   Small sah/sdh  Mets  Supratherapeutic ptt  COMPARISON: 4/29/2023  TECHNIQUE:  CT examination of the brain was performed  Multiplanar 2D reformatted images were created from the source data  Radiation dose length product (DLP) for this visit:  973 mGy-cm   This examination, like all CT scans performed in the [facility], was performed utilizing techniques to minimize radiation dose exposure, including the use of iterative reconstruction and automated exposure control  IMAGE QUALITY:  Diagnostic   FINDINGS: PARENCHYMA: Right frontal approach ventriculostomy shunt catheter with tip terminating in the body of the right lateral ventricle is again seen  There is a small amount of parenchymal hemorrhage and subarachnoid hemorrhage in the right frontal lobe along  the track of the catheter without interval change  No new areas of intracranial hemorrhage or extra-axial fluid collection identified  Several slightly increased density lesions are again seen consistent with metastatic disease which appear stable compared to the prior exam of the same day  Please see previous report  No significant mass effect or midline shift  Patient is status post occipital craniotomy and resection with postoperative pseudomeningocele again seen  Overall findings are unchanged compared to the prior exam  VENTRICLES AND EXTRA-AXIAL SPACES: Slight prominence of the ventricular system with normal appearing sulci and cisterns  Findings appear stable compared to the prior exam  VISUALIZED ORBITS: Normal visualized orbits  PARANASAL SINUSES: Normal visualized paranasal sinuses  Mastoid air cells are also well aerated and clear  CALVARIUM AND EXTRACRANIAL SOFT TISSUES:  Midline occipital craniotomy defect noted with pseudomeningocele  The inferior margin of the craniotomy is displaced posteriorly and inferiorly  Impression: No interval change since the prior exam of the same day with findings detailed above  Workstation performed: [id number]     CT head wo contrast    Result Date: 4/29/2023  Narrative: CT BRAIN - WITHOUT CONTRAST INDICATION:   Cerebral hemorrhage suspected F/u hemorrhage  COMPARISON: Prior CT April 28, 2023 TECHNIQUE:  CT examination of the brain was performed  Multiplanar 2D reformatted images were created from the source data  Radiation dose length product (DLP) for this visit:  895 mGy-cm     This examination, like all CT scans performed in the [facility], was performed utilizing techniques to minimize radiation dose exposure, including the use of iterative reconstruction and automated exposure control  IMAGE QUALITY:  Diagnostic  FINDINGS: PARENCHYMA: Right frontal shunt catheter terminates in the frontal horn of the right lateral ventricle unchanged from prior study  There is small amount of blood products in the right frontal cortex and a single sulcus adjacent to the shunt catheter entry site unchanged from prior  Mild ventriculomegaly noted unchanged from the prior study  Patient is status post midline occipital craniotomy and resection of cerebellar metastatic lesion  Typical postoperative findings are seen in the post operative bed with low density representing residual edema and some blood products identified in the midline of the cerebellum  No significant mass effect identified on the fourth ventricle  Supratentorial masses are again noted including left superior frontal gyrus series 2 image 33 measuring approximately 8 mm, right frontal lobe series 2 image 31 measuring approximately 16 mm, and barely visible right posterior frontal lobe in the superior frontal gyrus series 2 image 35 difficult to measure but approximately 8 mm in size with  No edema identified surrounding the supratentorial masses  VENTRICLES AND EXTRA-AXIAL SPACES: Stable mild ventriculomegaly VISUALIZED ORBITS: Normal visualized orbits  PARANASAL SINUSES: Normal visualized paranasal sinuses  CALVARIUM AND EXTRACRANIAL SOFT TISSUES: Midline occipital craniotomy defect noted with pseudomeningocele  The inferior margin of the craniotomy is displaced posteriorly and inferiorly  Impression: Stable CT of the brain status post occipital craniotomy and resection of metastatic disease  Postoperative pseudomeningocele noted similar to the prior study  Supratentorial metastatic lesions are again noted essentially stable in size  Small amount of postprocedural hemorrhage identified in the right frontal lobe and adjacent frontal sulcus from shunt catheter placement   Workstation performed: [id number]     CT head wo contrast    Result Date: 4/28/2023  Narrative: CT BRAIN - WITHOUT CONTRAST INDICATION:   Hydrocephalus s/p EVD placement  COMPARISON: CT head without contrast 4/27/2023, 4/8/2023  MRI brain with and without contrast 4/25/2023, 4/4/2023  TECHNIQUE:  CT examination of the brain was performed  Multiplanar 2D reformatted images were created from the source data  Radiation dose length product (DLP) for this visit:  901 82 mGy-cm   This examination, like all CT scans performed in the [facility], was performed utilizing techniques to minimize radiation dose exposure, including the use of iterative  reconstruction and automated exposure control  IMAGE QUALITY:  Diagnostic  FINDINGS: PARENCHYMA, VENTRICLES AND EXTRA-AXIAL SPACES:  : New postsurgical changes of right frontal approach ventricular catheter with tip in frontal horn of right lateral ventricle with stable ventricular size  New acute trace subarachnoid hemorrhage right frontal region, likely from recent surgery  Prior postsurgical changes of suboccipital craniotomy for resection of cerebellar metastatic lesion  Stable small hyperdense metastatic lesions in bilateral frontal lobes (right larger than left) and 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 which was better evaluated on recent MRI brain 4/25/2023  No mass effect or midline shift  No CT signs of acute infarction  No acute parenchymal hemorrhage  VISUALIZED ORBITS: Normal visualized orbits  PARANASAL SINUSES: Normal visualized paranasal sinuses  CALVARIUM AND EXTRACRANIAL SOFT TISSUES: Suboccipital craniotomy  Partially imaged small suboccipital pseudomeningocele  Impression: New postsurgical changes of right frontal approach ventricular catheter with tip in frontal horn of right lateral ventricle with stable ventricular size   New acute trace subarachnoid hemorrhage right frontal region, likely from recent surgery  Prior postsurgical changes of suboccipital craniotomy for resection of cerebellar metastatic lesion  Stable small hyperdense metastatic lesions in bilateral frontal lobes (right larger than left) and 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 which was better evaluated on recent MRI brain 4/25/2023  The study was marked in [facility] for immediate notification  Workstation performed: [id number]     CT head without contrast    Result Date: 4/28/2023  Narrative: CT BRAIN - WITHOUT CONTRAST INDICATION:   Vomiting, metastatic cancer  COMPARISON: Multiple prior recent studies, most recent MRI brain examination dated 4/25/2023 and CT head examination dated 4/8/2023  TECHNIQUE:  CT examination of the brain was performed  Multiplanar 2D reformatted images were created from the source data  Radiation dose length product (DLP) for this visit:  882 mGy-cm   This examination, like all CT scans performed in the [facility], was performed utilizing techniques to minimize radiation dose exposure, including the use of iterative reconstruction and automated exposure control  IMAGE QUALITY:  Diagnostic  FINDINGS: PARENCHYMA/EXTRA-AXIAL COMPARTMENT: Redemonstrated postsurgical changes of prior suboccipital craniectomy with grossly stable appearance of the surgical resection bed compared to the recent MRI accounting for differences in modality  Known brain parenchymal metastases better visualized on the prior recent MRI again with exception of similar hyperattenuating bifrontal metastases  No CT evidence for for territorial infarct  No parenchymal hemorrhage or new mass effect  VENTRICLES: Stable in size and configuration  VISUALIZED ORBITS: Normal visualized orbits  PARANASAL SINUSES: Normal visualized paranasal sinuses   CALVARIUM AND EXTRACRANIAL SOFT TISSUES: Redemonstrated suboccipital craniectomy changes and associated probable pseudomeningocele as seen on the prior MRI      Impression: No CT evidence for acute territorial infarct  No new mass effect or midline shift  Known intracranial metastatic lesions better visualized on the prior recent MRI brain examination  Postsurgical changes of prior suboccipital craniectomy with grossly stable appearance of the surgical resection bed and 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 compared to the recent MRI accounting for differences in modality  Workstation performed: [id number]     CT head wo contrast    Result Date: 4/8/2023  Narrative: CT BRAIN - WITHOUT CONTRAST INDICATION:   Traumatic brain injury (TBI), new or progressive neuro deficits brain mass  COMPARISON:  CT head 4/5/2023  MRI brain 4/4/2023  TECHNIQUE:  CT examination of the brain was performed  Multiplanar 2D reformatted images were created from the source data  Radiation dose length product (DLP) for this visit:  894 mGy-cm   This examination, like all CT scans performed in the [facility], was performed utilizing techniques to minimize radiation dose exposure, including the use of iterative reconstruction and automated exposure control  IMAGE QUALITY:  Diagnostic  FINDINGS: PARENCHYMA:  Reidentified postsurgical changes of suboccipital craniotomy  Known cerebral metastases better visualized on the comparison MRI; for example unchanged 14 mm right frontal lesion #2/29  The other lesions are not well visualized on this study  No new mass or mass effect  No hydrocephalus  No right internal auditory canal lesion is not visualized on this study  High density areas through the posterior fossa surgical bed are unchanged  Unchanged surgical bed postoperative findings  VENTRICLES AND EXTRA-AXIAL SPACES:  Unchanged compressive mass effect on the 4th ventricle  No hydrocephalus  VISUALIZED ORBITS: Normal visualized orbits  PARANASAL SINUSES: Normal visualized paranasal sinuses  CALVARIUM AND EXTRACRANIAL SOFT TISSUES:  Suboccipital craniotomy  Impression: No significant change when compared with the recent CT abdomen 4/5/2023 MRI brain 4/4/2023  Workstation performed: [id number]     CT head w wo contrast    Result Date: 5/5/2023  Narrative: CT BRAIN - WITH AND WITHOUT CONTRAST INDICATION:   Brain metastases, monitor obtain once therapeutic on heparin gtt  COMPARISON: CT head without contrast 5/4/2023, 5/2/2023, 4/30/2023, 4/29/2023, 4/28/2023, 4/27/2023, 4/8/2023  MRI brain with and without contrast 4/25/2023  TECHNIQUE:  CT examination of the brain was performed both prior to and after the administration of intravenous contrast   Multiplanar 2D reformatted images were created from the source data  Radiation dose length product (DLP) for this visit:  2766 mGy-cm   This examination, like all CT scans performed in the [facility], was performed utilizing techniques to minimize radiation dose exposure, including the use of iterative reconstruction and automated exposure control  IV Contrast:  85 mL of iohexol (OMNIPAQUE) <See Chart>  of iohexol (OMNIPAQUE) IMAGE QUALITY:  Diagnostic  FINDINGS: PARENCHYMA, VENTRICLES AND EXTRA-AXIAL SPACES:  : Postsurgical changes of suboccipital craniotomy  2 6 x 1 8 cm enhancing masslike abnormality in the cerebellar vermian region suspicious for recurrent metastatic disease (8:12)  Additional sites of nodular enhancement extend into the left paramedian cerebellar region, suspicious for recurrent metastatic disease  Encephalomalacia and gliosis in bilateral paramedian cerebellum  Similar multiple hyperdense enhancing metastatic parenchymal lesions in bilateral cerebral hemispheres, compatible with intracranial metastasis  3 reference lesions (measured in long axis): - 1 2 cm right frontal vertex lesion (3:36), unchanged  - 0 6 cm left frontal lesion (8:34), unchanged  - 1 7 cm right frontal lesion (8:31), unchanged   Diffuse leptomeningeal enhancement in bilateral cerebellar folia and visualized upper cervical spinal region, compatible with leptomeningeal spread of disease  Cranial nerve enhancement is underestimated by CT but is seen in bilateral cranial nerve V  Known leptomeningeal enhancement in left cranial nerve 7/8 complex not well seen  Known right internal auditory canal mass extending into right cerebellopontine angle was better evaluated on MRI brain 4/25/2023  Similar trace subacute subarachnoid hemorrhage in right frontal region (vertex and anteriorly)  Right frontal approach ventricular catheter tip in frontal horn with worsened slitlike right lateral ventricle  Stable mild dilation of left lateral ventricle and fourth ventricle  Similar trace intraventricular hemorrhage in occipital horn of left lateral ventricle  Rightward deviation of septum pellucidum  No midline shift  No CT signs of acute infarction  No acute parenchymal hemorrhage  VISUALIZED ORBITS: Normal visualized orbits  PARANASAL SINUSES: Normal visualized paranasal sinuses  CALVARIUM: Suboccipital craniotomy  Small suboccipital pseudomeningocele  Impression: Postsurgical changes of suboccipital craniotomy  2 6 cm enhancing masslike abnormality in cerebellar vermian region suspicious for recurrent metastatic disease  Additional sites of of nodular enhancement extend into the left paramedian cerebellar region,  suspicious for recurrent metastatic disease  Similar multiple hyperdense enhancing metastatic brain metastasis in bilateral cerebral hemispheres  Diffuse leptomeningeal spread of disease involving cerebellar folia and visualized upper cervical spinal region  Additional sites of leptomeningeal spread of disease involve the cranial nerves which is underestimated by CT but is seen in bilateral cranial nerve V  Known leptomeningeal enhancement in left cranial nerve 7/8 complex is not well seen   Known right internal auditory canal mass extending into right cerebellopontine angle was better evaluated on MRI brain 4/25/2023, likely vestibular schwannoma  Similar trace subacute subarachnoid hemorrhage in right frontal region (vertex and anteriorly)  Similar trace intraventricular hemorrhage in occipital horn of left lateral ventricle  Worsened slitlike right lateral ventricle status post right frontal approach ventricular catheter with stable mild dilation of left lateral ventricle and fourth ventricle  The study was marked in [facility] for immediate notification  Workstation performed: [id number]     CT head w wo contrast    Result Date: 5/4/2023  Narrative: CT BRAIN - WITH AND WITHOUT CONTRAST INDICATION:   per rad onc request for radiaiton eval  COMPARISON: CT head 5/2/2023  MRI brain 4/25/2023 TECHNIQUE:  CT examination of the brain was performed both prior to and after the administration of intravenous contrast   Multiplanar 2D reformatted images were created from the source data  Radiation dose length product (DLP) for this visit:  1752 72 mGy-cm   This examination, like all CT scans performed in the [facility], was performed utilizing techniques to minimize radiation dose exposure, including the use of iterative reconstruction and automated exposure control  IV Contrast:  85 mL of iohexol (OMNIPAQUE) IMAGE QUALITY:  Diagnostic  FINDINGS: PARENCHYMA: Stable postsurgical changes at the midline posterior fossa  There is enhancement along the surgical margins similar to the recent MRI, most notable along the anterior left margins  Stable enhancing lesions within the superior frontal lobes  The previously noted right cerebellopontine angle/IAC lesion is better delineated on the recent prior MRI examination CT signs of acute infarction  Stable right frontal subarachnoid hemorrhage  No acute parenchymal hemorrhage   VENTRICLES AND EXTRA-AXIAL SPACES:  Stable right transfrontal ventriculostomy catheter terminating within the right frontal horn new interval collapse of the right lateral ventricle and mild to moderate dilatation of the left lateral ventricle  The third  and fourth ventricles remain unchanged  VISUALIZED ORBITS: Normal visualized orbits  PARANASAL SINUSES: Normal visualized paranasal sinuses  CALVARIUM: Occipital craniotomy with improved pseudomeningocele  Impression: 1  Stable midline posterior fossa postsurgical changes with marginal enhancement similar to the recent prior MRI examination  Stable enhancing lesions are noted within the superior frontal lobes  2  Stable right transfrontal ventriculostomy catheter with new interval collapse of the right lateral ventricle and mild to moderate dilatation of the left lateral ventricle  3  Stable right frontal subarachnoid hemorrhage  There is no new intra or extra-axial hemorrhage  Workstation performed: [id number]     MRI brain w wo contrast    Result Date: 4/25/2023  Narrative: MRI BRAIN WITH AND WITHOUT CONTRAST INDICATION: C50 919: Malignant neoplasm of unspecified site of unspecified female breast C79 31: Secondary malignant neoplasm of brain  COMPARISON: 3/30/2023 and 4/4/2023 L5 somewhat TECHNIQUE: Multiplanar, multisequence imaging of the brain was performed before and after gadolinium administration  IV Contrast:  10 mL of Gadobutrol injection (SINGLE-DOSE)  IMAGE QUALITY:   Diagnostic  FINDINGS: BRAIN PARENCHYMA: Postsurgical change from suboccipital craniectomy and resection of bilateral cerebellar hemisphere masses  Blood products and fluid in the resection cavity  Minimal restricted diffusion along the margins of the resection cavity could represent mild infarct and/or blood products  There is increased enhancement along the margins of the resection cavity with mild nodularity and in the adjacent cerebellar folia, particularly in the left cerebellar hemisphere  Two right frontal lesions (11:128 and 11:135), previously visualized, demonstrate stable restricted diffusion and T2/FLAIR hyperintensity, with homogeneous enhancement   The larger more lateral lesion measures 1 6 cm in maximal diameter compared to 1 4 cm  on the previous exam  The more medially located lesion measures 0 9 cm compared to 1 cm on the prior exam  A third lesion (11:137) is present in the left superior frontal gyrus with similar enhancement and signal characteristics measuring  0 6 cm, stable  Stable enhancing lesion in the right internal auditory canal (11:57) and cerebellopontine angle measuring 1 5 x 0 7 x 0 5 cm  Subtle enhancement of the cisternal and canalicular portions of the left 7th and 8th cranial nerves (11:54)  Stable underlying T2/FLAIR hyperintense foci suggesting early microangiopathic change  Decreased mass effect in the posterior fossa  VENTRICLES: Interval resolution of effacement of the fourth ventricle  No hydrocephalus  Prominent perivascular space again noted inferior to the right lentiform nuclei  No mismatch or CT because of the diarrhea SELLA AND PITUITARY GLAND:  Normal  ORBITS: No retro-orbital lesion  PARANASAL SINUSES: Fluid levels in the sphenoid and maxillary sinuses  Mild mucosal thickening in the ethmoid air cells  VASCULATURE:  Evaluation of the major intracranial vasculature demonstrates appropriate flow voids  CALVARIUM AND SKULL BASE: Postsurgical change from suboccipital craniectomy EXTRACRANIAL SOFT TISSUES: Suboccipital pseudomeningocele, new since the prior exam, measuring 2 4 x 5 7 x 3 2 cm  Impression: 1  Supratentorial lesions are stable to mildly increased in size  2  Increasing enhancement along the margins of the posterior fossa resection cavity with prominent left cerebellar hemisphere foliar enhancement  , concerning for tumor progression  3  New subtle enhancement along the left cisternal and canalicular segments of the 7th and 8th cranial nerves without evidence of nodularity or discrete lesion  Close follow-up recommended  4  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   Workstation performed: [id number]     FL barium swallow video w speech    Result Date: 5/3/2023  Narrative: A video barium swallow study was performed by the Department of Speech Pathology  Please refer to the report for the official interpretation  The images are stored for archival purposes only  Study images were not formally reviewed by the Radiology Department  CT chest abdomen pelvis w contrast    Result Date: 4/28/2023  Narrative: CT CHEST, ABDOMEN AND PELVIS WITH IV CONTRAST INDICATION:   patient with stage IV metastatic breast cancer, concern for progression of disease, recently had brain mets s/p resection on 4/3  COMPARISON: CT chest abdomen pelvis with contrast 3/30/2023, 2/17/2023, 1/29/2022  TECHNIQUE: CT examination of the chest, abdomen and pelvis was performed  Multiplanar 2D reformatted images were created from the source data  Radiation dose length product (DLP) for this visit:  1415 02 mGy-cm   This examination, like all CT scans performed in the [facility], was performed utilizing techniques to minimize radiation dose exposure, including the use of iterative reconstruction and automated exposure control  IV Contrast:  90 mL of iohexol (OMNIPAQUE) Enteric Contrast: Enteric contrast was administered  FINDINGS: CHEST LUNGS:  There is no tracheal or endobronchial lesion  Mild emphysema  No focal consolidation  No suspicious pulmonary nodule  PLEURA:  Unremarkable  HEART/GREAT VESSELS: Normal heart size  No paracardial effusion  No CT evidence of right heart strain  No thoracic aortic aneurysm  Mild scattered calcified atherosclerotic disease  New saddle pulmonary embolism extending into bilateral pulmonary arterial segmental branches  MEDIASTINUM AND HAIM:  Unremarkable  CHEST WALL AND LOWER NECK: Postsurgical changes of right mastectomy and right axillary lidia dissection  No right chest wall mass  No axillary lymphadenopathy   ABDOMEN LIVER/BILIARY TREE: Stable several subcentimeter lesions in bilateral hepatic lobes (2:91, 103, 114, 117), likely treatment-related change  No new or enlarging metastatic liver lesions  No biliary duct dilation  GALLBLADDER:  No calcified gallstones  No pericholecystic inflammatory change  SPLEEN:  Unremarkable  PANCREAS:  Unremarkable  ADRENAL GLANDS:  Unremarkable  KIDNEYS/URETERS:  Unremarkable  No hydronephrosis  STOMACH AND BOWEL:  Unremarkable  APPENDIX:  No findings to suggest appendicitis  ABDOMINOPELVIC CAVITY:  No ascites  No pneumoperitoneum  No lymphadenopathy  VESSELS: Minimal calcified atherosclerotic disease of abdominal aorta and iliac vasculature  No abdominal aortic aneurysm  IVC is normal in caliber  Hepatic veins, portal vein, SMV, and splenic vein are patent  PELVIS REPRODUCTIVE ORGANS: Calcified fibroid uterus  No suspicious adnexal mass  URINARY BLADDER:  Unremarkable  ABDOMINAL WALL/INGUINAL REGIONS: Small fat-containing umbilical hernia  OSSEOUS STRUCTURES:  No acute fracture or destructive osseous lesion  Unchanged small sclerotic lesion right iliac bone, likely bone island  Spinal degenerative changes  Impression: New saddle pulmonary embolism extending into bilateral pulmonary arterial segmental branches  No CT evidence of right heart strain  Stable treated metastatic subcentimeter hepatic lesions  No new or enlarging sites of metastatic disease in chest, abdomen, or pelvis  Additional chronic/incidental findings as detailed above  I personally discussed this study with Buster Brochure on 4/28/2023 4:05 PM  Workstation performed: [id number]     IR IVC filter placement permanent    Result Date: 4/30/2023  Narrative: INDICATION: Stage IV breast cancer with metastasis to the brain and liver  History of PE and DVT  Request for IVC filter placement  PROCEDURE: 1  IVC venogram 2  Infrarenal temporary IVC filter placement FINDINGS: 1  Patent IVC with no evidence of clot  IVC is not duplicated based on review of CT   2   Successful placement of an infrarenal permanent [employer] IVC filter  Impression: Infrarenal IVC filter placement  _______________________________________________________________ COMPARISON: CT abdomen pelvis 4/28/2023 PROCEDURE DETAILS: Operators: Dr Chris Adrian Anesthesia:  Local anesthesia  Medications: 1% lidocaine Contrast: 40 mL of Omnipaque 300 Fluoroscopy time: One-point minutes Images: 32 COMMENTS: A preprocedure timeout was performed per [facility] protocol  The right neck was prepped and draped in the usual sterile fashion  Under ultrasound guidance, the patent and compressible right internal jugular vein was punctured using a 21-gauge micropuncture needle  A static ultrasound image was saved to PACS  A microwire was advanced through the needle into the right atrium  The needle was exchanged for a microsheath allowing exchange of the microwire for a heavy wire which was advanced into the IVC  The IVC filter 9 [place] sheath was advanced over the wire into the IVC  The wire was removed and a small hand contrast injection confirmed placement  A IVC venogram was performed  The wire was removed, the filter was advanced using the pusher through the sheath into appropriate position  The sheath was then withdrawn until the filter was deployed  IVC venogram was repeated  Sheath was removed and manual pressure was performed of the right neck to ensure hemostasis  Workstation performed: [id number]     XR shunt series    Result Date: 5/3/2023  Narrative: SHUNT SERIES INDICATION:  s/p  shunt insertion  COMPARISON:  None VIEWS:  XR SHUNT SERIES FINDINGS: Right side  shunt catheter is identified  The tubing appears contiguous through its visualized course in the neck, chest and abdomen  The caudal tip terminates in the right lower pelvis  The lungs are clear  Nonobstructive bowel gas pattern is noted with copious amount of stool within the colon  An IVC filter is noted in place  Impression: Intact  shunt catheter   Workstation performed: [id number]     XR chest portable ICU    Result Date: 5/4/2023  Narrative: CHEST INDICATION:   hypoxia  COMPARISON: 5/3/2023  EXAM PERFORMED/VIEWS:  XR CHEST PORTABLE ICU FINDINGS: Left PICC line is unchanged in position  Ventriculoperitoneal shunt catheter again seen  Cardiomediastinal silhouette appears unremarkable  The lungs are clear  No pneumothorax or pleural effusion  Osseous structures appear within normal limits for patient age  Impression: No acute cardiopulmonary disease  Workstation performed: [id number]     VAS lower limb venous duplex study, complete bilateral    Result Date: 4/29/2023  Narrative:  [facility] REPORT CLINICAL: Indications: Patient presents with recent discovery of pulmonary embolism and physician wants to determine potential source  Patient is currently asymptomatic  Operative History: Patient denies previous cardiovascular surgery Risk Factors The patient has history of Hyperlipidemia and previous smoking (quit 1-5yrs ago)  FINDINGS:  Left           Impression              FV Prox        Occlusive Subsegmental  FV Mid         Occlusive Subsegmental  FV Dist        Occlusive Subsegmental  Popliteal      Occlusive Subsegmental  PostTibial     Occlusive Subsegmental  Peroneal       Occlusive Subsegmental  Gastrocnemius  Occlusive Subsegmental     CONCLUSION:  Impression: RIGHT LOWER LIMB: No evidence of acute or chronic deep vein thrombosis  No evidence of superficial thrombophlebitis noted  No evidence of valvular incompetence noted in the deep veins  Popliteal, posterior tibial and anterior tibial arterial Doppler waveform's are triphasic/biphasic  LEFT LOWER LIMB: Occlusive acute deep vein thrombus noted in the femoral, popliteal, gastrocnemius, posterior tibial and peroneal veins  No evidence of superficial thrombophlebitis noted  No evidence of valvular incompetence noted in the deep veins  Popliteal, posterior tibial and anterior tibial arterial Doppler waveform's are triphasic/biphasic  SIGNATURE: Electronically Signed by: Rina Villafana MD on 2023-04-29 01:44:07 PM    Echo complete w/ contrast if indicated    Result Date: 4/29/2023  Narrative: •  Left Ventricle: Left ventricular cavity size is normal  Wall thickness is normal  The left ventricular ejection fraction is 60%  Systolic function is normal  Wall motion is normal  Diastolic function is normal  •  Right Ventricle: Right ventricular cavity size is normal  Systolic function is normal      Echo follow up/limited w/ contrast if indicated    Result Date: 5/1/2023  Narrative: •  Left Ventricle: The left ventricular ejection fraction is 65%  Systolic function is normal  Wall motion cannot be accurately assessed  EKG, Pathology, and Other Studies: I have personally reviewed pertinent reports        VTE Pharmacologic Prophylaxis: Heparin    VTE Mechanical Prophylaxis: sequential compression device

## 2023-05-05 NOTE — CASE MANAGEMENT
Case Management Discharge Planning Note    Patient name Samantha Pham  Location ICU /ICU  MRN [id number]  : 1970 Date 2023       Current Admission Date: 2023  Current Admission Diagnosis: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 [facility])   Patient Active Problem List    Diagnosis Date Noted   • Pulmonary embolism and DVT 2023   • Status post brain surgery 2023   • 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 [facility])    • Leukocytosis 2023   • Hyperglycemia, drug-induced 2023   • Vomiting 2023   • Brain mass 2023   • Injury of right foot 2023   • Constipation 2023   • GERD (gastroesophageal reflux disease) 2023   • Palliative care patient 2023   • Rosacea 10/03/2022   • Mixed hyperlipidemia 10/03/2022   • Cerebral aneurysm 2022   • Stroke ([facility] [facility] ) 2022   • Schwannoma of cranial nerve ([facility] ) 2021   • Allergic rhinitis 07/15/2021   • Obesity 07/15/2021   • Anxiety about health 2021   • Lymphedema 2020   • Postmenopausal 2020   • Advanced care planning/counseling discussion 01/15/2020   • Rash 01/15/2020   • On antineoplastic chemotherapy 2020   • S/P BSO (bilateral salpingo-oophorectomy) 2019   • Breast cancer metastasized to liver ([facility] [facility] ) 2019   • Multiple lesions on computed tomography of brain 2019   • BRCA2 gene mutation positive 2019   • Carcinoma of right breast metastatic to axillary lymph node ([facility] [address] ) 2019   • Family history of breast cancer in female 2019   • Family history of colon cancer 2019   • Dense breast tissue 2019   • Malignant neoplasm of central portion of right breast in female, estrogen receptor positive ([facility] ) 2019      LOS (days): 7  Geometric Mean LOS (GMLOS) (days): 6 60  Days to GMLOS:-1     OBJECTIVE:  Risk of Unplanned Readmission Score: 31 77         Current admission status: Inpatient   Preferred Pharmacy:   [facility]/pharmacy #[id number] - [place], [address] [facility]  [address]  Phone: [phone] Fax: [phone]    Jd Law[place], [address] [facility] [address]  Phone: [phone] Fax: [phone]    Primary Care Provider: EMILIANO Siddiqi    Primary Insurance: BLUE CROSS  Secondary Insurance:     DISCHARGE DETAILS:      Other Referral/Resources/Interventions Provided:  Interventions: Hospice      Treatment Team Recommendation: Hospice  Discharge Destination Plan[de-identified] Hospice  Transport at Discharge : [facility]  Dispatcher Contacted: Yes  Number/Name of Dispatcher: Shaila Peer by [employer] and Unit #): SLETS  ETA of Transport (Date): 05/05/23  ETA of Transport (Time): 1630     Transfer Mode: Stretcher  Accompanied by: Dipti Harris Jorge Name, [address] : [address]  Receiving Facility/Agency Phone Number:   Facility/Agency Fax Number:          CM spoke to [facility]; who explained the pt's transport was bumped to 1800 via [facility]  Pt's nurse and hospice liaison notified

## 2023-05-05 NOTE — CASE MANAGEMENT
Case Management Discharge Planning Note    Patient name Tramaine Greco  Location ICU /ICU  MRN [id number]  : 1970 Date 2023       Current Admission Date: 2023  Current Admission Diagnosis: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 [facility])   Patient Active Problem List    Diagnosis Date Noted   • Pulmonary embolism and DVT 2023   • Status post brain surgery 2023   • 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 [facility])    • Leukocytosis 2023   • Hyperglycemia, drug-induced 2023   • Vomiting 2023   • Brain mass 2023   • Injury of right foot 2023   • Constipation 2023   • GERD (gastroesophageal reflux disease) 2023   • Palliative care patient 2023   • Rosacea 10/03/2022   • Mixed hyperlipidemia 10/03/2022   • Cerebral aneurysm 2022   • Stroke ([facility] [address] ) 2022   • Schwannoma of cranial nerve ([facility] [facility] ) 2021   • Allergic rhinitis 07/15/2021   • Obesity 07/15/2021   • Anxiety about health 2021   • Lymphedema 2020   • Postmenopausal 2020   • Advanced care planning/counseling discussion 01/15/2020   • Rash 01/15/2020   • On antineoplastic chemotherapy 2020   • S/P BSO (bilateral salpingo-oophorectomy) 2019   • Breast cancer metastasized to liver ([facility] [address] ) 2019   • Multiple lesions on computed tomography of brain 2019   • BRCA2 gene mutation positive 2019   • Carcinoma of right breast metastatic to axillary lymph node ([facility] [address] ) 2019   • Family history of breast cancer in female 2019   • Family history of colon cancer 2019   • Dense breast tissue 2019   • Malignant neoplasm of central portion of right breast in female, estrogen receptor positive ([facility] [address] ) 2019      LOS (days): 7  Geometric Mean LOS (GMLOS) (days): 6 60  Days to GMLOS:-0 9     OBJECTIVE:  Risk of Unplanned Readmission Score: 31 77         Current admission status: Inpatient   Preferred Pharmacy:   [facility]/pharmacy #[id number] - [place], [address] [facility]  [address]  Phone: [phone] Fax: [phone]    Manuel Hamilton, [address] [facility]  [place] [address]  Phone: [phone] Fax: [phone]    Primary Care Provider: EMILIANO Jon    Primary Insurance: BLUE CROSS  Secondary Insurance:     DISCHARGE DETAILS:      Other Referral/Resources/Interventions Provided:  Interventions: Hospice    Treatment Team Recommendation: Hospice  Discharge Destination Plan[de-identified] Hospice  Transport at Discharge : [facility]  Dispatcher Contacted: Yes  Number/Name of Dispatcher: Richard Houston and Unit #):  SLETS  ETA of Transport (Date): 05/05/23  ETA of Transport (Time): 1630     Transfer Mode: Stretcher  Accompanied by: [address] [place] [address] Dorismercedez Patel Name, [address] : [address]  Receiving Facility/Agency Phone Number: Keagan Terry  Facility/Agency Fax Number:       Pt will d/c to [address]

## 2023-05-05 NOTE — CASE MANAGEMENT
Case Management Discharge Planning Note    Patient name Reji Vivas  Location ICU /ICU  MRN [id number]  : 1970 Date 2023       Current Admission Date: 2023  Current Admission Diagnosis: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 [facility])   Patient Active Problem List    Diagnosis Date Noted   • Pulmonary embolism and DVT 2023   • Status post brain surgery 2023   • 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 [facility])    • Leukocytosis 2023   • Hyperglycemia, drug-induced 2023   • Vomiting 2023   • Brain mass 2023   • Injury of right foot 2023   • Constipation 2023   • GERD (gastroesophageal reflux disease) 2023   • Palliative care patient 2023   • Rosacea 10/03/2022   • Mixed hyperlipidemia 10/03/2022   • Cerebral aneurysm 2022   • [facility] ) 2022   • Schwannoma of cranial nerve ([facility] ) 2021   • Allergic rhinitis 07/15/2021   • Obesity 07/15/2021   • Anxiety about health 2021   • Lymphedema 2020   • Postmenopausal 2020   • Advanced care planning/counseling discussion 01/15/2020   • Rash 01/15/2020   • On antineoplastic chemotherapy 2020   • S/P BSO (bilateral salpingo-oophorectomy) 2019   • Breast cancer metastasized to liver ([facility] ) 2019   • Multiple lesions on computed tomography of brain 2019   • BRCA2 gene mutation positive 2019   • Carcinoma of right breast metastatic to axillary lymph node ([facility] [facility] ) 2019   • Family history of breast cancer in female 2019   • Family history of colon cancer 2019   • Dense breast tissue 2019   • Malignant neoplasm of central portion of right breast in female, estrogen receptor positive ([facility] [facility] ) 2019      LOS (days): 7  Geometric Mean LOS (GMLOS) (days): 6 60  Days to GMLOS:-0 9     OBJECTIVE:  Risk of Unplanned Readmission Score: 31 77         Current admission status: Inpatient   Preferred Pharmacy:   [facility]/pharmacy #[id number] - [place], [address] [place] [address]  [address] [facility] [address]  Phone: [phone] Fax: [phone]    [place] Many, [address] [facility] [address]  Phone: [phone] Fax: [phone]    Primary Care Provider: EMILIANO Noyola    Primary Insurance: BLUE CROSS  Secondary Insurance:     DISCHARGE DETAILS:    Pt approved for [address] [facility] submitted for transport today after 1530  CM will follow up with d/c time

## 2023-05-05 NOTE — ASSESSMENT & PLAN NOTE
POD 4 Right frontal ventriculoperitoneal shunt placement using neuronavigation and laparoscopic abdominal assistance  · Patient with PMH of stage IV breast cancer with brain and liver metastases presented to the ED 4/27/2023 c/o headache and progressive nausea and vomiting for several weeks  · Patient is s/p suboccipital craniotomy for resection of right and left cerebellar masses with complex dural reconstruction 4/3/2023 with Dr Drew Cao  · Now with Certas Plus valve set at 6    Imaging  · CT head w/wo, 5/5/23: Similar trace subacute subarachnoid hemorrhage in right frontal region (vertex and anteriorly)  Similar trace intraventricular hemorrhage in occipital horn of left lateral ventricle  Worsened slitlike right lateral ventricle status post right frontal approach ventricular catheter with stable mild dilation of left lateral ventricle and fourth ventricle  Increased evidence of diffuse leptomeningeal disease  Plan  · Continue monitoring neurological status/exam with frequent neurological checks  · Worsening R slit like ventricle s/p shunt adjustment yesterday  Will continue at this setting  · Pain management per primary team  · Medical management per primary team  · Palliative care following, plan for family meeting today  · DVT ppx: SCD, HSQ BID   · MRI brain BT protocol ordered for tomorrow for radiation planning    Neurosurgery will follow from the periphery  Patient due for incision check in 2 weeks   Please call with questions or concerns

## 2023-05-05 NOTE — NUTRITION
05/05/23 0813   Recommendations/Interventions   Summary Remains reliant on tube feeds due to aspiration risk  Current EN regimen will meet 100% of estimated protein-calorie needs once infusing at goal rate of 70 mL/hr  Defer saline flush regimen to provider  EN infusing at 20 mL/hr at time of visit, discussed with RN who plans to ask team if rate can be advanced  If in line with goals of care, EN rate should be advanced to goal     Recommendations to Provider 1  While in line with GOC, Continue Jevity 1 2 and advance rate by 10 mL q 4 hrs to goal rate of 70 mL/hr

## 2023-05-05 NOTE — DISCHARGE SUMMARY
CRITICAL CARE DISCHARGE SUMMARY     Melly Renner   47 y o  female  MRN: [id number]  Room/Bed: ICU 01/ICU 01     [address] [facility] ICU   Encounter: [id number]    Principal Problem:    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 [facility])  Active Problems:    Malignant neoplasm of central portion of right breast in female, estrogen receptor positive [facility])    Palliative care patient    Vomiting    Pulmonary embolism and DVT      * 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 [facility])  Assessment & Plan  48 y/o female with a history of metastatic lung cancer with brain and liver metastasis was recently hospitalized in April '23 after she was found to have bilateral cerebellar masses s/p craniotomy w/ resection of cerebellar masses 4/3  She now presents 4/27/23 with symptoms of delayed hydrocephalus  NSGY proceeded with CSF diversion and EVD placement then conversion to right frontal VPS on 5/1 (after failed EVD clamp trial)  Plan:  · Albert Upton was transitioned to level 4 comfort care on 5/5/2023  · Plan to transfer to inpatient hospice house    Pulmonary embolism and DVT  Assessment & Plan  CT chest 4/28 showed a saddle PE extending into bilateral pulmonary artery segment branches  No evidence of right heart strain  Lower extremity duplex 4/29 showed left lower extremity with occlusive acute DVT noted in the femoral, popliteal, gastrinomas, posterior tibial and peroneal veins       Plan:   • S/P IVC filter  • Comfort care measures    Malignant neoplasm of central portion of right breast in female, estrogen receptor positive ([facility] )  Assessment & Plan  ER/AR+, HER2- grade 3, metastatic breast cancer diagnosed June 2019   + BRCA 2 mutation  Plan:  · Continue comfort care      [address] is a 50-year-old female with a history notable for metastatic breast cancer with known brain and liver metastasis   She had a recent hospitalization in April '23 after she was found to have bilateral cerebellar masses s/p craniotomy with resection on 4/3  She now presents 4/27/23 with symptoms of delayed hydrocephalus- worsening nausea, vomiting, and decreased PO intake x1 week  Neurosurgery was consulted and proceeded with CSF diversion and EVD placement then conversion to right frontal  shunt on 5/1 (after failed EVD clamp trial)  During her hospitalization, CT chest on 4/28 demonstrated a saddle PE and she was subsequently found to have left lower extremity DVT s/p IVC filter 4/30  In the days following  shunt placement, she began to experience worsening dysarthria and difficulty handling her secretions  She had worsening neurologic deficits, inability to protrude tongue and she failed multiple speech evaluations  She eventually had NG tube placed for nutrition  She had worsening encephalopathy which is suspected to be secondary to her metastatic disease and possibly LMD   She also had worsening oxygen requirements, requiring 15 L mid flow is likely secondary to her saddle PE  On 5/5/2023, family meeting was held with neuro critical care, neurosurgery, patient's brother Jose Alejandro Barahona (healthcare agent), friend Manuel Aleman, and son Darren Prince  We reviewed her case in detail, discussed that there are no further neurosurgical interventions that could be offered  After discussion regarding hospice care, Addis Goodwin was transitioned to level 4 comfort care  Hospice referral was placed and patient was accepted to [place] [address] level of care      DISCHARGE INFORMATION     Physical Exam on Day of Discharge:  See physical exam from progress note dated 5/5/2023    Admitting Provider[facility] [place] [facility],   Admission Date: 4/27/2023    Discharge Provider: Natalie Guerin MD   Discharge Date: 5/5/2023    [facility]    Discharge Diagnoses:  Principal Problem:    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 [facility])  Active Problems:    Malignant neoplasm of central portion of right breast in female, estrogen receptor positive ([facility] )    Palliative care patient    Vomiting    Pulmonary embolism and DVT    Consulting Providers:  · Neurosurgery  · Medical oncology  · Palliative care      Diagnostic & Therapeutic Procedures Performed:  XR chest portable    Result Date: 4/30/2023  Impression: PICC line tip in the superior vena cava  No acute cardiopulmonary disease  Workstation performed: [id number]     CT head wo contrast    Result Date: 5/2/2023  Impression: 1  Interval removal of previous right frontal approach ventricular catheter and similar approach  shunt catheter placement  Small postsurgical layering hemorrhage within posterior horns of lateral ventricles and small pneumocephalus within anterior horn of right lateral ventricle  Stable ventricular size without hydrocephalus  2   No significant change in small subarachnoid hemorrhage in the anterior right frontal lobe  3   Grossly stable postsurgical change in the posterior fossa from prior tumor resection with unchanged mass effect upon the fourth ventricle  Again partially imaged pseudomeningocele extending to the right posterior superior neck  4   Stable right frontal lobe cortical lesion  Additional smaller lesions are not well visualized due to CT modality limitation  Lesions are demonstrated in prior MRI  Workstation performed: [id number]     CT head wo contrast    Result Date: 5/1/2023  Impression: Stable appearance of the posterior fossa in this patient with a prior mass resection  Persistent mild edema and slight mass effect upon the fourth ventricle without obstructive hydrocephalus  Shunt catheter unchanged in position and ventricular size is unchanged  Stable small amount of subarachnoid hemorrhage within the anterior superior right frontal lobe  Stable well-circumscribed hyperdense metastatic lesion within the right frontal lobe  Stable small pseudomeningocele anterior and posterior to the occipital craniotomy flap   Workstation performed: [id number]     CT head wo contrast    Result Date: 4/30/2023  Impression: No significant interval change since prior exam  Status post right frontal approach ventriculostomy catheter placement with unchanged ventricular size  Small amount of hemorrhage within the right frontal region is stable  Status post suboccipital craniotomy with a grossly stable pseudomeningocele  Workstation performed: [id number]     CT head wo contrast    Result Date: 4/29/2023  Impression: No interval change since the prior exam of the same day with findings detailed above  Workstation performed: [id number]     CT head wo contrast    Result Date: 4/29/2023  Impression: Stable CT of the brain status post occipital craniotomy and resection of metastatic disease  Postoperative pseudomeningocele noted similar to the prior study  Supratentorial metastatic lesions are again noted essentially stable in size  Small amount of postprocedural hemorrhage identified in the right frontal lobe and adjacent frontal sulcus from shunt catheter placement  Workstation performed: [id number]     CT head wo contrast    Result Date: 4/28/2023  Impression: New postsurgical changes of right frontal approach ventricular catheter with tip in frontal horn of right lateral ventricle with stable ventricular size  New acute trace subarachnoid hemorrhage right frontal region, likely from recent surgery  Prior postsurgical changes of suboccipital craniotomy for resection of cerebellar metastatic lesion  Stable small hyperdense metastatic lesions in bilateral frontal lobes (right larger than left) and 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 which was better evaluated on recent MRI brain 4/25/2023  The study was marked in [facility] for immediate notification  Workstation performed: [id number]     CT head without contrast    Result Date: 4/28/2023  Impression: No CT evidence for acute territorial infarct   No new mass effect or midline shift  Known intracranial metastatic lesions better visualized on the prior recent MRI brain examination  Postsurgical changes of prior suboccipital craniectomy with grossly stable appearance of the surgical resection bed and 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 compared to the recent MRI accounting for differences in modality  Workstation performed: [id number]     CT chest abdomen pelvis w contrast    Result Date: 4/28/2023  Impression: New saddle pulmonary embolism extending into bilateral pulmonary arterial segmental branches  No CT evidence of right heart strain  Stable treated metastatic subcentimeter hepatic lesions  No new or enlarging sites of metastatic disease in chest, abdomen, or pelvis  Additional chronic/incidental findings as detailed above  I personally discussed this study with Livan Wolf on 4/28/2023 4:05 PM  Workstation performed: [id number]     IR IVC filter placement permanent    Result Date: 4/30/2023  Impression: Infrarenal IVC filter placement  _______________________________________________________________ COMPARISON: CT abdomen pelvis 4/28/2023 PROCEDURE DETAILS: Operators: Dr Presley Kay Anesthesia:  Local anesthesia  Medications: 1% lidocaine Contrast: 40 mL of Omnipaque 300 Fluoroscopy time: One-point minutes Images: 32 COMMENTS: A preprocedure timeout was performed per [facility] protocol  The right neck was prepped and draped in the usual sterile fashion  Under ultrasound guidance, the patent and compressible right internal jugular vein was punctured using a 21-gauge micropuncture needle  A static ultrasound image was saved to PACS  A microwire was advanced through the needle into the right atrium  The needle was exchanged for a microsheath allowing exchange of the microwire for a heavy wire which was advanced into the IVC  The IVC filter 9 Yi sheath was advanced over the wire into the IVC   The wire was removed and a small hand contrast injection confirmed placement  A IVC venogram was performed  The wire was removed, the filter was advanced using the pusher through the sheath into appropriate position  The sheath was then withdrawn until the filter was deployed  IVC venogram was repeated  Sheath was removed and manual pressure was performed of the right neck to ensure hemostasis   Workstation performed: [id number]       Code Status: Level 4 - Comfort Care  Advance Directive & Living Will: Received    ==    Robbi Alston, DO  [address]  Internal Medicine Resident PGY-2

## 2023-05-05 NOTE — NURSING NOTE
Family at bedside  Patient transitioned to comfort care  Patient appears comfortable and states she is not in pain  Will continue to assess patients comfort and intervene when appropriate

## 2023-05-05 NOTE — CASE MANAGEMENT
Case Management Discharge Planning Note    Patient name Evan Stacy  Location ICU ICU  MRN [id number]  : 1970 Date 2023       Current Admission Date: 2023  Current Admission Diagnosis: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 [facility])   Patient Active Problem List    Diagnosis Date Noted   • Pulmonary embolism and DVT 2023   • Status post brain surgery 2023   • 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 [facility])    • Leukocytosis 2023   • Hyperglycemia, drug-induced 2023   • Vomiting 2023   • Brain mass 2023   • Injury of right foot 2023   • Constipation 2023   • GERD (gastroesophageal reflux disease) 2023   • Palliative care patient 2023   • Rosacea 10/03/2022   • Mixed hyperlipidemia 10/03/2022   • Cerebral aneurysm 2022   • Stroke ([facility] ) 2022   • Schwannoma of cranial nerve ([facility] ) 2021   • Allergic rhinitis 07/15/2021   • Obesity 07/15/2021   • Anxiety about health 2021   • Lymphedema 2020   • Postmenopausal 2020   • Advanced care planning/counseling discussion 01/15/2020   • Rash 01/15/2020   • On antineoplastic chemotherapy 2020   • S/P BSO (bilateral salpingo-oophorectomy) 2019   • Breast cancer metastasized to liver ([facility] ) 2019   • Multiple lesions on computed tomography of brain 2019   • BRCA2 gene mutation positive 2019   • Carcinoma of right breast metastatic to axillary lymph node ([facility] ) 2019   • Family history of breast cancer in female 2019   • Family history of colon cancer 2019   • Dense breast tissue 2019   • Malignant neoplasm of central portion of right breast in female, estrogen receptor positive ([facility] ) 2019      LOS (days): 7  Geometric Mean LOS (GMLOS) (days): 6 60  Days to GMLOS:-0 9     OBJECTIVE:  Risk of Unplanned Readmission Score: 31 77         Current admission status: Inpatient   Preferred Pharmacy:   [facility]/pharmacy #[id number] - [place], [address] [place] [address]  23 [facility]  Phone: [phone] Fax: [phone]    Carline Palomo, [address] [facility] [address]  Phone: [phone] Fax: [phone]    Primary Care Provider: EMILIANO Moyer    Primary Insurance: BLUE CROSS  Secondary Insurance:     DISCHARGE DETAILS:    CM placed hospice referral to evaluate for IPU, as was requested   CM will follow up

## 2023-05-05 NOTE — ACP (ADVANCE CARE PLANNING)
Record of Family Meeting - Palliative and [address] 47 y o  female [phone]      Recommendations and Plan:  -Transition to level 4, comfort care  -Hospice referral placed, will ask for IPU level of care  -Per patient and family request, please no  support  A family meeting was held for Ms Roque Casey  This meeting was necessary for determine the appropriate course of treatment  Time of Meetin:30 AM  Meeting Location: Patient bedside  Participants:    Family- Brother (Phuc Haider), son Brady Rai   Matt Morales Demarco Face)   Dr Evan Israel, [facility]    Patient Participation: Intermittently participates    Patient Support System: All present    Advanced Directive of POLST available: yes    Topics of Discussion:  -Neuro critical care and neurosurgery provided a comprehensive medical update  Reviewed that at this time there is no further neurosurgical intervention to offer and given underlying cancer patient will continue to likely decline   -Reviewed hospice cares at length  Including different levels of hospice care  Discussed hospice IPU and I did explain I thought she would qualify for this level of care  -Reviewed her living will and her goal for comfort if in an incurable end-stage condition   -Discussed comfort cares while admitted at the hospital    Other Content of Meeting:  -All questions and concerns were addressed to their satisfaction  -Spent providing supportive listening    Time Involved in Meetin+ minutes, beginning at approximately 11:30 AM and ending at approximately 1235 [facility] DO [place]   Palliative and Supportive Care  Clinic/Answering Service: [phone]  You can find me on [employer]!

## 2023-05-08 ENCOUNTER — APPOINTMENT (OUTPATIENT)
Dept: RADIATION ONCOLOGY | Facility: HOSPITAL | Age: 53
End: 2023-05-08
Attending: STUDENT IN AN ORGANIZED HEALTH CARE EDUCATION/TRAINING PROGRAM
Payer: COMMERCIAL

## 2023-05-08 NOTE — UTILIZATION REVIEW
NOTIFICATION OF ADMISSION DISCHARGE   This is a Notification of Discharge from [facility]  Please be advised that this patient has been discharge from our facility  Below you will find the admission and discharge date and time including the patient’s disposition  UTILIZATION REVIEW CONTACT:  Alexx Chapman  Utilization   Network Utilization Review Department  Phone: [phone] x carefully listen to the prompts  All voicemails are confidential   Email: [email or web address]     ADMISSION INFORMATION  PRESENTATION DATE: 2023  7:26 PM  OBERVATION ADMISSION DATE:   INPATIENT ADMISSION DATE: 23  1:09 AM   DISCHARGE DATE: 2023  6:57 PM   DISPOSITION:    IMPORTANT INFORMATION:  Send all requests for admission clinical reviews, approved or denied determinations and any other requests to dedicated fax number below belonging to the campus where the patient is receiving treatment   List of dedicated fax numbers:  [address] DENIALS (Administrative/Medical Necessity) [phone]   [address] (Maternity/NICU/Pediatrics) [phone]   [facility] [phone]   [place] [address] [phone]   [address] [phone]   220 [facility] [phone]   90 [facility] [phone]   [address] [place] [address] 119 [phone]   [facility]  [phone]   4054 [facility] [phone]   412 [facility] [address] [facility] [phone]

## 2023-05-09 ENCOUNTER — TELEPHONE (OUTPATIENT)
Dept: FAMILY MEDICINE CLINIC | Facility: CLINIC | Age: 53
End: 2023-05-09

## 2023-05-10 ENCOUNTER — APPOINTMENT (OUTPATIENT)
Dept: RADIATION ONCOLOGY | Facility: HOSPITAL | Age: 53
End: 2023-05-10
Attending: STUDENT IN AN ORGANIZED HEALTH CARE EDUCATION/TRAINING PROGRAM
Payer: COMMERCIAL

## 2023-05-11 ENCOUNTER — HOME CARE VISIT (OUTPATIENT)
Dept: HOME HOSPICE | Facility: HOSPICE | Age: 53
End: 2023-05-11

## 2023-05-11 ENCOUNTER — APPOINTMENT (OUTPATIENT)
Dept: RADIATION ONCOLOGY | Facility: HOSPITAL | Age: 53
End: 2023-05-11
Attending: STUDENT IN AN ORGANIZED HEALTH CARE EDUCATION/TRAINING PROGRAM
Payer: COMMERCIAL

## 2023-05-11 NOTE — CASE COMMUNICATION
"Tramaine Angus, Bereavement Final IDG 5/10/23 (1MR,2LR) (Due: 23)   : 1970  SOC: 23  DOD: 23  Diagnosis: Breast Cancer, Mets to the Brain and Liver, Respiratory Failure  Primary: Son - Burnis Music \"Amilcar\" Shira Luu was a 47year old woman at the [facility]  Son Albert Azul, brother Yancy Hernadez and multiple friends visited  There was mention of another son and ex-spouse - both estranged  Friend/ Anup Postal was bringing ricky d for the family  Yancy Hernadez felt his brother looked comfortable  Albert Azul said it appeared Halle's death was coming soon when her breathing changed  Family was at bedside  TOD: MSW was notified of Halle's death  Maksim Cannon notified RN that ex-spouse and estranged son were not to visit  MSW reviewed that [address] [place] Светлана Briand did not set restrictions when it was brought up the day before  Joi Agustin stated it was one of the last things Nabil Kern said to her  MSW stated he would consult with the family should they arrive, which it does not sound like they did  Joi Agustin declined bereavement follow-up  Kay Byers, and Anup Miller to be followed in bereavement  Call Assignments:  Maddie Javed to reassess Maxell \"Amilcar\" Rick Ramos at [facility]  (Due: 23)  BOOM Manzanares to reassess brother Priscillarafael Prajapatins and friend Annie Garcia at [facility]   (Due: 23)  "

## 2023-05-12 ENCOUNTER — APPOINTMENT (OUTPATIENT)
Dept: RADIATION ONCOLOGY | Facility: HOSPITAL | Age: 53
End: 2023-05-12
Attending: STUDENT IN AN ORGANIZED HEALTH CARE EDUCATION/TRAINING PROGRAM
Payer: COMMERCIAL

## 2023-05-12 NOTE — ANESTHESIA PREPROCEDURE EVALUATION
Procedure:  Right, possible left, ventriculoperitoneal shunt placement using neuronavigation, laparoscopic abdominal portion with GI surgery (Right: Head)  LAPAROSCOPIC APPROACH FOR VENTRICULAR PERITONEAL SHUNT INSERTION (Abdomen)    Relevant Problems   CARDIO   (+) Mixed hyperlipidemia      GI/HEPATIC   (+) Breast cancer metastasized to liver (HCC)   (+) GERD (gastroesophageal reflux disease)      GYN   (+) Carcinoma of right breast metastatic to axillary lymph node (HCC)   (+) Malignant neoplasm of central portion of right breast in female, estrogen receptor positive (HCC)      NEURO/PSYCH   (+) Anxiety about health   (+) Stroke ([facility] )      Other   (+) Carcinoma of right breast metastatic to axillary lymph node (HCC)        Physical Exam    Airway    Mallampati score: II  TM Distance: >3 FB  Neck ROM: full     Dental   No notable dental hx     Cardiovascular  Rhythm: regular, Rate: normal, Cardiovascular exam normal    Pulmonary  Pulmonary exam normal Breath sounds clear to auscultation,     Other Findings        Anesthesia Plan  ASA Score- 4     Anesthesia Type- general with ASA Monitors  Additional Monitors: arterial line  Airway Plan: ETT  Plan Factors-Exercise tolerance (METS): >4 METS  Chart reviewed  EKG reviewed  Imaging results reviewed  Existing labs reviewed  Patient summary reviewed  Induction- intravenous  Postoperative Plan- Plan for postoperative opioid use  Informed Consent- Anesthetic plan and risks discussed with patient  I personally reviewed this patient with the CRNA  Discussed and agreed on the Anesthesia Plan with the CRNA  Nico Julio

## 2023-08-07 NOTE — PROGRESS NOTES
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  Plan  · Reviewed with patient the natural history of aneurysms  · Discussed modifiable risk factors for aneurysm growth and rupture including avoiding smoking and managing hypertension and hyperlipidemia   Certain genetic conditions also are associated with the incidence and rupture rate of aneurysm  Patients family members should be screened by their Primary Care Providers with MRA head and neck  Recommended that patient discuss this information  with family members   Recommend that patient call 911 and present to Emergency Room if  they experience sudden severe  headache, seizure, mental status change, speech / vision change, sensory / motor change, or other neurological change  · At this time, no nsx intervention anticipated  Recommend follow up in 6 month with repeat MRA head wo (Snplx Dr Tracie Brooks)  Since pt has upcoming MRI brain wo ordered to be done for follow up for the vestibular schwannoma will have pt obtain the MRA head at the same time  · Pt made aware to contact nsx with any questions or concerns in the interim           Diagnoses and all orders for this visit:    Cerebral aneurysm  -     Ambulatory referral to Neurosurgery  -     Cancel: CTA head w wo contrast; Future  -     MRI angiogram head without contrast; Future    Other orders  -     Specialty Vitamins Products (Advanced Collagen) TABS; Take by mouth in the morning        Subjective/Objective     Chief Complaint    Follow up for suspected cerebral aneurysm    HPI    Patient is a 59-year-old female with history of breast cancer metastatic to lymph node and liver status post right mastectomy and lymph node resection with baseline right arm numbness, obesity, right vestibular schwannoma who presents for consultation for suspected 2 mm aneurysm of the right superior cerebellar artery origin, found incidentally during workup for cerebral infarcts  Patient denies any sudden severe headache  She dizziness or lightheadedness  She reports chronic right sided 'lazy eye' and reports that she uses glasses  Patient denies any new numbness, tingling or weakness than her baseline  Pt denies changes in gait or balance  Pt denies nausea or vomiting  She reports she quit smoking about 5 years ago  She denies hx of HTN  She reports has been making healthy choices and lost over 10 lbs this year with diet and exercise  Pt reports she has 2 children in their 25s  She reports her aunt  of ruptured cerebral aneurysm  ROS    Review of Systems   Constitutional: Negative  HENT: Positive for hearing loss  Eyes: Positive for visual disturbance (wears glasses)  Respiratory:        Emphysema    Cardiovascular: Negative  Gastrointestinal: Negative  Endocrine: Negative  Genitourinary: Negative  Musculoskeletal: Positive for gait problem (balance issues)  Skin: Negative  Allergic/Immunologic: Negative  Hematological: Negative  Psychiatric/Behavioral: Negative          Family History    Family History   Problem Relation Age of Onset    Hypotension Mother     Colon cancer Father 36    Hypertension Father     Prostate cancer Father 79    Throat cancer Maternal Grandmother 61    Cancer Maternal Grandmother     Breast cancer Paternal Aunt         age unknown       Social History    Social History     Socioeconomic History    Marital status: Single     Spouse name: Not on file    Number of children: Not on file    Years of education: Not on file    Highest education level: Not on file   Occupational History    Not on file   Tobacco Use    Smoking status: Former Smoker     Packs/day: 1 00     Years: 30 00     Pack years: 30 00     Quit date: 2017     Years since quittin 6    Smokeless tobacco: Never Used   Vaping Use    Vaping Use: Never used   Substance and Sexual Activity    Alcohol use: Yes     Comment: occassional    Drug use: No    Sexual activity: Not Currently     Partners: Male     Birth control/protection: None   Other Topics Concern    Not on file   Social History Narrative    Consumes 2-3 cups of coffee per day     Social Determinants of Health     Financial Resource Strain: Not on file   Food Insecurity: Not on file   Transportation Needs: Not on file   Physical Activity: Not on file   Stress: Not on file   Social Connections: Not on file   Intimate Partner Violence: Not on file   Housing Stability: Not on file       Past Medical History    Past Medical History:   Diagnosis Date    Bloating     Bruises easily     Cancer ([address] )     right breast//to lymph nodes/liver/ and bone    Depression     History of cancer chemotherapy 2020    History of pneumonia     Hypotension     occas    Low iron     "when pregnant, 20 yrs ago"    Neuropathy     hands//fingers    Shortness of breath     occas    Tinnitus     right    Wears glasses        Surgical History    Past Surgical History:   Procedure Laterality Date    BILATERAL SALPINGOOPHORECTOMY      BREAST BIOPSY      IR BIOPSY LIVER MASS  2019    MYOMECTOMY      H/O FIBROIDS, NO SURGERY NEEDED    MT LAP,RMV  ADNEXAL STRUCTURE N/A 2019    Procedure: LAPAROSCOPIC BILATERAL SALPINGO-OOPHORECTOMY;  Surgeon: Caryn Simmons MD;  Location:  MAIN OR;  Service: Gynecology Oncology    MT MASTECTOMY, SIMPLE, COMPLETE Right 2020    Procedure: MASTECTOMY SIMPLE, axillary node dissection;  Surgeon: Newton Rangel MD;  Location: [facility] Main OR;  Service: Surgical Oncology    US GUIDED BREAST BIOPSY RIGHT COMPLETE Right 5/3/2019    US GUIDED show BREAST LYMPH NODE BIOPSY RIGHT Right 5/3/2019       Medications      Current Outpatient Medications:     albuterol (Proventil HFA) 90 mcg/act inhaler, Inhale 2 puffs every 6 (six) hours as needed for wheezing, Disp: 6 7 g, Rfl: 5    ALPRAZolam (XANAX) 0 25 mg tablet, Take 1 tablet (0 25 mg total) by mouth daily as needed (during day for anxiety as needed), Disp: 40 tablet, Rfl: 0    ASPIRIN 81 PO, Take by mouth, Disp: , Rfl:     atorvastatin (LIPITOR) 40 mg tablet, Take 1 tablet (40 mg total) by mouth daily, Disp: 90 tablet, Rfl: 1    b complex vitamins capsule, Take 1 capsule by mouth daily, Disp: , Rfl:     cholecalciferol (VITAMIN D3) 1,000 units tablet, Take 1 tablet (1,000 Units total) by mouth daily, Disp: , Rfl:     fluticasone-vilanterol (Breo Ellipta) 200-25 MCG/INH inhaler, Inhale 1 puff daily Rinse mouth after use , Disp: 180 blister, Rfl: 0    olaparib (Lynparza) tablet, TAKE 2 TABLETS (300 MG) TWICE A DAY, Disp: 120 tablet, Rfl: 2    oxyCODONE (ROXICODONE) 10 MG TABS, Take 1 tablet (10 mg total) by mouth every 6 (six) hours as needed for moderate pain Max Daily Amount: 40 mg, Disp: 30 tablet, Rfl: 0    Specialty Vitamins Products (Advanced Collagen) TABS, Take by mouth in the morning, Disp: , Rfl:     Allergies    Allergies   Allergen Reactions    Tylenol [Acetaminophen]      Told to avoid due to cancer        The following portions of the patient's history were reviewed and updated as appropriate: allergies, current medications, past family history, past medical history, past social history, past surgical history and problem list     Investigations    I personally reviewed the MRA results with the patient:        Physical Exam    Vitals:    08/08/22 0706   BP: 127/76   Pulse: 76   Resp: 16   Temp: 97 7 °F (36 5 °C)       Physical Exam  Vitals and nursing note reviewed  Constitutional:       General: She is not in acute distress  Appearance: She is well-developed     HENT:      Head: Normocephalic and atraumatic  Eyes:      Conjunctiva/sclera: Conjunctivae normal       Pupils: Pupils are equal, round, and reactive to light  Cardiovascular:      Rate and Rhythm: Normal rate and regular rhythm  Heart sounds: No murmur heard  Pulmonary:      Effort: Pulmonary effort is normal  No respiratory distress  Breath sounds: Normal breath sounds  Abdominal:      Palpations: Abdomen is soft  Tenderness: There is no abdominal tenderness  Musculoskeletal:      Cervical back: Neck supple  Skin:     General: Skin is warm and dry  Neurological:      Mental Status: She is alert and oriented to person, place, and time  Comments: GCS 15, Awake, Alert, Oriented x 3    Motor: S[place]IN, strength 5/5 throughout    Sensation:  intact to LT X 4     Coordination: no drift bilateral upper extremities     Psychiatric:         Speech: Speech normal        Neurologic Exam     Mental Status   Oriented to person, place, and time  Attention: normal  Concentration: normal    Speech: speech is normal   Level of consciousness: alert  Knowledge: good  Normal comprehension  Cranial Nerves   Cranial nerves II through XII intact  CN III, IV, VI   Pupils are equal, round, and reactive to light      Motor Exam   Muscle bulk: normal  Overall muscle tone: normal

## 2024-03-28 NOTE — TELEPHONE ENCOUNTER
----- Message from Althea Denney MD sent at 6/30/2022  9:13 AM EDT -----  Nursing, Let pt know chol panel abnormal   In setting of the cva found on imaging, rec pt to touch base with pcp office for further cholesterol management  Total chol elevate from 201 to 220, triglycerides elevated from 71 to now 172, and hdl ie the better cholesterol lower from 55 to 45 and ldl up from 132 to 141  Rec re eval with pcp for cva risk factors  Cc ing pcp office as well on labs  Recommended icing daily, wearing compression sleeve, and start meloxicam. Do not take ibuprofen with meloxicam.

## 2024-11-10 NOTE — PLAN OF CARE
Balloon removed. Problem: OCCUPATIONAL THERAPY ADULT  Goal: Performs self-care activities at highest level of function for planned discharge setting  See evaluation for individualized goals  Description: Treatment Interventions: ADL retraining, Functional transfer training, UE strengthening/ROM, Endurance training, Cognitive reorientation, Patient/family training, Equipment evaluation/education, Compensatory technique education  Equipment Recommended: Bedside commode, Shower/Tub chair with back ($)       See flowsheet documentation for full assessment, interventions and recommendations  Outcome: Progressing  Note: Limitation: Decreased ADL status, Decreased Safe judgement during ADL, Decreased cognition, Decreased endurance, Decreased self-care trans, Decreased high-level ADLs  Prognosis: Good, Fair  Assessment: Pt was seen this date for OT tx session focusing on self care tasks, bed mobility, sit to stand progressions, standing tolerance, tranfers, safety awareness, compensatory techniques, energy conservation, and overall activity tolerance  Pt presents supine and is agreeable to OT tx session  All vitals WNL  Pt completes previously mentioned tasks at documented assist levels please see above in flow sheet  Pt now requiring Max a x2 with BL knee block for transfers  and max A for self care tasks  Pt is overall limited by decreased balance, aphasia, increased fatigue, decreased strength, endurance, and activity tolerance and continues to function below baseline level  Pt resting OOB in chair at end of session with all needs in reach and alarm on  Pt would benefit from continued OT Tx to improve overall functional abilities and increase independence  Will continue to follow with current POC       OT Discharge Recommendation: Post acute rehabilitation services

## 2025-02-01 NOTE — TELEPHONE ENCOUNTER
4/6/23- PT IN HOSPITAL  2WK PATH SCHEDULED 4/20/23      Anibal Becerra, [address] Clerical  Patient needs 2-week path review with Dr Brigette bledsoe on 4/3/23 27.3

## 2025-05-27 NOTE — PROGRESS NOTES
Age appropriate Gonorrhea/Chlamydia screen is negative. No further action required.           Pt tolerated treatment today without incident    Pt declined AVS but is aware of future appts

## (undated) DEVICE — SUTURE BOOTS YELLOW

## (undated) DEVICE — TOOL MR8-15MH22 MR8 15CM MATCH 2.2MM: Brand: MIDAS REX MR8

## (undated) DEVICE — SUT MONOCRYL PLUS 4-0 PS-2 18 IN MCP496G

## (undated) DEVICE — NEEDLE 25G X 1 1/2

## (undated) DEVICE — SPONGE LAP 18 X 18 IN STRL RFD

## (undated) DEVICE — GLOVE INDICATOR PI UNDERGLOVE SZ 8.5 BLUE

## (undated) DEVICE — GLOVE INDICATOR PI UNDERGLOVE SZ 7.5 BLUE

## (undated) DEVICE — GLOVE SRG BIOGEL 6

## (undated) DEVICE — IRRIG ENDO FLO TUBING

## (undated) DEVICE — TUBING SUCTION 5MM X 12 FT

## (undated) DEVICE — MAYO STAND COVER: Brand: CONVERTORS

## (undated) DEVICE — PAD GROUNDING ADULT

## (undated) DEVICE — INTENDED FOR TISSUE SEPARATION, AND OTHER PROCEDURES THAT REQUIRE A SHARP SURGICAL BLADE TO PUNCTURE OR CUT.: Brand: BARD-PARKER SAFETY BLADES SIZE 15, STERILE

## (undated) DEVICE — GLOVE SRG BIOGEL 6.5

## (undated) DEVICE — TROCAR: Brand: KII FIOS FIRST ENTRY

## (undated) DEVICE — PREMIUM DRY TRAY LF: Brand: MEDLINE INDUSTRIES, INC.

## (undated) DEVICE — ASTOUND STANDARD SURGICAL GOWN, XL: Brand: CONVERTORS

## (undated) DEVICE — SUT VICRYL 0 CT-1 27 IN J260H

## (undated) DEVICE — 3M™ IOBAN™ 2 ANTIMICROBIAL INCISE DRAPE 6650EZ: Brand: IOBAN™ 2

## (undated) DEVICE — 3000CC GUARDIAN II: Brand: GUARDIAN

## (undated) DEVICE — PERFORATOR CRANIAL 14MM

## (undated) DEVICE — INSUFFLATION TUBING PRIMFLO

## (undated) DEVICE — 3M™ TEGADERM™ TRANSPARENT FILM DRESSING FRAME STYLE, 1624W, 2-3/8 IN X 2-3/4 IN (6 CM X 7 CM), 100/CT 4CT/CASE: Brand: 3M™ TEGADERM™

## (undated) DEVICE — ARM DRAPE

## (undated) DEVICE — INTENDED FOR TISSUE SEPARATION, AND OTHER PROCEDURES THAT REQUIRE A SHARP SURGICAL BLADE TO PUNCTURE OR CUT.: Brand: BARD-PARKER SAFETY BLADES SIZE 10, STERILE

## (undated) DEVICE — SUT ETHILON 2-0 FS 18 IN 664H

## (undated) DEVICE — DRAPE INTESTINAL ISOLATION BAG

## (undated) DEVICE — DRAPE SHEET THREE QUARTER

## (undated) DEVICE — MEDI-VAC YANKAUER SUCTION HANDLE W/BULBOUS AND CONTROL VENT: Brand: CARDINAL HEALTH

## (undated) DEVICE — PACK BURR HOLE PBDS RF

## (undated) DEVICE — PLUMEPEN PRO 10FT

## (undated) DEVICE — DRAIN SPONGES,6 PLY: Brand: EXCILON

## (undated) DEVICE — JP PERF DRN SIL FLT 10MM FULL: Brand: CARDINAL HEALTH

## (undated) DEVICE — SUPER SPONGES,MEDIUM: Brand: KERLIX

## (undated) DEVICE — DISPOSABLE EQUIPMENT COVER: Brand: SMALL TOWEL DRAPE

## (undated) DEVICE — LUBRICANT INST ELECTROLUBE ANTISTK WO PAD

## (undated) DEVICE — SUT MONOCRYL 3-0 SH 27 IN Y416H

## (undated) DEVICE — ENSEAL LAPAROSCOPIC TISSUE SEALER G2 CURVED JAW FOR USE WITH G2 GENERATOR 5MM DIAMETER 35CM SHAFT LENGTH: Brand: ENSEAL

## (undated) DEVICE — SUT MONOCRYL 4-0 PS-2 27 IN Y426H

## (undated) DEVICE — SUT STRATAFIX SPIRAL 2-0 CT-1 30 CM SXPP1B410

## (undated) DEVICE — ANTI-FOG SOLUTION WITH FOAM PAD: Brand: DEVON

## (undated) DEVICE — SUT MONOCRYL 4-0 PS-2 18 IN Y496G

## (undated) DEVICE — HEMOSTATIC MATRIX SURGIFLO 8ML W/THROMBIN

## (undated) DEVICE — SUT SILK 2-0 18 IN A185H

## (undated) DEVICE — GLOVE INDICATOR PI UNDERGLOVE SZ 6.5 BLUE

## (undated) DEVICE — GLOVE PI ULTRA TOUCH SZ.7.5

## (undated) DEVICE — SWABSTCK, BENZOIN TINCTURE, 1/PK, STRL: Brand: APLICARE

## (undated) DEVICE — INTENDED FOR TISSUE SEPARATION, AND OTHER PROCEDURES THAT REQUIRE A SHARP SURGICAL BLADE TO PUNCTURE OR CUT.: Brand: BARD-PARKER ® CARBON RIB-BACK BLADES

## (undated) DEVICE — CHLORAPREP HI-LITE 26ML ORANGE

## (undated) DEVICE — COLUMN DRAPE

## (undated) DEVICE — ADHESIVE SKN CLSR HISTOACRYL FLEX 0.5ML LF

## (undated) DEVICE — UTILITY MARKER,BLACK WITH LABELS: Brand: DEVON

## (undated) DEVICE — JACKSON-PRATT 100CC BULB RESERVOIR: Brand: CARDINAL HEALTH

## (undated) DEVICE — PREP SURGICAL PURPREP 26ML

## (undated) DEVICE — PACK PBDS STERILE LAP LITHOTOMY RF

## (undated) DEVICE — INSUFLATION TUBING INSUFLOW (LEXION)

## (undated) DEVICE — PASSER 48409 60CM DISP. CATHETER

## (undated) DEVICE — BRA SURGICAL SZ 2XL (42-45)

## (undated) DEVICE — ADHESIVE SKIN HIGH VISCOSITY EXOFIN 1ML

## (undated) DEVICE — SUT VICRYL PLUS 0 UR-6 27IN VCP603H

## (undated) DEVICE — INTRO SHEATH  PEEL AWAY 9FR 12CM

## (undated) DEVICE — TISSUE RETRIEVAL SYSTEM: Brand: INZII RETRIEVAL SYSTEM

## (undated) DEVICE — GLOVE SRG BIOGEL 8

## (undated) DEVICE — SPECIMEN TRAP: Brand: ARGYLE

## (undated) DEVICE — 40595 XL TRENDELENBURG POSITIONING KIT: Brand: 40595 XL TRENDELENBURG POSITIONING KIT

## (undated) DEVICE — SYRINGE 10ML LL

## (undated) DEVICE — ANTIBACTERIAL VIOLET BRAIDED (POLYGLACTIN 910), SYNTHETIC ABSORBABLE SUTURE: Brand: COATED VICRYL

## (undated) DEVICE — INTENDED FOR TISSUE SEPARATION, AND OTHER PROCEDURES THAT REQUIRE A SHARP SURGICAL BLADE TO PUNCTURE OR CUT.: Brand: BARD-PARKER SAFETY BLADES SIZE 11, STERILE

## (undated) DEVICE — KIT, BETHLEHEM ROBOTIC PROST: Brand: CARDINAL HEALTH

## (undated) DEVICE — TRAY FOLEY 16FR URIMETER SILICONE SURESTEP

## (undated) DEVICE — STYLET 9735428 2 COIL SINGLE PACKAGE

## (undated) DEVICE — GLOVE INDICATOR PI UNDERGLOVE SZ 7 BLUE

## (undated) DEVICE — BETHLEHEM UNIVERSAL MINOR GEN: Brand: CARDINAL HEALTH

## (undated) DEVICE — TIBURON SPLIT SHEET: Brand: CONVERTORS

## (undated) DEVICE — VISUALIZATION SYSTEM: Brand: CLEARIFY

## (undated) DEVICE — LIGACLIP MCA MULTIPLE CLIP APPLIERS, 20 MEDIUM CLIPS: Brand: LIGACLIP

## (undated) DEVICE — CHEST/BREAST DRAPE: Brand: CONVERTORS

## (undated) DEVICE — PATIENT TRACKER 9734887 NON-INVASIVE

## (undated) DEVICE — PASSER 48407 CATHETER 38CM DISP.: Brand: CATHTER PASSER, DISPOSABLE

## (undated) DEVICE — SCD SEQUENTIAL COMPRESSION COMFORT SLEEVE MEDIUM KNEE LENGTH: Brand: KENDALL SCD

## (undated) DEVICE — ELECTRODE BLADE MOD E-Z CLEAN 2.5IN 6.4CM -0012M

## (undated) DEVICE — SPONGE PVP SCRUB WING STERILE

## (undated) DEVICE — NEEDLE 22 G X 1 1/2 SAFETY

## (undated) DEVICE — SUT SILK 2-0 SH 30 IN K833H

## (undated) DEVICE — POINTER 9735317 TRACER AXIEM PACKAGED: Brand: TRACER POINTER

## (undated) DEVICE — ENDOPATH PNEUMONEEDLE INSUFFLATION NEEDLES WITH LUER LOCK CONNECTORS 120MM: Brand: ENDOPATH

## (undated) DEVICE — TROCAR: Brand: KII® SLEEVE

## (undated) DEVICE — LIGHT HANDLE COVER SLEEVE DISP BLUE STELLAR

## (undated) DEVICE — VESSEL SEALER EXTEND: Brand: ENDOWRIST

## (undated) DEVICE — SURGIFOAM 8.5 X 12.5

## (undated) DEVICE — PROXIMATE SKIN STAPLERS (35 WIDE) CONTAINS 35 STAINLESS STEEL STAPLES (FIXED HEAD): Brand: PROXIMATE